# Patient Record
Sex: FEMALE | Race: WHITE | NOT HISPANIC OR LATINO | Employment: OTHER | URBAN - METROPOLITAN AREA
[De-identification: names, ages, dates, MRNs, and addresses within clinical notes are randomized per-mention and may not be internally consistent; named-entity substitution may affect disease eponyms.]

---

## 2017-03-03 ENCOUNTER — ALLSCRIPTS OFFICE VISIT (OUTPATIENT)
Dept: OTHER | Facility: OTHER | Age: 82
End: 2017-03-03

## 2017-03-03 DIAGNOSIS — R06.09 OTHER FORMS OF DYSPNEA: ICD-10-CM

## 2017-03-03 DIAGNOSIS — I25.10 ATHEROSCLEROTIC HEART DISEASE OF NATIVE CORONARY ARTERY WITHOUT ANGINA PECTORIS: ICD-10-CM

## 2017-03-03 DIAGNOSIS — I42.9 CARDIOMYOPATHY (HCC): ICD-10-CM

## 2017-03-03 DIAGNOSIS — Z86.79 PERSONAL HISTORY OF OTHER DISEASES OF THE CIRCULATORY SYSTEM: ICD-10-CM

## 2017-03-03 DIAGNOSIS — I44.7 LEFT BUNDLE-BRANCH BLOCK: ICD-10-CM

## 2017-03-20 ENCOUNTER — HOSPITAL ENCOUNTER (OUTPATIENT)
Dept: NON INVASIVE DIAGNOSTICS | Facility: CLINIC | Age: 82
Discharge: HOME | End: 2017-03-20
Payer: MEDICARE

## 2017-03-20 DIAGNOSIS — I25.10 ATHEROSCLEROTIC HEART DISEASE OF NATIVE CORONARY ARTERY WITHOUT ANGINA PECTORIS: ICD-10-CM

## 2017-03-20 DIAGNOSIS — Z86.79 PERSONAL HISTORY OF OTHER DISEASES OF THE CIRCULATORY SYSTEM: ICD-10-CM

## 2017-03-20 DIAGNOSIS — R06.09 OTHER FORMS OF DYSPNEA: ICD-10-CM

## 2017-03-20 DIAGNOSIS — I44.7 LEFT BUNDLE-BRANCH BLOCK: ICD-10-CM

## 2017-03-20 DIAGNOSIS — I42.9 CARDIOMYOPATHY (HCC): ICD-10-CM

## 2017-03-20 LAB
CHEST PAIN STATEMENT: NORMAL
MAX DIASTOLIC BP: 70 MMHG
MAX HEART RATE: 85 BPM
MAX PREDICTED HEART RATE: 133 BPM
MAX. SYSTOLIC BP: 130 MMHG
PROTOCOL NAME: NORMAL
REASON FOR TERMINATION: NORMAL
TARGET HR FORMULA: NORMAL
TEST INDICATION: NORMAL
TIME IN EXERCISE PHASE: 180 S

## 2017-03-20 PROCEDURE — 93306 TTE W/DOPPLER COMPLETE: CPT

## 2017-03-20 PROCEDURE — 78452 HT MUSCLE IMAGE SPECT MULT: CPT

## 2017-03-20 PROCEDURE — A9502 TC99M TETROFOSMIN: HCPCS

## 2017-03-20 PROCEDURE — 93017 CV STRESS TEST TRACING ONLY: CPT

## 2017-03-20 RX ADMIN — REGADENOSON 0.4 MG: 0.08 INJECTION, SOLUTION INTRAVENOUS at 13:17

## 2017-04-03 ENCOUNTER — ALLSCRIPTS OFFICE VISIT (OUTPATIENT)
Dept: OTHER | Facility: OTHER | Age: 82
End: 2017-04-03

## 2017-04-03 ENCOUNTER — APPOINTMENT (OUTPATIENT)
Dept: LAB | Facility: CLINIC | Age: 82
End: 2017-04-03
Payer: MEDICARE

## 2017-04-03 DIAGNOSIS — R94.39 OTHER NONSPECIFIC ABNORMAL CARDIOVASCULAR SYSTEM FUNCTION STUDY: ICD-10-CM

## 2017-04-03 DIAGNOSIS — I25.10 ATHEROSCLEROTIC HEART DISEASE OF NATIVE CORONARY ARTERY WITHOUT ANGINA PECTORIS: ICD-10-CM

## 2017-04-03 DIAGNOSIS — R06.09 OTHER FORMS OF DYSPNEA: ICD-10-CM

## 2017-04-03 LAB
ANION GAP SERPL CALCULATED.3IONS-SCNC: 8 MMOL/L (ref 4–13)
BUN SERPL-MCNC: 16 MG/DL (ref 5–25)
CALCIUM SERPL-MCNC: 9.2 MG/DL (ref 8.3–10.1)
CHLORIDE SERPL-SCNC: 99 MMOL/L (ref 100–108)
CO2 SERPL-SCNC: 32 MMOL/L (ref 21–32)
CREAT SERPL-MCNC: 0.71 MG/DL (ref 0.6–1.3)
ERYTHROCYTE [DISTWIDTH] IN BLOOD BY AUTOMATED COUNT: 14.1 % (ref 11.6–15.1)
GFR SERPL CREATININE-BSD FRML MDRD: >60 ML/MIN/1.73SQ M
GLUCOSE SERPL-MCNC: 67 MG/DL (ref 65–140)
HCT VFR BLD AUTO: 38.9 % (ref 34.8–46.1)
HGB BLD-MCNC: 12.6 G/DL (ref 11.5–15.4)
INR PPP: 1.05 (ref 0.86–1.16)
MCH RBC QN AUTO: 29.4 PG (ref 26.8–34.3)
MCHC RBC AUTO-ENTMCNC: 32.4 G/DL (ref 31.4–37.4)
MCV RBC AUTO: 91 FL (ref 82–98)
PLATELET # BLD AUTO: 228 THOUSANDS/UL (ref 149–390)
PMV BLD AUTO: 9.5 FL (ref 8.9–12.7)
POTASSIUM SERPL-SCNC: 4.2 MMOL/L (ref 3.5–5.3)
PROTHROMBIN TIME: 13.5 SECONDS (ref 12–14.3)
RBC # BLD AUTO: 4.29 MILLION/UL (ref 3.81–5.12)
SODIUM SERPL-SCNC: 139 MMOL/L (ref 136–145)
WBC # BLD AUTO: 7.01 THOUSAND/UL (ref 4.31–10.16)

## 2017-04-03 PROCEDURE — 85610 PROTHROMBIN TIME: CPT

## 2017-04-03 PROCEDURE — 85027 COMPLETE CBC AUTOMATED: CPT

## 2017-04-03 PROCEDURE — 36415 COLL VENOUS BLD VENIPUNCTURE: CPT

## 2017-04-03 PROCEDURE — 80048 BASIC METABOLIC PNL TOTAL CA: CPT

## 2017-04-07 PROBLEM — I25.10 CAD (CORONARY ARTERY DISEASE): Status: ACTIVE | Noted: 2017-04-07

## 2017-04-07 PROBLEM — I10 HTN (HYPERTENSION): Status: ACTIVE | Noted: 2017-04-07

## 2017-04-10 ENCOUNTER — HOSPITAL ENCOUNTER (OUTPATIENT)
Dept: NON INVASIVE DIAGNOSTICS | Facility: HOSPITAL | Age: 82
Discharge: HOME | End: 2017-04-10
Attending: INTERNAL MEDICINE | Admitting: INTERNAL MEDICINE
Payer: MEDICARE

## 2017-04-10 VITALS
BODY MASS INDEX: 32.89 KG/M2 | OXYGEN SATURATION: 98 % | HEART RATE: 75 BPM | HEIGHT: 60 IN | SYSTOLIC BLOOD PRESSURE: 145 MMHG | TEMPERATURE: 97.5 F | DIASTOLIC BLOOD PRESSURE: 64 MMHG | WEIGHT: 167.55 LBS | RESPIRATION RATE: 18 BRPM

## 2017-04-10 DIAGNOSIS — I25.10 ATHEROSCLEROTIC HEART DISEASE OF NATIVE CORONARY ARTERY WITHOUT ANGINA PECTORIS: ICD-10-CM

## 2017-04-10 DIAGNOSIS — R06.09 OTHER FORMS OF DYSPNEA: ICD-10-CM

## 2017-04-10 DIAGNOSIS — R94.39 OTHER NONSPECIFIC ABNORMAL CARDIOVASCULAR SYSTEM FUNCTION STUDY: ICD-10-CM

## 2017-04-10 LAB
ANION GAP SERPL CALCULATED.3IONS-SCNC: 11 MMOL/L (ref 4–13)
BASOPHILS # BLD AUTO: 0.01 THOUSANDS/ΜL (ref 0–0.1)
BASOPHILS NFR BLD AUTO: 0 % (ref 0–1)
BUN SERPL-MCNC: 12 MG/DL (ref 5–25)
CALCIUM SERPL-MCNC: 9.1 MG/DL (ref 8.3–10.1)
CHLORIDE SERPL-SCNC: 99 MMOL/L (ref 100–108)
CO2 SERPL-SCNC: 27 MMOL/L (ref 21–32)
CREAT SERPL-MCNC: 0.73 MG/DL (ref 0.6–1.3)
EOSINOPHIL # BLD AUTO: 0.1 THOUSAND/ΜL (ref 0–0.61)
EOSINOPHIL NFR BLD AUTO: 2 % (ref 0–6)
ERYTHROCYTE [DISTWIDTH] IN BLOOD BY AUTOMATED COUNT: 14.2 % (ref 11.6–15.1)
GFR SERPL CREATININE-BSD FRML MDRD: >60 ML/MIN/1.73SQ M
GLUCOSE P FAST SERPL-MCNC: 101 MG/DL (ref 65–99)
GLUCOSE SERPL-MCNC: 101 MG/DL (ref 65–140)
HCT VFR BLD AUTO: 39.1 % (ref 34.8–46.1)
HGB BLD-MCNC: 12.8 G/DL (ref 11.5–15.4)
INR PPP: 0.99 (ref 0.86–1.16)
LYMPHOCYTES # BLD AUTO: 0.99 THOUSANDS/ΜL (ref 0.6–4.47)
LYMPHOCYTES NFR BLD AUTO: 19 % (ref 14–44)
MAGNESIUM SERPL-MCNC: 1.8 MG/DL (ref 1.6–2.6)
MCH RBC QN AUTO: 29.4 PG (ref 26.8–34.3)
MCHC RBC AUTO-ENTMCNC: 32.7 G/DL (ref 31.4–37.4)
MCV RBC AUTO: 90 FL (ref 82–98)
MONOCYTES # BLD AUTO: 0.41 THOUSAND/ΜL (ref 0.17–1.22)
MONOCYTES NFR BLD AUTO: 8 % (ref 4–12)
NEUTROPHILS # BLD AUTO: 3.74 THOUSANDS/ΜL (ref 1.85–7.62)
NEUTS SEG NFR BLD AUTO: 71 % (ref 43–75)
PLATELET # BLD AUTO: 227 THOUSANDS/UL (ref 149–390)
PMV BLD AUTO: 9.2 FL (ref 8.9–12.7)
POTASSIUM SERPL-SCNC: 4.8 MMOL/L (ref 3.5–5.3)
PROTHROMBIN TIME: 12.9 SECONDS (ref 12–14.3)
RBC # BLD AUTO: 4.36 MILLION/UL (ref 3.81–5.12)
SODIUM SERPL-SCNC: 137 MMOL/L (ref 136–145)
WBC # BLD AUTO: 5.25 THOUSAND/UL (ref 4.31–10.16)

## 2017-04-10 PROCEDURE — 93458 L HRT ARTERY/VENTRICLE ANGIO: CPT | Performed by: INTERNAL MEDICINE

## 2017-04-10 PROCEDURE — C1769 GUIDE WIRE: HCPCS | Performed by: INTERNAL MEDICINE

## 2017-04-10 PROCEDURE — 99152 MOD SED SAME PHYS/QHP 5/>YRS: CPT | Performed by: INTERNAL MEDICINE

## 2017-04-10 PROCEDURE — C1760 CLOSURE DEV, VASC: HCPCS | Performed by: INTERNAL MEDICINE

## 2017-04-10 PROCEDURE — 99153 MOD SED SAME PHYS/QHP EA: CPT | Performed by: INTERNAL MEDICINE

## 2017-04-10 PROCEDURE — 83735 ASSAY OF MAGNESIUM: CPT | Performed by: NURSE PRACTITIONER

## 2017-04-10 PROCEDURE — 80048 BASIC METABOLIC PNL TOTAL CA: CPT | Performed by: NURSE PRACTITIONER

## 2017-04-10 PROCEDURE — C1894 INTRO/SHEATH, NON-LASER: HCPCS | Performed by: INTERNAL MEDICINE

## 2017-04-10 PROCEDURE — 93567 NJX CAR CTH SPRVLV AORTGRPHY: CPT | Performed by: INTERNAL MEDICINE

## 2017-04-10 PROCEDURE — 85025 COMPLETE CBC W/AUTO DIFF WBC: CPT | Performed by: NURSE PRACTITIONER

## 2017-04-10 PROCEDURE — 85610 PROTHROMBIN TIME: CPT | Performed by: NURSE PRACTITIONER

## 2017-04-10 RX ORDER — METOPROLOL SUCCINATE 50 MG/1
50 TABLET, EXTENDED RELEASE ORAL DAILY
Status: DISCONTINUED | OUTPATIENT
Start: 2017-04-10 | End: 2017-04-11 | Stop reason: HOSPADM

## 2017-04-10 RX ORDER — LEVOTHYROXINE SODIUM 0.03 MG/1
25 TABLET ORAL DAILY
Status: DISCONTINUED | OUTPATIENT
Start: 2017-04-10 | End: 2017-04-11 | Stop reason: HOSPADM

## 2017-04-10 RX ORDER — CLOPIDOGREL BISULFATE 75 MG/1
75 TABLET ORAL DAILY
Status: DISCONTINUED | OUTPATIENT
Start: 2017-04-10 | End: 2017-04-11 | Stop reason: HOSPADM

## 2017-04-10 RX ORDER — NITROGLYCERIN 20 MG/100ML
INJECTION INTRAVENOUS CODE/TRAUMA/SEDATION MEDICATION
Status: COMPLETED | OUTPATIENT
Start: 2017-04-10 | End: 2017-04-10

## 2017-04-10 RX ORDER — ASPIRIN 81 MG/1
81 TABLET, CHEWABLE ORAL DAILY
Status: DISCONTINUED | OUTPATIENT
Start: 2017-04-11 | End: 2017-04-11 | Stop reason: HOSPADM

## 2017-04-10 RX ORDER — PANTOPRAZOLE SODIUM 40 MG/1
40 TABLET, DELAYED RELEASE ORAL DAILY
Status: DISCONTINUED | OUTPATIENT
Start: 2017-04-10 | End: 2017-04-11 | Stop reason: HOSPADM

## 2017-04-10 RX ORDER — ASPIRIN 81 MG/1
324 TABLET, CHEWABLE ORAL ONCE
Status: COMPLETED | OUTPATIENT
Start: 2017-04-10 | End: 2017-04-10

## 2017-04-10 RX ORDER — ACETAMINOPHEN 325 MG/1
650 TABLET ORAL EVERY 4 HOURS PRN
Status: DISCONTINUED | OUTPATIENT
Start: 2017-04-10 | End: 2017-04-11 | Stop reason: HOSPADM

## 2017-04-10 RX ORDER — OXYCODONE HYDROCHLORIDE 5 MG/1
5 TABLET ORAL EVERY 4 HOURS PRN
Status: DISCONTINUED | OUTPATIENT
Start: 2017-04-10 | End: 2017-04-11 | Stop reason: HOSPADM

## 2017-04-10 RX ORDER — ACETAMINOPHEN 325 MG/1
500 TABLET ORAL EVERY 6 HOURS PRN
Status: DISCONTINUED | OUTPATIENT
Start: 2017-04-10 | End: 2017-04-11 | Stop reason: HOSPADM

## 2017-04-10 RX ORDER — PRAVASTATIN SODIUM 20 MG
20 TABLET ORAL DAILY
Status: DISCONTINUED | OUTPATIENT
Start: 2017-04-10 | End: 2017-04-11 | Stop reason: HOSPADM

## 2017-04-10 RX ORDER — MIDAZOLAM HYDROCHLORIDE 1 MG/ML
INJECTION INTRAMUSCULAR; INTRAVENOUS CODE/TRAUMA/SEDATION MEDICATION
Status: COMPLETED | OUTPATIENT
Start: 2017-04-10 | End: 2017-04-10

## 2017-04-10 RX ORDER — CHOLECALCIFEROL (VITAMIN D3) 25 MCG
3000 TABLET,CHEWABLE ORAL DAILY
Status: DISCONTINUED | OUTPATIENT
Start: 2017-04-10 | End: 2017-04-11 | Stop reason: HOSPADM

## 2017-04-10 RX ORDER — FOLIC ACID/MULTIVIT,IRON,MINER .4-18-35
1 TABLET,CHEWABLE ORAL DAILY
Status: DISCONTINUED | OUTPATIENT
Start: 2017-04-10 | End: 2017-04-11 | Stop reason: HOSPADM

## 2017-04-10 RX ORDER — HEPARIN SODIUM 1000 [USP'U]/ML
INJECTION, SOLUTION INTRAVENOUS; SUBCUTANEOUS CODE/TRAUMA/SEDATION MEDICATION
Status: COMPLETED | OUTPATIENT
Start: 2017-04-10 | End: 2017-04-10

## 2017-04-10 RX ORDER — CALCIUM CARBONATE 500(1250)
1 TABLET ORAL
Status: DISCONTINUED | OUTPATIENT
Start: 2017-04-11 | End: 2017-04-11 | Stop reason: HOSPADM

## 2017-04-10 RX ORDER — SODIUM CHLORIDE 9 MG/ML
100 INJECTION, SOLUTION INTRAVENOUS CONTINUOUS
Status: DISCONTINUED | OUTPATIENT
Start: 2017-04-10 | End: 2017-04-11 | Stop reason: HOSPADM

## 2017-04-10 RX ORDER — LISINOPRIL 2.5 MG/1
2.5 TABLET ORAL DAILY
Status: DISCONTINUED | OUTPATIENT
Start: 2017-04-10 | End: 2017-04-11 | Stop reason: HOSPADM

## 2017-04-10 RX ORDER — SODIUM CHLORIDE 9 MG/ML
250 INJECTION, SOLUTION INTRAVENOUS CONTINUOUS
Status: DISPENSED | OUTPATIENT
Start: 2017-04-10 | End: 2017-04-10

## 2017-04-10 RX ORDER — LIDOCAINE HYDROCHLORIDE 10 MG/ML
INJECTION, SOLUTION INFILTRATION; PERINEURAL CODE/TRAUMA/SEDATION MEDICATION
Status: COMPLETED | OUTPATIENT
Start: 2017-04-10 | End: 2017-04-10

## 2017-04-10 RX ORDER — FENTANYL CITRATE 50 UG/ML
INJECTION, SOLUTION INTRAMUSCULAR; INTRAVENOUS CODE/TRAUMA/SEDATION MEDICATION
Status: COMPLETED | OUTPATIENT
Start: 2017-04-10 | End: 2017-04-10

## 2017-04-10 RX ORDER — VERAPAMIL HYDROCHLORIDE 2.5 MG/ML
INJECTION, SOLUTION INTRAVENOUS CODE/TRAUMA/SEDATION MEDICATION
Status: COMPLETED | OUTPATIENT
Start: 2017-04-10 | End: 2017-04-10

## 2017-04-10 RX ADMIN — IOHEXOL 49 ML: 350 INJECTION, SOLUTION INTRAVENOUS at 09:29

## 2017-04-10 RX ADMIN — NITROGLYCERIN 200 MCG: 20 INJECTION INTRAVENOUS at 09:09

## 2017-04-10 RX ADMIN — IOHEXOL 100 ML: 350 INJECTION, SOLUTION INTRAVENOUS at 09:30

## 2017-04-10 RX ADMIN — LIDOCAINE HYDROCHLORIDE 10 ML: 10 INJECTION, SOLUTION INFILTRATION; PERINEURAL at 09:14

## 2017-04-10 RX ADMIN — SODIUM CHLORIDE 100 ML/HR: 0.9 INJECTION, SOLUTION INTRAVENOUS at 08:12

## 2017-04-10 RX ADMIN — ASPIRIN 81 MG 324 MG: 81 TABLET ORAL at 08:11

## 2017-04-10 RX ADMIN — HEPARIN SODIUM 4000 UNITS: 1000 INJECTION INTRAVENOUS; SUBCUTANEOUS at 09:09

## 2017-04-10 RX ADMIN — LIDOCAINE HYDROCHLORIDE 1 ML: 10 INJECTION, SOLUTION INFILTRATION; PERINEURAL at 09:07

## 2017-04-10 RX ADMIN — VERAPAMIL HYDROCHLORIDE 2.5 MG: 2.5 INJECTION, SOLUTION INTRAVENOUS at 09:09

## 2017-04-10 RX ADMIN — IOHEXOL 37 ML: 350 INJECTION, SOLUTION INTRAVENOUS at 09:28

## 2017-04-10 RX ADMIN — MIDAZOLAM HYDROCHLORIDE 2 MG: 1 INJECTION, SOLUTION INTRAMUSCULAR; INTRAVENOUS at 09:00

## 2017-04-10 RX ADMIN — MIDAZOLAM HYDROCHLORIDE 1 MG: 1 INJECTION, SOLUTION INTRAMUSCULAR; INTRAVENOUS at 09:18

## 2017-04-10 RX ADMIN — FENTANYL CITRATE 50 MCG: 50 INJECTION INTRAMUSCULAR; INTRAVENOUS at 09:18

## 2017-04-10 RX ADMIN — FENTANYL CITRATE 50 MCG: 50 INJECTION INTRAMUSCULAR; INTRAVENOUS at 09:01

## 2017-05-15 ENCOUNTER — ALLSCRIPTS OFFICE VISIT (OUTPATIENT)
Dept: OTHER | Facility: OTHER | Age: 82
End: 2017-05-15

## 2017-05-22 ENCOUNTER — GENERIC CONVERSION - ENCOUNTER (OUTPATIENT)
Dept: OTHER | Facility: OTHER | Age: 82
End: 2017-05-22

## 2017-07-24 ENCOUNTER — TRANSCRIBE ORDERS (OUTPATIENT)
Dept: RADIOLOGY | Facility: HOSPITAL | Age: 82
End: 2017-07-24

## 2017-07-24 ENCOUNTER — ALLSCRIPTS OFFICE VISIT (OUTPATIENT)
Dept: OTHER | Facility: OTHER | Age: 82
End: 2017-07-24

## 2017-07-24 ENCOUNTER — HOSPITAL ENCOUNTER (OUTPATIENT)
Dept: RADIOLOGY | Facility: HOSPITAL | Age: 82
Discharge: HOME | End: 2017-07-24
Attending: ANESTHESIOLOGY
Payer: MEDICARE

## 2017-07-24 DIAGNOSIS — M19.012 PRIMARY OSTEOARTHRITIS OF LEFT SHOULDER: ICD-10-CM

## 2017-07-24 DIAGNOSIS — M19.011 PRIMARY OSTEOARTHRITIS OF RIGHT SHOULDER: ICD-10-CM

## 2017-07-24 PROCEDURE — 73030 X-RAY EXAM OF SHOULDER: CPT

## 2017-08-01 ENCOUNTER — ALLSCRIPTS OFFICE VISIT (OUTPATIENT)
Dept: RADIOLOGY | Facility: CLINIC | Age: 82
End: 2017-08-01
Payer: MEDICARE

## 2017-08-01 PROCEDURE — 77002 NEEDLE LOCALIZATION BY XRAY: CPT | Performed by: ANESTHESIOLOGY

## 2017-10-09 NOTE — PRE-PROCEDURE INSTRUCTIONS
Pre-Surgery Instructions:   Medication Instructions    acetaminophen (TYLENOL) 500 mg tablet Instructed patient per Anesthesia Guidelines   aspirin 81 MG tablet Patient was instructed to contact Physician for medication instruction   Calcium Carbonate-Vitamin D (CALTRATE 600+D PO) Instructed patient per Anesthesia Guidelines   clopidogrel (PLAVIX) 75 mg tablet Patient was instructed to contact Physician for medication instruction   Cyanocobalamin (B-12) 3000 MCG CAPS Instructed patient per Anesthesia Guidelines   levothyroxine 25 mcg tablet Instructed patient per Anesthesia Guidelines   lisinopril (ZESTRIL) 2 5 mg tablet Instructed patient per Anesthesia Guidelines   metFORMIN (GLUCOPHAGE) 500 mg tablet Instructed patient per Anesthesia Guidelines   metoprolol succinate (TOPROL-XL) 50 mg 24 hr tablet Instructed patient per Anesthesia Guidelines   Misc Natural Products (ENERGY FOCUS PO) Instructed patient per Anesthesia Guidelines   multivitamin-iron-minerals-folic acid (CENTRUM) chewable tablet Instructed patient per Anesthesia Guidelines   pravastatin (PRAVACHOL) 20 mg tablet Instructed patient per Anesthesia Guidelines  Spoke to patient via telephone  Patient instructed about medication as per anesthesia guidelines  Patient instructed to take Toprol-XL and levothyroxine am of surgery with sip of water  Patient reports already stopping aspirin and plavix  Patient instructed to avoid all Aspirin, NSAIDs, supplements, and vitamins from now and until after surgery  [facility] pre-op instructions reviewed  Pre-op bathing reviewed and patient to  chlorhexidine soap or dial antibacterial soap

## 2017-10-10 ENCOUNTER — ANESTHESIA EVENT (OUTPATIENT)
Dept: PERIOP | Facility: HOSPITAL | Age: 82
End: 2017-10-10
Payer: MEDICARE

## 2017-10-11 ENCOUNTER — ANESTHESIA (OUTPATIENT)
Dept: PERIOP | Facility: HOSPITAL | Age: 82
End: 2017-10-11
Payer: MEDICARE

## 2017-10-11 ENCOUNTER — HOSPITAL ENCOUNTER (OUTPATIENT)
Facility: HOSPITAL | Age: 82
Setting detail: OUTPATIENT SURGERY
Discharge: HOME | End: 2017-10-11
Attending: PODIATRIST | Admitting: PODIATRIST
Payer: MEDICARE

## 2017-10-11 VITALS
TEMPERATURE: 98.1 F | BODY MASS INDEX: 34.47 KG/M2 | OXYGEN SATURATION: 96 % | RESPIRATION RATE: 16 BRPM | HEIGHT: 59 IN | WEIGHT: 171 LBS | DIASTOLIC BLOOD PRESSURE: 74 MMHG | HEART RATE: 70 BPM | SYSTOLIC BLOOD PRESSURE: 174 MMHG

## 2017-10-11 DIAGNOSIS — M24.675 ANKYLOSIS OF LEFT FOOT: ICD-10-CM

## 2017-10-11 DIAGNOSIS — M20.42 OTHER HAMMER TOE(S) (ACQUIRED), LEFT FOOT: ICD-10-CM

## 2017-10-11 PROCEDURE — 82948 REAGENT STRIP/BLOOD GLUCOSE: CPT

## 2017-10-11 PROCEDURE — 88311 DECALCIFY TISSUE: CPT | Performed by: PODIATRIST

## 2017-10-11 PROCEDURE — 88305 TISSUE EXAM BY PATHOLOGIST: CPT | Performed by: PODIATRIST

## 2017-10-11 RX ORDER — FENTANYL CITRATE/PF 50 MCG/ML
25 SYRINGE (ML) INJECTION
Status: DISCONTINUED | OUTPATIENT
Start: 2017-10-11 | End: 2017-10-11 | Stop reason: HOSPADM

## 2017-10-11 RX ORDER — PROPOFOL 10 MG/ML
INJECTION, EMULSION INTRAVENOUS CONTINUOUS PRN
Status: DISCONTINUED | OUTPATIENT
Start: 2017-10-11 | End: 2017-10-11 | Stop reason: SURG

## 2017-10-11 RX ORDER — DEXAMETHASONE SODIUM PHOSPHATE 4 MG/ML
INJECTION, SOLUTION INTRA-ARTICULAR; INTRALESIONAL; INTRAMUSCULAR; INTRAVENOUS; SOFT TISSUE AS NEEDED
Status: DISCONTINUED | OUTPATIENT
Start: 2017-10-11 | End: 2017-10-11 | Stop reason: HOSPADM

## 2017-10-11 RX ORDER — SODIUM CHLORIDE 9 MG/ML
125 INJECTION, SOLUTION INTRAVENOUS CONTINUOUS
Status: DISCONTINUED | OUTPATIENT
Start: 2017-10-11 | End: 2017-10-11 | Stop reason: HOSPADM

## 2017-10-11 RX ORDER — MAGNESIUM HYDROXIDE 1200 MG/15ML
LIQUID ORAL AS NEEDED
Status: DISCONTINUED | OUTPATIENT
Start: 2017-10-11 | End: 2017-10-11 | Stop reason: HOSPADM

## 2017-10-11 RX ORDER — ONDANSETRON 2 MG/ML
4 INJECTION INTRAMUSCULAR; INTRAVENOUS ONCE AS NEEDED
Status: DISCONTINUED | OUTPATIENT
Start: 2017-10-11 | End: 2017-10-11 | Stop reason: HOSPADM

## 2017-10-11 RX ORDER — PROPOFOL 10 MG/ML
INJECTION, EMULSION INTRAVENOUS AS NEEDED
Status: DISCONTINUED | OUTPATIENT
Start: 2017-10-11 | End: 2017-10-11 | Stop reason: SURG

## 2017-10-11 RX ORDER — FENTANYL CITRATE 50 UG/ML
INJECTION, SOLUTION INTRAMUSCULAR; INTRAVENOUS AS NEEDED
Status: DISCONTINUED | OUTPATIENT
Start: 2017-10-11 | End: 2017-10-11 | Stop reason: SURG

## 2017-10-11 RX ORDER — OXYCODONE HYDROCHLORIDE AND ACETAMINOPHEN 5; 325 MG/1; MG/1
1 TABLET ORAL EVERY 4 HOURS PRN
Status: DISCONTINUED | OUTPATIENT
Start: 2017-10-11 | End: 2017-10-11 | Stop reason: HOSPADM

## 2017-10-11 RX ORDER — BUPIVACAINE HYDROCHLORIDE 5 MG/ML
INJECTION, SOLUTION EPIDURAL; INTRACAUDAL AS NEEDED
Status: DISCONTINUED | OUTPATIENT
Start: 2017-10-11 | End: 2017-10-11 | Stop reason: HOSPADM

## 2017-10-11 RX ORDER — MEPERIDINE HYDROCHLORIDE 50 MG/ML
12.5 INJECTION INTRAMUSCULAR; INTRAVENOUS; SUBCUTANEOUS AS NEEDED
Status: DISCONTINUED | OUTPATIENT
Start: 2017-10-11 | End: 2017-10-11 | Stop reason: HOSPADM

## 2017-10-11 RX ADMIN — PROPOFOL 20 MG: 10 INJECTION, EMULSION INTRAVENOUS at 12:39

## 2017-10-11 RX ADMIN — PROPOFOL 30 MCG/KG/MIN: 10 INJECTION, EMULSION INTRAVENOUS at 12:30

## 2017-10-11 RX ADMIN — SODIUM CHLORIDE 125 ML/HR: 0.9 INJECTION, SOLUTION INTRAVENOUS at 11:49

## 2017-10-11 RX ADMIN — PROPOFOL 20 MG: 10 INJECTION, EMULSION INTRAVENOUS at 12:26

## 2017-10-11 RX ADMIN — CEFAZOLIN SODIUM 1000 MG: 1 SOLUTION INTRAVENOUS at 12:32

## 2017-10-11 RX ADMIN — FENTANYL CITRATE 100 MCG: 50 INJECTION, SOLUTION INTRAMUSCULAR; INTRAVENOUS at 12:30

## 2017-10-11 NOTE — DISCHARGE INSTRUCTIONS
Dr Adelfo Sam Instructions    1  Take your prescribed medication as directed  2  Upon arrival at home, lie down and elevate your surgical foot on 2 pillows  3  Remain quiet, off your feet as much as possible, for the first 24-48 hours  This is when your feet first swell and may become painful  After 48 hours you may begin limited walking following these restrictions:   Weightbear as tolerated to surgical foot  4  Drink large quantities of water and citrus fruit juice  Consume no alcohol  Continue a well-balanced diet  5  Report any unusual discomfort or fever to this office  6  A limited amount of discomfort and swelling is to be expected  In some cases the skin may take on a bruised appearance  The surgical solution that was applied to your foot prior to the operation is dark in color and the operation site may appear to be oozing when it actually is not  7  A slight amount of blood is to be expected, and is no cause for alarm  Do not remove the dressings  If there is active bleeding and if the bleeding persists, add additional gauze to the bandage, apply direct pressure, elevate your feet and call the office  8  Do not get the dressings wet  As regular bathing may be inconvenient, sponge baths are recommended  9  Wear your special open shoes anytime you put weight on your foot, even if it is just to walk to the bathroom and back  10  Having performed the operation, we are interested in a prompt recovery  Please cooperate by following the above instructions  11  Please call to confirm your follow up appointment

## 2017-10-11 NOTE — DISCHARGE SUMMARY
Discharge Summary Outpatient Procedure Podiatry- Krystina Lu 80 y o  female MRN: [id number]    Unit/Bed#: OR POOL Encounter: [id number]    Admission Date: 10/11/2017     Admitting Diagnosis: Ankylosis of left foot [M24 675]  Other hammer toe(s) (acquired), left foot [M20 42]    Discharge Diagnosis: same    Procedures Performed: AMPUTATION SECOND TOE: 49870 (CPT®) left foot     Complications: none    Condition at Discharge: stable    Discharge instructions/Information to patient and family:   See after visit summary for information provided to patient and family  Provisions for Follow-Up Care/Important appointments:  See after visit summary for information related to follow-up care and any pertinent home health orders  Discharge Medications:  See after visit summary for reconciled discharge medications provided to patient and family

## 2017-10-11 NOTE — ANESTHESIA PREPROCEDURE EVALUATION
Review of Systems/Medical History  Patient summary reviewed  Chart reviewed  No history of anesthetic complications     Cardiovascular  Hyperlipidemia, Hypertension , No past MI , CAD, , Cardiac stents > 1 year History of percutaneous transluminal coronary angioplasty, Dysrhythmias (Hx LBBB), atrial flutter, DVT  PE,  Pulmonary  Negative pulmonary ROS Shortness of breath, ,        GI/Hepatic    GERD well controlled,        Kidney stones,        Endo/Other  Diabetes well controlled type 2 Oral agent, History of thyroid disease , hypothyroidism, Arthritis     GYN       Hematology  Negative hematology ROS      Musculoskeletal  Obesity ,        Neurology  Negative neurology ROS      Psychology   Anxiety,            Physical Exam    Airway    Mallampati score: II  TM Distance: >3 FB  Neck ROM: full     Dental   No notable dental hx     Cardiovascular  Rhythm: regular, Rate: normal, Cardiovascular exam normal    Pulmonary  Pulmonary exam normal Breath sounds clear to auscultation,     Other Findings        Anesthesia Plan  ASA Score- 3       Anesthesia Type- IV sedation with anesthesia with ASA Monitors  Additional Monitors:   Airway Plan:           Induction- intravenous  Informed Consent- Anesthetic plan and risks discussed with patient

## 2017-10-11 NOTE — OP NOTE
OPERATIVE REPORT  PATIENT NAME: Skye Patrick    :  1929  MRN: [id number]  Pt Location: [facility] OR ROOM 02    SURGERY DATE: 10/11/2017    Surgeon(s) and Role:     * Judson Gutierrez DPM - Primary     * Enid Kilpatrick DPM - Assisting    Preop Diagnosis: Ankylosis of left foot [M24 675]  Other hammer toe(s) (acquired), left foot [M20 42]    Post-Op Diagnosis Codes:     * Ankylosis of left foot [M24 675]     * Other hammer toe(s) (acquired), left foot [M20 42]    Procedure(s) (LRB):  AMPUTATION SECOND TOE (Left)    Specimen(s):  ID Type Source Tests Collected by Time Destination   1 : 2nd toe Tissue Toe, Left TISSUE EXAM Judson Gutierrez DPM 10/11/2017 1245        Estimated Blood Loss:   Minimal    Drains: none     Anesthesia Type:   IV Sedation with Anesthesia; 10cc of 0 5% marcaine plain     Hemostasis: PAT at 250mmHg for 44 minutes     Injectables: 1cc of dexamethasone 4mg    Materials: 3-0 vicryl, 4-0 vicryl, 4-0 prolene     Operative Indications: Ankylosis of left foot [M24 675]  Other hammer toe(s) (acquired), left foot [M20 42]    Operative Findings:  As expected with diagnosis  No signs of infection  Metatarsal head proximally appeared to be white and viable  Complications:   None    Procedure and Technique:  Under mild sedation, the patient was brought into the operating room and placed on the operating room table in the supine position  A pneumatic ankle tourniquet was then placed around the patient's left ankle with ample webril padding  A time out was performed to confirm the correct patient, procedure and site with all parties in agreement  Following IV sedation, local anesthetic was obtained about the patient's left foot and injection was performed consisting of 10ml of 0 5% Bupivacaine plain  The foot was then scrubbed, prepped and draped in the usual aseptic manner   An esmarch bandage was utilized to exsangunate the patients foot and the pneumatic ankle tourniquet was then inflated  The esmarch bandage was removed and the foot was placed on the operating room table  Attention was directed to the 2 digit where a raquet type of incision was made  Utilizing a sterile 15 blade, this incision was carried deep straight to bone  Soft tissue structures were then reflected off the metatarsal phalangeal joint  Utilizing a sterile 15 blade, all capsular structures were severed and the toe was then disarticulated at the metatarsal phalangeal joint and then placed on the back table  No deep sinus tracts or areas of purulence were visualized  The remaining bone on the proximal aspect of the joint was noted to be of hard and viable quality  Then capsular and tendinous structures dorsally and plantarly were re-approximated and closed with 3-0 vicryl  Then deep structures were closed with 3-0 vicryl  The wound was then cleansed with copious amount of sterile saline  Subcutaneous structures were closed with 4-0 vicryl in running technique  Skin closure was then obtained with 4-0 prolene placed in a horizontal mattress type of fashion  Post operative injection was performed consisting of 1cc of dexamethasone  Surgical site was then dressed with adaptic, 4x4 gauze, and 4x4 fluffs, kerlex, and then ACE wrap  Tourniquet was deflated at this time and normal hyperemic response was noted to the digits  Patient tolerated the procedure well and was transported to the PACU with VSS       Patient Disposition:  PACU  and hemodynamically stable    SIGNATURE: Dakota Horner DPM  DATE: October 11, 2017  TIME: 1:39 PM

## 2017-10-12 LAB — GLUCOSE SERPL-MCNC: 85 MG/DL (ref 65–140)

## 2017-11-27 ENCOUNTER — TRANSCRIBE ORDERS (OUTPATIENT)
Dept: ADMINISTRATIVE | Facility: HOSPITAL | Age: 82
End: 2017-11-27

## 2017-11-27 ENCOUNTER — ALLSCRIPTS OFFICE VISIT (OUTPATIENT)
Dept: OTHER | Facility: OTHER | Age: 82
End: 2017-11-27

## 2017-11-27 DIAGNOSIS — M50.120 MID-CERVICAL DISC DISORDER, UNSPECIFIED (CODE): ICD-10-CM

## 2017-11-27 DIAGNOSIS — M50.120 CERVICAL DISC DISORDER WITH RADICULOPATHY OF MID-CERVICAL REGION: Primary | ICD-10-CM

## 2017-11-28 NOTE — PROGRESS NOTES
Assessment    1  Cervical disc disorder with radiculopathy of mid-cervical region (723 4) (M50 120)   2  Primary osteoarthritis of left shoulder (715 11) (M19 012)   3  Primary osteoarthritis of right shoulder (715 11) (M19 011)    Plan  Cervical disc disorder with radiculopathy of mid-cervical region    · * MRI CERVICAL SPINE WO CONTRAST; Status:Need Information - FinancialAuthorization; Requested for:27Nov2017;    Perform:[facility]; [phone]; Last Updated By:Dearie, Francois Spatz; 11/27/2017 9:28:17 AM;Ordered; For:Cervical disc disorder with radiculopathy of mid-cervical region; Ordered By:Erik Griffith; Discussion/Summary    The patient has ongoing pain in the neck going into both shoulders and down both arms  While she does have significant osteoarthritis in both shoulders, I suspect there is a component coming from her neck  I will order an MRI of the cervical spine to evaluate further  She may be a candidate for cervical epidural steroid injection which will act to help reduce inflammation and hopefully alleviate some of her pain symptoms  I advised her we will call with results of the MRI and discuss treatment moving forward  For now, she will continue with the Tylenol for pain relief  The patient has the current Goals: Improved pain control  The patent has the current Barriers: None  Patient is able to Self-Care  Chief Complaint    1  Pain    History of Present Illness  The patient is a pleasant 51-year-old female with a history of bilateral shoulder osteoarthritis who returns for follow-up  She is status post bilateral shoulder glenohumeral injection in August  She reports minimal relief  She continues with constant sharp pain in neck that radiates into both shoulders and upper back  She has been taking Tylenol with minimal relief  Delgado Rand presents with complaints of gradual onset of constant episodes of severe bilateral upper back pain, described as sharp   On a scale of 1 to 10, the patient rates the pain as 8  Symptoms are unchanged  Review of Systems   Constitutional: no fever,-- no recent weight gain-- and-- no recent weight loss  Eyes: no double vision-- and-- no blurry vision  Cardiovascular: no chest pain,-- no palpitations-- and-- no lower extremity edema  Respiratory: shortness of breath, but-- no wheezing  Musculoskeletal: difficulty walking,-- joint stiffness-- and-- pain in extremity , but-- no muscle weakness,-- no joint swelling,-- no limb swelling-- and-- no decreased range of motion  Neurological: dizziness, but-- no difficulty swallowing,-- no memory loss,-- no loss of consciousness-- and-- no seizures  Gastrointestinal: constipation-- and-- diarrhea, but-- no nausea-- and-- no vomiting  Genitourinary: no difficulty initiating urine stream,-- no genital pain-- and-- no frequent urination  Integumentary: no complaints of skin rash  Psychiatric: no depression  Endocrine: no excessive thirst,-- no adrenal disease,-- no hypothyroidism-- and-- no hyperthyroidism  Hematologic/Lymphatic: no tendency for easy bruising-- and-- no tendency for easy bleeding  ROS reviewed  Active Problems  1  Acute pain of right hip (719 45) (M25 551)   2  Adhesive capsulitis (726 0) (M75 00)   3  Anxiety (300 00) (F41 9)   4  Arthritis (716 90) (M19 90)   5  History of Cardiomyopathy, secondary (425 9) (I42 9)   6  Cath Stent 1 Proximal Left Anterior Descending Artery   7  Chronic low back pain (724 2,338 29) (M54 5,G89 29)   8  Chronic pain syndrome (338 4) (G89 4)   9  Coronary artery disease (414 00) (I25 10)   10  Diabetes mellitus (250 00) (E11 9)   11  PEREIRA (dyspnea on exertion) (786 09) (R06 09)   12  Generalized osteoarthritis (715 00) (M15 9)   13  H/O prior ablation treatment (V15 29) (Z98 890)   14  History of arthroplasty of right hip (V43 64) (Z96 641)   15  History of atrial flutter (V12 59) (Z86 79)   16  Hyperlipidemia (272 4) (E78 5)   17   Hypertension (401  9) (I10)   18  Hypothyroidism (244 9) (E03 9)   19  LBBB (left bundle branch block) (426 3) (I44 7)   20  Primary osteoarthritis of left shoulder (715 11) (M19 012)   21  Primary osteoarthritis of right shoulder (715 11) (M19 011)   22  Sacroiliitis (720 2) (M46 1)   23  Strain of muscle of right hip (843 8) (D91 082H)    Past Medical History  1  History of Abnormal nuclear stress test (794 39) (R94 39)   2  History of Acute deep vein thrombosis of lower limb, unspecified laterality   3  History of Cardiomyopathy, secondary (425 9) (I42 9)   4  Diabetes mellitus (250 00) (E11 9)   5  History of atrial flutter (V12 59) (Z86 79)   6  History of renal calculi (V13 01) (Z87 442)   7  History of shortness of breath (V13 89) (Z87 898)   8  History of Myalgia (729 1) (M79 1)   9  History of Pulmonary Embolism (V12 55)    The active problems and past medical history were reviewed and updated today  Surgical History  1  History of Cataract Surgery   2  History of Elective Cardioversion   3  History of Hip Replacement   4  History of Hysterectomy   5  Denied: History of Knee Replacement   6  History of Neuroplasty Decompression Median Nerve At Carpal Tunnel   7  History of Tonsillectomy    The surgical history was reviewed and updated today  Family History  Sister    1  Family history of malignant neoplasm (V16 9) (Z80 9)   2  Family history of Parkinson's disease (V17 2) (Z82 0)  Family History    3  Family history of malignant neoplasm (V16 9) (Z80 9)   4  Denied: Family history of rheumatoid arthritis   5  Denied: Family history of systemic lupus erythematosus   6  Family history of No Significant Family History    The family history was reviewed and updated today  Social History     · Being A Social Drinker   · Marital History - Currently    · Never A Smoker   · No drug use  The social history was reviewed and updated today  The social history was reviewed and is unchanged        Current Meds 1  Aspirin 81 MG Oral Tablet Delayed Release; take one tablet by mouth every day; Therapy: 56OTH2710 to (Shashi Oconnor)  Requested for: 39HGE3772; Last Rx:08Nov2017 Ordered   2  Caltrate 600+D 600-400 MG-UNIT CHEW; Take 1 tablet twice daily; Therapy: 70JKK4078 to Recorded   3  Centrum Silver TABS; TAKE 1 TABLET DAILY; Therapy: (Recorded:45Ykr3585) to Recorded   4  Focused Mind CAPS; TAKE 1 CAPSULE DAILY; Therapy: (Recorded:19Jun2015) to Recorded   5  Levothyroxine Sodium 50 MCG Oral Tablet; TAKE 1 TABLET DAILY; Therapy: 22YZI1546 to Recorded   6  Lisinopril 2 5 MG Oral Tablet; Take 1 tablet daily  Requested for: 06Apr2017; Last Rx:03Apr2017 Ordered   7  MetFORMIN HCl - 500 MG Oral Tablet; TAKE 1 TABLET DAILY; Therapy: (Recorded:49Hnn3761) to Recorded   8  Metoprolol Succinate ER 50 MG Oral Tablet Extended Release 24 Hour; TAKE 1 TABLET DAILY AS DIRECTED  Requested for: 49NBT2226; Last Rx:19Nov2015 Ordered   9  Pravastatin Sodium 40 MG Oral Tablet; Take 1 tablet daily; Therapy: 28ZSL9890 to (Charlotte Philippe)  Requested for: 90Cgr7276; Last Rx:34Cdn7384 Ordered   10  Tylenol Extra Strength 500 MG Oral Tablet; TAKE 2 TABLET Twice daily PRN pain; Therapy: (Recorded:19Jun2015) to Recorded   11  Vitamin B12 TABS; TAKE 1 TABLET DAILY AS DIRECTED; Therapy: (Recorded:19Jun2015) to Recorded    The medication list was reviewed and updated today  Allergies  1  No Known Drug Allergies    Vitals  Vital Signs    Recorded: 93LLA1343 08:55AM   Temperature 98 6 F   Heart Rate 64   Systolic 792   Diastolic 78   Height 5 ft    Weight 174 lb    BMI Calculated 33 98   BSA Calculated 1 76   Pain Scale 8       Physical Exam   Constitutional  General appearance: Well developed, well nourished, alert, in no distress, non-toxic and no overt pain behavior  Eyes  Sclera: anicteric  HEENT  Hearing grossly intact  Neck  Neck: Supple, symmetric, trachea midline, no masses  Pulmonary  Respiratory effort: Even and unlabored  Cardiovascular  Examination of extremities: No edema or pitting edema present  Skin  Skin and subcutaneous tissue: Normal without rashes or lesions, well hydrated  Psychiatric  Mood and affect: Mood and affect appropriate  Cervical Spine examination demonstrates Cervical Spine:  Appearance: Normal   Tenderness: cervical spine tenderness,-- right paraspinal tenderness,-- left paraspinal tenderness,-- right trapezius muscle-- and-- left trapezius muscle  Palpatory findings include bilateral muscle spasms   Cervical Sensory Exam:  intact to light touch and pinprick in the upper extremities  Flexion was restricted-- and-- was painful  Extension was restricted-- and-- was painful  Left lateral flexion was restricted-- and-- was painful  Right lateral flexion was restricted-- and-- was painful  Rotation to the left was restricted-- and-- was painful  Rotation to the right was restricted-- and-- was painful   hand strength was normal bilaterally  wrist strength was normal bilaterally  elbow strength was normal bilaterally  Results/Data  Results Free Text Form [facility]:   Results    I personally reviewed the films/images in the office today  Radiology:  LEFT SHOULDER (7/27/2017)    INDICATION: Left shoulder pain  COMPARISON: Left shoulder radiograph 6/26/2015    VIEWS: 3    IMAGES: 3    FINDINGS:    There is no acute fracture or dislocation  There is severe joint space loss and osteophyte formation involving the glenohumeral joint  Mild degenerative changes of acromioclavicular joint  No lytic or blastic lesions are seen  Soft tissues are unremarkable  SHOULDER (7/27/2017)    INDICATION: Right shoulder pain  COMPARISON: Right humerus radiograph 9/21/2016    VIEWS: 3    IMAGES: 3    FINDINGS:    There is no acute fracture or dislocation  Moderate lateral humeral joint degenerative changes with joint space loss and subchondral sclerosis   Mild acromioclavicular joint degenerative changes  No lytic or blastic lesions are seen  Soft tissues are unremarkable        Signatures   Electronically signed by : Kyler Sousa MD; Nov 27 2017  9:33AM EST                       (Author)

## 2017-12-01 ENCOUNTER — HOSPITAL ENCOUNTER (OUTPATIENT)
Dept: MRI IMAGING | Facility: HOSPITAL | Age: 82
Discharge: HOME | End: 2017-12-01
Attending: ANESTHESIOLOGY
Payer: MEDICARE

## 2017-12-01 DIAGNOSIS — M50.120 MID-CERVICAL DISC DISORDER, UNSPECIFIED (CODE): ICD-10-CM

## 2017-12-01 PROCEDURE — 72141 MRI NECK SPINE W/O DYE: CPT

## 2017-12-06 ENCOUNTER — GENERIC CONVERSION - ENCOUNTER (OUTPATIENT)
Dept: OTHER | Facility: OTHER | Age: 82
End: 2017-12-06

## 2017-12-12 ENCOUNTER — ALLSCRIPTS OFFICE VISIT (OUTPATIENT)
Dept: OTHER | Facility: OTHER | Age: 82
End: 2017-12-12

## 2017-12-13 NOTE — PROGRESS NOTES
Assessment  Assessed    1  Cath Stent 1 Proximal Left Anterior Descending Artery   2  Coronary artery disease (414 00) (I25 10)   3  Hyperlipidemia (272 4) (E78 5)   4  Hypertension (401 9) (I10)   5  LBBB (left bundle branch block) (426 3) (I44 7)   6  Bilateral lower extremity edema (782 3) (R60 0)    Plan  Bilateral lower extremity edema    · Furosemide 20 MG Oral Tablet; Take 1 tablet once daily   Rx By: Betty Ventura; Dispense: 30 Days ; #:30 Tablet; Refill: 11; For: Bilateral lower extremity edema; CHRISTINE = N; Verified Transmission to [facility]; Last Updated By: [employer]; 12/12/2017 4:05:18 PM   · (1) BASIC METABOLIC PROFILE; Status:Active; Requested EQQ:18TMT5000; Perform:[facility]; ITF:43CUN6900;DLFZVAR;ESJCP extremity edema; Ordered By:Myke Vaz;   · Follow-up visit in 6 weeks Evaluation and Treatment  Follow-up  Status: Complete  Done:49Bnv2554   Ordered;Bilateral lower extremity edema; Ordered By: Betty Ventura Performed:  Due: 93LDP0815; Last Updated By: Kareem Richard; 12/12/2017 4:13:56 PM    Discussion/Summary  Cardiology Discussion Summary Free Text Note Form [facility]:   Maryruth Pallas returns to see me at the [facility] office for follow-up of her complaints of leg edema L>R   high risk features for DVT and only distal ankle swelling  was slight HJR on exam but rales, heart exam unchanged  - she has 2-vessel disease with mild nonobstructive 50% lesion in the nondominant RCA, 70% small obtuse marginal and 99% severe proximal LAD lesion  STONE to Proximal LAD 10/30/14  Continue aspirin indefinitely, 81mg  flutter - initially occurred in 2014 but recurred after DCCV  Underwent Ablation 5/15 and has had no recurrence  - remains controlled  - Previously controlled on Pravastatin  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 - no history of stroke, continue ASA  low dose furosemide 20mg daily  have recommended to Maryruth Pallas that she initiate the furosemide daily for at least a week, if her swelling resolves, then she can changed to as needed after that would like her to have some follow-up blood work in about 3 weeks to ensure that her potassium and renal function is stable had complete testing by echo and stress test earlier this year with normal LV function  do not see any mame decompensated heart failure type symptoms but suspect that she probably has some diastolic dysfunction leading to her edema  have also recommended a low-salt diet  Chief Complaint  Chief Complaint Free Text Note Form: pt is here for a follow up  pt c/o leg swelling  Chief Complaint Chronic Condition [facility]: Patient is here today for follow up of chronic conditions described in HPI  History of Present Illness  Cardiology HPI Free Text Note Form [facility]: The Amanda Mackenzie presents with complaints of left greater than right lower extremity edema which is been going on for the past month or so  She has difficulty putting on her left foot  She does have some baseline dyspnea on exertion but this is no worse as of late  She had an echocardiogram earlier this year documented normal LV function bow albeit with diastolic dysfunction  She also had a stress test earlier this year because of her history of coronary artery disease and LAD stenosis with stenting but that was unremarkable  She has a history of atrial flutter with tachycardia induced cardiomyopathy but had this ablated and has not had any recurrent arrhythmias  She is in normal sinus rhythm today  Active Problems  Problems    1  Acute pain of right hip (719 45) (M25 551)   2  Adhesive capsulitis (726 0) (M75 00)   3  Anxiety (300 00) (F41 9)   4  Arthritis (716 90) (M19 90)   5  Cath Stent 1 Proximal Left Anterior Descending Artery   6  Cervical disc disorder with radiculopathy of mid-cervical region (723 4) (M50 120)   7  Chronic low back pain (724 2,338 29) (M54 5,G89 29)   8  Chronic pain syndrome (338 4) (G89 4)   9   Coronary artery disease (414 00) (I25 10)   10  Diabetes mellitus (250 00) (E11 9)   11  PEERIRA (dyspnea on exertion) (786 09) (R06 09)   12  Generalized osteoarthritis (715 00) (M15 9)   13  H/O prior ablation treatment (V15 29) (Z98 890)   14  History of arthroplasty of right hip (V43 64) (Z96 641)   15  History of atrial flutter (V12 59) (Z86 79)   16  Hyperlipidemia (272 4) (E78 5)   17  Hypertension (401 9) (I10)   18  Hypothyroidism (244 9) (E03 9)   19  LBBB (left bundle branch block) (426 3) (I44 7)   20  Primary osteoarthritis of left shoulder (715 11) (M19 012)   21  Primary osteoarthritis of right shoulder (715 11) (M19 011)   22  Sacroiliitis (720 2) (M46 1)   23  Strain of muscle of right hip (843 8) (O38 206E)    Past Medical History  Problems    1  History of Abnormal nuclear stress test (794 39) (R94 39)   2  History of Acute deep vein thrombosis of lower limb, unspecified laterality   3  History of Cardiomyopathy, secondary (425 9) (I42 9)   4  Diabetes mellitus (250 00) (E11 9)   5  History of atrial flutter (V12 59) (Z86 79)   6  History of renal calculi (V13 01) (Z87 442)   7  History of shortness of breath (V13 89) (Z87 898)   8  History of Myalgia (729 1) (M79 1)   9  History of Pulmonary Embolism (V12 55)  Active Problems And Past Medical History Reviewed: The active problems and past medical history were reviewed and updated today  Surgical History  Problems    1  History of Cataract Surgery   2  History of Elective Cardioversion   3  History of Hip Replacement   4  History of Hysterectomy   5  Denied: History of Knee Replacement   6  History of Neuroplasty Decompression Median Nerve At Carpal Tunnel   7  History of Tonsillectomy    Family History  Sister    1  Family history of malignant neoplasm (V16 9) (Z80 9)   2  Family history of Parkinson's disease (V17 2) (Z82 0)  Family History    3  Family history of malignant neoplasm (V16 9) (Z80 9)   4  Denied: Family history of rheumatoid arthritis   5   Denied: Family history of systemic lupus erythematosus   6  Family history of No Significant Family History    Social History  Problems    · Being A Social Drinker   · Marital History - Currently    · Never A Smoker   · No drug use  Social History Reviewed: The social history was reviewed and updated today  The social history was reviewed and is unchanged  Current Meds   1  Aspirin 81 MG Oral Tablet Delayed Release; take one tablet by mouth every day; Therapy: 38TYT1071 to (Elvira Dexter)  Requested for: 21QUR3070; Last Rx:08Nov2017 Ordered   2  Caltrate 600+D 600-400 MG-UNIT CHEW; Take 1 tablet twice daily; Therapy: 00THV0050 to Recorded   3  Centrum Silver TABS; TAKE 1 TABLET DAILY; Therapy: (Recorded:64Gcj9832) to Recorded   4  Focused Mind CAPS; TAKE 1 CAPSULE DAILY; Therapy: (Recorded:19Jun2015) to Recorded   5  Levothyroxine Sodium 50 MCG Oral Tablet; TAKE 1 TABLET DAILY; Therapy: 66AHW3764 to Recorded   6  Lisinopril 2 5 MG Oral Tablet; Take 1 tablet daily  Requested for: 06Apr2017; Last Rx:03Apr2017 Ordered   7  MetFORMIN HCl - 500 MG Oral Tablet; TAKE 1 TABLET DAILY; Therapy: (Recorded:76Ifb2528) to Recorded   8  Metoprolol Succinate ER 50 MG Oral Tablet Extended Release 24 Hour; TAKE 1 TABLET DAILY AS DIRECTED  Requested for: 48SRK2516; Last Rx:19Nov2015 Ordered   9  Pravastatin Sodium 40 MG Oral Tablet; Take 1 tablet daily; Therapy: 35ZPQ2211 to (467 72 717)  Requested for: 40Xvi9856; Last Rx:08Gzj5454 Ordered   10  Tylenol Extra Strength 500 MG Oral Tablet; TAKE 2 TABLET Twice daily PRN pain; Therapy: (Recorded:19Jun2015) to Recorded  Medication List Reviewed: The medication list was reviewed and updated today  Allergies  Medication    1   No Known Drug Allergies    Vitals  Vital Signs    Recorded: 12Dec2017 03:42PM   Heart Rate 72, R Radial   Systolic 099, LUE, Sitting   Diastolic 60, LUE, Sitting   Height 5 ft    Weight 174 lb 8 0 oz   BMI Calculated 34 08   BSA Calculated 1 76   O2 Saturation 95       Physical Exam   Constitutional - General appearance: No acute distress, well appearing and well nourished  Eyes - Conjunctiva and Sclera examination: Conjunctiva pink, sclera anicteric  Neck - Normal, no JVD   Pulmonary - Respiratory effort: No signs of respiratory distress  -- Auscultation of lungs: Clear to auscultation  Cardiovascular - Auscultation of heart: Normal rate and rhythm, normal S1 and S2, no murmurs  -- Pedal pulses: Normal, 2+ bilaterally  -- Trace right and 2+ left ankle edema  Musculoskeletal - Gait and station: Normal gait  Skin - Skin: Normal without rashes  Skin is warm and well perfused  Neurologic - Speech normal  No focal deficits  Psychiatric - Orientation to person, place, and time: Normal       Results/Data  Diagnostic Studies Reviewed Cardio: I personally reviewed the recording/images in the office today  My interpretation follows  Lab Review: 2/17 - CMP normal , CBC normal, LDL 86, HDL 77  Stress Test: Anterior wall ischemia, LVEF 67%- anteroapical wall ischemia, EF 60%  Catheterization: 10/14 - 99% LAD, 70% obtuse marginal, percent RCA with stenting of the 99% LAD with a drug-eluting stent  - LAD stent 10% in-stent restenosis, 40% nonobstructive RCA, otherwise no significant CAD  ECG Report:  Rhythm and rate:  normal sinus rhythm  QRS: left bundle branch block Sinus rhythm with first degree AV block, left axis deviation, nonspecific bundle branch block, cannot rule out anterolateral infarct  - atrial flutter, rapid ventricular response with 2-1 conduction, heart rate 51861/6/15 - normal sinus rhythm, heart rate 82, first degree AV block, left bundle branch block      Future Appointments    Date/Time Provider Specialty Site   01/26/2018 08:00 AM Raul Cortez MD Cardiology [facility]   12/27/2017 09:30 AM Ryann Rosales MD Pain Management [address]       Signatures   Electronically signed by : Fide Dougherty Wilver Lowery MD; Dec 12 2017  5:19PM EST                       (Author)

## 2017-12-27 ENCOUNTER — ALLSCRIPTS OFFICE VISIT (OUTPATIENT)
Dept: RADIOLOGY | Facility: CLINIC | Age: 82
End: 2017-12-27
Payer: MEDICARE

## 2018-01-02 DIAGNOSIS — R60.0 LOCALIZED EDEMA: ICD-10-CM

## 2018-01-05 ENCOUNTER — APPOINTMENT (OUTPATIENT)
Dept: LAB | Facility: CLINIC | Age: 83
End: 2018-01-05
Payer: MEDICARE

## 2018-01-05 DIAGNOSIS — R60.0 LOCALIZED EDEMA: ICD-10-CM

## 2018-01-05 LAB
ANION GAP SERPL CALCULATED.3IONS-SCNC: 7 MMOL/L (ref 4–13)
BUN SERPL-MCNC: 21 MG/DL (ref 5–25)
CALCIUM SERPL-MCNC: 9.4 MG/DL (ref 8.3–10.1)
CHLORIDE SERPL-SCNC: 98 MMOL/L (ref 100–108)
CO2 SERPL-SCNC: 31 MMOL/L (ref 21–32)
CREAT SERPL-MCNC: 0.75 MG/DL (ref 0.6–1.3)
GFR SERPL CREATININE-BSD FRML MDRD: 71 ML/MIN/1.73SQ M
GLUCOSE P FAST SERPL-MCNC: 135 MG/DL (ref 65–99)
POTASSIUM SERPL-SCNC: 4.1 MMOL/L (ref 3.5–5.3)
SODIUM SERPL-SCNC: 136 MMOL/L (ref 136–145)

## 2018-01-05 PROCEDURE — 80048 BASIC METABOLIC PNL TOTAL CA: CPT

## 2018-01-05 PROCEDURE — 36415 COLL VENOUS BLD VENIPUNCTURE: CPT

## 2018-01-11 NOTE — MISCELLANEOUS
Message   Recorded as Task   Date: 05/22/2017 09:30 AM, Created By: Vandana Joyner   Task Name: Miscellaneous   Assigned To: Skylar Walker   Regarding Patient: Linda Ponce, Status: Active   Comment:    Mei Walkera - 22 May 2017 9:30 AM     TASK CREATED  Pt called  She is an existing pt of yours last seen 8/19/16 in [address] [place] [address] for her rt hip  She would like to stay at the [address] [place] [address] and see Dr Zelda Gamboa  She was see by you for her rt hip and is now coming w/different pain -- shoulders  Can we schedule pt w/Dr Zelda Gamboa? Skylar Walker - 22 May 2017 9:54 AM     TASK EDITED  Cont'd: Intake started  Waiting on order  Intake sent to [facility] (for pt to see Dr Zelda Gamboa)  [address] - 22 May 2017 11:30 AM     TASK REPLIED TO: Previously Assigned To [address]  That's fine if Dr Zelda Gamboa is ok with seeing her  MurraySkylar steven - 22 May 2017 12:35 PM     TASK EDITED  Pls see note regarding pt requesting to see you  Rice Lines - 22 May 2017 2:15 PM     TASK REPLIED TO: Previously Assigned To Rice Lines  ok to schedule with me        Active Problems    1  Acute pain of right hip (719 45) (M25 551)   2  Adhesive capsulitis (726 0) (M75 00)   3  Anxiety (300 00) (F41 9)   4  Arthritis (716 90) (M19 90)   5  History of Cardiomyopathy, secondary (425 9) (I42 9)   6  Cath Stent 1 Proximal Left Anterior Descending Artery   7  Chronic low back pain (724 2,338 29) (M54 5,G89 29)   8  Chronic pain syndrome (338 4) (G89 4)   9  Coronary artery disease (414 00) (I25 10)   10  Diabetes mellitus (250 00) (E11 9)   11  PEREIRA (dyspnea on exertion) (786 09) (R06 09)   12  Generalized osteoarthritis (715 00) (M15 9)   13  H/O prior ablation treatment (V15 29) (Z98 890)   14  History of arthroplasty of right hip (V43 64) (Z96 641)   15  History of atrial flutter (V12 59) (Z86 79)   16  Hyperlipidemia (272 4) (E78 5)   17  Hypertension (401 9) (I10)   18   Hypothyroidism (244 9) (E03 9)   19  LBBB (left bundle branch block) (426 3) (I44 7)   20  Primary osteoarthritis of left shoulder (715 11) (M19 012)   21  Sacroiliitis (720 2) (M46 1)   22  Strain of muscle of right hip (843 8) (S76 011A)    Current Meds   1  Aspirin 81 MG Oral Tablet Delayed Release; take one tablet by mouth every day; Therapy: 80REO6682 to 40-45-11-94)  Requested for: 26Oct2016; Last   Rx:24Oct2016 Ordered   2  Caltrate 600+D 600-400 MG-UNIT CHEW; Take 1 tablet twice daily; Therapy: 01XRU1940 to Recorded   3  Centrum Silver TABS; TAKE 1 TABLET DAILY; Therapy: (Recorded:18Pha0524) to Recorded   4  Focused Mind CAPS; TAKE 1 CAPSULE DAILY; Therapy: (Recorded:19Jun2015) to Recorded   5  Levothyroxine Sodium 50 MCG Oral Tablet; TAKE 1 TABLET DAILY; Therapy: 28ULR5593 to Recorded   6  Lisinopril 2 5 MG Oral Tablet; Take 1 tablet daily  Requested for: 06Apr2017; Last   Rx:03Apr2017 Ordered   7  MetFORMIN HCl - 500 MG Oral Tablet; TAKE 1 TABLET DAILY; Therapy: (Recorded:23Sqo8026) to Recorded   8  Metoprolol Succinate ER 50 MG Oral Tablet Extended Release 24 Hour (Toprol XL);   TAKE 1 TABLET DAILY AS DIRECTED  Requested for: 68MHQ7910; Last Rx:19Nov2015   Ordered   9  Pravastatin Sodium 40 MG Oral Tablet; Take 1 tablet daily; Therapy: 11IQL1126 to (467 20 627)  Requested for: 26Yux8979; Last   Rx:92Way4272 Ordered   10  Tylenol Extra Strength 500 MG Oral Tablet; TAKE 2 TABLET Twice daily PRN pain; Therapy: (Recorded:19Jun2015) to Recorded   11  Vitamin B12 TABS; TAKE 1 TABLET DAILY AS DIRECTED; Therapy: (Recorded:19Jun2015) to Recorded    Allergies    1   No Known Drug Allergies    Signatures   Electronically signed by : Ronita Duane, ; May 22 2017  4:04PM EST                       (Author)

## 2018-01-11 NOTE — MISCELLANEOUS
Message   Recorded as Task   Date: 08/25/2016 02:28 PM, Created By: Мария Su   Task Name: Follow Up   Assigned To: [facility], [facility] Clinical,Team   Regarding Patient: Joel Randhawa, Status: Active   Comment:    Charity Gilliland - 25 Aug 2016 2:28 PM     TASK CREATED  Patient reports she received 60% relief from her procedure  She states that she is still getting right sided groin pain but no back pain  Her pain level today is an 8 (groin pain)  S/p (R) SI JOINT INJECTION done 8/19/16  Corrina Hale - 25 Aug 2016 2:33 PM     TASK REPLIED TO: Previously Assigned To [facility]  Active Problems    1  Acute pain of right hip (719 45) (M25 551)   2  Adhesive capsulitis (726 0) (M75 00)   3  Anxiety (300 00) (F41 9)   4  Arthritis (716 90) (M19 90)   5  History of Cardiomyopathy, secondary (425 9) (I42 9)   6  Cath Stent 1 Proximal Left Anterior Descending Artery   7  Chronic low back pain (724 2,338 29) (M54 5,G89 29)   8  Chronic pain syndrome (338 4) (G89 4)   9  Coronary artery disease (414 00) (I25 10)   10  Diabetes mellitus (250 00) (E11 9)   11  Generalized osteoarthritis (715 00) (M15 9)   12  H/O prior ablation treatment (V15 29) (Z98 89)   13  History of arthroplasty of right hip (V43 64) (Z96 641)   14  History of atrial flutter (V12 59) (Z86 79)   15  Hyperlipidemia (272 4) (E78 5)   16  Hypertension (401 9) (I10)   17  Hypothyroidism (244 9) (E03 9)   18  LBBB (left bundle branch block) (426 3) (I44 7)   19  Primary osteoarthritis of left shoulder (715 11) (M19 012)   20  Sacroiliitis (720 2) (M46 1)    Current Meds   1  Aspirin 325 MG Oral Tablet; Take 1 tab every morning; Therapy: 81TNA1123 to (Evaluate:94Zyu6023)  Requested for: 98Arz8635; Last   Rx:27Qno4795 Ordered   2  Caltrate 600+D 600-400 MG-UNIT Oral Tablet Chewable; Take 1 tablet twice daily; Therapy: 90OUE3568 to Recorded   3  Centrum Silver TABS; TAKE 1 TABLET DAILY;    Therapy: (Recorded:48Ksd4469) to Recorded   4  Focused Mind CAPS; TAKE 1 CAPSULE DAILY; Therapy: (Recorded:19Jun2015) to Recorded   5  Levothyroxine Sodium 50 MCG Oral Tablet; TAKE 1 TABLET DAILY; Therapy: 94OQB2719 to Recorded   6  Lisinopril 2 5 MG Oral Tablet; Take 1 tablet daily; Therapy: (06-55181403) to Recorded   7  MetFORMIN HCl - 500 MG Oral Tablet; TAKE 1 TABLET DAILY; Therapy: (Recorded:07Yyf6590) to Recorded   8  Metoprolol Succinate ER 50 MG Oral Tablet Extended Release 24 Hour (Toprol XL);   TAKE 1 TABLET DAILY AS DIRECTED  Requested for: 75TAL8753; Last Rx:19Nov2015   Ordered   9  Pravastatin Sodium 40 MG Oral Tablet; Take 1 tablet daily; Therapy: 14IEW1200 to (Delma Johns)  Requested for: 28Pte2999; Last   Rx:01Tik0525 Ordered   10  Tylenol Extra Strength 500 MG Oral Tablet; TAKE 2 TABLET Twice daily PRN pain; Therapy: (Recorded:19Jun2015) to Recorded   11  Vitamin B12 TABS; TAKE 1 TABLET DAILY AS DIRECTED; Therapy: (Recorded:19Jun2015) to Recorded    Allergies    1   No Known Drug Allergies    Signatures   Electronically signed by : Marilynn Ga RN; Aug 25 2016  2:36PM EST                       (Author)

## 2018-01-12 VITALS
WEIGHT: 174 LBS | DIASTOLIC BLOOD PRESSURE: 68 MMHG | BODY MASS INDEX: 34.16 KG/M2 | HEART RATE: 79 BPM | SYSTOLIC BLOOD PRESSURE: 144 MMHG | HEIGHT: 60 IN

## 2018-01-12 VITALS
DIASTOLIC BLOOD PRESSURE: 78 MMHG | HEIGHT: 60 IN | TEMPERATURE: 98.6 F | SYSTOLIC BLOOD PRESSURE: 130 MMHG | WEIGHT: 174 LBS | BODY MASS INDEX: 34.16 KG/M2 | HEART RATE: 64 BPM

## 2018-01-14 VITALS
HEART RATE: 68 BPM | RESPIRATION RATE: 14 BRPM | HEIGHT: 60 IN | WEIGHT: 172 LBS | DIASTOLIC BLOOD PRESSURE: 62 MMHG | SYSTOLIC BLOOD PRESSURE: 116 MMHG | BODY MASS INDEX: 33.77 KG/M2

## 2018-01-14 VITALS
BODY MASS INDEX: 33.99 KG/M2 | WEIGHT: 173.13 LBS | SYSTOLIC BLOOD PRESSURE: 130 MMHG | HEART RATE: 75 BPM | DIASTOLIC BLOOD PRESSURE: 66 MMHG | HEIGHT: 60 IN

## 2018-01-14 VITALS
HEART RATE: 72 BPM | BODY MASS INDEX: 34.6 KG/M2 | HEIGHT: 60 IN | WEIGHT: 176.25 LBS | DIASTOLIC BLOOD PRESSURE: 64 MMHG | SYSTOLIC BLOOD PRESSURE: 142 MMHG

## 2018-01-15 NOTE — PROGRESS NOTES
Assessment  Assessed    1  History of Cardiomyopathy, secondary (425 9) (I42 9)   2  Cath Stent 1 Proximal Left Anterior Descending Artery   3  Coronary artery disease (414 00) (I25 10)   4  History of atrial flutter (V12 59) (Z86 79)   5  Hyperlipidemia (272 4) (E78 5)   6  Hypertension (401 9) (I10)   7  LBBB (left bundle branch block) (426 3) (I44 7)    Plan  Coronary artery disease    · Follow-up visit in 6 months Evaluation and Treatment  Follow-up  Status: Complete   Done: 93DMG4573   Ordered; For: Coronary artery disease; Ordered By: Vadim Macias Performed:  Due: 37UOX2286; Last Updated By: Anthony Carrasco; 1/27/2016 12:05:42 PM  Hyperlipidemia    · From  Pravastatin Sodium 20 MG Oral Tablet TAKE 1 TABLET DAILY AT  BEDTIME To Pravastatin Sodium 40 MG Oral Tablet Take 1 tablet daily   Rx By: Vadim Macias; Dispense: 90 Days ; #:90 Tablet; Refill: 3; For: Hyperlipidemia; CHRISTINE = N; Record    Discussion/Summary  Cardiology Discussion Summary Free Text Note Form [facility]:   Walter Grullon returns to see me at the [facility] office  CAD - she has 2-vessel disease with mild nonobstructive 50% lesion in the nondominant RCA, 70% small obtuse marginal and 99% severe proximal LAD lesion  STONE to Proximal LAD 10/30/14  Continue aspirin indefinitely  She stopped clopidogrel at my request     Cardiomyopathy - first occurred in the setting of atrial flutter in 2014, and again with recurrence 5/15, but has resolved  Atrial flutter - initially occurred in 2014 but recurred after DCCV  Underwent Ablation 5/15 and has had no recurrence  Hypertension - remains controlled  Hyperlipidemia - she felt hair loss and mylagis due to atorvastatin  She will require higher doses of Pravastatin  Atrial 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 - no history of stroke, continue ASA  Chief Complaint  Chief Complaint Free Text Note Form: 3 month follow-up visit  C/o low back pain  Offers no cardiac symptoms     Chief Complaint Chronic Condition [facility]: Patient is here today for follow up of chronic conditions described in HPI  History of Present Illness  Cardiology HPI Free Text Note Form [facility]: Kalia Zelaya has a h/o atrial flutter and RVR with CMP  Aflutter was ablated and CMP has resolved  She has CAD with LAD stent  She is off clopidogrel  She takes ASA  Her BP is controlled  She denies CP  Her lipids are not well controlled  Atorvastatin was stopped due to myalgias and hair loss  She is tolerating Pravastatin  Review of Systems  Cardiology Female ROS:     Cardiac: rhythm problems, but No complaints of chest pain, no palpitations, no fainting , no signs of swelling and no syncope/fainting  Skin: No complaints of nonhealing sores or skin rash  Genitourinary: No complaints of recurrent urinary tract infections, frequent urination at night, difficult urination, blood in urine, kidney stones, loss of bladder control, kidney problems, denies any birth control or hormone replacement, is not post menopausal, not currently pregnant  , as noted in HPI, no blood in urine and no kidney problems   Psychological: difficulty concentrating, but No complaints of feeling depressed, anxiety, panic attacks, or difficulty concentrating , no depression and no panic attacks  General: trouble sleeping and lack of energy/fatigue, but No complaints of trouble sleeping, lack of energy, fatigue, appetite changes, weight changes, fever, frequent infections, or night sweats  Respiratory: No complaints of shortness of breath, cough with sputum, or wheezing  HEENT: No complaints of serious problems, hearing problems, nose problems, throat problems, or snoring  , no serious eye problems and no hearing problems   Gastrointestinal: abdonimal pain, but No complaints of liver problems, nausea, vomiting, heartburn, constipation, bloody stools, diarrhea, problems swallowing, adbominal pain, or rectal bleeding   and no heartburn   Hematologic: No complaints of bleeding disorders, anemia, blood clots, or excessive brusing  , no bleeding disorders and no anemia   Neurological: No complaints of numbness, tingling, dizziness, weakness, seizures, headaches, syncope or fainting, AM fatigue, daytime sleepiness, no witnessed apnea episodes  , no seizures and no headaches some forgetfulness  Musculoskeletal: arthritis, but No complaints of arthritis, back pain, or painfull swelling  Active Problems  Problems    1  Adhesive capsulitis (726 0) (M75 00)   2  Anxiety (300 00) (F41 9)   3  Arthritis (716 90) (M19 90)   4  History of Cardiomyopathy, secondary (425 9) (I42 9)   5  Cath Stent 1 Proximal Left Anterior Descending Artery   6  Coronary artery disease (414 00) (I25 10)   7  Diabetes mellitus (250 00) (E11 9)   8  Generalized osteoarthritis (715 00) (M15 9)   9  H/O prior ablation treatment (V15 29) (Z98 89)   10  History of atrial flutter (V12 59) (Z86 79)   11  Hyperlipidemia (272 4) (E78 5)   12  Hypertension (401 9) (I10)   13  Hypothyroidism (244 9) (E03 9)   14  LBBB (left bundle branch block) (426 3) (I44 7)   15  Left shoulder pain (719 41) (M25 512)   16  Myalgia (729 1) (M79 1)   17  Primary osteoarthritis of left shoulder (715 11) (M19 012)   18  SOB (shortness of breath) (786 05) (R06 02)    Past Medical History  Problems    1  History of Abnormal nuclear stress test (794 39) (R93 1)   2  History of Acute deep vein thrombosis of lower limb, unspecified laterality   3  History of Cardiomyopathy, secondary (425 9) (I42 9)   4  Diabetes mellitus (250 00) (E11 9)   5  History of atrial flutter (V12 59) (Z86 79)   6  History of renal calculi (V13 01) (Z87 442)   7  History of Pulmonary Embolism (V12 55)  Active Problems And Past Medical History Reviewed: The active problems and past medical history were reviewed and updated today  Surgical History  Problems    1  History of Cataract Surgery   2  History of Elective Cardioversion   3   History of Hip Replacement   4  History of Hysterectomy   5  Denied: History of Knee Replacement   6  History of Neuroplasty Decompression Median Nerve At Carpal Tunnel   7  History of Tonsillectomy  Surgical History Reviewed: The surgical history was reviewed and updated today  Family History  Sister    1  Family history of malignant neoplasm (V16 9) (Z80 9)   2  Family history of Parkinson's disease (V17 2) (Z82 0)  Family History    3  Family history of malignant neoplasm (V16 9) (Z80 9)   4  Denied: Family history of rheumatoid arthritis   5  Denied: Family history of systemic lupus erythematosus   6  Family history of No Significant Family History  Family History Reviewed: The family history was reviewed and updated today  Social History  Problems    · Being A Social Drinker   · Marital History - Currently    · Never A Smoker   · No drug use  Social History Reviewed: The social history was reviewed and updated today  The social history was reviewed and is unchanged  Current Meds   1  Aspirin 81 MG Oral Tablet; TAKE 1 TABLET DAILY; Therapy: 10IHV8821 to (Evaluate:13Nov2016)  Requested for: 18OEH4449; Last   Rx:19Nov2015 Ordered   2  Caltrate 600+D 600-400 MG-UNIT Oral Tablet Chewable; Take 1 tablet twice daily; Therapy: 84MEC2217 to Recorded   3  Centrum Silver TABS; TAKE 1 TABLET DAILY; Therapy: (Recorded:04Cbx4781) to Recorded   4  Levothyroxine Sodium 50 MCG Oral Tablet; TAKE 1 TABLET DAILY; Therapy: 41XGD9878 to Recorded   5  Lisinopril 2 5 MG Oral Tablet; Take 1 tablet daily; Therapy: (77 372 361) to Recorded   6  MetFORMIN HCl - 500 MG Oral Tablet; TAKE 1 TABLET DAILY; Therapy: (Recorded:50Vdx1452) to Recorded   7  Metoprolol Succinate ER 50 MG Oral Tablet Extended Release 24 Hour; TAKE 1 TABLET   DAILY AS DIRECTED  Requested for: 18GBA5664; Last Rx:19Nov2015 Ordered   8  Pravastatin Sodium 20 MG Oral Tablet; TAKE 1 TABLET DAILY AT BEDTIME;    Therapy: 75PQP9450 to (Precious Page)  Requested for: 58ZFG1240; Last   Rx:06Oct2015 Ordered   9  Protonix 40 MG Oral Tablet Delayed Release; take 1 tablet by mouth once daily; Therapy: (Recorded:70Tpn3020) to Recorded   10  Tylenol Extra Strength 500 MG Oral Tablet; TAKE 2 TABLET Twice daily PRN pain; Therapy: (Recorded:19Jun2015) to Recorded   11  Vitamin B12 TABS; TAKE 1 TABLET DAILY AS DIRECTED; Therapy: (Recorded:19Jun2015) to Recorded  Medication List Reviewed: The medication list was reviewed and updated today  Allergies  Medication    1  No Known Drug Allergies    Vitals  Vital Signs [Data Includes: Current Encounter]    Recorded: 00QBL5520 11:49AM   Heart Rate 80, R Radial   Pulse Quality Regular, R Radial   Systolic 422, RUE, Sitting   Diastolic 78, RUE, Sitting   Height 5 ft    Weight 177 lb 9 oz   BMI Calculated 34 68   BSA Calculated 1 77     Physical Exam    Constitutional - General appearance: No acute distress, well appearing and well nourished  Eyes - Conjunctiva and Sclera examination: Conjunctiva pink, sclera anicteric  Neck - Normal, no JVD   Pulmonary - Respiratory effort: No signs of respiratory distress  Auscultation of lungs: Clear to auscultation  Cardiovascular - Auscultation of heart: Normal rate and rhythm, normal S1 and S2, no murmurs  Pedal pulses: Normal, 2+ bilaterally  Examination of extremities for edema and/or varicosities: Normal     Abdomen - Soft  Musculoskeletal - Gait and station: Normal gait  Skin - Skin: Normal without rashes  Skin is warm and well perfused  Neurologic - Speech normal  No focal deficits  Psychiatric - Orientation to person, place, and time: Normal       Results/Data  Diagnostic Studies Reviewed Cardio: I personally reviewed the recording/images in the office today  My interpretation follows  Stress Test: Anterior wall ischemia, LVEF 67%     Catheterization: 10/14 - 99% LAD, 70% obtuse marginal, percent RCA with stenting of the 99% LAD with a drug-eluting stent  ECG Report:   Rhythm and rate:  normal sinus rhythm  QRS: left bundle branch block Sinus rhythm with first degree AV block, left axis deviation, nonspecific bundle branch block, cannot rule out anterolateral infarct    5/12/15 - atrial flutter, rapid ventricular response with 2-1 conduction, heart rate 126  10/6/15 - normal sinus rhythm, heart rate 82, first degree AV block, left bundle branch block      Signatures   Electronically signed by : Yi Lin MD; Feb 1 2016 10:15PM EST                       (Author)

## 2018-01-17 ENCOUNTER — GENERIC CONVERSION - ENCOUNTER (OUTPATIENT)
Dept: OTHER | Facility: OTHER | Age: 83
End: 2018-01-17

## 2018-01-23 VITALS
WEIGHT: 174.5 LBS | BODY MASS INDEX: 34.26 KG/M2 | HEART RATE: 72 BPM | HEIGHT: 60 IN | OXYGEN SATURATION: 95 % | DIASTOLIC BLOOD PRESSURE: 60 MMHG | SYSTOLIC BLOOD PRESSURE: 104 MMHG

## 2018-01-23 NOTE — MISCELLANEOUS
Message   Recorded as Task   Date: 12/04/2017 01:18 PM, Created By: Empertariz Feliciano   Task Name: Follow Up   Assigned To: [facility] vinny clinical,Team   Regarding Patient: Lilliam June, Status: In Progress   CommentLeonor Byron - 04 Dec 2017 1:18 PM     TASK CREATED  discussed MRI C-spine results which shows multilevel stenosis    please schedule GUILLERMO (Dx M48 02/M54 12)   needs aspirin hold (Dr Fidel Busch)   [address] [facility] - 04 Dec 2017 2:07 PM     TASK EDITED  ASA hold form faxed to Ashly Cohen - 06 Dec 2017 2:33 PM     TASK EDITED  Anti Coag approval scanned into patient's chart under procedures  [facility] - 06 Dec 2017 3:25 PM     TASK REASSIGNED: Previously Assigned To Zohaib Siu - 06 Dec 2017 3:39 PM     TASK IN PROGRESS   [address] [facility] - 06 Dec 2017 3:46 PM     TASK EDITED  Scheduled GUILLERMO 12/27 at 930 to arrive at 915  [facility] - 06 Dec 2017 4:01 PM     TASK EDITED  Advised pt to hold aspirin 6 days prior beginning 12/21  Advised to have , NPO 1 hour prior and no abx at time of procedure  Pt verbalized understanding and will cb if any questions  Active Problems    1  Acute pain of right hip (719 45) (M25 551)   2  Adhesive capsulitis (726 0) (M75 00)   3  Anxiety (300 00) (F41 9)   4  Arthritis (716 90) (M19 90)   5  History of Cardiomyopathy, secondary (425 9) (I42 9)   6  Cath Stent 1 Proximal Left Anterior Descending Artery   7  Cervical disc disorder with radiculopathy of mid-cervical region (723 4) (M50 120)   8  Chronic low back pain (724 2,338 29) (M54 5,G89 29)   9  Chronic pain syndrome (338 4) (G89 4)   10  Coronary artery disease (414 00) (I25 10)   11  Diabetes mellitus (250 00) (E11 9)   12  PEREIRA (dyspnea on exertion) (786 09) (R06 09)   13  Generalized osteoarthritis (715 00) (M15 9)   14  H/O prior ablation treatment (V15 29) (Z98 890)   15  History of arthroplasty of right hip (V43 64) (Z96 641)   16   History of atrial flutter (V12 59) (Z86 79)   17  Hyperlipidemia (272 4) (E78 5)   18  Hypertension (401 9) (I10)   19  Hypothyroidism (244 9) (E03 9)   20  LBBB (left bundle branch block) (426 3) (I44 7)   21  Primary osteoarthritis of left shoulder (715 11) (M19 012)   22  Primary osteoarthritis of right shoulder (715 11) (M19 011)   23  Sacroiliitis (720 2) (M46 1)   24  Strain of muscle of right hip (843 8) (S76 011A)    Current Meds   1  Aspirin 81 MG Oral Tablet Delayed Release; take one tablet by mouth every day; Therapy: 75KFD6769 to (Gabo Mask)  Requested for: 83UGR8408; Last   Rx:08Nov2017 Ordered   2  Caltrate 600+D 600-400 MG-UNIT CHEW; Take 1 tablet twice daily; Therapy: 70RUO9621 to Recorded   3  Centrum Silver TABS; TAKE 1 TABLET DAILY; Therapy: (Recorded:55Kap3288) to Recorded   4  Focused Mind CAPS; TAKE 1 CAPSULE DAILY; Therapy: (Recorded:19Jun2015) to Recorded   5  Levothyroxine Sodium 50 MCG Oral Tablet; TAKE 1 TABLET DAILY; Therapy: 77RNH5471 to Recorded   6  Lisinopril 2 5 MG Oral Tablet; Take 1 tablet daily  Requested for: 06Apr2017; Last   Rx:03Apr2017 Ordered   7  MetFORMIN HCl - 500 MG Oral Tablet; TAKE 1 TABLET DAILY; Therapy: (Recorded:11Uhc0043) to Recorded   8  Metoprolol Succinate ER 50 MG Oral Tablet Extended Release 24 Hour (Toprol XL);   TAKE 1 TABLET DAILY AS DIRECTED  Requested for: 72KEL4268; Last Rx:19Nov2015   Ordered   9  Pravastatin Sodium 40 MG Oral Tablet; Take 1 tablet daily; Therapy: 05VSP4073 to (Michelle Wilks)  Requested for: 94Vdi7916; Last   Rx:07Alr9267 Ordered   10  Tylenol Extra Strength 500 MG Oral Tablet; TAKE 2 TABLET Twice daily PRN pain; Therapy: (Recorded:19Jun2015) to Recorded   11  Vitamin B12 TABS; TAKE 1 TABLET DAILY AS DIRECTED; Therapy: (Recorded:19Jun2015) to Recorded    Allergies    1   No Known Drug Allergies    Signatures   Electronically signed by : Rosa Kraft, ; Dec  6 2017  4:22PM EST                       (Author)

## 2018-01-23 NOTE — MISCELLANEOUS
Message   Recorded as Task   Date: 01/05/2018 08:05 AM, Created By: Gian Mendenhall   Task Name: Follow Up   Assigned To: [facility] clerical,Team   Regarding Patient: Dami Zee, Status: In Progress   CommentBlythe Denver - 05 Jan 1990 0:49 AM     TASK CREATED  S/P GUILLERMO/ASPIRIN HOLD ON 12/27/2017 W/ DR Keyonna Neri - NO F/U SCHEDULED   Annetta Valdivia - 05 Jan 4705 6:92 PM     TASK EDITED  Patient reports no relief so far  She was instructed to give more time and we would f/u next week  Montrell Cabrera - 05 Jan 2018 1:49 PM     TASK REPLIED TO: Previously Assigned To Montrell Cabrera md aware    please f/u next week   Annetta Valdivia - 10 Scottie 2931 8:04 PM     TASK EDITED  Spoke with a man - patient is not home  Could not take MSG   Gian Mendenhall - 12 Jan 1858 68:50 AM     TASK EDITED  2nd Attempt     Lm on VM to Cb   Gian Mendenhall - 12 Jan 7099 27:28 AM     TASK IN PROGRESS   Praveen Macrina - 12 Jan 2018 11:54 AM     TASK EDITED  [address] [place] [address]- patient called back stating that she feels the same maybe worse  No relief after the injection  Karrie Nissen Karrie Nissen Current level 8/10   taking Tylenol extra strength   c/b [phone]   Gian Mendenhall - 12 Jan 5174 3:38 PM     TASK EDITED   Montrell Cabrera - 15 Scottie 2018 2:30 PM     TASK REPLIED TO: Previously Assigned To Montrell Cabrera  please have her scheduled for sovs for re-eval   Gian Mendenhall - 17 Jan 4504 7:17 AM     TASK EDITED   Sharon Allen - 17 Jan 2018 11:18 AM     TASK IN PROGRESS   Sharon Allen - 17 Jan 2018 11:24 AM     TASK EDITED  Pt is scheduled w/Therese on 1/24/18 @ 1:00        Active Problems    1  Acute pain of right hip (719 45) (M25 551)   2  Adhesive capsulitis (726 0) (M75 00)   3  Anxiety (300 00) (F41 9)   4  Arthritis (716 90) (M19 90)   5  Bilateral lower extremity edema (782 3) (R60 0)   6  Cath Stent 1 Proximal Left Anterior Descending Artery   7  Cervical disc disorder with radiculopathy of mid-cervical region (723 4) (M50 120)   8   Chronic low back pain (724 2,338 29) (M54 5,G89 29)   9  Chronic pain syndrome (338 4) (G89 4)   10  Coronary artery disease (414 00) (I25 10)   11  Diabetes mellitus (250 00) (E11 9)   12  PEREIRA (dyspnea on exertion) (786 09) (R06 09)   13  Generalized osteoarthritis (715 00) (M15 9)   14  H/O prior ablation treatment (V15 29) (Z98 890)   15  History of arthroplasty of right hip (V43 64) (Z96 641)   16  History of atrial flutter (V12 59) (Z86 79)   17  Hyperlipidemia (272 4) (E78 5)   18  Hypertension (401 9) (I10)   19  Hypothyroidism (244 9) (E03 9)   20  LBBB (left bundle branch block) (426 3) (I44 7)   21  Primary osteoarthritis of left shoulder (715 11) (M19 012)   22  Primary osteoarthritis of right shoulder (715 11) (M19 011)   23  Sacroiliitis (720 2) (M46 1)   24  Strain of muscle of right hip (843 8) (S76 011A)    Current Meds   1  Aspirin 81 MG Oral Tablet Delayed Release; take one tablet by mouth every day; Therapy: 26ONG5934 to (Juan Matthews)  Requested for: 66JSD5469; Last   Rx:08Nov2017 Ordered   2  Caltrate 600+D 600-400 MG-UNIT CHEW; Take 1 tablet twice daily; Therapy: 94RIH5004 to Recorded   3  Centrum Silver TABS; TAKE 1 TABLET DAILY; Therapy: (Recorded:73Vwi0106) to Recorded   4  Focused Mind CAPS; TAKE 1 CAPSULE DAILY; Therapy: (Recorded:19Jun2015) to Recorded   5  Furosemide 20 MG Oral Tablet; Take 1 tablet once daily; Therapy: 76Uho2904 to (Evaluate:84Qjz8487)  Requested for: 19Kzd2705; Last   Rx:64Wbn0553 Ordered   6  Levothyroxine Sodium 50 MCG Oral Tablet; TAKE 1 TABLET DAILY; Therapy: 95QZS2886 to Recorded   7  Lisinopril 2 5 MG Oral Tablet; Take 1 tablet daily  Requested for: 33Ute1532; Last   Rx:15Prs3506 Ordered   8  MetFORMIN HCl - 500 MG Oral Tablet; TAKE 1 TABLET DAILY; Therapy: (Recorded:86Asg2066) to Recorded   9  Metoprolol Succinate ER 50 MG Oral Tablet Extended Release 24 Hour (Toprol XL);   TAKE 1 TABLET DAILY AS DIRECTED  Requested for: 45BUR3523;  Last Rx:19Nov2015 Ordered   10  Pravastatin Sodium 40 MG Oral Tablet; Take 1 tablet daily; Therapy: 90YRQ8768 to (851 68 926)  Requested for: 45Uzs5474; Last    Rx:74Yqi1355 Ordered   11  Tylenol Extra Strength 500 MG Oral Tablet; TAKE 2 TABLET Twice daily PRN pain; Therapy: (Recorded:19Jun2015) to Recorded    Allergies    1   No Known Drug Allergies    Signatures   Electronically signed by : Isidoro Henderson, ; Jan 17 2018 11:24AM EST                       (Author)

## 2018-01-26 ENCOUNTER — OFFICE VISIT (OUTPATIENT)
Dept: CARDIOLOGY CLINIC | Facility: CLINIC | Age: 83
End: 2018-01-26
Payer: MEDICARE

## 2018-01-26 VITALS
BODY MASS INDEX: 34.95 KG/M2 | HEIGHT: 60 IN | WEIGHT: 178 LBS | SYSTOLIC BLOOD PRESSURE: 118 MMHG | DIASTOLIC BLOOD PRESSURE: 58 MMHG | HEART RATE: 85 BPM

## 2018-01-26 DIAGNOSIS — Z95.5 HISTORY OF PLACEMENT OF STENT IN LAD CORONARY ARTERY: ICD-10-CM

## 2018-01-26 DIAGNOSIS — I44.7 LBBB (LEFT BUNDLE BRANCH BLOCK): ICD-10-CM

## 2018-01-26 DIAGNOSIS — I25.10 CORONARY ARTERY DISEASE INVOLVING NATIVE CORONARY ARTERY OF NATIVE HEART WITHOUT ANGINA PECTORIS: ICD-10-CM

## 2018-01-26 DIAGNOSIS — R60.9 EDEMA, UNSPECIFIED TYPE: ICD-10-CM

## 2018-01-26 DIAGNOSIS — Z86.79 HISTORY OF ATRIAL FLUTTER: ICD-10-CM

## 2018-01-26 DIAGNOSIS — I10 HYPERTENSION, UNSPECIFIED TYPE: Primary | ICD-10-CM

## 2018-01-26 DIAGNOSIS — E78.5 HYPERLIPIDEMIA, UNSPECIFIED HYPERLIPIDEMIA TYPE: ICD-10-CM

## 2018-01-26 PROCEDURE — 99214 OFFICE O/P EST MOD 30 MIN: CPT | Performed by: INTERNAL MEDICINE

## 2018-01-26 PROCEDURE — 93000 ELECTROCARDIOGRAM COMPLETE: CPT | Performed by: INTERNAL MEDICINE

## 2018-01-26 RX ORDER — FUROSEMIDE 40 MG/1
40 TABLET ORAL DAILY PRN
Qty: 30 TABLET | Refills: 11 | Status: SHIPPED | OUTPATIENT
Start: 2018-01-26 | End: 2018-05-21

## 2018-01-26 NOTE — PROGRESS NOTES
Anthony Guzmán  7/24/1929  [id number]  [facility] [address] [address]    ASSESSMENT AND PLAN:  1  Cath Stent 1 Proximal Left Anterior Descending Artery  2  Coronary artery disease (414 00) (I25 10)  3  Hyperlipidemia (272 4) (E78 5)  4  Hypertension (401 9) (I10)  5  LBBB (left bundle branch block) (426 3) (I44 7)  6  Bilateral lower extremity edema (782 3) (R60 0)      Plan will be to continue furosemide at 40 mg a day as it has been a more effective dose in the past, this will be prescribed only as needed  Indication is leg swelling and shortness of breath per     continue the remainder of the medications, check lipids and a basic metabolic panel in 6 months and I have requested a 6 month follow-up  HPI:  Anthony Guzmán is a 80 y o  female who presents for routine  follow-up  Previous symptoms were SOB  Furosemide wa started  She took 20 mg daily  She says her response to 20 mg was less  Robust than her previous response to 40 mg at the time of her prior atrial flutter related cardiomyopathy  That being said she is also not sure if she got any improvement in terms of shortness of breath, she still has dyspnea on exertion with walking quickly  Her blood work on 1/5/2018 was unremarkable, normal renal function, normal electrolytes  Her blood pressure is under excellent control  Her lipids were last assessed in early 2017 in a been controlled on pravastatin  She has CAD with a history of LAD stent, but denies any current ischemic symptoms          Past Medical History:   Diagnosis Date    Anxiety     Arthritis     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    Bundle branch block, left     Cardiomyopathy ([facility] [address] )     Chronic pain     chronic b/l shoulder pain    Coronary artery disease     Diabetes mellitus ([facility] [address] )     GERD (gastroesophageal reflux disease)     H/O atrial flutter     History of DVT (deep vein thrombosis)     History of pulmonary embolism     Hyperlipidemia     Hypertension     Hypothyroid     Renal calculi     Shortness of breath      Past Surgical History:   Procedure Laterality Date    ATRIAL ABLATION SURGERY  01/2015    CARPAL TUNNEL RELEASE Right     CATARACT EXTRACTION      CORONARY ANGIOPLASTY WITH STENT PLACEMENT      HYSTERECTOMY      JOINT REPLACEMENT Right     MN AMPUTATION TOE,MT-P JT Left 10/11/2017    Procedure: AMPUTATION SECOND TOE;  Surgeon: Odilia Sánchez DPM;  Location: [facility] OR;  Service: Podiatry    TONSILLECTOMY      TOTAL HIP ARTHROPLASTY      Right     Social History     Social History    Marital status: /Civil Union     Spouse name: N/A    Number of children: N/A    Years of education: N/A     Occupational History    Not on file  Social History Main Topics    Smoking status: Never Smoker    Smokeless tobacco: Never Used    Alcohol use Yes      Comment: occasional    Drug use: No    Sexual activity: Not on file     Other Topics Concern    Not on file     Social History Narrative    No narrative on file     Family History   Problem Relation Age of Onset    Cancer Sister     Parkinsonism Sister        Current Outpatient Prescriptions:     acetaminophen (TYLENOL) 500 mg tablet, Take 500 mg by mouth every 6 (six) hours as needed for mild pain , Disp: , Rfl:     aspirin 81 MG tablet, Take 81 mg by mouth daily  , Disp: , Rfl:     Calcium Carbonate-Vitamin D (CALTRATE 600+D PO), Take by mouth , Disp: , Rfl:     clopidogrel (PLAVIX) 75 mg tablet, Take 75 mg by mouth daily  , Disp: , Rfl:     levothyroxine 25 mcg tablet, Take 25 mcg by mouth daily  , Disp: , Rfl:     lisinopril (ZESTRIL) 2 5 mg tablet, Take 2 5 mg by mouth daily  , Disp: , Rfl:     metFORMIN (GLUCOPHAGE) 500 mg tablet, Take 500 mg by mouth daily with breakfast  , Disp: , Rfl:     Metoprolol Succinate (TOPROL XL PO), Take 50 mg by mouth, Disp: , Rfl:     Misc Natural Products (ENERGY FOCUS PO), Take by mouth, Disp: , Rfl:     multivitamin-iron-minerals-folic acid (CENTRUM) chewable tablet, Chew 1 tablet daily  , Disp: , Rfl:     pravastatin (PRAVACHOL) 20 mg tablet, Take 20 mg by mouth daily  , Disp: , Rfl:   The following portions of the patient's history were reviewed and updated as appropriate: allergies, current medications, past family history, past medical history, past social history, past surgical history and problem list     Review of Systems:  Review of Systems   Constitutional: Negative  HENT: Negative  Eyes: Negative  Respiratory: Positive for shortness of breath  Negative for cough, chest tightness and wheezing  Cardiovascular: Negative  Negative for chest pain and palpitations  Gastrointestinal: Negative  Negative for abdominal pain  Genitourinary: Negative  Musculoskeletal: Negative  Skin: Negative  Neurological: Negative  Hematological: Negative  Psychiatric/Behavioral: Negative  Physical Exam:  Physical Exam   Constitutional: She is oriented to person, place, and time  She appears well-developed and well-nourished  HENT:   Head: Normocephalic and atraumatic  Mouth/Throat: Oropharynx is clear and moist    Eyes: Conjunctivae and EOM are normal  Pupils are equal, round, and reactive to light  Neck: Normal range of motion  Neck supple  No JVD present  No tracheal deviation present  No thyromegaly present  Cardiovascular: Normal rate, regular rhythm, normal heart sounds and intact distal pulses  Exam reveals no gallop  No murmur heard  Pulmonary/Chest: Effort normal and breath sounds normal  No respiratory distress  She has no wheezes  She has no rales  Abdominal: Soft  Bowel sounds are normal  She exhibits no distension  There is no tenderness  Musculoskeletal: Normal range of motion  She exhibits no edema  Neurological: She is alert and oriented to person, place, and time  Skin: Skin is warm and dry  Cardiographics  ECG: normal sinus rhythm  with fusion complexes      Lab Review  Lab Results   Component Value Date    CHOL 210 11/24/2015    CHOL 153 01/20/2015    CHOL 154 10/31/2014    TRIG 107 11/24/2015    TRIG 98 01/20/2015    TRIG 80 10/31/2014    HDL 64 11/24/2015    HDL 61 01/20/2015    HDL 64 10/31/2014       1/5/18    Sodium 136 - 145 mmol/L 136     Potassium 3 5 - 5 3 mmol/L 4 1     Chloride 100 - 108 mmol/L 98   L    CO2 21 - 32 mmol/L 31     Anion Gap 4 - 13 mmol/L 7     BUN 5 - 25 mg/dL 21     Creatinine 0 60 - 1 30 mg/dL 0 75     Comments: Standardized to IDMS reference method   Glucose, Fasting 65 - 99 mg/dL 135   H    Comments:    Specimen collection should occur prior to Sulfasalazine administration due to the potential for falsely depressed results  Specimen collection should occur prior to Sulfapyridine administration due to the potential for falsely elevated results     Calcium 8 3 - 10 1 mg/dL 9 4     eGFR ml/min/1 73sq m 71

## 2018-03-02 ENCOUNTER — OFFICE VISIT (OUTPATIENT)
Dept: INTERNAL MEDICINE CLINIC | Facility: CLINIC | Age: 83
End: 2018-03-02
Payer: MEDICARE

## 2018-03-02 VITALS
DIASTOLIC BLOOD PRESSURE: 84 MMHG | WEIGHT: 171.2 LBS | HEIGHT: 60 IN | SYSTOLIC BLOOD PRESSURE: 140 MMHG | BODY MASS INDEX: 33.61 KG/M2 | HEART RATE: 82 BPM | OXYGEN SATURATION: 94 %

## 2018-03-02 DIAGNOSIS — E03.9 HYPOTHYROIDISM, UNSPECIFIED TYPE: ICD-10-CM

## 2018-03-02 DIAGNOSIS — I10 ESSENTIAL HYPERTENSION: ICD-10-CM

## 2018-03-02 DIAGNOSIS — G89.29 CHRONIC RIGHT-SIDED LOW BACK PAIN WITHOUT SCIATICA: ICD-10-CM

## 2018-03-02 DIAGNOSIS — M75.02 ADHESIVE CAPSULITIS OF BOTH SHOULDERS: ICD-10-CM

## 2018-03-02 DIAGNOSIS — I25.10 CORONARY ARTERY DISEASE INVOLVING NATIVE CORONARY ARTERY OF NATIVE HEART WITHOUT ANGINA PECTORIS: ICD-10-CM

## 2018-03-02 DIAGNOSIS — R33.9 URINARY RETENTION: ICD-10-CM

## 2018-03-02 DIAGNOSIS — R10.84 GENERALIZED ABDOMINAL PAIN: ICD-10-CM

## 2018-03-02 DIAGNOSIS — M25.551 RIGHT HIP PAIN: Primary | ICD-10-CM

## 2018-03-02 DIAGNOSIS — E11.8 TYPE 2 DIABETES MELLITUS WITH COMPLICATION, WITHOUT LONG-TERM CURRENT USE OF INSULIN (HCC): ICD-10-CM

## 2018-03-02 DIAGNOSIS — E78.5 HYPERLIPIDEMIA, UNSPECIFIED HYPERLIPIDEMIA TYPE: ICD-10-CM

## 2018-03-02 DIAGNOSIS — R26.9 GAIT DISTURBANCE: ICD-10-CM

## 2018-03-02 DIAGNOSIS — M54.50 CHRONIC RIGHT-SIDED LOW BACK PAIN WITHOUT SCIATICA: ICD-10-CM

## 2018-03-02 DIAGNOSIS — M75.01 ADHESIVE CAPSULITIS OF BOTH SHOULDERS: ICD-10-CM

## 2018-03-02 PROCEDURE — 99204 OFFICE O/P NEW MOD 45 MIN: CPT | Performed by: INTERNAL MEDICINE

## 2018-03-02 NOTE — PROGRESS NOTES
Assessment/Plan:    Right hip pain and new? urinary retention with lower abdominal tenderness  She was unable to provide a urine sample in the office  Labs ordered and CT scheduled for her  We will get More Kahn to see her at home for PT         Problem List Items Addressed This Visit     CAD (coronary artery disease)    HTN (hypertension)    Relevant Orders    CBC    Comprehensive metabolic panel    Hyperlipidemia    Right hip pain - Primary    Relevant Orders    CT abdomen pelvis w contrast    Diabetes mellitus ([address] )    Relevant Orders    CBC    Comprehensive metabolic panel    HEMOGLOBIN A1C W/ EAG ESTIMATION    Microalbumin / creatinine urine ratio    Hypothyroidism    Relevant Orders    TSH, 3rd generation with T4 reflex    Generalized abdominal pain    Relevant Orders    CT abdomen pelvis w contrast    Urinary retention    Relevant Orders    UA/M w/rflx Culture, Routine    Adhesive capsulitis    Relevant Orders    Ambulatory referral to Physical Therapy    Chronic low back pain    Gait disturbance    Relevant Orders    Ambulatory referral to Physical Therapy            Subjective:      Patient ID: Karen Estes is a 80 y o  female  HPI    Right hip pain x 1 month with certain movements  9/90 non-radiating  She is using a cane  Denies falls, trauma  Taking Tylenol ES   Denies tingling numbenss  A few days of urinary retention, sensation of incomplete emptying  Denies bowel incontinence  Chronic low back pain  Chronic neck and bilateral shoulder pain  She can barely raise her arms    The following portions of the patient's history were reviewed and updated as appropriate: allergies, current medications, past family history, past medical history, past social history, past surgical history and problem list     Review of Systems   Constitutional: Negative for fatigue, fever and unexpected weight change  HENT: Negative for ear pain, hearing loss, sinus pain, sinus pressure and sore throat      Respiratory: Negative for cough, shortness of breath and wheezing  Cardiovascular: Negative for chest pain, palpitations and leg swelling  Gastrointestinal: Negative for abdominal pain, constipation, diarrhea, nausea and vomiting  Genitourinary:        See hpi   Musculoskeletal: Positive for arthralgias and back pain  Negative for myalgias  See hpi   Neurological: Negative for dizziness and headaches  Objective:      /84 (BP Location: Left arm, Patient Position: Sitting, Cuff Size: Standard)   Pulse 82   Ht 5' (1 524 m)   Wt 77 7 kg (171 lb 3 2 oz)   SpO2 94%   BMI 33 44 kg/m²          Physical Exam   Constitutional: She is oriented to person, place, and time  She appears well-developed and well-nourished  HENT:   Head: Normocephalic and atraumatic  Right Ear: External ear normal    Left Ear: External ear normal    Mouth/Throat: Oropharynx is clear and moist    Eyes: Conjunctivae are normal    Neck: Neck supple  Cardiovascular: Normal rate, regular rhythm and normal heart sounds  No murmur heard  Pulmonary/Chest: Effort normal and breath sounds normal  No respiratory distress  She has no wheezes  She has no rales  Abdominal: Soft  Bowel sounds are normal  She exhibits no distension and no mass  There is tenderness in the right lower quadrant, suprapubic area and left lower quadrant  There is no rebound and no guarding  Musculoskeletal:   Very limited ROM of both UE  She can only raise them maybe 30degrees  -SLR  No tenderness over the right hip, full ROM  5/5 in both LE   Neurological: She is alert and oriented to person, place, and time  Skin: Skin is warm and dry  Psychiatric: She has a normal mood and affect   Her behavior is normal  Judgment and thought content normal

## 2018-03-06 ENCOUNTER — TRANSCRIBE ORDERS (OUTPATIENT)
Dept: LAB | Facility: CLINIC | Age: 83
End: 2018-03-06

## 2018-03-06 ENCOUNTER — APPOINTMENT (OUTPATIENT)
Dept: LAB | Facility: CLINIC | Age: 83
End: 2018-03-06
Payer: MEDICARE

## 2018-03-06 DIAGNOSIS — E11.8 TYPE 2 DIABETES MELLITUS WITH COMPLICATION, WITHOUT LONG-TERM CURRENT USE OF INSULIN (HCC): ICD-10-CM

## 2018-03-06 DIAGNOSIS — E03.9 HYPOTHYROIDISM, UNSPECIFIED TYPE: ICD-10-CM

## 2018-03-06 DIAGNOSIS — E11.8 TYPE 2 DIABETES MELLITUS WITH COMPLICATION, WITHOUT LONG-TERM CURRENT USE OF INSULIN (HCC): Primary | ICD-10-CM

## 2018-03-06 LAB
ALBUMIN SERPL BCP-MCNC: 3.9 G/DL (ref 3.5–5)
ALP SERPL-CCNC: 73 U/L (ref 46–116)
ALT SERPL W P-5'-P-CCNC: 14 U/L (ref 12–78)
ANION GAP SERPL CALCULATED.3IONS-SCNC: 5 MMOL/L (ref 4–13)
AST SERPL W P-5'-P-CCNC: 11 U/L (ref 5–45)
BASOPHILS # BLD AUTO: 0.01 THOUSANDS/ΜL (ref 0–0.1)
BASOPHILS NFR BLD AUTO: 0 % (ref 0–1)
BILIRUB SERPL-MCNC: 0.37 MG/DL (ref 0.2–1)
BILIRUB UR QL STRIP: NEGATIVE
BUN SERPL-MCNC: 15 MG/DL (ref 5–25)
CALCIUM SERPL-MCNC: 9.1 MG/DL (ref 8.3–10.1)
CHLORIDE SERPL-SCNC: 99 MMOL/L (ref 100–108)
CLARITY UR: CLEAR
CO2 SERPL-SCNC: 30 MMOL/L (ref 21–32)
COLOR UR: YELLOW
CREAT SERPL-MCNC: 0.68 MG/DL (ref 0.6–1.3)
CREAT UR-MCNC: 32.8 MG/DL
EOSINOPHIL # BLD AUTO: 0.14 THOUSAND/ΜL (ref 0–0.61)
EOSINOPHIL NFR BLD AUTO: 2 % (ref 0–6)
ERYTHROCYTE [DISTWIDTH] IN BLOOD BY AUTOMATED COUNT: 14 % (ref 11.6–15.1)
EST. AVERAGE GLUCOSE BLD GHB EST-MCNC: 103 MG/DL
GFR SERPL CREATININE-BSD FRML MDRD: 78 ML/MIN/1.73SQ M
GLUCOSE P FAST SERPL-MCNC: 94 MG/DL (ref 65–99)
GLUCOSE UR STRIP-MCNC: NEGATIVE MG/DL
HBA1C MFR BLD: 5.2 % (ref 4.2–6.3)
HCT VFR BLD AUTO: 39.2 % (ref 34.8–46.1)
HGB BLD-MCNC: 13.3 G/DL (ref 11.5–15.4)
HGB UR QL STRIP.AUTO: NEGATIVE
KETONES UR STRIP-MCNC: NEGATIVE MG/DL
LEUKOCYTE ESTERASE UR QL STRIP: NEGATIVE
LYMPHOCYTES # BLD AUTO: 1.63 THOUSANDS/ΜL (ref 0.6–4.47)
LYMPHOCYTES NFR BLD AUTO: 26 % (ref 14–44)
MCH RBC QN AUTO: 32 PG (ref 26.8–34.3)
MCHC RBC AUTO-ENTMCNC: 33.9 G/DL (ref 31.4–37.4)
MCV RBC AUTO: 95 FL (ref 82–98)
MICROALBUMIN UR-MCNC: <5 MG/L (ref 0–20)
MICROALBUMIN/CREAT 24H UR: <15 MG/G CREATININE (ref 0–30)
MONOCYTES # BLD AUTO: 0.46 THOUSAND/ΜL (ref 0.17–1.22)
MONOCYTES NFR BLD AUTO: 7 % (ref 4–12)
NEUTROPHILS # BLD AUTO: 4.01 THOUSANDS/ΜL (ref 1.85–7.62)
NEUTS SEG NFR BLD AUTO: 65 % (ref 43–75)
NITRITE UR QL STRIP: NEGATIVE
NRBC BLD AUTO-RTO: 0 /100 WBCS
PH UR STRIP.AUTO: 7.5 [PH] (ref 4.5–8)
PLATELET # BLD AUTO: 242 THOUSANDS/UL (ref 149–390)
PMV BLD AUTO: 10.3 FL (ref 8.9–12.7)
POTASSIUM SERPL-SCNC: 4.3 MMOL/L (ref 3.5–5.3)
PROT SERPL-MCNC: 7.8 G/DL (ref 6.4–8.2)
PROT UR STRIP-MCNC: NEGATIVE MG/DL
RBC # BLD AUTO: 4.15 MILLION/UL (ref 3.81–5.12)
SODIUM SERPL-SCNC: 134 MMOL/L (ref 136–145)
SP GR UR STRIP.AUTO: 1.01 (ref 1–1.03)
TSH SERPL DL<=0.05 MIU/L-ACNC: 2.37 UIU/ML (ref 0.36–3.74)
UROBILINOGEN UR QL STRIP.AUTO: 0.2 E.U./DL
WBC # BLD AUTO: 6.26 THOUSAND/UL (ref 4.31–10.16)

## 2018-03-06 PROCEDURE — 82570 ASSAY OF URINE CREATININE: CPT | Performed by: INTERNAL MEDICINE

## 2018-03-06 PROCEDURE — 36415 COLL VENOUS BLD VENIPUNCTURE: CPT | Performed by: INTERNAL MEDICINE

## 2018-03-06 PROCEDURE — 82043 UR ALBUMIN QUANTITATIVE: CPT | Performed by: INTERNAL MEDICINE

## 2018-03-06 PROCEDURE — 80053 COMPREHEN METABOLIC PANEL: CPT | Performed by: INTERNAL MEDICINE

## 2018-03-06 PROCEDURE — 83036 HEMOGLOBIN GLYCOSYLATED A1C: CPT | Performed by: INTERNAL MEDICINE

## 2018-03-06 PROCEDURE — 85025 COMPLETE CBC W/AUTO DIFF WBC: CPT

## 2018-03-06 PROCEDURE — 84443 ASSAY THYROID STIM HORMONE: CPT | Performed by: INTERNAL MEDICINE

## 2018-03-06 PROCEDURE — 81003 URINALYSIS AUTO W/O SCOPE: CPT

## 2018-03-07 NOTE — PROCEDURES
Procedure      Pre-procedure Diagnosis: Cervical disc disorder with radiculopathy  Post-procedure Diagnosis: Cervical disc disorder with radiculopathy  Procedure Title(s):  1  C7-T1 interlaminar epidural steroid injection      2  Intraoperative fluoroscopy  Attending Surgeon:   Dennie Banda, MD  Anesthesia:   Local     Indications: The patient is a 80year-old female with a diagnosis of cervical disc disorder with radiculopathy  The patient's history and physical exam were reviewed  The risks, benefits and alternatives to the procedure were discussed, and all questions were answered to the patient's satisfaction  The patient agreed to proceed, and written informed consent was obtained  Procedure in Detail: The patient was brought into the procedure room and placed in the prone position on the fluoroscopy table  The area of the cervical spine was prepped with chlorhexidine gluconate solution times one and draped in a sterile manner  The C7-T1 interspace was identified and marked under AP fluoroscopy  The skin and subcutaneous tissues in the area were anesthetized with 1% lidocaine  A 20-gauge Tuohy epidural needle was directed toward the interspace under fluoroscopic guidance until the ligamentum flavum was engaged  The C-arm was oblique to the right to obtain a contra-lateral oblique view  From this point, a loss of resistance technique with air was used to identify entrance of the needle into the epidural space  Once an appropriate loss was obtained, negative aspiration was confirmed, and 1 ml Omnipaque 300 contrast solution was injected  An appropriate epidurogram was noted  Then, after negative aspiration, a solution consisting of 1-mL depo-medrol (80mg/mL) and 3-mL preservative-free saline was easily injected  The needle was removed with a 1% lidocaine flush  The patient's back was cleaned and a bandage was placed over the site of needle insertion      Disposition: The patient tolerated the procedure well, and there were no apparent complications  The patient was taken to the recovery area where written discharge instructions for the procedure were given             Signatures   Electronically signed by : Khanh Baker MD; Dec 27 2017  9:47AM EST                       (Author)

## 2018-03-13 ENCOUNTER — HOSPITAL ENCOUNTER (OUTPATIENT)
Dept: CT IMAGING | Facility: HOSPITAL | Age: 83
Discharge: HOME | End: 2018-03-13
Payer: MEDICARE

## 2018-03-13 PROCEDURE — 74177 CT ABD & PELVIS W/CONTRAST: CPT

## 2018-03-13 RX ADMIN — IOHEXOL 100 ML: 350 INJECTION, SOLUTION INTRAVENOUS at 07:16

## 2018-03-19 ENCOUNTER — OFFICE VISIT (OUTPATIENT)
Dept: INTERNAL MEDICINE CLINIC | Facility: CLINIC | Age: 83
End: 2018-03-19
Payer: MEDICARE

## 2018-03-19 VITALS
HEIGHT: 59 IN | OXYGEN SATURATION: 96 % | SYSTOLIC BLOOD PRESSURE: 144 MMHG | HEART RATE: 81 BPM | DIASTOLIC BLOOD PRESSURE: 90 MMHG | WEIGHT: 175.2 LBS | BODY MASS INDEX: 35.32 KG/M2

## 2018-03-19 DIAGNOSIS — M25.551 RIGHT HIP PAIN: ICD-10-CM

## 2018-03-19 DIAGNOSIS — E78.5 HYPERLIPIDEMIA, UNSPECIFIED HYPERLIPIDEMIA TYPE: Primary | ICD-10-CM

## 2018-03-19 DIAGNOSIS — I10 ESSENTIAL HYPERTENSION: ICD-10-CM

## 2018-03-19 DIAGNOSIS — R33.9 URINARY RETENTION: ICD-10-CM

## 2018-03-19 DIAGNOSIS — E11.8 TYPE 2 DIABETES MELLITUS WITH COMPLICATION, WITHOUT LONG-TERM CURRENT USE OF INSULIN (HCC): ICD-10-CM

## 2018-03-19 DIAGNOSIS — E03.9 HYPOTHYROIDISM, UNSPECIFIED TYPE: ICD-10-CM

## 2018-03-19 DIAGNOSIS — M75.01 ADHESIVE CAPSULITIS OF BOTH SHOULDERS: ICD-10-CM

## 2018-03-19 DIAGNOSIS — M75.02 ADHESIVE CAPSULITIS OF BOTH SHOULDERS: ICD-10-CM

## 2018-03-19 PROCEDURE — 99214 OFFICE O/P EST MOD 30 MIN: CPT | Performed by: INTERNAL MEDICINE

## 2018-03-19 RX ORDER — BLOOD SUGAR DIAGNOSTIC
STRIP MISCELLANEOUS
Refills: 1 | COMMUNITY
Start: 2018-02-05 | End: 2018-03-19 | Stop reason: SDUPTHER

## 2018-03-19 RX ORDER — LANCETS
EACH MISCELLANEOUS
Refills: 1 | COMMUNITY
Start: 2018-02-05 | End: 2018-03-19 | Stop reason: SDUPTHER

## 2018-03-19 NOTE — PROGRESS NOTES
Assessment/Plan:    Diabetes mellitus (HCC)  A1C 5 2  Discontinue metformin    Hypothyroidism  Normal TSH, continue low dose levothyroxine    HTN (hypertension)  Conttrolled    Adhesive capsulitis  Home PT thru [address] [place] [address] rehab to be arranged    Urinary retention  No pathology identified on Ct, symptoms not constant  Monitor    Hyperlipidemia  Lipids in a few months prior to visit with Dr Brendan Shetty    Right hip pain  Chronic, start PT    [facility]     Problem List Items Addressed This Visit     HTN (hypertension)     Conttrolled         Relevant Medications    lisinopril (ZESTRIL) 2 5 mg tablet    metoprolol succinate (TOPROL XL) 25 mg 24 hr tablet    Hyperlipidemia - Primary     Lipids in a few months prior to visit with Dr Brendan Shetty         Right hip pain     Chronic, start PT         Diabetes mellitus ([address] )     A1C 5 2  Discontinue metformin         Relevant Medications    lisinopril (ZESTRIL) 2 5 mg tablet    ACCU-CHEK RUBINA PLUS test strip    ACCU-CHEK SOFTCLIX LANCETS lancets    Hypothyroidism     Normal TSH, continue low dose levothyroxine         Relevant Medications    metoprolol succinate (TOPROL XL) 25 mg 24 hr tablet    levothyroxine 25 mcg tablet    Urinary retention     No pathology identified on Ct, symptoms not constant  Monitor         Adhesive capsulitis     Home PT thru [facility] rehab to be arranged                 Subjective:      Patient ID: Ana Treviño is a 80 y o  female      HPI   She was seen as new patient a few weeks ago c/o R hip pain urinary retention CT unremarkable  R hip pain about the same- h/o right hip arthroplasty and she has had imaging including xray, bone scan in 2016  Urinary retention is not constant, +sensation of incomplete bladder emptying, hesitancy  Chronic bilateral shoulder pain, very limited ROM  PT recommended in the past for her shoulders and hip but she was unable to get to PT  Using a walker  Recent labs normal except for slightly low Na =134  A1C 5 2 on metformin 500mg once daily      The following portions of the patient's history were reviewed and updated as appropriate: allergies, current medications, past family history, past medical history, past social history, past surgical history and problem list     Review of Systems   Constitutional: Negative for fatigue, fever and unexpected weight change  HENT: Negative for hearing loss, sinus pain, sinus pressure and sore throat  Respiratory: Negative for cough, shortness of breath and wheezing  Cardiovascular: Negative for chest pain, palpitations and leg swelling  Gastrointestinal: Negative for abdominal pain, constipation, diarrhea, nausea and vomiting  Genitourinary:        See hpi   Musculoskeletal: Positive for arthralgias  Negative for myalgias  See hpi         Objective:      /90 (BP Location: Left arm, Patient Position: Sitting, Cuff Size: Standard)   Pulse 81   Ht 4' 11" (1 499 m)   Wt 79 5 kg (175 lb 3 2 oz)   SpO2 96%   BMI 35 39 kg/m²          Physical Exam   Constitutional: She is oriented to person, place, and time  She appears well-developed and well-nourished  HENT:   Head: Normocephalic and atraumatic  Right Ear: External ear normal    Left Ear: External ear normal    Mouth/Throat: Oropharynx is clear and moist    Eyes: Conjunctivae are normal    Neck: Neck supple  Cardiovascular: Normal rate, regular rhythm and normal heart sounds  No murmur heard  Pulmonary/Chest: Effort normal and breath sounds normal  No respiratory distress  She has no wheezes  She has no rales  Musculoskeletal:   Limited ROM bilateral shoulders   Neurological: She is alert and oriented to person, place, and time  Skin: Skin is warm and dry  Psychiatric: She has a normal mood and affect   Her behavior is normal  Judgment and thought content normal

## 2018-03-20 RX ORDER — LISINOPRIL 2.5 MG/1
2.5 TABLET ORAL DAILY
Qty: 90 TABLET | Refills: 1 | Status: SHIPPED | OUTPATIENT
Start: 2018-03-20 | End: 2018-09-17 | Stop reason: SDUPTHER

## 2018-03-20 RX ORDER — BLOOD SUGAR DIAGNOSTIC
1 STRIP MISCELLANEOUS DAILY
Qty: 100 EACH | Refills: 1 | Status: SHIPPED | OUTPATIENT
Start: 2018-03-20 | End: 2018-05-07 | Stop reason: SDUPTHER

## 2018-03-20 RX ORDER — LEVOTHYROXINE SODIUM 0.03 MG/1
25 TABLET ORAL DAILY
Qty: 90 TABLET | Refills: 1 | Status: SHIPPED | OUTPATIENT
Start: 2018-03-20 | End: 2018-09-28 | Stop reason: SDUPTHER

## 2018-03-20 RX ORDER — METOPROLOL SUCCINATE 25 MG/1
25 TABLET, EXTENDED RELEASE ORAL DAILY
Qty: 90 TABLET | Refills: 1 | Status: SHIPPED | OUTPATIENT
Start: 2018-03-20 | End: 2018-05-21 | Stop reason: SDUPTHER

## 2018-03-20 RX ORDER — LANCETS
EACH MISCELLANEOUS DAILY
Qty: 100 EACH | Refills: 1 | Status: SHIPPED | OUTPATIENT
Start: 2018-03-20 | End: 2018-05-07 | Stop reason: SDUPTHER

## 2018-03-22 ENCOUNTER — TELEPHONE (OUTPATIENT)
Dept: INTERNAL MEDICINE CLINIC | Facility: CLINIC | Age: 83
End: 2018-03-22

## 2018-03-22 NOTE — TELEPHONE ENCOUNTER
----- Message from Faviola Jeong MD sent at 3/20/2018 11:57 PM EDT -----  Please arrange PT thru [address] for her  Order is in  Not sure if we faxed the order to them at her first visit a few weeks ago

## 2018-04-05 ENCOUNTER — TELEPHONE (OUTPATIENT)
Dept: PAIN MEDICINE | Facility: MEDICAL CENTER | Age: 83
End: 2018-04-05

## 2018-04-05 ENCOUNTER — TELEPHONE (OUTPATIENT)
Dept: INTERNAL MEDICINE CLINIC | Facility: CLINIC | Age: 83
End: 2018-04-05

## 2018-04-05 NOTE — TELEPHONE ENCOUNTER
Pt daughter is calling stating that Dr Sharma July would need DX paperwork on why they would need medical marijuana   Call back number [phone] or [phone]

## 2018-04-05 NOTE — TELEPHONE ENCOUNTER
From what I can see, Dr Cabrera [place] office will be faxing her records to Dr Brandon Lepe?  Not sure if he will be the one prescribing it

## 2018-04-05 NOTE — TELEPHONE ENCOUNTER
Please fax office notes to Dr Amy Monterroso per Dr Rachid Gracia  Fax # [phone],  # [phone]  Mrs Lopez informed that we will fax our office notes to Dr Carol Almaguer, she will make her daughter aware  Mrs Santiago Patient would like to be called once ov are faxed  C/B # [phone]  FYI: We have been requested to fax office notes on both Mr  & Mrs Santiago Patient, a separate task was already sent regarding Mr Denisha Mayorga

## 2018-04-10 NOTE — TELEPHONE ENCOUNTER
Pt's daughter called stating that the records had not been received for pt but was for pt's   Spoke with Trevon Malone and she was going to see what she can do  Anthony Cavazos in med records was unavailable at this time

## 2018-04-10 NOTE — TELEPHONE ENCOUNTER
I did fax information to [facility]  Please forward a copy of your recent mammogram to this office.    Don't forget to schedule a dental exam soon.      Please call with your Spironolactone dose.

## 2018-05-07 DIAGNOSIS — E11.8 TYPE 2 DIABETES MELLITUS WITH COMPLICATION, WITHOUT LONG-TERM CURRENT USE OF INSULIN (HCC): ICD-10-CM

## 2018-05-09 RX ORDER — LANCETS
EACH MISCELLANEOUS DAILY
Qty: 100 EACH | Refills: 2 | Status: SHIPPED | OUTPATIENT
Start: 2018-05-09 | End: 2019-03-04 | Stop reason: SDUPTHER

## 2018-05-09 RX ORDER — BLOOD SUGAR DIAGNOSTIC
1 STRIP MISCELLANEOUS DAILY
Qty: 100 EACH | Refills: 2 | Status: SHIPPED | OUTPATIENT
Start: 2018-05-09 | End: 2019-03-04 | Stop reason: SDUPTHER

## 2018-05-21 ENCOUNTER — OFFICE VISIT (OUTPATIENT)
Dept: INTERNAL MEDICINE CLINIC | Facility: CLINIC | Age: 83
End: 2018-05-21
Payer: MEDICARE

## 2018-05-21 VITALS
WEIGHT: 171.6 LBS | HEART RATE: 93 BPM | SYSTOLIC BLOOD PRESSURE: 132 MMHG | HEIGHT: 59 IN | BODY MASS INDEX: 34.6 KG/M2 | DIASTOLIC BLOOD PRESSURE: 76 MMHG

## 2018-05-21 DIAGNOSIS — I25.10 CORONARY ARTERY DISEASE INVOLVING NATIVE CORONARY ARTERY OF NATIVE HEART WITHOUT ANGINA PECTORIS: ICD-10-CM

## 2018-05-21 DIAGNOSIS — I10 ESSENTIAL HYPERTENSION: ICD-10-CM

## 2018-05-21 DIAGNOSIS — E03.9 HYPOTHYROIDISM, UNSPECIFIED TYPE: ICD-10-CM

## 2018-05-21 DIAGNOSIS — M54.50 CHRONIC RIGHT-SIDED LOW BACK PAIN WITHOUT SCIATICA: ICD-10-CM

## 2018-05-21 DIAGNOSIS — E11.8 TYPE 2 DIABETES MELLITUS WITH COMPLICATION, WITHOUT LONG-TERM CURRENT USE OF INSULIN (HCC): Primary | ICD-10-CM

## 2018-05-21 DIAGNOSIS — M75.02 ADHESIVE CAPSULITIS OF BOTH SHOULDERS: ICD-10-CM

## 2018-05-21 DIAGNOSIS — E11.8 TYPE 2 DIABETES MELLITUS WITH COMPLICATION, WITHOUT LONG-TERM CURRENT USE OF INSULIN (HCC): ICD-10-CM

## 2018-05-21 DIAGNOSIS — G89.29 CHRONIC RIGHT-SIDED LOW BACK PAIN WITHOUT SCIATICA: ICD-10-CM

## 2018-05-21 DIAGNOSIS — E78.2 MIXED HYPERLIPIDEMIA: ICD-10-CM

## 2018-05-21 DIAGNOSIS — M75.01 ADHESIVE CAPSULITIS OF BOTH SHOULDERS: ICD-10-CM

## 2018-05-21 PROBLEM — R10.84 GENERALIZED ABDOMINAL PAIN: Status: RESOLVED | Noted: 2018-03-02 | Resolved: 2018-05-21

## 2018-05-21 PROBLEM — R33.9 URINARY RETENTION: Status: RESOLVED | Noted: 2018-03-02 | Resolved: 2018-05-21

## 2018-05-21 PROCEDURE — 99214 OFFICE O/P EST MOD 30 MIN: CPT | Performed by: INTERNAL MEDICINE

## 2018-05-21 RX ORDER — PRAVASTATIN SODIUM 40 MG
TABLET ORAL
COMMUNITY
End: 2018-09-28 | Stop reason: SDUPTHER

## 2018-05-21 RX ORDER — METOPROLOL SUCCINATE 25 MG/1
25 TABLET, EXTENDED RELEASE ORAL DAILY
Qty: 90 TABLET | Refills: 2 | Status: SHIPPED | OUTPATIENT
Start: 2018-05-21 | End: 2018-09-28 | Stop reason: SDUPTHER

## 2018-05-21 RX ORDER — LISINOPRIL 2.5 MG/1
TABLET ORAL
COMMUNITY
End: 2018-05-21 | Stop reason: SDUPTHER

## 2018-05-21 RX ORDER — METOPROLOL SUCCINATE 50 MG/1
TABLET, EXTENDED RELEASE ORAL
COMMUNITY
End: 2018-05-21 | Stop reason: SDUPTHER

## 2018-05-21 RX ORDER — LANCETS
EACH MISCELLANEOUS
COMMUNITY
Start: 2018-05-07 | End: 2018-10-23 | Stop reason: SDUPTHER

## 2018-05-21 NOTE — PROGRESS NOTES
Assessment/Plan:  Stop metformin  Cont PT and rest of meds     Problem List Items Addressed This Visit     CAD (coronary artery disease)    HTN (hypertension)    Relevant Orders    CBC and differential    Comprehensive metabolic panel    Hyperlipidemia    Relevant Medications    pravastatin (PRAVACHOL) 40 mg tablet    Diabetes mellitus ([facility] [address] ) - Primary    Relevant Orders    HEMOGLOBIN A1C W/ EAG ESTIMATION    Hypothyroidism    Relevant Orders    TSH, 3rd generation with T4 reflex    Adhesive capsulitis    Chronic low back pain            Subjective:      Patient ID: Tho Encarnacion is a 80 y o  female  HPI  Chronic low back pain , right groin, both shoulders  Ongoing PT x 1 month [facility] rehab  2 weeks ago started medical marijuana-not much improvement  2 weeks ago (prior to starting marijuana), she fell, might have blacked out  She found herself on the floor , unsure how long she was down  Was not sure if she passed out  +lightheadedness occ mild  No dizziness getting out of bed  No CP palpitations  +tremors in the hands reaching for something and holding on to something  LE edema, takes Lasix prn a few days a week    The following portions of the patient's history were reviewed and updated as appropriate: allergies, current medications, past family history, past medical history, past social history, past surgical history and problem list     Review of Systems   Constitutional: Negative for fatigue, fever and unexpected weight change  HENT: Negative for ear pain, hearing loss, sinus pain, sinus pressure and sore throat  Respiratory: Negative for cough, shortness of breath and wheezing  Cardiovascular: Negative for chest pain, palpitations and leg swelling  Gastrointestinal: Negative for abdominal pain, constipation, diarrhea, nausea and vomiting  Musculoskeletal: Positive for arthralgias and back pain  Negative for myalgias  Neurological: Positive for tremors  Negative for dizziness and headaches  Objective:      /76 (BP Location: Left arm)   Pulse 93   Ht 4' 11" (1 499 m)   Wt 77 8 kg (171 lb 9 6 oz)   BMI 34 66 kg/m²          Physical Exam   Constitutional: She is oriented to person, place, and time  She appears well-developed and well-nourished  HENT:   Head: Normocephalic and atraumatic  Right Ear: External ear normal    Left Ear: External ear normal    Mouth/Throat: Oropharynx is clear and moist    Eyes: Conjunctivae are normal    Neck: Neck supple  Cardiovascular: Normal rate and regular rhythm  Murmur heard  Pulmonary/Chest: Effort normal and breath sounds normal  No respiratory distress  She has no wheezes  She has no rales  Abdominal: Soft  Bowel sounds are normal  She exhibits no distension and no mass  There is no tenderness  There is no rebound and no guarding  Musculoskeletal:   Limited ROM both shoulders   Neurological: She is alert and oriented to person, place, and time  Postural tremors   Skin: Skin is warm and dry  Psychiatric: She has a normal mood and affect   Her behavior is normal  Judgment and thought content normal

## 2018-06-08 ENCOUNTER — OFFICE VISIT (OUTPATIENT)
Dept: INTERNAL MEDICINE CLINIC | Facility: CLINIC | Age: 83
End: 2018-06-08
Payer: MEDICARE

## 2018-06-08 VITALS
BODY MASS INDEX: 34.68 KG/M2 | HEART RATE: 88 BPM | TEMPERATURE: 98.2 F | WEIGHT: 172 LBS | DIASTOLIC BLOOD PRESSURE: 72 MMHG | HEIGHT: 59 IN | SYSTOLIC BLOOD PRESSURE: 132 MMHG

## 2018-06-08 DIAGNOSIS — G25.0 ESSENTIAL TREMOR: Primary | ICD-10-CM

## 2018-06-08 PROCEDURE — 99213 OFFICE O/P EST LOW 20 MIN: CPT | Performed by: NURSE PRACTITIONER

## 2018-06-08 NOTE — PROGRESS NOTES
Assessment/Plan:     Diagnoses and all orders for this visit:    Essential tremor  -     Ambulatory referral to Neurology; Future        Subjective:      Patient ID: Nestor Radford is a 80 y o  female  Here for tremor  Started a couple months ago  Was seen here on 5/21 and discussed with Dr Genaro Guardado it is getting worse  Bilateral hand tremor with movement or simple tasks- reaching for an object or grabbing a glass of water    Home PT twice a week for bilateral shoulder arthritis  2 weeks ago she started medical marijuana   Thyroid levels done and normal   Patient concerned about tremor because her sister had parkinsons- she would like a work up for [employer]        The following portions of the patient's history were reviewed and updated as appropriate: allergies, current medications, past family history, past medical history, past social history, past surgical history and problem list     Review of Systems   Respiratory: Negative  Cardiovascular: Negative  Musculoskeletal: Positive for arthralgias  Neurological: Positive for tremors and weakness  Negative for numbness  Objective:      /72 (BP Location: Left arm, Patient Position: Sitting)   Pulse 88   Temp 98 2 °F (36 8 °C)   Ht 4' 11" (1 499 m)   Wt 78 kg (172 lb)   BMI 34 74 kg/m²          Physical Exam   Constitutional: She is oriented to person, place, and time  She appears well-developed and well-nourished  Cardiovascular: Normal rate, regular rhythm and normal heart sounds  Pulmonary/Chest: Effort normal and breath sounds normal    Neurological: She is alert and oriented to person, place, and time  She has normal strength  No cranial nerve deficit or sensory deficit  Tremor noted in bilateral hands with activity when grabbing her cane to get up and after having her hold an object  No resting tremor noted  Psychiatric: She has a normal mood and affect  Her behavior is normal    Vitals reviewed

## 2018-07-27 ENCOUNTER — APPOINTMENT (OUTPATIENT)
Dept: LAB | Facility: CLINIC | Age: 83
End: 2018-07-27
Payer: MEDICARE

## 2018-07-27 DIAGNOSIS — I25.10 CORONARY ARTERY DISEASE INVOLVING NATIVE CORONARY ARTERY OF NATIVE HEART WITHOUT ANGINA PECTORIS: ICD-10-CM

## 2018-07-27 LAB
ANION GAP SERPL CALCULATED.3IONS-SCNC: 5 MMOL/L (ref 4–13)
BUN SERPL-MCNC: 20 MG/DL (ref 5–25)
CALCIUM SERPL-MCNC: 9.2 MG/DL (ref 8.3–10.1)
CHLORIDE SERPL-SCNC: 100 MMOL/L (ref 100–108)
CHOLEST SERPL-MCNC: 219 MG/DL (ref 50–200)
CO2 SERPL-SCNC: 29 MMOL/L (ref 21–32)
CREAT SERPL-MCNC: 0.82 MG/DL (ref 0.6–1.3)
GFR SERPL CREATININE-BSD FRML MDRD: 64 ML/MIN/1.73SQ M
GLUCOSE P FAST SERPL-MCNC: 107 MG/DL (ref 65–99)
HDLC SERPL-MCNC: 67 MG/DL (ref 40–60)
LDLC SERPL CALC-MCNC: 119 MG/DL (ref 0–100)
NONHDLC SERPL-MCNC: 152 MG/DL
POTASSIUM SERPL-SCNC: 4.4 MMOL/L (ref 3.5–5.3)
SODIUM SERPL-SCNC: 134 MMOL/L (ref 136–145)
TRIGL SERPL-MCNC: 164 MG/DL

## 2018-07-27 PROCEDURE — 80048 BASIC METABOLIC PNL TOTAL CA: CPT

## 2018-07-27 PROCEDURE — 36415 COLL VENOUS BLD VENIPUNCTURE: CPT

## 2018-07-27 PROCEDURE — 80061 LIPID PANEL: CPT

## 2018-09-07 ENCOUNTER — APPOINTMENT (OUTPATIENT)
Dept: LAB | Facility: CLINIC | Age: 83
End: 2018-09-07
Payer: MEDICARE

## 2018-09-07 DIAGNOSIS — E03.9 HYPOTHYROIDISM, UNSPECIFIED TYPE: ICD-10-CM

## 2018-09-07 DIAGNOSIS — I10 ESSENTIAL HYPERTENSION: ICD-10-CM

## 2018-09-07 DIAGNOSIS — E11.8 TYPE 2 DIABETES MELLITUS WITH COMPLICATION, WITHOUT LONG-TERM CURRENT USE OF INSULIN (HCC): ICD-10-CM

## 2018-09-07 LAB
ALBUMIN SERPL BCP-MCNC: 3.6 G/DL (ref 3.5–5)
ALP SERPL-CCNC: 76 U/L (ref 46–116)
ALT SERPL W P-5'-P-CCNC: 16 U/L (ref 12–78)
ANION GAP SERPL CALCULATED.3IONS-SCNC: 8 MMOL/L (ref 4–13)
AST SERPL W P-5'-P-CCNC: 12 U/L (ref 5–45)
BASOPHILS # BLD AUTO: 0.03 THOUSANDS/ΜL (ref 0–0.1)
BASOPHILS NFR BLD AUTO: 1 % (ref 0–1)
BILIRUB SERPL-MCNC: 0.54 MG/DL (ref 0.2–1)
BUN SERPL-MCNC: 18 MG/DL (ref 5–25)
CALCIUM SERPL-MCNC: 9.3 MG/DL (ref 8.3–10.1)
CHLORIDE SERPL-SCNC: 97 MMOL/L (ref 100–108)
CO2 SERPL-SCNC: 30 MMOL/L (ref 21–32)
CREAT SERPL-MCNC: 0.78 MG/DL (ref 0.6–1.3)
EOSINOPHIL # BLD AUTO: 0.15 THOUSAND/ΜL (ref 0–0.61)
EOSINOPHIL NFR BLD AUTO: 3 % (ref 0–6)
ERYTHROCYTE [DISTWIDTH] IN BLOOD BY AUTOMATED COUNT: 13 % (ref 11.6–15.1)
EST. AVERAGE GLUCOSE BLD GHB EST-MCNC: 117 MG/DL
GFR SERPL CREATININE-BSD FRML MDRD: 68 ML/MIN/1.73SQ M
GLUCOSE P FAST SERPL-MCNC: 93 MG/DL (ref 65–99)
HBA1C MFR BLD: 5.7 % (ref 4.2–6.3)
HCT VFR BLD AUTO: 40.2 % (ref 34.8–46.1)
HGB BLD-MCNC: 13 G/DL (ref 11.5–15.4)
IMM GRANULOCYTES # BLD AUTO: 0.01 THOUSAND/UL (ref 0–0.2)
IMM GRANULOCYTES NFR BLD AUTO: 0 % (ref 0–2)
LYMPHOCYTES # BLD AUTO: 1.59 THOUSANDS/ΜL (ref 0.6–4.47)
LYMPHOCYTES NFR BLD AUTO: 33 % (ref 14–44)
MCH RBC QN AUTO: 31.6 PG (ref 26.8–34.3)
MCHC RBC AUTO-ENTMCNC: 32.3 G/DL (ref 31.4–37.4)
MCV RBC AUTO: 98 FL (ref 82–98)
MONOCYTES # BLD AUTO: 0.39 THOUSAND/ΜL (ref 0.17–1.22)
MONOCYTES NFR BLD AUTO: 8 % (ref 4–12)
NEUTROPHILS # BLD AUTO: 2.68 THOUSANDS/ΜL (ref 1.85–7.62)
NEUTS SEG NFR BLD AUTO: 55 % (ref 43–75)
NRBC BLD AUTO-RTO: 0 /100 WBCS
PLATELET # BLD AUTO: 219 THOUSANDS/UL (ref 149–390)
PMV BLD AUTO: 10 FL (ref 8.9–12.7)
POTASSIUM SERPL-SCNC: 4.3 MMOL/L (ref 3.5–5.3)
PROT SERPL-MCNC: 7.8 G/DL (ref 6.4–8.2)
RBC # BLD AUTO: 4.11 MILLION/UL (ref 3.81–5.12)
SODIUM SERPL-SCNC: 135 MMOL/L (ref 136–145)
TSH SERPL DL<=0.05 MIU/L-ACNC: 1.92 UIU/ML (ref 0.36–3.74)
WBC # BLD AUTO: 4.85 THOUSAND/UL (ref 4.31–10.16)

## 2018-09-07 PROCEDURE — 83036 HEMOGLOBIN GLYCOSYLATED A1C: CPT

## 2018-09-07 PROCEDURE — 80053 COMPREHEN METABOLIC PANEL: CPT

## 2018-09-07 PROCEDURE — 36415 COLL VENOUS BLD VENIPUNCTURE: CPT

## 2018-09-07 PROCEDURE — 84443 ASSAY THYROID STIM HORMONE: CPT

## 2018-09-07 PROCEDURE — 85025 COMPLETE CBC W/AUTO DIFF WBC: CPT

## 2018-09-17 DIAGNOSIS — E11.8 TYPE 2 DIABETES MELLITUS WITH COMPLICATION, WITHOUT LONG-TERM CURRENT USE OF INSULIN (HCC): ICD-10-CM

## 2018-09-17 DIAGNOSIS — I10 ESSENTIAL HYPERTENSION: ICD-10-CM

## 2018-09-17 RX ORDER — LISINOPRIL 2.5 MG/1
2.5 TABLET ORAL DAILY
Qty: 90 TABLET | Refills: 2 | Status: SHIPPED | OUTPATIENT
Start: 2018-09-17 | End: 2018-09-28 | Stop reason: SDUPTHER

## 2018-09-28 ENCOUNTER — OFFICE VISIT (OUTPATIENT)
Dept: INTERNAL MEDICINE CLINIC | Facility: CLINIC | Age: 83
End: 2018-09-28
Payer: MEDICARE

## 2018-09-28 VITALS
OXYGEN SATURATION: 97 % | DIASTOLIC BLOOD PRESSURE: 78 MMHG | HEART RATE: 85 BPM | WEIGHT: 173.8 LBS | HEIGHT: 59 IN | SYSTOLIC BLOOD PRESSURE: 140 MMHG | BODY MASS INDEX: 35.04 KG/M2

## 2018-09-28 DIAGNOSIS — M75.01 ADHESIVE CAPSULITIS OF BOTH SHOULDERS: ICD-10-CM

## 2018-09-28 DIAGNOSIS — E11.8 TYPE 2 DIABETES MELLITUS WITH COMPLICATION, WITHOUT LONG-TERM CURRENT USE OF INSULIN (HCC): ICD-10-CM

## 2018-09-28 DIAGNOSIS — I10 ESSENTIAL HYPERTENSION: ICD-10-CM

## 2018-09-28 DIAGNOSIS — M47.22 OSTEOARTHRITIS OF SPINE WITH RADICULOPATHY, CERVICAL REGION: ICD-10-CM

## 2018-09-28 DIAGNOSIS — Z23 NEED FOR INFLUENZA VACCINATION: Primary | ICD-10-CM

## 2018-09-28 DIAGNOSIS — E03.9 HYPOTHYROIDISM, UNSPECIFIED TYPE: ICD-10-CM

## 2018-09-28 DIAGNOSIS — M19.011 PRIMARY OSTEOARTHRITIS OF BOTH SHOULDERS: ICD-10-CM

## 2018-09-28 DIAGNOSIS — M19.012 PRIMARY OSTEOARTHRITIS OF BOTH SHOULDERS: ICD-10-CM

## 2018-09-28 DIAGNOSIS — R25.1 TREMORS OF NERVOUS SYSTEM: ICD-10-CM

## 2018-09-28 DIAGNOSIS — M75.02 ADHESIVE CAPSULITIS OF BOTH SHOULDERS: ICD-10-CM

## 2018-09-28 DIAGNOSIS — E78.2 MIXED HYPERLIPIDEMIA: ICD-10-CM

## 2018-09-28 DIAGNOSIS — I25.10 CORONARY ARTERY DISEASE INVOLVING NATIVE CORONARY ARTERY OF NATIVE HEART WITHOUT ANGINA PECTORIS: ICD-10-CM

## 2018-09-28 PROBLEM — M47.812 CERVICAL SPINE DEGENERATION: Status: ACTIVE | Noted: 2018-09-28

## 2018-09-28 PROCEDURE — 90662 IIV NO PRSV INCREASED AG IM: CPT

## 2018-09-28 PROCEDURE — 99214 OFFICE O/P EST MOD 30 MIN: CPT | Performed by: INTERNAL MEDICINE

## 2018-09-28 PROCEDURE — G0008 ADMIN INFLUENZA VIRUS VAC: HCPCS

## 2018-09-28 RX ORDER — METOPROLOL SUCCINATE 25 MG/1
25 TABLET, EXTENDED RELEASE ORAL DAILY
Qty: 90 TABLET | Refills: 1 | Status: SHIPPED | OUTPATIENT
Start: 2018-09-28 | End: 2019-04-15 | Stop reason: SDUPTHER

## 2018-09-28 RX ORDER — GABAPENTIN 100 MG/1
CAPSULE ORAL
Qty: 90 CAPSULE | Refills: 0 | Status: SHIPPED | OUTPATIENT
Start: 2018-09-28 | End: 2018-11-01 | Stop reason: HOSPADM

## 2018-09-28 RX ORDER — LEVOTHYROXINE SODIUM 0.03 MG/1
25 TABLET ORAL DAILY
Qty: 90 TABLET | Refills: 1 | Status: SHIPPED | OUTPATIENT
Start: 2018-09-28 | End: 2019-02-09 | Stop reason: SDUPTHER

## 2018-09-28 RX ORDER — PRAVASTATIN SODIUM 40 MG
40 TABLET ORAL DAILY
Qty: 90 TABLET | Refills: 1 | Status: SHIPPED | OUTPATIENT
Start: 2018-09-28 | End: 2019-04-15 | Stop reason: SDUPTHER

## 2018-09-28 RX ORDER — LISINOPRIL 2.5 MG/1
2.5 TABLET ORAL DAILY
Qty: 90 TABLET | Refills: 1 | Status: SHIPPED | OUTPATIENT
Start: 2018-09-28 | End: 2019-06-14 | Stop reason: SDUPTHER

## 2018-09-28 NOTE — TELEPHONE ENCOUNTER
Eugenio from [facility] regarding rx for gabapentin, wanting to clarify directions  He is concerned that increasing from once to twice to three times a day is a big jump  Please advise

## 2018-09-28 NOTE — TELEPHONE ENCOUNTER
It was to be increased every week AS tolerated so start with once a day then BID in a week then TID in another week AS tolerated   I discussed with her that if she can only tolerate one a day, then stay on that dose or if she think one is enough, then stay on one a day

## 2018-09-28 NOTE — ASSESSMENT & PLAN NOTE
Lab Results   Component Value Date    HGBA1C 5 7 09/07/2018     I explained to her that her sugar and the metformin have nothing to do with her neck pain and shoulder pain  Diet controlled DM, no need to resume metformin

## 2018-09-28 NOTE — PROGRESS NOTES
Assessment/Plan:         Problem List Items Addressed This Visit        Endocrine    Diabetes mellitus ([facility] [address] )     Lab Results   Component Value Date    HGBA1C 5 7 09/07/2018     I explained to her that her sugar and the metformin have nothing to do with her neck pain and shoulder pain  Diet controlled DM, no need to resume metformin             Relevant Medications    lisinopril (ZESTRIL) 2 5 mg tablet    Other Relevant Orders    Hemoglobin A1C    Microalbumin / creatinine urine ratio    Hypothyroidism     Normal TSH         Relevant Medications    levothyroxine 25 mcg tablet    metoprolol succinate (TOPROL XL) 25 mg 24 hr tablet       Cardiovascular and Mediastinum    CAD (coronary artery disease)    Relevant Medications    metoprolol succinate (TOPROL XL) 25 mg 24 hr tablet    HTN (hypertension)     Controlled         Relevant Medications    lisinopril (ZESTRIL) 2 5 mg tablet    metoprolol succinate (TOPROL XL) 25 mg 24 hr tablet    Other Relevant Orders    CBC    Comprehensive metabolic panel       Musculoskeletal and Integument    Adhesive capsulitis    Cervical spine degeneration     Start gabapentin  Declines going back to pain mgt         Relevant Medications    gabapentin (NEURONTIN) 100 mg capsule    Primary osteoarthritis of both shoulders    Relevant Medications    gabapentin (NEURONTIN) 100 mg capsule       Other    Hyperlipidemia    Relevant Medications    pravastatin (PRAVACHOL) 40 mg tablet    Other Relevant Orders    Lipid panel    Tremors of nervous system     See neurology         Relevant Orders    Ambulatory referral to Neurology      Other Visit Diagnoses     Need for influenza vaccination    -  Primary    Relevant Orders    influenza vaccine, [id number], high-dose, PF 0 5 mL, for patients 65 yr+ (FLUZONE HIGH-DOSE) (Completed)            Subjective:      Patient ID: Argelia Acosta is a 80 y o  female      HPI  Going down hill since stopping metformin a few months ago   A1C was 5 , now 5  7  Worse pain in her neck and bilateral shoulders in spite of PT  Known degenerative disease in the cervical spine and OA in the shoulders  She has seen pain mgt and had an injection in December without relief  Asking about fibromyalgia  She saw Dr Treasure Hall  for medical marijuana which has not helped much- she is using a cream and on oil  The oil makes her feel somewhat dizzy and foggy  No relief from Tylenol  She has not taken gabapentin in the past  Normal TSH CMP CBC  Tremors in hands when she has it outstretched and when she is holding on to things  She did not make the appt with neurology  Wound on her right foot, sees podiatry regularly  Had a bleeding wound on her right thumb but not sure how he got it  Also asking about B12 since she used to take it orally and get injections      The following portions of the patient's history were reviewed and updated as appropriate: allergies, current medications, past family history, past medical history, past social history, past surgical history and problem list     Review of Systems   Constitutional: Negative for fever and unexpected weight change  HENT: Negative for sinus pain, sinus pressure and sore throat  Respiratory: Negative for cough, shortness of breath and wheezing  Cardiovascular: Negative for chest pain, palpitations and leg swelling  Gastrointestinal: Negative for abdominal pain, constipation, diarrhea, nausea and vomiting  Musculoskeletal: Positive for arthralgias and neck pain  Negative for myalgias  Neurological: Positive for tremors  Objective:      /78   Pulse 85   Ht 4' 11" (1 499 m)   Wt 78 8 kg (173 lb 12 8 oz)   SpO2 97%   BMI 35 10 kg/m²          Physical Exam   Constitutional: She is oriented to person, place, and time  She appears well-developed and well-nourished  HENT:   Head: Normocephalic and atraumatic  Eyes: Conjunctivae are normal    Neck: Neck supple     Cardiovascular: Normal rate, regular rhythm and normal heart sounds  Pulses are no weak pulses  No murmur heard  Pulses:       Dorsalis pedis pulses are 1+ on the right side, and 1+ on the left side  Pulmonary/Chest: Effort normal and breath sounds normal  No respiratory distress  She has no wheezes  She has no rales  Musculoskeletal:   Limited abduction of both shoulders   Feet:   Right Foot:   Skin Integrity: Negative for ulcer, skin breakdown, erythema, warmth, callus or dry skin  Left Foot:   Skin Integrity: Negative for ulcer, skin breakdown, erythema, warmth, callus or dry skin  Neurological: She is alert and oriented to person, place, and time  Skin: Skin is warm and dry  Psychiatric: She has a normal mood and affect  Her behavior is normal  Judgment and thought content normal      foot Patient's shoes and socks removed  Right Foot/Ankle   Right Foot Inspection  Skin Exam: skin normal skin not intact, no dry skin, no warmth, no callus, no erythema, no maceration, no abnormal color, no pre-ulcer, no ulcer and no callus                          Toe Exam: right toe deformity (hammertoe  second toe)  Sensory   Vibration: diminished    Monofilament testing: intact  Vascular  Capillary refills: < 3 seconds  The right DP pulse is 1+  Left Foot/Ankle  Left Foot Inspection  Skin Exam: skin normal and skin intactno dry skin, no warmth, no erythema, no maceration, normal color, no pre-ulcer, no ulcer and no callus                         Toe Exam: left toe deformity (amputation second toe)                   Sensory   Vibration: intact    Monofilament: intact  Vascular  Capillary refills: < 3 seconds  The left DP pulse is 1+  Assign Risk Category:  Deformity present; Loss of protective sensation;  No weak pulses       Risk: 2

## 2018-10-11 NOTE — PROGRESS NOTES
DEPARTMENT OF NEUROLOGICAL SCIENCES  [address] DISORDERS [facility]         NEW PATIENT EVALUATION NOTE    Patient: Mayuri Nath  Medical Record Number: # [id number]  YOB: 1929  Date of visit: 10/12/2018    Referring provider: Alex Ortiz MD      ASSESSMENT     1  Tremors of nervous system  Ambulatory referral to Neurology    Vitamin E    Vitamin B12    Vitamin D Panel    Ceruloplasmin    Vitamin B1, whole blood   2  Multifactorial gait disorder     3  Syncope and collapse  CT head wo contrast   4  Hoarse voice quality       Impression of this 79 yo lady with 6-12 month history of mild bilateral tremor that is not bothersome to her and gait and walking problems more recently causing her to use a walker and cane, and recent voice changes associated with a presumed URI  She was concerned about possible Parkinson's disease  Her tremors appear more action postural/kinetic with a smaller resting component without dystonic features, resembling an essential tremor, possible partially responsive to the metoprolol she is on  At this point I have a low suspicion of parkinsonism, due to lack of clear motor and non-motor symptoms typical of parkinsonism  She has no rigidity or resting tremor and minmal bradykinesia at this time  She had one fall related to a syncopal episode that was not evaluated  PLAN     · Reviewed CMP and TSH from Sept 2018 as normal  Will order ceruloplasmin, and vitamin D, E, B12, B1    · Her tremor is not currently bothersome to her; will hold off on adding any medication  · Discussed with patient the signs and symptoms of Parkinson's disease  She can call us back for any questions  · Will order CT head wo contrast given fall history and memory changes r/o blood products or mass  · Treatment of any infections, including sinus related, as per PCP  Infections can amplify tremors and any underlying neurological conditions     · Encouraged continued home exercise regimen using the techniques learned from her very recent Physical Therapy session, as tolerated  Aware that much of her activity is limited by her arthritis  · Thank you very much for sending me this interesting patient  · The patient has been instructed to call us about any new neurological problems including more falls  · Return to Clinic in 3-4 months  Jemal Moyer MD  Movement Disorders  Department of Neurological [address] [facility]    A total of 60 minutes were spent face-to-face with this patient, of which at least 50% was spent on counseling and coordination of care  We discussed the natural history of the patient's condition, differential diagnosis, level of diagnostic certainty, treatment alternatives and their side effects and possible complications  HISTORY OF PRESENT ILLNESS:     Ms Zia Pride is a 80 y o  right handed female who has been referred to the [address] [facility] for evaluation of tremor  She is unable to recall the exact reason she was sent here today  She is concerned with possible Parkinson's disease  There was nobody with her present  I did call her  Sasha Gavin at home with her permission for some of the history  Her main complaints today:    1  Difficulty walking   She says she started having walking trouble nearly six months ago in Spring 2018  Only fell once, attributed to "blacking out" for several seconds in the bathroom while standing up  She cannot recall if she injured her head, and she did not seek treatment at the hospital  She is vague on when she started using a cane, but guesses it was during Spring 2018  She says she can perform better with a walker than with a cane  She says she has been to Physical Therapy in Summer 2018, but has only been partially compliant with continuing the exercises while at home      - Denies any particular stiffness or slowness on either side particularly, but slow in general    - Endorses trouble around in bed, but to either side  2  "Losing my voice" - She feels her voice changes are very recent onset and related to a head cold  Denies chills, or fever  +Sore throat  Feels stuffy in head and denies trouble swallowing, chewing or eating  3  Tremor - Subjectively more noticeable when she tries to do things  She denies trying any medications or treatments previously for this issue  She is not bothered by them  4  Memory difficulty  - She thinks she has had this "all my life" and stable over the years  She endorses occasional word finding difficulty, but this issue comes and goes  She denies any noticeable decline herself  Her  over the phone tells me she is often forgetful but there have been no major decline recently and no personality change  Date of First Symptom: 2017 approximately per her, but she is unsure  First Symptom:  Shaking in both hands  Side More Affected:  She cannot recall if it started on a particular side    Hyposmia: denies  RBD (REM semi-purposeful body movements):  denies  Gait Disturbance:  yes     Onset: Spring 2018   Falls:  1  Fractures: 0  Freezing of Gait: denies  Dementia:  "I have had trouble memorizing things all my life" and denies feeling any change  Hallucination: she is unsure about this  Sleep:  Can fall asleep easily, 7x hours a night interrupted  +Daytime sleepiness with naps  Handwriting: Yes,  Sloppier "and both bigger and smaller"  Constipation:   No    Family History: Number of First Degree Relatives With Parkinsonism:  Sister  Imaging: No head imaging available  MRI c spine in 2017 showed spondylosis but no compression       REVIEW OF PAST MEDICAL, SOCIAL AND FAMILY HISTORY:  This is the list of problems as per our Medical Records:    Patient Active Problem List    Diagnosis Date Noted    Cervical spine degeneration 09/28/2018    Primary osteoarthritis of both shoulders 09/28/2018    Tremors of nervous system 09/28/2018    Right hip pain 03/02/2018    Gait disturbance 03/02/2018    History of placement of stent in LAD coronary artery 01/26/2018    Hyperlipidemia 01/26/2018    History of atrial flutter 01/26/2018    Edema 01/26/2018    LBBB (left bundle branch block) 01/26/2018    CAD (coronary artery disease) 04/07/2017    HTN (hypertension) 04/07/2017    Chronic low back pain 08/16/2016    Hypothyroidism 01/27/2016    Diabetes mellitus ([facility] [address] ) 06/19/2015    Adhesive capsulitis 06/19/2015       Past Medical History:   Diagnosis Date    Abnormal nuclear stress test     last assessed 04/03/2017    Anxiety     Arthritis     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    Bundle branch block, left     Cardiomyopathy ([facility] [address] )     Chronic pain     chronic b/l shoulder pain    Coronary artery disease     [facility]     GERD (gastroesophageal reflux disease)     H/O atrial flutter     History of DVT (deep vein thrombosis)     History of pulmonary embolism     Hyperlipidemia     Hypertension     Hypothyroid     Renal calculi     Shortness of breath         Past Surgical History:   Procedure Laterality Date    ATRIAL ABLATION SURGERY  01/2015    CARDIOVERSION      CHELA/DCCV 10/22/2014    CARPAL TUNNEL RELEASE Right     CATARACT EXTRACTION      CORONARY ANGIOPLASTY WITH STENT PLACEMENT      HYSTERECTOMY      JOINT REPLACEMENT Right     AL AMPUTATION TOE,MT-P JT Left 10/11/2017    Procedure: AMPUTATION SECOND TOE;  Surgeon: Jay Buchanan DPM;  Location: [facility];  Service: Podiatry    TONSILLECTOMY      TOTAL HIP ARTHROPLASTY      Right        Allergies   Allergen Reactions    Atorvastatin     Other Rash     Patient sensitive to adhesives from tape        Outpatient Encounter Prescriptions as of 10/12/2018   Medication Sig Dispense Refill    ACCU-CHEK RUBINA PLUS test strip 1 each by Other route daily Use as instructed 100 each 2    ACCU-CHEK SOFTCLIX LANCETS lancets by Other route daily Use daily as instructed  100 each 2    ACCU-CHEK SOFTCLIX LANCETS lancets CHECK BLOOD SUGAR EVERY MORNING      acetaminophen (TYLENOL) 500 mg tablet Take 500 mg by mouth every 6 (six) hours as needed for mild pain   aspirin 81 MG tablet Take 81 mg by mouth daily   Calcium Carbonate-Vitamin D (CALTRATE 600+D PO) Take 1 capsule by mouth        gabapentin (NEURONTIN) 100 mg capsule Take once a day and increase to BID after a week and if tolerated then to 3 TID in a week 90 capsule 0    levothyroxine 25 mcg tablet Take 1 tablet (25 mcg total) by mouth daily 90 tablet 1    lisinopril (ZESTRIL) 2 5 mg tablet Take 1 tablet (2 5 mg total) by mouth daily 90 tablet 1    metoprolol succinate (TOPROL XL) 25 mg 24 hr tablet Take 1 tablet (25 mg total) by mouth daily 90 tablet 1    Misc Natural Products (ENERGY FOCUS PO) Take by mouth      multivitamin-iron-minerals-folic acid (CENTRUM) chewable tablet Chew 1 tablet daily   pravastatin (PRAVACHOL) 20 mg tablet       pravastatin (PRAVACHOL) 40 mg tablet Take 1 tablet (40 mg total) by mouth daily (Patient not taking: Reported on 10/12/2018 ) 90 tablet 1     No facility-administered encounter medications on file as of 10/12/2018  Social History   Substance Use Topics    Smoking status: Never Smoker    Smokeless tobacco: Never Used    Alcohol use Yes      Comment: occasional        Family History   Problem Relation Age of Onset   Richard Polio Parkinsonism Sister    Richard Polio Cancer Sister         unknown type        REVIEW OF SYSTEMS:  The patient has entered data on an intake form regarding present illness, past medical and surgical history, medications, allergies, family and social history, and a full review of 14 systems  I have reviewed this form with the patient, and all the relevant information has been included on this note  The full review of systems was negative except as stated in HPI and below        HENT: Positive for postnasal drip, sinus pain, sinus pressure and sore throat  Eyes: Positive for itching  Respiratory: Negative  Cardiovascular: Negative  Gastrointestinal: Negative  Endocrine: Positive for heat intolerance  Genitourinary: Positive for frequency  Musculoskeletal: Positive for arthralgias, back pain, gait problem, joint swelling and myalgias  Skin: Positive for wound  Allergic/Immunologic: Negative  Neurological: Positive for tremors, speech difficulty and weakness  Hematological: Bruises/bleeds easily  Psychiatric/Behavioral: Positive for sleep disturbance  The patient is nervous/anxious  PHYSICAL EXAMINATION:     Vital signs:  BP (!) 183/82 (BP Location: Left arm, Patient Position: Sitting, Cuff Size: Standard)   Pulse 87   Ht 4' 11" (1 499 m)   Wt 79 9 kg (176 lb 1 6 oz)   BMI 35 57 kg/m²     General:  Well-appearing, well nourished, pleasant patient in no acute distress  Cane at side  Mood and Fund of Knowledge are appropriate  Head:  Normocephalic, atraumatic  Oropharynx and conjunctiva are clear  Speech  No hypophonia or bradylalia  No scanning speech  Language: Comprehension intact  Neck:  Supple, strong 5/5 forward flexion and retroflexion  Extremities: Range of motion is normal       Cognitive and Mental Exam:  The patient is alert, oriented to self, location, date and situation  Memory is normal to provide accurate details of health history     Cranial Nerves:  Funduscopic examination reveals no papilledema  Direct and consensual light reflexes were normal  No afferent pupillary defect  Visual fields are full to confrontation  Extraocular movements were full, with normal pursuit and saccades  Pupils were equal, reactive to light symmetrically  Facial sensation to light touch was intact  Face is symmetric with normal strength     Hearing was assessed using the Calibrated Finger Rub Auditory Screening Test (CALFRAST) and was not abnormal (Better than CALFRAST-Strong-70)  Palate is up going bilaterally and symmetrically  Neck muscles are strong  Tongue protrusion is at midline with normal movements  No dysarthria  Motor:    Tremor:  No head or jaw or lip tremor  +Slight resting fast, small amplitude R wrist flex-ext, L thumb flex-extension - amplitude is increased on having both hands held in front of her and in various positions  Does not re-emerge during walking  Dystonia:  Slight left shoulder elevation  Dyskinesia: none  Myoclonus: none  Chorea: none  Tics: none  Handwriting sample:  Mildly Tremulous in all letters, particularly with downstrokes and with horizontal lines  When she circled some answers on the forms, wavy throughout the tracing       MDS-UPDRS III:   Speech: 1  Facial Expression: 1  Tremor (Head):  0  Rest Tremor Severity (RUE/LUE/RLE/LLE/Lip):  1/1/0/0/0  Action Tremor of Hands (R): 2  Action Tremor of Hands (L): 2  Finger tapping (R): 2  Finger tapping (L): 2  Hand clenching (R): 1  Hand clenching (L): 1  PAPITO Hand (R): 1  PAPITO Hand (L): 1  Toe Tapping (R):  1  Toe Tapping (L):  1  Leg Agility (R):  1  Leg Agility (L):  2  Rigidity (Neck): 2  Rigidity (RUE): 0  Rigidity (LUE): 1  Rigidity (RLE): 0  Rigidity (LLE): 0  Arising From Chair: 2  Posture: 2  Gait: 2 (slow, small steps forward)  Freezing of Gait: 0  Postural Stability: 2 (5 steps before about to fall to ground)  Body Bradykinesia: 2  -------------------------------------------------------------------------------------    Muscle Strength Right Left  Muscle Strength Right Left   Deltoid 3/5 3/5  Hip Adductors 5/5 5/5   Biceps 5/5 5/5  Hip Abductors 5/5 5/5   Triceps 5/5 5/5  Knee Extensors 5/5 5/5   Wrist Extensors 5/5 5/5  Knee Flexors 5/5 5/5   Wrist Flexors 5/5 5/5  Ankle Extensors 5/5 5/5    5/5 5/5  Ankle Flexors 5/5 5/5   Finger Abductors 5/5 5/5       Hip Flexors 5/5 5/5   Hip Extensors 5/5 5/5   Unable to raise either arm to shoulder level, attributed to neck pain  At least a 3 for poor voluntary effort due to pain  Strength on lower extremities was normal      Sensory  Decreased to temperature of tuning fork over the feet compared to hands, and also decreased to vibration bilaterally  Coordination:  Finger-to-nose-finger: unable to properly test; she is unable to raise her arms above shoulder due to pain but when performed at close range it was still apparent she was being very slow to both her nose and target  Some intention tremor  Unable to Complete Tandem gait  Gait:  Has to push herself up out of chair with hands ,wide based gait, forward truncal posture without swaying, short steps to walk, multiple steps to turn, bilaterally reduced arm swing equally, unable to complete Tandem gait  Pull test of 2  Reflexes:    Right Left   Biceps 2/4 2/4   Brachioradialis 2/4 2/4   Triceps 2/4 2/4   Knee 2/4 2/4   Ankle 1/4 1/4      Plantar cutaneous reflex:  Right: flexor  Left: flexor      REVIEW OF ANCILLARY TESTS:   MRI CERVICAL SPINE WITHOUT CONTRAST  12/1/17  No cord compression and no significant nerve root compression either

## 2018-10-12 ENCOUNTER — OFFICE VISIT (OUTPATIENT)
Dept: NEUROLOGY | Facility: CLINIC | Age: 83
End: 2018-10-12
Payer: MEDICARE

## 2018-10-12 ENCOUNTER — APPOINTMENT (OUTPATIENT)
Dept: LAB | Facility: CLINIC | Age: 83
End: 2018-10-12
Payer: MEDICARE

## 2018-10-12 VITALS
BODY MASS INDEX: 35.5 KG/M2 | HEART RATE: 87 BPM | DIASTOLIC BLOOD PRESSURE: 82 MMHG | HEIGHT: 59 IN | WEIGHT: 176.1 LBS | SYSTOLIC BLOOD PRESSURE: 183 MMHG

## 2018-10-12 DIAGNOSIS — R26.89 MULTIFACTORIAL GAIT DISORDER: ICD-10-CM

## 2018-10-12 DIAGNOSIS — R25.1 TREMORS OF NERVOUS SYSTEM: ICD-10-CM

## 2018-10-12 DIAGNOSIS — R25.1 TREMORS OF NERVOUS SYSTEM: Primary | ICD-10-CM

## 2018-10-12 DIAGNOSIS — R49.0 HOARSE VOICE QUALITY: ICD-10-CM

## 2018-10-12 DIAGNOSIS — R55 SYNCOPE AND COLLAPSE: ICD-10-CM

## 2018-10-12 LAB — VIT B12 SERPL-MCNC: 1472 PG/ML (ref 100–900)

## 2018-10-12 PROCEDURE — 82607 VITAMIN B-12: CPT

## 2018-10-12 PROCEDURE — 99205 OFFICE O/P NEW HI 60 MIN: CPT | Performed by: PSYCHIATRY & NEUROLOGY

## 2018-10-12 PROCEDURE — 82306 VITAMIN D 25 HYDROXY: CPT

## 2018-10-12 PROCEDURE — 82390 ASSAY OF CERULOPLASMIN: CPT

## 2018-10-12 PROCEDURE — 84446 ASSAY OF VITAMIN E: CPT

## 2018-10-12 PROCEDURE — 36415 COLL VENOUS BLD VENIPUNCTURE: CPT

## 2018-10-12 PROCEDURE — 84425 ASSAY OF VITAMIN B-1: CPT

## 2018-10-12 RX ORDER — PRAVASTATIN SODIUM 20 MG
20 TABLET ORAL DAILY
COMMUNITY
Start: 2018-09-02 | End: 2018-11-01 | Stop reason: SDUPTHER

## 2018-10-12 NOTE — PATIENT INSTRUCTIONS
· Reviewed CMP and TSH from Sept 2018 as normal  Will order ceruloplasmin, and vitamin D, E, B12, B1    · Discussed with patient the signs and symptoms of parkinson's disease  She can call us back for any questions  · Encouraged continued home exercise regimen using the techniques learned from her recent Physical Therapy session, as tolerated  Aware that much of her activity is limited by her arthritis  · Will order CT head wo contrast given fall history and memory changes r/o blood products or mass  · Thank you very much for sending me this interesting patient  · The patient has been instructed to call us about any new neurological problems or medication side effects  · Return to Clinic in 3-4 months

## 2018-10-12 NOTE — PROGRESS NOTES
Patient ID: Pancho Coates is a 80 y o  female  Assessment/Plan:    No problem-specific Assessment & Plan notes found for this encounter  {Assess/PlanSmartLinks:62539}       Subjective:    HPI    {[facility] HPI texts:28278}    {Common ambulatory SmartLinks:52802}         Objective:    Blood pressure (!) 183/82, pulse 87, height 4' 11" (1 499 m), weight 79 9 kg (176 lb 1 6 oz)  Physical Exam    Neurological Exam      ROS:    Review of Systems   HENT: Positive for postnasal drip, sinus pain, sinus pressure and sore throat  Eyes: Positive for itching  Respiratory: Negative  Cardiovascular: Negative  Gastrointestinal: Negative  Endocrine: Positive for heat intolerance  Genitourinary: Positive for frequency  Musculoskeletal: Positive for arthralgias, back pain, gait problem, joint swelling and myalgias  Skin: Positive for wound  Allergic/Immunologic: Negative  Neurological: Positive for tremors, speech difficulty and weakness  Hematological: Bruises/bleeds easily  Psychiatric/Behavioral: Positive for sleep disturbance  The patient is nervous/anxious

## 2018-10-12 NOTE — LETTER
October 12, 2018     Soraya Barney, [address] [facility] [address]  [address] [address]    Patient: Yin Schmidt   YOB: 1929   Date of Visit: 10/12/2018       Dear Dr Leah Parish: Thank you for referring Jessica Jerez to me for evaluation  Below are my notes for this consultation  If you have questions, please do not hesitate to call me  I look forward to following your patient along with you  Sincerely,        Victory Merlin, MD        CC: No Recipients  Victory Merlin, MD  10/12/2018  1:21 PM  Sign at close encounter  [facility] PATIENT EVALUATION NOTE    Patient: Олег Alvarez Record Number: # [id number]  YOB: 1929  Date of visit: 10/12/2018    Referring provider: Cintia Shipman MD      ASSESSMENT     1  Tremors of nervous system  Ambulatory referral to Neurology    Vitamin E    Vitamin B12    Vitamin D Panel    Ceruloplasmin    Vitamin B1, whole blood   2  Multifactorial gait disorder     3  Syncope and collapse  CT head wo contrast   4  Hoarse voice quality       Impression of this 81 yo lady with 6-12 month history of mild bilateral tremor that is not bothersome to her and gait and walking problems more recently causing her to use a walker and cane, and recent voice changes associated with a presumed URI  She was concerned about possible Parkinson's disease  Her tremors appear more action postural/kinetic with a smaller resting component without dystonic features, resembling an essential tremor, possible partially responsive to the metoprolol she is on  At this point I have a low suspicion of parkinsonism, due to lack of clear motor and non-motor symptoms typical of parkinsonism  She has no rigidity or resting tremor and minmal bradykinesia at this time  She had one fall related to a syncopal episode that was not evaluated       PLAN     · Reviewed CMP and TSH from Sept 2018 as normal  Will order ceruloplasmin, and vitamin D, E, B12, B1    · Her tremor is not currently bothersome to her; will hold off on adding any medication  · Discussed with patient the signs and symptoms of Parkinson's disease  She can call us back for any questions  · Will order CT head wo contrast given fall history and memory changes r/o blood products or mass  · Treatment of any infections, including sinus related, as per PCP  Infections can amplify tremors and any underlying neurological conditions  · Encouraged continued home exercise regimen using the techniques learned from her very recent Physical Therapy session, as tolerated  Aware that much of her activity is limited by her arthritis  · Thank you very much for sending me this interesting patient  · The patient has been instructed to call us about any new neurological problems including more falls  · Return to Clinic in 3-4 months  Emily Landa MD  Movement Disorders  Department of Neurological [address] [facility]    A total of 60 minutes were spent face-to-face with this patient, of which at least 50% was spent on counseling and coordination of care  We discussed the natural history of the patient's condition, differential diagnosis, level of diagnostic certainty, treatment alternatives and their side effects and possible complications  HISTORY OF PRESENT ILLNESS:     Ms Maria Elena Pena is a 80 y o  right handed female who has been referred to the [address] [facility] for evaluation of tremor  She is unable to recall the exact reason she was sent here today  She is concerned with possible Parkinson's disease  There was nobody with her present  I did call her  Eileen Zavala at home with her permission for some of the history  Her main complaints today:    1  Difficulty walking   She says she started having walking trouble nearly six months ago in Spring 2018   Only fell once, attributed to "blacking out" for several seconds in the bathroom while standing up  She cannot recall if she injured her head, and she did not seek treatment at the hospital  She is vague on when she started using a cane, but guesses it was during Spring 2018  She says she can perform better with a walker than with a cane  She says she has been to Physical Therapy in Summer 2018, but has only been partially compliant with continuing the exercises while at home  - Denies any particular stiffness or slowness on either side particularly, but slow in general    - Endorses trouble around in bed, but to either side  2  "Losing my voice" - She feels her voice changes are very recent onset and related to a head cold  Denies chills, or fever  +Sore throat  Feels stuffy in head and denies trouble swallowing, chewing or eating  3  Tremor - Subjectively more noticeable when she tries to do things  She denies trying any medications or treatments previously for this issue  She is not bothered by them  4  Memory difficulty  - She thinks she has had this "all my life" and stable over the years  She endorses occasional word finding difficulty, but this issue comes and goes  She denies any noticeable decline herself  Her  over the phone tells me she is often forgetful but there have been no major decline recently and no personality change  Date of First Symptom: 2017 approximately per her, but she is unsure  First Symptom:  Shaking in both hands  Side More Affected:  She cannot recall if it started on a particular side    Hyposmia: denies  RBD (REM semi-purposeful body movements):  denies  Gait Disturbance:  yes     Onset: Spring 2018   Falls:  1  Fractures: 0  Freezing of Gait: denies  Dementia:  "I have had trouble memorizing things all my life" and denies feeling any change  Hallucination: she is unsure about this  Sleep:  Can fall asleep easily, 7x hours a night interrupted  +Daytime sleepiness with naps  Handwriting: Yes,  Sloppier "and both bigger and smaller"  Constipation:   No    Family History: Number of First Degree Relatives With Parkinsonism:  Sister  Imaging: No head imaging available  MRI c spine in 2017 showed spondylosis but no compression       REVIEW OF PAST MEDICAL, SOCIAL AND FAMILY HISTORY:  This is the list of problems as per our Medical Records:    Patient Active Problem List    Diagnosis Date Noted    Cervical spine degeneration 09/28/2018    Primary osteoarthritis of both shoulders 09/28/2018    Tremors of nervous system 09/28/2018    Right hip pain 03/02/2018    Gait disturbance 03/02/2018    History of placement of stent in LAD coronary artery 01/26/2018    Hyperlipidemia 01/26/2018    History of atrial flutter 01/26/2018    Edema 01/26/2018    LBBB (left bundle branch block) 01/26/2018    CAD (coronary artery disease) 04/07/2017    HTN (hypertension) 04/07/2017    Chronic low back pain 08/16/2016    Hypothyroidism 01/27/2016    Diabetes mellitus ([address] ) 06/19/2015    Adhesive capsulitis 06/19/2015       Past Medical History:   Diagnosis Date    Abnormal nuclear stress test     last assessed 04/03/2017    Anxiety     Arthritis     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    Bundle branch block, left     Cardiomyopathy ([address] )     Chronic pain     chronic b/l shoulder pain    Coronary artery disease     Diabetes mellitus ([address] )     GERD (gastroesophageal reflux disease)     H/O atrial flutter     History of DVT (deep vein thrombosis)     History of pulmonary embolism     Hyperlipidemia     Hypertension     Hypothyroid     Renal calculi     Shortness of breath         Past Surgical History:   Procedure Laterality Date    ATRIAL ABLATION SURGERY  01/2015    CARDIOVERSION      CHELA/DCCV 10/22/2014    CARPAL TUNNEL RELEASE Right     CATARACT EXTRACTION      CORONARY ANGIOPLASTY WITH STENT PLACEMENT      HYSTERECTOMY      JOINT REPLACEMENT Right  DE AMPUTATION TOE,MT-P JT Left 10/11/2017    Procedure: AMPUTATION SECOND TOE;  Surgeon: Rochelle Toledo DPM;  Location: [facility];  Service: Podiatry    TONSILLECTOMY      TOTAL HIP ARTHROPLASTY      Right        Allergies   Allergen Reactions    Atorvastatin     Other Rash     Patient sensitive to adhesives from tape        Outpatient Encounter Prescriptions as of 10/12/2018   Medication Sig Dispense Refill    ACCU-CHEK RUBINA PLUS test strip 1 each by Other route daily Use as instructed 100 each 2    ACCU-CHEK SOFTCLIX LANCETS lancets by Other route daily Use daily as instructed  100 each 2    ACCU-CHEK SOFTCLIX LANCETS lancets CHECK BLOOD SUGAR EVERY MORNING      acetaminophen (TYLENOL) 500 mg tablet Take 500 mg by mouth every 6 (six) hours as needed for mild pain   aspirin 81 MG tablet Take 81 mg by mouth daily   Calcium Carbonate-Vitamin D (CALTRATE 600+D PO) Take 1 capsule by mouth        gabapentin (NEURONTIN) 100 mg capsule Take once a day and increase to BID after a week and if tolerated then to 3 TID in a week 90 capsule 0    levothyroxine 25 mcg tablet Take 1 tablet (25 mcg total) by mouth daily 90 tablet 1    lisinopril (ZESTRIL) 2 5 mg tablet Take 1 tablet (2 5 mg total) by mouth daily 90 tablet 1    metoprolol succinate (TOPROL XL) 25 mg 24 hr tablet Take 1 tablet (25 mg total) by mouth daily 90 tablet 1    Misc Natural Products (ENERGY FOCUS PO) Take by mouth      multivitamin-iron-minerals-folic acid (CENTRUM) chewable tablet Chew 1 tablet daily   pravastatin (PRAVACHOL) 20 mg tablet       pravastatin (PRAVACHOL) 40 mg tablet Take 1 tablet (40 mg total) by mouth daily (Patient not taking: Reported on 10/12/2018 ) 90 tablet 1     No facility-administered encounter medications on file as of 10/12/2018          Social History   Substance Use Topics    Smoking status: Never Smoker    Smokeless tobacco: Never Used    Alcohol use Yes      Comment: occasional Family History   Problem Relation Age of Onset   Mirian Sanchez Parkinsonism Sister    Mirian Sanchez Cancer Sister         unknown type        REVIEW OF SYSTEMS:  The patient has entered data on an intake form regarding present illness, past medical and surgical history, medications, allergies, family and social history, and a full review of 14 systems  I have reviewed this form with the patient, and all the relevant information has been included on this note  The full review of systems was negative except as stated in HPI and below  HENT: Positive for postnasal drip, sinus pain, sinus pressure and sore throat  Eyes: Positive for itching  Respiratory: Negative  Cardiovascular: Negative  Gastrointestinal: Negative  Endocrine: Positive for heat intolerance  Genitourinary: Positive for frequency  Musculoskeletal: Positive for arthralgias, back pain, gait problem, joint swelling and myalgias  Skin: Positive for wound  Allergic/Immunologic: Negative  Neurological: Positive for tremors, speech difficulty and weakness  Hematological: Bruises/bleeds easily  Psychiatric/Behavioral: Positive for sleep disturbance  The patient is nervous/anxious  PHYSICAL EXAMINATION:     Vital signs:  BP (!) 183/82 (BP Location: Left arm, Patient Position: Sitting, Cuff Size: Standard)   Pulse 87   Ht 4' 11" (1 499 m)   Wt 79 9 kg (176 lb 1 6 oz)   BMI 35 57 kg/m²      General:  Well-appearing, well nourished, pleasant patient in no acute distress  Cane at side  Mood and Fund of Knowledge are appropriate  Head:  Normocephalic, atraumatic  Oropharynx and conjunctiva are clear  Speech  No hypophonia or bradylalia  No scanning speech  Language: Comprehension intact  Neck:  Supple, strong 5/5 forward flexion and retroflexion  Extremities: Range of motion is normal       Cognitive and Mental Exam:  The patient is alert, oriented to self, location, date and situation   Memory is normal to provide accurate details of health history     Cranial Nerves:  Funduscopic examination reveals no papilledema  Direct and consensual light reflexes were normal  No afferent pupillary defect  Visual fields are full to confrontation  Extraocular movements were full, with normal pursuit and saccades  Pupils were equal, reactive to light symmetrically  Facial sensation to light touch was intact  Face is symmetric with normal strength  Hearing was assessed using the Calibrated Finger Rub Auditory Screening Test (CALFRAST) and was not abnormal (Better than CALFRAST-Strong-70)  Palate is up going bilaterally and symmetrically  Neck muscles are strong  Tongue protrusion is at midline with normal movements  No dysarthria  Motor:    Tremor:  No head or jaw or lip tremor  +Slight resting fast, small amplitude R wrist flex-ext, L thumb flex-extension - amplitude is increased on having both hands held in front of her and in various positions  Does not re-emerge during walking  Dystonia:  Slight left shoulder elevation  Dyskinesia: none  Myoclonus: none  Chorea: none  Tics: none  Handwriting sample:  Mildly Tremulous in all letters, particularly with downstrokes and with horizontal lines  When she circled some answers on the forms, wavy throughout the tracing       MDS-UPDRS III:   Speech: 1  Facial Expression: 1  Tremor (Head):   0  Rest Tremor Severity (RUE/LUE/RLE/LLE/Lip):  1/1/0/0/0  Action Tremor of Hands (R): 2  Action Tremor of Hands (L): 2  Finger tapping (R): 2  Finger tapping (L): 2  Hand clenching (R): 1  Hand clenching (L): 1  PAPITO Hand (R): 1  PAPITO Hand (L): 1  Toe Tapping (R):   1  Toe Tapping (L):   1  Leg Agility (R):   1  Leg Agility (L):   2  Rigidity (Neck): 2  Rigidity (RUE): 0  Rigidity (LUE): 1  Rigidity (RLE): 0  Rigidity (LLE): 0  Arising From Chair: 2  Posture: 2  Gait: 2 (slow, small steps forward)  Freezing of Gait: 0  Postural Stability: 2 (5 steps before about to fall to ground)  Body Bradykinesia: 2  -------------------------------------------------------------------------------------    Muscle Strength Right Left  Muscle Strength Right Left   Deltoid 3/5 3/5  Hip Adductors 5/5 5/5   Biceps 5/5 5/5  Hip Abductors 5/5 5/5   Triceps 5/5 5/5  Knee Extensors 5/5 5/5   Wrist Extensors 5/5 5/5  Knee Flexors 5/5 5/5   Wrist Flexors 5/5 5/5  Ankle Extensors 5/5 5/5    5/5 5/5  Ankle Flexors 5/5 5/5   Finger Abductors 5/5 5/5       Hip Flexors 5/5 5/5   Hip Extensors 5/5 5/5   Unable to raise either arm to shoulder level, attributed to neck pain  At least a 3 for poor voluntary effort due to pain  Strength on lower extremities was normal      Sensory  Decreased to temperature of tuning fork over the feet compared to hands, and also decreased to vibration bilaterally  Coordination:  Finger-to-nose-finger: unable to properly test; she is unable to raise her arms above shoulder due to pain but when performed at close range it was still apparent she was being very slow to both her nose and target  Some intention tremor  Unable to Complete Tandem gait  Gait:  Has to push herself up out of chair with hands ,wide based gait, forward truncal posture without swaying, short steps to walk, multiple steps to turn, bilaterally reduced arm swing equally, unable to complete Tandem gait  Pull test of 2  Reflexes:    Right Left   Biceps 2/4 2/4   Brachioradialis 2/4 2/4   Triceps 2/4 2/4   Knee 2/4 2/4   Ankle 1/4 1/4      Plantar cutaneous reflex:  Right: flexor  Left: flexor      REVIEW OF ANCILLARY TESTS:   MRI CERVICAL SPINE WITHOUT CONTRAST  12/1/17  No cord compression and no significant nerve root compression either

## 2018-10-13 LAB — CERULOPLASMIN SERPL-MCNC: 27.9 MG/DL (ref 19–39)

## 2018-10-15 LAB
A-TOCOPHEROL VIT E SERPL-MCNC: 32.5 MG/L (ref 9–29)
GAMMA TOCOPHEROL SERPL-MCNC: 0.2 MG/L (ref 0.5–4.9)
VIT B1 BLD-SCNC: 140.2 NMOL/L (ref 66.5–200)

## 2018-10-21 LAB
25(OH)D2 SERPL-MCNC: <1 NG/ML
25(OH)D3 SERPL-MCNC: 43 NG/ML
25(OH)D3+25(OH)D2 SERPL-MCNC: 43 NG/ML

## 2018-10-22 ENCOUNTER — TELEPHONE (OUTPATIENT)
Dept: NEUROLOGY | Facility: CLINIC | Age: 83
End: 2018-10-22

## 2018-10-22 NOTE — TELEPHONE ENCOUNTER
----- Message from Fernanda Cobb MD sent at 10/21/2018  2:35 AM EDT -----  Regarding: Lab Results  Hello, please let patient know her results are grossly normal, and reassuring  She should continue with the CT head scan scheduled and call us with any questions   Thanks    Vitamin E(Alpha Tocopherol) 9 0 - 29 0 mg/L 32 5     Vitamin E(Gamma Tocopherol) 0 5 - 4 9 mg/L 0 2       Vitamin B1, Whole Blood 66 5 - 200 0 nmol/L 140 2          ----- Message -----  From: Lab, Background User  Sent: 10/12/2018   3:13 PM  To: Fernanda Cobb MD

## 2018-10-23 ENCOUNTER — OFFICE VISIT (OUTPATIENT)
Dept: CARDIOLOGY CLINIC | Facility: CLINIC | Age: 83
End: 2018-10-23
Payer: MEDICARE

## 2018-10-23 VITALS
BODY MASS INDEX: 35.44 KG/M2 | WEIGHT: 175.8 LBS | SYSTOLIC BLOOD PRESSURE: 132 MMHG | HEART RATE: 88 BPM | HEIGHT: 59 IN | DIASTOLIC BLOOD PRESSURE: 78 MMHG | OXYGEN SATURATION: 96 %

## 2018-10-23 DIAGNOSIS — I44.7 LBBB (LEFT BUNDLE BRANCH BLOCK): Primary | ICD-10-CM

## 2018-10-23 DIAGNOSIS — Z86.79 HISTORY OF ATRIAL FLUTTER: ICD-10-CM

## 2018-10-23 DIAGNOSIS — Z95.5 HISTORY OF PLACEMENT OF STENT IN LAD CORONARY ARTERY: ICD-10-CM

## 2018-10-23 DIAGNOSIS — I10 ESSENTIAL HYPERTENSION: ICD-10-CM

## 2018-10-23 DIAGNOSIS — I25.10 CORONARY ARTERY DISEASE INVOLVING NATIVE CORONARY ARTERY OF NATIVE HEART WITHOUT ANGINA PECTORIS: ICD-10-CM

## 2018-10-23 DIAGNOSIS — E78.2 MIXED HYPERLIPIDEMIA: ICD-10-CM

## 2018-10-23 PROCEDURE — 93000 ELECTROCARDIOGRAM COMPLETE: CPT | Performed by: INTERNAL MEDICINE

## 2018-10-23 PROCEDURE — 99214 OFFICE O/P EST MOD 30 MIN: CPT | Performed by: INTERNAL MEDICINE

## 2018-10-23 NOTE — PROGRESS NOTES
[facility]  Follow up note  Anu Fischer 80 y o  female MRN: [id number]        Problems    1  LBBB (left bundle branch block)  POCT ECG   2  Coronary artery disease involving native coronary artery of native heart without angina pectoris     3  Mixed hyperlipidemia     4  History of atrial flutter     5  History of placement of stent in LAD coronary artery     6  Essential hypertension         Impression:    I HAD THE PLEASURE OF HAVING VANI RETURN TO SEE ME AT THE [facility] OFFICE  Hypertension  Well controlled    Hyperlipidemia  Cholesterol a little elevated for somebody with CAD, but not going to be too aggressive considering age 80  Instructed her to be sure she is taking pravastatin 40 mg a day at home  CAD  History of LAD stent  She has chronic left bundle branch block  History of atrial flutter  Status post ablation, with recovered tachyarrhythmia related cardiomyopathy    History of edema  Previously on furosemide, this appears to have been stopped at some point, she did have a syncopal episode earlier this year, it may have been around that time  At this time she has no edema, no pulmonary edema, and no ongoing need for diuretic therapy  Plan:    Six-month follow-up recommended  No need for any further cardiac testing at this time      HPI:   Anu Fischer is a 80y o  year old female with a history of CAD, left bundle branch block, hypertension, hyperlipidemia, atrial flutter with tachyarrhythmia related cardiomyopathy with recovery post ablation, returns to see me with complaints of gait disturbance, a episode of syncope earlier this year in the spring at which time it appears her furosemide was discontinued although she did not seek emergency care at that time  She is now seeing Neurology  She denies chest pain, lightheadedness, dizziness, lower extremity edema, but does have occasional dyspnea on exertion if she tries to rush    Her last testing was a stress test 3/17 abnormal with reported anterior ischemia, but cardiac catheterization revealed no new occlusive disease  LV function normal         Review of Systems   All other systems reviewed and are negative  Past Medical History:   Diagnosis Date    Abnormal nuclear stress test     last assessed 04/03/2017    Anxiety     Arthritis     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    Bundle branch block, left     Cardiomyopathy ([facility] [address] [facility]     Chronic pain     chronic b/l shoulder pain    Coronary artery disease     Diabetes mellitus ([facility] [address] )     GERD (gastroesophageal reflux disease)     H/O atrial flutter     History of DVT (deep vein thrombosis)     History of pulmonary embolism     Hyperlipidemia     Hypertension     Hypothyroid     Renal calculi     Shortness of breath      History   Alcohol Use    Yes     Comment: occasional     History   Drug Use No     History   Smoking Status    Never Smoker   Smokeless Tobacco    Never Used       Allergies: Allergies   Allergen Reactions    Atorvastatin      Reaction unknown 10/23/2018-    Other Rash     Patient sensitive to adhesives from tape       Medications:     Current Outpatient Prescriptions:     ACCU-CHEK RUBINA PLUS test strip, 1 each by Other route daily Use as instructed, Disp: 100 each, Rfl: 2    ACCU-CHEK SOFTCLIX LANCETS lancets, by Other route daily Use daily as instructed , Disp: 100 each, Rfl: 2    acetaminophen (TYLENOL) 500 mg tablet, Take 500 mg by mouth every 6 (six) hours as needed for mild pain , Disp: , Rfl:     aspirin 81 MG tablet, Take 81 mg by mouth daily  , Disp: , Rfl:     Calcium Carbonate-Vitamin D (CALTRATE 600+D PO), Take 1 capsule by mouth daily  , Disp: , Rfl:     gabapentin (NEURONTIN) 100 mg capsule, Take once a day and increase to BID after a week and if tolerated then to 3 TID in a week, Disp: 90 capsule, Rfl: 0    levothyroxine 25 mcg tablet, Take 1 tablet (25 mcg total) by mouth daily, Disp: 90 tablet, Rfl: 1    lisinopril (ZESTRIL) 2 5 mg tablet, Take 1 tablet (2 5 mg total) by mouth daily, Disp: 90 tablet, Rfl: 1    metoprolol succinate (TOPROL XL) 25 mg 24 hr tablet, Take 1 tablet (25 mg total) by mouth daily, Disp: 90 tablet, Rfl: 1    Misc Natural Products (ENERGY FOCUS PO), Take by mouth daily  , Disp: , Rfl:     multivitamin-iron-minerals-folic acid (CENTRUM) chewable tablet, Chew 1 tablet daily  , Disp: , Rfl:     pravastatin (PRAVACHOL) 20 mg tablet, Take 20 mg by mouth daily  , Disp: , Rfl:     pravastatin (PRAVACHOL) 40 mg tablet, Take 1 tablet (40 mg total) by mouth daily (Patient not taking: Reported on 10/12/2018 ), Disp: 90 tablet, Rfl: 1      Vitals:    10/23/18 0949   BP: 132/78   Pulse: 88   SpO2: 96%     Weight (last 2 days)     Date/Time   Weight    10/23/18 0949  79 7 (175 8)            Physical Exam   Constitutional: No distress  HENT:   Head: Normocephalic and atraumatic  Eyes: Conjunctivae are normal  No scleral icterus  Neck: Normal range of motion  No JVD present  Cardiovascular: Normal rate, regular rhythm, normal heart sounds and intact distal pulses  No murmur heard  Pulmonary/Chest: Effort normal and breath sounds normal  No respiratory distress  She has no wheezes  She has no rales  Musculoskeletal: She exhibits no edema or tenderness  Skin: Skin is warm and dry  She is not diaphoretic           Laboratory Studies:  Lab Results   Component Value Date    HGBA1C 5 7 09/07/2018    HGBA1C 5 2 03/06/2018     (L) 09/07/2018     (L) 07/27/2018     (L) 03/06/2018     (L) 06/23/2015     05/21/2015     (L) 05/16/2015    K 4 3 09/07/2018    K 4 4 07/27/2018    K 4 3 03/06/2018    K 4 3 06/23/2015    K 3 6 05/21/2015    K 4 7 05/16/2015    CL 97 (L) 09/07/2018     07/27/2018    CL 99 (L) 03/06/2018    CL 98 (L) 06/23/2015    CL 93 (L) 05/21/2015    CL 98 (L) 05/16/2015    CO2 30 09/07/2018    CO2 29 07/27/2018    CO2 30 2018    CO2 28 2015    CO2 37 (H) 2015    CO2 28 2015    GLUCOSE 90 2015    GLUCOSE 146 (H) 2015    GLUCOSE 132 2015    CREATININE 0 78 2018    CREATININE 0 82 2018    CREATININE 0 68 2018    CREATININE 0 68 2015    CREATININE 0 91 2015    CREATININE 0 75 2015    BUN 18 2018    BUN 20 2018    BUN 15 2018    BUN 16 2015    BUN 20 2015    BUN 18 2015    MG 1 8 04/10/2017    MG 2 1 2015    MG 1 9 05/15/2015    MG 1 7 2015     Lab Results   Component Value Date    WBC 4 85 2018    WBC 4 85 2015    RBC 4 11 2018    RBC 4 00 2015    HGB 13 0 2018    HGB 11 4 (L) 2015    HCT 40 2 2018    HCT 34 9 2015    MCV 98 2018    MCV 87 2015    MCH 31 6 2018    MCH 28 5 2015    RDW 13 0 2018    RDW 17 0 (H) 2015     2018     2015     NT-proBNP: No results for input(s): NTBNP in the last 72 hours     Coags:    Lipid Profile:   Lab Results   Component Value Date    CHOL 210 2015     Lab Results   Component Value Date    HDL 67 (H) 2018     Lab Results   Component Value Date    LDLCALC 119 (H) 2018     Lab Results   Component Value Date    TRIG 164 (H) 2018       Cardiac testing:   EKG reviewed personally:     Sinus rhythm  1st degree AV block  PACs  Nonspecific intraventricular conduction block  Inferior infarct  Anterolateral infarct    Results for orders placed during the hospital encounter of 17   Echo complete with contrast if indicated    Narrative [facility] [facility]  [phone]    Transthoracic Echocardiogram  2D, M-mode, Doppler, and Color Doppler    Study date:  20-Mar-2017    Patient: Radha Sutherland  MR number: [id number]  Account number: [de-identified]  : 24-Jul-1929  Age: 80 years  Gender: Female  Status: Outpatient  Location: [facility] [place]  Height: 60 in  Weight: 172 7 lb  BP: 130/ 66 mmHg    Indications: Assess left ventricular function  Diagnoses: I25 10 - Atherosclerotic heart disease of native coronary artery without angina pectoris    Sonographer:  TERRANCE Razo  Primary Physician:  Avery Sparrow DO  Referring Physician:  Karon Jenkins MD  Group:  [facility]  Interpreting Physician:  Maria Guadalupe Last MD    SUMMARY    LEFT VENTRICLE:  Size was normal   Systolic function was normal  Ejection fraction was estimated to be 60 %  Wall thickness was at the upper limits of normal   Doppler parameters were consistent with abnormal left ventricular relaxation (grade 1 diastolic dysfunction)  RIGHT VENTRICLE:  The size was normal   Systolic function was normal     LEFT ATRIUM:  The atrium was mildly dilated  ATRIAL SEPTUM:  The septum bows from left to right, consistent with increased left atrial pressure  There was a tiny patent foramen ovale  There was trace residual shunting  Doppler evaluation was performed  There was a trace left-to-right shunt  MITRAL VALVE:  There was trace regurgitation  AORTIC VALVE:  There was mild stenosis  TRICUSPID VALVE:  There was mild regurgitation  Pulmonary artery systolic pressure was mildly increased  The findings suggest mild pulmonary hypertension  HISTORY: PRIOR HISTORY: CAD s/p PTCA, Cardiomyopathy, LBBB, Aflutter, Hypertension, Hyperlipidemia, LBBB    PROCEDURE: The study was performed in the [facility]  This was a routine study  The transthoracic approach was used  The study included complete 2D imaging, M-mode, complete spectral Doppler, and color Doppler  The  heart rate was 75 bpm, at the start of the study  Images were obtained from the parasternal, apical, subcostal, and suprasternal notch acoustic windows  Image quality was adequate      LEFT VENTRICLE: Size was normal  Systolic function was normal  Ejection fraction was estimated to be 60 %  There were no regional wall motion abnormalities  Wall thickness was at the upper limits of normal  DOPPLER: Doppler parameters were  consistent with abnormal left ventricular relaxation (grade 1 diastolic dysfunction)  RIGHT VENTRICLE: The size was normal  Systolic function was normal  Wall thickness was normal     LEFT ATRIUM: The atrium was mildly dilated  ATRIAL SEPTUM: The septum bows from left to right, consistent with increased left atrial pressure  There was a tiny patent foramen ovale  There was trace residual shunting  Doppler evaluation was performed  There was a trace left-to-right  shunt  RIGHT ATRIUM: Size was normal     MITRAL VALVE: Valve structure was normal  There was normal leaflet separation  DOPPLER: The transmitral velocity was within the normal range  There was no evidence for stenosis  There was trace regurgitation  AORTIC VALVE: The valve was trileaflet  Leaflets exhibited mildly increased thickness, mild calcification, mildly reduced cuspal separation, and reduced mobility  DOPPLER: Transaortic velocity was minimally increased  There was mild  stenosis  There was no regurgitation  TRICUSPID VALVE: The valve structure was normal  There was normal leaflet separation  DOPPLER: The transtricuspid velocity was within the normal range  There was no evidence for stenosis  There was mild regurgitation  Pulmonary artery  systolic pressure was mildly increased  Estimated peak PA pressure was 45 mmHg  The findings suggest mild pulmonary hypertension  PULMONIC VALVE: Leaflets exhibited normal thickness, no calcification, and normal cuspal separation  DOPPLER: The transpulmonic velocity was within the normal range  There was no regurgitation  PERICARDIUM: There was no pericardial effusion  The pericardium was normal in appearance  AORTA: The root exhibited normal size      SYSTEMIC VEINS: IVC: The inferior vena cava was normal in size and course  Respirophasic changes were normal     MEASUREMENT TABLES    DOPPLER MEASUREMENTS  Aortic valve   (Reference normals)  Peak gradient   17 mmHg   (--)  Mean gradient   10 mmHg   (--)  Valve area, cont   1 5 cm squared   (--)    SYSTEM MEASUREMENT TABLES    2D  %FS: 32 09 %  AV Diam: 3 1 cm  EDV(Teich): 104 23 ml  EF(Cube): 68 68 %  EF(Teich): 60 21 %  ESV(Cube): 33 28 ml  ESV(Teich): 41 47 ml  IVSd: 0 95 cm  LA Area: 24 34 cm2  LA Diam: 3 31 cm  LVEDV MOD A4C: 81 93 ml  LVEF MOD A4C: 54 85 %  LVESV MOD A4C: 36 99 ml  LVIDd: 4 74 cm  LVIDs: 3 22 cm  LVLd A4C: 7 02 cm  LVLs A4C: 6 35 cm  LVOT Diam: 2 09 cm  LVPWd: 0 83 cm  RA Area: 14 83 cm2  RV Diam: 2 57 cm  SI(Cube): 41 47 ml/m2  SI(Teich): 35 66 ml/m2  SV MOD A4C: 44 94 ml  SV(Cube): 72 98 ml  SV(Teich): 62 75 ml    CW  AV Env  Ti: 288 35 ms  AV MaxPG: 15 88 mmHg  AV SI: 173 33 ml/m2  AV SV: 305 06 ml  AV VTI: 40 51 cm  AV Vmax: 1 99 m/s  AV Vmean: 1 41 m/s  AV meanP 86 mmHg  TR MaxP 62 mmHg  TR Vmax: 2 66 m/s    MM  TAPSE: 2 27 cm    PW  JN (VTI): 1 47 cm2  JN Vmax: 1 45 cm2  E': 0 03 m/s  E/E': 22 12  LVOT Env  Ti: 290 82 ms  LVOT VTI: 17 32 cm  LVOT Vmax: 0 84 m/s  LVOT Vmean: 0 59 m/s  LVOT maxP 82 mmHg  LVOT meanP 6 mmHg  LVSI Dopp: 33 77 ml/m2  LVSV Dopp: 59 44 ml  MV A Steven: 1 1 m/s  MV Dec Walsh: 2 18 m/s2  MV DecT: 347 55 ms  MV E Steven: 0 76 m/s  MV E/A Ratio: 0 69    [employer] Accredited Echocardiography Laboratory    Prepared and electronically signed by    Álvaro Booth MD  Signed 20-Mar-2017 13:32:45       No results found for this or any previous visit    Results for orders placed during the hospital encounter of 04/10/17   Cardiac catheterization    Narrative [place] [address], [address] [facility]  [phone]    [facility]    Invasive Cardiovascular Lab Complete Report    Patient: Fanny Yip  MR number: [id number]  Account number: [id number]  Study date: 04/10/2017  Gender: Female  : 1929  Height: 59 8 in  Weight: 173 6 lb  BSA: 1 76 m squared    Allergies: NO KNOWN ALLERGIES    Diagnostic Cardiologist:  Aria Celaya MD  Primary Physician:  DO MARIANGEL Rodriguez    CARDIAC STRUCTURES:  Global left ventricular function was normal  EF calculated by contrast ventriculography was 65 %  INDICATIONS:  --  Coronary artery disease: abnormal stress test and suspect in-stent stenosis  --  Cardiac: dyspnea  PROCEDURES PERFORMED    --  Left heart catheterization with ventriculography  --  Left coronary angiography  --  Right coronary angiography  --  Arch aortography with cardiac catheterization  --  Outpatient  --  Coronary Catheterization (w/ LHC)  --  Aortography w/ Cardiac Cath (Supravalvular)  --  Mod Sedation Same Physician Initial 15min  --  Mod Sedation Same Physician Add 15min  --  Mod Sedation Same Physician Add 15min  PROCEDURE: The risks and alternatives of the procedures and conscious sedation were explained to the patient and informed consent was obtained  The patient was brought to the cath lab and placed on the table  The planned puncture sites  were prepped and draped in the usual sterile fashion  Oxygen 2 L/min  --  Right radial artery access  After performing an Juan's test to verify adequate ulnar artery supply to the hand, the radial site was prepped  The puncture site was infiltrated with local anesthetic  The vessel was accessed using the  modified Seldinger technique, a wire was advanced into the vessel, and a sheath was advanced over the wire into the vessel  --  Right femoral artery access  The puncture site was infiltrated with local anesthetic  The vessel was accessed using the modified Seldinger technique, a wire was advanced into the vessel, and a sheath was advanced over the wire into the  vessel  --  Left heart catheterization with ventriculography   A catheter was advanced over a guidewire into the ascending aorta  After recording ascending aortic pressure, the catheter was advanced across the aortic valve and left ventricular  pressure was recorded  Ventriculography was performed  The catheter was pulled back across the aortic valve and into the ascending aorta and pullback pressures were obtained  --  Left coronary artery angiography  A catheter was advanced over a guidewire into the aorta and positioned in the left coronary artery ostium under fluoroscopic guidance  Angiography was performed  --  Right coronary artery angiography  A catheter was advanced over a guidewire into the aorta and positioned in the right coronary artery ostium under fluoroscopic guidance  Angiography was performed  --  Arch aortography with cardiac catheterization  A catheter was positioned into the ascending aorta over a guide wire under fluoroscopic guidance and contrast was injected  Angiography was performed  --  Outpatient  --  Coronary Catheterization (w/ LHC)  --  Aortography w/ Cardiac Cath (Supravalvular)  --  Mod Sedation Same Physician Initial 15min  --  Mod Sedation Same Physician Add 15min  --  Mod Sedation Same Physician Add 15min  PROCEDURE COMPLETION: The patient tolerated the procedure well and was discharged from the cath lab  TIMING: Test started at 08:50  Test concluded at 09:32  HEMOSTASIS: The sheath was removed over a wire and the Angioseal delivery sheath  was inserted into the femoral artery  Hemostasis was obtained using a closure device ( Angioseal) deployed through the delivery sheath  The sheath was removed  The site was compressed with a Hemoband device  Hemostasis was obtained  MEDICATIONS GIVEN: Versed (2mg/2ml), 2 mg, IV, at 09:01  Fentanyl (1OOmcg/2 ml), 50 mcg, IV, at 09:01  1% Lidocaine, 1 ml, subcutaneously, at 09:05  Nitroglycerin (200mcg/ml), 200 mcg, at 09:09  Heparin 1000 units/ml, 4,000 units, IV, at  09:09   Verapamil (5mg/2ml), 2 5 mg, IV, at 09:09  1% Lidocaine, 10 ml, subcutaneously, at 09:14  Versed (2mg/2ml), 1 mg, IV, at 09:16  Fentanyl (1OOmcg/2 ml), 50 mcg, IV, at 09:16  CONTRAST GIVEN: 86 ml Omnipaque (350mg I /ml)  100 ml  Omnipaque (350mg I /ml)  RADIATION EXPOSURE: Fluoroscopy time: 5 78 min  HEMODYNAMICS: Hemodynamic assessment demonstrated normal LVEDP  VENTRICLES:   --  There were no left ventricular global or regional wall motion abnormalities  Global left ventricular function was normal  EF calculated by contrast ventriculography was 65 %  VALVES:  AORTIC VALVE:   --  There was no aortic stenosis  MITRAL VALVE:   --  The mitral valve exhibited no regurgitation  CORONARY VESSELS:   --  The coronary circulation is left dominant  --  Left main: The vessel was large sized  Angiography showed minor luminal irregularities  --  LAD: The vessel was medium sized  Angiography showed mild atherosclerosis  --  Proximal LAD: There was a 10 % stenosis at the site of a prior stent  --  Circumflex: The vessel was large sized (dominant)  Angiography showed mild atherosclerosis  There were three major obtuse marginals  --  RCA: The vessel was small (non-dominant)  Angiography showed mild atherosclerosis  --  Ostial RCA: There was a 40 % stenosis  Prepared and signed by    Ann Littlejohn MD  Signed 04/10/2017 09:45:08    Study diagram    Angiographic findings  Native coronary lesions:  ·Proximal LAD: Lesion 1: 10 % stenosis, site of prior stent  ·Ostial RCA: Lesion 1: 40 % stenosis      Hemodynamic tables    Pressures:  Baseline  Pressures:  - HR: 79  Pressures:  - Rhythm:  Pressures:  -- Aortic Pressure (S/D/M): 134/52/69  Pressures:  -- Left Ventricle (s/edp): 136/14/--    Outputs:  Baseline  Outputs:  -- CALCULATIONS: [age]  Outputs:  -- CALCULATIONS: Body Surface Area: 1 76  Outputs:  -- CALCULATIONS: Height in cm: 152 00  Outputs:  -- CALCULATIONS: Sex: Female  Outputs:  -- CALCULATIONS: Weight in kg: 78 90       No results found for this or any previous visit  Rosendo Duggan MD    Portions of the record may have been created with voice recognition software   Occasional wrong word or "sound a like" substitutions may have occurred due to the inherent limitations of voice recognition software   Read the chart carefully and recognize, using context, where substitutions have occurred

## 2018-10-23 NOTE — PATIENT INSTRUCTIONS
PLEASE CHECK AT HOME, MAKE SURE WHETHER OR NOT YOU ARE TAKING A MEDICINE CALLED     Lexi Blend Lexi Blend FUROSEMIDE    PLEASE TAKE 2 OF THE 20 MG TABLETS OF PRAVASTATIN A DAY UNTIL YOU USE THEM UP, AND THEN SWITCH TO THE 40 MG TABLETS

## 2018-10-26 ENCOUNTER — HOSPITAL ENCOUNTER (OUTPATIENT)
Dept: CT IMAGING | Facility: HOSPITAL | Age: 83
Discharge: HOME | End: 2018-10-26
Payer: MEDICARE

## 2018-10-26 DIAGNOSIS — R55 SYNCOPE AND COLLAPSE: ICD-10-CM

## 2018-10-26 PROCEDURE — 70450 CT HEAD/BRAIN W/O DYE: CPT

## 2018-10-28 ENCOUNTER — APPOINTMENT (EMERGENCY)
Dept: CT IMAGING | Facility: HOSPITAL | Age: 83
End: 2018-10-28
Payer: MEDICARE

## 2018-10-28 ENCOUNTER — HOSPITAL ENCOUNTER (OUTPATIENT)
Facility: HOSPITAL | Age: 83
Setting detail: OBSERVATION
Discharge: HOME | End: 2018-10-30
Attending: EMERGENCY MEDICINE | Admitting: HOSPITALIST
Payer: MEDICARE

## 2018-10-28 ENCOUNTER — APPOINTMENT (EMERGENCY)
Dept: RADIOLOGY | Facility: HOSPITAL | Age: 83
End: 2018-10-28
Payer: MEDICARE

## 2018-10-28 DIAGNOSIS — R61 DIAPHORESIS: ICD-10-CM

## 2018-10-28 DIAGNOSIS — R55 NEAR SYNCOPE: Primary | ICD-10-CM

## 2018-10-28 LAB
ALBUMIN SERPL BCP-MCNC: 3.5 G/DL (ref 3.5–5)
ALP SERPL-CCNC: 79 U/L (ref 46–116)
ALT SERPL W P-5'-P-CCNC: 24 U/L (ref 12–78)
ANION GAP SERPL CALCULATED.3IONS-SCNC: 10 MMOL/L (ref 4–13)
APTT PPP: 29 SECONDS (ref 24–36)
AST SERPL W P-5'-P-CCNC: 16 U/L (ref 5–45)
ATRIAL RATE: 163 BPM
ATRIAL RATE: 500 BPM
BASOPHILS # BLD AUTO: 0.02 THOUSANDS/ΜL (ref 0–0.1)
BASOPHILS NFR BLD AUTO: 0 % (ref 0–1)
BILIRUB DIRECT SERPL-MCNC: 0.07 MG/DL (ref 0–0.2)
BILIRUB SERPL-MCNC: 0.3 MG/DL (ref 0.2–1)
BUN SERPL-MCNC: 19 MG/DL (ref 5–25)
CALCIUM SERPL-MCNC: 9.8 MG/DL (ref 8.3–10.1)
CHLORIDE SERPL-SCNC: 99 MMOL/L (ref 100–108)
CO2 SERPL-SCNC: 30 MMOL/L (ref 21–32)
CREAT SERPL-MCNC: 0.8 MG/DL (ref 0.6–1.3)
EOSINOPHIL # BLD AUTO: 0.15 THOUSAND/ΜL (ref 0–0.61)
EOSINOPHIL NFR BLD AUTO: 3 % (ref 0–6)
ERYTHROCYTE [DISTWIDTH] IN BLOOD BY AUTOMATED COUNT: 13.4 % (ref 11.6–15.1)
GFR SERPL CREATININE-BSD FRML MDRD: 66 ML/MIN/1.73SQ M
GLUCOSE SERPL-MCNC: 107 MG/DL (ref 65–140)
GLUCOSE SERPL-MCNC: 124 MG/DL (ref 65–140)
GLUCOSE SERPL-MCNC: 139 MG/DL (ref 65–140)
HCT VFR BLD AUTO: 37.5 % (ref 34.8–46.1)
HGB BLD-MCNC: 12.7 G/DL (ref 11.5–15.4)
IMM GRANULOCYTES # BLD AUTO: 0.02 THOUSAND/UL (ref 0–0.2)
IMM GRANULOCYTES NFR BLD AUTO: 0 % (ref 0–2)
INR PPP: 1 (ref 0.86–1.17)
LYMPHOCYTES # BLD AUTO: 1.81 THOUSANDS/ΜL (ref 0.6–4.47)
LYMPHOCYTES NFR BLD AUTO: 30 % (ref 14–44)
MCH RBC QN AUTO: 31.9 PG (ref 26.8–34.3)
MCHC RBC AUTO-ENTMCNC: 33.9 G/DL (ref 31.4–37.4)
MCV RBC AUTO: 94 FL (ref 82–98)
MONOCYTES # BLD AUTO: 0.49 THOUSAND/ΜL (ref 0.17–1.22)
MONOCYTES NFR BLD AUTO: 8 % (ref 4–12)
NEUTROPHILS # BLD AUTO: 3.6 THOUSANDS/ΜL (ref 1.85–7.62)
NEUTS SEG NFR BLD AUTO: 59 % (ref 43–75)
NRBC BLD AUTO-RTO: 0 /100 WBCS
NT-PROBNP SERPL-MCNC: 332 PG/ML
P AXIS: 63 DEGREES
PLATELET # BLD AUTO: 218 THOUSANDS/UL (ref 149–390)
PMV BLD AUTO: 9.3 FL (ref 8.9–12.7)
POTASSIUM SERPL-SCNC: 3.7 MMOL/L (ref 3.5–5.3)
PROT SERPL-MCNC: 7.8 G/DL (ref 6.4–8.2)
PROTHROMBIN TIME: 12.9 SECONDS (ref 11.8–14.2)
QRS AXIS: -61 DEGREES
QRS AXIS: -63 DEGREES
QRSD INTERVAL: 140 MS
QRSD INTERVAL: 144 MS
QT INTERVAL: 416 MS
QT INTERVAL: 420 MS
QTC INTERVAL: 468 MS
QTC INTERVAL: 469 MS
RBC # BLD AUTO: 3.98 MILLION/UL (ref 3.81–5.12)
SODIUM SERPL-SCNC: 139 MMOL/L (ref 136–145)
T WAVE AXIS: 88 DEGREES
T WAVE AXIS: 88 DEGREES
TROPONIN I SERPL-MCNC: <0.02 NG/ML
TSH SERPL DL<=0.05 MIU/L-ACNC: 2.37 UIU/ML (ref 0.36–3.74)
VENTRICULAR RATE: 75 BPM
VENTRICULAR RATE: 76 BPM
WBC # BLD AUTO: 6.09 THOUSAND/UL (ref 4.31–10.16)

## 2018-10-28 PROCEDURE — 85730 THROMBOPLASTIN TIME PARTIAL: CPT | Performed by: EMERGENCY MEDICINE

## 2018-10-28 PROCEDURE — 80048 BASIC METABOLIC PNL TOTAL CA: CPT | Performed by: EMERGENCY MEDICINE

## 2018-10-28 PROCEDURE — 99220 PR INITIAL OBSERVATION CARE/DAY 70 MINUTES: CPT | Performed by: HOSPITALIST

## 2018-10-28 PROCEDURE — 93010 ELECTROCARDIOGRAM REPORT: CPT | Performed by: INTERNAL MEDICINE

## 2018-10-28 PROCEDURE — 71046 X-RAY EXAM CHEST 2 VIEWS: CPT

## 2018-10-28 PROCEDURE — 84443 ASSAY THYROID STIM HORMONE: CPT | Performed by: EMERGENCY MEDICINE

## 2018-10-28 PROCEDURE — 36415 COLL VENOUS BLD VENIPUNCTURE: CPT | Performed by: EMERGENCY MEDICINE

## 2018-10-28 PROCEDURE — 84484 ASSAY OF TROPONIN QUANT: CPT | Performed by: EMERGENCY MEDICINE

## 2018-10-28 PROCEDURE — 83880 ASSAY OF NATRIURETIC PEPTIDE: CPT | Performed by: EMERGENCY MEDICINE

## 2018-10-28 PROCEDURE — 99285 EMERGENCY DEPT VISIT HI MDM: CPT

## 2018-10-28 PROCEDURE — 70450 CT HEAD/BRAIN W/O DYE: CPT

## 2018-10-28 PROCEDURE — 85610 PROTHROMBIN TIME: CPT | Performed by: EMERGENCY MEDICINE

## 2018-10-28 PROCEDURE — 80076 HEPATIC FUNCTION PANEL: CPT | Performed by: EMERGENCY MEDICINE

## 2018-10-28 PROCEDURE — 93005 ELECTROCARDIOGRAM TRACING: CPT

## 2018-10-28 PROCEDURE — 82948 REAGENT STRIP/BLOOD GLUCOSE: CPT

## 2018-10-28 PROCEDURE — 85025 COMPLETE CBC W/AUTO DIFF WBC: CPT | Performed by: EMERGENCY MEDICINE

## 2018-10-28 RX ORDER — METOPROLOL SUCCINATE 25 MG/1
25 TABLET, EXTENDED RELEASE ORAL DAILY
Status: DISCONTINUED | OUTPATIENT
Start: 2018-10-29 | End: 2018-10-30 | Stop reason: HOSPADM

## 2018-10-28 RX ORDER — PRAVASTATIN SODIUM 40 MG
40 TABLET ORAL EVERY EVENING
Status: DISCONTINUED | OUTPATIENT
Start: 2018-10-28 | End: 2018-10-30 | Stop reason: HOSPADM

## 2018-10-28 RX ORDER — LEVOTHYROXINE SODIUM 0.03 MG/1
25 TABLET ORAL
Status: DISCONTINUED | OUTPATIENT
Start: 2018-10-29 | End: 2018-10-30 | Stop reason: HOSPADM

## 2018-10-28 RX ORDER — ASPIRIN 81 MG/1
81 TABLET, CHEWABLE ORAL DAILY
Status: DISCONTINUED | OUTPATIENT
Start: 2018-10-29 | End: 2018-10-30 | Stop reason: HOSPADM

## 2018-10-28 RX ORDER — LISINOPRIL 2.5 MG/1
2.5 TABLET ORAL DAILY
Status: DISCONTINUED | OUTPATIENT
Start: 2018-10-29 | End: 2018-10-30 | Stop reason: HOSPADM

## 2018-10-28 RX ORDER — ONDANSETRON 2 MG/ML
INJECTION INTRAMUSCULAR; INTRAVENOUS
Status: COMPLETED
Start: 2018-10-28 | End: 2018-10-28

## 2018-10-28 RX ORDER — SODIUM CHLORIDE 9 MG/ML
125 INJECTION, SOLUTION INTRAVENOUS CONTINUOUS
Status: DISCONTINUED | OUTPATIENT
Start: 2018-10-28 | End: 2018-10-29

## 2018-10-28 RX ORDER — ONDANSETRON 2 MG/ML
4 INJECTION INTRAMUSCULAR; INTRAVENOUS EVERY 6 HOURS PRN
Status: DISCONTINUED | OUTPATIENT
Start: 2018-10-28 | End: 2018-10-30 | Stop reason: HOSPADM

## 2018-10-28 RX ORDER — GABAPENTIN 100 MG/1
100 CAPSULE ORAL 2 TIMES DAILY
Status: DISCONTINUED | OUTPATIENT
Start: 2018-10-28 | End: 2018-10-30 | Stop reason: HOSPADM

## 2018-10-28 RX ADMIN — SODIUM CHLORIDE 125 ML/HR: 0.9 INJECTION, SOLUTION INTRAVENOUS at 18:13

## 2018-10-28 RX ADMIN — GABAPENTIN 100 MG: 100 CAPSULE ORAL at 18:12

## 2018-10-28 RX ADMIN — PRAVASTATIN SODIUM 40 MG: 40 TABLET ORAL at 18:13

## 2018-10-28 RX ADMIN — ONDANSETRON 4 MG: 2 INJECTION INTRAMUSCULAR; INTRAVENOUS at 14:27

## 2018-10-28 NOTE — ASSESSMENT & PLAN NOTE
Lab Results   Component Value Date    HGBA1C 5 7 09/07/2018       No results for input(s): POCGLU in the last 72 hours      Blood Sugar Average: Last 72 hrs:  diet controlled DMII   Pt would like BG checks

## 2018-10-28 NOTE — ED PROVIDER NOTES
History  Chief Complaint   Patient presents with    Dizziness     pt reports immediately PTA feeling sudden onset of dizziness, nausea and diaphoresis     80year-old female presents with sudden onset nausea, dizziness and diaphoresis  Patient states she was in her complete normal state of health which they changed 45 minutes ago where she suddenly felt weak, dizzy, lightheaded  Her  was concerned  so he called 911  Upon paramedic arrival she was found to be severely diaphoretic, paramedics note to the point where she actually had collection of sweat in her supraclavicular areas  , was not confused and was able to answer all questions although very slowed  She had a nonfocal neurological examination for them although was unable to walk due to generalized weakness  Patient describes a sensation during all these events as feeling extremely dizzy and lightheaded, felt like she was close to passing out but did not fully lose consciousness  Denies any associated pain  No chest pain no abdominal pain no headache no neck pain  Denies any associated shortness of breath  Denies any sensation of palpitations  Currently patient states she still feels slightly weak but the diaphoresis has resolved and she feels very close to baseline  History provided by:  Patient  Dizziness   Quality:  Lightheadedness  Severity:  Severe  Onset quality:  Sudden  Duration:  1 hour  Timing:  Constant  Progression:  Partially resolved  Chronicity:  New  Relieved by:  None tried  Worsened by:  Nothing  Ineffective treatments:  None tried  Associated symptoms: no chest pain, no diarrhea, no headaches, no nausea, no palpitations, no shortness of breath and no vomiting    Associated symptoms comment:  Diaphoresis      Prior to Admission Medications   Prescriptions Last Dose Informant Patient Reported? Taking?    ACCU-CHEK RUBINA PLUS test strip  Self No No   Si each by Other route daily Use as instructed   ACCU-PolyInnovationsK SOFTCLIX LANCETS lancets  Self No No   Sig: by Other route daily Use daily as instructed  Calcium Carbonate-Vitamin D (CALTRATE 600+D PO)  Self Yes No   Sig: Take 1 capsule by mouth daily     Misc Natural Products (ENERGY FOCUS PO)  Self Yes No   Sig: Take by mouth daily     acetaminophen (TYLENOL) 500 mg tablet  Self Yes No   Sig: Take 500 mg by mouth every 6 (six) hours as needed for mild pain  aspirin 81 MG tablet  Self Yes No   Sig: Take 81 mg by mouth daily  gabapentin (NEURONTIN) 100 mg capsule  Self No No   Sig: Take once a day and increase to BID after a week and if tolerated then to 3 TID in a week   levothyroxine 25 mcg tablet  Self No No   Sig: Take 1 tablet (25 mcg total) by mouth daily   lisinopril (ZESTRIL) 2 5 mg tablet  Self No No   Sig: Take 1 tablet (2 5 mg total) by mouth daily   metoprolol succinate (TOPROL XL) 25 mg 24 hr tablet  Self No No   Sig: Take 1 tablet (25 mg total) by mouth daily   multivitamin-iron-minerals-folic acid (CENTRUM) chewable tablet  Self Yes No   Sig: Chew 1 tablet daily     pravastatin (PRAVACHOL) 20 mg tablet  Self Yes No   Sig: Take 20 mg by mouth daily     pravastatin (PRAVACHOL) 40 mg tablet  Self No No   Sig: Take 1 tablet (40 mg total) by mouth daily   Patient not taking: Reported on 10/12/2018       Facility-Administered Medications: None       Past Medical History:   Diagnosis Date    Abnormal nuclear stress test     last assessed 04/03/2017    Anxiety     Arthritis     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    Bundle branch block, left     Cardiomyopathy ([facility] [address] [facility]     Chronic pain     chronic b/l shoulder pain    Coronary artery disease     Diabetes mellitus ([facility] [address] [facility]     GERD (gastroesophageal reflux disease)     H/O atrial flutter     History of DVT (deep vein thrombosis)     History of pulmonary embolism     Hyperlipidemia     Hypertension     Hypothyroid     Renal calculi     Shortness of breath        Past Surgical History: Procedure Laterality Date    ATRIAL ABLATION SURGERY  01/2015    CARDIOVERSION      CHELA/DCCV 10/22/2014    CARPAL TUNNEL RELEASE Right     CATARACT EXTRACTION      CORONARY ANGIOPLASTY WITH STENT PLACEMENT      HYSTERECTOMY      JOINT REPLACEMENT Right     DC AMPUTATION TOE,MT-P JT Left 10/11/2017    Procedure: AMPUTATION SECOND TOE;  Surgeon: Jojo Miller DPM;  Location: [facility];  Service: Podiatry    TONSILLECTOMY      TOTAL HIP ARTHROPLASTY      Right       Family History   Problem Relation Age of Onset   Corrine Burt Parkinsonism Sister     Cancer Sister         unknown type    Heart attack Neg Hx     Stroke Neg Hx     Anuerysm Neg Hx     Clotting disorder Neg Hx     Arrhythmia Neg Hx     Heart failure Neg Hx     Coronary artery disease Neg Hx      I have reviewed and agree with the history as documented  Social History   Substance Use Topics    Smoking status: Never Smoker    Smokeless tobacco: Never Used    Alcohol use Yes      Comment: occasional        Review of Systems   Constitutional: Negative for activity change, chills, diaphoresis and fever  HENT: Negative for congestion, sinus pressure and sore throat  Eyes: Negative for pain and visual disturbance  Respiratory: Negative for cough, chest tightness, shortness of breath, wheezing and stridor  Cardiovascular: Negative for chest pain and palpitations  Gastrointestinal: Negative for abdominal distention, abdominal pain, constipation, diarrhea, nausea and vomiting  Genitourinary: Negative for dysuria and frequency  Musculoskeletal: Negative for neck pain and neck stiffness  Skin: Negative for rash  Neurological: Positive for dizziness  Negative for speech difficulty, light-headedness, numbness and headaches  Physical Exam  Physical Exam   Constitutional: She is oriented to person, place, and time  She appears well-developed  No distress  HENT:   Head: Normocephalic and atraumatic     Eyes: Pupils are equal, round, and reactive to light  Neck: Normal range of motion  Neck supple  No tracheal deviation present  Cardiovascular: Normal rate, regular rhythm, normal heart sounds and intact distal pulses  No murmur heard  Pulmonary/Chest: Effort normal and breath sounds normal  No stridor  No respiratory distress  Abdominal: Soft  She exhibits no distension  There is no tenderness  There is no rebound and no guarding  Musculoskeletal: Normal range of motion  Neurological: She is alert and oriented to person, place, and time  Skin: Skin is warm and dry  She is not diaphoretic  No erythema  No pallor  Psychiatric: She has a normal mood and affect  Vitals reviewed        Vital Signs  ED Triage Vitals   Temperature Pulse Respirations Blood Pressure SpO2   10/28/18 1341 10/28/18 1329 10/28/18 1329 10/28/18 1329 10/28/18 1329   97 7 °F (36 5 °C) 77 18 (!) 180/85 95 %      Temp Source Heart Rate Source Patient Position - Orthostatic VS BP Location FiO2 (%)   10/28/18 1341 10/28/18 1329 10/28/18 1329 10/28/18 1329 --   Oral Monitor Sitting Right arm       Pain Score       10/28/18 1329       No Pain           Vitals:    10/28/18 1329 10/28/18 1430 10/28/18 1500   BP: (!) 180/85 (!) 186/85 149/66   Pulse: 77 74 74   Patient Position - Orthostatic VS: Sitting Lying Lying       Visual Acuity      ED Medications  Medications   ondansetron (ZOFRAN) 4 mg/2 mL injection **ADS Override Pull** (4 mg  Given 10/28/18 1427)       Diagnostic Studies  Results Reviewed     Procedure Component Value Units Date/Time    Basic metabolic panel [[id number]]  (Abnormal) Collected:  10/28/18 1343    Lab Status:  Final result Specimen:  Blood from Arm, Right Updated:  10/28/18 1422     Sodium 139 mmol/L      Potassium 3 7 mmol/L      Chloride 99 (L) mmol/L      CO2 30 mmol/L      ANION GAP 10 mmol/L      BUN 19 mg/dL      Creatinine 0 80 mg/dL      Glucose 124 mg/dL      Calcium 9 8 mg/dL      eGFR 66 ml/min/1 73sq m     Narrative: National Kidney Disease Education Program recommendations are as follows:  GFR calculation is accurate only with a steady state creatinine  Chronic Kidney disease less than 60 ml/min/1 73 sq  meters  Kidney failure less than 15 ml/min/1 73 sq  meters  Hepatic function panel [[id number]]  (Normal) Collected:  10/28/18 1343    Lab Status:  Final result Specimen:  Blood from Arm, Right Updated:  10/28/18 1422     Total Bilirubin 0 30 mg/dL      Bilirubin, Direct 0 07 mg/dL      Alkaline Phosphatase 79 U/L      AST 16 U/L      ALT 24 U/L      Total Protein 7 8 g/dL      Albumin 3 5 g/dL     TSH [[id number]]  (Normal) Collected:  10/28/18 1343    Lab Status:  Final result Specimen:  Blood from Arm, Right Updated:  10/28/18 1421     TSH 3RD GENERATON 2 373 uIU/mL     Narrative:         Patients undergoing fluorescein dye angiography may retain small amounts of fluorescein in the body for 48-72 hours post procedure  Samples containing fluorescein can produce falsely depressed TSH values  If the patient had this procedure,a specimen should be resubmitted post fluorescein clearance            The recommended reference ranges for TSH during pregnancy are as follows:  First trimester 0 1 to 2 5 uIU/mL  Second trimester  0 2 to 3 0 uIU/mL  Third trimester 0 3 to 3 0 uIU/m      B-type natriuretic peptide [[id number]]  (Normal) Collected:  10/28/18 1343    Lab Status:  Final result Specimen:  Blood from Arm, Right Updated:  10/28/18 1421     NT-proBNP 332 pg/mL     Troponin I [[id number]]  (Normal) Collected:  10/28/18 1343    Lab Status:  Final result Specimen:  Blood from Arm, Right Updated:  10/28/18 1412     Troponin I <0 02 ng/mL     Protime-INR [[id number]]  (Normal) Collected:  10/28/18 1343    Lab Status:  Final result Specimen:  Blood from Arm, Right Updated:  10/28/18 1403     Protime 12 9 seconds      INR 1 00    APTT [[id number]]  (Normal) Collected:  10/28/18 1343    Lab Status:  Final result Specimen:  Blood from Arm, Right Updated:  10/28/18 1403     PTT 29 seconds     CBC and differential [[id number]] Collected:  10/28/18 1343    Lab Status:  Final result Specimen:  Blood from Arm, Right Updated:  10/28/18 1353     WBC 6 09 Thousand/uL      RBC 3 98 Million/uL      Hemoglobin 12 7 g/dL      Hematocrit 37 5 %      MCV 94 fL      MCH 31 9 pg      MCHC 33 9 g/dL      RDW 13 4 %      MPV 9 3 fL      Platelets 626 Thousands/uL      nRBC 0 /100 WBCs      Neutrophils Relative 59 %      Immat GRANS % 0 %      Lymphocytes Relative 30 %      Monocytes Relative 8 %      Eosinophils Relative 3 %      Basophils Relative 0 %      Neutrophils Absolute 3 60 Thousands/µL      Immature Grans Absolute 0 02 Thousand/uL      Lymphocytes Absolute 1 81 Thousands/µL      Monocytes Absolute 0 49 Thousand/µL      Eosinophils Absolute 0 15 Thousand/µL      Basophils Absolute 0 02 Thousands/µL                  CT head without contrast   Final Result by Reyna Gruber MD (10/28 5240)      No acute intracranial abnormality  Microangiopathic changes  Stable compared to prior  Workstation performed: [id number]         XR chest 2 views    (Results Pending)              Procedures  ECG 12 Lead Documentation  Date/Time: 10/28/2018 1:30 PM  Performed by: Stephanie Pozo by: Reshma Kulkarni     ECG reviewed by me, the ED Provider: yes    Patient location:  ED  Previous ECG:     Previous ECG:  Compared to current    Comparison ECG info:  7 7 16  Interpretation:     Interpretation: non-specific    Rate:     ECG rate:  75    ECG rate assessment: normal    Rhythm:     Rhythm: sinus rhythm and A-V block      Rhythm comment:  Long 1st degree AV block  Ectopy:     Ectopy: none    QRS:     QRS axis:  Left    QRS intervals:   Wide  Conduction:     Conduction: abnormal      Abnormal conduction: complete LBBB    ST segments:     ST segments:  Non-specific  T waves:     T waves: non-specific               Phone Contacts  ED Phone Contact    ED Course                               MDM  Number of Diagnoses or Management Options  Diaphoresis: new and requires workup  Near syncope: new and requires workup  Diagnosis management comments: 80-year-old female presents severe dizziness lightheadedness, likely near syncopal episode but due to the severe diaphoresis, will admit the hospital for serial troponins of further workup evaluation       Amount and/or Complexity of Data Reviewed  Clinical lab tests: ordered and reviewed  Tests in the radiology section of CPT®: ordered and reviewed  Decide to obtain previous medical records or to obtain history from someone other than the patient: yes  Obtain history from someone other than the patient: yes  Review and summarize past medical records: yes  Discuss the patient with other providers: yes  Independent visualization of images, tracings, or specimens: yes      CritCare Time    Disposition  Final diagnoses:   Near syncope   Diaphoresis     Time reflects when diagnosis was documented in both MDM as applicable and the Disposition within this note     Time User Action Codes Description Comment    10/28/2018  3:07 PM Dianne Kerr Add [R55] Near syncope     10/28/2018  3:07 PM Olivia, [address] [facility] [place]       ED Disposition     ED Disposition Condition Comment    Admit  Case was discussed with Dr Hair Lang and the patient's admission status was agreed to be Admission Status:  Observation status to the service of Dr Hair Lang   Follow-up Information    None         Patient's Medications   Discharge Prescriptions    No medications on file     No discharge procedures on file      ED Provider  Electronically Signed by           Silverio Ball DO  10/28/18 0911

## 2018-10-28 NOTE — H&P
H&P- Anu Fischer 7/24/1929, 80 y o  female MRN: [id number]    Unit/Bed#: ED 26 Encounter: [id number]    Primary Care Provider: Michelle Haddad MD   Date and time admitted to hospital: 10/28/2018  1:23 PM    * Near syncope   Assessment & Plan    Occurred today while seated; dizziness, nausea and diaphoresis  Observation overnight  Telemetry   Check TSH   Check orthostatics   IVF after orthostats  F/u AM labs  Mobilize prior to dc      HTN (hypertension)   Assessment & Plan    Resume home meds  Lisinopril, toprol      Diabetes mellitus ([facility] [address] )   Assessment & Plan    Lab Results   Component Value Date    HGBA1C 5 7 09/07/2018       No results for input(s): POCGLU in the last 72 hours  Blood Sugar Average: Last 72 hrs:  diet controlled DMII   Pt would like BG checks      LBBB (left bundle branch block)   Assessment & Plan    Noted on prior EKGs          VTE Prophylaxis: Enoxaparin (Lovenox)    Code Status: DNR/DNI   POLST: POLST form is not discussed and not completed at this time  Anticipated Length of Stay:  Patient will be admitted on an Observation basis with an anticipated length of stay of  < 2 midnights  Justification for Hospital Stay: near syncope    Total Time for Visit, including Counseling / Coordination of Care: 45 minutes  Greater than 50% of this total time spent on direct patient counseling and coordination of care  Chief Complaint:   Near syncope, dizziness     History of Present Illness:    Anu Fischer is a 80 y o  female history of hypertension, left bundle branch block who presents with an episode of near syncope today  History provided by patient, patient's  and patient's daughter at bedside  Patient reports she was in her usual state of health in the preceding days  Today while sitting on the couch looking for some TV to watch, patient became acutely dizzy  Her  noted she was severely diaphoretic and pale  Patient noted nausea and lightheadedness    She denies chest pain, palpitations or chest pressure  No focal deficits noted during event and pt was conscious throughout episode, per  at bedside  Her symptoms lasted until EMS arrived  Patient had checked her blood sugar earlier that morning and it was in the 90s prior to her eating breakfast   She took all of her medications as directed the day of  She denies any preceding fevers or chills  She still feels a little lightheaded while in the emergency department but overall improved  Review of Systems:    Review of Systems   Constitutional: Positive for diaphoresis  Negative for chills, fatigue and fever  Respiratory: Negative for shortness of breath  Cardiovascular: Negative for chest pain, palpitations and leg swelling  Gastrointestinal: Positive for nausea  Negative for abdominal distention, diarrhea and vomiting  Musculoskeletal: Negative  Neurological: Positive for dizziness and weakness  Negative for facial asymmetry  All other systems reviewed and are negative        Past Medical and Surgical History:     Past Medical History:   Diagnosis Date    Abnormal nuclear stress test     last assessed 04/03/2017    Anxiety     Arthritis     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    Bundle branch block, left     Cardiomyopathy ([facility] [address] [facility]     Chronic pain     chronic b/l shoulder pain    Coronary artery disease     Diabetes mellitus ([facility] [address] )     GERD (gastroesophageal reflux disease)     H/O atrial flutter     History of DVT (deep vein thrombosis)     History of pulmonary embolism     Hyperlipidemia     Hypertension     Hypothyroid     Renal calculi     Shortness of breath        Past Surgical History:   Procedure Laterality Date    ATRIAL ABLATION SURGERY  01/2015    CARDIOVERSION      CHELA/DCCV 10/22/2014    CARPAL TUNNEL RELEASE Right     CATARACT EXTRACTION      CORONARY ANGIOPLASTY WITH STENT PLACEMENT      HYSTERECTOMY      JOINT REPLACEMENT Right     IA AMPUTATION TOE,MT-P JT Left 10/11/2017    Procedure: AMPUTATION SECOND TOE;  Surgeon: Dafne Wiseman DPM;  Location: [facility];  Service: Podiatry    TONSILLECTOMY      TOTAL HIP ARTHROPLASTY      Right       Meds/Allergies:    Prior to Admission medications    Medication Sig Start Date End Date Taking? Authorizing Provider   ACCU-CHEK RUBINA PLUS test strip 1 each by Other route daily Use as instructed 5/9/18   Lendia Cheadle, MD   ACCU-CHEK SOFTCLIX LANCETS lancets by Other route daily Use daily as instructed  5/9/18   Lendia Cheadle, MD   acetaminophen (TYLENOL) 500 mg tablet Take 500 mg by mouth every 6 (six) hours as needed for mild pain  Historical Provider, MD   aspirin 81 MG tablet Take 81 mg by mouth daily  Historical Provider, MD   Calcium Carbonate-Vitamin D (CALTRATE 600+D PO) Take 1 capsule by mouth daily      Historical Provider, MD   gabapentin (NEURONTIN) 100 mg capsule Take once a day and increase to BID after a week and if tolerated then to 3 TID in a week 9/28/18   Lendia Cheadle, MD   levothyroxine 25 mcg tablet Take 1 tablet (25 mcg total) by mouth daily 9/28/18   Lendia Cheadle, MD   lisinopril (ZESTRIL) 2 5 mg tablet Take 1 tablet (2 5 mg total) by mouth daily 9/28/18   Lendia Cheadle, MD   metoprolol succinate (TOPROL XL) 25 mg 24 hr tablet Take 1 tablet (25 mg total) by mouth daily 9/28/18   Lendia Cheadle, MD   Misc Natural Products (ENERGY FOCUS PO) Take by mouth daily      Historical Provider, MD   multivitamin-iron-minerals-folic acid (CENTRUM) chewable tablet Chew 1 tablet daily  Historical Provider, MD   pravastatin (PRAVACHOL) 20 mg tablet Take 20 mg by mouth daily   9/2/18   Historical Provider, MD   pravastatin (PRAVACHOL) 40 mg tablet Take 1 tablet (40 mg total) by mouth daily  Patient not taking: Reported on 10/12/2018  9/28/18   Lendia Cheadle, MD     I have reviewed home medications using allscripts  Allergies:    Allergies   Allergen Reactions    Atorvastatin      Reaction unknown 10/23/2018-  Other Rash     Patient sensitive to adhesives from tape       Social History:     Marital Status: /Civil Union   Occupation:   Patient Pre-hospital Living Situation: home with    Patient Pre-hospital Level of Mobility: independent   Patient Pre-hospital Diet Restrictions:  No restrictions   Substance Use History:   History   Alcohol Use    Yes     Comment: occasional     History   Smoking Status    Never Smoker   Smokeless Tobacco    Never Used     History   Drug Use No       Family History:    Family History   Problem Relation Age of Onset    Parkinsonism Sister    Natasha Golden Cancer Sister         unknown type    Heart attack Neg Hx     Stroke Neg Hx     Anuerysm Neg Hx     Clotting disorder Neg Hx     Arrhythmia Neg Hx     Heart failure Neg Hx     Coronary artery disease Neg Hx        Physical Exam:     Vitals:   Blood Pressure: 169/78 (10/28/18 1548)  Pulse: 61 (10/28/18 1548)  Temperature: 97 7 °F (36 5 °C) (10/28/18 1341)  Temp Source: Oral (10/28/18 1341)  Respirations: 18 (10/28/18 1548)  Weight - Scale: 81 5 kg (179 lb 10 8 oz) (10/28/18 1329)  SpO2: 99 % (10/28/18 1548)    Physical Exam   Constitutional: She is oriented to person, place, and time  No distress  HENT:   Head: Normocephalic and atraumatic  Moist mucus membranes    Eyes: Conjunctivae are normal  No scleral icterus  Neck: No JVD present  Cardiovascular: Normal rate  An irregular rhythm present  Murmur heard  Pulmonary/Chest: Effort normal and breath sounds normal  No respiratory distress  She has no wheezes  She has no rales  She exhibits no tenderness  Abdominal: Soft  Bowel sounds are normal  She exhibits no distension  There is no tenderness  There is no rebound and no guarding  Musculoskeletal: Normal range of motion  She exhibits no edema or tenderness  Neurological: She is alert and oriented to person, place, and time  Skin: Skin is warm and dry  No rash noted  She is not diaphoretic  No erythema  Psychiatric: She has a normal mood and affect  Her behavior is normal    Nursing note and vitals reviewed  Additional Data:     Lab Results: I have personally reviewed pertinent reports  Results from last 7 days  Lab Units 10/28/18  1343   WBC Thousand/uL 6 09   HEMOGLOBIN g/dL 12 7   HEMATOCRIT % 37 5   PLATELETS Thousands/uL 218   NEUTROS PCT % 59   LYMPHS PCT % 30   MONOS PCT % 8   EOS PCT % 3       Results from last 7 days  Lab Units 10/28/18  1343   SODIUM mmol/L 139   POTASSIUM mmol/L 3 7   CHLORIDE mmol/L 99*   CO2 mmol/L 30   BUN mg/dL 19   CREATININE mg/dL 0 80   CALCIUM mg/dL 9 8   ALK PHOS U/L 79   ALT U/L 24   AST U/L 16       Results from last 7 days  Lab Units 10/28/18  1343   INR  1 00       Imaging: I have personally reviewed pertinent reports  Ct Head Without Contrast    Result Date: 10/28/2018  Narrative: CT BRAIN - WITHOUT CONTRAST INDICATION:   Lightheadedness diaphoresis  COMPARISON:  10/26/2018 TECHNIQUE:  CT examination of the brain was performed  In addition to axial images, coronal 2D reformatted images were created and submitted for interpretation  Radiation dose length product (DLP) for this visit:  [phone] mGy-cm   This examination, like all CT scans performed in the [facility], was performed utilizing techniques to minimize radiation dose exposure, including the use of iterative reconstruction and automated exposure control  IMAGE QUALITY:  Diagnostic  FINDINGS: PARENCHYMA: Decreased attenuation is noted in periventricular and subcortical white matter demonstrating an appearance that is statistically most likely to represent mild microangiopathic change; this appearance is similar when compared to most recent prior examination  No CT signs of acute infarction  No intracranial mass, mass effect or midline shift  No acute parenchymal hemorrhage   VENTRICLES AND EXTRA-AXIAL SPACES:  Ventricles and extra-axial CSF spaces are prominent commensurate with the degree of volume loss  No hydrocephalus  No acute extra-axial hemorrhage  VISUALIZED ORBITS AND PARANASAL SINUSES:  Unremarkable  CALVARIUM AND EXTRACRANIAL SOFT TISSUES:  Normal      Impression: No acute intracranial abnormality  Microangiopathic changes  Stable compared to prior  Workstation performed: [id number]     Ct Head Wo Contrast    Result Date: 10/26/2018  Narrative: CT BRAIN - WITHOUT CONTRAST INDICATION:   R55: Syncope and collapse  COMPARISON:  Head CT 9/18/2006 TECHNIQUE:  CT examination of the brain was performed  In addition to axial images, coronal 2D reformatted images were created and submitted for interpretation  Radiation dose length product (DLP) for this visit:  [phone] mGy-cm   This examination, like all CT scans performed in the [facility], was performed utilizing techniques to minimize radiation dose exposure, including the use of iterative reconstruction and automated exposure control  IMAGE QUALITY:  Diagnostic  FINDINGS: PARENCHYMA: There is age appropriate cerebral atrophy  No masslike lesion, mass effect, or midline shift  Periventricular and subcortical white matter patchy and confluent hypodensities most compatible with chronic microangiopathic disease  No acute intracranial hemorrhage  Bilateral carotid siphons and vertebral-basilar atherosclerotic disease  VENTRICLES AND EXTRA-AXIAL SPACES:  Normal for the patient's age  VISUALIZED ORBITS AND PARANASAL SINUSES:  Unremarkable  CALVARIUM AND EXTRACRANIAL SOFT TISSUES:  Normal      Impression: 1  No acute CT abnormality  2   Age appropriate cerebral atrophy and chronic microangiopathic changes of the brain Workstation performed: [id number]       EKG, Pathology, and Other Studies Reviewed on Admission:   · EKG: rate 75 rhythm undetermined  LBBB    Allscripts / Epic Records Reviewed: Yes     ** Please Note: This note has been constructed using a voice recognition system   **

## 2018-10-28 NOTE — ASSESSMENT & PLAN NOTE
Occurred today while seated; dizziness, nausea and diaphoresis  Observation overnight  Telemetry   Check TSH   Check orthostatics   IVF after orthostats  F/u AM labs  Mobilize prior to dc

## 2018-10-29 ENCOUNTER — TELEPHONE (OUTPATIENT)
Dept: NEUROLOGY | Facility: CLINIC | Age: 83
End: 2018-10-29

## 2018-10-29 ENCOUNTER — APPOINTMENT (OUTPATIENT)
Dept: NON INVASIVE DIAGNOSTICS | Facility: HOSPITAL | Age: 83
End: 2018-10-29
Payer: MEDICARE

## 2018-10-29 LAB
ALBUMIN SERPL BCP-MCNC: 2.8 G/DL (ref 3.5–5)
ALP SERPL-CCNC: 63 U/L (ref 46–116)
ALT SERPL W P-5'-P-CCNC: 18 U/L (ref 12–78)
ANION GAP SERPL CALCULATED.3IONS-SCNC: 6 MMOL/L (ref 4–13)
AST SERPL W P-5'-P-CCNC: 13 U/L (ref 5–45)
BASOPHILS # BLD AUTO: 0.02 THOUSANDS/ΜL (ref 0–0.1)
BASOPHILS NFR BLD AUTO: 0 % (ref 0–1)
BILIRUB SERPL-MCNC: 0.4 MG/DL (ref 0.2–1)
BUN SERPL-MCNC: 16 MG/DL (ref 5–25)
CALCIUM SERPL-MCNC: 8.7 MG/DL (ref 8.3–10.1)
CHLORIDE SERPL-SCNC: 103 MMOL/L (ref 100–108)
CO2 SERPL-SCNC: 30 MMOL/L (ref 21–32)
CREAT SERPL-MCNC: 0.8 MG/DL (ref 0.6–1.3)
EOSINOPHIL # BLD AUTO: 0.12 THOUSAND/ΜL (ref 0–0.61)
EOSINOPHIL NFR BLD AUTO: 2 % (ref 0–6)
ERYTHROCYTE [DISTWIDTH] IN BLOOD BY AUTOMATED COUNT: 13.5 % (ref 11.6–15.1)
GFR SERPL CREATININE-BSD FRML MDRD: 66 ML/MIN/1.73SQ M
GLUCOSE P FAST SERPL-MCNC: 89 MG/DL (ref 65–99)
GLUCOSE SERPL-MCNC: 117 MG/DL (ref 65–140)
GLUCOSE SERPL-MCNC: 118 MG/DL (ref 65–140)
GLUCOSE SERPL-MCNC: 89 MG/DL (ref 65–140)
GLUCOSE SERPL-MCNC: 92 MG/DL (ref 65–140)
GLUCOSE SERPL-MCNC: 98 MG/DL (ref 65–140)
HCT VFR BLD AUTO: 34.9 % (ref 34.8–46.1)
HGB BLD-MCNC: 11.4 G/DL (ref 11.5–15.4)
IMM GRANULOCYTES # BLD AUTO: 0.01 THOUSAND/UL (ref 0–0.2)
IMM GRANULOCYTES NFR BLD AUTO: 0 % (ref 0–2)
LYMPHOCYTES # BLD AUTO: 1.25 THOUSANDS/ΜL (ref 0.6–4.47)
LYMPHOCYTES NFR BLD AUTO: 25 % (ref 14–44)
MAGNESIUM SERPL-MCNC: 1.7 MG/DL (ref 1.6–2.6)
MCH RBC QN AUTO: 31.5 PG (ref 26.8–34.3)
MCHC RBC AUTO-ENTMCNC: 32.7 G/DL (ref 31.4–37.4)
MCV RBC AUTO: 96 FL (ref 82–98)
MONOCYTES # BLD AUTO: 0.47 THOUSAND/ΜL (ref 0.17–1.22)
MONOCYTES NFR BLD AUTO: 9 % (ref 4–12)
NEUTROPHILS # BLD AUTO: 3.18 THOUSANDS/ΜL (ref 1.85–7.62)
NEUTS SEG NFR BLD AUTO: 64 % (ref 43–75)
NRBC BLD AUTO-RTO: 0 /100 WBCS
PLATELET # BLD AUTO: 188 THOUSANDS/UL (ref 149–390)
PMV BLD AUTO: 9.2 FL (ref 8.9–12.7)
POTASSIUM SERPL-SCNC: 4.2 MMOL/L (ref 3.5–5.3)
PROT SERPL-MCNC: 6.4 G/DL (ref 6.4–8.2)
RBC # BLD AUTO: 3.62 MILLION/UL (ref 3.81–5.12)
SODIUM SERPL-SCNC: 139 MMOL/L (ref 136–145)
TSH SERPL DL<=0.05 MIU/L-ACNC: 0.87 UIU/ML (ref 0.36–3.74)
WBC # BLD AUTO: 5.05 THOUSAND/UL (ref 4.31–10.16)

## 2018-10-29 PROCEDURE — 93306 TTE W/DOPPLER COMPLETE: CPT

## 2018-10-29 PROCEDURE — 99225 PR SBSQ OBSERVATION CARE/DAY 25 MINUTES: CPT | Performed by: NURSE PRACTITIONER

## 2018-10-29 PROCEDURE — 82948 REAGENT STRIP/BLOOD GLUCOSE: CPT

## 2018-10-29 PROCEDURE — 85025 COMPLETE CBC W/AUTO DIFF WBC: CPT | Performed by: HOSPITALIST

## 2018-10-29 PROCEDURE — 83735 ASSAY OF MAGNESIUM: CPT | Performed by: HOSPITALIST

## 2018-10-29 PROCEDURE — 93306 TTE W/DOPPLER COMPLETE: CPT | Performed by: INTERNAL MEDICINE

## 2018-10-29 PROCEDURE — 84443 ASSAY THYROID STIM HORMONE: CPT | Performed by: HOSPITALIST

## 2018-10-29 PROCEDURE — 80053 COMPREHEN METABOLIC PANEL: CPT | Performed by: HOSPITALIST

## 2018-10-29 RX ADMIN — PRAVASTATIN SODIUM 40 MG: 40 TABLET ORAL at 17:17

## 2018-10-29 RX ADMIN — ASPIRIN 81 MG 81 MG: 81 TABLET ORAL at 10:00

## 2018-10-29 RX ADMIN — LISINOPRIL 2.5 MG: 2.5 TABLET ORAL at 10:00

## 2018-10-29 RX ADMIN — METOPROLOL SUCCINATE 25 MG: 25 TABLET, EXTENDED RELEASE ORAL at 10:00

## 2018-10-29 RX ADMIN — SODIUM CHLORIDE 125 ML/HR: 0.9 INJECTION, SOLUTION INTRAVENOUS at 02:29

## 2018-10-29 RX ADMIN — GABAPENTIN 100 MG: 100 CAPSULE ORAL at 17:17

## 2018-10-29 RX ADMIN — ENOXAPARIN SODIUM 40 MG: 40 INJECTION SUBCUTANEOUS at 10:00

## 2018-10-29 RX ADMIN — GABAPENTIN 100 MG: 100 CAPSULE ORAL at 10:00

## 2018-10-29 RX ADMIN — LEVOTHYROXINE SODIUM 25 MCG: 25 TABLET ORAL at 05:40

## 2018-10-29 NOTE — TELEPHONE ENCOUNTER
Patient's , Marielos Murrell, made aware of below  Verbalized understanding  Marielos Murrell reports patient is currently admitted to the hospital but he will make her aware

## 2018-10-29 NOTE — TELEPHONE ENCOUNTER
----- Message from Tedyd Morris MD sent at 10/27/2018 11:26 AM EDT -----  Regarding: CT Head results   Hi please inform patient her CT head was normal for her age   She should call with any questions, thanks    ----- Message -----  From: Interface, Radiology Results In  Sent: 10/26/2018   3:39 PM  To: Teddy Morris MD

## 2018-10-29 NOTE — ASSESSMENT & PLAN NOTE
· Occurred today while seated; dizziness, nausea and diaphoresis  · CTH without acute changes  · Check echo, murmur noted on exam  · Telemetry appears stable   · TSH 0 868  · orthostats negative  · Stop IVF  · Encourage OOB with assistance to see how she tolerates, d/w nursing

## 2018-10-29 NOTE — PROGRESS NOTES
Progress Note - Ashli Chopra 7/24/1929, 80 y o  female MRN: [id number]    Unit/Bed#: -01 Encounter: [id number]    Primary Care Provider: Maritza La MD   Date and time admitted to hospital: 10/28/2018  1:23 PM        * Near syncope   Assessment & Plan    · Occurred today while seated; dizziness, nausea and diaphoresis  · CTH without acute changes  · Check echo, murmur noted on exam  · Telemetry appears stable   · TSH 0 868  · orthostats negative  · Stop IVF  · Encourage OOB with assistance to see how she tolerates, d/w nursing     Hypothyroidism   Assessment & Plan    · TSH 0 868  · Continue current levothyroxine dose     Diabetes mellitus [facility])   Assessment & Plan    Lab Results   Component Value Date    HGBA1C 5 7 09/07/2018       Recent Labs      10/28/18   1807  10/28/18   2102  10/29/18   0728  10/29/18   1112   POCGLU  107  139  98  117       Blood Sugar Average: Last 72 hrs:  (P) 115 25     diet controlled DMII   accu checks acceptable, continue ac/hs accu checks with SSI     LBBB (left bundle branch block)   Assessment & Plan    Noted on prior EKGs      Hyperlipidemia   Assessment & Plan    · Continue statin     HTN (hypertension)   Assessment & Plan    · BP acceptable  · Continue current meds          VTE Pharmacologic Prophylaxis:   Pharmacologic: Enoxaparin (Lovenox)  Mechanical VTE Prophylaxis in Place: Yes    Patient Centered Rounds: I have performed bedside rounds with nursing staff today  Discussions with Specialists or Other Care Team Provider:     Education and Discussions with Family / Patient: patient    Time Spent for Care: 30 minutes  More than 50% of total time spent on counseling and coordination of care as described above      Current Length of Stay: 0 day(s)    Current Patient Status: Observation   Certification Statement: may require another midnight check echo and PT consult to evaluate gait and amb status inregards to safety    Discharge Plan: await echo and PT eval Code Status: Level 3 - DNAR and DNI      Subjective:   Reports feeling slightly better but still with weakness and lightheadedness  No CP, palp, SOB  Noted murmur on exam and patient does not recall being told she has one  jeremías diet  Objective:     Vitals:   Temp (24hrs), Av °F (36 7 °C), Min:97 7 °F (36 5 °C), Max:98 3 °F (36 8 °C)    Temp:  [97 7 °F (36 5 °C)-98 3 °F (36 8 °C)] 98 1 °F (36 7 °C)  HR:  [60-83] 63  Resp:  [18] 18  BP: (138-174)/(56-78) 145/67  SpO2:  [92 %-100 %] 92 %  Body mass index is 35 22 kg/m²  Input and Output Summary (last 24 hours): Intake/Output Summary (Last 24 hours) at 10/29/18 1521  Last data filed at 10/29/18 1441   Gross per 24 hour   Intake          1953 33 ml   Output             1500 ml   Net           453 33 ml       Physical Exam:     Physical Exam   Constitutional: She is oriented to person, place, and time  She appears well-developed and well-nourished  No distress  HENT:   Head: Normocephalic and atraumatic  Mouth/Throat: Oropharynx is clear and moist    Eyes: Pupils are equal, round, and reactive to light  Neck: Normal range of motion  Neck supple  Cardiovascular: Normal rate, regular rhythm and intact distal pulses  Murmur heard  Pulmonary/Chest: Effort normal and breath sounds normal  No respiratory distress  Abdominal: Soft  Bowel sounds are normal  She exhibits no distension  There is no tenderness  obese   Musculoskeletal: Normal range of motion  She exhibits no edema  Neurological: She is alert and oriented to person, place, and time  No cranial nerve deficit  Skin: Skin is warm and dry  Psychiatric: She has a normal mood and affect  Her behavior is normal    Vitals reviewed        Additional Data:     Labs:      Results from last 7 days  Lab Units 10/29/18  0549   WBC Thousand/uL 5 05   HEMOGLOBIN g/dL 11 4*   HEMATOCRIT % 34 9   PLATELETS Thousands/uL 188   NEUTROS PCT % 64   LYMPHS PCT % 25   MONOS PCT % 9   EOS PCT % 2 Results from last 7 days  Lab Units 10/29/18  0549   SODIUM mmol/L 139   POTASSIUM mmol/L 4 2   CHLORIDE mmol/L 103   CO2 mmol/L 30   BUN mg/dL 16   CREATININE mg/dL 0 80   CALCIUM mg/dL 8 7   ALK PHOS U/L 63   ALT U/L 18   AST U/L 13       Results from last 7 days  Lab Units 10/28/18  1343   INR  1 00       * I Have Reviewed All Lab Data Listed Above  * Additional Pertinent Lab Tests Reviewed: Lucía 66 Admission Reviewed    Imaging:    Imaging Reports Reviewed Today Include: [facility]  Imaging Personally Reviewed by Myself Includes:  none    Recent Cultures (last 7 days):           Last 24 Hours Medication List:     Current Facility-Administered Medications:  aspirin 81 mg Oral Daily Vinay Puente MD   enoxaparin 40 mg Subcutaneous Daily Vinay Puente MD   gabapentin 100 mg Oral BID Vinay Puente MD   insulin lispro 1-6 Units Subcutaneous HS Juni Loera PA-C   levothyroxine 25 mcg Oral Early Morning Vinay Puente MD   lisinopril 2 5 mg Oral Daily Vinay Puente MD   metoprolol succinate 25 mg Oral Daily Vinay Puente MD   ondansetron 4 mg Intravenous Q6H PRN Vinay Puente MD   pravastatin 40 mg Oral QPM Vinay Puente MD        Today, Patient Was Seen By: FERMIN Hope    ** Please Note: Dictation voice to text software may have been used in the creation of this document   **

## 2018-10-29 NOTE — UTILIZATION REVIEW
Initial Clinical Review    Admission: Date/Time/Statement: 10/28/2018  1507 OBSERVATION     Orders Placed This Encounter   Procedures    Place in Observation (expected length of stay for this patient is less than two midnights)     Standing Status:   Standing     Number of Occurrences:   1     Order Specific Question:   Admitting Physician     Answer:   Tequila Norton     Order Specific Question:   Level of Care     Answer:   Med Surg [16]         ED: Date/Time/Mode of Arrival:   ED Arrival Information     Expected Arrival Acuity Means of Arrival Escorted By Service Admission Type    - 10/28/2018 13:23 Emergent Ambulance [facility] General Medicine Emergency    Arrival Complaint    -          Chief Complaint:   Chief Complaint   Patient presents with    Dizziness     pt reports immediately PTA feeling sudden onset of dizziness, nausea and diaphoresis       History of Illness: 80 y o  female history of hypertension, left bundle branch block who presents with an episode of near syncope today  History provided by patient, patient's  and patient's daughter at bedside  Patient reports she was in her usual state of health in the preceding days  Today while sitting on the couch looking for some TV to watch, patient became acutely dizzy  Her  noted she was severely diaphoretic and pale  Patient noted nausea and lightheadedness  She denies chest pain, palpitations or chest pressure  No focal deficits noted during event and pt was conscious throughout episode, per  at bedside  Her symptoms lasted until EMS arrived  Patient had checked her blood sugar earlier that morning and it was in the 90s prior to her eating breakfast   She took all of her medications as directed the day of      She still feels a little lightheaded while in the emergency department        ED Vital Signs:   ED Triage Vitals   Temperature Pulse Respirations Blood Pressure SpO2   10/28/18 1341 10/28/18 1329 10/28/18 1329 10/28/18 1329 10/28/18 1329   97 7 °F (36 5 °C) 77 18 (!) 180/85 95 %      Temp Source Heart Rate Source Patient Position - Orthostatic VS BP Location FiO2 (%)   10/28/18 1341 10/28/18 1329 10/28/18 1329 10/28/18 1329 --   Oral Monitor Sitting Right arm       Pain Score       10/28/18 1329       No Pain        Wt Readings from Last 1 Encounters:   10/28/18 79 1 kg (174 lb 6 1 oz)       Vital Signs (abnormal): /85 - 174/77  10/28/18 1743  --  72  --  151/67  --  --  Standing for 3 minutes - Orthostatic VS   10/28/18 1740  --  82  --  146/56  --  --  Standing - Orthostatic VS   10/28/18 1739  --  60  --  161/67  --  --  Sitting - Orthostatic VS   10/28/18 1738  97 7 °F (36 5 °C)  83  18   174/77  100 %  Nasal cannula  Lying - Orthostatic VS       Abnormal Labs/Diagnostic Test Results:   Cl 99    Ct head - No acute intracranial abnormality   Microangiopathic changes   Stable compared to prior    10/29/2018  Albumin 2 8   hgb 11 4    ED Treatment:   Medication Administration from 10/28/2018 1323 to 10/28/2018 1730       Date/Time Order Dose Route Action Comments     10/28/2018 1427 ondansetron (ZOFRAN) 4 mg/2 mL injection **ADS Override Pull** 4 mg  Given rac          Past Medical/Surgical History:   Past Medical History:   Diagnosis Date    Abnormal nuclear stress test     Anxiety     Arthritis     Bundle branch block, left     Cardiomyopathy ([facility] [address] [facility]     Chronic pain     Coronary artery disease     Diabetes mellitus ([facility] [address] )     GERD (gastroesophageal reflux disease)     H/O atrial flutter     History of DVT (deep vein thrombosis)     History of pulmonary embolism     Hyperlipidemia     Hypertension     Hypothyroid     Renal calculi     Shortness of breath        Admitting Diagnosis: Diaphoresis [R61]  Dizziness [R42]  Near syncope [R55]    Age/Sex: 80 y o  female    Assessment/Plan:   Near syncope   Assessment & Plan     Occurred today while seated; dizziness, nausea and diaphoresis  Observation overnight  Telemetry   Check TSH   Check orthostatics               IVF after orthostats  F/u AM labs  Mobilize prior to dc       HTN (hypertension)   Assessment & Plan     Resume home meds  Lisinopril, toprol       Diabetes mellitus ([facility] [address] )   Assessment & Plan             Lab Results   Component Value Date     HGBA1C 5 7 09/07/2018         No results for input(s): POCGLU in the last 72 hours      Blood Sugar Average: Last 72 hrs:  diet controlled DMII   Pt would like BG checks       LBBB (left bundle branch block)   Assessment & Plan     Noted on prior EKGs             Admission Orders:  10/28/2018  1507 OBSERVATION   Scheduled Meds:   Current Facility-Administered Medications:  aspirin 81 mg Oral Daily   enoxaparin 40 mg Subcutaneous Daily   gabapentin 100 mg Oral BID   insulin lispro 1-6 Units Subcutaneous HS   levothyroxine 25 mcg Oral Early Morning   lisinopril 2 5 mg Oral Daily   metoprolol succinate 25 mg Oral Daily   ondansetron 4 mg Intravenous Q6H PRN   pravastatin 40 mg Oral QPM     Continuous Infusions:    PRN Meds: ondansetron - not used       Fingerstick glucose qid  Telemetry  Orthostatic BP

## 2018-10-29 NOTE — ASSESSMENT & PLAN NOTE
Lab Results   Component Value Date    HGBA1C 5 7 09/07/2018       Recent Labs      10/28/18   1807  10/28/18   2102  10/29/18   0728  10/29/18   1112   POCGLU  107  139  98  117       Blood Sugar Average: Last 72 hrs:  (P) 115 25     diet controlled DMII   accu checks acceptable, continue ac/hs accu checks with SSI

## 2018-10-30 ENCOUNTER — TELEPHONE (OUTPATIENT)
Dept: CARDIOLOGY CLINIC | Facility: CLINIC | Age: 83
End: 2018-10-30

## 2018-10-30 ENCOUNTER — TRANSITIONAL CARE MANAGEMENT (OUTPATIENT)
Dept: INTERNAL MEDICINE CLINIC | Facility: CLINIC | Age: 83
End: 2018-10-30

## 2018-10-30 VITALS
TEMPERATURE: 98.4 F | HEIGHT: 59 IN | HEART RATE: 78 BPM | BODY MASS INDEX: 35.16 KG/M2 | WEIGHT: 174.38 LBS | RESPIRATION RATE: 18 BRPM | SYSTOLIC BLOOD PRESSURE: 140 MMHG | OXYGEN SATURATION: 92 % | DIASTOLIC BLOOD PRESSURE: 80 MMHG

## 2018-10-30 PROBLEM — R55 NEAR SYNCOPE: Status: RESOLVED | Noted: 2018-10-28 | Resolved: 2018-10-30

## 2018-10-30 LAB
GLUCOSE SERPL-MCNC: 104 MG/DL (ref 65–140)
GLUCOSE SERPL-MCNC: 97 MG/DL (ref 65–140)

## 2018-10-30 PROCEDURE — 97163 PT EVAL HIGH COMPLEX 45 MIN: CPT

## 2018-10-30 PROCEDURE — 82948 REAGENT STRIP/BLOOD GLUCOSE: CPT

## 2018-10-30 PROCEDURE — 99217 PR OBSERVATION CARE DISCHARGE MANAGEMENT: CPT | Performed by: PHYSICIAN ASSISTANT

## 2018-10-30 PROCEDURE — G8978 MOBILITY CURRENT STATUS: HCPCS

## 2018-10-30 PROCEDURE — 97116 GAIT TRAINING THERAPY: CPT

## 2018-10-30 PROCEDURE — G8979 MOBILITY GOAL STATUS: HCPCS

## 2018-10-30 RX ORDER — ACETAMINOPHEN 325 MG/1
650 TABLET ORAL EVERY 6 HOURS PRN
Status: DISCONTINUED | OUTPATIENT
Start: 2018-10-30 | End: 2018-10-30 | Stop reason: HOSPADM

## 2018-10-30 RX ADMIN — ASPIRIN 81 MG 81 MG: 81 TABLET ORAL at 08:52

## 2018-10-30 RX ADMIN — ENOXAPARIN SODIUM 40 MG: 40 INJECTION SUBCUTANEOUS at 08:51

## 2018-10-30 RX ADMIN — METOPROLOL SUCCINATE 25 MG: 25 TABLET, EXTENDED RELEASE ORAL at 08:52

## 2018-10-30 RX ADMIN — ACETAMINOPHEN 650 MG: 325 TABLET, FILM COATED ORAL at 02:07

## 2018-10-30 RX ADMIN — GABAPENTIN 100 MG: 100 CAPSULE ORAL at 08:52

## 2018-10-30 RX ADMIN — ACETAMINOPHEN 650 MG: 325 TABLET, FILM COATED ORAL at 08:51

## 2018-10-30 RX ADMIN — LEVOTHYROXINE SODIUM 25 MCG: 25 TABLET ORAL at 05:24

## 2018-10-30 RX ADMIN — LISINOPRIL 2.5 MG: 2.5 TABLET ORAL at 08:52

## 2018-10-30 NOTE — ASSESSMENT & PLAN NOTE
· Occurred at home while seated; dizziness, nausea and diaphoretic  · CTH and CXR with no acute change  · Echo: increased LV wall thickness, severe concentric hypertrophy  EF 55%  Septal wall dyssynergy, possible conduction abnormality or paced rhythm  Mild AV stenosis  Moderate TV regurgitation, pressure 55 mmHg showing moderate pulmonary HTN     · Telemetry stable, no events overnight   · TSH 0 868  · Orthostats negative  · Per PT able to be d/c with home PT  · Encourage adequate hydration  · Will follow up with PCP and cardiologist

## 2018-10-30 NOTE — PHYSICIAN ADVISOR
Current patient class: Observation  The patient is currently on Hospital Day: 2 at [address] [facility]        The patient was admitted to the hospital  on N/A at N/A for the following diagnosis:  Diaphoresis [R61]  Dizziness [R42]  Near syncope [R55]     After review of the relevant documentation, labs, vital signs and test results, the patient is most appropriate for OBSERVATION STATUS  Rationale is as follows: The patient is a 80 yrs   Female who presented to the ED at 10/28/2018  1:23 PM with a chief complaint of Dizziness (pt reports immediately PTA feeling sudden onset of dizziness, nausea and diaphoresis)     The patient presented with near syncope and dizziness  The plan of care includes telemetry monitoring, orthostatic vital signs, IVF, telemetry monitoring  This patient is appropriate for OBSERVATION status       The patients vitals on arrival were ED Triage Vitals   Temperature Pulse Respirations Blood Pressure SpO2   10/28/18 1341 10/28/18 1329 10/28/18 1329 10/28/18 1329 10/28/18 1329   97 7 °F (36 5 °C) 77 18 (!) 180/85 95 %      Temp Source Heart Rate Source Patient Position - Orthostatic VS BP Location FiO2 (%)   10/28/18 1341 10/28/18 1329 10/28/18 1329 10/28/18 1329 --   Oral Monitor Sitting Right arm       Pain Score       10/28/18 1329       No Pain           Past Medical History:   Diagnosis Date    Abnormal nuclear stress test     last assessed 04/03/2017    Anxiety     Arthritis     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    Bundle branch block, left     Cardiomyopathy ([address] )     Chronic pain     chronic b/l shoulder pain    Coronary artery disease     Diabetes mellitus ([address] )     GERD (gastroesophageal reflux disease)     H/O atrial flutter     History of DVT (deep vein thrombosis)     History of pulmonary embolism     Hyperlipidemia     Hypertension     Hypothyroid     Renal calculi     Shortness of breath      Past Surgical History: Procedure Laterality Date    ATRIAL ABLATION SURGERY  01/2015    CARDIOVERSION      CHELA/DCCV 10/22/2014    CARPAL TUNNEL RELEASE Right     CATARACT EXTRACTION      CORONARY ANGIOPLASTY WITH STENT PLACEMENT      HYSTERECTOMY      JOINT REPLACEMENT Right     IL AMPUTATION TOE,MT-P JT Left 10/11/2017    Procedure: AMPUTATION SECOND TOE;  Surgeon: James Lal DPM;  Location: [facility];  Service: Podiatry    TONSILLECTOMY      TOTAL HIP ARTHROPLASTY      Right           Consults have been placed to:   None    Vitals:    10/28/18 1743 10/28/18 2249 10/29/18 0727 10/29/18 1500   BP: 151/67 138/63 145/67 137/77   BP Location: Left arm Right arm Left arm Left arm   Pulse: 72 77 63 67   Resp:  18 18 16   Temp:  98 3 °F (36 8 °C) 98 1 °F (36 7 °C) 98 3 °F (36 8 °C)   TempSrc:  Oral Oral Oral   SpO2:  94% 92% 93%   Weight:       Height:           Most recent labs:    Recent Labs      10/28/18   1343  10/29/18   0549   WBC  6 09  5 05   HGB  12 7  11 4*   HCT  37 5  34 9   PLT  218  188   K  3 7  4 2   NA  139  139   CALCIUM  9 8  8 7   BUN  19  16   CREATININE  0 80  0 80   INR  1 00   --    TROPONINI  <0 02   --    AST  16  13   ALT  24  18   ALKPHOS  79  63       Scheduled Meds:  Current Facility-Administered Medications:  aspirin 81 mg Oral Daily Teri Shetty MD   enoxaparin 40 mg Subcutaneous Daily Teri Shetty MD   gabapentin 100 mg Oral BID Teri Shetty MD   insulin lispro 1-6 Units Subcutaneous HS Juni Capone PA-C   levothyroxine 25 mcg Oral Early Morning Teri Shetty MD   lisinopril 2 5 mg Oral Daily Teri Shetty MD   metoprolol succinate 25 mg Oral Daily Teri Shetty MD   ondansetron 4 mg Intravenous Q6H PRN Teri Shetty MD   pravastatin 40 mg Oral QPM Teri Shetty MD     Continuous Infusions:   PRN Meds: ondansetron    Surgical procedures (if appropriate):

## 2018-10-30 NOTE — UTILIZATION REVIEW
Continued Stay Review  OBSERVATION PER PHYSICIAN ADVISOR    Date:10/29/2018  Reports feeling slightly better but still with weakness and lightheadedness  Vital Signs: Temp (24hrs), Av °F (36 7 °C), Min:97 7 °F (36 5 °C), Max:98 3 °F (36 8 °C)     Temp:  [97 7 °F (36 5 °C)-98 3 °F (36 8 °C)] 98 1 °F (36 7 °C)  HR:  [60-83] 63  Resp:  [18] 18  BP: (138-174)/(56-78) 145/67  SpO2:  [92 %-100 %] 92 %  Body mass index is 35 22 kg/m²  Medications:   Scheduled Meds:   Current Facility-Administered Medications:  acetaminophen 650 mg Oral Q6H PRN   aspirin 81 mg Oral Daily   enoxaparin 40 mg Subcutaneous Daily   gabapentin 100 mg Oral BID   insulin lispro 1-6 Units Subcutaneous HS   levothyroxine 25 mcg Oral Early Morning   lisinopril 2 5 mg Oral Daily   metoprolol succinate 25 mg Oral Daily   ondansetron 4 mg Intravenous Q6H PRN   pravastatin 40 mg Oral QPM     Continuous Infusions:    PRN Meds:   acetaminophen    ondansetron    Abnormal Labs/Diagnostic Results: Albumin 2 8   hgb  11 4    Echo- LEFT VENTRICLE:  Systolic function was normal  Ejection fraction was estimated to be 55 %  This study was inadequate for the evaluation of regional wall motion  Wall thickness was markedly increased  There was severe concentric hypertrophy      VENTRICULAR SEPTUM:  There was dyssynergic motion  These changes are consistent with a conduction abnormality or paced rhythm      RIGHT VENTRICLE:  The ventricle was dilated  Systolic function was normal      AORTIC VALVE:  The valve was probably trileaflet  Leaflets exhibited moderately to markedly increased thickness, moderate calcification, moderately reduced cuspal separation, and sclerosis  There was mild stenosis by gradients  Vmax 2 6 m/s, mean gradient 16 mm Hg, maximum gradient 27 mm Hg      TRICUSPID VALVE:  There was moderate regurgitation  Estimated peak PA pressure was 55 mmHg    The findings suggest moderate pulmonary hypertension    Age/Sex: 80 y o  female Assessment/Plan:Female who presented to the ED at 10/28/2018  1:23 PM with a chief complaint of Dizziness (pt reports immediately PTA feeling sudden onset of dizziness, nausea and diaphoresis)      The patient presented with near syncope and dizziness  The plan of care includes telemetry monitoring, orthostatic vital signs, IVF, telemetry monitoring  This patient is appropriate for OBSERVATION status    Discharge Plan: to be determined

## 2018-10-30 NOTE — PLAN OF CARE
Problem: PHYSICAL THERAPY ADULT  Goal: Performs mobility at highest level of function for planned discharge setting  See evaluation for individualized goals  Treatment/Interventions: Functional transfer training, LE strengthening/ROM, Elevations, Therapeutic exercise, Endurance training, Patient/family training, Equipment eval/education, Bed mobility, Compensatory technique education, Spoke to nursing, Spoke to case management  Equipment Recommended: Stacia Marrero (RW for mobility)       See flowsheet documentation for full assessment, interventions and recommendations  Prognosis: Good  Problem List: Decreased strength, Decreased endurance, Impaired balance, Decreased mobility, Decreased skin integrity, Pain  Assessment: Pt is a 81 y/o female admitted to T 2* near syncope and reports of dizziness (which has resolved)  Pt lives with  and has local support from family as needed,use of personal DME PTA,no reports of recent falls,ranch style home and 2 RADHA  Pt reports being completely I PTA  Pt currently is not at functional mobility baseline,needs Ax1 for mobility,use of RW for mobility,multiple lines,reports pain LLE and L foot during WB,ongoing medical care and unsteady gait pattern  Pt demonstrates minimal deficits during functional mobility and gait including dec endurance,dec balance,dec BLE strength,inc L foot/ankle pain,unsteady gait pattern and needs minAx1 for transfers and stair training,S for BM and S for gait with use of RW  Pt would cont to benefit from skilled inpt PT services to maximize functional independence,        Recommendation: Home with family support, Home PT (cont use of personal DME)          See flowsheet documentation for full assessment

## 2018-10-30 NOTE — TELEPHONE ENCOUNTER
Patient was admitted to hospital on 10/28 with near syncope  D/C today  Just seen in office on 10/23  Echo done yesterday and Flavio castro would like you to review results and advise on follow up  Please advise

## 2018-10-30 NOTE — SOCIAL WORK
Patient will d c later today  Patient is active with [employer] through [address] and will resume at discharge  CM did not send referral due to patient being in under Obs  D/c instruction should be printed for patient to give to her [facility]  agency  No other needs and  will transport  CM will follow patient

## 2018-10-30 NOTE — PLAN OF CARE
Problem: DISCHARGE PLANNING - CARE MANAGEMENT  Goal: Discharge to post-acute care or home with appropriate resources  INTERVENTIONS:  - Conduct assessment to determine patient/family and health care team treatment goals, and need for post-acute services based on payer coverage, community resources, and patient preferences, and barriers to discharge  - Address psychosocial, clinical, and financial barriers to discharge as identified in assessment in conjunction with the patient/family and health care team  - Arrange appropriate level of post-acute services according to patients   needs and preference and payer coverage in collaboration with the physician and health care team  - Communicate with and update the patient/family, physician, and health care team regarding progress on the discharge plan  - Arrange appropriate transportation to post-acute venues  Outcome: Progressing  Patient will d c later today  Patient is active with [employer] through  [address] and will resume at discharge  CM did not send referral due to patient being in under Obs  D/c instruction should be printed for patient to give to her [employer] agency  No other needs and  will transport  CM will follow patient

## 2018-10-30 NOTE — DISCHARGE SUMMARY
Discharge- Karen Sale 7/24/1929, 80 y o  female MRN: [id number]  Unit/Bed#: -01 Encounter: [id number]  Primary Care Provider: Chandrika Cohen MD   Date and time admitted to hospital: 10/28/2018  1:23 PM    * Near syncope   Assessment & Plan    · Occurred at home while seated; dizziness, nausea and diaphoretic  · CTH and CXR with no acute change  · Echo: increased LV wall thickness, severe concentric hypertrophy  EF 55%  Septal wall dyssynergy, possible conduction abnormality or paced rhythm  Mild AV stenosis  Moderate TV regurgitation, pressure 55 mmHg showing moderate pulmonary HTN     · Telemetry stable, no events overnight   · TSH 0 868  · Orthostats negative  · Per PT able to be d/c with home PT  · Encourage adequate hydration  · Will follow up with PCP and cardiologist     HTN (hypertension)   Assessment & Plan    · BP acceptable  · Continue current meds      Diabetes mellitus [facility])   Assessment & Plan    Lab Results   Component Value Date    HGBA1C 5 7 09/07/2018       Recent Labs      10/29/18   1112  10/29/18   1613  10/29/18   2059  10/30/18   0742   POCGLU  117  92  118  97       Blood Sugar Average: Last 72 hrs:  (P) 291 [id number]     diet controlled DMII   accu checks acceptable     Hypothyroidism   Assessment & Plan    · TSH 0 868  · Continue current levothyroxine dose     LBBB (left bundle branch block)   Assessment & Plan    · Noted on prior EKGs   · Will follow up with cardiologist after discharge       Hyperlipidemia   Assessment & Plan    · Continue statin         Discharging Physician / Practitioner: Meliton Homans, PA-C  PCP: Chandrika Cohen MD  Admission Date:   Admission Orders     Ordered        10/28/18 1507  Place in Observation (expected length of stay for this patient is less than two midnights)  Once             Discharge Date: 10/30/18    Resolved Problems  Date Reviewed: 10/29/2018    None        Consultations During Hospital Stay:  · none    Procedures Performed: · none    Significant Findings / Test Results:   · CBC and BMP normal   · TSH 0 868  · Orthostatic vitals wnl  · Telemetry stable  · CT head- no acute change  · CXR- no acute change  · EKG- Normal sinus rhythm with 1st degree A-V block, Left axis deviation, LBBB  · Echocardiogram- increased LV wall thickness, severe concentric hypertrophy  EF 55%  Septal wall dyssynergy, possible conduction abnormality or paced rhythm  Mild AV stenosis  Moderate TV regurgitation, pressure 55 mmHg showing moderate pulmonary HTN    Incidental Findings:   · Worsening left ventricular hypertrophy     Test Results Pending at Discharge (will require follow up):   · none     Outpatient Tests Requested:  · none    Complications:  none    Reason for Admission: Near syncope    Hospital Course:     Janie Seip is a 80 y o  female patient who originally presented to the hospital on 10/28/2018 due to near syncope while at home  The patient reports she was sitting at home in her recliner watching TV when she felt dizzy  She became nauseous and diaphoretic but denies LOC  She was brought into the ER where an EKG revealed Normal sinus rhythm with 1st degree A-V block, Left axis deviation, LBBB  A CBC and BMP were WNL  CT head and CXR showed no acute change  She had no focal neurological deficit  She was admitted under [facility] service and was placed under telemetry monitoring  During her stay she had an echocardiogram which showed increased LV wall thickness, severe concentric hypertrophy  EF 55%  Septal wall dyssynergy, possible conduction abnormality or paced rhythm  Mild AV stenosis  Moderate TV regurgitation, pressure 55 mmHg showing moderate pulmonary HTN  Her hospital stay was uncomplicated, she denied any near-syncope or dizziness and did not have any chest pain or shortness of breath  Her telemetry was normal throughout her stay  She remained medically stable and was discharged to home with home PT   She was instructed to follow up with her PCP and cardiologist upon discharge home  Please see above list of diagnoses and related plan for additional information  Condition at Discharge: fair     Discharge Day Visit / Exam:     Subjective: The patient denies any complaints and reports she is feeling better today  She does not report any episodes of near-syncope or dizziness  She denies CP, SOB, headache, fevers, abdominal pain, N/V/D, edema  Vitals: Blood Pressure: 140/80 (10/30/18 0748)  Pulse: 78 (10/30/18 0748)  Temperature: 98 4 °F (36 9 °C) (10/30/18 0748)  Temp Source: Oral (10/30/18 0748)  Respirations: 18 (10/30/18 0748)  Height: 4' 11" (149 9 cm) (10/28/18 1738)  Weight - Scale: 79 1 kg (174 lb 6 1 oz) (10/28/18 1738)  SpO2: 92 % (10/30/18 0748)  Exam:   Physical Exam   Constitutional: She is oriented to person, place, and time  HENT:   Head: Normocephalic and atraumatic  Mouth/Throat: Oropharynx is clear and moist and mucous membranes are normal    Eyes: No scleral icterus  Cardiovascular: Normal rate and regular rhythm  Murmur heard  Pulmonary/Chest: Breath sounds normal  She has no wheezes  She has no rales  She exhibits no tenderness  Abdominal: Soft  Bowel sounds are normal  She exhibits no distension  There is no tenderness  Musculoskeletal: Normal range of motion  She exhibits no edema  Neurological: She is alert and oriented to person, place, and time  Skin: Skin is warm and dry  No rash noted  Psychiatric: She has a normal mood and affect  Vitals reviewed  Discussion with Family: none    Discharge instructions/Information to patient and family:   See after visit summary for information provided to patient and family  Provisions for Follow-Up Care:  See after visit summary for information related to follow-up care and any pertinent home health orders        Disposition:     Other: Home with home PT    For Discharges to [facility]:   · Not Applicable to this Patient - Not Applicable to this Patient    Planned Readmission: no     Discharge Statement:  I spent 45 minutes discharging the patient  This time was spent on the day of discharge  I had direct contact with the patient on the day of discharge  Greater than 50% of the total time was spent examining patient, answering all patient questions, arranging and discussing plan of care with patient as well as directly providing post-discharge instructions  Additional time then spent on discharge activities  Discharge Medications:  See after visit summary for reconciled discharge medications provided to patient and family        ** Please Note: This note has been constructed using a voice recognition system **

## 2018-10-30 NOTE — PHYSICAL THERAPY NOTE
Physical Therapy Evaluation:    2 forms of pt ID verified:name,birthdate and pt ID amado    Patient's Name: Karl Park    Admitting Diagnosis  Diaphoresis [R61]  Dizziness [R42]  Near syncope [R55]    Problem List  Patient Active Problem List   Diagnosis    CAD (coronary artery disease)    HTN (hypertension)    History of placement of stent in LAD coronary artery    Hyperlipidemia    History of atrial flutter    Edema    LBBB (left bundle branch block)    Right hip pain    [facility] mellitus ([facility] [address] [facility]    Hypothyroidism    Adhesive capsulitis    Chronic low back pain    Gait disturbance    Cervical spine degeneration    Primary osteoarthritis of both shoulders    Tremors of nervous system    Near syncope       Past Medical History  Past Medical History:   Diagnosis Date    Abnormal nuclear stress test     last assessed 04/03/2017    Anxiety     Arthritis     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    Bundle branch block, left     Cardiomyopathy ([facility] [address] [facility]     Chronic pain     chronic b/l shoulder pain    Coronary artery disease     Diabetes mellitus ([facility] [address] [facility]     GERD (gastroesophageal reflux disease)     H/O atrial flutter     History of DVT (deep vein thrombosis)     History of pulmonary embolism     Hyperlipidemia     Hypertension     Hypothyroid     Renal calculi     Shortness of breath        Past Surgical History  Past Surgical History:   Procedure Laterality Date    ATRIAL ABLATION SURGERY  01/2015    CARDIOVERSION      CHELA/DCCV 10/22/2014    CARPAL TUNNEL RELEASE Right     CATARACT EXTRACTION      CORONARY ANGIOPLASTY WITH STENT PLACEMENT      HYSTERECTOMY      JOINT REPLACEMENT Right     GA AMPUTATION TOE,MT-P JT Left 10/11/2017    Procedure: AMPUTATION SECOND TOE;  Surgeon: Rita Rogers DPM;  Location: [facility];  Service: Podiatry    TONSILLECTOMY      TOTAL HIP ARTHROPLASTY      Right      10/30/18 1100   Note Type   Note type Eval/Treat Pain Assessment   Pain Assessment 0-10   Pain Score 6   Pain Type Acute pain   Pain Location Foot; Ankle   Pain Orientation Left  (during WB)   Hospital Pain Intervention(s) Repositioned; Ambulation/increased activity; Elevated; Emotional support; Environmental changes;Cold applied; Rest   Home Living   Type of [address] One level;Stairs to enter without rails  (2 RADHA,ranch style home)   Home Equipment Cane;Walker  (owns [facility] and ,use of personal DME PTA)   Additional Comments pt reports being completely I PTA,lives with  and has family support as needed,use of personal DME PTA,reports no recent falls   Prior Function   Level of Cole Independent with ADLs and functional mobility  (per pt PTA)   Lives With Spouse  (local family A as needed)   Receives Help From Family   ADL Assistance Independent   IADLs Independent   Falls in the last 6 months 0   Restrictions/Precautions   Other Precautions Pain;Multiple lines; Fall Risk;Hard of hearing   General   Additional Pertinent History near syncope,reports of dizziness (resolved at this time)   Family/Caregiver Present No   Cognition   Overall Cognitive Status WFL   Arousal/Participation Cooperative   Orientation Level Oriented X4   Following Commands Follows one step commands without difficulty   RLE Assessment   RLE Assessment (4/5 grossly throughout)   LLE Assessment   LLE Assessment (4/5 grossly throughout)   Coordination   Movements are Fluid and Coordinated 0   Coordination and Movement Description dec WB LLE 2* inc pain,step to gait pattern,dec BLE step length,ataxic and unsteady gait pattern   Sensation WFL   Light Touch   RLE Light Touch Grossly intact   LLE Light Touch Grossly intact   Bed Mobility   Supine to Sit 5  Supervision   Additional items Assist x 1;Bedrails; Increased time required;Verbal cues   Transfers   Sit to Stand 4  Minimal assistance   Additional items Assist x 1;Bedrails; Increased time required;Verbal cues   Stand to Sit 4  Minimal assistance   Additional items Assist x 1; Armrests; Increased time required;Verbal cues  (for safety,education)   Ambulation/Elevation   Gait pattern Improper Weight shift; Antalgic;Narrow REBEKA; Decreased L stance; Inconsistent sandra; Foward flexed; Short stride; Ataxia; Step to   Gait Assistance 5  Supervision   Additional items Assist x 1;Verbal cues   Assistive Device Rolling walker   Distance 120 feet with use of RW on tile and hardwood edward;pt reports inc pain LLE during WB (acute in nature)   Stair Management Assistance 4  Minimal assist   Additional items Assist x 1;Verbal cues; Tactile cues   Stair Management Technique One rail R;Step to pattern; Foreward;Nonreciprocal  (HHA)   Number of Stairs 2   Balance   Static Sitting Good   Dynamic Sitting Poor +   Static Standing Poor +   Dynamic Standing Poor +   Ambulatory Poor +   Endurance Deficit   Endurance Deficit Yes   Endurance Deficit Description pain,weakness,SOB following mobility   Activity Tolerance   Activity Tolerance Patient limited by fatigue;Patient limited by pain  (fair->good)   Medical Staff Made Aware CM   Nurse Made Aware yes   Assessment   Prognosis Good   Problem List Decreased strength;Decreased endurance; Impaired balance;Decreased mobility; Decreased skin integrity;Pain   Assessment Pt is a 79 y/o female admitted to T 2* near syncope and reports of dizziness (which has resolved)  Pt lives with  and has local support from family as needed,use of personal DME PTA,no reports of recent falls,ranch style home and 2 RADHA  Pt reports being completely I PTA  Pt currently is not at functional mobility baseline,needs Ax1 for mobility,use of RW for mobility,multiple lines,reports pain LLE and L foot during WB,ongoing medical care and unsteady gait pattern   Pt demonstrates minimal deficits during functional mobility and gait including dec endurance,dec balance,dec BLE strength,inc L foot/ankle pain,unsteady gait pattern and needs minAx1 for transfers and stair training,S for BM and S for gait with use of RW  Pt would cont to benefit from skilled inpt PT services to maximize functional independence,   Goals   Patient Goals to dec L foot pain   STG Expiration Date 11/09/18   Short Term Goal #1 in 7-10 days:pt will be able to ambulate >200 feet with use of RW on various surfaces without LOB S->completely I level of A to A pt to return to PLOF,activity tolerance:45mins/45mins,inc balance 1/2 grade to dec fall risk,inc BLE strength 1/2 grade to A to inc balance,strength,mobility and endurance,I with BLE ther ex HEP in various positions to A pt to dec pain,inc balance,strength,mobility and endurance,BM and transfers completely I to and from various surfaces to A pt to return to PLOF,up and down 2 steps without use of rail S level of A to navigate RADHA upon D/C   Treatment Day 0   Plan   Treatment/Interventions Functional transfer training;LE strengthening/ROM; Elevations; Therapeutic exercise; Endurance training;Patient/family training;Equipment eval/education; Bed mobility; Compensatory technique education;Spoke to nursing;Spoke to case management   PT Frequency Other (Comment)  (3-5x/week)   Recommendation   Recommendation Home with family support;Home PT  (cont use of personal DME)   Equipment Recommended Walker  (RW for mobility)   Barthel Index   Feeding 10   Bathing 5   Grooming Score 5   Dressing Score 10   Bladder Score 10   Bowels Score 10   Toilet Use Score 5   Transfers (Bed/Chair) Score 10   Mobility (Level Surface) Score 0   Stairs Score 5   Barthel Index Score 70           @Brenda Verma, PT, DPT@

## 2018-11-01 ENCOUNTER — OFFICE VISIT (OUTPATIENT)
Dept: INTERNAL MEDICINE CLINIC | Facility: CLINIC | Age: 83
End: 2018-11-01
Payer: MEDICARE

## 2018-11-01 VITALS
BODY MASS INDEX: 35.2 KG/M2 | WEIGHT: 174.6 LBS | OXYGEN SATURATION: 93 % | DIASTOLIC BLOOD PRESSURE: 78 MMHG | SYSTOLIC BLOOD PRESSURE: 162 MMHG | HEIGHT: 59 IN | HEART RATE: 96 BPM

## 2018-11-01 DIAGNOSIS — I25.10 CORONARY ARTERY DISEASE INVOLVING NATIVE CORONARY ARTERY OF NATIVE HEART WITHOUT ANGINA PECTORIS: ICD-10-CM

## 2018-11-01 DIAGNOSIS — R55 PRE-SYNCOPE: Primary | ICD-10-CM

## 2018-11-01 DIAGNOSIS — E03.9 HYPOTHYROIDISM, UNSPECIFIED TYPE: ICD-10-CM

## 2018-11-01 DIAGNOSIS — M47.22 OSTEOARTHRITIS OF SPINE WITH RADICULOPATHY, CERVICAL REGION: ICD-10-CM

## 2018-11-01 DIAGNOSIS — I10 ESSENTIAL HYPERTENSION: ICD-10-CM

## 2018-11-01 DIAGNOSIS — E78.2 MIXED HYPERLIPIDEMIA: ICD-10-CM

## 2018-11-01 DIAGNOSIS — I44.7 LBBB (LEFT BUNDLE BRANCH BLOCK): ICD-10-CM

## 2018-11-01 DIAGNOSIS — E11.8 TYPE 2 DIABETES MELLITUS WITH COMPLICATION, WITHOUT LONG-TERM CURRENT USE OF INSULIN (HCC): ICD-10-CM

## 2018-11-01 PROCEDURE — 99496 TRANSJ CARE MGMT HIGH F2F 7D: CPT | Performed by: INTERNAL MEDICINE

## 2018-11-01 NOTE — TELEPHONE ENCOUNTER
Please let her know:    1  Heart function still normal  2  Aortic valve is maybe a little more blocked but not severe and unlikely related to syncope      I don;t think she needs to be seen soon, but if she has similar symptoms then I would definitely want to see her

## 2018-11-01 NOTE — ASSESSMENT & PLAN NOTE
Has seen pain mgt, had PT  No relief from gabapentin-will stop in light of recent presyncope  More relief from topical treatment

## 2018-11-01 NOTE — PATIENT INSTRUCTIONS
Stop gabapentin-take daily for 1 week then stop  Call if you don't hear from the heart doctor or physical therapy  Blood work in February before your visit

## 2018-11-01 NOTE — PROGRESS NOTES
Assessment/Plan:     Stop gabapentin-take daily for the next week then stop     Problem List Items Addressed This Visit        Endocrine    Diabetes mellitus ([facility] [address] )     Lab Results   Component Value Date    HGBA1C 5 7 09/07/2018       Off all meds         Hypothyroidism     Normal TSH            Cardiovascular and Mediastinum    CAD (coronary artery disease)    LBBB (left bundle branch block)    HTN (hypertension)     Controlled            Musculoskeletal and Integument    Cervical spine degeneration     Has seen pain mgt, had PT  No relief from gabapentin-will stop in light of recent presyncope  More relief from topical treatment            Other    Hyperlipidemia      Other Visit Diagnoses     Pre-syncope    -  Primary           Subjective:     Patient ID: Dennis Mora is a 80 y o  female  HPI  Admitted for a near syncopal episode  She did not feel right when she sitting at home, her  thought she might have been having a stroke or a heart attack  She was not confused, speech was not slurred, no CP  EMS called and she was taken to the hospital  She recalls breaking into a cold sweat when EMS came  Echo  showed EF 55% Markedly increased wall thickness, severe concentric hypertrophy  Mild AS moderate TR  Blood work normal  Syncope in the summer-?frmo low sugar  She denies taking the furosemide then or recently  She was taking it prn for LE edema  She had "tonsillitis" diagnosed at urgent care a few weeks ago  I started her on gabapentin at her last visit on 9/28 for her chronic neck pain  She is tolerating it and takes it BID but no relief of the neck pain  She has  medical marijuana cream that she uses prn which helps more    Review of Systems   Constitutional: Negative for fatigue, fever and unexpected weight change  HENT: Positive for sneezing and voice change  Negative for ear pain, hearing loss, sinus pain, sinus pressure and sore throat  Respiratory: Positive for shortness of breath  Negative for cough and wheezing  Cardiovascular: Negative for palpitations (with exertion) and leg swelling  Gastrointestinal: Negative for abdominal pain, constipation, diarrhea, nausea and vomiting  Genitourinary: Negative for dysuria  Musculoskeletal: Positive for neck pain  Neurological: Negative for dizziness, light-headedness and headaches  Objective:     Physical Exam   Constitutional: She is oriented to person, place, and time  She appears well-developed and well-nourished  HENT:   Head: Normocephalic and atraumatic  Mouth/Throat: Oropharynx is clear and moist    Eyes: Conjunctivae are normal    Neck: Neck supple  Cardiovascular: Normal rate, regular rhythm and normal heart sounds  No murmur heard  Pulmonary/Chest: Effort normal and breath sounds normal  No respiratory distress  She has no wheezes  She has no rales  Abdominal: Soft  Bowel sounds are normal  She exhibits no distension and no mass  There is no tenderness  There is no rebound and no guarding  Musculoskeletal: Normal range of motion  Neurological: She is alert and oriented to person, place, and time  Skin: Skin is warm and dry  Psychiatric: She has a normal mood and affect  Her behavior is normal  Judgment and thought content normal          Vitals:    11/01/18 1102   BP: 162/78   Pulse: 96   SpO2: 93%   Weight: 79 2 kg (174 lb 9 6 oz)   Height: 4' 11" (1 499 m)       Transitional Care Management Review:  Pancho Coates is a 80 y o  female here for TCM follow up       During the TCM phone call patient stated:    Date and time hospital follow up call was made:  10/30/2018  3:36 PM  Hospital care reviewed:  Records reviewed  Patient was hopsitalized at:  [address] [place] [address]  Date of admission:  10/28/18  Date of discharge:  10/30/18  Diagnosis:  Near Syncope  Disposition:  Home  Were the patients medicaitons reviewed and updated:  No  Current symptoms:  Fatigue  Should patient be enrolled in anticoag monitoring?:  No  Scheduled for follow up?:  Yes  Patients specialists:  Other (comment)  Other specialists Name:  Therapist  Do you need help managing your perscriptions or medications:  Yes  I have advised the patient to call PCP with any new or worsening symptoms (please type in name along with any credentials):  Brown Bloch,   Are you recieving outpatient services:  No  Are you recieving home care services:  No  Are you using any community resources:  No  Have you fallen in the last 12 months:  Yes  How many times:  1  Interperter language line required?:  No  Counseling:  Patient             Lendia Cheadle, MD

## 2018-12-02 DIAGNOSIS — I25.10 CAD (CORONARY ARTERY DISEASE): Primary | ICD-10-CM

## 2018-12-03 RX ORDER — ASPIRIN 81 MG
TABLET, DELAYED RELEASE (ENTERIC COATED) ORAL
Qty: 90 TABLET | Refills: 3 | Status: SHIPPED | OUTPATIENT
Start: 2018-12-03 | End: 2020-01-28

## 2018-12-09 ENCOUNTER — HOSPITAL ENCOUNTER (INPATIENT)
Facility: HOSPITAL | Age: 83
LOS: 4 days | Discharge: NURSING, REHAB OR LONG-TERM CARE | DRG: 243 | End: 2018-12-13
Attending: FAMILY MEDICINE | Admitting: STUDENT IN AN ORGANIZED HEALTH CARE EDUCATION/TRAINING PROGRAM
Payer: MEDICARE

## 2018-12-09 ENCOUNTER — APPOINTMENT (EMERGENCY)
Dept: CT IMAGING | Facility: HOSPITAL | Age: 83
End: 2018-12-09
Payer: MEDICARE

## 2018-12-09 ENCOUNTER — HOSPITAL ENCOUNTER (EMERGENCY)
Facility: HOSPITAL | Age: 83
End: 2018-12-09
Attending: EMERGENCY MEDICINE | Admitting: EMERGENCY MEDICINE
Payer: MEDICARE

## 2018-12-09 ENCOUNTER — APPOINTMENT (INPATIENT)
Dept: NON INVASIVE DIAGNOSTICS | Facility: HOSPITAL | Age: 83
DRG: 243 | End: 2018-12-09
Payer: MEDICARE

## 2018-12-09 VITALS
SYSTOLIC BLOOD PRESSURE: 157 MMHG | OXYGEN SATURATION: 95 % | WEIGHT: 179.68 LBS | BODY MASS INDEX: 36.29 KG/M2 | RESPIRATION RATE: 18 BRPM | TEMPERATURE: 98.8 F | DIASTOLIC BLOOD PRESSURE: 70 MMHG | HEART RATE: 58 BPM

## 2018-12-09 DIAGNOSIS — R26.2 AMBULATORY DYSFUNCTION: ICD-10-CM

## 2018-12-09 DIAGNOSIS — I44.1 SECOND DEGREE HEART BLOCK: ICD-10-CM

## 2018-12-09 DIAGNOSIS — R55 SYNCOPE: Primary | ICD-10-CM

## 2018-12-09 DIAGNOSIS — I44.1 2ND DEGREE AV BLOCK: ICD-10-CM

## 2018-12-09 DIAGNOSIS — I44.7 LBBB (LEFT BUNDLE BRANCH BLOCK): Primary | ICD-10-CM

## 2018-12-09 LAB
ALBUMIN SERPL BCP-MCNC: 3.4 G/DL (ref 3.5–5)
ALP SERPL-CCNC: 76 U/L (ref 46–116)
ALT SERPL W P-5'-P-CCNC: 24 U/L (ref 12–78)
ANION GAP SERPL CALCULATED.3IONS-SCNC: 5 MMOL/L (ref 4–13)
AST SERPL W P-5'-P-CCNC: 17 U/L (ref 5–45)
BASOPHILS # BLD AUTO: 0.02 THOUSANDS/ΜL (ref 0–0.1)
BASOPHILS NFR BLD AUTO: 0 % (ref 0–1)
BILIRUB SERPL-MCNC: 0.3 MG/DL (ref 0.2–1)
BUN SERPL-MCNC: 25 MG/DL (ref 5–25)
CALCIUM SERPL-MCNC: 9.4 MG/DL (ref 8.3–10.1)
CHLORIDE SERPL-SCNC: 102 MMOL/L (ref 100–108)
CO2 SERPL-SCNC: 31 MMOL/L (ref 21–32)
CREAT SERPL-MCNC: 0.78 MG/DL (ref 0.6–1.3)
EOSINOPHIL # BLD AUTO: 0.06 THOUSAND/ΜL (ref 0–0.61)
EOSINOPHIL NFR BLD AUTO: 1 % (ref 0–6)
ERYTHROCYTE [DISTWIDTH] IN BLOOD BY AUTOMATED COUNT: 13.4 % (ref 11.6–15.1)
GFR SERPL CREATININE-BSD FRML MDRD: 68 ML/MIN/1.73SQ M
GLUCOSE SERPL-MCNC: 105 MG/DL (ref 65–140)
GLUCOSE SERPL-MCNC: 97 MG/DL (ref 65–140)
HCT VFR BLD AUTO: 35.9 % (ref 34.8–46.1)
HGB BLD-MCNC: 12 G/DL (ref 11.5–15.4)
IMM GRANULOCYTES # BLD AUTO: 0.01 THOUSAND/UL (ref 0–0.2)
IMM GRANULOCYTES NFR BLD AUTO: 0 % (ref 0–2)
LYMPHOCYTES # BLD AUTO: 0.97 THOUSANDS/ΜL (ref 0.6–4.47)
LYMPHOCYTES NFR BLD AUTO: 13 % (ref 14–44)
MCH RBC QN AUTO: 32.2 PG (ref 26.8–34.3)
MCHC RBC AUTO-ENTMCNC: 33.4 G/DL (ref 31.4–37.4)
MCV RBC AUTO: 96 FL (ref 82–98)
MONOCYTES # BLD AUTO: 0.61 THOUSAND/ΜL (ref 0.17–1.22)
MONOCYTES NFR BLD AUTO: 8 % (ref 4–12)
NEUTROPHILS # BLD AUTO: 5.81 THOUSANDS/ΜL (ref 1.85–7.62)
NEUTS SEG NFR BLD AUTO: 78 % (ref 43–75)
NRBC BLD AUTO-RTO: 0 /100 WBCS
PLATELET # BLD AUTO: 214 THOUSANDS/UL (ref 149–390)
PMV BLD AUTO: 10.3 FL (ref 8.9–12.7)
POTASSIUM SERPL-SCNC: 4.3 MMOL/L (ref 3.5–5.3)
PROT SERPL-MCNC: 7.3 G/DL (ref 6.4–8.2)
RBC # BLD AUTO: 3.73 MILLION/UL (ref 3.81–5.12)
SODIUM SERPL-SCNC: 138 MMOL/L (ref 136–145)
TROPONIN I SERPL-MCNC: <0.02 NG/ML
WBC # BLD AUTO: 7.48 THOUSAND/UL (ref 4.31–10.16)

## 2018-12-09 PROCEDURE — 82948 REAGENT STRIP/BLOOD GLUCOSE: CPT

## 2018-12-09 PROCEDURE — 33210 INSERT ELECTRD/PM CATH SNGL: CPT | Performed by: INTERNAL MEDICINE

## 2018-12-09 PROCEDURE — 93005 ELECTROCARDIOGRAM TRACING: CPT

## 2018-12-09 PROCEDURE — 94760 N-INVAS EAR/PLS OXIMETRY 1: CPT

## 2018-12-09 PROCEDURE — 5A1223Z PERFORMANCE OF CARDIAC PACING, CONTINUOUS: ICD-10-PCS | Performed by: INTERNAL MEDICINE

## 2018-12-09 PROCEDURE — 80053 COMPREHEN METABOLIC PANEL: CPT | Performed by: EMERGENCY MEDICINE

## 2018-12-09 PROCEDURE — 99222 1ST HOSP IP/OBS MODERATE 55: CPT | Performed by: STUDENT IN AN ORGANIZED HEALTH CARE EDUCATION/TRAINING PROGRAM

## 2018-12-09 PROCEDURE — 99285 EMERGENCY DEPT VISIT HI MDM: CPT

## 2018-12-09 PROCEDURE — 36415 COLL VENOUS BLD VENIPUNCTURE: CPT | Performed by: EMERGENCY MEDICINE

## 2018-12-09 PROCEDURE — 33210 INSERT ELECTRD/PM CATH SNGL: CPT | Performed by: STUDENT IN AN ORGANIZED HEALTH CARE EDUCATION/TRAINING PROGRAM

## 2018-12-09 PROCEDURE — 70450 CT HEAD/BRAIN W/O DYE: CPT

## 2018-12-09 PROCEDURE — 85025 COMPLETE CBC W/AUTO DIFF WBC: CPT | Performed by: EMERGENCY MEDICINE

## 2018-12-09 PROCEDURE — C1894 INTRO/SHEATH, NON-LASER: HCPCS | Performed by: STUDENT IN AN ORGANIZED HEALTH CARE EDUCATION/TRAINING PROGRAM

## 2018-12-09 PROCEDURE — 84484 ASSAY OF TROPONIN QUANT: CPT | Performed by: EMERGENCY MEDICINE

## 2018-12-09 RX ORDER — PRAVASTATIN SODIUM 40 MG
40 TABLET ORAL DAILY
Status: DISCONTINUED | OUTPATIENT
Start: 2018-12-10 | End: 2018-12-13 | Stop reason: HOSPADM

## 2018-12-09 RX ORDER — ONDANSETRON 2 MG/ML
4 INJECTION INTRAMUSCULAR; INTRAVENOUS EVERY 8 HOURS PRN
Status: DISCONTINUED | OUTPATIENT
Start: 2018-12-09 | End: 2018-12-13 | Stop reason: HOSPADM

## 2018-12-09 RX ORDER — SODIUM CHLORIDE 9 MG/ML
50 INJECTION, SOLUTION INTRAVENOUS CONTINUOUS
Status: DISCONTINUED | OUTPATIENT
Start: 2018-12-09 | End: 2018-12-10

## 2018-12-09 RX ORDER — LIDOCAINE HYDROCHLORIDE 10 MG/ML
INJECTION, SOLUTION INFILTRATION; PERINEURAL CODE/TRAUMA/SEDATION MEDICATION
Status: COMPLETED | OUTPATIENT
Start: 2018-12-09 | End: 2018-12-09

## 2018-12-09 RX ORDER — CALCIUM CARBONATE 500(1250)
1 TABLET ORAL
Status: DISCONTINUED | OUTPATIENT
Start: 2018-12-10 | End: 2018-12-13 | Stop reason: HOSPADM

## 2018-12-09 RX ORDER — HEPARIN SODIUM 5000 [USP'U]/ML
5000 INJECTION, SOLUTION INTRAVENOUS; SUBCUTANEOUS EVERY 8 HOURS SCHEDULED
Status: DISCONTINUED | OUTPATIENT
Start: 2018-12-09 | End: 2018-12-13 | Stop reason: HOSPADM

## 2018-12-09 RX ORDER — HYDRALAZINE HYDROCHLORIDE 20 MG/ML
5 INJECTION INTRAMUSCULAR; INTRAVENOUS EVERY 6 HOURS PRN
Status: DISCONTINUED | OUTPATIENT
Start: 2018-12-09 | End: 2018-12-10

## 2018-12-09 RX ORDER — ASPIRIN 81 MG/1
81 TABLET ORAL DAILY
Status: DISCONTINUED | OUTPATIENT
Start: 2018-12-10 | End: 2018-12-13 | Stop reason: HOSPADM

## 2018-12-09 RX ORDER — ACETAMINOPHEN 325 MG/1
500 TABLET ORAL EVERY 6 HOURS PRN
Status: DISCONTINUED | OUTPATIENT
Start: 2018-12-09 | End: 2018-12-09

## 2018-12-09 RX ORDER — LEVOTHYROXINE SODIUM 0.03 MG/1
25 TABLET ORAL
Status: DISCONTINUED | OUTPATIENT
Start: 2018-12-10 | End: 2018-12-13 | Stop reason: HOSPADM

## 2018-12-09 RX ORDER — SODIUM CHLORIDE 9 MG/ML
INJECTION, SOLUTION INTRAVENOUS
Status: COMPLETED | OUTPATIENT
Start: 2018-12-09 | End: 2018-12-09

## 2018-12-09 RX ORDER — ACETAMINOPHEN 325 MG/1
650 TABLET ORAL EVERY 6 HOURS PRN
Status: DISCONTINUED | OUTPATIENT
Start: 2018-12-09 | End: 2018-12-12

## 2018-12-09 RX ADMIN — HEPARIN SODIUM 5000 UNITS: 5000 INJECTION INTRAVENOUS; SUBCUTANEOUS at 21:46

## 2018-12-09 RX ADMIN — SODIUM CHLORIDE 50 ML/HR: 0.9 INJECTION, SOLUTION INTRAVENOUS at 21:13

## 2018-12-09 RX ADMIN — HYDRALAZINE HYDROCHLORIDE 5 MG: 20 INJECTION INTRAMUSCULAR; INTRAVENOUS at 21:45

## 2018-12-09 RX ADMIN — LIDOCAINE HYDROCHLORIDE 10 ML: 10 INJECTION, SOLUTION INFILTRATION; PERINEURAL at 20:17

## 2018-12-09 RX ADMIN — SODIUM CHLORIDE 50 ML/HR: 0.9 INJECTION, SOLUTION INTRAVENOUS at 20:15

## 2018-12-09 RX ADMIN — SODIUM CHLORIDE 250 ML: 0.9 INJECTION, SOLUTION INTRAVENOUS at 11:56

## 2018-12-09 NOTE — EMTALA/ACUTE CARE TRANSFER
[address] [address] [address]  Dept: [phone]      EMTALA TRANSFER CONSENT    NAME Nando Whiting                                         1929                              MRN [id number]    I have been informed of my rights regarding examination, treatment, and transfer   by Dr Sumit Rios MD    Benefits: Specialized equipment and/or services available at the receiving facility (Include comment)________________________    Risks: Potential for delay in receiving treatment      Transfer Request   I acknowledge that my medical condition has been evaluated and explained to me by the emergency department physician or other qualified medical person and/or my attending physician who has recommended and offered to me further medical examination and treatment  I understand the Hospital's obligation with respect to the treatment and stabilization of my emergency medical condition  I nevertheless request to be transferred  I release the Hospital, the doctor, and any other persons caring for me from all responsibility or liability for any injury or ill effects that may result from my transfer and agree to accept all responsibility for the consequences of my choice to transfer, rather than receive stabilizing treatment at the Hospital  I understand that because the transfer is my request, my insurance may not provide reimbursement for the services  The Hospital will assist and direct me and my family in how to make arrangements for transfer, but the hospital is not liable for any fees charged by the transport service  In spite of this understanding, I refuse to consent to further medical examination and treatment which has been offered to me, and request transfer to  [facility] Name, [address] [facility]    I authorize the performance of emergency medical procedures and treatments upon me in both transit and upon arrival at the receiving facility  Additionally, I authorize the release of any and all medical records to the receiving facility and request they be transported with me, if possible  I authorize the performance of emergency medical procedures and treatments upon me in both transit and upon arrival at the receiving facility  Additionally, I authorize the release of any and all medical records to the receiving facility and request they be transported with me, if possible  I understand that the safest mode of transportation during a medical emergency is an ambulance and that the Hospital advocates the use of this mode of transport  Risks of traveling to the receiving facility by car, including absence of medical control, life sustaining equipment, such as oxygen, and medical personnel has been explained to me and I fully understand them  (FLETCHER CORRECT BOX BELOW)  [ x ]  I consent to the stated transfer and to be transported by ambulance/helicopter  [  ]  I consent to the stated transfer, but refuse transportation by ambulance and accept full responsibility for my transportation by car  I understand the risks of non-ambulance transfers and I exonerate the Hospital and its staff from any deterioration in my condition that results from this refusal     X___________________________________________    DATE  18  TIME________  Signature of patient or legally responsible individual signing on patient behalf           RELATIONSHIP TO PATIENT_________________________          Provider Certification    NAME Leonidas Rai                                         1929                              MRN [id number]    A medical screening exam was performed on the above named patient  Based on the examination:    Condition Necessitating Transfer The primary encounter diagnosis was Syncope  A diagnosis of 2nd degree AV block was also pertinent to this visit      Patient Condition: The patient has been stabilized such that within reasonable medical probability, no material deterioration of the patient condition or the condition of the unborn child(dionicio) is likely to result from the transfer    Reason for Transfer: Level of Care needed not available at this facility    Transfer Requirements: [facility] [address]    · Space available and qualified personnel available for treatment as acknowledged by pacs  · Agreed to accept transfer and to provide appropriate medical treatment as acknowledged by       dr Tobias Tapia  · Appropriate medical records of the examination and treatment of the patient are provided at the time of transfer   [address], [address] _______  · Transfer will be performed by qualified personnel from [facility]  and appropriate transfer equipment as required, including the use of necessary and appropriate life support measures  Provider Certification: I have examined the patient and explained the following risks and benefits of being transferred/refusing transfer to the patient/family:  General risk, such as traffic hazards, adverse weather conditions, rough terrain or turbulence, possible failure of equipment (including vehicle or aircraft), or consequences of actions of persons outside the control of the transport personnel      Based on these reasonable risks and benefits to the patient and/or the unborn child(dionicio), and based upon the information available at the time of the patients examination, I certify that the medical benefits reasonably to be expected from the provision of appropriate medical treatments at another medical facility outweigh the increasing risks, if any, to the individuals medical condition, and in the case of labor to the unborn child, from effecting the transfer      X____________________________________________ DATE 12/09/18        TIME_______      ORIGINAL - SEND TO MEDICAL RECORDS   COPY - SEND WITH PATIENT DURING TRANSFER

## 2018-12-09 NOTE — ED NOTES
Aware I am awaiting transport arrangements for transfer to [facility]  Call bell within reach, bed low position    Remains NPO     Marlene Hodges RN  12/09/18 5708

## 2018-12-09 NOTE — ED NOTES
Aware she is awaiting ct scan, call bell within reach, bed low position, side rail up     Marlene Hodges RN  12/09/18 5906

## 2018-12-09 NOTE — ED NOTES
Dr Tony Yost at bedside, per doctor hold on [facility] [address], RN  12/09/18 7646

## 2018-12-09 NOTE — ED NOTES
Callbell within reach  Bed in low position  Siderails up  HOB elevated  Temperature was taken because PT stated she was sweaty but she was not febrile       Cecilio Primus  12/09/18 1702

## 2018-12-09 NOTE — ED NOTES
Dylan Valencia from [address] called, to call when she has transport for patient     Cherelle Smith RN  12/09/18 4352

## 2018-12-09 NOTE — ED PROVIDER NOTES
History  Chief Complaint   Patient presents with    Fall     WAS WALKING FROM THE BATHROOM TO THE KITCHEN AND "I JUST WENT DOWN   I DON'T KNOW WHAT HAPPENED, I HAVE A FUNNY FEELING IN MY HEAD"  SHE DOESN'T REMEMBER IF SHE WAS DIZZY OR IF SHE TRIPPED  SHE IS NOT SURE IF SHE HIT HER HEAD BUT STATES "I DON'T THINK SO"     80 yr female- with hx of  paf -  With lbbb- hx fo pulmonary artery htnsion / mild as- with recent neg admit for dizziness-  In normal state of health with no recent illness/ new med symptoms-  Dosage change- usually walk with walker- but not this am - was walking back from bathroom and next thing remembers was on ground -- deneis any prodromal symptoms- or tripping/ losing balance- was not on ground for long- no specific injury - unsure if any head injury - daughter states by the time she got  To see her- which was not long- before ems arrived- pt was acting normally for her with no comps- or signs of any injury - pt currently is assymptomatic with no comps at this time        History provided by:  Patient and relative   used: No        Prior to Admission Medications   Prescriptions Last Dose Informant Patient Reported? Taking? ACCU-CHEK RUBINA PLUS test strip  Self No Yes   Si each by Other route daily Use as instructed   ACCU-CHEK SOFTCLIX LANCETS lancets  Self No Yes   Sig: by Other route daily Use daily as instructed  ASPIR-LOW 81 MG EC tablet   No Yes   Sig: TAKE ONE TABLET BY MOUTH EVERY DAY   Calcium Carbonate-Vitamin D (CALTRATE 600+D PO)  Self Yes Yes   Sig: Take 1 capsule by mouth 2 (two) times a day     Misc Natural Products (ENERGY FOCUS PO)  Self Yes Yes   Sig: Take 1 tablet by mouth 2 (two) times a day     acetaminophen (TYLENOL) 500 mg tablet  Self Yes Yes   Sig: Take 500 mg by mouth every 6 (six) hours as needed for mild pain     levothyroxine 25 mcg tablet  Self No Yes   Sig: Take 1 tablet (25 mcg total) by mouth daily   lisinopril (ZESTRIL) 2 5 mg tablet Self No Yes   Sig: Take 1 tablet (2 5 mg total) by mouth daily   metoprolol succinate (TOPROL XL) 25 mg 24 hr tablet  Self No Yes   Sig: Take 1 tablet (25 mg total) by mouth daily   multivitamin-iron-minerals-folic acid (CENTRUM) chewable tablet  Self Yes Yes   Sig: Chew 1 tablet daily  pravastatin (PRAVACHOL) 40 mg tablet  Self No Yes   Sig: Take 1 tablet (40 mg total) by mouth daily      Facility-Administered Medications: None       Past Medical History:   Diagnosis Date    Abnormal nuclear stress test     last assessed 04/03/2017    Anxiety     Arthritis     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    Bundle branch block, left     Cardiomyopathy ([facility] [address] [facility]     Chronic pain     chronic b/l shoulder pain    Coronary artery disease     Diabetes mellitus ([facility] [address] )     GERD (gastroesophageal reflux disease)     H/O atrial flutter     History of DVT (deep vein thrombosis)     History of pulmonary embolism     Hyperlipidemia     Hypertension     Hypothyroid     Renal calculi     Shortness of breath        Past Surgical History:   Procedure Laterality Date    ATRIAL ABLATION SURGERY  01/2015    CARDIOVERSION      CHELA/DCCV 10/22/2014    CARPAL TUNNEL RELEASE Right     CATARACT EXTRACTION      CORONARY ANGIOPLASTY WITH STENT PLACEMENT      HYSTERECTOMY      JOINT REPLACEMENT Right     NE AMPUTATION TOE,MT-P JT Left 10/11/2017    Procedure: AMPUTATION SECOND TOE;  Surgeon: Angélica Ortiz DPM;  Location: [facility];  Service: Podiatry    TONSILLECTOMY      TOTAL HIP ARTHROPLASTY      Right       Family History   Problem Relation Age of Onset    Parkinsonism Sister     Cancer Sister         unknown type    Heart attack Neg Hx     Stroke Neg Hx     Anuerysm Neg Hx     Clotting disorder Neg Hx     Arrhythmia Neg Hx     Heart failure Neg Hx     Coronary artery disease Neg Hx      I have reviewed and agree with the history as documented      Social History   Substance Use Topics    Smoking status: Never Smoker    Smokeless tobacco: Never Used    Alcohol use Yes      Comment: occasional        Review of Systems   Constitutional: Negative  HENT: Negative  Eyes: Negative  Respiratory: Negative  Cardiovascular: Negative  Gastrointestinal: Negative  Endocrine: Negative  Genitourinary: Negative  Musculoskeletal: Negative  Skin: Negative  Allergic/Immunologic: Negative  Neurological: Positive for syncope  Negative for dizziness, tremors, seizures, facial asymmetry, speech difficulty, weakness, light-headedness, numbness and headaches  Hematological: Negative  Psychiatric/Behavioral: Negative  Physical Exam  Physical Exam   Constitutional: She is oriented to person, place, and time  She appears well-developed and well-nourished  No distress  avss-- intermitent sean -- pulse ox 95 % on ra- interpretation is normal- no intervention- well appearing- in nad    HENT:   Head: Normocephalic and atraumatic  Eyes: Pupils are equal, round, and reactive to light  Conjunctivae and EOM are normal  Right eye exhibits no discharge  Left eye exhibits no discharge  No scleral icterus  Mm pink   Neck: Normal range of motion  Neck supple  No JVD present  No tracheal deviation present  No thyromegaly present  No pmt c/t/l/s spine   Cardiovascular: Regular rhythm and intact distal pulses  Exam reveals no gallop and no friction rub  Murmur heard  Pulmonary/Chest: Effort normal and breath sounds normal  No stridor  No respiratory distress  She has no wheezes  She has no rales  She exhibits no tenderness  Abdominal: Soft  Bowel sounds are normal  She exhibits no distension and no mass  There is no tenderness  There is no rebound and no guarding  No hernia  Soft nt- nsd- no peritoneal signs- no cva tenderness- no pulsatile abd mass/bruit   Musculoskeletal: Normal range of motion  She exhibits edema  She exhibits no tenderness or deformity     Equal bilateral radial/dp pulses- trace ble pretibial edema- nt- no assym/ erythema   Lymphadenopathy:     She has no cervical adenopathy  Neurological: She is alert and oriented to person, place, and time  No cranial nerve deficit or sensory deficit  She exhibits normal muscle tone  Coordination normal    Skin: Skin is warm  Capillary refill takes less than 2 seconds  No rash noted  She is not diaphoretic  No erythema  There is pallor  Psychiatric: She has a normal mood and affect  Her behavior is normal    Nursing note and vitals reviewed        Vital Signs  ED Triage Vitals   Temperature Pulse Respirations Blood Pressure SpO2   12/09/18 1236 12/09/18 1123 12/09/18 1123 12/09/18 1123 12/09/18 1123   98 9 °F (37 2 °C) 74 18 165/72 96 %      Temp Source Heart Rate Source Patient Position - Orthostatic VS BP Location FiO2 (%)   12/09/18 1123 12/09/18 1123 -- 12/09/18 1123 --   Oral Monitor  Right arm       Pain Score       12/09/18 1123       7           Vitals:    12/09/18 1123 12/09/18 1200 12/09/18 1235 12/09/18 1245   BP: 165/72 146/68 169/70 169/77   Pulse: 74 58 67 68       Visual Acuity  Visual Acuity      Most Recent Value   L Pupil Size (mm)  3   R Pupil Size (mm)  3          ED Medications  Medications   sodium chloride 0 9 % bolus 250 mL (0 mL Intravenous Stopped 12/9/18 1222)       Diagnostic Studies  Results Reviewed     Procedure Component Value Units Date/Time    Comprehensive metabolic panel [[id number]]  (Abnormal) Collected:  12/09/18 1155    Lab Status:  Final result Specimen:  Blood from Arm, Right Updated:  12/09/18 1231     Sodium 138 mmol/L      Potassium 4 3 mmol/L      Chloride 102 mmol/L      CO2 31 mmol/L      ANION GAP 5 mmol/L      BUN 25 mg/dL      Creatinine 0 78 mg/dL      Glucose 97 mg/dL      Calcium 9 4 mg/dL      AST 17 U/L      ALT 24 U/L      Alkaline Phosphatase 76 U/L      Total Protein 7 3 g/dL      Albumin 3 4 (L) g/dL      Total Bilirubin 0 30 mg/dL      eGFR 68 ml/min/1 73sq m Narrative:         National Kidney Disease Education Program recommendations are as follows:  GFR calculation is accurate only with a steady state creatinine  Chronic Kidney disease less than 60 ml/min/1 73 sq  meters  Kidney failure less than 15 ml/min/1 73 sq  meters  Troponin I [[id number]]  (Normal) Collected:  12/09/18 1155    Lab Status:  Final result Specimen:  Blood from Arm, Right Updated:  12/09/18 1225     Troponin I <0 02 ng/mL     CBC and differential [[id number]]  (Abnormal) Collected:  12/09/18 1155    Lab Status:  Final result Specimen:  Blood from Arm, Right Updated:  12/09/18 1201     WBC 7 48 Thousand/uL      RBC 3 73 (L) Million/uL      Hemoglobin 12 0 g/dL      Hematocrit 35 9 %      MCV 96 fL      MCH 32 2 pg      MCHC 33 4 g/dL      RDW 13 4 %      MPV 10 3 fL      Platelets 128 Thousands/uL      nRBC 0 /100 WBCs      Neutrophils Relative 78 (H) %      Immat GRANS % 0 %      Lymphocytes Relative 13 (L) %      Monocytes Relative 8 %      Eosinophils Relative 1 %      Basophils Relative 0 %      Neutrophils Absolute 5 81 Thousands/µL      Immature Grans Absolute 0 01 Thousand/uL      Lymphocytes Absolute 0 97 Thousands/µL      Monocytes Absolute 0 61 Thousand/µL      Eosinophils Absolute 0 06 Thousand/µL      Basophils Absolute 0 02 Thousands/µL                  CT head without contrast   Final Result by Fab Guajardo MD (12/09 1229)      No acute intracranial abnormality  Microangiopathic changes                    Workstation performed: [id number]                    Procedures  Procedures       Phone Contacts  ED Phone Contact    ED Course  ED Course as of Dec 09 1337   Monika Mayra Dec 09, 2018   1149 Leyda harkins note- 10/18 labs/ cardiac echo reviewed by leyda harkins     46 Er md note- pt re-evalauted by leyda harkins multiple times-- pulse from 30's  to 60's-- sbp always greater than 140- pt is assymptomatic     1321 - leyda harkins note- case d/w- dr Jorgito Abernathy cardiology- ecg tiger texted to her -- would prefer pt  be transferred over  to [facility] for eps eval and rachel pacer- [facility]  contacted                                 MDM  The patient presented with a condition in which there was a high probability of imminent or life-threatening deterioration, and critical care services (excluding separately billable procedures) totalled 30-74 minutes          Disposition  Final diagnoses:   Syncope   2nd degree AV block     Time reflects when diagnosis was documented in both MDM as applicable and the Disposition within this note     Time User Action Codes Description Comment    12/9/2018  1:06 PM Minor Levels Add [R55] Syncope     12/9/2018  1:06 PM Minor Levels Add [I44 1] 2nd degree AV block       ED Disposition     ED Disposition Condition Comment    Transfer to Another [place] [id number] should be transferred out to [facility] - dr Duane Witt MD Documentation      Most Recent Value   Patient Condition  The patient has been stabilized such that within reasonable medical probability, no material deterioration of the patient condition or the condition of the unborn child(dionicio) is likely to result from the transfer   Reason for Transfer  Level of Care needed not available at this facility   Benefits of Transfer  Specialized equipment and/or services available at the receiving facility (Include comment)________________________   Risks of Transfer  Potential for delay in receiving treatment   Accepting Physician  dr Hawa Hi NameDylan     (Name & Tel number)  [facility]   Transported by (Company and Unit #)  [employer]   Sending MD arango   Provider Certification  General risk, such as traffic hazards, adverse weather conditions, rough terrain or turbulence, possible failure of equipment (including vehicle or aircraft), or consequences of actions of persons outside the control of the transport personnel      RN Documentation      Most Recent Value Accepting Facility Name, [address] [place] [address]     (Name & Tel number)  [facility]   Transported by [employer] and Unit #)  [facility]      Follow-up Information    None         Patient's Medications   Discharge Prescriptions    No medications on file     No discharge procedures on file      ED Provider  Electronically Signed by           Caren Philippe MD  12/09/18 7614

## 2018-12-09 NOTE — ED NOTES
Returned from ct scan  Call bell within reach, side rail x 1 up, bed low position  Aware she is awaiting ct results       Damián Stout RN  12/09/18 9559

## 2018-12-09 NOTE — ED NOTES
Ate 100% of her meal tray, call bell within reach, bed low position, side rail up     Misael James, [employer]  12/09/18 5534

## 2018-12-09 NOTE — ED PROCEDURE NOTE
PROCEDURE  ECG 12 Lead Documentation  Date/Time: 12/9/2018 1:22 PM  Performed by: Patric Horton  Authorized by: Alex MUHAMMAD     Indications / Diagnosis:  Syncope  ECG reviewed by me, the ED Provider: yes    Patient location:  ED and bedside  Previous ECG:     Previous ECG:  Compared to current    Comparison ECG info:  2nd degree av block is new- lbbb is old- old ecg- 10/28/18    Similarity:  Changes noted    Comparison to cardiac monitor: Yes    Interpretation:     Interpretation: abnormal    Rate:     ECG rate:  48    ECG rate assessment: bradycardic    Rhythm:     Rhythm: A-V block      Rhythm comment:  2nd degree mobitz type 2   Ectopy:     Ectopy: none    QRS:     QRS axis:  Left    QRS intervals:   Wide  Conduction:     Conduction: abnormal      Abnormal conduction: complete LBBB    ST segments:     ST segments:  Normal  T waves:     T waves: inverted      Inverted:  AVL  Q waves:     Q waves:  II, III, aVF, V1, V2, V3, V4, V5 and V6  Comments:      No allisona a/b/c- mod gonzales criteria         Lennie Huitron MD  12/09/18 5443

## 2018-12-09 NOTE — ED NOTES
PT ambulated well to commode at bedside  PT left on cardiac monitor during use of commode  PT provided call bell for when shes finished        Tash Oliver  12/09/18 3442

## 2018-12-09 NOTE — ED NOTES
Patient aware meal tray ordered, remains awaiting transport, call bell within reach, bed low position     Tuan Henson RN  12/09/18 0767

## 2018-12-09 NOTE — ED NOTES
Transfer consent and medical necessity noted signed  Being excepted by Dr James Fu at [facility]       Jose Thomas RN  12/09/18 7944

## 2018-12-09 NOTE — ED NOTES
Patient reports falling today  Does not recall what happened  Event unwitnessed  Reports head feeling funny    Takes aspirin     Shell Mccormack RN  12/09/18 9121

## 2018-12-09 NOTE — ED NOTES
Per Shelli Mercado from [facility], patient going to [place] [address] at 1830 via [address]       Kristi Gonsalves RN  12/09/18 3430

## 2018-12-09 NOTE — ED NOTES
Per Dr Maxwell [place] patient may eat, regular diet can be ordered     Marlene Hodges RN  12/09/18 1959

## 2018-12-09 NOTE — ED NOTES
Re-assumed care of patient post break coverage by EMIR Sher    Per Boyd Fountain is working on excepting doctor for transfer     Sandra Diaz RN  12/09/18 [phone]

## 2018-12-09 NOTE — ED PROCEDURE NOTE
PROCEDURE  CriticalCare Time  Performed by: Lucas Blum  Authorized by: Lucas Blum     Critical care provider statement:     Critical care time (minutes):  35    Critical care start time:  12/9/2018 11:00 AM    Critical care end time:  12/9/2018 11:35 AM    Critical care time was exclusive of:  Separately billable procedures and treating other patients and teaching time    Critical care was necessary to treat or prevent imminent or life-threatening deterioration of the following conditions: heart block      Critical care was time spent personally by me on the following activities:  Examination of patient, discussions with consultants, development of treatment plan with patient or surrogate, obtaining history from patient or surrogate, ordering and review of radiographic studies, ordering and review of laboratory studies, re-evaluation of patient's condition and review of old charts    I assumed direction of critical care for this patient from another provider in my specialty: roland Higuera MD  12/09/18 0943

## 2018-12-10 ENCOUNTER — ANESTHESIA EVENT (INPATIENT)
Dept: NON INVASIVE DIAGNOSTICS | Facility: HOSPITAL | Age: 83
DRG: 243 | End: 2018-12-10
Payer: MEDICARE

## 2018-12-10 ENCOUNTER — APPOINTMENT (INPATIENT)
Dept: RADIOLOGY | Facility: HOSPITAL | Age: 83
DRG: 243 | End: 2018-12-10
Payer: MEDICARE

## 2018-12-10 ENCOUNTER — APPOINTMENT (OUTPATIENT)
Dept: NON INVASIVE DIAGNOSTICS | Facility: HOSPITAL | Age: 83
DRG: 243 | End: 2018-12-10
Attending: INTERNAL MEDICINE
Payer: MEDICARE

## 2018-12-10 PROBLEM — Z95.5 HISTORY OF PLACEMENT OF STENT IN LAD CORONARY ARTERY: Chronic | Status: ACTIVE | Noted: 2018-01-26

## 2018-12-10 PROBLEM — I10 HTN (HYPERTENSION): Chronic | Status: ACTIVE | Noted: 2017-04-07

## 2018-12-10 PROBLEM — Z86.79 HISTORY OF ATRIAL FLUTTER: Chronic | Status: ACTIVE | Noted: 2018-01-26

## 2018-12-10 PROBLEM — E78.5 HYPERLIPIDEMIA: Chronic | Status: ACTIVE | Noted: 2018-01-26

## 2018-12-10 LAB
ANION GAP SERPL CALCULATED.3IONS-SCNC: 6 MMOL/L (ref 4–13)
ATRIAL RATE: 54 BPM
ATRIAL RATE: 74 BPM
ATRIAL RATE: 75 BPM
ATRIAL RATE: 77 BPM
ATRIAL RATE: 80 BPM
BUN SERPL-MCNC: 17 MG/DL (ref 5–25)
CALCIUM SERPL-MCNC: 9.2 MG/DL (ref 8.3–10.1)
CHLORIDE SERPL-SCNC: 102 MMOL/L (ref 100–108)
CO2 SERPL-SCNC: 29 MMOL/L (ref 21–32)
CREAT SERPL-MCNC: 0.6 MG/DL (ref 0.6–1.3)
ERYTHROCYTE [DISTWIDTH] IN BLOOD BY AUTOMATED COUNT: 13.5 % (ref 11.6–15.1)
GFR SERPL CREATININE-BSD FRML MDRD: 81 ML/MIN/1.73SQ M
GLUCOSE SERPL-MCNC: 101 MG/DL (ref 65–140)
GLUCOSE SERPL-MCNC: 103 MG/DL (ref 65–140)
GLUCOSE SERPL-MCNC: 105 MG/DL (ref 65–140)
GLUCOSE SERPL-MCNC: 148 MG/DL (ref 65–140)
GLUCOSE SERPL-MCNC: 95 MG/DL (ref 65–140)
HCT VFR BLD AUTO: 34.8 % (ref 34.8–46.1)
HGB BLD-MCNC: 11.3 G/DL (ref 11.5–15.4)
MAGNESIUM SERPL-MCNC: 1.8 MG/DL (ref 1.6–2.6)
MAGNESIUM SERPL-MCNC: 2.3 MG/DL (ref 1.6–2.6)
MCH RBC QN AUTO: 31.2 PG (ref 26.8–34.3)
MCHC RBC AUTO-ENTMCNC: 32.5 G/DL (ref 31.4–37.4)
MCV RBC AUTO: 96 FL (ref 82–98)
P AXIS: 43 DEGREES
P AXIS: 47 DEGREES
P AXIS: 63 DEGREES
PLATELET # BLD AUTO: 191 THOUSANDS/UL (ref 149–390)
PMV BLD AUTO: 10.1 FL (ref 8.9–12.7)
POTASSIUM SERPL-SCNC: 3.8 MMOL/L (ref 3.5–5.3)
POTASSIUM SERPL-SCNC: 4.5 MMOL/L (ref 3.5–5.3)
PR INTERVAL: 268 MS
QRS AXIS: -36 DEGREES
QRS AXIS: -36 DEGREES
QRS AXIS: -40 DEGREES
QRS AXIS: -62 DEGREES
QRS AXIS: -77 DEGREES
QRSD INTERVAL: 132 MS
QRSD INTERVAL: 134 MS
QRSD INTERVAL: 136 MS
QRSD INTERVAL: 138 MS
QRSD INTERVAL: 167 MS
QT INTERVAL: 442 MS
QT INTERVAL: 448 MS
QT INTERVAL: 448 MS
QT INTERVAL: 508 MS
QT INTERVAL: 548 MS
QTC INTERVAL: 416 MS
QTC INTERVAL: 448 MS
QTC INTERVAL: 459 MS
QTC INTERVAL: 482 MS
QTC INTERVAL: 489 MS
RBC # BLD AUTO: 3.62 MILLION/UL (ref 3.81–5.12)
SODIUM SERPL-SCNC: 137 MMOL/L (ref 136–145)
T WAVE AXIS: 76 DEGREES
T WAVE AXIS: 87 DEGREES
T WAVE AXIS: 88 DEGREES
T WAVE AXIS: 89 DEGREES
T WAVE AXIS: 93 DEGREES
TSH SERPL DL<=0.05 MIU/L-ACNC: 1.19 UIU/ML (ref 0.36–3.74)
VENTRICULAR RATE: 48 BPM
VENTRICULAR RATE: 52 BPM
VENTRICULAR RATE: 54 BPM
VENTRICULAR RATE: 60 BPM
VENTRICULAR RATE: 65 BPM
WBC # BLD AUTO: 5.11 THOUSAND/UL (ref 4.31–10.16)

## 2018-12-10 PROCEDURE — 84443 ASSAY THYROID STIM HORMONE: CPT | Performed by: STUDENT IN AN ORGANIZED HEALTH CARE EDUCATION/TRAINING PROGRAM

## 2018-12-10 PROCEDURE — C1898 LEAD, PMKR, OTHER THAN TRANS: HCPCS

## 2018-12-10 PROCEDURE — 83735 ASSAY OF MAGNESIUM: CPT | Performed by: PHYSICIAN ASSISTANT

## 2018-12-10 PROCEDURE — 99232 SBSQ HOSP IP/OBS MODERATE 35: CPT | Performed by: EMERGENCY MEDICINE

## 2018-12-10 PROCEDURE — 0JH606Z INSERTION OF PACEMAKER, DUAL CHAMBER INTO CHEST SUBCUTANEOUS TISSUE AND FASCIA, OPEN APPROACH: ICD-10-PCS | Performed by: INTERNAL MEDICINE

## 2018-12-10 PROCEDURE — 82948 REAGENT STRIP/BLOOD GLUCOSE: CPT

## 2018-12-10 PROCEDURE — 71045 X-RAY EXAM CHEST 1 VIEW: CPT

## 2018-12-10 PROCEDURE — C1892 INTRO/SHEATH,FIXED,PEEL-AWAY: HCPCS | Performed by: INTERNAL MEDICINE

## 2018-12-10 PROCEDURE — 02H63JZ INSERTION OF PACEMAKER LEAD INTO RIGHT ATRIUM, PERCUTANEOUS APPROACH: ICD-10-PCS | Performed by: INTERNAL MEDICINE

## 2018-12-10 PROCEDURE — C1769 GUIDE WIRE: HCPCS | Performed by: INTERNAL MEDICINE

## 2018-12-10 PROCEDURE — 33208 INSRT HEART PM ATRIAL & VENT: CPT | Performed by: INTERNAL MEDICINE

## 2018-12-10 PROCEDURE — 83735 ASSAY OF MAGNESIUM: CPT | Performed by: NURSE PRACTITIONER

## 2018-12-10 PROCEDURE — 93010 ELECTROCARDIOGRAM REPORT: CPT | Performed by: INTERNAL MEDICINE

## 2018-12-10 PROCEDURE — 93005 ELECTROCARDIOGRAM TRACING: CPT

## 2018-12-10 PROCEDURE — 02HK3JZ INSERTION OF PACEMAKER LEAD INTO RIGHT VENTRICLE, PERCUTANEOUS APPROACH: ICD-10-PCS | Performed by: INTERNAL MEDICINE

## 2018-12-10 PROCEDURE — 84132 ASSAY OF SERUM POTASSIUM: CPT | Performed by: NURSE PRACTITIONER

## 2018-12-10 PROCEDURE — C1785 PMKR, DUAL, RATE-RESP: HCPCS

## 2018-12-10 PROCEDURE — 80048 BASIC METABOLIC PNL TOTAL CA: CPT | Performed by: PHYSICIAN ASSISTANT

## 2018-12-10 PROCEDURE — 85027 COMPLETE CBC AUTOMATED: CPT | Performed by: PHYSICIAN ASSISTANT

## 2018-12-10 PROCEDURE — 99223 1ST HOSP IP/OBS HIGH 75: CPT | Performed by: INTERNAL MEDICINE

## 2018-12-10 RX ORDER — FENTANYL CITRATE 50 UG/ML
INJECTION, SOLUTION INTRAMUSCULAR; INTRAVENOUS AS NEEDED
Status: DISCONTINUED | OUTPATIENT
Start: 2018-12-10 | End: 2018-12-10 | Stop reason: SURG

## 2018-12-10 RX ORDER — GENTAMICIN SULFATE 40 MG/ML
INJECTION, SOLUTION INTRAMUSCULAR; INTRAVENOUS CODE/TRAUMA/SEDATION MEDICATION
Status: COMPLETED | OUTPATIENT
Start: 2018-12-10 | End: 2018-12-10

## 2018-12-10 RX ORDER — CEFAZOLIN SODIUM 1 G/50ML
1000 SOLUTION INTRAVENOUS ONCE
Status: COMPLETED | OUTPATIENT
Start: 2018-12-10 | End: 2018-12-10

## 2018-12-10 RX ORDER — DOCUSATE SODIUM 100 MG/1
100 CAPSULE, LIQUID FILLED ORAL 2 TIMES DAILY
Status: DISCONTINUED | OUTPATIENT
Start: 2018-12-10 | End: 2018-12-13 | Stop reason: HOSPADM

## 2018-12-10 RX ORDER — METOPROLOL TARTRATE 5 MG/5ML
5 INJECTION INTRAVENOUS ONCE
Status: COMPLETED | OUTPATIENT
Start: 2018-12-10 | End: 2018-12-10

## 2018-12-10 RX ORDER — POTASSIUM CHLORIDE 20 MEQ/1
20 TABLET, EXTENDED RELEASE ORAL ONCE
Status: COMPLETED | OUTPATIENT
Start: 2018-12-10 | End: 2018-12-10

## 2018-12-10 RX ORDER — PROPOFOL 10 MG/ML
INJECTION, EMULSION INTRAVENOUS AS NEEDED
Status: DISCONTINUED | OUTPATIENT
Start: 2018-12-10 | End: 2018-12-10 | Stop reason: SURG

## 2018-12-10 RX ORDER — HYDRALAZINE HYDROCHLORIDE 20 MG/ML
5 INJECTION INTRAMUSCULAR; INTRAVENOUS ONCE
Status: COMPLETED | OUTPATIENT
Start: 2018-12-10 | End: 2018-12-10

## 2018-12-10 RX ORDER — LIDOCAINE HYDROCHLORIDE 10 MG/ML
INJECTION, SOLUTION INFILTRATION; PERINEURAL CODE/TRAUMA/SEDATION MEDICATION
Status: COMPLETED | OUTPATIENT
Start: 2018-12-10 | End: 2018-12-10

## 2018-12-10 RX ORDER — MAGNESIUM SULFATE HEPTAHYDRATE 40 MG/ML
2 INJECTION, SOLUTION INTRAVENOUS ONCE
Status: COMPLETED | OUTPATIENT
Start: 2018-12-10 | End: 2018-12-10

## 2018-12-10 RX ORDER — PROPOFOL 10 MG/ML
INJECTION, EMULSION INTRAVENOUS CONTINUOUS PRN
Status: DISCONTINUED | OUTPATIENT
Start: 2018-12-10 | End: 2018-12-10 | Stop reason: SURG

## 2018-12-10 RX ORDER — METOPROLOL SUCCINATE 25 MG/1
25 TABLET, EXTENDED RELEASE ORAL DAILY
Status: DISCONTINUED | OUTPATIENT
Start: 2018-12-11 | End: 2018-12-13 | Stop reason: HOSPADM

## 2018-12-10 RX ORDER — HYDRALAZINE HYDROCHLORIDE 20 MG/ML
10 INJECTION INTRAMUSCULAR; INTRAVENOUS EVERY 6 HOURS PRN
Status: DISCONTINUED | OUTPATIENT
Start: 2018-12-10 | End: 2018-12-13 | Stop reason: HOSPADM

## 2018-12-10 RX ORDER — LISINOPRIL 2.5 MG/1
2.5 TABLET ORAL DAILY
Status: DISCONTINUED | OUTPATIENT
Start: 2018-12-11 | End: 2018-12-13 | Stop reason: HOSPADM

## 2018-12-10 RX ADMIN — HYDRALAZINE HYDROCHLORIDE 5 MG: 20 INJECTION INTRAMUSCULAR; INTRAVENOUS at 06:06

## 2018-12-10 RX ADMIN — HYDRALAZINE HYDROCHLORIDE 5 MG: 20 INJECTION INTRAMUSCULAR; INTRAVENOUS at 16:15

## 2018-12-10 RX ADMIN — PROPOFOL 50 MG: 10 INJECTION, EMULSION INTRAVENOUS at 12:42

## 2018-12-10 RX ADMIN — ASPIRIN 81 MG: 81 TABLET, COATED ORAL at 08:43

## 2018-12-10 RX ADMIN — DOCUSATE SODIUM 100 MG: 100 CAPSULE, LIQUID FILLED ORAL at 17:11

## 2018-12-10 RX ADMIN — LIDOCAINE HYDROCHLORIDE 20 ML: 10 INJECTION, SOLUTION INFILTRATION; PERINEURAL at 13:15

## 2018-12-10 RX ADMIN — HEPARIN SODIUM 5000 UNITS: 5000 INJECTION INTRAVENOUS; SUBCUTANEOUS at 21:43

## 2018-12-10 RX ADMIN — METOPROLOL TARTRATE 5 MG: 5 INJECTION, SOLUTION INTRAVENOUS at 17:32

## 2018-12-10 RX ADMIN — HEPARIN SODIUM 5000 UNITS: 5000 INJECTION INTRAVENOUS; SUBCUTANEOUS at 05:47

## 2018-12-10 RX ADMIN — ACETAMINOPHEN 650 MG: 325 TABLET, FILM COATED ORAL at 05:47

## 2018-12-10 RX ADMIN — IOHEXOL 10 ML: 350 INJECTION, SOLUTION INTRAVENOUS at 13:14

## 2018-12-10 RX ADMIN — HYDRALAZINE HYDROCHLORIDE 5 MG: 20 INJECTION INTRAMUSCULAR; INTRAVENOUS at 15:30

## 2018-12-10 RX ADMIN — HEPARIN SODIUM 5000 UNITS: 5000 INJECTION INTRAVENOUS; SUBCUTANEOUS at 15:31

## 2018-12-10 RX ADMIN — CALCIUM 1 TABLET: 500 TABLET ORAL at 08:44

## 2018-12-10 RX ADMIN — FENTANYL CITRATE 25 MCG: 50 INJECTION, SOLUTION INTRAMUSCULAR; INTRAVENOUS at 12:47

## 2018-12-10 RX ADMIN — LEVOTHYROXINE SODIUM 25 MCG: 25 TABLET ORAL at 05:47

## 2018-12-10 RX ADMIN — POTASSIUM CHLORIDE 20 MEQ: 1500 TABLET, EXTENDED RELEASE ORAL at 08:44

## 2018-12-10 RX ADMIN — HYDRALAZINE HYDROCHLORIDE 10 MG: 20 INJECTION INTRAMUSCULAR; INTRAVENOUS at 21:43

## 2018-12-10 RX ADMIN — SODIUM CHLORIDE: 0.9 INJECTION, SOLUTION INTRAVENOUS at 08:00

## 2018-12-10 RX ADMIN — PRAVASTATIN SODIUM 40 MG: 40 TABLET ORAL at 08:44

## 2018-12-10 RX ADMIN — CEFAZOLIN SODIUM 1000 MG: 1 SOLUTION INTRAVENOUS at 13:00

## 2018-12-10 RX ADMIN — FENTANYL CITRATE 25 MCG: 50 INJECTION, SOLUTION INTRAMUSCULAR; INTRAVENOUS at 13:10

## 2018-12-10 RX ADMIN — PROPOFOL 50 MG: 10 INJECTION, EMULSION INTRAVENOUS at 13:00

## 2018-12-10 RX ADMIN — Medication 1 TABLET: at 08:44

## 2018-12-10 RX ADMIN — MAGNESIUM SULFATE IN WATER 2 G: 40 INJECTION, SOLUTION INTRAVENOUS at 08:43

## 2018-12-10 RX ADMIN — ACETAMINOPHEN 650 MG: 325 TABLET, FILM COATED ORAL at 21:55

## 2018-12-10 RX ADMIN — GENTAMICIN SULFATE 80 MG: 40 INJECTION, SOLUTION INTRAMUSCULAR; INTRAVENOUS at 13:44

## 2018-12-10 RX ADMIN — PROPOFOL 50 MCG/KG/MIN: 10 INJECTION, EMULSION INTRAVENOUS at 12:42

## 2018-12-10 NOTE — ANESTHESIA POSTPROCEDURE EVALUATION
Post-Op Assessment Note      CV Status:  Stable    Mental Status:  Alert and awake    Hydration Status:  Euvolemic    PONV Controlled:  Controlled    Airway Patency:  Patent    Post Op Vitals Reviewed: Yes          Staff: Anesthesiologist, CRNA           BP   143/70   Temp      Pulse  74   Resp     SpO2   100

## 2018-12-10 NOTE — PLAN OF CARE
CARDIOVASCULAR - ADULT     Maintains optimal cardiac output and hemodynamic stability Progressing     Absence of cardiac dysrhythmias or at baseline rhythm Progressing        DISCHARGE PLANNING     Discharge to home or other facility with appropriate resources Progressing        INFECTION - ADULT     Absence or prevention of progression during hospitalization Progressing     Absence of fever/infection during neutropenic period Progressing        PAIN - ADULT     Verbalizes/displays adequate comfort level or baseline comfort level Progressing        Potential for Falls     Patient will remain free of falls Progressing        SAFETY ADULT     Maintain or return to baseline ADL function Progressing     Maintain or return mobility status to optimal level Progressing

## 2018-12-10 NOTE — PROGRESS NOTES
Patient was transferred from [facility] block Mobitz type 2  Patient was evaluated by Cardiology upon arrival to the hospital and felt that patient need temporary pacing    Patient will be transferred to the critical care unit

## 2018-12-10 NOTE — H&P
History and Physical - Critical Care    Dulce Padgett 80 y o  female MRN: [id number]  [facility]   Unit/Bed#: [place] 661-27 Encounter: [id number]      Reason for Admission / Principal Problem: Syncope    HPI: Dulce Padgett is a 80 y o  female who presents to the [address] [place] [address] Emergency Department with a sudden onset of syncope  Patient reports waking up and feeling fine this morning  She remembers walking back to her living room from her kitchen and the next thing she remembers is being woken up by her   He had found her on the floor  It is unclear how long she was unconscious  She had no loss of bowel or bladder  After this episode she woke up without confusion  In the emergency department, she was found to have second-degree type 2 heart block and left bundle branch block and was subsequently transferred to [facility] for cardiac evaluation  Given her slow HR, cardiology recommended TVP placement and the patient presents to the ICU post procedurally  At the time of my examination, she is resting comfotably  She offers no complaints  History obtained from chart review and the patient      PMH:   Past Medical History:   Diagnosis Date    Abnormal nuclear stress test     last assessed 04/03/2017    Anxiety     Arthritis     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    Bundle branch block, left     Cardiomyopathy ([facility] )     Chronic pain     chronic b/l shoulder pain    Coronary artery disease     Diabetes mellitus ([facility] [address] [facility] )     GERD (gastroesophageal reflux disease)     H/O atrial flutter     History of DVT (deep vein thrombosis)     History of pulmonary embolism     Hyperlipidemia     Hypertension     Hypothyroid     Renal calculi     Shortness of breath        PSH:   Past Surgical History:   Procedure Laterality Date    ATRIAL ABLATION SURGERY  01/2015    CARDIOVERSION      CHELA/DCCV 10/22/2014    CARPAL TUNNEL RELEASE Right     CATARACT EXTRACTION      CORONARY ANGIOPLASTY WITH STENT PLACEMENT      HYSTERECTOMY      JOINT REPLACEMENT Right     MS AMPUTATION TOE,MT-P JT Left 10/11/2017    Procedure: AMPUTATION SECOND TOE;  Surgeon: Leah Roy DPM;  Location: [facility];  Service: Podiatry    TONSILLECTOMY      TOTAL HIP ARTHROPLASTY      Right       Family History:   Family History   Problem Relation Age of Onset    Parkinsonism Sister     Cancer Sister         unknown type    Heart attack Neg Hx     Stroke Neg Hx     Anuerysm Neg Hx     Clotting disorder Neg Hx     Arrhythmia Neg Hx     Heart failure Neg Hx     Coronary artery disease Neg Hx        Social History:   History   Smoking Status    Never Smoker   Smokeless Tobacco    Never Used      History   Alcohol Use    Yes     Comment: occasional      Marital Status: /Civil Union    ROS: 14 point ROS is negative and/or as stated in the HPI  Allergies: Allergies   Allergen Reactions    Atorvastatin      Reaction unknown 10/23/2018-    Other Rash     Patient sensitive to adhesives from tape       Home Medications:   Prior to Admission medications    Medication Sig Start Date End Date Taking? Authorizing Provider   ACCU-CHEK RUBINA PLUS test strip 1 each by Other route daily Use as instructed 5/9/18   Amandeep Andrade MD   ACCU-CHEK SOFTCLIX LANCETS lancets by Other route daily Use daily as instructed  5/9/18   Amandeep Andrade MD   acetaminophen (TYLENOL) 500 mg tablet Take 500 mg by mouth every 6 (six) hours as needed for mild pain      Historical Provider, MD   ASPIR-LOW 81 MG EC tablet TAKE ONE TABLET BY MOUTH EVERY DAY 12/3/18   Hollis Ramirez DO   Calcium Carbonate-Vitamin D (CALTRATE 600+D PO) Take 1 capsule by mouth 2 (two) times a day      Historical Provider, MD   levothyroxine 25 mcg tablet Take 1 tablet (25 mcg total) by mouth daily 9/28/18   Amandeep Andrade MD   lisinopril (ZESTRIL) 2 5 mg tablet Take 1 tablet (2 5 mg total) by mouth daily 18   Chandrika Cohen MD   metoprolol succinate (TOPROL XL) 25 mg 24 hr tablet Take 1 tablet (25 mg total) by mouth daily 18   Chandrika Cohen MD   Misc Natural Products (ENERGY FOCUS PO) Take 1 tablet by mouth 2 (two) times a day      Historical Provider, MD   multivitamin-iron-minerals-folic acid (CENTRUM) chewable tablet Chew 1 tablet daily  Historical Provider, MD   pravastatin (PRAVACHOL) 40 mg tablet Take 1 tablet (40 mg total) by mouth daily 18   Chandrika Cohen MD   aspirin 81 MG tablet Take 81 mg by mouth daily  18  Historical Provider, MD       Vitals:   Vitals:    18 1937   BP: 132/68   BP Location: Right arm   Pulse: 65   Resp: 18   Temp: 98 6 °F (37 °C)   TempSrc: Oral   SpO2: 92%   Height: 4' 11" (1 499 m)     Respiratory:  SpO2: SpO2: 92 %, SpO2 Activity: SpO2 Activity: At Rest, SpO2 Device: O2 Device: None (Room air)    Temperature: Temp (24hrs), Av 7 °F (37 1 °C), Min:98 6 °F (37 °C), Max:98 9 °F (37 2 °C)  Current: Temperature: 98 6 °F (37 °C)    Weights: IBW: 43 2 kg  Body mass index is 36 29 kg/m²  Physical Exam:    General Appearance:  Elderly female in NAD  HENT: Atraumtic  Neck: Supple  No CVC  Eyes: No icterus  Cardiac: Slowed rate and regular rhythm, no murmur, no rub  Pulmonary: Clear to auscultation bilaterally, no secretions  Gastrointestinal: Soft, no distention  : Segura present: no              Musculoskeletal: No edema bilaterally  Neuro:  Alert and oriented x 3  Psych: Mood and affect seem appropriate  Skin: Warm      Labs:     Results from last 7 days  Lab Units 18  1155   WBC Thousand/uL 7 48   HEMOGLOBIN g/dL 12 0   HEMATOCRIT % 35 9   PLATELETS Thousands/uL 214   NEUTROS PCT % 78*   MONOS PCT % 8       Results from last 7 days  Lab Units 18  1155   POTASSIUM mmol/L 4 3   CHLORIDE mmol/L 102   CO2 mmol/L 31   BUN mg/dL 25   CREATININE mg/dL 0 78   CALCIUM mg/dL 9 4   ALK PHOS U/L 76   ALT U/L 24   AST U/L 17 Results from last 7 days  Lab Units 12/09/18  1155   TROPONIN I ng/mL <0 02           Imaging:   None    Micro:  Blood Culture: No results found for: BLOODCX  Urine Culture: No results found for: URINECX  Sputum Culture: No components found for: SPUTUMCX  Wound Culure: No results found for: WOUNDCULT    Impression:  Principal Problem:    Syncope  Active Problems:    Heart block AV second degree    LBBB (left bundle branch block)    History of placement of stent in LAD coronary artery    History of atrial flutter    Diabetes mellitus (HCC)    Hypothyroidism    HTN (hypertension)    Hyperlipidemia      Plan:    Neuro:   · Pain controlled with: tylenol PRN  · Regulate sleep/wake cycle  · Delirium precautions  · CAM-ICU daily  · Trend neuro exam  CV:   · Cardiac infusions: None  · MAP goal > 65  · Rhythm: V-Paced  · Follow rhythm on telemetry  · Hold BB and ACEi  · EP consult in AM  · VVI 50 RFV temp pacer wire  Lung:   · SpO2 goal >92%  · Pulmonary toileting with IS  GI:   · Stress ulcer prophylaxis: No prophylaxis needed  · Bowel regimen: PRN  · Zofran PRN for nausea  FEN:   · Fluid/Diuretic plan: NS @ 50/ hour  · Nutrition/diet plan: NPO at midnight  · Replete electrolytes with goals: K >4 0, Mag >2 0, and Phos >3 0  :   · Indwelling Segura: no  · Trend UOP and BUN/creat  · Strict I and O  ID:   · Trend temps and WBC count  · Maintain normothermia  Heme:   · Trend hgb and plts  · Transfuse as needed for goal hgb >7 0  Endo:   · Glycemic control plan: SQ insulin coverage  MSK/Skin:   · Frequent turning and pressure off-loading  · Local wound care as needed    Disposition: ICU admission  Given critical illness, patient length of stay will require greater than two midnights  VTE Pharmacologic Prophylaxis: Heparin SQ  VTE Mechanical Prophylaxis: sequential compression device    Invasive lines and devices:   Invasive Devices     Peripheral Intravenous Line            Peripheral IV 12/09/18 Left Hand less than 1 day          Line            Venous Sheath 6 Fr  Right Femoral less than 1 day                Code Status: Prior    Counseling / Coordination of Care  Total Critical Care time spent 0 minutes excluding procedures, teaching and family updates        SIGNATURE: Rekha Nicholas PA-C  DATE: December 9, 2018  TIME: 9:00 PM

## 2018-12-10 NOTE — PROGRESS NOTES
Briana Gutierrez with CCM called and notified of pt with more frequent PVCs  HR in 80s at the time and sbp 150s  New orders for K and Mg level  Labs drawn and sent  Will monitor

## 2018-12-10 NOTE — PROGRESS NOTES
Pt transported to cath lab via bed and on heart monitor  Drop off in stable condition  Care assumed by cath lab on call staff

## 2018-12-10 NOTE — PROGRESS NOTES
Transfer Note - ICU/Stepdown Transfer to [facility]/MS glenn Mora 80 y o  female MRN: [id number]  [address] [facility]   Unit/Bed#: [place] 747-71 Encounter: [id number]    Code Status: Level 3 - DNAR and DNI    Reason for ICU/Stepdown admission: 2nd degree AV block requiring temp transvenous pacemaker     Active problems: Principal Problem:    Syncope  Active Problems:    Heart block AV second degree    LBBB (left bundle branch block)    History of placement of stent in LAD coronary artery    History of atrial flutter    Diabetes mellitus ([facility] [address] )    Hypothyroidism    HTN (hypertension)    Hyperlipidemia  Resolved Problems:    * No resolved hospital problems  *      Consultants:   · EP    History of Present Illness/Summary of clinical course: From H&P 12/9 2056 by Bakari English PA-C: "80 y o  female who presents to the [facility] Emergency Department with a sudden onset of syncope  Patient reports waking up and feeling fine this morning  She remembers walking back to her living room from her kitchen and the next thing she remembers is being woken up by her   He had found her on the floor  It is unclear how long she was unconscious  She had no loss of bowel or bladder  After this episode she woke up without confusion  In the emergency department, she was found to have second-degree type 2 heart block and left bundle branch block and was subsequently transferred to [facility] for cardiac evaluation  Given her slow HR, cardiology recommended TVP placement and the patient presents to the ICU post procedurally "     Please refer to today's progress note for further clinical details  Recent or scheduled procedures: 12/10 s/p dual chamber PPM     Outstanding/pending diagnostics: N/A       Mobilization Plan: PT/OT consult  Ambulate as tolerated      Nutrition Plan: Cardiac diet    Discharge Plan: discharge to home when medically stable       [  ] Family aware of transfer out of critical care: no family present, patient aware      Spoke with Dr Kimmy Negrete regarding transfer @ [phone]  Patient accepted to their service      FERMIN Abel

## 2018-12-10 NOTE — PROGRESS NOTES
Progress Note - Critical Care   Devyn Garay 80 y o  female MRN: [id number]  Unit/Bed#: [facility] 477-84 Encounter: [id number]    Impression:  Principal Problem:    Syncope  Active Problems:    Heart block AV second degree    LBBB (left bundle branch block)    History of placement of stent in LAD coronary artery    History of atrial flutter    Diabetes mellitus (HCC)    Hypothyroidism    HTN (hypertension)    Hyperlipidemia      Plan:    Neuro:   · Pain controlled with: tylenol PRN  · Regulate sleep/wake cycle  · Delirium precautions  · CAM-ICU daily  · Trend neuro exam  CV:   · Cardiac infusions: None  · MAP goal > 65  · Rhythm: V-Paced- intermittently  · Follow rhythm on telemetry  · VVI 50  · EP consult pending  · Hold BB and ACE-I for marlon  · Hydralazine PRN for SBP >160  Lung:   · SpO2 goal >92%  · Pulmonary toileting with IS  GI:   · Stress ulcer prophylaxis: No prophylaxis needed  · Bowel regimen: PRN  · Zofran PRN for nausea  FEN:   · Fluid/Diuretic plan: NS @ 50  · Nutrition/diet plan: NPO  · Replete electrolytes with goals: K >4 0, Mag >2 0, and Phos >3 0  :   · Indwelling Segura present: no   · Trend UOP and BUN/creat  · Strict I and O  ID:   · Trend temps and WBC count  · Maintain normothermia  Heme:   · Trend hgb and plts  · Transfuse as needed for goal hgb >7 0  Endo:   · Glycemic control plan: SQ insulin  · Synthroid  · TSH in AM  MSK/Skin:  · Mobility goal: Bedrest with TVP  · PT consult: yes  · OT consult: yes  · Frequent turning and pressure off-loading  · Local wound care as needed  VTE Prophylaxis:  · Pharmacologic Prophylaxis: Heparin SQ  · Mechanical Prophylaxis: sequential compression device    Disposition: Continue ICU care    Treatment Team/Consultants: Cardiology    Date of admission: 12/9/2018    Reason for Admission: Admitted with syncope    HPI/24hr events: TVP placed via femoral vein  Physical Exam:    General Appearance:  Elderly female in bed in NAD  HENT: Atraumtic      Neck: Supple  No CVC  Eyes: No icterus  Cardiac: Regular rate and rhythm, no murmur, no rub  Pulmonary: Clear to auscultation bilaterally, no secretions  Gastrointestinal: Soft, no distention  : Segura present: no              Musculoskeletal: No edema bilaterally  Neuro:  Alert  Grossly non-focal     Psych: Mood and affect seem appropriate  Skin: Warm  Vitals:   Vitals:    12/10/18 0300 12/10/18 0400 12/10/18 0500 12/10/18 0600   BP: 139/56 159/73 152/67 166/60   Pulse: 56 78 76 72   Resp: (!) 26 (!) 30 (!) 26 (!) 26   Temp:    98 2 °F (36 8 °C)   TempSrc:    Oral   SpO2: 96% 95% 94% 96%   Weight:       Height:         Temperature: Temp (24hrs), Av 5 °F (36 9 °C), Min:98 °F (36 7 °C), Max:98 9 °F (37 2 °C)  Current: Temperature: 98 2 °F (36 8 °C)    Weights: IBW: 43 2 kg  Body mass index is 35 09 kg/m²  Respiratory:  SpO2: SpO2: 96 %, SpO2 Activity: SpO2 Activity: At Rest, SpO2 Device: O2 Device: None (Room air)    Intake and Outputs:    Intake/Output Summary (Last 24 hours) at 12/10/18 0623  Last data filed at 12/10/18 0600   Gross per 24 hour   Intake            786 1 ml   Output             1100 ml   Net           -313 9 ml     I/O last 24 hours: In: 786 1 [P O :300; I V :486 1]  Out: 1100 [Urine:1100]    Nutrition:        Diet Orders            Start     Ordered    12/10/18 0001  Diet NPO; Sips with meds  Diet effective midnight     Comments: For permanent pacemaker in AM   Question Answer Comment   Diet Type NPO    NPO Except: Sips with meds    RD to adjust diet per protocol?  No        18        Labs:     Results from last 7 days  Lab Units 18  1155   WBC Thousand/uL 7 48   HEMOGLOBIN g/dL 12 0   HEMATOCRIT % 35 9   PLATELETS Thousands/uL 214   NEUTROS PCT % 78*   MONOS PCT % 8       Results from last 7 days  Lab Units 18  1155   SODIUM mmol/L 138   POTASSIUM mmol/L 4 3   CHLORIDE mmol/L 102   CO2 mmol/L 31   BUN mg/dL 25   CREATININE mg/dL 0 78   CALCIUM mg/dL 9 4   ALK PHOS U/L 76   ALT U/L 24   AST U/L 17     Imaging:   No new imaging    Micro:   Blood Culture: No results found for: BLOODCX  Urine Culture: No results found for: URINECX  Sputum Culture: No components found for: SPUTUMCX  Wound Culure: No results found for: WOUNDCULT        Allergies: Allergies   Allergen Reactions    Atorvastatin      Reaction unknown 10/23/2018-    Other Rash     Patient sensitive to adhesives from tape       Medications:   Scheduled Meds:    Current Facility-Administered Medications:  acetaminophen 650 mg Oral Q6H PRN Kimberly Leslie PA-C    aspirin 81 mg Oral Daily Gloria Romero PA-C    calcium carbonate 1 tablet Oral Daily With Breakfast Gloria Romero PA-C    heparin (porcine) 5,000 Units Subcutaneous [facility] Gloria Romero PA-C    hydrALAZINE 5 mg Intravenous Q6H PRN Samm Vick MD    insulin lispro 1-5 Units Subcutaneous TID AC Gloria Romero PA-C    insulin lispro 1-5 Units Subcutaneous HS Gloria Romero PA-C    levothyroxine 25 mcg Oral Early Morning Kimberly Leslie PA-C    multivitamin-minerals 1 tablet Oral Daily Gloria Romero PA-C    ondansetron 4 mg Intravenous Q8H PRN Kimberly Leslie PA-C    pravastatin 40 mg Oral Daily Gloria Romero PA-C    sodium chloride 50 mL/hr Intravenous Continuous Kimberly Leslie PA-C Last Rate: 50 mL/hr (12/09/18 2113)     Continuous Infusions:    sodium chloride 50 mL/hr Last Rate: 50 mL/hr (12/09/18 2113)     PRN Meds:    acetaminophen 650 mg Q6H PRN   hydrALAZINE 5 mg Q6H PRN   ondansetron 4 mg Q8H PRN       Invasive lines and devices: Invasive Devices     Peripheral Intravenous Line            Peripheral IV 12/09/18 Left Hand less than 1 day          Line            Venous Sheath 6 Fr  Right Femoral less than 1 day                Code Status: Level 3 - DNAR and DNI    Counseling / Coordination of Care  Total Critical Care time spent 0 minutes excluding procedures, teaching and family updates        SIGNATURE: Kimberly Leslie PA-C  DATE: December 10, 2018  TIME: 6:23 AM

## 2018-12-10 NOTE — PROGRESS NOTES
Progress Note - Cardiology   Janith Class 80 y o  female MRN: [id number]  Unit/Bed#: [facility] 518-01 Encounter: [id number]    Assessment/Plan:    1  Syncope likely secondary to high-grade AV block  2  High-grade AV block-appears to Mobitz 2 along with left bundle-branch block  3  Coronary artery disease S/P PCI to LAD  4  Hypertension  5  Hyperlipidemia  6  History of atrial flutter S/P ablation    -patient now postprocedure day 1 from temporary venous pacemaker with intermittent episodes of pacing on telemetry  -continue aspirin and statin therapy  -as previously stated most recent transthoracic echocardiogram from 10/29/2018 read as left ventricular systolic function normal estimated LVEF 55% with severe concentric hypertrophy and ventricular septum dyssynergistic motion likely secondary to left bundle-branch block and mild aortic stenosis with moderate tricuspid regurg and elevated PA pressure suggestive of moderate pulmonary hypertension  -will continue to monitor patient at this time and follow up electrophysiology recommendations for permanent pacemaker placement  -patient currently NPO for potential procedure      Subjective/Objective   Subjective:   Patient seen in exam   Per nursing, there were no acute events overnight  Per patient she notes having her chronic arthritic pain in her shoulders and knees however denies any chest pain, shortness of breath, lightheadedness or dizziness, palpitations, abdominal pain      Objective:   Vitals: BP (!) 146/42   Pulse 76   Temp (!) 97 3 °F (36 3 °C) (Oral)   Resp (!) 25   Ht 4' 11" (1 499 m)   Wt 78 4 kg (172 lb 13 5 oz)   SpO2 94%   BMI 34 91 kg/m²   Vitals:    12/09/18 2151 12/10/18 0600   Weight: 78 8 kg (173 lb 11 6 oz) 78 4 kg (172 lb 13 5 oz)     Orthostatic Blood Pressures      Most Recent Value   Blood Pressure   146/42 filed at 12/10/2018 0800   Patient Position - Orthostatic VS  Lying filed at 12/10/2018 0700        Physical Exam   Constitutional: No distress  HENT:   Head: Normocephalic and atraumatic  Eyes: Conjunctivae are normal  No scleral icterus  Neck: No tracheal deviation present  Cardiovascular:   Irregular rate and rhythm   Pulmonary/Chest: Effort normal and breath sounds normal  No respiratory distress  She has no wheezes  Abdominal: Soft  Bowel sounds are normal  There is no tenderness  Musculoskeletal: She exhibits no edema  Neurological:   Awake, alert, able to answer questions appropriately   Skin: Skin is warm and dry  She is not diaphoretic  Temporary venous pacemaker in place   Psychiatric: She has a normal mood and affect  Intake/Output Summary (Last 24 hours) at 12/10/18 0847  Last data filed at 12/10/18 0800   Gross per 24 hour   Intake            886 1 ml   Output             1450 ml   Net           -563 9 ml       Invasive Devices     Peripheral Intravenous Line            Peripheral IV 12/09/18 Left Hand less than 1 day          Line            Venous Sheath 6 Fr  Right Femoral less than 1 day                Lab Results: I have personally reviewed pertinent lab results  Imaging: I have personally reviewed pertinent reports  EKG:  Appears to be in Mobitz 2 on telemetry requiring intermittent episodes of pacing

## 2018-12-10 NOTE — CONSULTS
Consultation - Cardiology   Angel Martin 80 y o  female MRN: [id number]  Unit/Bed#: [facility] 792-09 Encounter: [id number]      Assessment:  Active Problems:    * No active hospital problems  *      Plan:  1  Syncope due to High grade AV block- Mobitz II along with LBBB- due to age related degeneration of conduction system  - EKG with sinus rhythm, Mobitz Type II AV block and LBBB  - Discussed with Dr Bridget Lim and the patient will be taken for a temporary pacemaker with plan for permanent pacemaker by EP   - Check TSH, hold Metoprolol, troponin one set negative    2  Coronary artery disease with PCI to the LAD  - aspirin 81 mg daily, pravastatin 40 mg daily  - echo from 02/80 reviewed-systolic function at 50% with marked concentric LVH  Dyskinetic septum due to left bundle branch block  Mildly dilated RV with normal systolic function  Mild aortic stenosis  Moderate tricuspid regurgitation with an estimated PA systolic pressure of 55 suggestive of moderate pulmonary hypertension   - cardiac catheterization-April 2017-minimal luminal irregularities in the left main, LAD, mild atherosclerosis of the circumflex, ostial RCA 40%  3  Hypertension  - on lisinopril 2 5 mg daily  4  Dyslipidemia  - 7/18- LDL-119, HDL-67  5  Atrial flutter status post CTI ablation  6  Type 2 diabetes  7  Hypothyroidism  - check TSH, continue levothyroxine 25 mcg daily  History of Present Illness   Physician Requesting Consult: Amandeep Strong MD  Reason for Consult / Principal Problem:  Syncope  HPI: Angel Martin is a 80y o  year old female with past medical history significant for coronary artery disease with PCI to the LAD, hypertension, dyslipidemia, atrial flutter status post CTI ablation with recovered tachyarrhythmia related cardiomyopathy, type 2 diabetes, hypothyroidism  Patient presented to the ED at [facility] with sudden onset syncope    Patient reports waking up and feeling fine this morning, she then made breakfast and went to use the restroom  She denies any symptoms of dizziness or lightheadedness while and after using the restroom  She remembers walking back to the living room and the next thing she remembers is being waking up by her   She was found on the floor  She denies any prodromal symptoms of dizziness or lightheadedness  No neurological symptoms  She did not trip or lose balance  Unsure how much time she was down  Post episode when she woke up, no confusion  No bowel or bladder accident  Patient was subsequently transferred to [facility] for further evaluation  On my evaluation here, patient resting in bed  No symptoms of chest pain or pressure  No shortness of breath  No dizziness or lightheadedness in bed  Patient had an episode of sudden onset dizziness diaphoresis and near-syncope while seated in a chair end of October- admitted to [facility] and had a workup done with echo, normal tele per discharge note and discharged  EKG on arrival at [facility] with Mobitz type 2 av block  Left bundle branch block  Telemetry-sinus with Mobitz type 2 av block, marked bradycardia up to 33  Inpatient consult to Cardiology     Date/Time 12/9/2018 8:14 PM     Performed by  Giacomo Fleming     Authorized by Guerry Cranker              Review of Systems:  Review of Systems   Constitutional: Negative for activity change, appetite change, chills, diaphoresis, fatigue and fever  HENT: Negative for congestion  Respiratory: Negative for cough, chest tightness, shortness of breath and wheezing  Cardiovascular: Negative for chest pain, palpitations and leg swelling  Gastrointestinal: Negative for abdominal pain, diarrhea, nausea and vomiting  Genitourinary: Negative for difficulty urinating and dysuria  Musculoskeletal: Negative for back pain  Skin: Negative for color change and rash  Neurological: Positive for syncope   Negative for dizziness, facial asymmetry, weakness, light-headedness, numbness and headaches  Psychiatric/Behavioral: Negative for confusion  14 systems reviewed and negative with the exception of the above and the following    Historical Information   Past Medical History:   Diagnosis Date    Abnormal nuclear stress test     last assessed 04/03/2017    Anxiety     Arthritis     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 [address] [facility]     Chronic pain     chronic b/l shoulder pain    Coronary artery disease     Diabetes mellitus ([facility] [address] )     GERD (gastroesophageal reflux disease)     H/O atrial flutter     History of DVT (deep vein thrombosis)     History of pulmonary embolism     Hyperlipidemia     Hypertension     Hypothyroid     Renal calculi     Shortness of breath      Past Surgical History:   Procedure Laterality Date    ATRIAL ABLATION SURGERY  01/2015    CARDIOVERSION      CHELA/DCCV 10/22/2014    CARPAL TUNNEL RELEASE Right     CATARACT EXTRACTION      CORONARY ANGIOPLASTY WITH STENT PLACEMENT      HYSTERECTOMY      JOINT REPLACEMENT Right     NC AMPUTATION TOE,MT-P JT Left 10/11/2017    Procedure: AMPUTATION SECOND TOE;  Surgeon: Trena Evangelista DPM;  Location: [facility];  Service: Podiatry    TONSILLECTOMY      TOTAL HIP ARTHROPLASTY      Right     History   Alcohol Use    Yes     Comment: occasional     History   Drug Use    Types: Marijuana     Comment: MEDICAL MARIJUANA-HAS NOT USED FOR 3 WEEKS     History   Smoking Status    Never Smoker   Smokeless Tobacco    Never Used     Family History: non-contributory    Meds/Allergies   all current active meds have been reviewed  Allergies   Allergen Reactions    Atorvastatin      Reaction unknown 10/23/2018-    Other Rash     Patient sensitive to adhesives from tape       Objective   Vitals: There were no vitals taken for this visit  , There is no height or weight on file to calculate BMI ,     No intake or output data in the 24 hours ending 12/09/18 1951    Invasive Devices     Peripheral Intravenous Line            Peripheral IV 12/09/18 Left Hand less than 1 day                    Physical Exam:  Physical Exam   Constitutional: She is oriented to person, place, and time  She appears well-developed and well-nourished  No distress  HENT:   Head: Normocephalic and atraumatic  Eyes: Conjunctivae are normal  No scleral icterus  Neck: Neck supple  No JVD present  Cardiovascular: Regular rhythm and intact distal pulses  Exam reveals no gallop and no friction rub  Murmur heard  Bradycardic  Grade 2/6 ESM in the aortic area   Pulmonary/Chest: Effort normal and breath sounds normal  She has no wheezes  She has no rales  Abdominal: Soft  Bowel sounds are normal  There is no tenderness  Musculoskeletal: Normal range of motion  She exhibits no edema  Neurological: She is alert and oriented to person, place, and time  Skin: Skin is warm and dry  Psychiatric: She has a normal mood and affect  Nursing note and vitals reviewed            Lab Results:     Lab Results   Component Value Date    CKTOTAL 52 07/07/2016    TROPONINI <0 02 12/09/2018    TROPONINI <0 02 10/28/2018       Lab Results   Component Value Date    GLUCOSE 90 06/23/2015    CALCIUM 9 4 12/09/2018     (L) 06/23/2015    K 4 3 12/09/2018    CO2 31 12/09/2018     12/09/2018    BUN 25 12/09/2018    CREATININE 0 78 12/09/2018       Lab Results   Component Value Date    WBC 7 48 12/09/2018    HGB 12 0 12/09/2018    HCT 35 9 12/09/2018    MCV 96 12/09/2018     12/09/2018       Lab Results   Component Value Date    CHOL 210 11/24/2015    CHOL 153 01/20/2015    CHOL 154 10/31/2014     Lab Results   Component Value Date    HDL 67 (H) 07/27/2018    HDL 64 11/24/2015    HDL 61 01/20/2015     Lab Results   Component Value Date    LDLCALC 119 (H) 07/27/2018    LDLCALC 125 (H) 11/24/2015    LDLCALC 72 01/20/2015 Lab Results   Component Value Date    TRIG 164 (H) 2018    TRIG 107 2015    TRIG 98 2015       Lab Results   Component Value Date    ALT 24 2018    AST 17 2018             Cardiac testing:   Results for orders placed during the hospital encounter of 10/28/18   Echo complete with contrast if indicated    Narrative [address] [facility]  [phone]    Transthoracic Echocardiogram  2D, M-mode, Doppler, and Color Doppler    Study date:  29-Oct-2018    Patient: Amari Colbert  MR number: [id number]  Account number: [de-identified]  : [id number]  Age: 80 years  Gender: Female  Status: Outpatient  Location: Bedside  Height: 59 in  Weight: 173 6 lb  BP: 169/ 78 mmHg    Indications: Murmur    Diagnoses: R01 1 - Cardiac murmur, unspecified    Sonographer:  Livier Pop RDCS  Primary Physician:  Zohreh Méndez MD  Referring Physician:  Neville CRNP  Group:  [facility]  Interpreting Physician:  Dayton Stovall MD    SUMMARY    LEFT VENTRICLE:  Systolic function was normal  Ejection fraction was estimated to be 55 %  This study was inadequate for the evaluation of regional wall motion  Wall thickness was markedly increased  There was severe concentric hypertrophy  VENTRICULAR SEPTUM:  There was dyssynergic motion  These changes are consistent with a conduction abnormality or paced rhythm  RIGHT VENTRICLE:  The ventricle was dilated  Systolic function was normal     AORTIC VALVE:  The valve was probably trileaflet  Leaflets exhibited moderately to markedly increased thickness, moderate calcification, moderately reduced cuspal separation, and sclerosis  There was mild stenosis by gradients  Vmax 2 6 m/s, mean gradient 16 mm Hg, maximum gradient 27 mm Hg  TRICUSPID VALVE:  There was moderate regurgitation  Estimated peak PA pressure was 55 mmHg    The findings suggest moderate pulmonary hypertension  HISTORY: PRIOR HISTORY: Syncope, hypertension, diabetes, left bundle block branch    PROCEDURE: The procedure was performed at the bedside  This was a routine study  The transthoracic approach was used  The study included complete 2D imaging, M-mode, complete spectral Doppler, and color Doppler  Images were obtained from  the parasternal, apical, subcostal, and suprasternal notch acoustic windows  Echocardiographic views were limited due to restricted patient mobility, decreased penetration, and lung interference  This was a technically difficult study  LEFT VENTRICLE: Size was normal  Systolic function was normal  Ejection fraction was estimated to be 55 %  This study was inadequate for the evaluation of regional wall motion  Wall thickness was markedly increased  There was severe  concentric hypertrophy  DOPPLER: There was an increased relative contribution of atrial contraction to ventricular filling  VENTRICULAR SEPTUM: There was dyssynergic motion  These changes are consistent with a conduction abnormality or paced rhythm  RIGHT VENTRICLE: The ventricle was dilated  Systolic function was normal     LEFT ATRIUM: The atrium was dilated  RIGHT ATRIUM: The atrium was dilated  MITRAL VALVE: There was moderate to marked annular calcification  DOPPLER: There was no evidence for stenosis  There was no significant regurgitation  AORTIC VALVE: The valve was probably trileaflet  Leaflets exhibited moderately to markedly increased thickness, moderate calcification, moderately reduced cuspal separation, and sclerosis  DOPPLER: There was mild stenosis by gradients  Vmax 2 6 m/s, mean gradient 16 mm Hg, maximum gradient 27 mm Hg  There was no significant regurgitation  TRICUSPID VALVE: The valve structure was normal  There was normal leaflet separation  DOPPLER: There was no evidence for stenosis  There was moderate regurgitation  Estimated peak PA pressure was 55 mmHg   The findings suggest moderate  pulmonary hypertension  PULMONIC VALVE: Leaflets exhibited normal thickness, no calcification, and normal cuspal separation  DOPPLER: The transpulmonic velocity was within the normal range  There was mild regurgitation  PERICARDIUM: There was no pericardial effusion  The pericardium was normal in appearance  AORTA: The root exhibited top normal size at 3 8 cm and fibrocalcific change  SYSTEM MEASUREMENT TABLES    2D  %FS: 28 05 %  Ao Diam: 3 66 cm  EDV(Teich): 61 24 ml  EF Biplane: 54 43 %  EF(Teich): 55 04 %  ESV(Teich): 27 53 ml  IVSd: 1 37 cm  LA Area: 26 21 cm2  LA Diam: 3 67 cm  LVEDV MOD A2C: 94 62 ml  LVEDV MOD A4C: 62 24 ml  LVEDV MOD BP: 77 26 ml  LVEF MOD A2C: 59 %  LVEF MOD A4C: 49 46 %  LVESV MOD A2C: 38 8 ml  LVESV MOD A4C: 31 46 ml  LVESV MOD BP: 35 21 ml  LVIDd: 3 78 cm  LVIDs: 2 72 cm  LVLd A2C: 7 05 cm  LVLd A4C: 7 17 cm  LVLs A2C: 6 04 cm  LVLs A4C: 5 92 cm  LVOT Diam: 2 2 cm  LVPWd: 1 38 cm  RA Area: 23 62 cm2  RVIDd: 3 78 cm  SV MOD A2C: 55 82 ml  SV MOD A4C: 30 78 ml  SV(Teich): 33 71 ml    CW  AV Env  Ti: 279 12 ms  AV MaxP 98 mmHg  AV VTI: 46 73 cm  AV Vmax: 2 23 m/s  AV Vmean: 1 67 m/s  AV meanP 18 mmHg  TR MaxP 48 mmHg  TR Vmax: 3 59 m/s    MM  TAPSE: 2 39 cm    PW  JN (VTI): 1 4 cm2  JN Vmax: 1 35 cm2  E': 0 06 m/s  E/E': 15 79  LVOT Env  Ti: 279 41 ms  LVOT VTI: 17 24 cm  LVOT Vmax: 0 79 m/s  LVOT Vmean: 0 62 m/s  LVOT maxP 51 mmHg  LVOT meanP 61 mmHg  LVSI Dopp: 37 59 ml/m2  LVSV Dopp: 65 41 ml  MV A Steven: 1 14 m/s  MV Dec Owen: 7 35 m/s2  MV DecT: 130 63 ms  MV E Steven: 0 96 m/s  MV E/A Ratio: 0 84  MV PHT: 37 88 ms  MVA By PHT: 5 81 cm2    [employer] Accredited [facility]    Prepared and electronically signed by    Ab Newell MD  Signed 29-Oct-2018 17:35:21       No results found for this or any previous visit  No procedure found  No results found for this or any previous visit        Imaging: I have personally reviewed pertinent reports  Ct Head Without Contrast    Result Date: 12/9/2018  Narrative: CT BRAIN - WITHOUT CONTRAST INDICATION:   Intracranial hemorrhage  COMPARISON:  10/28/2018 TECHNIQUE:  CT examination of the brain was performed  In addition to axial images, coronal 2D reformatted images were created and submitted for interpretation  Radiation dose length product (DLP) for this visit:  1055 mGy-cm   This examination, like all CT scans performed in the [facility], was performed utilizing techniques to minimize radiation dose exposure, including the use of iterative reconstruction and automated exposure control  IMAGE QUALITY:  Diagnostic  FINDINGS: PARENCHYMA: Decreased attenuation is noted in periventricular and subcortical white matter demonstrating an appearance that is statistically most likely to represent mild microangiopathic change; this appearance is similar when compared to most recent prior examination  No CT signs of acute infarction  No intracranial mass, mass effect or midline shift  No acute parenchymal hemorrhage  VENTRICLES AND EXTRA-AXIAL SPACES:  Ventricles and extra-axial CSF spaces are prominent commensurate with the degree of volume loss  No hydrocephalus  No acute extra-axial hemorrhage  VISUALIZED ORBITS AND PARANASAL SINUSES:  Unremarkable  CALVARIUM AND EXTRACRANIAL SOFT TISSUES:  Normal      Impression: No acute intracranial abnormality  Microangiopathic changes  Workstation performed: [id number]         EKG:  Sinus with Mobitz type 2  Left bundle branch block

## 2018-12-10 NOTE — CONSULTS
Consultation - Electrophysiology-Cardiology (EP)   Anthony Guzmán 80 y o  female MRN: [id number]  Unit/Bed#: [facility] 323-66 Encounter: [id number]      Inpatient consult to Electrophysiology  Consult performed by: Elsi Wei ordered by: Jeana Wheeler          Assessment/Plan   1  Mobitz type II HB    * patient with bradycardia induced syncope 2/2 mobitz type II HB and is now s/p temp perm    * no identifiable reversible causes as she is on no AVNB now and only on toprol XL 25mg PO QDay as an outpatient 2/2 CAD and still requires intermittent V pacing due to bradycardia    Also in setting of underlying conduction disease with LBBB   * TSH is normal at 1 19   * EF 55% 2018    * no indication for ICD, will proceed with class I indication for DC PPM today by Dr Velma Hand, procedure was explained to patient by myself with risks benefits included  She is OK with proceeding    * she is right handed    * no renal disease    * no contrast dye allergy   * no prior surgeries to left breast or axilla, left SC Vein should be patent    * pre op Vanc on call to EP lab only     2  CAD s/p PCI to LAD   3  Metabolic syndrome   4  LBBB   5  Typical flutter s/p CTI RFA        History of Present Illness   Physician Requesting Consult: Neisha Panda MD  Reason for Consult / Principal Problem: bradycardia     HPI: Anthony Guzmán is a 80y o  year old female with a history of LBBB, HTN, obesity, T2DM, typical flutter s/p CTI RFA, HLD, CAD s/p PCI to LAB who is HOD#1 after being transferred to [facility] from [facility] for syncope 2/2 bradycardia  EP is being consulted for PPM evaluation  One day ago while she was walking from her bathroom to her kitchen all she remembers is feeling a fleeting symptom of dizziness with the next thing patient remembering is waking up on the floor  Her  heard her fall and came downstairs to find her  Patient denies any loss of bowel or bladder function, no tongue or lip biting  After the event EMS was called who took her to [facility] where she was found to have mobitz type II HB prompting transfer to our facility  Once here due to continued bradycardia a temp pacer and EP consult were placed  This AM she remains in intermittent Mobitz type II HB with intermittent V pacing  She is currently asymptomatic  She denies any prior surgeries to her left breast or axilla  She is right handed  Review of Systems  ROS as noted above, otherwise 12 point review of systems was performed and is negative         Historical Information   Past Medical History:   Diagnosis Date    Abnormal nuclear stress test     last assessed 04/03/2017    Anxiety     Arthritis     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    Bundle branch block, left     Cardiomyopathy ([facility] [address] [facility]     Chronic pain     chronic b/l shoulder pain    Coronary artery disease     Diabetes mellitus ([facility] [address] )     GERD (gastroesophageal reflux disease)     H/O atrial flutter     History of DVT (deep vein thrombosis)     History of pulmonary embolism     Hyperlipidemia     Hypertension     Hypothyroid     Renal calculi     Shortness of breath      Past Surgical History:   Procedure Laterality Date    ATRIAL ABLATION SURGERY  01/2015    CARDIOVERSION      CHELA/DCCV 10/22/2014    CARPAL TUNNEL RELEASE Right     CATARACT EXTRACTION      CORONARY ANGIOPLASTY WITH STENT PLACEMENT      HYSTERECTOMY      JOINT REPLACEMENT Right     WA AMPUTATION TOE,MT-P JT Left 10/11/2017    Procedure: AMPUTATION SECOND TOE;  Surgeon: Judson hTompson DPM;  Location: [facility];  Service: Podiatry    TONSILLECTOMY      TOTAL HIP ARTHROPLASTY      Right     History   Alcohol Use    Yes     Comment: occasional     History   Drug Use    Types: Marijuana     Comment: MEDICAL MARIJUANA-HAS NOT USED FOR 3 WEEKS     History   Smoking Status    Never Smoker   Smokeless Tobacco    Never Used     Family History: non-contributory    Meds/Allergies Hospital Medications: Current Facility-Administered Medications   Medication Dose Route Frequency    acetaminophen (TYLENOL) tablet 650 mg  650 mg Oral Q6H PRN    aspirin (ECOTRIN LOW STRENGTH) EC tablet 81 mg  81 mg Oral Daily    calcium carbonate (OYSTER SHELL,OSCAL) 500 mg tablet 1 tablet  1 tablet Oral Daily With Breakfast    heparin (porcine) subcutaneous injection 5,000 Units  5,000 Units Subcutaneous Q8H [address]    hydrALAZINE (APRESOLINE) injection 5 mg  5 mg Intravenous Q6H PRN    insulin lispro (HumaLOG) 100 units/mL subcutaneous injection 1-5 Units  1-5 Units Subcutaneous TID AC    insulin lispro (HumaLOG) 100 units/mL subcutaneous injection 1-5 Units  1-5 Units Subcutaneous HS    levothyroxine tablet 25 mcg  25 mcg Oral Early Morning    multivitamin-minerals (CENTRUM) tablet 1 tablet  1 tablet Oral Daily    ondansetron (ZOFRAN) injection 4 mg  4 mg Intravenous Q8H PRN    pravastatin (PRAVACHOL) tablet 40 mg  40 mg Oral Daily    sodium chloride 0 9 % infusion  50 mL/hr Intravenous Continuous     Home Medications:   Prescriptions Prior to Admission   Medication    ACCU-CHEK RUBINA PLUS test strip    ACCU-CHEK SOFTCLIX LANCETS lancets    acetaminophen (TYLENOL) 500 mg tablet    ASPIR-LOW 81 MG EC tablet    Calcium Carbonate-Vitamin D (CALTRATE 600+D PO)    levothyroxine 25 mcg tablet    lisinopril (ZESTRIL) 2 5 mg tablet    metoprolol succinate (TOPROL XL) 25 mg 24 hr tablet    Misc Natural Products (ENERGY FOCUS PO)    multivitamin-iron-minerals-folic acid (CENTRUM) chewable tablet    pravastatin (PRAVACHOL) 40 mg tablet       Allergies   Allergen Reactions    Atorvastatin      Reaction unknown 10/23/2018-    Other Rash     Patient sensitive to adhesives from tape       Objective   Vitals: Blood pressure 127/51, pulse 76, temperature 98 2 °F (36 8 °C), temperature source Oral, resp  rate (!) 25, height 4' 11" (1 499 m), weight 78 4 kg (172 lb 13 5 oz), SpO2 95 %    Orthostatic Blood Pressures      Most Recent Value   Blood Pressure  127/51 filed at 12/10/2018 1000   Patient Position - Orthostatic VS  Lying filed at 12/10/2018 1000            Intake/Output Summary (Last 24 hours) at 12/10/18 1111  Last data filed at 12/10/18 0957   Gross per 24 hour   Intake            983 6 ml   Output             1525 ml   Net           -541 4 ml       Invasive Devices     Peripheral Intravenous Line            Peripheral IV 12/09/18 Left Hand less than 1 day          Line            Venous Sheath 6 Fr  Right Femoral less than 1 day                Physical Exam   Constitutional: She is oriented to person, place, and time  She appears well-developed and well-nourished  HENT:   Head: Normocephalic and atraumatic  Eyes: Pupils are equal, round, and reactive to light  EOM are normal    Neck: Normal range of motion  Neck supple  Cardiovascular: Regular rhythm  Bradycardia present  Pulmonary/Chest: Effort normal and breath sounds normal    Abdominal: Soft  Bowel sounds are normal    Musculoskeletal: Normal range of motion  Neurological: She is alert and oriented to person, place, and time  Skin: Skin is warm and dry  Psychiatric: She has a normal mood and affect  Lab Results: I have personally reviewed pertinent lab results  Results from last 7 days  Lab Units 12/10/18  0544 12/09/18  1155   WBC Thousand/uL 5 11 7 48   HEMOGLOBIN g/dL 11 3* 12 0   HEMATOCRIT % 34 8 35 9   PLATELETS Thousands/uL 191 214       Results from last 7 days  Lab Units 12/10/18  0544 12/09/18  1155   POTASSIUM mmol/L 3 8 4 3   CHLORIDE mmol/L 102 102   CO2 mmol/L 29 31   BUN mg/dL 17 25   CREATININE mg/dL 0 60 0 78   CALCIUM mg/dL 9 2 9 4           Results from last 7 days  Lab Units 12/10/18  0544   MAGNESIUM mg/dL 1 8       Imaging: I have personally reviewed pertinent reports      ECHO:   Results for orders placed during the hospital encounter of 10/28/18   Echo complete with contrast if indicated    Narrative [address] [place] [address]  [phone]    Transthoracic Echocardiogram  2D, M-mode, Doppler, and Color Doppler    Study date:  29-Oct-2018    Patient: Myra Her  MR number: [id number]  Account number: [de-identified]  : 1929  Age: 80 years  Gender: Female  Status: Outpatient  Location: Bedside  Height: 59 in  Weight: 173 6 lb  BP: 169/ 78 mmHg    Indications: Murmur    Diagnoses: R01 1 - Cardiac murmur, unspecified    Sonographer:  Jakub Parmar  Primary Physician:  Wilfred Shepard MD  Referring Physician:  Heather Rice,, FERMIN  Group:  [id number]  Interpreting Physician:  Hamzah Green MD    SUMMARY    LEFT VENTRICLE:  Systolic function was normal  Ejection fraction was estimated to be 55 %  This study was inadequate for the evaluation of regional wall motion  Wall thickness was markedly increased  There was severe concentric hypertrophy  VENTRICULAR SEPTUM:  There was dyssynergic motion  These changes are consistent with a conduction abnormality or paced rhythm  RIGHT VENTRICLE:  The ventricle was dilated  Systolic function was normal     AORTIC VALVE:  The valve was probably trileaflet  Leaflets exhibited moderately to markedly increased thickness, moderate calcification, moderately reduced cuspal separation, and sclerosis  There was mild stenosis by gradients  Vmax 2 6 m/s, mean gradient 16 mm Hg, maximum gradient 27 mm Hg  TRICUSPID VALVE:  There was moderate regurgitation  Estimated peak PA pressure was 55 mmHg  The findings suggest moderate pulmonary hypertension  HISTORY: PRIOR HISTORY: Syncope, hypertension, diabetes, left bundle block branch    PROCEDURE: The procedure was performed at the bedside  This was a routine study  The transthoracic approach was used  The study included complete 2D imaging, M-mode, complete spectral Doppler, and color Doppler   Images were obtained from  the parasternal, apical, subcostal, and suprasternal notch acoustic windows  Echocardiographic views were limited due to restricted patient mobility, decreased penetration, and lung interference  This was a technically difficult study  LEFT VENTRICLE: Size was normal  Systolic function was normal  Ejection fraction was estimated to be 55 %  This study was inadequate for the evaluation of regional wall motion  Wall thickness was markedly increased  There was severe  concentric hypertrophy  DOPPLER: There was an increased relative contribution of atrial contraction to ventricular filling  VENTRICULAR SEPTUM: There was dyssynergic motion  These changes are consistent with a conduction abnormality or paced rhythm  RIGHT VENTRICLE: The ventricle was dilated  Systolic function was normal     LEFT ATRIUM: The atrium was dilated  RIGHT ATRIUM: The atrium was dilated  MITRAL VALVE: There was moderate to marked annular calcification  DOPPLER: There was no evidence for stenosis  There was no significant regurgitation  AORTIC VALVE: The valve was probably trileaflet  Leaflets exhibited moderately to markedly increased thickness, moderate calcification, moderately reduced cuspal separation, and sclerosis  DOPPLER: There was mild stenosis by gradients  Vmax 2 6 m/s, mean gradient 16 mm Hg, maximum gradient 27 mm Hg  There was no significant regurgitation  TRICUSPID VALVE: The valve structure was normal  There was normal leaflet separation  DOPPLER: There was no evidence for stenosis  There was moderate regurgitation  Estimated peak PA pressure was 55 mmHg  The findings suggest moderate  pulmonary hypertension  PULMONIC VALVE: Leaflets exhibited normal thickness, no calcification, and normal cuspal separation  DOPPLER: The transpulmonic velocity was within the normal range  There was mild regurgitation  PERICARDIUM: There was no pericardial effusion  The pericardium was normal in appearance      AORTA: The root exhibited top normal size at 3 8 cm and fibrocalcific change  SYSTEM MEASUREMENT TABLES    2D  %FS: 28 05 %  Ao Diam: 3 66 cm  EDV(Teich): 61 24 ml  EF Biplane: 54 43 %  EF(Teich): 55 04 %  ESV(Teich): 27 53 ml  IVSd: 1 37 cm  LA Area: 26 21 cm2  LA Diam: 3 67 cm  LVEDV MOD A2C: 94 62 ml  LVEDV MOD A4C: 62 24 ml  LVEDV MOD BP: 77 26 ml  LVEF MOD A2C: 59 %  LVEF MOD A4C: 49 46 %  LVESV MOD A2C: 38 8 ml  LVESV MOD A4C: 31 46 ml  LVESV MOD BP: 35 21 ml  LVIDd: 3 78 cm  LVIDs: 2 72 cm  LVLd A2C: 7 05 cm  LVLd A4C: 7 17 cm  LVLs A2C: 6 04 cm  LVLs A4C: 5 92 cm  LVOT Diam: 2 2 cm  LVPWd: 1 38 cm  RA Area: 23 62 cm2  RVIDd: 3 78 cm  SV MOD A2C: 55 82 ml  SV MOD A4C: 30 78 ml  SV(Teich): 33 71 ml    CW  AV Env  Ti: 279 12 ms  AV MaxP 98 mmHg  AV VTI: 46 73 cm  AV Vmax: 2 23 m/s  AV Vmean: 1 67 m/s  AV meanP 18 mmHg  TR MaxP 48 mmHg  TR Vmax: 3 59 m/s    MM  TAPSE: 2 39 cm    PW  JN (VTI): 1 4 cm2  JN Vmax: 1 35 cm2  E': 0 06 m/s  E/E': 15 79  LVOT Env  Ti: 279 41 ms  LVOT VTI: 17 24 cm  LVOT Vmax: 0 79 m/s  LVOT Vmean: 0 62 m/s  LVOT maxP 51 mmHg  LVOT meanP 61 mmHg  LVSI Dopp: 37 59 ml/m2  LVSV Dopp: 65 41 ml  MV A Steven: 1 14 m/s  MV Dec [place]: 7 35 m/s2  MV DecT: 130 63 ms  MV E Steven: 0 96 m/s  MV E/A Ratio: 0 84  MV PHT: 37 88 ms  MVA By PHT: 5 81 cm2    [facility] Accredited Echocardiography Laboratory    Prepared and electronically signed by    Leonard Nicholas MD  Signed 29-Oct-2018 17:35:21         CATH/STRESS TEST (2017):   CORONARY VESSELS:   --  The coronary circulation is left dominant  --  Left main: The vessel was large sized  Angiography showed minor luminal irregularities  --  LAD: The vessel was medium sized  Angiography showed mild atherosclerosis  --  Proximal LAD: There was a 10 % stenosis at the site of a prior stent  --  Circumflex: The vessel was large sized (dominant)  Angiography showed mild atherosclerosis  There were three major obtuse marginals    --  RCA: The vessel was small (non-dominant)  Angiography showed mild atherosclerosis    --  Ostial RCA: There was a 40 % stenosis        EKG:

## 2018-12-10 NOTE — RESPIRATORY THERAPY NOTE
RT Protocol Note  Hobart Kocher 80 y o  female MRN: [id number]  Unit/Bed#: [facility] 242-23 Encounter: [id number]    Assessment    Principal Problem:    Syncope  Active Problems:    HTN (hypertension)    History of placement of stent in LAD coronary artery    Hyperlipidemia    History of atrial flutter    LBBB (left bundle branch block)    Diabetes mellitus ([facility] [address] )    Hypothyroidism    Heart block AV second degree      Home Pulmonary Medications:    Home Devices/Therapy:  (None)    Past Medical History:   Diagnosis Date    Abnormal nuclear stress test     last assessed 04/03/2017    Anxiety     Arthritis     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    Bundle branch block, left     Cardiomyopathy ([facility] [address] )     Chronic pain     chronic b/l shoulder pain    Coronary artery disease     Diabetes mellitus ([facility] )     GERD (gastroesophageal reflux disease)     H/O atrial flutter     History of DVT (deep vein thrombosis)     History of pulmonary embolism     Hyperlipidemia     Hypertension     Hypothyroid     Renal calculi     Shortness of breath      Social History     Social History    Marital status: /Civil Union     Spouse name: N/A    Number of children: N/A    Years of education: N/A     Social History Main Topics    Smoking status: Never Smoker    Smokeless tobacco: Never Used    Alcohol use Yes      Comment: occasional    Drug use: Yes     Types: Marijuana      Comment: MEDICAL MARIJUANA-HAS NOT USED FOR 3 WEEKS    Sexual activity: Not on file     Other Topics Concern    Not on file     Social History Narrative    No narrative on file       Subjective         Objective    Physical Exam:   Assessment Type: Assess only  General Appearance: Alert, Awake  Respiratory Pattern: Normal  Chest Assessment: Chest expansion symmetrical  Bilateral Breath Sounds: Clear  O2 Device: RA    Vitals:  Blood pressure (!) 104/45, pulse (!) 54, temperature 98 6 °F (37 °C), temperature source Oral, resp  rate (!) 30, height 4' 11" (1 499 m), weight 78 8 kg (173 lb 11 6 oz), SpO2 97 %  Imaging and other studies: I have personally reviewed pertinent reports  O2 Device: RA     Plan    Respiratory Plan: Discontinue Protocol        Resp Comments: (P) Pt admit with syncope  She has no pulmonary history and currently takes no respiratory meds at home  Pt c/o no SOB or distress  Breath sounds are clear  No indication for bronchodilators at this time  Will DC protocol

## 2018-12-10 NOTE — ANESTHESIA PREPROCEDURE EVALUATION
Review of Systems/Medical History  Patient summary reviewed  Chart reviewed  No history of anesthetic complications     Cardiovascular  Hyperlipidemia, Hypertension , No past MI , CAD , Cardiac stents > 1 year History of percutaneous transluminal coronary angioplasty, Dysrhythmias (Hx LBBB) , atrial flutter, DVT  Comment: Ef 55, RV normal systolic function, mild AS, RVSP 55, PE,  Pulmonary  Negative pulmonary ROS        GI/Hepatic    GERD well controlled,        Kidney stones,        Endo/Other  Diabetes well controlled type 2 Oral agent, History of thyroid disease , hypothyroidism,   Obesity    GYN       Hematology  Negative hematology ROS      Musculoskeletal    Arthritis     Neurology  Negative neurology ROS      Psychology   Anxiety,              Physical Exam    Airway    Mallampati score: II  TM Distance: >3 FB  Neck ROM: full     Dental   No notable dental hx     Cardiovascular  Rhythm: regular, Rate: normal, Cardiovascular exam normal    Pulmonary  Pulmonary exam normal Breath sounds clear to auscultation,     Other Findings        Anesthesia Plan  ASA Score- 3     Anesthesia Type- IV sedation with anesthesia with ASA Monitors  Additional Monitors:   Airway Plan:         Plan Factors-    Induction- intravenous  Postoperative Plan-     Informed Consent- Anesthetic plan and risks discussed with patient

## 2018-12-10 NOTE — PROGRESS NOTES
Rounded with Dr Kat Freeman and pt updated on plan of care  Pt agreeable to plan  Pt transported to cath lab for ppm placement  Bedside report given to April

## 2018-12-10 NOTE — SOCIAL WORK
Patient identified as HRR per criteria  Call made to DC appointment hotline with information as required for CM support follow up  Referral made to Op cm

## 2018-12-11 ENCOUNTER — APPOINTMENT (INPATIENT)
Dept: NON INVASIVE DIAGNOSTICS | Facility: HOSPITAL | Age: 83
DRG: 243 | End: 2018-12-11
Payer: MEDICARE

## 2018-12-11 ENCOUNTER — APPOINTMENT (INPATIENT)
Dept: RADIOLOGY | Facility: HOSPITAL | Age: 83
DRG: 243 | End: 2018-12-11
Payer: MEDICARE

## 2018-12-11 DIAGNOSIS — Z71.89 COMPLEX CARE COORDINATION: Primary | ICD-10-CM

## 2018-12-11 PROBLEM — E87.1 HYPONATREMIA: Status: ACTIVE | Noted: 2018-12-11

## 2018-12-11 LAB
ANION GAP SERPL CALCULATED.3IONS-SCNC: 5 MMOL/L (ref 4–13)
ANION GAP SERPL CALCULATED.3IONS-SCNC: 9 MMOL/L (ref 4–13)
BUN SERPL-MCNC: 12 MG/DL (ref 5–25)
BUN SERPL-MCNC: 23 MG/DL (ref 5–25)
CALCIUM SERPL-MCNC: 8.2 MG/DL (ref 8.3–10.1)
CALCIUM SERPL-MCNC: 9 MG/DL (ref 8.3–10.1)
CHLORIDE SERPL-SCNC: 99 MMOL/L (ref 100–108)
CHLORIDE SERPL-SCNC: 99 MMOL/L (ref 100–108)
CO2 SERPL-SCNC: 26 MMOL/L (ref 21–32)
CO2 SERPL-SCNC: 26 MMOL/L (ref 21–32)
CREAT SERPL-MCNC: 0.58 MG/DL (ref 0.6–1.3)
CREAT SERPL-MCNC: 1.03 MG/DL (ref 0.6–1.3)
ERYTHROCYTE [DISTWIDTH] IN BLOOD BY AUTOMATED COUNT: 13.5 % (ref 11.6–15.1)
GFR SERPL CREATININE-BSD FRML MDRD: 48 ML/MIN/1.73SQ M
GFR SERPL CREATININE-BSD FRML MDRD: 82 ML/MIN/1.73SQ M
GLUCOSE SERPL-MCNC: 105 MG/DL (ref 65–140)
GLUCOSE SERPL-MCNC: 105 MG/DL (ref 65–140)
GLUCOSE SERPL-MCNC: 110 MG/DL (ref 65–140)
GLUCOSE SERPL-MCNC: 112 MG/DL (ref 65–140)
GLUCOSE SERPL-MCNC: 122 MG/DL (ref 65–140)
GLUCOSE SERPL-MCNC: 134 MG/DL (ref 65–140)
HCT VFR BLD AUTO: 36.7 % (ref 34.8–46.1)
HGB BLD-MCNC: 12.1 G/DL (ref 11.5–15.4)
MAGNESIUM SERPL-MCNC: 2 MG/DL (ref 1.6–2.6)
MCH RBC QN AUTO: 31.6 PG (ref 26.8–34.3)
MCHC RBC AUTO-ENTMCNC: 33 G/DL (ref 31.4–37.4)
MCV RBC AUTO: 96 FL (ref 82–98)
OSMOLALITY UR/SERPL-RTO: 282 MMOL/KG (ref 282–298)
PLATELET # BLD AUTO: 180 THOUSANDS/UL (ref 149–390)
PMV BLD AUTO: 10.8 FL (ref 8.9–12.7)
POTASSIUM SERPL-SCNC: 3.9 MMOL/L (ref 3.5–5.3)
POTASSIUM SERPL-SCNC: 4 MMOL/L (ref 3.5–5.3)
RBC # BLD AUTO: 3.83 MILLION/UL (ref 3.81–5.12)
SODIUM SERPL-SCNC: 130 MMOL/L (ref 136–145)
SODIUM SERPL-SCNC: 134 MMOL/L (ref 136–145)
WBC # BLD AUTO: 6.7 THOUSAND/UL (ref 4.31–10.16)

## 2018-12-11 PROCEDURE — 93308 TTE F-UP OR LMTD: CPT

## 2018-12-11 PROCEDURE — 99232 SBSQ HOSP IP/OBS MODERATE 35: CPT | Performed by: INTERNAL MEDICINE

## 2018-12-11 PROCEDURE — 93308 TTE F-UP OR LMTD: CPT | Performed by: INTERNAL MEDICINE

## 2018-12-11 PROCEDURE — 80048 BASIC METABOLIC PNL TOTAL CA: CPT | Performed by: INTERNAL MEDICINE

## 2018-12-11 PROCEDURE — 93321 DOPPLER ECHO F-UP/LMTD STD: CPT | Performed by: INTERNAL MEDICINE

## 2018-12-11 PROCEDURE — 83930 ASSAY OF BLOOD OSMOLALITY: CPT | Performed by: INTERNAL MEDICINE

## 2018-12-11 PROCEDURE — 85027 COMPLETE CBC AUTOMATED: CPT | Performed by: PHYSICIAN ASSISTANT

## 2018-12-11 PROCEDURE — 80048 BASIC METABOLIC PNL TOTAL CA: CPT | Performed by: PHYSICIAN ASSISTANT

## 2018-12-11 PROCEDURE — 83735 ASSAY OF MAGNESIUM: CPT | Performed by: PHYSICIAN ASSISTANT

## 2018-12-11 PROCEDURE — 71046 X-RAY EXAM CHEST 2 VIEWS: CPT

## 2018-12-11 PROCEDURE — 82948 REAGENT STRIP/BLOOD GLUCOSE: CPT

## 2018-12-11 PROCEDURE — 93325 DOPPLER ECHO COLOR FLOW MAPG: CPT | Performed by: INTERNAL MEDICINE

## 2018-12-11 RX ORDER — SODIUM CHLORIDE 9 MG/ML
50 INJECTION, SOLUTION INTRAVENOUS CONTINUOUS
Status: DISCONTINUED | OUTPATIENT
Start: 2018-12-11 | End: 2018-12-12

## 2018-12-11 RX ADMIN — PRAVASTATIN SODIUM 40 MG: 40 TABLET ORAL at 09:29

## 2018-12-11 RX ADMIN — CALCIUM 1 TABLET: 500 TABLET ORAL at 09:29

## 2018-12-11 RX ADMIN — HEPARIN SODIUM 5000 UNITS: 5000 INJECTION INTRAVENOUS; SUBCUTANEOUS at 14:00

## 2018-12-11 RX ADMIN — Medication 1 TABLET: at 09:29

## 2018-12-11 RX ADMIN — LEVOTHYROXINE SODIUM 25 MCG: 25 TABLET ORAL at 06:42

## 2018-12-11 RX ADMIN — ASPIRIN 81 MG: 81 TABLET, COATED ORAL at 09:29

## 2018-12-11 RX ADMIN — LISINOPRIL 2.5 MG: 2.5 TABLET ORAL at 09:29

## 2018-12-11 RX ADMIN — ACETAMINOPHEN 650 MG: 325 TABLET, FILM COATED ORAL at 16:58

## 2018-12-11 RX ADMIN — DOCUSATE SODIUM 100 MG: 100 CAPSULE, LIQUID FILLED ORAL at 16:59

## 2018-12-11 RX ADMIN — ACETAMINOPHEN 650 MG: 325 TABLET, FILM COATED ORAL at 09:29

## 2018-12-11 RX ADMIN — METOPROLOL SUCCINATE 25 MG: 25 TABLET, EXTENDED RELEASE ORAL at 09:29

## 2018-12-11 RX ADMIN — HEPARIN SODIUM 5000 UNITS: 5000 INJECTION INTRAVENOUS; SUBCUTANEOUS at 06:42

## 2018-12-11 RX ADMIN — SODIUM CHLORIDE 50 ML/HR: 0.9 INJECTION, SOLUTION INTRAVENOUS at 17:06

## 2018-12-11 RX ADMIN — HEPARIN SODIUM 5000 UNITS: 5000 INJECTION INTRAVENOUS; SUBCUTANEOUS at 22:13

## 2018-12-11 RX ADMIN — DOCUSATE SODIUM 100 MG: 100 CAPSULE, LIQUID FILLED ORAL at 09:29

## 2018-12-11 NOTE — ASSESSMENT & PLAN NOTE
Lab Results   Component Value Date    HGBA1C 5 7 09/07/2018       Recent Labs      12/10/18   1617  12/10/18   2147  12/11/18   0641  12/11/18   1116   POCGLU  105  148*  122  105       Blood Sugar Average: Last 72 hrs:  (P) 112 [id number]     Patient not on any medications at home  Continue sliding scale for now  Blood glucose well controlled

## 2018-12-11 NOTE — RESTORATIVE TECHNICIAN NOTE
Restorative Specialist Mobility Note       Activity: Ambulate in room, Chair, Dangle, Stand at bedside (Educated/encouraged pt to ambulate with assistance 3-4 x's/day  Chair alarm on   Pt callbell, phone/tray within reach )     Assistive Device: Other (Comment), Sling (HHA x2 back to bed, pt is NWB to L UE and wearing L arm sling)    Luz SWARTZ, Restorative Technician, [employer]

## 2018-12-11 NOTE — PROGRESS NOTES
Pt just came back to floor from CXR  Pt back in bed comfortable  No complaints of pain  VSS  Call bell within reach  Bed locked and low  Will continue to monitor

## 2018-12-11 NOTE — RESTORATIVE TECHNICIAN NOTE
Restorative Specialist Mobility Note       Activity: Ambulate in room, Chair, Dangle, Stand at bedside (Educated/encouraged pt to ambulate with assistance 3-4 x's/day   Pt callbell, phone/tray within reach )     Assistive Device: Other (Comment), Sling (HHA x2 OOB to the chair, Pt is NWB to L UE and wearing L arm sling)    Jason Jewell BS, Restorative Technician, [employer]

## 2018-12-11 NOTE — PLAN OF CARE
CARDIOVASCULAR - ADULT     Maintains optimal cardiac output and hemodynamic stability Progressing     Absence of cardiac dysrhythmias or at baseline rhythm Progressing        DISCHARGE PLANNING     Discharge to home or other facility with appropriate resources Progressing        INFECTION - ADULT     Absence or prevention of progression during hospitalization Progressing     Absence of fever/infection during neutropenic period Progressing        Nutrition/Hydration-ADULT     Nutrient/Hydration intake appropriate for improving, restoring or maintaining nutritional needs Progressing        PAIN - ADULT     Verbalizes/displays adequate comfort level or baseline comfort level Progressing        Potential for Falls     Patient will remain free of falls Progressing        SAFETY ADULT     Maintain or return to baseline ADL function Progressing     Maintain or return mobility status to optimal level Progressing

## 2018-12-11 NOTE — PROGRESS NOTES
Pt tx to P7 from P5  Pt resting comfortably in bed  Telemetry on  LCW dressing at pacer site C/D/I  R Groin puncture site dressing C/D/I  Bed pan used  SCD's on  Warmed up dinner and set her up  Bed locked and low  Bed alarm on  Call bell and phone within reach  Will continue to monitor

## 2018-12-11 NOTE — RESTORATIVE TECHNICIAN NOTE
Restorative Specialist Mobility Note       Activity: Ambulate in room, Chair, Dangle, Stand at bedside (Educated/encouraged pt to ambulate with assistance 3-4 x's/day  Chair alarm on   Pt callbell, phone/tray within reach )     Assistive Device: Other (Comment), Sling (HHA x2 OOB to the chair, pt is NWB to L UE)       Alexy Roajs BS, Restorative Technician, [employer]

## 2018-12-11 NOTE — PROGRESS NOTES
Progress Note - Pancho Coates 7/24/1929, 80 y o  female MRN: [id number]    Unit/Bed#: [facility] 704-01 Encounter: [id number]    Primary Care Provider: Gaby Brown MD   Date and time admitted to hospital: 12/9/2018  7:07 PM        * Syncope   Assessment & Plan    Possibly from heart block  Status post pacemaker placement  Continue to monitor on tele  Appreciate EP input  Patient's blood pressure did drop 1 time today and she was feeling dizzy and lightheaded  But it improved later on without any significant interventions  Orthostatic blood pressure was negative  Will start the patient on gentle IV hydration  Heart block AV second degree   Assessment & Plan    Along with left bundle branch block  Status post pacemaker placement  Appreciate EP input  Hyponatremia   Assessment & Plan    Exact reason not clear  Possibly due to dehydration as patient mentioned that she has not been eating drinking well for last 1-2 days  Will check urine and serum osmolality and urine sodium  Start gentle IV fluids at 50 cc  Recheck BMP in the afternoon and tomorrow morning  Hypothyroidism   Assessment & Plan    TSH normal      Diabetes mellitus [facility])   Assessment & Plan    Lab Results   Component Value Date    HGBA1C 5 7 09/07/2018       Recent Labs      12/10/18   1617  12/10/18   2147  12/11/18   0641  12/11/18   1116   POCGLU  105  148*  122  105       Blood Sugar Average: Last 72 hrs:  (P) 112 [id number]     Patient not on any medications at home  Continue sliding scale for now  Blood glucose well controlled  History of atrial flutter   Assessment & Plan    Status post ablation in 2015  HTN (hypertension)   Assessment & Plan    Currently well controlled  Continue lisinopril and metoprolol           VTE Pharmacologic Prophylaxis:   Pharmacologic: Heparin  Mechanical VTE Prophylaxis in Place: Yes    Patient Centered Rounds: I have performed bedside rounds with nursing staff today     Discussions with Specialists or Other Care Team Provider:     Education and Discussions with Family / Patient: patient  She said she will call her family  Time Spent for Care: 45 minutes  More than 50% of total time spent on counseling and coordination of care as described above  Current Length of Stay: 2 day(s)    Current Patient Status: Inpatient   Certification Statement: The patient will continue to require additional inpatient hospital stay due to above    Discharge Plan: pending improvement - needs PT TO eval    Code Status: Level 3 - DNAR and DNI      Subjective:   Pt seen and examined by me this morning  Pt complained of  generalized weakness  Says that she has not got out of bed for last 2-3 days now  Objective:     Vitals:   Temp (24hrs), Av 1 °F (37 3 °C), Min:98 4 °F (36 9 °C), Max:99 9 °F (37 7 °C)    Temp:  [98 4 °F (36 9 °C)-99 9 °F (37 7 °C)] 98 4 °F (36 9 °C)  HR:  [76-87] 87  Resp:  [16-20] 18  BP: ()/(48-94) 159/82  SpO2:  [94 %-96 %] 95 %  Body mass index is 33 75 kg/m²  Input and Output Summary (last 24 hours): Intake/Output Summary (Last 24 hours) at 18 1648  Last data filed at 18 1400   Gross per 24 hour   Intake              660 ml   Output             1400 ml   Net             -740 ml       Physical Exam:     Physical Exam    Constitutional: Pt appears well-developed and well-nourished  Not in any acute distress  HENT:   Head: Normocephalic and atraumatic  Eyes: EOM are normal    Neck: Neck supple  Cardiovascular: Normal rate, regular rhythm, normal heart sounds  Exam reveals no gallop and no friction rub  No murmur heard  Left chest wall PPM site - no bleeding, swelling  Pulmonary/Chest: Effort normal and breath sounds normal  No respiratory distress  Pt has no wheezes or rales  Abdominal: Soft  Non-distended, Non-tender  Bowel sounds are normal    Musculoskeletal: Normal range of motion     Neurological: alert and oriented to person, place, and time  Nomal strength and sensations  r  Psychiatric: normal mood and affect  Additional Data:     Labs:      Results from last 7 days  Lab Units 12/11/18  0548  12/09/18  1155   WBC Thousand/uL 6 70  < > 7 48   HEMOGLOBIN g/dL 12 1  < > 12 0   HEMATOCRIT % 36 7  < > 35 9   PLATELETS Thousands/uL 180  < > 214   NEUTROS PCT %  --   --  78*   LYMPHS PCT %  --   --  13*   MONOS PCT %  --   --  8   EOS PCT %  --   --  1   < > = values in this interval not displayed  Results from last 7 days  Lab Units 12/11/18  0548  12/09/18  1155   SODIUM mmol/L 130*  < > 138   POTASSIUM mmol/L 4 0  < > 4 3   CHLORIDE mmol/L 99*  < > 102   CO2 mmol/L 26  < > 31   BUN mg/dL 12  < > 25   CREATININE mg/dL 0 58*  < > 0 78   ANION GAP mmol/L 5  < > 5   CALCIUM mg/dL 9 0  < > 9 4   ALBUMIN g/dL  --   --  3 4*   TOTAL BILIRUBIN mg/dL  --   --  0 30   ALK PHOS U/L  --   --  76   ALT U/L  --   --  24   AST U/L  --   --  17   GLUCOSE RANDOM mg/dL 112  < > 97   < > = values in this interval not displayed  Results from last 7 days  Lab Units 12/11/18  1116 12/11/18  0641 12/10/18  2147 12/10/18  1617 12/10/18  1054 12/10/18  0802 12/09/18  2112   POC GLUCOSE mg/dl 105 122 148* 105 101 103 105                   * I Have Reviewed All Lab Data Listed Above  * Additional Pertinent Lab Tests Reviewed:  Lucía 66 Admission Reviewed    Imaging:    Imaging Reports Reviewed Today Include:   Imaging Personally Reviewed by Myself Includes:      Recent Cultures (last 7 days):           Last 24 Hours Medication List:     Current Facility-Administered Medications:  acetaminophen 650 mg Oral Q6H PRN Kimberly Leslie PA-C   aspirin 81 mg Oral Daily Gloria Romero PA-C   calcium carbonate 1 tablet Oral Daily With Breakfast Gloria Romero PA-C   docusate sodium 100 mg Oral BID FERMIN Santo   heparin (porcine) 5,000 Units Subcutaneous Q8H [facility] Gloria Romero PA-C   hydrALAZINE 10 mg Intravenous Q6H PRN FERMIN Santo   insulin lispro 1-5 Units Subcutaneous TID AC Gloria Romero PA-C   insulin lispro 1-5 Units Subcutaneous HS Gloria Romero PA-C   levothyroxine 25 mcg Oral Early Morning Gloria Romero PA-C   lisinopril 2 5 mg Oral Daily FERMIN Santo   metoprolol succinate 25 mg Oral Daily FERMIN Santo   multivitamin-minerals 1 tablet Oral Daily Gloria Romero PA-C   ondansetron 4 mg Intravenous Q8H PRN Gloria Romero PA-C   pravastatin 40 mg Oral Daily Gloria Romero PA-C   sodium chloride 50 mL/hr Intravenous Continuous Everton Sharp MD        Today, Patient Was Seen By: Everton Sharp MD    ** Please Note: Dictation voice to text software may have been used in the creation of this document   **

## 2018-12-11 NOTE — ASSESSMENT & PLAN NOTE
Exact reason not clear  Possibly due to dehydration as patient mentioned that she has not been eating drinking well for last 1-2 days  Will check urine and serum osmolality and urine sodium  Start gentle IV fluids at 50 cc  Recheck BMP in the afternoon and tomorrow morning

## 2018-12-11 NOTE — OCCUPATIONAL THERAPY NOTE
Occupational Therapy         Patient Name: Sandi Zambrano  GCUHL'Z Date: 12/11/2018      OT orders received  Chart reviewed   Attempted to see pt twice  At first attempt, informed by PT of pt's refusal 10 minutes prior indicating she would prefer therapy in the afternoon having just ambulated w/ restorative  [facility]   OT will continue to follow to attempt initial eval     Barbara Ackerman MS, OTR/L

## 2018-12-11 NOTE — PHYSICAL THERAPY NOTE
Physical Therapy Cancellation Note:    Attempt x2 for pt to participate in therapy PT eval  Pt declined during first attempt and second attempt pt off floor at [address] per Cristino Melendrez)  Cancel PT services for today and will cont to follow as able

## 2018-12-11 NOTE — ASSESSMENT & PLAN NOTE
Possibly from heart block  Status post pacemaker placement  Continue to monitor on tele  Appreciate EP input  Patient's blood pressure did drop 1 time today and she was feeling dizzy and lightheaded  But it improved later on without any significant interventions  Orthostatic blood pressure was negative  Will start the patient on gentle IV hydration

## 2018-12-11 NOTE — PROGRESS NOTES
Discussed with Dr Richa Guaman pt's low temps 99 7-99 9, and that while out in chair she was dizzy and BP was 98/48, rechecking now laying and sitting in bed for her orthos as her standing did not go well today, heavy assist of 2, knees very weak, laying was 115/50 sitting 131/55 MILD

## 2018-12-11 NOTE — DISCHARGE INSTRUCTIONS
Please refer to post pacemaker implantation discharge instructions and restrictions and your pacemaker booklet/temporary card  Keep incision dry for one week  Do not use lotions/powders/creams on incision  Leave outer bandage in place for 1 week - it is water proof, and as long as it is fully adhered to your skin you may shower with it  If it appears as though the bandage is coming off and/or there is any communication to the area of device incision, please then keep the whole area dry for the remaining week  After 1 week, please remove by pulling all edges away from the center of the bandage  No overhead reaching/pushing/pulling/lifting greater than 5-10lbs with left arm for one month  Please call the office if you notice redness, swelling, bleeding, or drainage from incision or if you develop fevers  AFTER PACEMAKER CARE:    If you have any questions, please call [phone] to speak with a nurse (8:30am-4pm, or [phone] after hours)  For appointments, please call [phone]  WHAT YOU SHOULD KNOW:   A pacemaker is a small, battery-powered device that is placed under your skin in your upper chest area with wires placed through a vein that lead directly into the heart  It helps regulate your heart rate and prevent your heart from beating too slowly                  AFTER YOU LEAVE:     Medicines:     · Pain medicine: You may need medicine to take away or decrease pain  ¨ Learn how to take your medicine  Ask what medicine and how much you should take  Be sure you know how, when, and how often to take it  Usually Over the counter pain medicine is sufficient to control pain (Acetominophen or Ibuprofen) Ask your doctor if you may take these  If this does not control your pain, narcotic pain killers may be prescribed, please call if you need prescription  ¨ Do not wait until the pain is severe before you take your medicine  Tell caregivers if your pain does not decrease      ¨ Pain medicine can make you dizzy or sleepy  Prevent falls by calling someone when you get out of bed or if you need help  Take your medicine as directed  Call your healthcare provider if you think your medicine is not helping or if you have side effects  Tell him if you are allergic to any medicine  Follow up with your cardiologist after your procedure: You will need a follow-up visit approximately 2 weeks after you leave the hospital  Your cardiologist will check your wound and make sure that your pacemaker is working correctly  Follow the instructions to check your pacemaker: Your cardiologist or primary healthcare provider will check your pacemaker and the battery regularly  He will use a computer to check your pacemaker over the telephone or wireless device which will be given to you  Pacemaker batteries usually last 5 to 10 years  The pacemaker unit will be replaced when the battery gets low  This is a simpler procedure than the original one to implant your pacemaker  Wound care:  Keep your incision dry for one week  Sponge/tub baths are preferred, try to avoid a shower for 7 days  Do not use lotions/powders/creams on incision  Remove outer bandage 48 hours after implantation  Leave underlying steri-strips in place, they will either fall off on their own or will be removed at 2 week follow up appointment  Please call the office if you notice redness, swelling, bleeding, or drainage from incision or if you develop fevers  Activity:   · Arm movement and lifting:  Be careful using the arm on the side of your pacemaker  Do not move your arm for the first 24 hours after your procedure  Do not  lift your arm above your shoulder or lift more than 10 pounds for one month after your procedure  Avoid pushing, pulling, or repetitive arm movements for one month  This helps the leads stay in place and helps your wound heal  Ask your caregiver when you can drive after your procedure   You may move your arm side to side without lifting above your shoulder, and do not need to wear a sling at home  · Typically driving is allowed after 1 week post pacemaker if you are stable, however in some cases it may be longer and you should discuss with your doctor before proceeding  · Sports:  Ask your caregiver when it is okay to play tennis, golf, basketball, or any sport that requires you to lift your arms  Do not play full contact sports, such as football, that could damage your pacemaker  Ask your cardiologist or primary healthcare provider how much and what kinds of physical activity are safe for you  Living with a pacemaker:   · Tell all caregivers you have a pacemaker: This includes surgeons, radiologists, and medical technicians  You may want to wear a medical alert ID bracelet or necklace that states that you have a pacemaker  · Carry your pacemaker ID card: Make sure you receive a pacemaker ID card  Carry it with you at all times  It lists important information about your pacemaker  Show it to airport security if you travel  · Avoid electrical interference:  Avoid welding equipment and other equipment with large magnets or electric fields  These things could interfere with how your pacemaker works  Use your cell phone on the ear opposite from your pacemaker  Do not carry your cell phone in your shirt pocket over your chest      · Some Pacemakers are MRI safe  Ask you doctor if it is safe to proceed with MRI and let the radiologist and staff know you have a pacemaker  · Do not touch the skin around your pacemaker: This can cause damage to the lead wires or move the pacemaker unit from where it should be  Contact your cardiologist or primary healthcare provider if:   · The area around your pacemaker has increasing amount of pain after surgery  The pain should improve over first few days after implantation  · The skin around your stitches has increasing redness, swelling, or has drainage  This may mean that you have an infection  · You have a fever  · You have chills, a cough, and feel weak or achy  These are also signs of infection  · Your feet or ankles are more swollen than your baseline  · Your Heart rate is less than 50 beats per minute     Seek care immediately if:   · Your bandage becomes soaked with blood  · Your pacemaker is swelling rapidly    · Your stitches open up  · You feel your heart suddenly beating very slowly or quickly  · You become too weak or dizzy to stand, or you pass out  · Your arm or leg feels warm, tender, and painful  It may look swollen and red  · You have chest pain that does not go away with rest or medicine  · You feel lightheaded, short of breath, and have chest pain  · You cough up blood  © 2014 [address] is for End User's use only and may not be sold, redistributed or otherwise used for commercial purposes  All illustrations and images included in CareNotes® are the copyrighted property of [employer]  or Uli Bautista  The above information is an  only  It is not intended as medical advice for individual conditions or treatments  Talk to your doctor, nurse or pharmacist before following any medical regimen to see if it is safe and effective for you

## 2018-12-11 NOTE — PROGRESS NOTES
Progress Note - Electrophysiology-Cardiology (EP)   Karl Park 80 y o  female MRN: [id number]  Unit/Bed#: [facility] 704-01 Encounter: [id number]      Assessment:  1  Symptomatic Mobitz type 2 and left bundle-branch block, status post Medtronic dual-chamber pacemaker implantation 12/10/2018  2  Preserved LV systolic function per echo 10/2018   A ) mild aortic stenosis, moderate tricuspid regurgitation  3  CAD status post prior STONE to proximal LAD, known 70% small OM and 50% nonobstructive RCA lesion  4  History of typical atrial flutter status post ablation in 2015  5  Hypertension  6  Hyperlipidemia  7  Hypothyroidism    Plan:  1  Incision clean, dry, intact without swelling, hematoma, or signs of infection  Per reports device interrogation shows appropriate function including lead sensing, thresholds, impedances  PA and lateral chest x-ray is currently pending  2  Post implantation discharge instructions and restrictions were reviewed with the patient in detail, all follow-up has been arranged  All questions were answered  3  She states just before I came in the room she began to feel unwell, slightly dizzy  She states she has had these symptoms in the past, and they are largely unchanged  Blood pressures were then taken and she was found to be hypotensive and felt weak  Her most recent blood pressure was improved  There were no arrhythmias noted on telemetry, but given that she just had a pacemaker implanted will get a limited echo to rule out an effusion  4  She was previously on metoprolol as an outpatient, and this has been restarted  She has infrequent PVCs on telemetry, we can continue to monitor  5  Pending the results of her echocardiogram, she would then be stable for discharge from an EP standpoint with outpatient follow-up        Subjective/Objective   Chief Complaint: "I just got dizzy"    Subjective:  Patient states she has been feeling well, however she recently was moved to the chair after being in bed for an extended period  After eating lunch, she suddenly felt dizzy and unwell  She states she has had this in the past, and is largely unchanged  She otherwise denies significant pain, shortness of breath, or edema      Objective:     Vitals: /76 (BP Location: Right arm)   Pulse 83   Temp 99 7 °F (37 6 °C) (Oral)   Resp 16   Ht 4' 11" (1 499 m)   Wt 75 8 kg (167 lb 1 7 oz)   SpO2 94%   BMI 33 75 kg/m²   Vitals:    12/10/18 1859 12/11/18 0600   Weight: 79 5 kg (175 lb 4 3 oz) 75 8 kg (167 lb 1 7 oz)     Orthostatic Blood Pressures      Most Recent Value   Blood Pressure  142/76 filed at 12/11/2018 5106   Patient Position - Orthostatic VS  Lying filed at 12/11/2018 9429            Intake/Output Summary (Last 24 hours) at 12/11/18 1308  Last data filed at 12/11/18 1100   Gross per 24 hour   Intake              180 ml   Output             1750 ml   Net            -1570 ml       Invasive Devices     Peripheral Intravenous Line            Peripheral IV 12/10/18 Left Hand less than 1 day                            Scheduled Meds:  Current Facility-Administered Medications:  acetaminophen 650 mg Oral Q6H PRN Mary Ferguson PA-C   aspirin 81 mg Oral Daily Gloria Romero PA-C   calcium carbonate 1 tablet Oral Daily With Breakfast Gloria Romero PA-C   docusate sodium 100 mg Oral BID Dayna Spironello V, CRNP   heparin (porcine) 5,000 Units Subcutaneous Q8H [address] Gloria Romero PA-C   hydrALAZINE 10 mg Intravenous Q6H PRN Dayna Spironello V, CRNP   insulin lispro 1-5 Units Subcutaneous TID AC Gloria Romero PA-C   insulin lispro 1-5 Units Subcutaneous HS Gloria Romero PA-C   levothyroxine 25 mcg Oral Early Morning Gloria Romero PA-C   lisinopril 2 5 mg Oral Daily Dayna Spironello V, CRMARANDA   metoprolol succinate 25 mg Oral Daily Dayna Spironello V, CRNP   multivitamin-minerals 1 tablet Oral Daily Gloria Romero PA-C   ondansetron 4 mg Intravenous Q8H PRN FABRICIO UrrutiaC pravastatin 40 mg Oral Daily Gloria Romero PA-C     Continuous Infusions:   PRN Meds:   acetaminophen    hydrALAZINE    ondansetron    Review of Systems: Cardiovascular ROS: positive for - dizziness  negative for - chest pain, palpitations or shortness of breath    Physical Exam:   GEN: NAD, alert and oriented, well appearing  SKIN: dry without significant lesions or rashes  HEENT: NCAT, PERRL, EOMs intact  NECK: No JVD appreciated  CARDIOVASCULAR: RRR, normal S1, S2 without rubs, or gallops appreciated; +FORREST  LUNGS: Clear to auscultation bilaterally without wheezes, rhonchi, or rales  ABDOMEN: Soft, nontender, nondistended  EXTREMITIES/VASCULAR: perfused without clubbing, cyanosis, or edema b/l  PSYCH: Normal mood and affect  NEURO: CN ll-Xll grossly intact                Lab Results: I have personally reviewed pertinent lab results  Results from last 7 days  Lab Units 18  0548 12/10/18  0544 18  1155   WBC Thousand/uL 6 70 5 11 7 48   HEMOGLOBIN g/dL 12 1 11 3* 12 0   HEMATOCRIT % 36 7 34 8 35 9   PLATELETS Thousands/uL 180 191 214       Results from last 7 days  Lab Units 18  0548 12/10/18  1527 12/10/18  0544 18  1155   POTASSIUM mmol/L 4 0 4 5 3 8 4 3   CHLORIDE mmol/L 99*  --  102 102   CO2 mmol/L 26  --  29 31   BUN mg/dL 12  --  17 25   CREATININE mg/dL 0 58*  --  0 60 0 78   CALCIUM mg/dL 9 0  --  9 2 9 4           Results from last 7 days  Lab Units 18  0548 12/10/18  1527 12/10/18  0544   MAGNESIUM mg/dL 2 0 2 3 1 8         Imaging: I have personally reviewed pertinent reports      Results for orders placed during the hospital encounter of 10/28/18   Echo complete with contrast if indicated    Narrative [address] [place] [address], [address] [place] [address]  [phone]    Transthoracic Echocardiogram  2D, M-mode, Doppler, and Color Doppler    Study date:  29-Oct-2018    Patient: Kishan Swartz  MR number: [id number]  Account number: [de-identified]  : 24-Jul-1929  Age: 80 years  Gender: Female  Status: Outpatient  Location: Bedside  Height: 59 in  Weight: 173 6 lb  BP: 169/ 78 mmHg    Indications: Murmur    Diagnoses: R01 1 - Cardiac murmur, unspecified    Sonographer:  Aggie Alvarez RDCS  Primary Physician:  Gail Lyon MD  Referring Physician:  Altagracia Higuera,, FERMIN  Group:  [facility]  Interpreting Physician:  Lluvia Massey MD    SUMMARY    LEFT VENTRICLE:  Systolic function was normal  Ejection fraction was estimated to be 55 %  This study was inadequate for the evaluation of regional wall motion  Wall thickness was markedly increased  There was severe concentric hypertrophy  VENTRICULAR SEPTUM:  There was dyssynergic motion  These changes are consistent with a conduction abnormality or paced rhythm  RIGHT VENTRICLE:  The ventricle was dilated  Systolic function was normal     AORTIC VALVE:  The valve was probably trileaflet  Leaflets exhibited moderately to markedly increased thickness, moderate calcification, moderately reduced cuspal separation, and sclerosis  There was mild stenosis by gradients  Vmax 2 6 m/s, mean gradient 16 mm Hg, maximum gradient 27 mm Hg  TRICUSPID VALVE:  There was moderate regurgitation  Estimated peak PA pressure was 55 mmHg  The findings suggest moderate pulmonary hypertension  HISTORY: PRIOR HISTORY: Syncope, hypertension, diabetes, left bundle block branch    PROCEDURE: The procedure was performed at the bedside  This was a routine study  The transthoracic approach was used  The study included complete 2D imaging, M-mode, complete spectral Doppler, and color Doppler  Images were obtained from  the parasternal, apical, subcostal, and suprasternal notch acoustic windows  Echocardiographic views were limited due to restricted patient mobility, decreased penetration, and lung interference  This was a technically difficult study      LEFT VENTRICLE: Size was normal  Systolic function was normal  Ejection fraction was estimated to be 55 %  This study was inadequate for the evaluation of regional wall motion  Wall thickness was markedly increased  There was severe  concentric hypertrophy  DOPPLER: There was an increased relative contribution of atrial contraction to ventricular filling  VENTRICULAR SEPTUM: There was dyssynergic motion  These changes are consistent with a conduction abnormality or paced rhythm  RIGHT VENTRICLE: The ventricle was dilated  Systolic function was normal     LEFT ATRIUM: The atrium was dilated  RIGHT ATRIUM: The atrium was dilated  MITRAL VALVE: There was moderate to marked annular calcification  DOPPLER: There was no evidence for stenosis  There was no significant regurgitation  AORTIC VALVE: The valve was probably trileaflet  Leaflets exhibited moderately to markedly increased thickness, moderate calcification, moderately reduced cuspal separation, and sclerosis  DOPPLER: There was mild stenosis by gradients  Vmax 2 6 m/s, mean gradient 16 mm Hg, maximum gradient 27 mm Hg  There was no significant regurgitation  TRICUSPID VALVE: The valve structure was normal  There was normal leaflet separation  DOPPLER: There was no evidence for stenosis  There was moderate regurgitation  Estimated peak PA pressure was 55 mmHg  The findings suggest moderate  pulmonary hypertension  PULMONIC VALVE: Leaflets exhibited normal thickness, no calcification, and normal cuspal separation  DOPPLER: The transpulmonic velocity was within the normal range  There was mild regurgitation  PERICARDIUM: There was no pericardial effusion  The pericardium was normal in appearance  AORTA: The root exhibited top normal size at 3 8 cm and fibrocalcific change      SYSTEM MEASUREMENT TABLES    2D  %FS: 28 05 %  Ao Diam: 3 66 cm  EDV(Teich): 61 24 ml  EF Biplane: 54 43 %  EF(Teich): 55 04 %  ESV(Teich): 27 53 ml  IVSd: 1 37 cm  LA Area: 26 21 cm2  LA Diam: 3 67 cm  LVEDV MOD A2C: 94 62 ml  LVEDV MOD A4C: 62 24 ml  LVEDV MOD BP: 77 26 ml  LVEF MOD A2C: 59 %  LVEF MOD A4C: 49 46 %  LVESV MOD A2C: 38 8 ml  LVESV MOD A4C: 31 46 ml  LVESV MOD BP: 35 21 ml  LVIDd: 3 78 cm  LVIDs: 2 72 cm  LVLd A2C: 7 05 cm  LVLd A4C: 7 17 cm  LVLs A2C: 6 04 cm  LVLs A4C: 5 92 cm  LVOT Diam: 2 2 cm  LVPWd: 1 38 cm  RA Area: 23 62 cm2  RVIDd: 3 78 cm  SV MOD A2C: 55 82 ml  SV MOD A4C: 30 78 ml  SV(Teich): 33 71 ml    CW  AV Env  Ti: 279 12 ms  AV MaxP 98 mmHg  AV VTI: 46 73 cm  AV Vmax: 2 23 m/s  AV Vmean: 1 67 m/s  AV meanP 18 mmHg  TR MaxP 48 mmHg  TR Vmax: 3 59 m/s    MM  TAPSE: 2 39 cm    PW  JN (VTI): 1 4 cm2  JN Vmax: 1 35 cm2  E': 0 06 m/s  E/E': 15 79  LVOT Env  Ti: 279 41 ms  LVOT VTI: 17 24 cm  LVOT Vmax: 0 79 m/s  LVOT Vmean: 0 62 m/s  LVOT maxP 51 mmHg  LVOT meanP 61 mmHg  LVSI Dopp: 37 59 ml/m2  LVSV Dopp: 65 41 ml  MV A Steven: 1 14 m/s  MV Dec Ponce: 7 35 m/s2  MV DecT: 130 63 ms  MV E Steven: 0 96 m/s  MV E/A Ratio: 0 84  MV PHT: 37 88 ms  MVA By PHT: 5 81 cm2    [facility] Accredited Echocardiography Laboratory    Prepared and electronically signed by    Marcial Colbert MD  Signed 29-Oct-2018 17:35:21         EKG/telemetry:  Normal sinus rhythm with baseline left bundle-branch block, occasional PVCs, occasional pacing    VTE Pharmacologic Prophylaxis: Reason for no pharmacologic prophylaxis Postop hematoma risk  VTE Mechanical Prophylaxis: sequential compression device

## 2018-12-12 PROBLEM — R26.2 AMBULATORY DYSFUNCTION: Status: ACTIVE | Noted: 2018-12-12

## 2018-12-12 LAB
ANION GAP SERPL CALCULATED.3IONS-SCNC: 8 MMOL/L (ref 4–13)
ATRIAL RATE: 68 BPM
BUN SERPL-MCNC: 17 MG/DL (ref 5–25)
CALCIUM SERPL-MCNC: 8.2 MG/DL (ref 8.3–10.1)
CHLORIDE SERPL-SCNC: 101 MMOL/L (ref 100–108)
CO2 SERPL-SCNC: 27 MMOL/L (ref 21–32)
CREAT SERPL-MCNC: 0.62 MG/DL (ref 0.6–1.3)
GFR SERPL CREATININE-BSD FRML MDRD: 80 ML/MIN/1.73SQ M
GLUCOSE SERPL-MCNC: 104 MG/DL (ref 65–140)
GLUCOSE SERPL-MCNC: 105 MG/DL (ref 65–140)
GLUCOSE SERPL-MCNC: 122 MG/DL (ref 65–140)
GLUCOSE SERPL-MCNC: 144 MG/DL (ref 65–140)
GLUCOSE SERPL-MCNC: 223 MG/DL (ref 65–140)
OSMOLALITY UR: 304 MMOL/KG
P AXIS: 50 DEGREES
POTASSIUM SERPL-SCNC: 4 MMOL/L (ref 3.5–5.3)
PR INTERVAL: 204 MS
QRS AXIS: 17 DEGREES
QRSD INTERVAL: 113 MS
QT INTERVAL: 417 MS
QTC INTERVAL: 444 MS
SODIUM 24H UR-SCNC: 35 MOL/L
SODIUM SERPL-SCNC: 136 MMOL/L (ref 136–145)
T WAVE AXIS: 188 DEGREES
VENTRICULAR RATE: 68 BPM

## 2018-12-12 PROCEDURE — 83935 ASSAY OF URINE OSMOLALITY: CPT | Performed by: INTERNAL MEDICINE

## 2018-12-12 PROCEDURE — 93010 ELECTROCARDIOGRAM REPORT: CPT | Performed by: INTERNAL MEDICINE

## 2018-12-12 PROCEDURE — 97110 THERAPEUTIC EXERCISES: CPT

## 2018-12-12 PROCEDURE — 99232 SBSQ HOSP IP/OBS MODERATE 35: CPT | Performed by: INTERNAL MEDICINE

## 2018-12-12 PROCEDURE — G8988 SELF CARE GOAL STATUS: HCPCS

## 2018-12-12 PROCEDURE — 80048 BASIC METABOLIC PNL TOTAL CA: CPT | Performed by: INTERNAL MEDICINE

## 2018-12-12 PROCEDURE — G8987 SELF CARE CURRENT STATUS: HCPCS

## 2018-12-12 PROCEDURE — G8978 MOBILITY CURRENT STATUS: HCPCS

## 2018-12-12 PROCEDURE — 82948 REAGENT STRIP/BLOOD GLUCOSE: CPT

## 2018-12-12 PROCEDURE — 84300 ASSAY OF URINE SODIUM: CPT | Performed by: INTERNAL MEDICINE

## 2018-12-12 PROCEDURE — 97530 THERAPEUTIC ACTIVITIES: CPT

## 2018-12-12 PROCEDURE — 97167 OT EVAL HIGH COMPLEX 60 MIN: CPT

## 2018-12-12 PROCEDURE — G8979 MOBILITY GOAL STATUS: HCPCS

## 2018-12-12 PROCEDURE — 97163 PT EVAL HIGH COMPLEX 45 MIN: CPT

## 2018-12-12 RX ORDER — ACETAMINOPHEN 325 MG/1
1000 TABLET ORAL EVERY 8 HOURS
Status: DISCONTINUED | OUTPATIENT
Start: 2018-12-12 | End: 2018-12-13 | Stop reason: HOSPADM

## 2018-12-12 RX ADMIN — DOCUSATE SODIUM 100 MG: 100 CAPSULE, LIQUID FILLED ORAL at 17:18

## 2018-12-12 RX ADMIN — LEVOTHYROXINE SODIUM 25 MCG: 25 TABLET ORAL at 05:27

## 2018-12-12 RX ADMIN — ACETAMINOPHEN 975 MG: 325 TABLET, FILM COATED ORAL at 17:18

## 2018-12-12 RX ADMIN — ACETAMINOPHEN 650 MG: 325 TABLET, FILM COATED ORAL at 08:43

## 2018-12-12 RX ADMIN — ASPIRIN 81 MG: 81 TABLET, COATED ORAL at 08:43

## 2018-12-12 RX ADMIN — HEPARIN SODIUM 5000 UNITS: 5000 INJECTION INTRAVENOUS; SUBCUTANEOUS at 13:31

## 2018-12-12 RX ADMIN — HEPARIN SODIUM 5000 UNITS: 5000 INJECTION INTRAVENOUS; SUBCUTANEOUS at 05:27

## 2018-12-12 RX ADMIN — INSULIN LISPRO 2 UNITS: 100 INJECTION, SOLUTION INTRAVENOUS; SUBCUTANEOUS at 13:32

## 2018-12-12 RX ADMIN — CALCIUM 1 TABLET: 500 TABLET ORAL at 08:43

## 2018-12-12 RX ADMIN — HYDRALAZINE HYDROCHLORIDE 10 MG: 20 INJECTION INTRAMUSCULAR; INTRAVENOUS at 08:43

## 2018-12-12 RX ADMIN — Medication 1 TABLET: at 08:43

## 2018-12-12 RX ADMIN — HEPARIN SODIUM 5000 UNITS: 5000 INJECTION INTRAVENOUS; SUBCUTANEOUS at 21:46

## 2018-12-12 RX ADMIN — PRAVASTATIN SODIUM 40 MG: 40 TABLET ORAL at 08:43

## 2018-12-12 RX ADMIN — LISINOPRIL 2.5 MG: 2.5 TABLET ORAL at 08:43

## 2018-12-12 RX ADMIN — DOCUSATE SODIUM 100 MG: 100 CAPSULE, LIQUID FILLED ORAL at 08:43

## 2018-12-12 RX ADMIN — METOPROLOL SUCCINATE 25 MG: 25 TABLET, EXTENDED RELEASE ORAL at 08:43

## 2018-12-12 NOTE — ASSESSMENT & PLAN NOTE
Lab Results   Component Value Date    HGBA1C 5 7 09/07/2018       Recent Labs      12/11/18   1701  12/11/18   2106  12/12/18   0624  12/12/18   1043   POCGLU  110  134  105  223*       Blood Sugar Average: Last 72 hrs:  (P) 123 [id number]     Patient not on any medications at home  Continue sliding scale for now  Blood glucose well controlled

## 2018-12-12 NOTE — PROGRESS NOTES
Progress Note - Angel Martin 7/24/1929, 80 y o  female MRN: [id number]    [facility] Encounter: [id number]    Primary Care Provider: Erich Baca MD   Date and time admitted to hospital: 12/9/2018  7:07 PM        * Syncope   Assessment & Plan    Possibly from heart block  Status post pacemaker placement  Continue to monitor on tele  Appreciate EP input  Patient's blood pressure stable  She denies any dizziness or lightheadedness  Stop IV fluids  Heart block AV second degree   Assessment & Plan    Along with left bundle branch block  Status post pacemaker placement  Appreciate EP input  Hyponatremia   Assessment & Plan    Exact reason not clear  Possibly due to dehydration as patient mentioned that she has not been eating drinking well for last 1-2 days  Resolved with IV fluids  136 today  Stop IV fluids  Ambulatory dysfunction   Assessment & Plan    With generalized weakness and pain all over possibly from osteoarthritis  Will start the patient on standing Tylenol 975 Q 8   PT OT evaluation pending  Patient might need rehab  Patient agreeable  Hypothyroidism   Assessment & Plan    TSH normal   Continue Synthroid  Diabetes mellitus [facility])   Assessment & Plan    Lab Results   Component Value Date    HGBA1C 5 7 09/07/2018       Recent Labs      12/11/18   1701  12/11/18   2106  12/12/18   0624  12/12/18   1043   POCGLU  110  134  105  223*       Blood Sugar Average: Last 72 hrs:  (P) 123 [id number]     Patient not on any medications at home  Continue sliding scale for now  Blood glucose well controlled  History of atrial flutter   Assessment & Plan    Status post ablation in 2015  HTN (hypertension)   Assessment & Plan    Currently well controlled  Continue lisinopril and metoprolol           VTE Pharmacologic Prophylaxis:   Pharmacologic: Heparin  Mechanical VTE Prophylaxis in Place: Yes   Patient Centered Rounds: I have performed bedside rounds with nursing staff today    Discussions with Specialists or Other Care Team Provider:    Education and Discussions with Family / Patient: patient  She said she will call her family    Time Spent for Care: 30 minutes  More than 50% of total time spent on counseling and coordination of care as described above    Current Length of Stay: 3 day(s)   Current Patient Status: Inpatient   Certification Statement: The patient will continue to require additional inpatient hospital stay due to above   Discharge Plan: pending improvement - needs PT TO eval   Code Status: Level 3 - DNAR and DNI    Subjective:   Pt seen and examined by me this morning  Pt complained of  generalized weakness and difficulty walking  She feels that it is because she has been in bed for last few days  Objective:     Vitals:   Temp (24hrs), Av 5 °F (36 9 °C), Min:97 8 °F (36 6 °C), Max:99 9 °F (37 7 °C)    Temp:  [97 8 °F (36 6 °C)-99 9 °F (37 7 °C)] 98 2 °F (36 8 °C)  HR:  [74-89] 88  Resp:  [16-20] 18  BP: ()/(48-84) 112/53  SpO2:  [94 %-98 %] 98 %  Body mass index is 33 89 kg/m²  Input and Output Summary (last 24 hours): Intake/Output Summary (Last 24 hours) at 18 1156  Last data filed at 18 5679   Gross per 24 hour   Intake             1020 ml   Output             1751 ml   Net             -731 ml       Physical Exam:     Physical Exam    Constitutional: Pt appears well-developed and well-nourished  Not in any acute distress  Cardiovascular: Normal rate, regular rhythm, normal heart sounds  Exam reveals no gallop and no friction rub  No murmur heard  Left chest wall PPM site - no bleeding, swelling  Pulmonary/Chest: Effort normal and breath sounds normal  No respiratory distress  Pt has no wheezes or rales  Abdominal: Soft  Non-distended, Non-tender  Bowel sounds are normal    Musculoskeletal: Normal range of motion  Neurological: alert and oriented to person, place, and time   Nomal strength and sensations  r  Psychiatric: normal mood and affect  Additional Data:     Labs:      Results from last 7 days  Lab Units 12/11/18  0548  12/09/18  1155   WBC Thousand/uL 6 70  < > 7 48   HEMOGLOBIN g/dL 12 1  < > 12 0   HEMATOCRIT % 36 7  < > 35 9   PLATELETS Thousands/uL 180  < > 214   NEUTROS PCT %  --   --  78*   LYMPHS PCT %  --   --  13*   MONOS PCT %  --   --  8   EOS PCT %  --   --  1   < > = values in this interval not displayed  Results from last 7 days  Lab Units 12/12/18  0541  12/09/18  1155   SODIUM mmol/L 136  < > 138   POTASSIUM mmol/L 4 0  < > 4 3   CHLORIDE mmol/L 101  < > 102   CO2 mmol/L 27  < > 31   BUN mg/dL 17  < > 25   CREATININE mg/dL 0 62  < > 0 78   ANION GAP mmol/L 8  < > 5   CALCIUM mg/dL 8 2*  < > 9 4   ALBUMIN g/dL  --   --  3 4*   TOTAL BILIRUBIN mg/dL  --   --  0 30   ALK PHOS U/L  --   --  76   ALT U/L  --   --  24   AST U/L  --   --  17   GLUCOSE RANDOM mg/dL 104  < > 97   < > = values in this interval not displayed  Results from last 7 days  Lab Units 12/12/18  1043 12/12/18  0624 12/11/18  2106 12/11/18  1701 12/11/18  1116 12/11/18  0641 12/10/18  2147 12/10/18  1617 12/10/18  1054 12/10/18  0802 12/09/18  2112   POC GLUCOSE mg/dl 223* 105 134 110 105 122 148* 105 101 103 105                   * I Have Reviewed All Lab Data Listed Above  * Additional Pertinent Lab Tests Reviewed:  Lucía 66 Admission Reviewed    Imaging:    Imaging Reports Reviewed Today Include:   Imaging Personally Reviewed by Myself Includes:      Recent Cultures (last 7 days):           Last 24 Hours Medication List:     Current Facility-Administered Medications:  acetaminophen 975 mg Oral Q8H Julio Cesar Hairston MD   aspirin 81 mg Oral Daily Gloria Romero PA-C   calcium carbonate 1 tablet Oral Daily With Breakfast Gloria Romero PA-C   docusate sodium 100 mg Oral BID Dayna Spironello V, CRNP   heparin (porcine) 5,000 Units Subcutaneous [facility] Dorina Day PA-C hydrALAZINE 10 mg Intravenous Q6H PRN Dayna Holdeno ILANA, FERMIN   insulin lispro 1-5 Units Subcutaneous TID AC Gloria Romero PA-C   insulin lispro 1-5 Units Subcutaneous HS Gloria Romero PA-C   levothyroxine 25 mcg Oral Early Morning Gloria Romero PA-C   lisinopril 2 5 mg Oral Daily FERMIN Santo   metoprolol succinate 25 mg Oral Daily FERMIN Santo   multivitamin-minerals 1 tablet Oral Daily Gloria Romero PA-C   ondansetron 4 mg Intravenous Q8H PRN Gloria Romero PA-C   pravastatin 40 mg Oral Daily Lucinda Townsend PA-C        Today, Patient Was Seen By: Lennie Montalvo MD    ** Please Note: Dictation voice to text software may have been used in the creation of this document   **

## 2018-12-12 NOTE — PROGRESS NOTES
Patient s/p PPM, echo reviewed  No abnormalities  No further input needed from EP at this time, we will sign off  Please call with questions

## 2018-12-12 NOTE — PLAN OF CARE
Problem: OCCUPATIONAL THERAPY ADULT  Goal: Performs self-care activities at highest level of function for planned discharge setting  See evaluation for individualized goals  Treatment Interventions: ADL retraining, Functional transfer training, UE strengthening/ROM, Endurance training, Patient/family training, Equipment evaluation/education, Compensatory technique education, Continued evaluation, Energy conservation, Activityengagement          See flowsheet documentation for full assessment, interventions and recommendations  Limitation: Decreased ADL status, Decreased UE ROM, Decreased UE strength, Decreased endurance, Decreased self-care trans, Decreased high-level ADLs  Prognosis: Fair  Assessment: Pt is an 80year old female seen for OT eval s/p admission [facility] [place] [facility] a [facility] with sudden onset of syncope with a subsequent fall  Comorbidities include h/o anxiety, Arthritis, cardiomyopathy, chronic pain, CAD, DM, DVT, PE, HLD, HTN, hypothyroid and renal calculi  Pt lives with her  in a [facility] with 2STE  Pt was I with ADLs, and functional mobility with cane/rw and required assistance with IADLs PTA  Pt is currently demonstrating the following occupational deficits: eating with set-up, grooming with set-up, UB bathing with Vini, LB bathing with maxA, LB dressing with maxA, toileting with modA, and functional transfers with maxA  Impairments that are currently contributing to pt's decline in independence with these occupations include: strength, endurance, activity tolerance, functional mobility, unsupportive home environment and anxiety  Overall, pt scored 45/100 on the Barthel Index  Recommend STR upon d/c  Pt is to continue to benefit from skilled occupational therapy while in the hospital to maximize functioning and independence in daily activities  See below for OT goals       OT Discharge Recommendation: Short Term Rehab  OT - OK to Discharge:  (to rehab when medically stable)      Comments: Lalo Perry, STACI, OTR/L

## 2018-12-12 NOTE — RESTORATIVE TECHNICIAN NOTE
Restorative Specialist Mobility Note       Activity: Ambulate in room, Bathroom privileges, Chair, Dangle, Stand at bedside (Educated/encouraged pt to ambulate with assistance 3-4 x's/day  Chair alarm on   Pt callbell, phone/tray within reach )     Assistive Device: Front wheel walker          [employer] BS, Restorative Technician, [employer]

## 2018-12-12 NOTE — PLAN OF CARE
Problem: PHYSICAL THERAPY ADULT  Goal: Performs mobility at highest level of function for planned discharge setting  See evaluation for individualized goals  Treatment/Interventions: Functional transfer training, LE strengthening/ROM, Elevations, Therapeutic exercise, Endurance training, Patient/family training, Equipment eval/education, Bed mobility, Gait training, Spoke to nursing, Spoke to case management  Equipment Recommended: Chioma Sheridan       See flowsheet documentation for full assessment, interventions and recommendations  Prognosis: Good  Problem List: Decreased strength, Decreased range of motion, Decreased endurance, Impaired balance, Decreased mobility, Decreased coordination, Decreased safety awareness, Impaired sensation, Obesity, Pain  Assessment:  80 y o  female admitted  to [facility] on 12/9/2018 as a transfer from [place] following being found down on floor by spouse w/ unknown down time/ unwitnessed fall  W/u revealing L BBB and pt was xferred to [facility]  Pt underwent pacemaker placement on 12/10/18 and pt was consulted to assist w/ mobility and d/c planning  Dx also consisting of syncope; ambulatory dysfunction; hyponatremia;  PMhx is fairly extensive (see above) and significant CAD; HTN; HLD; stent placement LAD; DM w/ neuropathy; chronic LBP; C/D degeneration; OA of B/L shoulder  And hips/ knees for  and  personal factors currently affecting pt's physical performance include: advanced age; limited social support; spouse is 81 y/o; obesity; limited ability to perform ADL's IADL's anxiety; loss on functional indep and poor adjustment to hospitalization and disability  Prior to admission, pt was living w/ her spouse in a ranch home w/ 2STE and was using a SPCvs RW only on occasion   Pt reports driving and  performing all ADL's independently  Upon evaluation, pt currently is requiring modA A for bed skills; modA w/ SBA of another  for functional transfers and modA x1  for ambulation w/ RW 6' x1 w/ chair follow- extremely slow gait w/ cues for upright- limited by fatigue; SOB and fear of falling  Pt presents functioning below baseline and currently w/ overall mobility deficits 2* to: decreased LE strength/AROM; limited flexibility;  generalized weakness/ deconditioning; decreased endurance; decreased activity tolerance; decreased coordination; impaired balance; gait deviations; decreased safety awareness; SOB/PEREIRA; fatigue; impaired safety and judgement; limited insight into current deficits; bed/ chair alarms; multiple lines; Pt currently at risk for falls  (Please find additional objective findings from PT assessment regarding body systems outlined above ) Pt will continue to benefit from skilled PT interventions to address stated impairments; to maximize functional potential; for ongoing pt/ family training; and DME needs  PT is currently recommending   d/c to inpatient rehab setting when medically cleared for safety and to maximize functional potential prior to d/c home  Pt is reclucant to dc to rehab however knows she will need it prior to home- time spent explaining goals of rehab w/ pt  Upon return to chair pt reqports that she feels like she needs to go to the bathroom- PT initiated additional tx session following (see below)   Barriers to Discharge: Inaccessible home environment, Decreased caregiver support  Barriers to Discharge Comments: 2 RADHA home- sposue is 79 y/o   Recommendation: Short-term skilled PT     PT - OK to Discharge:  (to  rehab when medically cleared )    See flowsheet documentation for full assessment

## 2018-12-12 NOTE — ASSESSMENT & PLAN NOTE
With generalized weakness and pain all over possibly from osteoarthritis  Will start the patient on standing Tylenol 975 Q 8   PT OT evaluation pending  Patient might need rehab  Patient agreeable

## 2018-12-12 NOTE — PHYSICAL THERAPY NOTE
Physical Therapy Evaluation [id number]    Patient Name: Hobart Kocher    UZMTQ'P Date: 12/12/2018     Problem List  Patient Active Problem List   Diagnosis    CAD (coronary artery disease)    HTN (hypertension)    History of placement of stent in LAD coronary artery    Hyperlipidemia    History of atrial flutter    Edema    LBBB (left bundle branch block)    Right hip pain    [facility] [address] [facility]    Hypothyroidism    Adhesive capsulitis    Chronic low back pain    Gait disturbance    Cervical spine degeneration    Primary osteoarthritis of both shoulders    Tremors of nervous system    Heart block AV second degree    Syncope    Hyponatremia    Ambulatory dysfunction        Past Medical History  Past Medical History:   Diagnosis Date    Abnormal nuclear stress test     last assessed 04/03/2017    Anxiety     Arthritis     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    Bundle branch block, left     Cardiomyopathy ([facility] [address] [facility]     Chronic pain     chronic b/l shoulder pain    Coronary artery disease     Diabetes mellitus ([facility] [address] [facility]     GERD (gastroesophageal reflux disease)     H/O atrial flutter     History of DVT (deep vein thrombosis)     History of pulmonary embolism     Hyperlipidemia     Hypertension     Hypothyroid     Renal calculi     Shortness of breath         Past Surgical History  Past Surgical History:   Procedure Laterality Date    ATRIAL ABLATION SURGERY  01/2015    CARDIOVERSION      CHELA/DCCV 10/22/2014    CARPAL TUNNEL RELEASE Right     CATARACT EXTRACTION      CORONARY ANGIOPLASTY WITH STENT PLACEMENT      HYSTERECTOMY      JOINT REPLACEMENT Right     [place] AMPUTATION TOE,MT-P JT Left 10/11/2017    Procedure: AMPUTATION SECOND TOE;  Surgeon: Jasmin Rai DPM;  [facility];  Service: Podiatry    TONSILLECTOMY      TOTAL HIP ARTHROPLASTY      Right         12/12/18 1019   Note Type Note type Eval/Treat   Pain Assessment   Pain Assessment 0-10   Pain Score 8   Pain Type Chronic pain   Pain Location Shoulder   Pain Orientation Bilateral;Posterior   Pain Descriptors Sore   Pain Frequency Constant/continuous   Hospital Pain Intervention(s) Ambulation/increased activity; Emotional support;Repositioned   Response to Interventions tolerated    Home Living   Type of [address] One level  (2STE rail)   Home Equipment Walker;Cane  (was not usign conssitantly PTA- mostly I in home )   Prior Function   Level of Nancy Independent with ADLs and functional mobility   Lives With Spouse  (pt reports her spouse is 79 y/o)   Receives Help From Family   ADL Assistance Independent   IADLs Needs assistance   Falls in the last 6 months 1 to 4  (syncopal events per pt - not mechanica falls )   Vocational Retired   Comments pt reports being I w/o use of AD- occasional use of SPC; I w/ ADL's driving    Restrictions/Precautions   Weight Bearing Precautions Per Order No   Other Precautions Chair Alarm; Bed Alarm;Multiple lines; Fall Risk;Pain  (s/p pacer precautions- sling d/c'd )   General   Additional Pertinent History pt is s/p pacer 12/10/18   Family/Caregiver Present No   Cognition   Overall Cognitive Status WFL   Arousal/Participation Alert  (tearful and upset that she "needs so much help" )   Attention Within functional limits   Orientation Level Oriented X4   Memory Within functional limits   Following Commands Follows one step commands without difficulty   Comments pt tearful at times 2* loss of independence"- needs reassurance and is easily redirected to participate in PT   LUE Assessment   LUE Assessment (limited s/p pacer- )   RLE Assessment   RLE Assessment WFL  (tested at 3+/5 functional )   LLE Assessment   LLE Assessment WFL  (functinoal 3+/5 )   Coordination   Movements are Fluid and Coordinated 0   Light Touch   RLE Light Touch Grossly intact  (pt reports chronic "numbnes" in plantar surface of feet )   LLE Light Touch Grossly intact   Bed Mobility   Supine to Sit 3  Moderate assistance   Additional items Assist x 1   Transfers   Sit to Stand 3  Moderate assistance   Additional items Assist x 1;Assist x 2  (SBA of another- pad utilized )   Stand to Sit 3  Moderate assistance   Additional items Assist x 1; Increased time required;Verbal cues   Stand pivot 3  Moderate assistance   Additional items Assist x 1; Increased time required;Verbal cues  (w/ RW )   Ambulation/Elevation   Gait pattern Foward flexed; Shuffling;Decreased L stance;Decreased R stance;Decreased foot clearance; Wide REBEKA; Excessively slow   Gait Assistance 3  Moderate assist   Additional items Assist x 1;Verbal cues; Tactile cues  (CHAIR FOLLOW (A OF ANOTHER FOR SAFETY))   Assistive Device Rolling walker   Distance 6   Balance   Static Sitting Fair +   Dynamic Sitting Fair   Static Standing Poor +   Dynamic Standing Poor +   Endurance Deficit   Endurance Deficit Yes   Endurance Deficit Description significant decline from baseline    Activity Tolerance   Activity Tolerance Patient limited by fatigue   Medical Staff Made Aware yes- Rja myers for mobiltiy    Nurse Made Aware yes- Vaughn joseph pt for session   Assessment   Prognosis Good   Problem List Decreased strength;Decreased range of motion;Decreased endurance; Impaired balance;Decreased mobility; Decreased coordination;Decreased safety awareness; Impaired sensation;Obesity;Pain   Assessment 80 y o  female admitted  [facility] on [facility]/9/2018 as a transfer from [address] [place] [address] following being found down on floor by spouse w/ unknown down time/ unwitnessed fall  W/u revealing L BBB and pt was xferred to [facility]  Pt underwent pacemaker placement on 12/10/18 and pt was consulted to assist w/ mobility and d/c planning   Dx also consisting of syncope; ambulatory dysfunction; hyponatremia;  PMhx is fairly extensive (see above) and significant CAD; HTN; HLD; stent placement LAD; DM w/ neuropathy; chronic LBP; C/D degeneration; OA of B/L shoulder  And hips/ knees for  and  personal factors currently affecting pt's physical performance include: advanced age; limited social support; spouse is 79 y/o; obesity; limited ability to perform ADL's IADL's anxiety; loss on functional indep and poor adjustment to hospitalization and disability  Prior to admission, pt was living w/ her spouse in a ranch home w/ 2STE and was using a SPCvs RW only on occasion  Pt reports driving and  performing all ADL's independently  Upon evaluation, pt currently is requiring modA A for bed skills; modA w/ SBA of another  for functional transfers and modA x1  for ambulation w/ RW 6' x1 w/ chair follow- extremely slow gait w/ cues for upright- limited by fatigue; SOB and fear of falling  Pt presents functioning below baseline and currently w/ overall mobility deficits 2* to: decreased LE strength/AROM; limited flexibility;  generalized weakness/ deconditioning; decreased endurance; decreased activity tolerance; decreased coordination; impaired balance; gait deviations; decreased safety awareness; SOB/PEREIRA; fatigue; impaired safety and judgement; limited insight into current deficits; bed/ chair alarms; multiple lines; Pt currently at risk for falls  (Please find additional objective findings from PT assessment regarding body systems outlined above ) Pt will continue to benefit from skilled PT interventions to address stated impairments; to maximize functional potential; for ongoing pt/ family training; and DME needs  PT is currently recommending   d/c to inpatient rehab setting when medically cleared for safety and to maximize functional potential prior to d/c home  Pt is reclucant to dc to rehab however knows she will need it prior to home- time spent explaining goals of rehab w/ pt   Upon return to chair pt reqports that she feels like she needs to go to the bathroom- PT initiated additional tx session following (see below)    Barriers to Discharge Inaccessible home environment;Decreased caregiver support   Barriers to Discharge Comments 2 RADHA home- sposue is 79 y/o    Goals   Patient Goals be able to go home    STG Expiration Date 12/22/18   Short Term Goal #1 In 10 days pt will complete: 1) Bed mobility skills with MI for improved fuctional mobiltiy2) Functional transfers with MI for safe d/c I 3) Ambulation with ' x2 for safe home and community mobility' without LOB and stable vitals  4) Stair training up/ down 2 step/s with S and rails for safe home d/c   5) Improve balance grades to Good 6) Improve LE strength grades by 1   7) LE HEP independently  8) PT for ongoing pt and family education; DME needs and D/C planning to promote highest level of function in least restrictive environment  Treatment Day 0   Plan   Treatment/Interventions Functional transfer training;LE strengthening/ROM; Elevations; Therapeutic exercise; Endurance training;Patient/family training;Equipment eval/education; Bed mobility;Gait training;Spoke to nursing;Spoke to case management   PT Frequency (3-5x/wk )   Recommendation   Recommendation Short-term skilled PT   Equipment Recommended Walker   PT - OK to Discharge (to  rehab when medically cleared )   Modified Tonica Scale   Modified Maryann Scale 4   Barthel Index   Feeding 10   Bathing 0   Grooming Score 5   Dressing Score 5   Bladder Score 10   Bowels Score 10   Toilet Use Score 5   Transfers (Bed/Chair) Score 5   Mobility (Level Surface) Score 0   Stairs Score 0   Barthel Index Score 50         PHYSICAL THERAPY TREATMENT [id number]   Upon return to chair and repositioning following above eval; pt reports urge to use the bathroom and requests assist from PT/ restorative Yevgeniy Divers) to use commode  3in 1 was obtained as pt reporting significant fatigue- pt performs sit<> stand from recliner w/ modA and ambulates approx 4' sidesteps w/ turn to commode w/ RW Vini for controlled descent   Pthas BM and required modA for balance w/ RW during perineal hygiene and required total assist to clean self  Pt ambualted back to chair w/ modA and RW following approx 4'  Pt was repositioned w/ all needs in reach; became tearful re: loss of  independence and need for rehab (pt expressing wanting to be able to go home)   PT showed pt basic seated therex including AP; LAQ and seated marching 10 reps each w/ rest for LE strengthening to assist w/ improving functional strength for increased independence w/ xfers and gait    Sabina Rivera, PT

## 2018-12-12 NOTE — PLAN OF CARE
Problem: Potential for Falls  Goal: Patient will remain free of falls  INTERVENTIONS:  - Assess patient frequently for physical needs  -  Identify cognitive and physical deficits and behaviors that affect risk of falls  -  [place] fall precautions as indicated by assessment   - Educate patient/family on patient safety including physical limitations  - Instruct patient to call for assistance with activity based on assessment  - Modify environment to reduce risk of injury  - Consider OT/PT consult to assist with strengthening/mobility   Outcome: Progressing      Problem: CARDIOVASCULAR - ADULT  Goal: Maintains optimal cardiac output and hemodynamic stability  INTERVENTIONS:  - Monitor I/O, vital signs and rhythm  - Monitor for S/S and trends of decreased cardiac output i e  bleeding, hypotension  - Administer and titrate ordered vasoactive medications to optimize hemodynamic stability  - Assess quality of pulses, skin color and temperature  - Assess for signs of decreased coronary artery perfusion - ex   Angina  - Instruct patient to report change in severity of symptoms   Outcome: Progressing    Goal: Absence of cardiac dysrhythmias or at baseline rhythm  INTERVENTIONS:  - Continuous cardiac monitoring, monitor vital signs, obtain 12 lead EKG if indicated  - Administer antiarrhythmic and heart rate control medications as ordered  - Monitor electrolytes and administer replacement therapy as ordered   Outcome: Progressing      Problem: SKIN/TISSUE INTEGRITY - ADULT  Goal: Skin integrity remains intact  INTERVENTIONS  - Identify patients at risk for skin breakdown  - Assess and monitor skin integrity  - Assess and monitor nutrition and hydration status  - Monitor labs (i e  albumin)  - Assess for incontinence   - Turn and reposition patient  - Assist with mobility/ambulation  - Relieve pressure over bony prominences  - Avoid friction and shearing  - Provide appropriate hygiene as needed including keeping skin clean and dry  - Evaluate need for skin moisturizer/barrier cream  - Collaborate with interdisciplinary team (i e  Nutrition, Rehabilitation, etc )   - Patient/family teaching   Outcome: Progressing

## 2018-12-12 NOTE — PROGRESS NOTES
Rounded with Dr Salbador George with [facility], pt to be seen by PT/OT as we are awaiting their eval for d/c planning

## 2018-12-12 NOTE — RESTORATIVE TECHNICIAN NOTE
Restorative Specialist Mobility Note       Activity: Ambulate in room, Chair, Stand at bedside, Commode (Educated/encouraged pt to ambulate with assistance 3-4 x's/day  Chair alarm on  Pt callbell, phone/tray within reach )     Assistive Device: Front wheel walker (Assisted PT Bubba Mohan with getting pt to the commode/ambulation   See PT notes )    Luz Martinez BS, Restorative Technician, [employer]

## 2018-12-12 NOTE — ASSESSMENT & PLAN NOTE
Possibly from heart block  Status post pacemaker placement  Continue to monitor on tele  Appreciate EP input  Patient's blood pressure stable  She denies any dizziness or lightheadedness  Stop IV fluids

## 2018-12-12 NOTE — PLAN OF CARE
CARDIOVASCULAR - ADULT     Maintains optimal cardiac output and hemodynamic stability Progressing     Absence of cardiac dysrhythmias or at baseline rhythm Progressing        DISCHARGE PLANNING     Discharge to home or other facility with appropriate resources Progressing        INFECTION - ADULT     Absence or prevention of progression during hospitalization Progressing     Absence of fever/infection during neutropenic period Progressing        METABOLIC, FLUID AND ELECTROLYTES - ADULT     Electrolytes maintained within normal limits Progressing        Nutrition/Hydration-ADULT     Nutrient/Hydration intake appropriate for improving, restoring or maintaining nutritional needs Progressing        PAIN - ADULT     Verbalizes/displays adequate comfort level or baseline comfort level Progressing        Potential for Falls     Patient will remain free of falls Progressing        SAFETY ADULT     Maintain or return to baseline ADL function Progressing     Maintain or return mobility status to optimal level Progressing        SKIN/TISSUE INTEGRITY - ADULT     Skin integrity remains intact Progressing

## 2018-12-12 NOTE — SOCIAL WORK
CM met with pt at bedside and explained CM role  Pt lives with her  in a 1 story home with 2 steps to enter  PTA pt was independent with ADL's and utilized a RW or a cane for ambulation  Pt reports a hx of VNA a few years ago, and no hx of STR  Pt is able to drive and uses [facility] on [address]  Pt sees PCP Dr Author Elliott  Pt reports no hx of drug/alcohol rehabilitation or mental health issues  Pt's primary contact is her  Neema Mccracken [phone]  CM discussed PT/OT recommendation for STR and provided pt with a SNF list  CM provided information on STR  Pt reports she will review SNF list and provide choices  CM to follow  CM reviewed d/c planning process including the following: identifying help at home, patient preference for d/c planning needs, Discharge Lounge, [facility] Meds to Bed program, availability of treatment team to discuss questions or concerns patient and/or family may have regarding understanding medications and recognizing signs and symptoms once discharged  CM also encouraged patient to follow up with all recommended appointments after discharge  Patient advised of importance for patient and family to participate in managing patients medical well being

## 2018-12-12 NOTE — ASSESSMENT & PLAN NOTE
Exact reason not clear  Possibly due to dehydration as patient mentioned that she has not been eating drinking well for last 1-2 days  Resolved with IV fluids  136 today  Stop IV fluids

## 2018-12-12 NOTE — OCCUPATIONAL THERAPY NOTE
[address]  Evaluation     Patient Name: Sandi DRAKEX Date: 12/12/2018  Problem List  Patient Active Problem List   Diagnosis    CAD (coronary artery disease)    HTN (hypertension)    History of placement of stent in LAD coronary artery    Hyperlipidemia    History of atrial flutter    Edema    LBBB (left bundle branch block)    Right hip pain    [facility] mellitus ([facility] [address] [facility]    Hypothyroidism    Adhesive capsulitis    Chronic low back pain    Gait disturbance    Cervical spine degeneration    Primary osteoarthritis of both shoulders    Tremors of nervous system    Heart block AV second degree    Syncope    Hyponatremia    Ambulatory dysfunction     Past Medical History  Past Medical History:   Diagnosis Date    Abnormal nuclear stress test     last assessed 04/03/2017    Anxiety     Arthritis     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 [address] [facility]     Chronic pain     chronic b/l shoulder pain    Coronary artery disease     Diabetes mellitus ([facility] [address] [facility]     GERD (gastroesophageal reflux disease)     H/O atrial flutter     History of DVT (deep vein thrombosis)     History of pulmonary embolism     Hyperlipidemia     Hypertension     Hypothyroid     Renal calculi     Shortness of breath      Past Surgical History  Past Surgical History:   Procedure Laterality Date    ATRIAL ABLATION SURGERY  01/2015    CARDIOVERSION      CHELA/DCCV 10/22/2014    CARPAL TUNNEL RELEASE Right     CATARACT EXTRACTION      CORONARY ANGIOPLASTY WITH STENT PLACEMENT      HYSTERECTOMY      JOINT REPLACEMENT Right     [place] AMPUTATION TOE,MT-P JT Left 10/11/2017    Procedure: AMPUTATION SECOND TOE;  Surgeon: Jason Cade DPM;  Location[facility];  Service: Podiatry    TONSILLECTOMY      TOTAL HIP ARTHROPLASTY      Right        Goals:  Further assess functional transfers  Pt will complete UB ADLs with supervision    Pt will complete LB ADLs with supervision using DME and AD as appropriate  Pt will participate in ongoing cognitive assessment while in the hospital for safe discharge/planning      STACI Onofre, OTR/L

## 2018-12-13 ENCOUNTER — TRANSITIONAL CARE MANAGEMENT (OUTPATIENT)
Dept: INTERNAL MEDICINE CLINIC | Facility: CLINIC | Age: 83
End: 2018-12-13

## 2018-12-13 VITALS
DIASTOLIC BLOOD PRESSURE: 68 MMHG | HEART RATE: 85 BPM | HEIGHT: 59 IN | SYSTOLIC BLOOD PRESSURE: 161 MMHG | RESPIRATION RATE: 16 BRPM | WEIGHT: 167.77 LBS | TEMPERATURE: 98.3 F | OXYGEN SATURATION: 97 % | BODY MASS INDEX: 33.82 KG/M2

## 2018-12-13 LAB
ANION GAP SERPL CALCULATED.3IONS-SCNC: 6 MMOL/L (ref 4–13)
BUN SERPL-MCNC: 19 MG/DL (ref 5–25)
CALCIUM SERPL-MCNC: 9.6 MG/DL (ref 8.3–10.1)
CHLORIDE SERPL-SCNC: 101 MMOL/L (ref 100–108)
CO2 SERPL-SCNC: 27 MMOL/L (ref 21–32)
CREAT SERPL-MCNC: 0.62 MG/DL (ref 0.6–1.3)
GFR SERPL CREATININE-BSD FRML MDRD: 80 ML/MIN/1.73SQ M
GLUCOSE SERPL-MCNC: 100 MG/DL (ref 65–140)
GLUCOSE SERPL-MCNC: 201 MG/DL (ref 65–140)
GLUCOSE SERPL-MCNC: 212 MG/DL (ref 65–140)
POTASSIUM SERPL-SCNC: 4.2 MMOL/L (ref 3.5–5.3)
SODIUM SERPL-SCNC: 134 MMOL/L (ref 136–145)

## 2018-12-13 PROCEDURE — 80048 BASIC METABOLIC PNL TOTAL CA: CPT | Performed by: INTERNAL MEDICINE

## 2018-12-13 PROCEDURE — 99239 HOSP IP/OBS DSCHRG MGMT >30: CPT | Performed by: INTERNAL MEDICINE

## 2018-12-13 PROCEDURE — TCMXX

## 2018-12-13 PROCEDURE — 82948 REAGENT STRIP/BLOOD GLUCOSE: CPT

## 2018-12-13 RX ORDER — ACETAMINOPHEN 500 MG
500 TABLET ORAL EVERY 6 HOURS PRN
Qty: 30 TABLET | Refills: 0 | Status: SHIPPED | OUTPATIENT
Start: 2018-12-13 | End: 2021-02-02

## 2018-12-13 RX ADMIN — Medication 1 TABLET: at 09:50

## 2018-12-13 RX ADMIN — INSULIN LISPRO 1 UNITS: 100 INJECTION, SOLUTION INTRAVENOUS; SUBCUTANEOUS at 11:13

## 2018-12-13 RX ADMIN — ACETAMINOPHEN 975 MG: 325 TABLET, FILM COATED ORAL at 09:49

## 2018-12-13 RX ADMIN — ASPIRIN 81 MG: 81 TABLET, COATED ORAL at 09:50

## 2018-12-13 RX ADMIN — ACETAMINOPHEN 975 MG: 325 TABLET, FILM COATED ORAL at 00:13

## 2018-12-13 RX ADMIN — PRAVASTATIN SODIUM 40 MG: 40 TABLET ORAL at 09:53

## 2018-12-13 RX ADMIN — LEVOTHYROXINE SODIUM 25 MCG: 25 TABLET ORAL at 05:29

## 2018-12-13 RX ADMIN — CALCIUM 1 TABLET: 500 TABLET ORAL at 09:50

## 2018-12-13 RX ADMIN — HEPARIN SODIUM 5000 UNITS: 5000 INJECTION INTRAVENOUS; SUBCUTANEOUS at 05:29

## 2018-12-13 RX ADMIN — LISINOPRIL 2.5 MG: 2.5 TABLET ORAL at 09:49

## 2018-12-13 RX ADMIN — METOPROLOL SUCCINATE 25 MG: 25 TABLET, EXTENDED RELEASE ORAL at 09:50

## 2018-12-13 RX ADMIN — DOCUSATE SODIUM 100 MG: 100 CAPSULE, LIQUID FILLED ORAL at 09:50

## 2018-12-13 NOTE — ASSESSMENT & PLAN NOTE
Possibly from heart block  Status post pacemaker placement  Continue to monitor on tele  Appreciate EP input  Cleared for discharge from EP standpoint  Patient's blood pressure stable  She denies any dizziness or lightheadedness  Outpatient follow-up for incision check and with electrophysiology

## 2018-12-13 NOTE — UTILIZATION REVIEW
Continued Stay Review    Date: 12/13/2018  Age/Sex: 80 y o  female     Assessment/Plan:     Discharge Plan:   12/13/18 1137  Discharge patient Once     Discharge Disposition: Non [facility] SNF/TCU/SNU    Expected Discharge Date: 12/13/18            Vital Signs: /68 (BP Location: Right arm)   Pulse 85   Temp 98 3 °F (36 8 °C) (Oral)   Resp 16   Ht 4' 11" (1 499 m)   Wt 76 1 kg (167 lb 12 3 oz)   SpO2 97%   BMI 33 89 kg/m²     Medications:   Scheduled Meds:   Current Facility-Administered Medications:  acetaminophen 975 mg Oral Q8H Leobardo Alcantara MD   aspirin 81 mg Oral Daily Rizwana Lambert PA-C   calcium carbonate 1 tablet Oral Daily With Breakfast Gloria Romero PA-C   docusate sodium 100 mg Oral BID Dayna Spironello FERMIN PRECIADO   heparin (porcine) 5,000 Units Subcutaneous [facility] Gloria Romero PA-C   hydrALAZINE 10 mg Intravenous Q6H PRN EFRMIN Santo   insulin lispro 1-5 Units Subcutaneous TID AC Gloria Romero PA-C   insulin lispro 1-5 Units Subcutaneous HS Gloria Romero PA-C   levothyroxine 25 mcg Oral Early Morning Gloria Romero PA-C   lisinopril 2 5 mg Oral Daily Dayna Holdeno FERMIN PRECIADO   metoprolol succinate 25 mg Oral Daily FERMIN Santo   multivitamin-minerals 1 tablet Oral Daily Gloria Romero PA-C   ondansetron 4 mg Intravenous Q8H PRN Gloria Romero PA-C   pravastatin 40 mg Oral Daily Gloria Romero PA-C     Abnormal Labs/Diagnostic Results:

## 2018-12-13 NOTE — ASSESSMENT & PLAN NOTE
Exact reason not clear  Possibly due to dehydration as patient mentioned that she has not been eating drinking well for last 1-2 days  Resolved with IV fluids  134 today  Encourage p o  Hydration  Repeat BMP in 1 week

## 2018-12-13 NOTE — ASSESSMENT & PLAN NOTE
Lab Results   Component Value Date    HGBA1C 5 7 09/07/2018       Recent Labs      12/12/18   1653  12/12/18   2109  12/13/18   0948  12/13/18   1102   POCGLU  144*  122  212*  201*       Blood Sugar Average: Last 72 hrs:  (P) 208 [id number]     Patient not on any medications at home  Diet controlled  Cont to monitor  Will hold off on starting any medications for now  Will need close monitoring of sugars and follow up with PCP  If needed can consider starting metformin

## 2018-12-13 NOTE — DISCHARGE SUMMARY
Discharge- Angel Martin 7/24/1929, 80 y o  female MRN: [id number]    [facility]counter: [id number]    Primary Care Provider: Erich Baca MD   Date and time admitted to hospital: 12/9/2018  7:07 PM        * Syncope   Assessment & Plan    Possibly from heart block  Status post pacemaker placement  Continue to monitor on tele  Appreciate EP input  Cleared for discharge from EP standpoint  Patient's blood pressure stable  She denies any dizziness or lightheadedness  Outpatient follow-up for incision check and with electrophysiology  Heart block AV second degree   Assessment & Plan    Along with left bundle branch block  Status post pacemaker placement  Appreciate EP input  Hyponatremia   Assessment & Plan    Exact reason not clear  Possibly due to dehydration as patient mentioned that she has not been eating drinking well for last 1-2 days  Resolved with IV fluids  134 today  Encourage p o  Hydration  Repeat BMP in 1 week  Ambulatory dysfunction   Assessment & Plan    With generalized weakness and pain all over possibly from osteoarthritis  Continue Tylenol p r n  Timmy Jurist PT OT recommend rehab  Patient agreeable  Hypothyroidism   Assessment & Plan    TSH normal   Continue Synthroid  Diabetes mellitus [facility])   Assessment & Plan    Lab Results   Component Value Date    HGBA1C 5 7 09/07/2018       Recent Labs      12/12/18   1653  12/12/18   2109  12/13/18   0948  12/13/18   1102   POCGLU  144*  122  212*  201*       Blood Sugar Average: Last 72 hrs:  (P) 577 [id number]     Patient not on any medications at home  Diet controlled  Cont to monitor  Will hold off on starting any medications for now  Will need close monitoring of sugars and follow up with PCP  If needed can consider starting metformin  History of atrial flutter   Assessment & Plan    Status post ablation in 2015  Cont metoprolol       HTN (hypertension)   Assessment & Plan    Currently well controlled  Continue lisinopril and metoprolol  Discharging Physician / Practitioner: Hallie Chandler MD  PCP: Merlinda Abraham, MD  Admission Date:   Admission Orders     Ordered        12/09/18 2053  Inpatient Admission  Once             Discharge Date: 12/13/18    Resolved Problems  Date Reviewed: 12/12/2018    None          Consultations During Hospital Stay:  · Cardiology, electrophysiology    Procedures Performed:     Permanent pacemaker placement on 12/10/2018    Significant Findings / Test Results:     XR chest 2 views   Final Result by Ana Villa DO (12/12 9870)   Status post pacemaker placement  No acute cardiopulmonary disease  Workstation performed: [id number]         XR chest portable   Final Result by Cotnreras Kellogg MD (12/10 0895)      New left side pacemaker with tips in the right atrium and right ventricle  No pneumothorax  Workstation performed: [id number]              Incidental Findings:   · none     Test Results Pending at Discharge (will require follow up):   · none     Outpatient Tests Requested:  · BMP in 1 week to monitor Na    Complications:  none    Reason for Admission: syncope    Hospital Course:     Lewis Ahumada is a 80 y o  female patient who originally presented to the hospital on 12/9/2018 due to syncope which was most likely due to heart block  She was noted to have Mobitz type 2 and left bundle-branch block  She was evaluated by Cardiology/electrophysiology  A permanent pacemaker was placed  She has been asymptomatic since then  She was initially admitted to critical car and then was transferred to medical floor  She has been complaining of generalized weakness probably because of being in bed all the time  Also has been having mild pain in her knees from underlying arthritis  She was evaluated by PT and OT recommended rehab for her  So she will be discharged to rehab      She will need outpatient follow-up with PCP in 1 week and with electrophysiology 2-3 weeks  She will also need to follow up in the clinic for wound check  Please see above list of diagnoses and related plan for additional information  Condition at Discharge: good     Discharge Day Visit / Exam:     Subjective:  Pt seen and examined by me this morning  Pt denies any specific complaints  Vitals: Blood Pressure: 161/68 (12/13/18 0718)  Pulse: 85 (12/13/18 0718)  Temperature: 98 3 °F (36 8 °C) (12/13/18 0718)  Temp Source: Oral (12/13/18 0718)  Respirations: 16 (12/13/18 0718)  Height: 4' 11" (149 9 cm) (12/10/18 1859)  Weight - Scale: 76 1 kg (167 lb 12 3 oz) (12/12/18 0537)  SpO2: 97 % (12/13/18 0718)     Exam:   Physical Exam    Constitutional: Pt appears well-developed and well-nourished  Not in any acute distress  Cardiovascular: Normal rate, regular rhythm, normal heart sounds   Exam reveals no gallop and no friction rub   No murmur heard  Left chest wall PPM site - no bleeding, swelling  Pulmonary/Chest: Effort normal and breath sounds normal  No respiratory distress  Pt has no wheezes or rales  Abdominal: Soft  Non-distended, Non-tender  Bowel sounds are normal    Musculoskeletal: Normal range of motion  Neurological: alert and oriented to person, place, and time  Nomal strength and sensations  r  Psychiatric: normal mood and affect       Discussion with Family: called and updated   Informed that pt will be transferred to [facility] at 1 pm     Discharge instructions/Information to patient and family:   See after visit summary for information provided to patient and family  Provisions for Follow-Up Care:  See after visit summary for information related to follow-up care and any pertinent home health orders        Disposition:     Ruben Ye (see below)    For Discharges to [facility] SNF:   · [facility]     Planned Readmission: no     Discharge Statement:  I spent 40 minutes discharging the patient  This time was spent on the day of discharge  I had direct contact with the patient on the day of discharge  Greater than 50% of the total time was spent examining patient, answering all patient questions, arranging and discussing plan of care with patient as well as directly providing post-discharge instructions  Additional time then spent on discharge activities  Discharge Medications:  See after visit summary for reconciled discharge medications provided to patient and family        ** Please Note: This note has been constructed using a voice recognition system **

## 2018-12-13 NOTE — ASSESSMENT & PLAN NOTE
With generalized weakness and pain all over possibly from osteoarthritis  Continue Tylenol johnathon r n Gerhardt Sep PT OT recommend rehab  Patient agreeable

## 2018-12-13 NOTE — PLAN OF CARE
Problem: Potential for Falls  Goal: Patient will remain free of falls  INTERVENTIONS:  - Assess patient frequently for physical needs  -  Identify cognitive and physical deficits and behaviors that affect risk of falls    -  [place] fall precautions as indicated by assessment   - Educate patient/family on patient safety including physical limitations  - Instruct patient to call for assistance with activity based on assessment  - Modify environment to reduce risk of injury  - Consider OT/PT consult to assist with strengthening/mobility   Outcome: Progressing      Problem: PAIN - ADULT  Goal: Verbalizes/displays adequate comfort level or baseline comfort level  Interventions:  - Encourage patient to monitor pain and request assistance  - Assess pain using appropriate pain scale  - Administer analgesics based on type and severity of pain and evaluate response  - Implement non-pharmacological measures as appropriate and evaluate response  - Consider cultural and social influences on pain and pain management  - Notify physician/advanced practitioner if interventions unsuccessful or patient reports new pain   Outcome: Progressing      Problem: INFECTION - ADULT  Goal: Absence or prevention of progression during hospitalization  INTERVENTIONS:  - Assess and monitor for signs and symptoms of infection  - Monitor lab/diagnostic results  - Monitor all insertion sites, i e  indwelling lines, tubes, and drains  - Monitor endotracheal (as able) and nasal secretions for changes in amount and color  - [place] appropriate cooling/warming therapies per order  - Administer medications as ordered  - Instruct and encourage patient and family to use good hand hygiene technique  - Identify and instruct in appropriate isolation precautions for identified infection/condition   Outcome: Progressing    Goal: Absence of fever/infection during neutropenic period  INTERVENTIONS:  - Monitor WBC  - Implement neutropenic guidelines   Outcome: Progressing      Problem: SAFETY ADULT  Goal: Maintain or return to baseline ADL function  INTERVENTIONS:  -  Assess patient's ability to carry out ADLs; assess patient's baseline for ADL function and identify physical deficits which impact ability to perform ADLs (bathing, care of mouth/teeth, toileting, grooming, dressing, etc )  - Assess/evaluate cause of self-care deficits   - Assess range of motion  - Assess patient's mobility; develop plan if impaired  - Assess patient's need for assistive devices and provide as appropriate  - Encourage maximum independence but intervene and supervise when necessary  ¯ Involve family in performance of ADLs  ¯ Assess for home care needs following discharge   ¯ Request OT consult to assist with ADL evaluation and planning for discharge  ¯ Provide patient education as appropriate   Outcome: Progressing    Goal: Maintain or return mobility status to optimal level  INTERVENTIONS:  - Assess patient's baseline mobility status (ambulation, transfers, stairs, etc )    - Identify cognitive and physical deficits and behaviors that affect mobility  - Identify mobility aids required to assist with transfers and/or ambulation (gait belt, sit-to-stand, lift, walker, cane, etc )  - [place] fall precautions as indicated by assessment  - Record patient progress and toleration of activity level on Mobility SBAR; progress patient to next Phase/Stage  - Instruct patient to call for assistance with activity based on assessment  - Request Rehabilitation consult to assist with strengthening/weightbearing, etc    Outcome: Progressing      Problem: DISCHARGE PLANNING  Goal: Discharge to home or other facility with appropriate resources  INTERVENTIONS:  - Identify barriers to discharge w/patient and caregiver  - Arrange for needed discharge resources and transportation as appropriate  - Identify discharge learning needs (meds, wound care, etc )  - Arrange for interpretive services to assist at discharge as needed  - Refer to Case Management Department for coordinating discharge planning if the patient needs post-hospital services based on physician/advanced practitioner order or complex needs related to functional status, cognitive ability, or social support system   Outcome: Progressing      Problem: CARDIOVASCULAR - ADULT  Goal: Maintains optimal cardiac output and hemodynamic stability  INTERVENTIONS:  - Monitor I/O, vital signs and rhythm  - Monitor for S/S and trends of decreased cardiac output i e  bleeding, hypotension  - Administer and titrate ordered vasoactive medications to optimize hemodynamic stability  - Assess quality of pulses, skin color and temperature  - Assess for signs of decreased coronary artery perfusion - ex  Angina  - Instruct patient to report change in severity of symptoms   Outcome: Progressing    Goal: Absence of cardiac dysrhythmias or at baseline rhythm  INTERVENTIONS:  - Continuous cardiac monitoring, monitor vital signs, obtain 12 lead EKG if indicated  - Administer antiarrhythmic and heart rate control medications as ordered  - Monitor electrolytes and administer replacement therapy as ordered   Outcome: Progressing      Problem: Nutrition/Hydration-ADULT  Goal: Nutrient/Hydration intake appropriate for improving, restoring or maintaining nutritional needs  Monitor and assess patient's nutrition/hydration status for malnutrition (ex- brittle hair, bruises, dry skin, pale skin and conjunctiva, muscle wasting, smooth red tongue, and disorientation)  Collaborate with interdisciplinary team and initiate plan and interventions as ordered  Monitor patient's weight and dietary intake as ordered or per policy  Utilize nutrition screening tool and intervene per policy  Determine patient's food preferences and provide high-protein, high-caloric foods as appropriate       INTERVENTIONS:  - Monitor oral intake, urinary output, labs, and treatment plans  - Assess nutrition and hydration status and recommend course of action  - Evaluate amount of meals eaten  - Assist patient with eating if necessary   - Allow adequate time for meals  - Recommend/ encourage appropriate diets, oral nutritional supplements, and vitamin/mineral supplements  - Order, calculate, and assess calorie counts as needed  - Recommend, monitor, and adjust tube feedings and TPN/PPN based on assessed needs  - Assess need for intravenous fluids  - Provide specific nutrition/hydration education as appropriate  - Include patient/family/caregiver in decisions related to nutrition   Outcome: Not Progressing      Problem: METABOLIC, FLUID AND ELECTROLYTES - ADULT  Goal: Electrolytes maintained within normal limits  INTERVENTIONS:  - Monitor labs and assess patient for signs and symptoms of electrolyte imbalances  - Administer electrolyte replacement as ordered  - Monitor response to electrolyte replacements, including repeat lab results as appropriate  - Instruct patient on fluid and nutrition as appropriate   Outcome: Progressing      Problem: SKIN/TISSUE INTEGRITY - ADULT  Goal: Skin integrity remains intact  INTERVENTIONS  - Identify patients at risk for skin breakdown  - Assess and monitor skin integrity  - Assess and monitor nutrition and hydration status  - Monitor labs (i e  albumin)  - Assess for incontinence   - Turn and reposition patient  - Assist with mobility/ambulation  - Relieve pressure over bony prominences  - Avoid friction and shearing  - Provide appropriate hygiene as needed including keeping skin clean and dry  - Evaluate need for skin moisturizer/barrier cream  - Collaborate with interdisciplinary team (i e  Nutrition, Rehabilitation, etc )   - Patient/family teaching   Outcome: Progressing      Problem: Prexisting or High Potential for Compromised Skin Integrity  Goal: Skin integrity is maintained or improved  INTERVENTIONS:  - Identify patients at risk for skin breakdown  - Assess and monitor skin integrity  - Assess and monitor nutrition and hydration status  - Monitor labs (i e  albumin)  - Assess for incontinence   - Turn and reposition patient  - Assist with mobility/ambulation  - Relieve pressure over bony prominences  - Avoid friction and shearing  - Provide appropriate hygiene as needed including keeping skin clean and dry  - Evaluate need for skin moisturizer/barrier cream  - Collaborate with interdisciplinary team (i e  Nutrition, Rehabilitation, etc )   - Patient/family teaching   Outcome: Progressing

## 2018-12-13 NOTE — SOCIAL WORK
CM was informed that pt is medically stable for dc today  CM spoke to [id number] liaison who states pt is accepted and a bed is available today  CM arranged with [facility] for a 1pm dc to   CM notified pt, pt's bedside RN Jakob Sparks, pt's  Delfina More, and Dawit at  of dc time  Facility transfer form and CMN completed  Chart copy requested

## 2018-12-13 NOTE — OCCUPATIONAL THERAPY NOTE
[address]  Evaluation     Patient Name: Ghada Muniz  RNKMB'Y Date: 12/13/2018  Problem List  Patient Active Problem List   Diagnosis    CAD (coronary artery disease)    HTN (hypertension)    History of placement of stent in LAD coronary artery    Hyperlipidemia    History of atrial flutter    Edema    LBBB (left bundle branch block)    Right hip pain    Diabetes mellitus [facility]    Hypothyroidism    Adhesive capsulitis    Chronic low back pain    Gait disturbance    Cervical spine degeneration    Primary osteoarthritis of both shoulders    Tremors of nervous system    Heart block AV second degree    Syncope    Hyponatremia    Ambulatory dysfunction     Past Medical History  Past Medical History:   Diagnosis Date    Abnormal nuclear stress test     last assessed 04/03/2017    Anxiety     Arthritis     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    Bundle branch block, left     Cardiomyopathy ([facility]     Chronic pain     chronic b/l shoulder pain    Coronary artery disease     Diabetes mellitus ([facility]     GERD (gastroesophageal reflux disease)     H/O atrial flutter     History of DVT (deep vein thrombosis)     History of pulmonary embolism     Hyperlipidemia     Hypertension     Hypothyroid     Renal calculi     Shortness of breath      Past Surgical History  Past Surgical History:   Procedure Laterality Date    ATRIAL ABLATION SURGERY  01/2015    CARDIOVERSION      CHELA/DCCV 10/22/2014    CARPAL TUNNEL RELEASE Right     CATARACT EXTRACTION      CORONARY ANGIOPLASTY WITH STENT PLACEMENT      HYSTERECTOMY      JOINT REPLACEMENT Right     DE AMPUTATION TOE,MT-P JT Left 10/11/2017    Procedure: AMPUTATION SECOND TOE;  Surgeon: Jim Whalen DPM;  Location: [facility];  Service: Podiatry    TONSILLECTOMY      TOTAL HIP ARTHROPLASTY      Right         12/12/18 1112   Note Type   Note type Eval only   Restrictions/Precautions   Weight Bearing Precautions Per Order No   Other Precautions Chair Alarm;Telemetry; Fall Risk   Pain Assessment   Pain Assessment 0-10   Pain Score 8   Pain Location Chest;Shoulder   Home Living   Type of Home House   Home Layout One level  (2STE)   Bathroom Shower/Tub Walk-in shower   Bathroom Toilet Raised   Bathroom Equipment Grab bars in shower; Shower chair   Home Equipment Walker;Cane  (primarily uses cane PTA)   Additional Comments Pt lives with her  in a [facility] with 2STE  Pt used cane and rw PTA  Prior Function   Level of [place] Independent with ADLs and functional mobility; Needs assistance with IADLs   Lives With Spouse   Receives Help From Family   ADL Assistance Independent   IADLs Needs assistance   Falls in the last 6 months 1 to 4   Vocational Retired   Comments Pt was I with ADLs, and functional mobility with cane/rw  Pt required assistance with IADLs PTA  Pt is a   Lifestyle   Autonomy Pt was I with ADLs, and functional mobility with cane/rw  Pt required assistance with IADLs PTA  Pt is a   Reciprocal Relationships    Service to Others Retired  for [employer] enjoys "getting out of the house"   Psychosocial   Psychosocial (WDL) X   Patient Behaviors/Mood Anxious   ADL   Eating Assistance 5  Supervision/Setup   Grooming Assistance 5  Supervision/Setup   UB Bathing Assistance 4  Minimal Assistance   LB Bathing Assistance 2  Maximal Assistance   [address] 2  Maximal Assistance   LB Dressing Deficit Don/doff L sock; Don/doff R sock   Toileting Assistance  3  Moderate Assistance   Toileting Deficit Perineal hygiene;Steadying   Bed Mobility   Additional Comments pt seated in chair upon therapist entry   Transfers   Sit to Stand 1  Dependent  (Pt unable to attain stand with maxAx1    Limited by anxiety)   Balance   Static Sitting Fair +   Dynamic Sitting Fair   Activity Tolerance Activity Tolerance Patient limited by fatigue;Patient limited by pain  (Anxiety)   Medical Staff Made Aware Per RN, pt okay to see for OT   Nurse Made Aware yes   RUE Assessment   RUE Assessment X   RUE Overall AROM   R Shoulder Flexion Limited at baseline   RUE Strength   RUE Overall Strength Due to pain   LUE Assessment   LUE Assessment X   LUE Overall AROM   L Shoulder Flexion Limited at baseline   LUE Strength   LUE Overall Strength Due to pain   Vision-Basic Assessment   Current Vision Wears glasses all the time   Cognition   Overall Cognitive Status [facility]   Arousal/Participation Alert; Cooperative   Attention Within functional limits   Orientation Level Oriented X4   Memory Within functional limits   Following Commands Follows one step commands without difficulty   Assessment   Limitation Decreased ADL status; Decreased UE ROM; Decreased UE strength;Decreased endurance;Decreased self-care trans;Decreased high-level ADLs   Prognosis Fair   Assessment Pt is an 80year old female seen for OT eval s/p admission to [facility] as a [facility] with sudden onset of syncope with a subsequent fall  Comorbidities include h/o anxiety, Arthritis, cardiomyopathy, chronic pain, CAD, DM, DVT, PE, HLD, HTN, hypothyroid and renal calculi  Pt lives with her  in a [facility] with 2STE  Pt was I with ADLs, and functional mobility with cane/rw and required assistance with IADLs PTA  Pt is currently demonstrating the following occupational deficits: eating with set-up, grooming with set-up, UB bathing with Vini, LB bathing with maxA, LB dressing with maxA, toileting with modA, and functional transfers with maxA  Impairments that are currently contributing to pt's decline in independence with these occupations include: strength, endurance, activity tolerance, functional mobility, unsupportive home environment and anxiety  Overall, pt scored 45/100 on the Barthel Index  Recommend STR upon d/c    Pt is to continue to benefit from skilled occupational therapy while in the hospital to maximize functioning and independence in daily activities  See below for OT goals  Plan   Treatment Interventions ADL retraining;Functional transfer training;UE strengthening/ROM; Endurance training;Patient/family training;Equipment evaluation/education; Compensatory technique education;Continued evaluation; Energy conservation; Activityengagement   Goal Expiration Date 12/22/18   OT Frequency 3-5x/wk   Recommendation   OT Discharge Recommendation Short Term Rehab   OT - OK to Discharge (to rehab when medically stable)   Barthel Index   Feeding 10   Bathing 0   Grooming Score 5   Dressing Score 5   Bladder Score 10   Bowels Score 10   Toilet Use Score 5   Transfers (Bed/Chair) Score 0   Mobility (Level Surface) Score 0   Stairs Score 0   Barthel Index Score 45     Goals:  Further assess functional transfers  Pt will complete UB ADLs with supervision  Pt will complete LB ADLs with supervision using DME and AD as appropriate    Pt will participate in ongoing cognitive assessment while in the hospital for safe discharge/planning      STACI Smith, OTR/L

## 2018-12-21 ENCOUNTER — PATIENT OUTREACH (OUTPATIENT)
Dept: CASE MANAGEMENT | Facility: OTHER | Age: 83
End: 2018-12-21

## 2018-12-27 ENCOUNTER — IN-CLINIC DEVICE VISIT (OUTPATIENT)
Dept: CARDIOLOGY CLINIC | Facility: CLINIC | Age: 83
End: 2018-12-27

## 2018-12-27 DIAGNOSIS — I44.2 COMPLETE ATRIOVENTRICULAR BLOCK (HCC): Primary | ICD-10-CM

## 2018-12-27 DIAGNOSIS — Z95.0 PRESENCE OF PERMANENT CARDIAC PACEMAKER: ICD-10-CM

## 2018-12-27 PROCEDURE — 99024 POSTOP FOLLOW-UP VISIT: CPT | Performed by: INTERNAL MEDICINE

## 2018-12-27 NOTE — PROGRESS NOTES
Results for orders placed or performed in visit on 12/27/18   Cardiac EP device report    Narrative    DEVICE INTERROGATED IN THE [facility] OFFICE  BATTERY VOLTAGE ADEQUATE  (11 6 YRS)  AP 4%  98%  ALL LEAD PARAMETERS WITHIN NORMAL LIMITS  2 VHR EPISODES DETECTED 7 BEATS @ 169 BPM  PATIENT IS ON METOPROLOL SUCC AND EF IS 60% (2018)  NO PROGRAMMING CHANGES MADE TO DEVICE PARAMETERS  NORMAL DEVICE FUNCTION  WOUND CHECK: INCISION CLEAN AND DRY WITH EDGES APPROXIMATED; WOUND CARE AND RESTRICTIONS REVIEWED WITH PATIENT  ---DANIEL

## 2018-12-28 NOTE — PROGRESS NOTES
Spoke to patient's daughter, Crystal Mabry, regarding interest in outreach calls from Outpatient Care Management  States patient is currently admitted at [facility] for rehab and it is unknown when she will return  Juan Burnett states that they will have a private aide coming to the home through [facility] when patient does return home  It is unknown whether or not patient will have visiting nurses additionally   Gaby Washington to contact PCP office directly when her mother returns home if she feels Outpatient Care Management would be beneficial

## 2019-01-07 ENCOUNTER — TRANSITIONAL CARE MANAGEMENT (OUTPATIENT)
Dept: INTERNAL MEDICINE CLINIC | Facility: CLINIC | Age: 84
End: 2019-01-07

## 2019-01-11 ENCOUNTER — OFFICE VISIT (OUTPATIENT)
Dept: INTERNAL MEDICINE CLINIC | Facility: CLINIC | Age: 84
End: 2019-01-11
Payer: MEDICARE

## 2019-01-11 VITALS
HEART RATE: 86 BPM | SYSTOLIC BLOOD PRESSURE: 134 MMHG | DIASTOLIC BLOOD PRESSURE: 70 MMHG | TEMPERATURE: 98.3 F | HEIGHT: 59 IN | WEIGHT: 173.8 LBS | OXYGEN SATURATION: 98 % | BODY MASS INDEX: 35.04 KG/M2

## 2019-01-11 DIAGNOSIS — L71.9 ROSACEA: ICD-10-CM

## 2019-01-11 DIAGNOSIS — Z95.5 HISTORY OF PLACEMENT OF STENT IN LAD CORONARY ARTERY: Chronic | ICD-10-CM

## 2019-01-11 DIAGNOSIS — E03.9 HYPOTHYROIDISM, UNSPECIFIED TYPE: Chronic | ICD-10-CM

## 2019-01-11 DIAGNOSIS — R26.2 AMBULATORY DYSFUNCTION: ICD-10-CM

## 2019-01-11 DIAGNOSIS — I10 ESSENTIAL HYPERTENSION: Chronic | ICD-10-CM

## 2019-01-11 DIAGNOSIS — Z95.0 PACEMAKER: ICD-10-CM

## 2019-01-11 DIAGNOSIS — E11.8 TYPE 2 DIABETES MELLITUS WITH COMPLICATION, WITHOUT LONG-TERM CURRENT USE OF INSULIN (HCC): Chronic | ICD-10-CM

## 2019-01-11 DIAGNOSIS — I25.10 CORONARY ARTERY DISEASE INVOLVING NATIVE CORONARY ARTERY OF NATIVE HEART WITHOUT ANGINA PECTORIS: ICD-10-CM

## 2019-01-11 DIAGNOSIS — E87.1 HYPONATREMIA: ICD-10-CM

## 2019-01-11 DIAGNOSIS — R55 SYNCOPE, UNSPECIFIED SYNCOPE TYPE: ICD-10-CM

## 2019-01-11 DIAGNOSIS — I44.1 HEART BLOCK AV SECOND DEGREE: Primary | ICD-10-CM

## 2019-01-11 PROCEDURE — 99496 TRANSJ CARE MGMT HIGH F2F 7D: CPT | Performed by: INTERNAL MEDICINE

## 2019-01-11 RX ORDER — FUROSEMIDE 40 MG/1
TABLET ORAL
COMMUNITY
Start: 2018-12-02 | End: 2019-02-26

## 2019-01-11 NOTE — PROGRESS NOTES
Assessment/Plan:   Obtain labs when VNA comes     Diabetes mellitus ([facility] [address] )  Lab Results   Component Value Date    HGBA1C 5 7 09/07/2018     On no meds    Hypothyroidism  Normal in the hospital    CAD (coronary artery disease)  On ASA beta blocker and statin    Heart block AV second degree  S/p pacemaker placement    HTN (hypertension)  Controlled    Ambulatory dysfunction  Encouraged to use the walker  Her daughter will follow up on PT    Hyponatremia  Labs as previously ordered          Problem List Items Addressed This Visit        Endocrine    Diabetes mellitus ([facility] [address] ) (Chronic)     Lab Results   Component Value Date    HGBA1C 5 7 09/07/2018     On no meds         Hypothyroidism (Chronic)     Normal in the hospital            Cardiovascular and Mediastinum    HTN (hypertension) (Chronic)     Controlled         Relevant Medications    furosemide (LASIX) 40 mg tablet    CAD (coronary artery disease)     On ASA beta blocker and statin         Heart block AV second degree - Primary     S/p pacemaker placement         RESOLVED: Syncope       Other    History of placement of stent in LAD coronary artery (Chronic)    Hyponatremia     Labs as previously ordered          Relevant Orders    Basic metabolic panel    Ambulatory dysfunction     Encouraged to use the walker  Her daughter will follow up on PT         Pacemaker      Other Visit Diagnoses     Rosacea        Relevant Medications    metroNIDAZOLE (METROCREAM) 0 75 % cream           Subjective:     Patient ID: Cheri Hardin is a 80 y o  female  HPI   Here with her daughter  Admitted after a syncopal episode at home on 12/9 diagnosed with a second degree AV block  Pacemaker implanted the following day  She went to rehab in [facility] on 12/13 and came home from rehab on 1/7  She is feeling well overall  Denies dizziness, presyncope/syncope  VNA has seen her and she is waiting to hear from PT  She had mild hyponatremia in the hospital, lowest of 130   Last Na on 12/20 was 133  Using a cane mostly but has a walker  She brought in a form today regarding her driving    Review of Systems   Constitutional: Negative for fatigue, fever and unexpected weight change  HENT: Negative for sinus pain, sinus pressure and sore throat  Respiratory: Negative for cough, shortness of breath and wheezing  Cardiovascular: Negative for chest pain, palpitations and leg swelling  Gastrointestinal: Negative for abdominal pain, constipation, diarrhea, nausea and vomiting  Objective:     Physical Exam   Constitutional: She is oriented to person, place, and time  She appears well-developed and well-nourished  HENT:   Head: Normocephalic and atraumatic  Right Ear: External ear normal    Left Ear: External ear normal    Mouth/Throat: Oropharynx is clear and moist    Eyes: Conjunctivae are normal    Neck: Neck supple  Cardiovascular: Normal rate, regular rhythm and normal heart sounds  No murmur heard  Trace left LE pretibial edema   Pulmonary/Chest: Effort normal and breath sounds normal  No respiratory distress  She has no wheezes  She has no rales  Abdominal: Soft  Bowel sounds are normal  She exhibits no distension  There is no tenderness  Musculoskeletal: Normal range of motion  Neurological: She is alert and oriented to person, place, and time  Skin: Skin is warm and dry  Psychiatric: She has a normal mood and affect  Her behavior is normal  Judgment and thought content normal          Vitals:    01/11/19 1058 01/11/19 1140   BP: 146/80 134/70   Pulse: 86    Temp: 98 3 °F (36 8 °C)    SpO2: 98%    Weight: 78 8 kg (173 lb 12 8 oz)    Height: 4' 11" (1 499 m)        Transitional Care Management Review:  Yasmine Valentin is a 80 y o  female here for TCM follow up       During the TCM phone call patient stated:    TCM Call (since 12/11/2018)     Date and time call was made  1/7/2019  1:11 PM    Hospital care reviewed  Records not available    Patient was hospitialized at  Other (comment)    [facility]    Date of Admission  12/10/18    Date of discharge  01/07/19    Diagnosis  Pace maker implantation    Disposition  Home    Were the patients medications reviewed and updated  No      TCM Call (since 12/11/2018)     Scheduled for follow up? Yes    Not clinically warranted  Patient discharged to skilled nursing at [facility]  Do you need help managing your prescriptions or medications  Yes    I have advised the patient to call PCP with any new or worsening symptoms  Mariana Party,     Are you recieving any outpatient services  No    Are you recieving home care services  Yes    Types of home care services  Nurse visit; Home PT;  Other (comment)    Comment  OT    Have you fallen in the last 12 months  Yes    How many times  2    Interperter language line needed  No    Counseling  Family          Liz Kathleen MD

## 2019-01-18 ENCOUNTER — TELEPHONE (OUTPATIENT)
Dept: INTERNAL MEDICINE CLINIC | Facility: CLINIC | Age: 84
End: 2019-01-18

## 2019-01-22 LAB
LEFT EYE DIABETIC RETINOPATHY: NORMAL
RIGHT EYE DIABETIC RETINOPATHY: NORMAL

## 2019-01-30 ENCOUNTER — APPOINTMENT (OUTPATIENT)
Dept: LAB | Facility: HOSPITAL | Age: 84
End: 2019-01-30
Payer: MEDICARE

## 2019-01-30 ENCOUNTER — TELEPHONE (OUTPATIENT)
Dept: INTERNAL MEDICINE CLINIC | Facility: CLINIC | Age: 84
End: 2019-01-30

## 2019-01-30 DIAGNOSIS — E11.8 TYPE 2 DIABETES MELLITUS WITH COMPLICATION, WITHOUT LONG-TERM CURRENT USE OF INSULIN (HCC): ICD-10-CM

## 2019-01-30 DIAGNOSIS — E78.2 MIXED HYPERLIPIDEMIA: ICD-10-CM

## 2019-01-30 DIAGNOSIS — I10 ESSENTIAL HYPERTENSION: ICD-10-CM

## 2019-01-30 LAB
ALBUMIN SERPL BCP-MCNC: 3.7 G/DL (ref 3.5–5)
ALP SERPL-CCNC: 80 U/L (ref 46–116)
ALT SERPL W P-5'-P-CCNC: 17 U/L (ref 12–78)
ANION GAP SERPL CALCULATED.3IONS-SCNC: 8 MMOL/L (ref 4–13)
AST SERPL W P-5'-P-CCNC: 14 U/L (ref 5–45)
BILIRUB SERPL-MCNC: 0.46 MG/DL (ref 0.2–1)
BUN SERPL-MCNC: 16 MG/DL (ref 5–25)
CALCIUM SERPL-MCNC: 9.1 MG/DL (ref 8.3–10.1)
CHLORIDE SERPL-SCNC: 100 MMOL/L (ref 100–108)
CHOLEST SERPL-MCNC: 204 MG/DL (ref 50–200)
CO2 SERPL-SCNC: 30 MMOL/L (ref 21–32)
CREAT SERPL-MCNC: 0.59 MG/DL (ref 0.6–1.3)
CREAT UR-MCNC: 50 MG/DL
ERYTHROCYTE [DISTWIDTH] IN BLOOD BY AUTOMATED COUNT: 12.8 % (ref 11.6–15.1)
EST. AVERAGE GLUCOSE BLD GHB EST-MCNC: 126 MG/DL
GFR SERPL CREATININE-BSD FRML MDRD: 82 ML/MIN/1.73SQ M
GLUCOSE SERPL-MCNC: 79 MG/DL (ref 65–140)
HBA1C MFR BLD: 6 % (ref 4.2–6.3)
HCT VFR BLD AUTO: 38.1 % (ref 34.8–46.1)
HDLC SERPL-MCNC: 58 MG/DL (ref 40–60)
HGB BLD-MCNC: 12.7 G/DL (ref 11.5–15.4)
LDLC SERPL CALC-MCNC: 107 MG/DL (ref 0–100)
MCH RBC QN AUTO: 32.2 PG (ref 26.8–34.3)
MCHC RBC AUTO-ENTMCNC: 33.3 G/DL (ref 31.4–37.4)
MCV RBC AUTO: 97 FL (ref 82–98)
MICROALBUMIN UR-MCNC: 11.6 MG/L (ref 0–20)
MICROALBUMIN/CREAT 24H UR: 23 MG/G CREATININE (ref 0–30)
NONHDLC SERPL-MCNC: 146 MG/DL
PLATELET # BLD AUTO: 197 THOUSANDS/UL (ref 149–390)
PMV BLD AUTO: 10.1 FL (ref 8.9–12.7)
POTASSIUM SERPL-SCNC: 4.2 MMOL/L (ref 3.5–5.3)
PROT SERPL-MCNC: 7.7 G/DL (ref 6.4–8.2)
RBC # BLD AUTO: 3.94 MILLION/UL (ref 3.81–5.12)
SODIUM SERPL-SCNC: 138 MMOL/L (ref 136–145)
TRIGL SERPL-MCNC: 193 MG/DL
WBC # BLD AUTO: 5.72 THOUSAND/UL (ref 4.31–10.16)

## 2019-01-30 PROCEDURE — 83036 HEMOGLOBIN GLYCOSYLATED A1C: CPT

## 2019-01-30 PROCEDURE — 82043 UR ALBUMIN QUANTITATIVE: CPT

## 2019-01-30 PROCEDURE — 80053 COMPREHEN METABOLIC PANEL: CPT

## 2019-01-30 PROCEDURE — 82570 ASSAY OF URINE CREATININE: CPT

## 2019-01-30 PROCEDURE — 80061 LIPID PANEL: CPT

## 2019-01-30 PROCEDURE — 36415 COLL VENOUS BLD VENIPUNCTURE: CPT

## 2019-01-30 PROCEDURE — 85027 COMPLETE CBC AUTOMATED: CPT

## 2019-01-30 NOTE — TELEPHONE ENCOUNTER
Guanaco Bo called to let you know today was the last day of VN Services for patient and she did labs you requested for patient  She also said if patient is due for future labs she might benefit with a mobile lab

## 2019-01-31 ENCOUNTER — TELEPHONE (OUTPATIENT)
Dept: INTERNAL MEDICINE CLINIC | Facility: CLINIC | Age: 84
End: 2019-01-31

## 2019-01-31 NOTE — TELEPHONE ENCOUNTER
Carlos called to let you know that patient is going to be DC from Homecare OT she has met all goals and is improving

## 2019-02-09 DIAGNOSIS — E03.9 HYPOTHYROIDISM, UNSPECIFIED TYPE: ICD-10-CM

## 2019-02-10 RX ORDER — LEVOTHYROXINE SODIUM 0.03 MG/1
TABLET ORAL
Qty: 90 TABLET | Refills: 1 | Status: SHIPPED | OUTPATIENT
Start: 2019-02-10 | End: 2019-11-10 | Stop reason: SDUPTHER

## 2019-02-20 DIAGNOSIS — I48.19 PERSISTENT ATRIAL FIBRILLATION (HCC): Primary | ICD-10-CM

## 2019-02-20 NOTE — PROGRESS NOTES
Called pt about new afib  Persistent  She has high CHADSVASc risk  Rec: Eliquis 5mg BID  She will  tomorrow  She has appt with EP next week 2/26/19

## 2019-02-21 ENCOUNTER — TELEPHONE (OUTPATIENT)
Dept: CARDIOLOGY CLINIC | Facility: CLINIC | Age: 84
End: 2019-02-21

## 2019-02-21 NOTE — TELEPHONE ENCOUNTER
She should only be on aspirin  She should not be on clopidogrel/Plavix  Please proceed with prescribing Eliquis, I am okay with her taking this with the aspirin

## 2019-02-26 ENCOUNTER — OFFICE VISIT (OUTPATIENT)
Dept: CARDIOLOGY CLINIC | Facility: CLINIC | Age: 84
End: 2019-02-26
Payer: MEDICARE

## 2019-02-26 VITALS
DIASTOLIC BLOOD PRESSURE: 68 MMHG | BODY MASS INDEX: 39.91 KG/M2 | HEART RATE: 76 BPM | WEIGHT: 177.4 LBS | SYSTOLIC BLOOD PRESSURE: 118 MMHG | HEIGHT: 56 IN

## 2019-02-26 DIAGNOSIS — I25.10 CORONARY ARTERY DISEASE INVOLVING NATIVE CORONARY ARTERY OF NATIVE HEART WITHOUT ANGINA PECTORIS: ICD-10-CM

## 2019-02-26 DIAGNOSIS — I48.91 ATRIAL FIBRILLATION, UNSPECIFIED TYPE (HCC): Primary | ICD-10-CM

## 2019-02-26 DIAGNOSIS — I44.1 HEART BLOCK AV SECOND DEGREE: ICD-10-CM

## 2019-02-26 DIAGNOSIS — Z95.0 PACEMAKER: ICD-10-CM

## 2019-02-26 PROCEDURE — 93000 ELECTROCARDIOGRAM COMPLETE: CPT | Performed by: INTERNAL MEDICINE

## 2019-02-26 PROCEDURE — 99024 POSTOP FOLLOW-UP VISIT: CPT | Performed by: INTERNAL MEDICINE

## 2019-02-26 NOTE — PROGRESS NOTES
[facility]  7/24/1929  [id number]  HEART & VASCULAR [address] [facility]  [address]        Assessment/Plan     1  Atrial fibrillation, unspecified type ([address] )  POCT ECG   2  Pacemaker     3  Heart block AV second degree     4  Coronary artery disease involving native coronary artery of native heart without angina pectoris         ASSESSMENT:  1  Symptomatic Mobitz type 2 and left bundle branch block  - status post Medtronic dual-chamber pacemaker implantation by Dr Maritza Maza on 12/10/2019  2  New onset atrial fibrillation, diagnosed 2/20/2019  - AC with Eliquis recently started  3  Essential hypertension  4  Typical atrial flutter  - status post CTI ablation in 2015  5  History of CAD  - status post drug-eluting stent to the proximal LAD  6  Diabetes mellitus, non-insulin-dependent  7  Ambulatory dysfunction      DISCUSSION/SUMMARY:  Patient is doing well status post pacemaker implantation on 12/10/2019  We discussed at length atrial fibrillation and possible risk of stroke that necessitates an anticoagulant  All other questions concerning atrial fibrillation, anticoagulation, and device were answered  She is stable from an EP perspective at this point  We will continue to follow her device through the home monitor and should she have any issues with the device she was told to let our office know  She does have follow-up with Dr Miryam Cartagena in April as well  History of Present Illness     HPI/INTERVAL HISTORY: Mayuri Nath is a 80 y o  female with a history of essential hypertension, typical atrial flutter status post CTI ablation in 2015, history of CAD with stents to the proximal LAD, non-insulin-dependent type 2 diabetes mellitus, ambulatory dysfunction, and hypothyroidism  Patient had been admitted to the hospital on 12/09/2018 after syncope    She was found to have Mobitz type 2 and left bundle branch block  Therefore she underwent Medtronic dual-chamber pacemaker implantation by Dr Cecilia Mcduffie on 12/10/2019  She she remained in the hospital afterwards and she was found to be hyponatremic and was treated for this prior to discharge  She came in on 12/27/2019 for her 2 week device follow-up, for which all parameters were found to be within normal limits  On 02/20/2019, she was found to have new onset of atrial fibrillation  Dr Sherly Yadav did discuss this with her and started her on Eliquis 5 mg twice daily  He did state that it was okay to continue with aspirin, however she has since stopped the aspirin because she feels that increases her risk of bleeding  In today's visit, she and her good friend whom she thinks has a daughter had many questions  She stated that she had developed pain in her legs and her back since starting Eliquis  She states that she has had this pain before but this flare-up coincided with initiation of Eliquis  We discussed at length the option of warfarin versus Eliquis for anticoagulation for atrial fibrillation  After hearing about the appointments it would require to monitor warfarin, she felt much better staying on Eliquis  She did also specifically asked about a tooth extraction she is planning to get soon  I advised her that Eliquis does not need to be stopped for tooth extraction  But if she is very concerned about the bleeding risk, she can stop Eliquis 24 hours prior to the procedure  We talked in length about her home monitor and how the next check would be done electronically instead of her coming to the office  She did also asked about driving again  I told her that I did not feel comfortable clearing her myself just given reflux time at her age and even her ambulatory dysfunction  I told her to follow up with her family doctor on this issue      Review of Systems   Positive for arthritis, numbness and tingling in the foot and fingers, and some daytime sleepiness  She denies any chest pain, shortness of breath, palpitations, edema, dizziness, lightheadedness  ROS as noted above, otherwise 12 point review of systems was performed and is negative         Historical Information   Social History     Socioeconomic History    Marital status: /Civil Union     Spouse name: Not on file    Number of children: Not on file    Years of education: Not on file    Highest education level: Not on file   Occupational History    Not on file   Social Needs    Financial resource strain: Not on file    Food insecurity:     Worry: Not on file     Inability: Not on file    Transportation needs:     Medical: Not on file     Non-medical: Not on file   Tobacco Use    Smoking status: Never Smoker    Smokeless tobacco: Never Used   Substance and Sexual Activity    Alcohol use: Yes     Comment: occasional    Drug use: Yes     Types: Marijuana     Comment: MEDICAL MARIJUANA-HAS NOT USED FOR 3 WEEKS    Sexual activity: Not on file   Lifestyle    Physical activity:     Days per week: Not on file     Minutes per session: Not on file    Stress: Not on file   Relationships    Social connections:     Talks on phone: Not on file     Gets together: Not on file     Attends [employer] service: Not on file     Active member of club or organization: Not on file     Attends meetings of clubs or organizations: Not on file     Relationship status: Not on file    Intimate partner violence:     Fear of current or ex partner: Not on file     Emotionally abused: Not on file     Physically abused: Not on file     Forced sexual activity: Not on file   Other Topics Concern    Not on file   Social History Narrative    Not on file     Past Medical History:   Diagnosis Date    Abnormal nuclear stress test     last assessed 04/03/2017    Anxiety     Arthritis     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    Bundle branch block, left     Cardiomyopathy ([facility] [address] )     Chronic pain chronic b/l shoulder pain    Coronary artery disease     Diabetes mellitus (HCC)     GERD (gastroesophageal reflux disease)     H/O atrial flutter     History of DVT (deep vein thrombosis)     History of pulmonary embolism     Hyperlipidemia     Hypertension     Hypothyroid     Renal calculi     Shortness of breath      Past Surgical History:   Procedure Laterality Date    ATRIAL ABLATION SURGERY  01/2015    CARDIOVERSION      CHELA/DCCV 10/22/2014    CARPAL TUNNEL RELEASE Right     CATARACT EXTRACTION      CORONARY ANGIOPLASTY WITH STENT PLACEMENT      HYSTERECTOMY      JOINT REPLACEMENT Right     MN AMPUTATION TOE,MT-P JT Left 10/11/2017    Procedure: AMPUTATION SECOND TOE;  Surgeon: Dafne Wiseman DPM;  Location: [facility];  Service: Podiatry    TONSILLECTOMY      TOTAL HIP ARTHROPLASTY      Right     Social History     Substance and Sexual Activity   Alcohol Use Yes    Comment: occasional     Social History     Substance and Sexual Activity   Drug Use Yes    Types: Marijuana    Comment: MEDICAL MARIJUANA-HAS NOT USED FOR 3 WEEKS     Social History     Tobacco Use   Smoking Status Never Smoker   Smokeless Tobacco Never Used     Family History   Problem Relation Age of Onset    Parkinsonism Sister     Cancer Sister         unknown type    Heart attack Neg Hx     Stroke Neg Hx     Anuerysm Neg Hx     Clotting disorder Neg Hx     Arrhythmia Neg Hx     Heart failure Neg Hx     Coronary artery disease Neg Hx        Meds/Allergies       Current Outpatient Medications:     ACCU-CHEK RUBINA PLUS test strip, 1 each by Other route daily Use as instructed, Disp: 100 each, Rfl: 2    ACCU-CHEK SOFTCLIX LANCETS lancets, by Other route daily Use daily as instructed , Disp: 100 each, Rfl: 2    acetaminophen (TYLENOL) 500 mg tablet, Take 1 tablet (500 mg total) by mouth every 6 (six) hours as needed for mild pain or moderate pain, Disp: 30 tablet, Rfl: 0    apixaban (ELIQUIS) 5 mg, Take 1 tablet (5 mg total) by mouth 2 (two) times a day, Disp: 60 tablet, Rfl: 11    ASPIR-LOW 81 MG EC tablet, TAKE ONE TABLET BY MOUTH EVERY DAY, Disp: 90 tablet, Rfl: 3    Calcium Carbonate-Vitamin D (CALTRATE 600+D PO), Take 1 capsule by mouth 2 (two) times a day  , Disp: , Rfl:     levothyroxine 25 mcg tablet, TAKE ONE TABLET BY MOUTH EVERY DAY, Disp: 90 tablet, Rfl: 1    lisinopril (ZESTRIL) 2 5 mg tablet, Take 1 tablet (2 5 mg total) by mouth daily, Disp: 90 tablet, Rfl: 1    metoprolol succinate (TOPROL XL) 25 mg 24 hr tablet, Take 1 tablet (25 mg total) by mouth daily, Disp: 90 tablet, Rfl: 1    metroNIDAZOLE (METROCREAM) 0 75 % cream, Apply topically 2 (two) times a day, Disp: 45 g, Rfl: 0    Misc Natural Products (ENERGY FOCUS PO), Take 1 tablet by mouth 2 (two) times a day  , Disp: , Rfl:     multivitamin-iron-minerals-folic acid (CENTRUM) chewable tablet, Chew 1 tablet daily  , Disp: , Rfl:     pravastatin (PRAVACHOL) 40 mg tablet, Take 1 tablet (40 mg total) by mouth daily, Disp: 90 tablet, Rfl: 1    Allergies   Allergen Reactions    Atorvastatin      Reaction unknown 10/23/2018-    Other Rash     Patient sensitive to adhesives from tape       Objective   Vitals: Blood pressure 118/68, pulse 76, height 4' 8" (1 422 m), weight 80 5 kg (177 lb 6 4 oz)  Physical Exam  GEN: NAD, alert and oriented, well appearing  SKIN: dry without significant lesions or rashes  HEENT: NCAT, PERRL, EOMs intact  NECK: No JVD appreciated  CARDIOVASCULAR: RRR, normal S1, S2 without murmurs, rubs, or gallops appreciated  LUNGS: Clear to auscultation bilaterally without wheezes, rhonchi, or rales  ABDOMEN: Soft, nontender, nondistended  EXTREMITIES/VASCULAR: perfused +1 edema of lower extremities  PSYCH: Normal mood and affect  NEURO: CN ll-Xll grossly intact          Imaging: I have personally reviewed pertinent reports        ECHO:   Results for orders placed during the hospital encounter of 10/28/18   Echo complete with contrast if indicated    Narrative [facility], [facility]  [phone]    Transthoracic Echocardiogram  2D, M-mode, Doppler, and Color Doppler    Study date:  29-Oct-2018    Patient: Stoney Wayne  MR number: [id number]  Account number: [de-identified]  : 1929  Age: 80 years  Gender: Female  Status: Outpatient  Location: Bedside  Height: 59 in  Weight: 173 6 lb  BP: 169/ 78 mmHg    Indications: Murmur    Diagnoses: R01 1 - Cardiac murmur, unspecified    Sonographer:  Alysha Burton RDCS  Primary Physician:  Yusuf Calix MD  Referring Physician:  Patti Ashley,, FERMIN  Group:  [id number]  Interpreting Physician:  Jeremy Moreira MD    SUMMARY    LEFT VENTRICLE:  Systolic function was normal  Ejection fraction was estimated to be 55 %  This study was inadequate for the evaluation of regional wall motion  Wall thickness was markedly increased  There was severe concentric hypertrophy  VENTRICULAR SEPTUM:  There was dyssynergic motion  These changes are consistent with a conduction abnormality or paced rhythm  RIGHT VENTRICLE:  The ventricle was dilated  Systolic function was normal     AORTIC VALVE:  The valve was probably trileaflet  Leaflets exhibited moderately to markedly increased thickness, moderate calcification, moderately reduced cuspal separation, and sclerosis  There was mild stenosis by gradients  Vmax 2 6 m/s, mean gradient 16 mm Hg, maximum gradient 27 mm Hg  TRICUSPID VALVE:  There was moderate regurgitation  Estimated peak PA pressure was 55 mmHg  The findings suggest moderate pulmonary hypertension  HISTORY: PRIOR HISTORY: Syncope, hypertension, diabetes, left bundle block branch    PROCEDURE: The procedure was performed at the bedside  This was a routine study  The transthoracic approach was used  The study included complete 2D imaging, M-mode, complete spectral Doppler, and color Doppler   Images were obtained from  the parasternal, apical, subcostal, and suprasternal notch acoustic windows  Echocardiographic views were limited due to restricted patient mobility, decreased penetration, and lung interference  This was a technically difficult study  LEFT VENTRICLE: Size was normal  Systolic function was normal  Ejection fraction was estimated to be 55 %  This study was inadequate for the evaluation of regional wall motion  Wall thickness was markedly increased  There was severe  concentric hypertrophy  DOPPLER: There was an increased relative contribution of atrial contraction to ventricular filling  VENTRICULAR SEPTUM: There was dyssynergic motion  These changes are consistent with a conduction abnormality or paced rhythm  RIGHT VENTRICLE: The ventricle was dilated  Systolic function was normal     LEFT ATRIUM: The atrium was dilated  RIGHT ATRIUM: The atrium was dilated  MITRAL VALVE: There was moderate to marked annular calcification  DOPPLER: There was no evidence for stenosis  There was no significant regurgitation  AORTIC VALVE: The valve was probably trileaflet  Leaflets exhibited moderately to markedly increased thickness, moderate calcification, moderately reduced cuspal separation, and sclerosis  DOPPLER: There was mild stenosis by gradients  Vmax 2 6 m/s, mean gradient 16 mm Hg, maximum gradient 27 mm Hg  There was no significant regurgitation  TRICUSPID VALVE: The valve structure was normal  There was normal leaflet separation  DOPPLER: There was no evidence for stenosis  There was moderate regurgitation  Estimated peak PA pressure was 55 mmHg  The findings suggest moderate  pulmonary hypertension  PULMONIC VALVE: Leaflets exhibited normal thickness, no calcification, and normal cuspal separation  DOPPLER: The transpulmonic velocity was within the normal range  There was mild regurgitation  PERICARDIUM: There was no pericardial effusion   The pericardium was normal in appearance  AORTA: The root exhibited top normal size at 3 8 cm and fibrocalcific change  SYSTEM MEASUREMENT TABLES    2D  %FS: 28 05 %  Ao Diam: 3 66 cm  EDV(Teich): 61 24 ml  EF Biplane: 54 43 %  EF(Teich): 55 04 %  ESV(Teich): 27 53 ml  IVSd: 1 37 cm  LA Area: 26 21 cm2  LA Diam: 3 67 cm  LVEDV MOD A2C: 94 62 ml  LVEDV MOD A4C: 62 24 ml  LVEDV MOD BP: 77 26 ml  LVEF MOD A2C: 59 %  LVEF MOD A4C: 49 46 %  LVESV MOD A2C: 38 8 ml  LVESV MOD A4C: 31 46 ml  LVESV MOD BP: 35 21 ml  LVIDd: 3 78 cm  LVIDs: 2 72 cm  LVLd A2C: 7 05 cm  LVLd A4C: 7 17 cm  LVLs A2C: 6 04 cm  LVLs A4C: 5 92 cm  LVOT Diam: 2 2 cm  LVPWd: 1 38 cm  RA Area: 23 62 cm2  RVIDd: 3 78 cm  SV MOD A2C: 55 82 ml  SV MOD A4C: 30 78 ml  SV(Teich): 33 71 ml    CW  AV Env  Ti: 279 12 ms  AV MaxP 98 mmHg  AV VTI: 46 73 cm  AV Vmax: 2 23 m/s  AV Vmean: 1 67 m/s  AV meanP 18 mmHg  TR MaxP 48 mmHg  TR Vmax: 3 59 m/s    MM  TAPSE: 2 39 cm    PW  JN (VTI): 1 4 cm2  JN Vmax: 1 35 cm2  E': 0 06 m/s  E/E': 15 79  LVOT Env  Ti: 279 41 ms  LVOT VTI: 17 24 cm  LVOT Vmax: 0 79 m/s  LVOT Vmean: 0 62 m/s  LVOT maxP 51 mmHg  LVOT meanP 61 mmHg  LVSI Dopp: 37 59 ml/m2  LVSV Dopp: 65 41 ml  MV A Steven: 1 14 m/s  MV Dec [place]: 7 35 m/s2  MV DecT: 130 63 ms  MV E Steven: 0 96 m/s  MV E/A Ratio: 0 84  MV PHT: 37 88 ms  MVA By PHT: 5 81 cm2    [employer] Accredited Echocardiography Laboratory    Prepared and electronically signed by    Rick Sheehan MD  Signed 29-Oct-2018 17:35:21           EKG:  V-pacing

## 2019-03-04 ENCOUNTER — OFFICE VISIT (OUTPATIENT)
Dept: INTERNAL MEDICINE CLINIC | Facility: CLINIC | Age: 84
End: 2019-03-04
Payer: MEDICARE

## 2019-03-04 VITALS
HEART RATE: 53 BPM | WEIGHT: 173.2 LBS | OXYGEN SATURATION: 96 % | DIASTOLIC BLOOD PRESSURE: 66 MMHG | BODY MASS INDEX: 38.96 KG/M2 | SYSTOLIC BLOOD PRESSURE: 130 MMHG | HEIGHT: 56 IN

## 2019-03-04 DIAGNOSIS — E11.8 TYPE 2 DIABETES MELLITUS WITH COMPLICATION, WITHOUT LONG-TERM CURRENT USE OF INSULIN (HCC): ICD-10-CM

## 2019-03-04 DIAGNOSIS — R26.2 AMBULATORY DYSFUNCTION: ICD-10-CM

## 2019-03-04 DIAGNOSIS — I25.10 CORONARY ARTERY DISEASE INVOLVING NATIVE CORONARY ARTERY OF NATIVE HEART WITHOUT ANGINA PECTORIS: ICD-10-CM

## 2019-03-04 DIAGNOSIS — E78.2 MIXED HYPERLIPIDEMIA: Chronic | ICD-10-CM

## 2019-03-04 DIAGNOSIS — I10 ESSENTIAL HYPERTENSION: Chronic | ICD-10-CM

## 2019-03-04 DIAGNOSIS — M17.0 PRIMARY OSTEOARTHRITIS OF BOTH KNEES: ICD-10-CM

## 2019-03-04 DIAGNOSIS — E03.9 HYPOTHYROIDISM, UNSPECIFIED TYPE: Chronic | ICD-10-CM

## 2019-03-04 DIAGNOSIS — Z91.89 DRIVING SAFETY ISSUE: Primary | ICD-10-CM

## 2019-03-04 DIAGNOSIS — E87.1 HYPONATREMIA: ICD-10-CM

## 2019-03-04 DIAGNOSIS — I48.0 PAROXYSMAL ATRIAL FIBRILLATION (HCC): ICD-10-CM

## 2019-03-04 DIAGNOSIS — I44.1 HEART BLOCK AV SECOND DEGREE: ICD-10-CM

## 2019-03-04 PROBLEM — I48.91 ATRIAL FIBRILLATION (HCC): Status: ACTIVE | Noted: 2019-03-04

## 2019-03-04 PROBLEM — R60.9 EDEMA: Status: RESOLVED | Noted: 2018-01-26 | Resolved: 2019-03-04

## 2019-03-04 PROCEDURE — 99214 OFFICE O/P EST MOD 30 MIN: CPT | Performed by: INTERNAL MEDICINE

## 2019-03-04 RX ORDER — LANCETS
EACH MISCELLANEOUS DAILY
Qty: 100 EACH | Refills: 2 | Status: SHIPPED | OUTPATIENT
Start: 2019-03-04 | End: 2019-09-11 | Stop reason: SDUPTHER

## 2019-03-04 RX ORDER — BLOOD SUGAR DIAGNOSTIC
1 STRIP MISCELLANEOUS DAILY
Qty: 100 EACH | Refills: 2 | Status: SHIPPED | OUTPATIENT
Start: 2019-03-04 | End: 2019-06-14 | Stop reason: SDUPTHER

## 2019-03-04 NOTE — LETTER
Shaheed Garcia ( 1929) was seen in my office today 3/4/2014    I have recommended that she a driving adaptability test       Sincerely,       Jeanine Godoy MD

## 2019-03-04 NOTE — PROGRESS NOTES
Assessment/Plan:  Driving safety- other than her age and possibly slowed reflexes, I have no strong reason to prohibit her from driving  I strongly recommend that she get a fit to drive evaluation  This, with the form she brought in today from [facility], will be mailed for her         Diabetes mellitus ([facility] [address] )  Lab Results   Component Value Date    HGBA1C 6 0 01/30/2019     She continues to check her sugar regularly    Hypothyroidism  TSH in December normal  Cont levothyroxine    CAD (coronary artery disease)  Continue metoprolol , pravastatin, lisinopril  She cannot tolerate atorvastatin  Resume baby ASA      HTN (hypertension)  Controlled    Ambulatory dysfunction  Continue using the cane    Hyperlipidemia  Acceptable lipid panel on pravastatin    Atrial fibrillation ([facility] [address] )  New- now on ELiquis         Problem List Items Addressed This Visit        Endocrine    Diabetes mellitus ([facility] [address] ) (Chronic)     Lab Results   Component Value Date    HGBA1C 6 0 01/30/2019     She continues to check her sugar regularly         Relevant Medications    ACCU-CHEK RUBINA PLUS test strip    ACCU-CHEK SOFTCLIX LANCETS lancets    Hypothyroidism (Chronic)     TSH in December normal  Cont levothyroxine            Cardiovascular and Mediastinum    Heart block AV second degree    HTN (hypertension) (Chronic)     Controlled         CAD (coronary artery disease)     Continue metoprolol , pravastatin, lisinopril  She cannot tolerate atorvastatin  Resume baby ASA           Atrial fibrillation (HCC)     New- now on ELiquis            Other    Hyperlipidemia (Chronic)     Acceptable lipid panel on pravastatin         Ambulatory dysfunction     Continue using the cane         RESOLVED: Hyponatremia      Other Visit Diagnoses     Driving safety issue    -  Primary    Relevant Orders    Ambulatory referral to ot  adaptability evaluation    Primary osteoarthritis of both knees        Relevant Orders    Ambulatory referral to Orthopedic Surgery Subjective:      Patient ID: Dennis Mora is a 80 y o  female  HPI   Syncope in December and diagnosed with a second degree AV block, pacemaker placed  Afib noted recently and started on Eliquis  She stopped the baby ASA on her own because she thought she was on too many blood thinners  She reports feeling more shaky since starting Eliquis, no recent falls  Uses a cane  FBS in the 90s on average   She checks it daily  Recent labs A1C 6  Trig 193  Here with a driving form for me to complete  No LOC since syncope in December-prior to having pacemaker implanted  She was driving prior to that event    The following portions of the patient's history were reviewed and updated as appropriate: allergies, past family history, past medical history, past social history, past surgical history and problem list     Review of Systems   Constitutional: Negative for fatigue, fever and unexpected weight change  HENT: Negative for ear pain, hearing loss, sinus pressure, sinus pain and sore throat  Respiratory: Negative for cough, shortness of breath and wheezing  Cardiovascular: Negative for chest pain, palpitations and leg swelling  Gastrointestinal: Negative for abdominal pain, constipation, diarrhea, nausea and vomiting  Musculoskeletal: Positive for arthralgias (especially the knees; taking Tylenol prn with little relief)  Negative for myalgias  Neurological: Negative for dizziness and headaches  Objective:      /66   Pulse (!) 53   Ht 4' 7 5" (1 41 m)   Wt 78 6 kg (173 lb 3 2 oz)   SpO2 96%   BMI 39 53 kg/m²          Physical Exam   Constitutional: She is oriented to person, place, and time  She appears well-developed and well-nourished  HENT:   Head: Normocephalic and atraumatic  Right Ear: External ear normal    Left Ear: External ear normal    Mouth/Throat: Oropharynx is clear and moist    Cerumen AD   Eyes: Conjunctivae are normal    Neck: Neck supple  Cardiovascular: Normal rate, regular rhythm and normal heart sounds  No murmur heard  Pulmonary/Chest: Effort normal and breath sounds normal  No respiratory distress  She has no wheezes  She has no rales  Abdominal: Soft  Bowel sounds are normal  She exhibits no distension and no mass  There is no tenderness  There is no rebound and no guarding  Neurological: She is alert and oriented to person, place, and time  Skin: Skin is warm and dry  Psychiatric: She has a normal mood and affect   Her behavior is normal  Judgment and thought content normal

## 2019-03-04 NOTE — ASSESSMENT & PLAN NOTE
Lab Results   Component Value Date    HGBA1C 6 0 01/30/2019     She continues to check her sugar regularly

## 2019-03-09 ENCOUNTER — HOSPITAL ENCOUNTER (OUTPATIENT)
Facility: HOSPITAL | Age: 84
Setting detail: OBSERVATION
Discharge: HOME | End: 2019-03-10
Attending: EMERGENCY MEDICINE | Admitting: HOSPITALIST
Payer: MEDICARE

## 2019-03-09 ENCOUNTER — APPOINTMENT (EMERGENCY)
Dept: RADIOLOGY | Facility: HOSPITAL | Age: 84
End: 2019-03-09
Payer: MEDICARE

## 2019-03-09 DIAGNOSIS — R07.9 CHEST PAIN: Primary | ICD-10-CM

## 2019-03-09 DIAGNOSIS — I25.10 CORONARY ARTERY DISEASE INVOLVING NATIVE CORONARY ARTERY OF NATIVE HEART WITHOUT ANGINA PECTORIS: Chronic | ICD-10-CM

## 2019-03-09 LAB
ANION GAP SERPL CALCULATED.3IONS-SCNC: 6 MMOL/L (ref 4–13)
ATRIAL RATE: 72 BPM
BASOPHILS # BLD AUTO: 0.02 THOUSANDS/ΜL (ref 0–0.1)
BASOPHILS NFR BLD AUTO: 0 % (ref 0–1)
BILIRUB UR QL STRIP: NEGATIVE
BUN SERPL-MCNC: 20 MG/DL (ref 5–25)
CALCIUM SERPL-MCNC: 9 MG/DL (ref 8.3–10.1)
CHLORIDE SERPL-SCNC: 101 MMOL/L (ref 100–108)
CLARITY UR: CLEAR
CO2 SERPL-SCNC: 31 MMOL/L (ref 21–32)
COLOR UR: YELLOW
COLOR, POC: NORMAL
CREAT SERPL-MCNC: 0.66 MG/DL (ref 0.6–1.3)
EOSINOPHIL # BLD AUTO: 0.13 THOUSAND/ΜL (ref 0–0.61)
EOSINOPHIL NFR BLD AUTO: 2 % (ref 0–6)
ERYTHROCYTE [DISTWIDTH] IN BLOOD BY AUTOMATED COUNT: 13 % (ref 11.6–15.1)
GFR SERPL CREATININE-BSD FRML MDRD: 79 ML/MIN/1.73SQ M
GLUCOSE SERPL-MCNC: 117 MG/DL (ref 65–140)
GLUCOSE UR STRIP-MCNC: NEGATIVE MG/DL
HCT VFR BLD AUTO: 38.4 % (ref 34.8–46.1)
HGB BLD-MCNC: 12.7 G/DL (ref 11.5–15.4)
HGB UR QL STRIP.AUTO: NEGATIVE
IMM GRANULOCYTES # BLD AUTO: 0.02 THOUSAND/UL (ref 0–0.2)
IMM GRANULOCYTES NFR BLD AUTO: 0 % (ref 0–2)
KETONES UR STRIP-MCNC: NEGATIVE MG/DL
LEUKOCYTE ESTERASE UR QL STRIP: NEGATIVE
LYMPHOCYTES # BLD AUTO: 1.39 THOUSANDS/ΜL (ref 0.6–4.47)
LYMPHOCYTES NFR BLD AUTO: 23 % (ref 14–44)
MCH RBC QN AUTO: 31.4 PG (ref 26.8–34.3)
MCHC RBC AUTO-ENTMCNC: 33.1 G/DL (ref 31.4–37.4)
MCV RBC AUTO: 95 FL (ref 82–98)
MONOCYTES # BLD AUTO: 0.45 THOUSAND/ΜL (ref 0.17–1.22)
MONOCYTES NFR BLD AUTO: 7 % (ref 4–12)
NEUTROPHILS # BLD AUTO: 4.15 THOUSANDS/ΜL (ref 1.85–7.62)
NEUTS SEG NFR BLD AUTO: 68 % (ref 43–75)
NITRITE UR QL STRIP: NEGATIVE
NRBC BLD AUTO-RTO: 0 /100 WBCS
P AXIS: 63 DEGREES
PH UR STRIP.AUTO: 8.5 [PH] (ref 4.5–8)
PLATELET # BLD AUTO: 216 THOUSANDS/UL (ref 149–390)
PMV BLD AUTO: 10.3 FL (ref 8.9–12.7)
POTASSIUM SERPL-SCNC: 4 MMOL/L (ref 3.5–5.3)
PR INTERVAL: 208 MS
PROT UR STRIP-MCNC: NEGATIVE MG/DL
QRS AXIS: -14 DEGREES
QRSD INTERVAL: 122 MS
QT INTERVAL: 370 MS
QTC INTERVAL: 405 MS
RBC # BLD AUTO: 4.05 MILLION/UL (ref 3.81–5.12)
SODIUM SERPL-SCNC: 138 MMOL/L (ref 136–145)
SP GR UR STRIP.AUTO: 1.02 (ref 1–1.03)
T WAVE AXIS: 121 DEGREES
TROPONIN I SERPL-MCNC: <0.02 NG/ML
UROBILINOGEN UR QL STRIP.AUTO: 0.2 E.U./DL
VENTRICULAR RATE: 72 BPM
WBC # BLD AUTO: 6.16 THOUSAND/UL (ref 4.31–10.16)

## 2019-03-09 PROCEDURE — 36415 COLL VENOUS BLD VENIPUNCTURE: CPT | Performed by: EMERGENCY MEDICINE

## 2019-03-09 PROCEDURE — 80048 BASIC METABOLIC PNL TOTAL CA: CPT | Performed by: EMERGENCY MEDICINE

## 2019-03-09 PROCEDURE — 93005 ELECTROCARDIOGRAM TRACING: CPT

## 2019-03-09 PROCEDURE — 85025 COMPLETE CBC W/AUTO DIFF WBC: CPT | Performed by: EMERGENCY MEDICINE

## 2019-03-09 PROCEDURE — 99285 EMERGENCY DEPT VISIT HI MDM: CPT

## 2019-03-09 PROCEDURE — 93010 ELECTROCARDIOGRAM REPORT: CPT | Performed by: INTERNAL MEDICINE

## 2019-03-09 PROCEDURE — 84484 ASSAY OF TROPONIN QUANT: CPT | Performed by: PHYSICIAN ASSISTANT

## 2019-03-09 PROCEDURE — 84484 ASSAY OF TROPONIN QUANT: CPT | Performed by: EMERGENCY MEDICINE

## 2019-03-09 PROCEDURE — 81003 URINALYSIS AUTO W/O SCOPE: CPT

## 2019-03-09 PROCEDURE — 70450 CT HEAD/BRAIN W/O DYE: CPT

## 2019-03-09 PROCEDURE — 71046 X-RAY EXAM CHEST 2 VIEWS: CPT

## 2019-03-09 PROCEDURE — 99220 PR INITIAL OBSERVATION CARE/DAY 70 MINUTES: CPT | Performed by: INTERNAL MEDICINE

## 2019-03-09 RX ORDER — LEVOTHYROXINE SODIUM 0.03 MG/1
25 TABLET ORAL
Status: DISCONTINUED | OUTPATIENT
Start: 2019-03-10 | End: 2019-03-10 | Stop reason: HOSPADM

## 2019-03-09 RX ORDER — PRAVASTATIN SODIUM 40 MG
40 TABLET ORAL EVERY EVENING
Status: DISCONTINUED | OUTPATIENT
Start: 2019-03-09 | End: 2019-03-10 | Stop reason: HOSPADM

## 2019-03-09 RX ORDER — ASPIRIN 81 MG/1
81 TABLET ORAL DAILY
Status: DISCONTINUED | OUTPATIENT
Start: 2019-03-10 | End: 2019-03-10 | Stop reason: HOSPADM

## 2019-03-09 RX ORDER — METOPROLOL SUCCINATE 25 MG/1
25 TABLET, EXTENDED RELEASE ORAL DAILY
Status: DISCONTINUED | OUTPATIENT
Start: 2019-03-10 | End: 2019-03-10 | Stop reason: HOSPADM

## 2019-03-09 RX ORDER — LISINOPRIL 2.5 MG/1
2.5 TABLET ORAL DAILY
Status: DISCONTINUED | OUTPATIENT
Start: 2019-03-10 | End: 2019-03-10 | Stop reason: HOSPADM

## 2019-03-09 RX ORDER — ONDANSETRON 2 MG/ML
4 INJECTION INTRAMUSCULAR; INTRAVENOUS EVERY 6 HOURS PRN
Status: DISCONTINUED | OUTPATIENT
Start: 2019-03-09 | End: 2019-03-10 | Stop reason: HOSPADM

## 2019-03-09 RX ORDER — ASPIRIN 325 MG
325 TABLET ORAL ONCE
Status: COMPLETED | OUTPATIENT
Start: 2019-03-09 | End: 2019-03-09

## 2019-03-09 RX ORDER — ACETAMINOPHEN 325 MG/1
650 TABLET ORAL EVERY 6 HOURS PRN
Status: DISCONTINUED | OUTPATIENT
Start: 2019-03-09 | End: 2019-03-10 | Stop reason: HOSPADM

## 2019-03-09 RX ADMIN — PRAVASTATIN SODIUM 40 MG: 40 TABLET ORAL at 17:01

## 2019-03-09 RX ADMIN — APIXABAN 5 MG: 5 TABLET, FILM COATED ORAL at 17:00

## 2019-03-09 RX ADMIN — ASPIRIN 325 MG ORAL TABLET 325 MG: 325 PILL ORAL at 14:01

## 2019-03-09 RX ADMIN — NITROGLYCERIN 1 INCH: 20 OINTMENT TOPICAL at 14:03

## 2019-03-09 NOTE — ED ATTENDING ATTESTATION
Liberty Torrez MD, saw and evaluated the patient  I have discussed the patient with the resident/non-physician practitioner and agree with the resident's/non-physician practitioner's findings, Plan of Care, and MDM as documented in the resident's/non-physician practitioner's note, except where noted  All available labs and Radiology studies were reviewed  I was present for key portions of any procedure(s) performed by the resident/non-physician practitioner and I was immediately available to provide assistance  At this point I agree with the current assessment done in the Emergency Department  I have conducted an independent evaluation of this patient a history and physical is as follows:    Patient reports she was in her normal state of health this morning when she woke and had breakfast as normal   Later in the morning, at approximately 11:30 a m , the patient started to feel as though her head were, funny  On further questioning the patient states that the head symptoms may have been lightheadedness although she did not pass out or feel as though she was going to pass out  At approximately the same time the patient did notice that she had a heaviness across her chest which radiated to both shoulders  The patient rated this was moderate in severity and has been waxing and waning since onset  The patient rates it as minimal in severity right now  The patient denies shortness of breath, vomiting, or diaphoresis  The patient reports was a brief period where she felt slightly nauseated this morning when she had the chest heaviness  On review of systems the patient admits to brief, lasting only minutes, tingling in her left thumb index long and ring fingers  She stated this happened several times each time only lasting a few minutes  That symptom has resolved  The patient denies having any weakness this morning  The patient denies any head symptoms now    Physical exam demonstrates a pleasant alert nontoxic female in no acute distress  HEENT exam was normal   The neck was supple nontender  Lungs are clear with equal breath sounds  The heart had a regular rate rhythm  The abdomen is soft and nontender  There is no rebound or guarding  The chest was nontender to palpation  All extremities are nontender with a full range of motion  Both lower extremities are symmetric  The patient had normal strength and sensation all extremities  Cranial nerves were intact  Patient was alert and oriented x3    Speech was normal   Skin was normal     Critical Care Time  Procedures

## 2019-03-09 NOTE — ASSESSMENT & PLAN NOTE
· Appears to be in a rate control A  Fib   Pacer spikes noted on EKG   · Continue Toprol -XL   · Continue Eliquis for anticoagulation

## 2019-03-09 NOTE — H&P
[address] [facility]  H&P- Ashli Chopra 7/24/1929, 80 y o  female MRN: [id number]    Unit/Bed#: [facility] 214-02 Encounter: [id number]    Primary Care Provider: Maritza La MD   Date and time admitted to hospital: 3/9/2019 11:20 AM        * Chest pain  Assessment & Plan  · POA, resolved  Reported as a heaviness in her chest without any exacerbating or alleviating factors  No other anginal symptoms  EKG and troponin normal  CHEPE score is 4  History of angioplasty  Last stress test in 2017 was abnormal and resulted in a cardiac catheterization with small amounts of stenosis but no interventions were performed   · Admit patient to med/surg under observation status with telemetry monitoring   · Trend troponin   · Update stress test   · Consider cardiology evaluation if further abnormal findings    Coronary artery disease involving native coronary artery of native heart without angina pectoris  Assessment & Plan  · Patient did have chest pain earlier today without anginal symptoms  Troponin normal   · Continue current medication regimen   · ASA, Pravastatin, Toprol, Lisinopril     Atrial fibrillation (HCC)  Assessment & Plan  · Appears to be in a rate control A  Fib  Pacer spikes noted on EKG   · Continue Toprol -XL   · Continue Eliquis for anticoagulation     Type 2 diabetes mellitus without complication, without long-term current use of insulin (HCC)  Assessment & Plan  Lab Results   Component Value Date    HGBA1C 6 0 01/30/2019       No results for input(s): POCGLU in the last 72 hours      Blood Sugar Average: Last 72 hrs:  · Last A1c showing good control   · Monitor with BMP   · No SSI needed    Essential hypertension  Assessment & Plan  · BP controlled on admission   · Continue Lisinopril, Toprol-XL     Hyperlipidemia  Assessment & Plan  · Continue with statin       VTE Prophylaxis: Apixaban (Eliquis)  / sequential compression device   Code Status: DNR/DNI  POLST: POLST form is not discussed and not completed at this time  Discussion with family: No family at bedside     Anticipated Length of Stay:  Patient will be admitted on an Observation basis with an anticipated length of stay of  Less than 2 midnights  Justification for Hospital Stay: Chest pain, rule out ACS    Total Time for Visit, including Counseling / Coordination of Care: 1 hour  Greater than 50% of this total time spent on direct patient counseling and coordination of care  Chief Complaint:   Chest Pain     History of Present Illness:    Xiao Maldonado is a 80 y o  female with a history of CAD, HTN, HLD, and well-controlled T2DM who presents with chest pain earlier today  She reports that she had central chest heaviness that was non-radiated  States that there were no exacerbating or alleviating factors  Denies arm pain  Denies neck or jaw pain  Denies shortness of breath or coughing  Denied nausea or vomiting  Denied dizziness  Denies diaphoresis  She reports that additionally she felt funny in her head  She denied headaches, change in vision, blurred vision, or feeling that she was drunk  She states that she had been getting numbness in her left arm and left leg, but these symptoms have been going on for months and were unassociated with her feeling in her head  Denies difficulty speaking or swallowing  Otherwise patient does report some arthritis pain in her bilateral shoulder  Denies falls  Review of Systems:    Review of Systems   Constitutional: Negative for appetite change, chills, diaphoresis, fatigue and fever  HENT: Negative for congestion, rhinorrhea and sore throat  Eyes: Negative for visual disturbance  Respiratory: Negative for cough, chest tightness, shortness of breath and wheezing  Cardiovascular: Positive for chest pain  Negative for palpitations and leg swelling  Gastrointestinal: Negative for abdominal pain, constipation, diarrhea, nausea and vomiting  Genitourinary: Negative for dysuria  Musculoskeletal: Negative for arthralgias and myalgias  Neurological: Positive for numbness (in left arm and leg, prior to head pain for the last few months, never at same time )  Negative for dizziness, syncope, speech difficulty, weakness, light-headedness and headaches (Felt funny in her head, but denied headahces specifically )  All other systems reviewed and are negative  Past Medical and Surgical History:     Past Medical History:   Diagnosis Date    Abnormal nuclear stress test     last assessed 04/03/2017    Anxiety     Arthritis     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    Bundle branch block, left     Cardiomyopathy ([facility] [address] )     Chronic pain     chronic b/l shoulder pain    Coronary artery disease     Diabetes mellitus ([facility] [address] )     GERD (gastroesophageal reflux disease)     H/O atrial flutter     History of DVT (deep vein thrombosis)     History of pulmonary embolism     Hyperlipidemia     Hypertension     Hypothyroid     Renal calculi     Shortness of breath        Past Surgical History:   Procedure Laterality Date    ATRIAL ABLATION SURGERY  01/2015    CARDIOVERSION      CHELA/DCCV 10/22/2014    CARPAL TUNNEL RELEASE Right     CATARACT EXTRACTION      CORONARY ANGIOPLASTY WITH STENT PLACEMENT      HYSTERECTOMY      JOINT REPLACEMENT Right     IA AMPUTATION TOE,MT-P JT Left 10/11/2017    Procedure: AMPUTATION SECOND TOE;  Surgeon: Walker Downing DPM;  Location: [facility];  Service: Podiatry    TONSILLECTOMY      TOTAL HIP ARTHROPLASTY      Right       Meds/Allergies:    Prior to Admission medications    Medication Sig Start Date End Date Taking? Authorizing Provider   ACCU-CHEK RUBINA PLUS test strip 1 each by Other route daily Use as instructed 3/4/19  Yes Grady Sullivan MD   ACCU-CHEK SOFTCLIX LANCETS lancets by Other route daily Use daily as instructed   3/4/19  Yes Grady Sullivan MD   acetaminophen (TYLENOL) 500 mg tablet Take 1 tablet (500 mg total) by mouth every 6 (six) hours as needed for mild pain or moderate pain 12/13/18  Yes Arelis Frank MD   apixaban (ELIQUIS) 5 mg Take 1 tablet (5 mg total) by mouth 2 (two) times a day 2/20/19  Yes Rhina Fermin MD   ASPIR-LOW 81 MG EC tablet TAKE ONE TABLET BY MOUTH EVERY DAY 12/3/18  Yes Fito Pozo DO   Calcium Carbonate-Vitamin D (CALTRATE 600+D PO) Take 1 capsule by mouth 2 (two) times a day     Yes Historical Provider, MD   levothyroxine 25 mcg tablet TAKE ONE TABLET BY MOUTH EVERY DAY 2/10/19  Yes Roverto Nava MD   lisinopril (ZESTRIL) 2 5 mg tablet Take 1 tablet (2 5 mg total) by mouth daily 9/28/18  Yes Roverto Nava MD   metoprolol succinate (TOPROL XL) 25 mg 24 hr tablet Take 1 tablet (25 mg total) by mouth daily 9/28/18  Yes Roverto Nava MD   multivitamin-iron-minerals-folic acid (CENTRUM) chewable tablet Chew 1 tablet daily  Yes Historical Provider, MD   pravastatin (PRAVACHOL) 40 mg tablet Take 1 tablet (40 mg total) by mouth daily 9/28/18  Yes Roverto Nava MD   metroNIDAZOLE (METROCREAM) 0 75 % cream Apply topically 2 (two) times a day 1/11/19   Roverto Nava MD   Misc Natural Products (ENERGY FOCUS PO) Take 1 tablet by mouth 2 (two) times a day      Historical Provider, MD     I have reviewed home medications with patient personally  Allergies:    Allergies   Allergen Reactions    Atorvastatin      Reaction unknown 10/23/2018-    Other Rash     Patient sensitive to adhesives from tape       Social History:     Marital Status: /Civil Union   Occupation: Noncontributory   Patient Pre-hospital Living Situation: Home  Patient Pre-hospital Level of Mobility: Full  Patient Pre-hospital Diet Restrictions: None  Substance Use History:   Social History     Substance and Sexual Activity   Alcohol Use Yes    Comment: occasional     Social History     Tobacco Use   Smoking Status Never Smoker   Smokeless Tobacco Never Used     Social History     Substance and Sexual Activity   Drug Use Yes    Types: Marijuana Comment: MEDICAL MARIJUANA-HAS NOT USED FOR 3 WEEKS       Family History:    Family History   Problem Relation Age of Onset   Camille Moreland Parkinsonism Sister    Camille Moreland Cancer Sister         unknown type    Heart attack Neg Hx     Stroke Neg Hx     Anuerysm Neg Hx     Clotting disorder Neg Hx     Arrhythmia Neg Hx     Heart failure Neg Hx     Coronary artery disease Neg Hx        Physical Exam:     Vitals:   Blood Pressure: 128/69 (03/09/19 1516)  Pulse: 82 (03/09/19 1516)  Temperature: 98 4 °F (36 9 °C) (03/09/19 1138)  Temp Source: Oral (03/09/19 1138)  Respirations: 18 (03/09/19 1516)  SpO2: 96 % (03/09/19 1516)    Physical Exam   Constitutional: She is oriented to person, place, and time  Vital signs are normal  She appears well-developed and well-nourished  Non-toxic appearance  No distress  HENT:   Head: Normocephalic and atraumatic  Mouth/Throat: Mucous membranes are not dry  Eyes: Pupils are equal, round, and reactive to light  Conjunctivae and EOM are normal  No scleral icterus  Right pupil is round and reactive  Left pupil is round and reactive  Pupils are equal    Neck: Neck supple  Cardiovascular: Normal rate, S1 normal, S2 normal, normal heart sounds and intact distal pulses  An irregularly irregular rhythm present  Exam reveals no S3 and no S4  No murmur heard  Pulmonary/Chest: Effort normal and breath sounds normal  No accessory muscle usage or stridor  No respiratory distress  She has no decreased breath sounds  She has no wheezes  She has no rhonchi  She has no rales  She exhibits no tenderness  Abdominal: Soft  Bowel sounds are normal  She exhibits no distension and no mass  There is no tenderness  There is no rigidity, no rebound and no guarding  Neurological: She is alert and oriented to person, place, and time  She has normal strength  She is not disoriented  She displays no tremor  No cranial nerve deficit or sensory deficit  She displays no seizure activity  GCS eye subscore is 4   GCS verbal subscore is 5  GCS motor subscore is 6  Skin: Skin is warm and dry  Additional Data:     Lab Results: I have personally reviewed pertinent reports  Results from last 7 days   Lab Units 03/09/19  1243   WBC Thousand/uL 6 16   HEMOGLOBIN g/dL 12 7   HEMATOCRIT % 38 4   PLATELETS Thousands/uL 216   NEUTROS PCT % 68   LYMPHS PCT % 23   MONOS PCT % 7   EOS PCT % 2     Results from last 7 days   Lab Units 03/09/19  1243   SODIUM mmol/L 138   POTASSIUM mmol/L 4 0   CHLORIDE mmol/L 101   CO2 mmol/L 31   BUN mg/dL 20   CREATININE mg/dL 0 66   ANION GAP mmol/L 6   CALCIUM mg/dL 9 0   GLUCOSE RANDOM mg/dL 117                       Imaging: I have personally reviewed pertinent reports  CT head without contrast   Final Result by Tyrone Deng MD (03/09 1351)      No CT evidence of acute intracranial process or significant interval change  Chronic microangiopathy  Chronic bilateral mastoid effusions  Workstation performed: [id number]         XR chest 2 views   Final Result by Yasmani Espitia MD (03/09 8026)         1  Slight increase in cardiomegaly  2   Clear lungs  Workstation performed: [id number]             EKG, Pathology, and Other Studies Reviewed on Admission:   · CXR: Cardiomegaly  No acute pulmonary disease  · CT Head: NO acute intracranial abnormality  Chronic microangiopathy  Bilateral mastoid effusions  Allscripts / Epic Records Reviewed: Yes     ** Please Note: This note has been constructed using a voice recognition system   **

## 2019-03-09 NOTE — ASSESSMENT & PLAN NOTE
· POA, resolved  Reported as a heaviness in her chest without any exacerbating or alleviating factors  No other anginal symptoms  EKG and troponin normal  CHEPE score is 4  History of angioplasty   Last stress test in 2017 was abnormal and resulted in a cardiac catheterization with small amounts of stenosis but no interventions were performed   · Admit patient to med/surg under observation status with telemetry monitoring   · Trend troponin   · Update stress test   · Consider cardiology evaluation if further abnormal findings

## 2019-03-09 NOTE — ASSESSMENT & PLAN NOTE
· Patient did have chest pain earlier today without anginal symptoms   Troponin normal   · Continue current medication regimen   · ASA, Pravastatin, Toprol, Lisinopril

## 2019-03-09 NOTE — ED PROVIDER NOTES
History  Chief Complaint   Patient presents with    Weakness - Generalized     pt states starting this morning she was having intermittent "weakness and cloudiness" as well as left finger tingling/numbess of sudden onset  symptoms coming and going, not exacerbated by anything  per EMS- pt with fluctuating HR and BPs intermittently A-paced while en route  Pt states "I can't describe it but I just feel funny " Pt recently started on eliquis d/t new afib  pt denies fall/injury  Patient with past medical history of left bundle-branch block, cardiomyopathy, CAD, diabetes, hyperlipidemia, hypertension, hypothyroid, atrial fibrillation with pacemaker placement on Eliquis presents to the ED stating that earlier today after she was eating breakfast she sat down and started to get a heavy pressure feeling under her bilateral breasts which radiates to both shoulders and included left finger numbness as well as a foggy/funny feeling in her head and a bout of nausea  Patient denies recent illness, fever, night sweats, chills, recent falls, shortness of breath, radiation of the pain to her back, jaw or abdomen, vomiting, diarrhea or constipation, dysuria, hematuria  History provided by:  Patient   used: No        Prior to Admission Medications   Prescriptions Last Dose Informant Patient Reported? Taking? ACCU-CHEK RUBINA PLUS test strip   No Yes   Si each by Other route daily Use as instructed   ACCU-CHEK SOFTCLIX LANCETS lancets   No Yes   Sig: by Other route daily Use daily as instructed     ASPIR-LOW 81 MG EC tablet  Self No Yes   Sig: TAKE ONE TABLET BY MOUTH EVERY DAY   Calcium Carbonate-Vitamin D (CALTRATE 600+D PO)  Self Yes Yes   Sig: Take 1 capsule by mouth 2 (two) times a day     Misc Natural Products (ENERGY FOCUS PO)  Self Yes No   Sig: Take 1 tablet by mouth 2 (two) times a day     acetaminophen (TYLENOL) 500 mg tablet  Self No Yes   Sig: Take 1 tablet (500 mg total) by mouth every 6 (six) hours as needed for mild pain or moderate pain   apixaban (ELIQUIS) 5 mg  Self No Yes   Sig: Take 1 tablet (5 mg total) by mouth 2 (two) times a day   levothyroxine 25 mcg tablet  Self No Yes   Sig: TAKE ONE TABLET BY MOUTH EVERY DAY   lisinopril (ZESTRIL) 2 5 mg tablet  Self No Yes   Sig: Take 1 tablet (2 5 mg total) by mouth daily   metoprolol succinate (TOPROL XL) 25 mg 24 hr tablet  Self No Yes   Sig: Take 1 tablet (25 mg total) by mouth daily   metroNIDAZOLE (METROCREAM) 0 75 % cream  Self No No   Sig: Apply topically 2 (two) times a day   multivitamin-iron-minerals-folic acid (CENTRUM) chewable tablet  Self Yes Yes   Sig: Chew 1 tablet daily     pravastatin (PRAVACHOL) 40 mg tablet  Self No Yes   Sig: Take 1 tablet (40 mg total) by mouth daily      Facility-Administered Medications: None       Past Medical History:   Diagnosis Date    Abnormal nuclear stress test     last assessed 04/03/2017    Anxiety     Arthritis     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    Bundle branch block, left     Cardiomyopathy ([facility] [address] )     Chronic pain     chronic b/l shoulder pain    Coronary artery disease     Diabetes mellitus ([facility] [address] [facility]     GERD (gastroesophageal reflux disease)     H/O atrial flutter     History of DVT (deep vein thrombosis)     History of pulmonary embolism     Hyperlipidemia     Hypertension     Hypothyroid     Renal calculi     Shortness of breath        Past Surgical History:   Procedure Laterality Date    ATRIAL ABLATION SURGERY  01/2015    CARDIOVERSION      CHELA/DCCV 10/22/2014    CARPAL TUNNEL RELEASE Right     CATARACT EXTRACTION      CORONARY ANGIOPLASTY WITH STENT PLACEMENT      HYSTERECTOMY      JOINT REPLACEMENT Right     AR AMPUTATION TOE,MT-P JT Left 10/11/2017    Procedure: AMPUTATION SECOND TOE;  Surgeon: Heydi Ibarra DPM;  Location: [facility];  Service: Podiatry    TONSILLECTOMY      TOTAL HIP ARTHROPLASTY      Right       Family History Problem Relation Age of Onset   Guerda Dubose Parkinsonism Sister    Guerda Dubose Cancer Sister         unknown type    Heart attack Neg Hx     Stroke Neg Hx     Anuerysm Neg Hx     Clotting disorder Neg Hx     Arrhythmia Neg Hx     Heart failure Neg Hx     Coronary artery disease Neg Hx      I have reviewed and agree with the history as documented  Social History     Tobacco Use    Smoking status: Never Smoker    Smokeless tobacco: Never Used   Substance Use Topics    Alcohol use: Yes     Comment: occasional    Drug use: Yes     Types: Marijuana     Comment: MEDICAL MARIJUANA-HAS NOT USED FOR 3 WEEKS        Review of Systems   Constitutional: Negative for activity change, appetite change, chills, diaphoresis, fatigue and fever  HENT: Negative for congestion, nosebleeds, postnasal drip, rhinorrhea, sinus pressure, sinus pain, sneezing and sore throat  Eyes: Negative for redness and visual disturbance  Respiratory: Positive for chest tightness  Negative for apnea, cough, shortness of breath, wheezing and stridor  Cardiovascular: Positive for chest pain  Negative for palpitations and leg swelling  Gastrointestinal: Negative for abdominal distention, abdominal pain, blood in stool, constipation, diarrhea, nausea and vomiting  Genitourinary: Negative for difficulty urinating, dysuria, flank pain, frequency, hematuria and urgency  Musculoskeletal: Negative for arthralgias, back pain, gait problem, joint swelling, myalgias, neck pain and neck stiffness  Skin: Negative for color change, pallor, rash and wound  Neurological: Positive for weakness and numbness  Negative for dizziness, syncope, facial asymmetry, light-headedness and headaches  Psychiatric/Behavioral: Negative for confusion and decreased concentration  The patient is not nervous/anxious          Physical Exam  ED Triage Vitals [03/09/19 1138]   Temperature Pulse Respirations Blood Pressure SpO2   98 4 °F (36 9 °C) 80 18 152/79 96 %      Temp Source Heart Rate Source Patient Position - Orthostatic VS BP Location FiO2 (%)   Oral -- -- -- --      Pain Score       No Pain           Orthostatic Vital Signs  Vitals:    03/09/19 1138 03/09/19 1401   BP: 152/79 140/70   Pulse: 80 78       Physical Exam   Constitutional: She is oriented to person, place, and time  She appears well-developed and well-nourished  No distress  HENT:   Head: Normocephalic and atraumatic  Right Ear: External ear normal    Left Ear: External ear normal    Nose: Nose normal    Mouth/Throat: Oropharynx is clear and moist  No oropharyngeal exudate  Eyes: Pupils are equal, round, and reactive to light  Conjunctivae and EOM are normal  Right eye exhibits no discharge  Left eye exhibits no discharge  No scleral icterus  Neck: Normal range of motion  Neck supple  No JVD present  No tracheal deviation present  Cardiovascular: Normal rate, regular rhythm, normal heart sounds and intact distal pulses  Exam reveals no gallop and no friction rub  No murmur heard  Pulmonary/Chest: Effort normal and breath sounds normal  No stridor  No respiratory distress  She has no wheezes  She has no rales  Abdominal: Soft  Bowel sounds are normal  She exhibits no distension and no mass  There is no tenderness  There is no guarding  Musculoskeletal: Normal range of motion  She exhibits no edema, tenderness or deformity  Neurological: She is alert and oriented to person, place, and time  She has normal strength  She is not disoriented  No cranial nerve deficit or sensory deficit  She exhibits normal muscle tone  GCS eye subscore is 4  GCS verbal subscore is 5  GCS motor subscore is 6  No sensory deficits noted  Strength 5/5 UEs/LEs  No cerebellar signs noted on exam   Skin: Skin is warm and dry  Capillary refill takes less than 2 seconds  No rash noted  She is not diaphoretic  No erythema  No pallor  Psychiatric: She has a normal mood and affect   Her behavior is normal  Judgment and thought content normal    Nursing note and vitals reviewed  ED Medications  Medications   nitroglycerin (NITRO-BID) 2 % TD ointment 1 inch (1 inch Topical Given 3/9/19 1403)   aspirin tablet 325 mg (325 mg Oral Given 3/9/19 1401)       Diagnostic Studies  Results Reviewed     Procedure Component Value Units Date/Time    Troponin I [[id number]]  (Normal) Collected:  03/09/19 1243    Lab Status:  Final result Specimen:  Blood from Arm, Left Updated:  03/09/19 1326     Troponin I <0 02 ng/mL     Basic metabolic panel [[id number]] Collected:  03/09/19 1243    Lab Status:  Final result Specimen:  Blood from Arm, Left Updated:  03/09/19 1322     Sodium 138 mmol/L      Potassium 4 0 mmol/L      Chloride 101 mmol/L      CO2 31 mmol/L      ANION GAP 6 mmol/L      BUN 20 mg/dL      Creatinine 0 66 mg/dL      Glucose 117 mg/dL      Calcium 9 0 mg/dL      eGFR 79 ml/min/1 73sq m     Narrative:       National Kidney Disease Education Program recommendations are as follows:  GFR calculation is accurate only with a steady state creatinine  Chronic Kidney disease less than 60 ml/min/1 73 sq  meters  Kidney failure less than 15 ml/min/1 73 sq  meters      POCT urinalysis dipstick [[id number]]  (Normal) Resulted:  03/09/19 1308    Lab Status:  Final result Updated:  03/09/19 1308     Color, UA see results    ED Urine Macroscopic [[id number]]  (Abnormal) Collected:  03/09/19 1311    Lab Status:  Final result Specimen:  Urine Updated:  03/09/19 1307     Color, UA Yellow     Clarity, UA Clear     pH, UA 8 5     Leukocytes, UA Negative     Nitrite, UA Negative     Protein, UA Negative mg/dl      Glucose, UA Negative mg/dl      Ketones, UA Negative mg/dl      Urobilinogen, UA 0 2 E U /dl      Bilirubin, UA Negative     Blood, UA Negative     Specific Gravity, UA 1 020    Narrative:       CLINITEK RESULT    CBC and differential [[id number]] Collected:  03/09/19 1243    Lab Status:  Final result Specimen:  Blood from Arm, Left Updated:  03/09/19 1253     WBC 6 16 Thousand/uL      RBC 4 05 Million/uL      Hemoglobin 12 7 g/dL      Hematocrit 38 4 %      MCV 95 fL      MCH 31 4 pg      MCHC 33 1 g/dL      RDW 13 0 %      MPV 10 3 fL      Platelets 988 Thousands/uL      nRBC 0 /100 WBCs      Neutrophils Relative 68 %      Immat GRANS % 0 %      Lymphocytes Relative 23 %      Monocytes Relative 7 %      Eosinophils Relative 2 %      Basophils Relative 0 %      Neutrophils Absolute 4 15 Thousands/µL      Immature Grans Absolute 0 02 Thousand/uL      Lymphocytes Absolute 1 39 Thousands/µL      Monocytes Absolute 0 45 Thousand/µL      Eosinophils Absolute 0 13 Thousand/µL      Basophils Absolute 0 02 Thousands/µL                  CT head without contrast   Final Result by Jhoan Camacho MD (03/09 1351)      No CT evidence of acute intracranial process or significant interval change  Chronic microangiopathy  Chronic bilateral mastoid effusions  Workstation performed: [id number]         XR chest 2 views    (Results Pending)         Procedures  ECG 12 Lead Documentation  Date/Time: 3/9/2019 12:24 PM  Performed by: Michael Clement DO  Authorized by: Michael Clement DO     Indications / Diagnosis:  Chest pressure  ECG reviewed by me, the ED Provider: yes    Patient location:  ED  Previous ECG:     Previous ECG:  Compared to current    Comparison ECG info:  LBBB now present, ventricular pacing spikes now noted    Similarity:  Changes noted    Comparison to cardiac monitor: Yes    Interpretation:     Interpretation: abnormal    Rate:     ECG rate:  72    ECG rate assessment: normal    Rhythm:     Rhythm: sinus rhythm    Ectopy:     Ectopy: none    QRS:     QRS axis:  Left    QRS intervals:   Wide  Conduction:     Conduction: abnormal      Abnormal conduction: complete LBBB    ST segments:     ST segments:  Normal  T waves:     T waves: normal            Phone Consults  ED Phone Contact    ED Course         HEART Risk Score      Most Recent Value   History  1 Filed at: 03/09/2019 1318   ECG  0 Filed at: 03/09/2019 1318   Age  2 Filed at: 03/09/2019 1318   Risk Factors  2 Filed at: 03/09/2019 1318   Troponin  0 Filed at: 03/09/2019 1318   Heart Score Risk Calculator   History  1 Filed at: 03/09/2019 1318   ECG  0 Filed at: 03/09/2019 1318   Age  2 Filed at: 03/09/2019 1318   Risk Factors  2 Filed at: 03/09/2019 1318   Troponin  0 Filed at: 03/09/2019 1318   HEART Score  5 Filed at: 03/09/2019 1318   HEART Score  5 Filed at: 03/09/2019 1318        Identification of Seniors at Risk      Most Recent Value   (ISAR) Identification of Seniors at Risk   Before the illness or injury that brought you to the Emergency, did you need someone to help you on a regular basis? 1 Filed at: 03/09/2019 1140   In the last 24 hours, have you needed more help than usual?  1 Filed at: 03/09/2019 1140   Have you been hospitalized for one or more nights during the past 6 months? 0 Filed at: 03/09/2019 1140   In general, do you see well? 1 Filed at: 03/09/2019 1140   In general, do you have serious problems with your memory? 0 Filed at: 03/09/2019 1140   Do you take more than three different medications every day?   1 Filed at: 03/09/2019 1140   ISAR Score  4 Filed at: 03/09/2019 1140                          MDM  Number of Diagnoses or Management Options  Chest pain: new and requires workup     Amount and/or Complexity of Data Reviewed  Clinical lab tests: ordered and reviewed  Tests in the radiology section of CPT®: ordered and reviewed  Tests in the medicine section of CPT®: ordered and reviewed    Risk of Complications, Morbidity, and/or Mortality  Presenting problems: minimal  Diagnostic procedures: minimal  Management options: minimal    Patient Progress  Patient progress: stable      Disposition  Final diagnoses:   Chest pain     Time reflects when diagnosis was documented in both MDM as applicable and the Disposition within this note     Time User Action Codes Description Comment    3/9/2019  2:55 PM Melani Rodriguez Add [R07 9] Chest pain       ED Disposition     ED Disposition Condition Date/Time Comment    Admit Stable Sat Mar 9, 2019  2:55 PM Case was discussed with CHUY and the patient's admission status was agreed to be Admission Status: observation status to the service of Dr Sun Storm   Follow-up Information    None         Patient's Medications   Discharge Prescriptions    No medications on file     No discharge procedures on file  ED Provider  Attending physically available and evaluated Anthony Guzmán I managed the patient along with the ED Attending      Electronically Signed by         Kimmie Daniels DO  03/09/19 0728

## 2019-03-09 NOTE — ASSESSMENT & PLAN NOTE
Lab Results   Component Value Date    HGBA1C 6 0 01/30/2019       No results for input(s): POCGLU in the last 72 hours      Blood Sugar Average: Last 72 hrs:  · Last A1c showing good control   · Monitor with BMP   · No SSI needed

## 2019-03-10 VITALS
BODY MASS INDEX: 39.14 KG/M2 | HEIGHT: 56 IN | DIASTOLIC BLOOD PRESSURE: 96 MMHG | OXYGEN SATURATION: 95 % | RESPIRATION RATE: 20 BRPM | SYSTOLIC BLOOD PRESSURE: 139 MMHG | HEART RATE: 79 BPM | WEIGHT: 174 LBS | TEMPERATURE: 97.6 F

## 2019-03-10 LAB
ANION GAP SERPL CALCULATED.3IONS-SCNC: 9 MMOL/L (ref 4–13)
BUN SERPL-MCNC: 16 MG/DL (ref 5–25)
CALCIUM SERPL-MCNC: 8.6 MG/DL (ref 8.3–10.1)
CHLORIDE SERPL-SCNC: 101 MMOL/L (ref 100–108)
CO2 SERPL-SCNC: 26 MMOL/L (ref 21–32)
CREAT SERPL-MCNC: 0.71 MG/DL (ref 0.6–1.3)
GFR SERPL CREATININE-BSD FRML MDRD: 76 ML/MIN/1.73SQ M
GLUCOSE SERPL-MCNC: 87 MG/DL (ref 65–140)
POTASSIUM SERPL-SCNC: 4.1 MMOL/L (ref 3.5–5.3)
SODIUM SERPL-SCNC: 136 MMOL/L (ref 136–145)

## 2019-03-10 PROCEDURE — 99217 PR OBSERVATION CARE DISCHARGE MANAGEMENT: CPT | Performed by: PHYSICIAN ASSISTANT

## 2019-03-10 PROCEDURE — 80048 BASIC METABOLIC PNL TOTAL CA: CPT | Performed by: PHYSICIAN ASSISTANT

## 2019-03-10 RX ADMIN — METOPROLOL SUCCINATE 25 MG: 25 TABLET, EXTENDED RELEASE ORAL at 08:57

## 2019-03-10 RX ADMIN — LEVOTHYROXINE SODIUM 25 MCG: 25 TABLET ORAL at 05:49

## 2019-03-10 RX ADMIN — LISINOPRIL 2.5 MG: 2.5 TABLET ORAL at 08:57

## 2019-03-10 RX ADMIN — APIXABAN 5 MG: 5 TABLET, FILM COATED ORAL at 08:57

## 2019-03-10 RX ADMIN — ACETAMINOPHEN 650 MG: 325 TABLET ORAL at 01:00

## 2019-03-10 RX ADMIN — ASPIRIN 81 MG: 81 TABLET, COATED ORAL at 08:57

## 2019-03-10 NOTE — NURSING NOTE
Discharge instructions gone over, pt knows to make her appt for her op stress test and has no issues or concerns at this time

## 2019-03-10 NOTE — SOCIAL WORK
CM spoke with Eddi Lennon PA-C who states pt can transport back to [facility] today  [place] states that daughter will be able to transport around noon  CM contacted [facility] [phone] and was provided phone number for [facility] @ [phone]  CM left [facility] requesting return call to determine if pt can return today  CM called [facility] and was transferred to [facility] who states that pt is able to return to facility  CM contacted Eddi Lennon PA-C regarding discharge plan

## 2019-03-10 NOTE — DISCHARGE SUMMARY
[address] Internal Medicine  Discharge- Radha Leavens 7/24/1929, 80 y o  female MRN: [id number]    Unit/Bed#: [facility] 214-02 Encounter: [id number]    Primary Care Provider: Gail Lyon MD   Date and time admitted to hospital: 3/9/2019 11:20 AM        * Chest pain  Assessment & Plan  · POA, resolved  No further pain overnight  Troponin normal x 3  Discussed with patient and she would rather have outpatient stress test performed   · Stable for discharge   · Stress team will contact patient for testing later this week  · Cardiology follow up     Coronary artery disease involving native coronary artery of native heart without angina pectoris  Assessment & Plan  · No further chest pain  Troponin normal   · Continue current medication regimen   · ASA, Pravastatin, Toprol, Lisinopril   · Update stress test - will perform as outpatient     Atrial fibrillation ([facility] )  Assessment & Plan  · Appears to be in a rate control A  Fib  Pacer spikes noted on EKG   · Continue Toprol -XL   · Continue Eliquis for anticoagulation     Type 2 diabetes mellitus without complication, without long-term current use of insulin (HCC)  Assessment & Plan  Lab Results   Component Value Date    HGBA1C 6 0 01/30/2019       No results for input(s): POCGLU in the last 72 hours      Blood Sugar Average: Last 72 hrs:  · Last A1c showing good control   · Continue dietary control     Essential hypertension  Assessment & Plan  · BP controlled    · Continue Lisinopril, Toprol-XL     Hyperlipidemia  Assessment & Plan  · Continue with statin       Discharging Physician / Practitioner: Ping Milligan PA-C  PCP: Gail Lyon MD  Admission Date:   Admission Orders (From admission, onward)    Ordered        03/09/19 1454  Place in Observation  Once     Order ID Start Status   [id number] 03/09/19 1454 Completed              Discharge Date: 03/10/19    Resolved Problems  Date Reviewed: 3/10/2019    None          Consultations During Hospital Stay:  · None    Procedures Performed:   · CXR PA and Lateral   · CT Head without contrast     Significant Findings / Test Results:   · CXR PA and Lateral - Slight increase in cardiomegaly  Clear lungs   · CT Head without contrast - No CT evidence of acute intracranial process or significant interval change  Chronic microangiopathy  Chronic bilateral mastoid effusions     Incidental Findings:   · None     Test Results Pending at Discharge (will require follow up): · None     Outpatient Tests Requested:  · NM Stress Test     Complications:  None    Reason for Admission: Chest Pain     Hospital Course:     Danisha Patel is a 80 y o  female patient who originally presented to the hospital on 3/9/2019 due to chest pain and nonspecific neurological complaints  In the emergency the patient had a CXR, CT head, and routine blood work which was negative  She was admitted for further telemetry monitoring and to trend her troponin  Her troponin was negative x 3 as well as her telemetry showed no acute events  Given that her pain had resolved and she had a negative work up she was deemed stable for discharge  A nuclear stress test had been planned inpatient, however given unavailability of the test on day of discharge it was decided that it would be acceptable for the patient to follow up with an outpatient stress test and to have close cardiology follow up, which she has  Arrangement were made directly with the stress test team to get the patient in later on this week  Please see above list of diagnoses and related plan for additional information  Condition at Discharge: stable     Discharge Day Visit / Exam:     Subjective:  Patient states that she had some pain in her legs last night, but this resolved spontaneously  She denies further chest pain  Denies SOB  Overall feeling better and wants to go home  Amenable to outpatient stress test and knows that she must follow up       Vitals: Blood Pressure: 139/96 (03/10/19 0715)  Pulse: 79 (03/10/19 0715)  Temperature: 97 6 °F (36 4 °C) (03/10/19 0715)  Temp Source: Oral (03/10/19 0715)  Respirations: 20 (03/10/19 0715)  Height: 4' 8" (142 2 cm) (03/09/19 1628)  Weight - Scale: 78 9 kg (174 lb) (03/09/19 1628)  SpO2: 95 % (03/10/19 0715)  Exam:   Physical Exam   Constitutional: She is oriented to person, place, and time  Vital signs are normal  She appears well-developed and well-nourished  Non-toxic appearance  No distress  HENT:   Head: Normocephalic and atraumatic  Eyes: Pupils are equal, round, and reactive to light  Conjunctivae and EOM are normal    Neck: Neck supple  Cardiovascular: Normal rate, S1 normal, S2 normal, normal heart sounds and intact distal pulses  An irregularly irregular rhythm present  Exam reveals no S3 and no S4  No murmur heard  Pulmonary/Chest: Effort normal and breath sounds normal  No accessory muscle usage or stridor  No respiratory distress  She has no decreased breath sounds  She has no wheezes  She has no rhonchi  She has no rales  She exhibits no tenderness  Abdominal: Soft  Bowel sounds are normal  She exhibits no distension and no mass  There is no tenderness  There is no rigidity, no rebound and no guarding  Neurological: She is alert and oriented to person, place, and time  She has normal strength  She is not disoriented  No cranial nerve deficit or sensory deficit  GCS eye subscore is 4  GCS verbal subscore is 5  GCS motor subscore is 6  Skin: Skin is warm and dry  Discussion with Family: None requested     Discharge instructions/Information to patient and family:   See after visit summary for information provided to patient and family  Provisions for Follow-Up Care:  See after visit summary for information related to follow-up care and any pertinent home health orders        Disposition:     Home    For Discharges to [facility]:   · Not Applicable to this Patient - Not Applicable to this Patient    Planned Readmission: None     Discharge Statement:  I spent 45 minutes discharging the patient  This time was spent on the day of discharge  I had direct contact with the patient on the day of discharge  Greater than 50% of the total time was spent examining patient, answering all patient questions, arranging and discussing plan of care with patient as well as directly providing post-discharge instructions  Additional time then spent on discharge activities  Discharge Medications:  See after visit summary for reconciled discharge medications provided to patient and family        ** Please Note: This note has been constructed using a voice recognition system **

## 2019-03-10 NOTE — ASSESSMENT & PLAN NOTE
Lab Results   Component Value Date    HGBA1C 6 0 01/30/2019       No results for input(s): POCGLU in the last 72 hours      Blood Sugar Average: Last 72 hrs:  · Last A1c showing good control   · Continue dietary control

## 2019-03-10 NOTE — UTILIZATION REVIEW
Initial Clinical Review    Admission: Date/Time/Statement: 03/09/19 @ 1454 -- OBS    Orders Placed This Encounter   Procedures    Place in Observation     Standing Status:   Standing     Number of Occurrences:   1     Order Specific Question:   Admitting Physician     Answer:   Yasmine Underwood [[id number]]     Order Specific Question:   Level of Care     Answer:   Med Surg [[id number]]     ED: Date/Time/Mode of Arrival:   ED Arrival Information     Expected Arrival Acuity Means of Arrival Escorted By Service Admission Type    - 3/9/2019 11:19 Emergent Ambulance [facility] Hospitalist Emergency    Arrival Complaint    weekness        Chief Complaint:   Chief Complaint   Patient presents with    Weakness - Generalized     pt states starting this morning she was having intermittent "weakness and cloudiness" as well as left finger tingling/numbess of sudden onset  symptoms coming and going, not exacerbated by anything  per EMS- pt with fluctuating HR and BPs intermittently A-paced while en route  Pt states "I can't describe it but I just feel funny " Pt recently started on eliquis d/t new afib  pt denies fall/injury  Assessment/Plan: 65 y/o female presents with chest pain that started earlier today  Reports central chest heaviness, non-radiating  Chest pain  Assessment & Plan  · POA, resolved  Reported as a heaviness in her chest without any exacerbating or alleviating factors  No other anginal symptoms  EKG and troponin normal  CHEPE score is 4  History of angioplasty  Last stress test in 2017 was abnormal and resulted in a cardiac catheterization with small amounts of stenosis but no interventions were performed   ? Admit patient to med/surg under observation status with telemetry monitoring   ? Trend troponin   ?  Update stress test   ? Consider cardiology evaluation if further abnormal findings    Anticipated Length of Stay:  Patient will be admitted on an Observation basis with an anticipated length of stay of  Less than 2 midnights  Justification for Hospital Stay: Chest pain, rule out ACS      ED Vital Signs:   ED Triage Vitals   Temperature Pulse Respirations Blood Pressure SpO2   03/09/19 1138 03/09/19 1138 03/09/19 1138 03/09/19 1138 03/09/19 1138   98 4 °F (36 9 °C) 80 18 152/79 96 %      Temp Source Heart Rate Source Patient Position - Orthostatic VS BP Location FiO2 (%)   03/09/19 1138 03/09/19 1516 03/09/19 1516 03/09/19 1516 --   Oral Monitor Sitting Right arm       Pain Score       03/09/19 1138       No Pain        Wt Readings from Last 1 Encounters:   03/09/19 78 9 kg (174 lb)     Vital Signs (abnormal): wnl  Pertinent Labs/Diagnostic Test Results: CBC, BMP -- wnl, Trop neg  CXR--  1   Slight increase in cardiomegaly  CT head -- No CT evidence of acute intracranial process or significant interval change  Chronic microangiopathy  Chronic bilateral mastoid effusions  ED Treatment:   Medication Administration from 03/09/2019 1119 to 03/09/2019 1619       Date/Time Order Dose Route Action Action by Comments     03/09/2019 1403 nitroglycerin (NITRO-BID) 2 % TD ointment 1 inch 1 inch Topical Given Nancyann Collet, RN      03/09/2019 1401 aspirin tablet 325 mg 325 mg Oral Given Nancyann Collet, RN         Past Medical/Surgical History:    Active Ambulatory Problems     Diagnosis Date Noted    Coronary artery disease involving native coronary artery of native heart without angina pectoris 04/07/2017    Essential hypertension 04/07/2017    History of placement of stent in LAD coronary artery 01/26/2018    Hyperlipidemia 01/26/2018    History of atrial flutter 01/26/2018    LBBB (left bundle branch block) 01/26/2018    Right hip pain 03/02/2018    Type 2 diabetes mellitus without complication, without long-term current use of insulin ([facility] [address] ) 06/19/2015    Hypothyroidism 01/27/2016    Adhesive capsulitis 06/19/2015    Chronic low back pain 08/16/2016    Gait disturbance 03/02/2018  Cervical spine degeneration 09/28/2018    Primary osteoarthritis of both shoulders 09/28/2018    Tremors of nervous system 09/28/2018    Heart block AV second degree 12/09/2018    Ambulatory dysfunction 12/12/2018    Pacemaker 01/11/2019    Atrial fibrillation ([facility] ) 03/04/2019     Past Medical History:   Diagnosis Date    Abnormal nuclear stress test     Anxiety     Arthritis     Bundle branch block, left     Cardiomyopathy ([facility] )     Chronic pain     Coronary artery disease     Diabetes mellitus ([facility] )     GERD (gastroesophageal reflux disease)     H/O atrial flutter     History of DVT (deep vein thrombosis)     History of pulmonary embolism     Hyperlipidemia     Hypertension     Hypothyroid     Renal calculi     Shortness of breath      Admitting Diagnosis: Chest pain [R07 9]  Weakness [R53 1]  Age/Sex: 80 y o  female  Admission Orders:  Scheduled Meds:   Current Facility-Administered Medications:  acetaminophen 650 mg Oral Q6H PRN Juni Capone PA-C   apixaban 5 mg Oral BID Juni Bolton PA-C   aspirin 81 mg Oral Daily Jnui Bolton PA-C   levothyroxine 25 mcg Oral Early Morning Juni Capone PA-C   lisinopril 2 5 mg Oral Daily Juni Capone PA-C   metoprolol succinate 25 mg Oral Daily Juni Capone PA-C   ondansetron 4 mg Intravenous Q6H PRN Juni Bolton PA-C   pravastatin 40 mg Oral QPM Juni Capone PA-C     Telem  Serial trops  ekg  Nuclear stress test  Cardiac diet

## 2019-03-10 NOTE — ASSESSMENT & PLAN NOTE
· No further chest pain   Troponin normal   · Continue current medication regimen   · ASA, Pravastatin, Toprol, Lisinopril   · Update stress test - will perform as outpatient

## 2019-03-10 NOTE — ASSESSMENT & PLAN NOTE
· POA, resolved  No further pain overnight  Troponin normal x 3   Discussed with patient and she would rather have outpatient stress test performed   · Stable for discharge   · Stress team will contact patient for testing later this week  · Cardiology follow up

## 2019-03-11 ENCOUNTER — TRANSITIONAL CARE MANAGEMENT (OUTPATIENT)
Dept: INTERNAL MEDICINE CLINIC | Facility: CLINIC | Age: 84
End: 2019-03-11

## 2019-03-12 ENCOUNTER — OFFICE VISIT (OUTPATIENT)
Dept: INTERNAL MEDICINE CLINIC | Facility: CLINIC | Age: 84
End: 2019-03-12
Payer: MEDICARE

## 2019-03-12 VITALS
SYSTOLIC BLOOD PRESSURE: 116 MMHG | HEIGHT: 56 IN | HEART RATE: 92 BPM | BODY MASS INDEX: 39.82 KG/M2 | DIASTOLIC BLOOD PRESSURE: 62 MMHG | WEIGHT: 177 LBS | OXYGEN SATURATION: 94 %

## 2019-03-12 DIAGNOSIS — I48.0 PAROXYSMAL ATRIAL FIBRILLATION (HCC): ICD-10-CM

## 2019-03-12 DIAGNOSIS — I25.10 CORONARY ARTERY DISEASE INVOLVING NATIVE CORONARY ARTERY OF NATIVE HEART WITHOUT ANGINA PECTORIS: Chronic | ICD-10-CM

## 2019-03-12 DIAGNOSIS — E11.9 TYPE 2 DIABETES MELLITUS WITHOUT COMPLICATION, WITHOUT LONG-TERM CURRENT USE OF INSULIN (HCC): Chronic | ICD-10-CM

## 2019-03-12 DIAGNOSIS — G56.02 CARPAL TUNNEL SYNDROME ON LEFT: Primary | ICD-10-CM

## 2019-03-12 DIAGNOSIS — E03.9 HYPOTHYROIDISM, UNSPECIFIED TYPE: Chronic | ICD-10-CM

## 2019-03-12 PROCEDURE — 99495 TRANSJ CARE MGMT MOD F2F 14D: CPT | Performed by: INTERNAL MEDICINE

## 2019-03-12 NOTE — PROGRESS NOTES
Assessment/Plan:   Carpal tunnel, left-discussed using OTC wrist splints  No NSAIDs bec she is on Eliquis  Continue rest of meds  Stress test as schedule       Problem List Items Addressed This Visit        Endocrine    Hypothyroidism (Chronic)    Type 2 diabetes mellitus without complication, without long-term current use of insulin (HCC) (Chronic)       Cardiovascular and Mediastinum    Coronary artery disease involving native coronary artery of native heart without angina pectoris (Chronic)    Atrial fibrillation ([facility] [address] )      Other Visit Diagnoses     Carpal tunnel syndrome on left    -  Primary           Subjective:     Patient ID: Danisha Patel is a 80 y o  female  HPI  Long history of carpal tunnel on the left with tingling and numbness on the first 4 fingers  This occurred frequently but would go away  This time, it was not going away so she went to the hospital  She denies having chest pain then  CXR showed clear lungs  No actue findings on head CT  Normal cardiac enzymes  She is scheduled for a nuclear stress test in 2 days  Continues to deny chest pain  She had the right hand carpal tunnel surgery many years ago and was told she needed the left too but it was not bothering her much so did not have it done    Review of Systems   Constitutional: Negative for chills and fever  HENT: Negative for congestion, rhinorrhea, sinus pressure and sore throat  Respiratory: Negative for cough and wheezing  Cardiovascular: Negative for chest pain, palpitations and leg swelling  Gastrointestinal: Negative for abdominal pain  Musculoskeletal: Positive for arthralgias  Neurological: Positive for dizziness  Negative for headaches  Objective:     Physical Exam   Constitutional: She is oriented to person, place, and time  No distress  Eyes: Conjunctivae are normal    Cardiovascular: Normal rate  An irregularly irregular rhythm present  Murmur heard     Systolic murmur is present with a grade of 2/6  Trace pretibial bilateral LE edema   Pulmonary/Chest: Effort normal and breath sounds normal  No stridor  No respiratory distress  She has no wheezes  She has no rales  Neurological: She is alert and oriented to person, place, and time  Skin: Skin is warm and dry  She is not diaphoretic  Psychiatric: She has a normal mood and affect  Her behavior is normal  Judgment and thought content normal          Vitals:    03/12/19 1100 03/12/19 1128   BP: 148/82 116/62   Pulse: 92    SpO2: 94%    Weight: 80 3 kg (177 lb)    Height: 4' 8" (1 422 m)        Transitional Care Management Review:  Lewis Ahumada is a 80 y o  female here for TCM follow up  During the TCM phone call patient stated:    TCM Call (since 2/9/2019)     Date and time call was made  3/11/2019  8:33 AM    Hospital care reviewed  Records reviewed    Patient was hospitialized at  [facility]    Date of Admission  03/09/19    Date of discharge  03/10/19    Diagnosis  Chest pain    Disposition  Home    Were the patients medications reviewed and updated  No    Current Symptoms  None      TCM Call (since 2/9/2019)     Post hospital issues  None    Should patient be enrolled in anticoag monitoring? No    Scheduled for follow up?   Yes    Patients specialists  Cardiologist    Cardiologist name  Eli Caceres    I have advised the patient to call PCP with any new or worsening symptoms  Haley Ayoub     Are you recieving any outpatient services  No    Are you recieving home care services  No    Are you using any community resources  No    Have you fallen in the last 12 months  Yes    How many times  1    Interperter language line needed  No    Counseling  Patient          Merlinda Abraham, MD

## 2019-03-14 ENCOUNTER — HOSPITAL ENCOUNTER (OUTPATIENT)
Dept: NON INVASIVE DIAGNOSTICS | Facility: CLINIC | Age: 84
Discharge: HOME | End: 2019-03-14
Payer: MEDICARE

## 2019-03-14 ENCOUNTER — TELEPHONE (OUTPATIENT)
Dept: INTERNAL MEDICINE CLINIC | Facility: CLINIC | Age: 84
End: 2019-03-14

## 2019-03-14 DIAGNOSIS — R07.9 CHEST PAIN: ICD-10-CM

## 2019-03-14 DIAGNOSIS — I25.10 CORONARY ARTERY DISEASE INVOLVING NATIVE CORONARY ARTERY OF NATIVE HEART WITHOUT ANGINA PECTORIS: Chronic | ICD-10-CM

## 2019-03-14 LAB
CHEST PAIN STATEMENT: NORMAL
MAX DIASTOLIC BP: 60 MMHG
MAX HEART RATE: 90 BPM
MAX PREDICTED HEART RATE: 131 BPM
MAX. SYSTOLIC BP: 126 MMHG
PROTOCOL NAME: NORMAL
REASON FOR TERMINATION: NORMAL
TARGET HR FORMULA: NORMAL
TEST INDICATION: NORMAL
TIME IN EXERCISE PHASE: NORMAL

## 2019-03-14 PROCEDURE — A9502 TC99M TETROFOSMIN: HCPCS

## 2019-03-14 PROCEDURE — 93018 CV STRESS TEST I&R ONLY: CPT | Performed by: INTERNAL MEDICINE

## 2019-03-14 PROCEDURE — 78452 HT MUSCLE IMAGE SPECT MULT: CPT | Performed by: INTERNAL MEDICINE

## 2019-03-14 PROCEDURE — 78452 HT MUSCLE IMAGE SPECT MULT: CPT

## 2019-03-14 PROCEDURE — 93017 CV STRESS TEST TRACING ONLY: CPT

## 2019-03-14 PROCEDURE — 93016 CV STRESS TEST SUPVJ ONLY: CPT | Performed by: INTERNAL MEDICINE

## 2019-03-14 RX ADMIN — REGADENOSON 0.4 MG: 0.08 INJECTION, SOLUTION INTRAVENOUS at 13:53

## 2019-03-14 NOTE — TELEPHONE ENCOUNTER
Patient said she was supposed to call and let you know when she had her stress test done  She had it done earlier today

## 2019-03-15 ENCOUNTER — TELEPHONE (OUTPATIENT)
Dept: CARDIOLOGY CLINIC | Facility: CLINIC | Age: 84
End: 2019-03-15

## 2019-03-15 DIAGNOSIS — I25.10 CORONARY ARTERY DISEASE INVOLVING NATIVE CORONARY ARTERY OF NATIVE HEART WITHOUT ANGINA PECTORIS: Primary | Chronic | ICD-10-CM

## 2019-03-15 RX ORDER — ISOSORBIDE MONONITRATE 30 MG/1
30 TABLET, EXTENDED RELEASE ORAL DAILY
Qty: 30 TABLET | Refills: 11 | Status: SHIPPED | OUTPATIENT
Start: 2019-03-15 | End: 2019-04-15 | Stop reason: SDUPTHER

## 2019-03-15 NOTE — PROGRESS NOTES
Had CP, was in ED and admitted overnight  W/U neg  Stress test yesterday suggested small anteroapical ischemia  Has prior LAD stent  Not having unstable symptoms, so am starting Imdur 30mg daily  Discussed with her in detail  She also thinks the Eliquis is causing side effects  Nothing specific  Stated we will discuss at appt in April

## 2019-04-02 ENCOUNTER — HOSPITAL ENCOUNTER (OUTPATIENT)
Dept: RADIOLOGY | Facility: HOSPITAL | Age: 84
Discharge: HOME | End: 2019-04-02
Attending: ORTHOPAEDIC SURGERY
Payer: MEDICARE

## 2019-04-02 ENCOUNTER — OFFICE VISIT (OUTPATIENT)
Dept: OBGYN CLINIC | Facility: HOSPITAL | Age: 84
End: 2019-04-02
Payer: MEDICARE

## 2019-04-02 VITALS
DIASTOLIC BLOOD PRESSURE: 75 MMHG | BODY MASS INDEX: 39.82 KG/M2 | WEIGHT: 177 LBS | HEART RATE: 96 BPM | HEIGHT: 56 IN | SYSTOLIC BLOOD PRESSURE: 167 MMHG

## 2019-04-02 DIAGNOSIS — M25.562 PAIN IN BOTH KNEES, UNSPECIFIED CHRONICITY: Primary | ICD-10-CM

## 2019-04-02 DIAGNOSIS — M17.0 PRIMARY OSTEOARTHRITIS OF BOTH KNEES: ICD-10-CM

## 2019-04-02 DIAGNOSIS — M25.561 PAIN IN BOTH KNEES, UNSPECIFIED CHRONICITY: ICD-10-CM

## 2019-04-02 DIAGNOSIS — M70.61 TROCHANTERIC BURSITIS OF RIGHT HIP: ICD-10-CM

## 2019-04-02 DIAGNOSIS — M25.561 PAIN IN BOTH KNEES, UNSPECIFIED CHRONICITY: Primary | ICD-10-CM

## 2019-04-02 DIAGNOSIS — M25.562 PAIN IN BOTH KNEES, UNSPECIFIED CHRONICITY: ICD-10-CM

## 2019-04-02 PROCEDURE — 73562 X-RAY EXAM OF KNEE 3: CPT

## 2019-04-02 PROCEDURE — 20610 DRAIN/INJ JOINT/BURSA W/O US: CPT | Performed by: ORTHOPAEDIC SURGERY

## 2019-04-02 PROCEDURE — 99214 OFFICE O/P EST MOD 30 MIN: CPT | Performed by: ORTHOPAEDIC SURGERY

## 2019-04-02 RX ORDER — LIDOCAINE HYDROCHLORIDE 20 MG/ML
2 INJECTION, SOLUTION INFILTRATION; PERINEURAL
Status: COMPLETED | OUTPATIENT
Start: 2019-04-02 | End: 2019-04-02

## 2019-04-02 RX ORDER — BUPIVACAINE HYDROCHLORIDE 2.5 MG/ML
2 INJECTION, SOLUTION INFILTRATION; PERINEURAL
Status: COMPLETED | OUTPATIENT
Start: 2019-04-02 | End: 2019-04-02

## 2019-04-02 RX ORDER — METHYLPREDNISOLONE ACETATE 40 MG/ML
2 INJECTION, SUSPENSION INTRA-ARTICULAR; INTRALESIONAL; INTRAMUSCULAR; SOFT TISSUE
Status: COMPLETED | OUTPATIENT
Start: 2019-04-02 | End: 2019-04-02

## 2019-04-02 RX ORDER — BETAMETHASONE SODIUM PHOSPHATE AND BETAMETHASONE ACETATE 3; 3 MG/ML; MG/ML
12 INJECTION, SUSPENSION INTRA-ARTICULAR; INTRALESIONAL; INTRAMUSCULAR; SOFT TISSUE
Status: COMPLETED | OUTPATIENT
Start: 2019-04-02 | End: 2019-04-02

## 2019-04-02 RX ORDER — LIDOCAINE HYDROCHLORIDE 20 MG/ML
2 INJECTION, SOLUTION EPIDURAL; INFILTRATION; INTRACAUDAL; PERINEURAL
Status: COMPLETED | OUTPATIENT
Start: 2019-04-02 | End: 2019-04-02

## 2019-04-02 RX ADMIN — BETAMETHASONE SODIUM PHOSPHATE AND BETAMETHASONE ACETATE 12 MG: 3; 3 INJECTION, SUSPENSION INTRA-ARTICULAR; INTRALESIONAL; INTRAMUSCULAR; SOFT TISSUE at 10:38

## 2019-04-02 RX ADMIN — LIDOCAINE HYDROCHLORIDE 2 ML: 20 INJECTION, SOLUTION EPIDURAL; INFILTRATION; INTRACAUDAL; PERINEURAL at 10:38

## 2019-04-02 RX ADMIN — METHYLPREDNISOLONE ACETATE 2 ML: 40 INJECTION, SUSPENSION INTRA-ARTICULAR; INTRALESIONAL; INTRAMUSCULAR; SOFT TISSUE at 10:39

## 2019-04-02 RX ADMIN — BUPIVACAINE HYDROCHLORIDE 2 ML: 2.5 INJECTION, SOLUTION INFILTRATION; PERINEURAL at 10:38

## 2019-04-02 RX ADMIN — LIDOCAINE HYDROCHLORIDE 2 ML: 20 INJECTION, SOLUTION INFILTRATION; PERINEURAL at 10:39

## 2019-04-02 RX ADMIN — BUPIVACAINE HYDROCHLORIDE 2 ML: 2.5 INJECTION, SOLUTION INFILTRATION; PERINEURAL at 10:39

## 2019-04-15 ENCOUNTER — OFFICE VISIT (OUTPATIENT)
Dept: CARDIOLOGY CLINIC | Facility: CLINIC | Age: 84
End: 2019-04-15
Payer: MEDICARE

## 2019-04-15 ENCOUNTER — IN-CLINIC DEVICE VISIT (OUTPATIENT)
Dept: CARDIOLOGY CLINIC | Facility: CLINIC | Age: 84
End: 2019-04-15
Payer: MEDICARE

## 2019-04-15 VITALS
HEART RATE: 86 BPM | BODY MASS INDEX: 39.01 KG/M2 | DIASTOLIC BLOOD PRESSURE: 64 MMHG | SYSTOLIC BLOOD PRESSURE: 116 MMHG | WEIGHT: 173.4 LBS | HEIGHT: 56 IN

## 2019-04-15 DIAGNOSIS — I25.10 CORONARY ARTERY DISEASE INVOLVING NATIVE CORONARY ARTERY OF NATIVE HEART WITHOUT ANGINA PECTORIS: Chronic | ICD-10-CM

## 2019-04-15 DIAGNOSIS — E78.2 MIXED HYPERLIPIDEMIA: Chronic | ICD-10-CM

## 2019-04-15 DIAGNOSIS — I44.7 LBBB (LEFT BUNDLE BRANCH BLOCK): ICD-10-CM

## 2019-04-15 DIAGNOSIS — I44.1 HEART BLOCK AV SECOND DEGREE: Primary | ICD-10-CM

## 2019-04-15 DIAGNOSIS — I10 ESSENTIAL HYPERTENSION: Chronic | ICD-10-CM

## 2019-04-15 DIAGNOSIS — Z95.5 HISTORY OF PLACEMENT OF STENT IN LAD CORONARY ARTERY: Chronic | ICD-10-CM

## 2019-04-15 DIAGNOSIS — I44.2 COMPLETE ATRIOVENTRICULAR BLOCK (HCC): Primary | ICD-10-CM

## 2019-04-15 DIAGNOSIS — Z86.79 HISTORY OF ATRIAL FLUTTER: Chronic | ICD-10-CM

## 2019-04-15 DIAGNOSIS — I48.19 PERSISTENT ATRIAL FIBRILLATION (HCC): ICD-10-CM

## 2019-04-15 DIAGNOSIS — Z95.0 PRESENCE OF PERMANENT CARDIAC PACEMAKER: ICD-10-CM

## 2019-04-15 DIAGNOSIS — Z95.0 PACEMAKER: ICD-10-CM

## 2019-04-15 PROCEDURE — 93280 PM DEVICE PROGR EVAL DUAL: CPT | Performed by: INTERNAL MEDICINE

## 2019-04-15 PROCEDURE — 99215 OFFICE O/P EST HI 40 MIN: CPT | Performed by: INTERNAL MEDICINE

## 2019-04-15 RX ORDER — METOPROLOL SUCCINATE 25 MG/1
25 TABLET, EXTENDED RELEASE ORAL
Qty: 90 TABLET | Refills: 3 | Status: SHIPPED | OUTPATIENT
Start: 2019-04-15 | End: 2020-06-17 | Stop reason: SDUPTHER

## 2019-04-15 RX ORDER — PRAVASTATIN SODIUM 80 MG/1
80 TABLET ORAL
Qty: 90 TABLET | Refills: 3 | Status: SHIPPED | OUTPATIENT
Start: 2019-04-15 | End: 2019-08-21 | Stop reason: SDUPTHER

## 2019-04-15 RX ORDER — ISOSORBIDE MONONITRATE 30 MG/1
30 TABLET, EXTENDED RELEASE ORAL DAILY
Qty: 90 TABLET | Refills: 3 | Status: SHIPPED | OUTPATIENT
Start: 2019-04-15 | End: 2020-05-15 | Stop reason: SDUPTHER

## 2019-04-18 ENCOUNTER — TELEPHONE (OUTPATIENT)
Dept: CARDIOLOGY CLINIC | Facility: CLINIC | Age: 84
End: 2019-04-18

## 2019-05-15 ENCOUNTER — TELEPHONE (OUTPATIENT)
Dept: OBGYN CLINIC | Facility: HOSPITAL | Age: 84
End: 2019-05-15

## 2019-06-14 ENCOUNTER — OFFICE VISIT (OUTPATIENT)
Dept: INTERNAL MEDICINE CLINIC | Facility: CLINIC | Age: 84
End: 2019-06-14
Payer: MEDICARE

## 2019-06-14 VITALS
HEIGHT: 56 IN | BODY MASS INDEX: 40.31 KG/M2 | OXYGEN SATURATION: 96 % | HEART RATE: 88 BPM | WEIGHT: 179.2 LBS | SYSTOLIC BLOOD PRESSURE: 144 MMHG | DIASTOLIC BLOOD PRESSURE: 80 MMHG

## 2019-06-14 DIAGNOSIS — E11.9 TYPE 2 DIABETES MELLITUS WITHOUT COMPLICATION, WITHOUT LONG-TERM CURRENT USE OF INSULIN (HCC): Primary | Chronic | ICD-10-CM

## 2019-06-14 DIAGNOSIS — I48.0 PAROXYSMAL ATRIAL FIBRILLATION (HCC): ICD-10-CM

## 2019-06-14 DIAGNOSIS — Z00.00 MEDICARE ANNUAL WELLNESS VISIT, SUBSEQUENT: ICD-10-CM

## 2019-06-14 DIAGNOSIS — M17.0 PRIMARY OSTEOARTHRITIS OF BOTH KNEES: ICD-10-CM

## 2019-06-14 DIAGNOSIS — E78.2 MIXED HYPERLIPIDEMIA: Chronic | ICD-10-CM

## 2019-06-14 DIAGNOSIS — Z12.31 ENCOUNTER FOR SCREENING MAMMOGRAM FOR BREAST CANCER: ICD-10-CM

## 2019-06-14 DIAGNOSIS — R26.2 AMBULATORY DYSFUNCTION: ICD-10-CM

## 2019-06-14 DIAGNOSIS — I10 ESSENTIAL HYPERTENSION: Chronic | ICD-10-CM

## 2019-06-14 DIAGNOSIS — E11.8 TYPE 2 DIABETES MELLITUS WITH COMPLICATION, WITHOUT LONG-TERM CURRENT USE OF INSULIN (HCC): ICD-10-CM

## 2019-06-14 DIAGNOSIS — E03.9 HYPOTHYROIDISM, UNSPECIFIED TYPE: Chronic | ICD-10-CM

## 2019-06-14 DIAGNOSIS — I25.10 CORONARY ARTERY DISEASE INVOLVING NATIVE CORONARY ARTERY OF NATIVE HEART WITHOUT ANGINA PECTORIS: Chronic | ICD-10-CM

## 2019-06-14 PROCEDURE — 99214 OFFICE O/P EST MOD 30 MIN: CPT | Performed by: INTERNAL MEDICINE

## 2019-06-14 PROCEDURE — G0439 PPPS, SUBSEQ VISIT: HCPCS | Performed by: INTERNAL MEDICINE

## 2019-06-14 RX ORDER — LISINOPRIL 2.5 MG/1
2.5 TABLET ORAL DAILY
Qty: 90 TABLET | Refills: 1 | Status: SHIPPED | OUTPATIENT
Start: 2019-06-14 | End: 2020-01-14

## 2019-06-14 RX ORDER — BLOOD SUGAR DIAGNOSTIC
1 STRIP MISCELLANEOUS DAILY
Qty: 100 EACH | Refills: 2 | Status: SHIPPED | OUTPATIENT
Start: 2019-06-14 | End: 2020-03-12 | Stop reason: SDUPTHER

## 2019-06-15 DIAGNOSIS — E78.2 MIXED HYPERLIPIDEMIA: ICD-10-CM

## 2019-06-17 RX ORDER — PRAVASTATIN SODIUM 40 MG
TABLET ORAL
Qty: 90 TABLET | Refills: 1 | Status: SHIPPED | OUTPATIENT
Start: 2019-06-17 | End: 2019-08-15 | Stop reason: DRUGHIGH

## 2019-07-02 ENCOUNTER — OFFICE VISIT (OUTPATIENT)
Dept: OBGYN CLINIC | Facility: HOSPITAL | Age: 84
End: 2019-07-02
Payer: MEDICARE

## 2019-07-02 VITALS
BODY MASS INDEX: 40.26 KG/M2 | DIASTOLIC BLOOD PRESSURE: 76 MMHG | WEIGHT: 179 LBS | HEIGHT: 56 IN | HEART RATE: 92 BPM | SYSTOLIC BLOOD PRESSURE: 146 MMHG

## 2019-07-02 DIAGNOSIS — M70.61 TROCHANTERIC BURSITIS OF RIGHT HIP: ICD-10-CM

## 2019-07-02 DIAGNOSIS — M17.0 PRIMARY OSTEOARTHRITIS OF BOTH KNEES: Primary | ICD-10-CM

## 2019-07-02 PROCEDURE — 99212 OFFICE O/P EST SF 10 MIN: CPT | Performed by: ORTHOPAEDIC SURGERY

## 2019-07-02 NOTE — PROGRESS NOTES
Assessment:  1  Primary osteoarthritis of both knees     2  Trochanteric bursitis of right hip       Patient Active Problem List   Diagnosis    Coronary artery disease involving native coronary artery of native heart without angina pectoris    Essential hypertension    History of placement of stent in LAD coronary artery    Hyperlipidemia    History of atrial flutter    LBBB (left bundle branch block)    Right hip pain    Type 2 diabetes mellitus without complication, without long-term current use of insulin (HCC)    Hypothyroidism    Adhesive capsulitis    Chronic low back pain    Gait disturbance    Cervical spine degeneration    Primary osteoarthritis of both shoulders    Tremors of nervous system    Heart block AV second degree    Ambulatory dysfunction    Pacemaker    Atrial fibrillation (HCC)    Chest pain    Pain in both knees    Primary osteoarthritis of both knees    Trochanteric bursitis of right hip           Plan      Left knee instability secondary to advanced osteoarthritis  Recommend hinged knee brace to try for added support to the left knee  She is still having continued relief from las cortisone injections  Follow up in 3 months  Subjective:     Patient ID:    Chief Complaint:Brenda Lopez 80 y o  female      HPI  80year old female who is here for recheck of her left knee  She was seen in April of 2019 at which time both knees were injected  Noe Amador has had great relief in her bilateral knee pain but over the pas four weeks or so without injury she has been having left knee instability and giving way when walking and standing at times          The following portions of the patient's history were reviewed and updated as appropriate: allergies, current medications, past family history, past social history, past surgical history and problem list     All organ systems normal    Social History     Socioeconomic History    Marital status: /Civil Union     Spouse name: Not on file    Number of children: Not on file    Years of education: Not on file    Highest education level: Not on file   Occupational History    Not on file   Social Needs    Financial resource strain: Not on file    Food insecurity:     Worry: Not on file     Inability: Not on file    Transportation needs:     Medical: Not on file     Non-medical: Not on file   Tobacco Use    Smoking status: Never Smoker    Smokeless tobacco: Never Used   Substance and Sexual Activity    Alcohol use: Yes     Comment: occasional    Drug use: Yes     Types: Marijuana     Comment: MEDICAL MARIJUANA-HAS NOT USED FOR 3 WEEKS    Sexual activity: Not on file   Lifestyle    Physical activity:     Days per week: Not on file     Minutes per session: Not on file    Stress: Not on file   Relationships    Social connections:     Talks on phone: Not on file     Gets together: Not on file     Attends Religion service: Not on file     Active member of club or organization: Not on file     Attends meetings of clubs or organizations: Not on file     Relationship status: Not on file    Intimate partner violence:     Fear of current or ex partner: Not on file     Emotionally abused: Not on file     Physically abused: Not on file     Forced sexual activity: Not on file   Other Topics Concern    Not on file   Social History Narrative    Not on file     Past Medical History:   Diagnosis Date    Abnormal nuclear stress test     last assessed 04/03/2017    Anxiety     Arthritis     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    Bundle branch block, left     Cardiomyopathy ([facility] [address] )     Chronic pain     chronic b/l shoulder pain    Coronary artery disease     Diabetes mellitus ([facility] [address] )     GERD (gastroesophageal reflux disease)     H/O atrial flutter     History of DVT (deep vein thrombosis)     History of pulmonary embolism     Hyperlipidemia     Hypertension     Hypothyroid     Renal calculi  Shortness of breath      Past Surgical History:   Procedure Laterality Date    ATRIAL ABLATION SURGERY  01/2015    CARDIOVERSION      CHELA/DCCV 10/22/2014    CARPAL TUNNEL RELEASE Right     CATARACT EXTRACTION      CORONARY ANGIOPLASTY WITH STENT PLACEMENT      HYSTERECTOMY      JOINT REPLACEMENT Right     CA AMPUTATION TOE,MT-P JT Left 10/11/2017    Procedure: AMPUTATION SECOND TOE;  Surgeon: Vilma Pryor DPM;  Location: [facility];  Service: Podiatry    TONSILLECTOMY      TOTAL HIP ARTHROPLASTY      Right     Allergies   Allergen Reactions    Atorvastatin      Reaction unknown 10/23/2018-    Other Rash     Patient sensitive to adhesives from tape     Current Outpatient Medications on File Prior to Visit   Medication Sig Dispense Refill    ACCU-CHEK RUBINA PLUS test strip 1 each by Other route daily Use as instructed 100 each 2    ACCU-CHEK SOFTCLIX LANCETS lancets by Other route daily Use daily as instructed   100 each 2    acetaminophen (TYLENOL) 500 mg tablet Take 1 tablet (500 mg total) by mouth every 6 (six) hours as needed for mild pain or moderate pain 30 tablet 0    amoxicillin (AMOXIL) 875 mg tablet amoxicillin 875 mg tablet      apixaban (ELIQUIS) 5 mg Take 1 tablet (5 mg total) by mouth 2 (two) times a day 180 tablet 3    ASPIR-LOW 81 MG EC tablet TAKE ONE TABLET BY MOUTH EVERY DAY 90 tablet 3    azelastine (ASTELIN) 0 1 % nasal spray Reported on 1/5/2017      Calcium Carbonate-Vitamin D (CALTRATE 600+D PO) Take 1 capsule by mouth 2 (two) times a day        cephalexin (KEFLEX) 500 mg capsule cephalexin 500 mg capsule      clopidogrel (PLAVIX) 75 mg tablet clopidogrel 75 mg tablet      furosemide (LASIX) 20 mg tablet furosemide 20 mg tablet      gabapentin (NEURONTIN) 300 mg capsule gabapentin 300 mg capsule      isosorbide mononitrate (IMDUR) 30 mg 24 hr tablet Take 1 tablet (30 mg total) by mouth daily 90 tablet 3    levothyroxine 25 mcg tablet TAKE ONE TABLET BY MOUTH EVERY DAY 90 tablet 1    lisinopril (ZESTRIL) 2 5 mg tablet Take 1 tablet (2 5 mg total) by mouth daily 90 tablet 1    metFORMIN (GLUCOPHAGE) 500 mg tablet metformin 500 mg tablet      methylPREDNISolone 4 MG tablet therapy pack methylprednisolone 4 mg tablets in a dose pack      metoprolol succinate (TOPROL XL) 25 mg 24 hr tablet Take 1 tablet (25 mg total) by mouth daily after dinner 90 tablet 3    metoprolol succinate (TOPROL-XL) 50 mg 24 hr tablet metoprolol succinate ER 50 mg tablet,extended release 24 hr      metroNIDAZOLE (METROCREAM) 0 75 % cream Apply topically 2 (two) times a day 45 g 0    Misc Natural Products (ENERGY FOCUS PO) Take 1 tablet by mouth 2 (two) times a day        multivitamin-iron-minerals-folic acid (CENTRUM) chewable tablet Chew 1 tablet daily   MYRBETRIQ 50 MG TB24       oxyCODONE-acetaminophen (PERCOCET) 5-325 mg per tablet oxycodone-acetaminophen 5 mg-325 mg tablet      pravastatin (PRAVACHOL) 20 mg tablet pravastatin 20 mg tablet      pravastatin (PRAVACHOL) 40 mg tablet pravastatin 40 mg tablet      pravastatin (PRAVACHOL) 40 mg tablet TAKE ONE TABLET BY MOUTH EVERY DAY 90 tablet 1    pravastatin (PRAVACHOL) 80 mg tablet Take 1 tablet (80 mg total) by mouth daily at bedtime 90 tablet 3    triamcinolone (KENALOG) 0 1 % oral topical paste Apply to tongue right side bid x 7-10 days 5 g 0     No current facility-administered medications on file prior to visit  Objective:        Left Knee Exam     Tenderness   The patient is experiencing tenderness in the pes anserinus      Tests   Varus: negative Valgus: negative    Other   Pulse: present  Swelling: none  Effusion: effusion present                no new imaging today    Portions of the record may have been created with voice recognition software   Occasional wrong word or "sound a like" substitutions may have occurred due to the inherent limitations of voice recognition software   Read the chart carefully and recognize, using context, where substitutions have occurred

## 2019-07-19 ENCOUNTER — TELEPHONE (OUTPATIENT)
Dept: CARDIOLOGY CLINIC | Facility: CLINIC | Age: 84
End: 2019-07-19

## 2019-07-19 NOTE — TELEPHONE ENCOUNTER
P/c having dental extractionson 8/6  They would like to know how long to hold Eliquis?         Please advise

## 2019-07-31 ENCOUNTER — REMOTE DEVICE CLINIC VISIT (OUTPATIENT)
Dept: CARDIOLOGY CLINIC | Facility: CLINIC | Age: 84
End: 2019-07-31
Payer: MEDICARE

## 2019-07-31 DIAGNOSIS — Z95.0 PRESENCE OF PERMANENT CARDIAC PACEMAKER: Primary | ICD-10-CM

## 2019-07-31 PROCEDURE — 93294 REM INTERROG EVL PM/LDLS PM: CPT | Performed by: INTERNAL MEDICINE

## 2019-07-31 PROCEDURE — 93296 REM INTERROG EVL PM/IDS: CPT | Performed by: INTERNAL MEDICINE

## 2019-07-31 NOTE — PROGRESS NOTES
Results for orders placed or performed in visit on 07/31/19   Cardiac EP device report    Narrative    MDT DUAL PM  CARELINK TRANSMISSION: BATTERY VOLTAGE ADEQUATE  (11 7 YRS) AP 17%  99%  ALL AVAILABLE LEAD PARAMETERS WITHIN NORMAL LIMITS  1 VHR EPISODE DETECTED 7 BEATS @ 330ms  PATIENT IS ON METOPROLOL SUCC; EF 76% (2019, STRESS)  NORMAL DEVICE FUNCTION  ---SANCHEZ

## 2019-08-07 DIAGNOSIS — I47.2 VENTRICULAR TACHYCARDIA (HCC): Primary | ICD-10-CM

## 2019-08-07 DIAGNOSIS — R00.0 TACHYCARDIA: ICD-10-CM

## 2019-08-07 RX ORDER — AMIODARONE HYDROCHLORIDE 200 MG/1
TABLET ORAL
Qty: 60 TABLET | Refills: 1 | Status: SHIPPED | OUTPATIENT
Start: 2019-08-07 | End: 2019-09-23 | Stop reason: SDUPTHER

## 2019-08-07 NOTE — PROGRESS NOTES
I received a call from our device clinic today regarding a recent transmission from this patient's pacemaker  She had an episode of ventricular tachycardia lasting 22 seconds around 7 o'clock this morning  The patient states that she was largely unaware of this arrhythmia, she denies dizziness, palpitations, lightheadedness, or syncope  Upon review of her history, she has known CAD with prior LAD stent  She underwent a nuclear stress test earlier this year which showed a small amount of apical ischemia in the LAD territory and she was started on Imdur  She also has had issues with low normal blood pressure and associated lightheadedness  She is currently maintained on low-dose Toprol-XL, however given her history of hypotension I would be unable to up titrate this medication  I reviewed these findings with Dr João Saldana, and it was recommended that she be started on amiodarone antiarrhythmic therapy to suppress further episodes of VT  Given her age and other comorbidities, I would likely treat this conservatively with medications as opposed to upgrading her device to a defibrillator  I explained these findings to the patient, and reviewed our recommendations  She is in agreement to proceed with amiodarone, however I feel that she should be seen by her primary cardiologist (Dr Marcia Ramirez) in the near future to further discuss these findings as well as treatment options  She agrees with this plan  Unfortunately, our call was disconnected toward the end of our conversation  When I tried to call her back her line was busy  I will send in a prescription for amiodarone, 200 mg 3 times daily for 1 week, then 200 mg daily  I advised her that she can take this with food  I briefly explained the side effects of amiodarone, and close monitoring that is required    I will also order a CMP and TSH, which historically have been normal     I will make Dr Marcia Ramirez aware of the above, I will have our office reach out to her to schedule a follow-up appointment

## 2019-08-08 ENCOUNTER — TELEPHONE (OUTPATIENT)
Dept: CARDIOLOGY CLINIC | Facility: CLINIC | Age: 84
End: 2019-08-08

## 2019-08-08 NOTE — TELEPHONE ENCOUNTER
Called [facility] pharmacy and spoke with Jose J Orozco, the pharmacist, after speaking with Lisa Martinez regarding the new amiodarone script  The patient does not need to stop metoprolol or Eliquis with starting amiodarone  Jose J Orozco will fill the script call patient when it is ready

## 2019-08-08 NOTE — TELEPHONE ENCOUNTER
Yaron Zhao from [address] [place] [address] called to clarify the new Amiodarone script  Dr Alexander Wei is on vacation  Lola Stevens sent in the script  It does not indicate stop Metoprolol or Eliquis, but Yaron Zhao is questioning that  Nii Newsome will check with Lola Stevens and call the Pharmacy and patient

## 2019-08-13 ENCOUNTER — APPOINTMENT (OUTPATIENT)
Dept: LAB | Facility: CLINIC | Age: 84
End: 2019-08-13
Payer: MEDICARE

## 2019-08-13 LAB
ALBUMIN SERPL BCP-MCNC: 3.8 G/DL (ref 3.5–5)
ALP SERPL-CCNC: 78 U/L (ref 46–116)
ALT SERPL W P-5'-P-CCNC: 15 U/L (ref 12–78)
ANION GAP SERPL CALCULATED.3IONS-SCNC: 4 MMOL/L (ref 4–13)
AST SERPL W P-5'-P-CCNC: 13 U/L (ref 5–45)
BASOPHILS # BLD AUTO: 0.02 THOUSANDS/ΜL (ref 0–0.1)
BASOPHILS NFR BLD AUTO: 0 % (ref 0–1)
BILIRUB SERPL-MCNC: 0.47 MG/DL (ref 0.2–1)
BUN SERPL-MCNC: 19 MG/DL (ref 5–25)
CALCIUM SERPL-MCNC: 9.1 MG/DL (ref 8.3–10.1)
CHLORIDE SERPL-SCNC: 98 MMOL/L (ref 100–108)
CHOLEST SERPL-MCNC: 191 MG/DL (ref 50–200)
CO2 SERPL-SCNC: 31 MMOL/L (ref 21–32)
CREAT SERPL-MCNC: 0.84 MG/DL (ref 0.6–1.3)
CREAT UR-MCNC: 41.5 MG/DL
EOSINOPHIL # BLD AUTO: 0.13 THOUSAND/ΜL (ref 0–0.61)
EOSINOPHIL NFR BLD AUTO: 2 % (ref 0–6)
ERYTHROCYTE [DISTWIDTH] IN BLOOD BY AUTOMATED COUNT: 12.5 % (ref 11.6–15.1)
EST. AVERAGE GLUCOSE BLD GHB EST-MCNC: 114 MG/DL
GFR SERPL CREATININE-BSD FRML MDRD: 61 ML/MIN/1.73SQ M
GLUCOSE P FAST SERPL-MCNC: 97 MG/DL (ref 65–99)
HBA1C MFR BLD: 5.6 % (ref 4.2–6.3)
HCT VFR BLD AUTO: 39.5 % (ref 34.8–46.1)
HDLC SERPL-MCNC: 57 MG/DL (ref 40–60)
HGB BLD-MCNC: 13 G/DL (ref 11.5–15.4)
IMM GRANULOCYTES # BLD AUTO: 0.02 THOUSAND/UL (ref 0–0.2)
IMM GRANULOCYTES NFR BLD AUTO: 0 % (ref 0–2)
LDLC SERPL CALC-MCNC: 102 MG/DL (ref 0–100)
LYMPHOCYTES # BLD AUTO: 1.61 THOUSANDS/ΜL (ref 0.6–4.47)
LYMPHOCYTES NFR BLD AUTO: 28 % (ref 14–44)
MCH RBC QN AUTO: 31.9 PG (ref 26.8–34.3)
MCHC RBC AUTO-ENTMCNC: 32.9 G/DL (ref 31.4–37.4)
MCV RBC AUTO: 97 FL (ref 82–98)
MICROALBUMIN UR-MCNC: <5 MG/L (ref 0–20)
MICROALBUMIN/CREAT 24H UR: <12 MG/G CREATININE (ref 0–30)
MONOCYTES # BLD AUTO: 0.46 THOUSAND/ΜL (ref 0.17–1.22)
MONOCYTES NFR BLD AUTO: 8 % (ref 4–12)
NEUTROPHILS # BLD AUTO: 3.58 THOUSANDS/ΜL (ref 1.85–7.62)
NEUTS SEG NFR BLD AUTO: 62 % (ref 43–75)
NONHDLC SERPL-MCNC: 134 MG/DL
NRBC BLD AUTO-RTO: 0 /100 WBCS
PLATELET # BLD AUTO: 224 THOUSANDS/UL (ref 149–390)
PMV BLD AUTO: 10 FL (ref 8.9–12.7)
POTASSIUM SERPL-SCNC: 4.2 MMOL/L (ref 3.5–5.3)
PROT SERPL-MCNC: 7.9 G/DL (ref 6.4–8.2)
RBC # BLD AUTO: 4.07 MILLION/UL (ref 3.81–5.12)
SODIUM SERPL-SCNC: 133 MMOL/L (ref 136–145)
T4 FREE SERPL-MCNC: 1.37 NG/DL (ref 0.76–1.46)
TRIGL SERPL-MCNC: 162 MG/DL
TSH SERPL DL<=0.05 MIU/L-ACNC: 2.89 UIU/ML (ref 0.36–3.74)
WBC # BLD AUTO: 5.82 THOUSAND/UL (ref 4.31–10.16)

## 2019-08-13 PROCEDURE — 80061 LIPID PANEL: CPT | Performed by: INTERNAL MEDICINE

## 2019-08-13 PROCEDURE — 36415 COLL VENOUS BLD VENIPUNCTURE: CPT | Performed by: INTERNAL MEDICINE

## 2019-08-13 PROCEDURE — 85025 COMPLETE CBC W/AUTO DIFF WBC: CPT | Performed by: INTERNAL MEDICINE

## 2019-08-13 PROCEDURE — 84439 ASSAY OF FREE THYROXINE: CPT | Performed by: PHYSICIAN ASSISTANT

## 2019-08-13 PROCEDURE — 84443 ASSAY THYROID STIM HORMONE: CPT | Performed by: INTERNAL MEDICINE

## 2019-08-13 PROCEDURE — 80053 COMPREHEN METABOLIC PANEL: CPT | Performed by: INTERNAL MEDICINE

## 2019-08-13 PROCEDURE — 82043 UR ALBUMIN QUANTITATIVE: CPT | Performed by: INTERNAL MEDICINE

## 2019-08-13 PROCEDURE — 82570 ASSAY OF URINE CREATININE: CPT | Performed by: INTERNAL MEDICINE

## 2019-08-13 PROCEDURE — 83036 HEMOGLOBIN GLYCOSYLATED A1C: CPT | Performed by: INTERNAL MEDICINE

## 2019-08-15 ENCOUNTER — TELEPHONE (OUTPATIENT)
Dept: CARDIOLOGY CLINIC | Facility: CLINIC | Age: 84
End: 2019-08-15

## 2019-08-15 ENCOUNTER — OFFICE VISIT (OUTPATIENT)
Dept: INTERNAL MEDICINE CLINIC | Facility: CLINIC | Age: 84
End: 2019-08-15
Payer: MEDICARE

## 2019-08-15 VITALS
HEIGHT: 56 IN | BODY MASS INDEX: 39.55 KG/M2 | SYSTOLIC BLOOD PRESSURE: 132 MMHG | HEART RATE: 82 BPM | WEIGHT: 175.8 LBS | DIASTOLIC BLOOD PRESSURE: 66 MMHG

## 2019-08-15 DIAGNOSIS — E03.9 HYPOTHYROIDISM, UNSPECIFIED TYPE: Chronic | ICD-10-CM

## 2019-08-15 DIAGNOSIS — R39.15 URINARY URGENCY: ICD-10-CM

## 2019-08-15 DIAGNOSIS — E11.9 TYPE 2 DIABETES MELLITUS WITHOUT COMPLICATION, WITHOUT LONG-TERM CURRENT USE OF INSULIN (HCC): Chronic | ICD-10-CM

## 2019-08-15 DIAGNOSIS — I47.2 VENTRICULAR TACHYCARDIA (HCC): Primary | ICD-10-CM

## 2019-08-15 DIAGNOSIS — I25.10 CORONARY ARTERY DISEASE INVOLVING NATIVE CORONARY ARTERY OF NATIVE HEART WITHOUT ANGINA PECTORIS: Chronic | ICD-10-CM

## 2019-08-15 DIAGNOSIS — Z95.0 PACEMAKER: ICD-10-CM

## 2019-08-15 DIAGNOSIS — I48.0 PAROXYSMAL ATRIAL FIBRILLATION (HCC): ICD-10-CM

## 2019-08-15 DIAGNOSIS — I42.9 CARDIOMYOPATHY, UNSPECIFIED TYPE (HCC): ICD-10-CM

## 2019-08-15 DIAGNOSIS — I10 ESSENTIAL HYPERTENSION: Chronic | ICD-10-CM

## 2019-08-15 DIAGNOSIS — E78.2 MIXED HYPERLIPIDEMIA: Chronic | ICD-10-CM

## 2019-08-15 PROBLEM — I47.20 VENTRICULAR TACHYCARDIA: Status: ACTIVE | Noted: 2019-08-15

## 2019-08-15 PROCEDURE — 99214 OFFICE O/P EST MOD 30 MIN: CPT | Performed by: INTERNAL MEDICINE

## 2019-08-15 RX ORDER — MIRABEGRON 50 MG/1
50 TABLET, FILM COATED, EXTENDED RELEASE ORAL DAILY
Start: 2019-08-15 | End: 2019-11-15 | Stop reason: ALTCHOICE

## 2019-08-15 NOTE — TELEPHONE ENCOUNTER
Patient's family member/caretaker called office w/ questions on how pt should be taking her amiodarone and if it will interfere with her other medications  Spoke to Pt this AM  Pt states she completed the first week of amiodarone by taking it 3 times daily, today  Pt is in understanding that she is now to take 200 mg daily moving forward  Pt also informed that this will not interfere with any of her other medications

## 2019-08-15 NOTE — PROGRESS NOTES
Assessment/Plan: We went over all her meds today and I asked her to match our list with what she has at home  She should check this before she sees cardiology next week       Type 2 diabetes mellitus without complication, without long-term current use of insulin (HCC)  Lab Results   Component Value Date    HGBA1C 5 6 08/13/2019   Controlled without metformin    Hypothyroidism  Continue low dose levothyroxine  Recent TSH normal    Coronary artery disease involving native coronary artery of native heart without angina pectoris  On ASA statin  Now also on Imdur   Continue metoprolol    Essential hypertension  Controlled on low dose lisinopril metoprolol    Atrial fibrillation (HCC)  Maintained on Eliquis    Ventricular tachycardia (HCC)  Taking amiodarone 200mg a day    Hyperlipidemia  Tolerating high dose pravastatin    Urinary urgency  On Myrbetriq through Dr [address] [facility]         Problem List Items Addressed This Visit        Endocrine    Type 2 diabetes mellitus without complication, without long-term current use of insulin ([address] ) (Chronic)     Lab Results   Component Value Date    HGBA1C 5 6 08/13/2019   Controlled without metformin         Hypothyroidism (Chronic)     Continue low dose levothyroxine  Recent TSH normal            Cardiovascular and Mediastinum    Coronary artery disease involving native coronary artery of native heart without angina pectoris (Chronic)     On ASA statin  Now also on Imdur   Continue metoprolol         Essential hypertension (Chronic)     Controlled on low dose lisinopril metoprolol         Atrial fibrillation (HCC)     Maintained on Eliquis         Ventricular tachycardia (HCC) - Primary     Taking amiodarone 200mg a day         Cardiomyopathy ([address] )       Other    Pacemaker    Hyperlipidemia (Chronic)     Tolerating high dose pravastatin         Urinary urgency     On My[facility]betriq [facility] [address] [facility]         Relevant Medications    MYRBETRIQ 50 MG TB24            Subjective:      Patient ID: Vicky Girard is a 80 y o  female  HPI  Here to go over medications  She was noted to have a run of VT on a recent pacemaker interrogation and last week was started on amiodarone  She is now on 200mg daily  She denies having any chest pain, palpitations  Recent TSH, creatinine, liver function tests are normal   Lipids a little better on pravastatin 80mg  She knows most of the meds she is taking  The following portions of the patient's history were reviewed and updated as appropriate: current medications, past family history, past medical history, past social history, past surgical history and problem list     Review of Systems   Constitutional: Negative for fatigue, fever and unexpected weight change  HENT: Positive for rhinorrhea  Negative for congestion, ear pain, hearing loss, postnasal drip, sinus pressure, sinus pain and sore throat  Respiratory: Positive for cough  Negative for shortness of breath and wheezing  Cardiovascular: Negative for chest pain, palpitations and leg swelling  Gastrointestinal: Negative for abdominal pain, constipation, diarrhea, nausea and vomiting  Genitourinary: Positive for urgency  Negative for dysuria  Musculoskeletal: Positive for arthralgias (generalized, florinda left shoulder) and neck pain  Neurological: Negative for dizziness and headaches  Objective:      /66   Pulse 82   Ht 4' 8" (1 422 m)   Wt 79 7 kg (175 lb 12 8 oz)   BMI 39 41 kg/m²          Physical Exam   Constitutional: She is oriented to person, place, and time  She appears well-developed and well-nourished  HENT:   Head: Normocephalic and atraumatic  Right Ear: External ear normal    Left Ear: External ear normal    Mouth/Throat: Oropharynx is clear and moist    Eyes: Conjunctivae are normal    Neck: Neck supple  Cardiovascular: Normal rate, regular rhythm and normal heart sounds  No murmur heard    Pulmonary/Chest: Effort normal and breath sounds normal  No respiratory distress  She has no wheezes  She has no rales  Abdominal: Soft  Bowel sounds are normal  She exhibits no distension and no mass  There is no tenderness  There is no rebound and no guarding  Musculoskeletal: Normal range of motion  Neurological: She is alert and oriented to person, place, and time  Skin: Skin is warm and dry  Psychiatric: She has a normal mood and affect   Her behavior is normal  Judgment and thought content normal

## 2019-08-15 NOTE — PATIENT INSTRUCTIONS
Continue amiodarone and levothyroxine  Make sure you are not taking Plavix (clopidogrel)  Check the medication list you were given today with what you are taking at home

## 2019-08-19 ENCOUNTER — OFFICE VISIT (OUTPATIENT)
Dept: CARDIOLOGY CLINIC | Facility: CLINIC | Age: 84
End: 2019-08-19
Payer: MEDICARE

## 2019-08-19 VITALS
OXYGEN SATURATION: 95 % | BODY MASS INDEX: 40.42 KG/M2 | HEART RATE: 80 BPM | SYSTOLIC BLOOD PRESSURE: 120 MMHG | DIASTOLIC BLOOD PRESSURE: 60 MMHG | WEIGHT: 179.7 LBS | HEIGHT: 56 IN

## 2019-08-19 DIAGNOSIS — I44.1 HEART BLOCK AV SECOND DEGREE: ICD-10-CM

## 2019-08-19 DIAGNOSIS — E78.2 MIXED HYPERLIPIDEMIA: Chronic | ICD-10-CM

## 2019-08-19 DIAGNOSIS — Z95.5 HISTORY OF PLACEMENT OF STENT IN LAD CORONARY ARTERY: Chronic | ICD-10-CM

## 2019-08-19 DIAGNOSIS — I48.0 PAROXYSMAL ATRIAL FIBRILLATION (HCC): ICD-10-CM

## 2019-08-19 DIAGNOSIS — I47.2 NON-SUSTAINED VENTRICULAR TACHYCARDIA (HCC): Primary | ICD-10-CM

## 2019-08-19 PROCEDURE — 99214 OFFICE O/P EST MOD 30 MIN: CPT | Performed by: NURSE PRACTITIONER

## 2019-08-19 PROCEDURE — 93000 ELECTROCARDIOGRAM COMPLETE: CPT | Performed by: NURSE PRACTITIONER

## 2019-08-19 PROCEDURE — 1123F ACP DISCUSS/DSCN MKR DOCD: CPT | Performed by: NURSE PRACTITIONER

## 2019-08-19 NOTE — PATIENT INSTRUCTIONS
2gm sodium low fat low cholesterol diet, eating fresh is best, fresh fruits, fresh vegetables lean protein

## 2019-08-19 NOTE — PROGRESS NOTES
Cardiology Follow Up    Beth ESCOBAR [facility]  7/24/1929  [id number]  [facility] [place]  [facility]  [address] [facility] [address] [phone]    Follow up office visit   Interval History:     On 8/07/19 device interrogation showed AP 18%,  99 4%  1 VT duration 22 sec at 7 am    4 VT episodes  Sasha Mini call and she stated she felt funny around the time of the VT episodes  Electrophysiology was notified  She was started on Amiodarone antiarrhythmic therapy to suppress episodes of VT  Due to age and comorbidities conservative medical management  She was started on amiodarone 200 mg t i d  For 1 week and then 200 mg daily  She was advised to take with food  CMP and TSH were ordered  Ms Riky Rolon presents our office for a follow-up visit  She is accompanied by her daughter  Her chief complaint is her arthritis pain  She admits to a poor memory  She denies chest pain palpitations lightheadedness or dizziness    Lab studies done on 8/13/19: sodium 133 potassium 4 2 BUN 19 creatinine 0 84 GFR 61 cholesterol 191 triglycerides 160 to HDL 57 , TSH 2 890 free T4 1 37  CAD         Stent to LAD  Nuclear stress test showed small amount of apical ischemia to LAD and started on Imdur  12/11/18 TTE LVEF 60%  Second degree AVB   12/10/18 sp Medtronic PPM  LBBB  Mixed HLD  Patient Active Problem List   Diagnosis    Coronary artery disease involving native coronary artery of native heart without angina pectoris    Essential hypertension    History of placement of stent in LAD coronary artery    Hyperlipidemia    History of atrial flutter    LBBB (left bundle branch block)    Right hip pain    Type 2 diabetes mellitus without complication, without long-term current use of insulin (HCC)    Hypothyroidism    Adhesive capsulitis    Chronic low back pain    Gait disturbance    Cervical spine degeneration    Primary osteoarthritis of both shoulders    Tremors of nervous system    Heart block AV second degree    Ambulatory dysfunction    Pacemaker    Atrial fibrillation (HCC)    Chest pain    Pain in both knees    Primary osteoarthritis of both knees    Trochanteric bursitis of right hip    Ventricular tachycardia (HCC)    Urinary urgency    Cardiomyopathy [facility])     Past Medical History:   Diagnosis Date    Abnormal nuclear stress test     last assessed 04/03/2017    Anxiety     Arthritis     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    Bundle branch block, left     Cardiomyopathy [facility]     Chronic pain     chronic b/l shoulder pain    Coronary artery disease     Diabetes mellitus ([facility] )     GERD (gastroesophageal reflux disease)     H/O atrial flutter     History of DVT (deep vein thrombosis)     History of pulmonary embolism     Hyperlipidemia     Hypertension     Hypothyroid     Renal calculi     Shortness of breath      Social History     Socioeconomic History    Marital status: /Civil Union     Spouse name: Not on file    Number of children: Not on file    Years of education: Not on file    Highest education level: Not on file   Occupational History    Not on file   Social Needs    Financial resource strain: Not on file    Food insecurity:     Worry: Not on file     Inability: Not on file    Transportation needs:     Medical: Not on file     Non-medical: Not on file   Tobacco Use    Smoking status: Never Smoker    Smokeless tobacco: Never Used   Substance and Sexual Activity    Alcohol use: Yes     Comment: occasional    Drug use: Yes     Types: Marijuana     Comment: MEDICAL MARIJUANA-HAS NOT USED FOR 3 WEEKS    Sexual activity: Not on file   Lifestyle    Physical activity:     Days per week: Not on file     Minutes per session: Not on file    Stress: Not on file   Relationships    Social connections:     Talks on phone: Not on file     Gets together: Not on file     Attends Rastafari service: Not on file     Active member of club or organization: Not on file     Attends meetings of clubs or organizations: Not on file     Relationship status: Not on file    Intimate partner violence:     Fear of current or ex partner: Not on file     Emotionally abused: Not on file     Physically abused: Not on file     Forced sexual activity: Not on file   Other Topics Concern    Not on file   Social History Narrative    Not on file      Family History   Problem Relation Age of Onset    Parkinsonism Sister     Cancer Sister         unknown type    Heart attack Neg Hx     Stroke Neg Hx     Anuerysm Neg Hx     Clotting disorder Neg Hx     Arrhythmia Neg Hx     Heart failure Neg Hx     Coronary artery disease Neg Hx      Past Surgical History:   Procedure Laterality Date    ATRIAL ABLATION SURGERY  01/2015    CARDIOVERSION      CHELA/DCCV 10/22/2014    CARPAL TUNNEL RELEASE Right     CATARACT EXTRACTION      CORONARY ANGIOPLASTY WITH STENT PLACEMENT      HYSTERECTOMY      JOINT REPLACEMENT Right     SD AMPUTATION TOE,MT-P JT Left 10/11/2017    Procedure: AMPUTATION SECOND TOE;  Surgeon: Dioni Brand DPM;  Location: [facility];  Service: Podiatry    TONSILLECTOMY      TOTAL HIP ARTHROPLASTY      Right       Current Outpatient Medications:     ACCU-CHEK RUBINA PLUS test strip, 1 each by Other route daily Use as instructed, Disp: 100 each, Rfl: 2    ACCU-CHEK SOFTCLIX LANCETS lancets, by Other route daily Use daily as instructed , Disp: 100 each, Rfl: 2    acetaminophen (TYLENOL) 500 mg tablet, Take 1 tablet (500 mg total) by mouth every 6 (six) hours as needed for mild pain or moderate pain, Disp: 30 tablet, Rfl: 0    amiodarone 200 mg tablet, Take 200 mg three times daily for one week, then take 200 mg daily thereafter (Patient taking differently: 200 mg daily Take 200 mg three times daily for one week, then take 200 mg daily thereafter), Disp: 60 tablet, Rfl: 1    apixaban (ELIQUIS) 5 mg, Take 1 tablet (5 mg total) by mouth 2 (two) times a day, Disp: 180 tablet, Rfl: 3    ASPIR-LOW 81 MG EC tablet, TAKE ONE TABLET BY MOUTH EVERY DAY, Disp: 90 tablet, Rfl: 3    Calcium Carbonate-Vitamin D (CALTRATE 600+D PO), Take 1 capsule by mouth 2 (two) times a day  , Disp: , Rfl:     isosorbide mononitrate (IMDUR) 30 mg 24 hr tablet, Take 1 tablet (30 mg total) by mouth daily, Disp: 90 tablet, Rfl: 3    levothyroxine 25 mcg tablet, TAKE ONE TABLET BY MOUTH EVERY DAY, Disp: 90 tablet, Rfl: 1    lisinopril (ZESTRIL) 2 5 mg tablet, Take 1 tablet (2 5 mg total) by mouth daily, Disp: 90 tablet, Rfl: 1    metoprolol succinate (TOPROL XL) 25 mg 24 hr tablet, Take 1 tablet (25 mg total) by mouth daily after dinner, Disp: 90 tablet, Rfl: 3    metroNIDAZOLE (METROCREAM) 0 75 % cream, Apply topically 2 (two) times a day, Disp: 45 g, Rfl: 0    Misc Natural Products (ENERGY FOCUS PO), Take 1 tablet by mouth 2 (two) times a day  , Disp: , Rfl:     multivitamin-iron-minerals-folic acid (CENTRUM) chewable tablet, Chew 1 tablet daily  , Disp: , Rfl:     MYRBETRIQ 50 MG TB24, Take 1 tablet (50 mg total) by mouth daily, Disp: , Rfl:     pravastatin (PRAVACHOL) 80 mg tablet, Take 1 tablet (80 mg total) by mouth daily at bedtime (Patient taking differently: Take 40 mg by mouth daily at bedtime ), Disp: 90 tablet, Rfl: 3  Allergies   Allergen Reactions    Atorvastatin      Reaction unknown 10/23/2018-    Other Rash     Patient sensitive to adhesives from tape       Labs:  Orders Only on 08/07/2019   Component Date Value    Free T4 08/13/2019 1 37      Imaging: No results found  Review of Systems:  Review of Systems   Musculoskeletal: Positive for arthralgias and gait problem  Neurological:        Poor memory        Physical Exam:  Physical Exam   Constitutional: She is oriented to person, place, and time  She appears well-developed     Elderly female    Eyes: Pupils are equal, round, and reactive to light  Neck: Normal range of motion  No JVD present  Cardiovascular: Normal rate, regular rhythm and normal heart sounds  Pulmonary/Chest: Effort normal and breath sounds normal    Abdominal: Soft  Bowel sounds are normal    Musculoskeletal: Normal range of motion  She exhibits no edema  Neurological: She is alert and oriented to person, place, and time  Skin: Skin is warm and dry  Capillary refill takes less than 2 seconds  Psychiatric: She has a normal mood and affect  Vitals reviewed  Discussion/Summary:  1  NSVT- placed on amiodarone 200mg TID for a week, now on Amiodarone 200mg daily,  Metoprolol succinate 25 mg daily  2  Paroxysmal atrial fibrillation-  Continue metoprolol succinate 25 mg daily,  Eliquis 5 mg b i d  For stroke prevention  3  Second Degree AVB sp Medtronic PPM   4  Mixed  Hyperlipidemia- 8/13/19  Cholesterol 191, triglycerides 162, HDL 57, -  Continue pravastatin  40 mg daily   5   Hx of Stent to LAD -  Continue metoprolol succinate 25 mg daily, aspirin 81 mg daily, Zestril 2 5 mg daily, metoprolol succinate 25 mg daily, pravastatin  40 mg daily

## 2019-08-20 ENCOUNTER — TELEPHONE (OUTPATIENT)
Dept: CARDIOLOGY CLINIC | Facility: CLINIC | Age: 84
End: 2019-08-20

## 2019-08-20 NOTE — TELEPHONE ENCOUNTER
Good afternoon you saw this patient yesterday  Which dose would you like patient to be on 40mgs or the 80mg of pravachol?

## 2019-08-20 NOTE — TELEPHONE ENCOUNTER
Patient would like clarification on how she should be taking her pravastatin (PRAVACHOL) 80 mg tablet    It is noted she is taking it differently than prescribed at 40 mg and that is how the pills are coming in 40 mg tablets but, she is concerned the instructions are for 80mg  Please clarify and advise if she should change from the 40mg to taking 2 of the 40 to total the 80mg       Thank you

## 2019-08-21 DIAGNOSIS — E78.2 MIXED HYPERLIPIDEMIA: Chronic | ICD-10-CM

## 2019-08-21 RX ORDER — PRAVASTATIN SODIUM 80 MG/1
80 TABLET ORAL
Qty: 30 TABLET | Refills: 11 | Status: SHIPPED | OUTPATIENT
Start: 2019-08-21 | End: 2020-08-17 | Stop reason: SDUPTHER

## 2019-08-27 ENCOUNTER — TRANSCRIBE ORDERS (OUTPATIENT)
Dept: ADMINISTRATIVE | Facility: HOSPITAL | Age: 84
End: 2019-08-27

## 2019-08-27 DIAGNOSIS — N31.9 NEUROGENIC DYSFUNCTION OF THE URINARY BLADDER: Primary | ICD-10-CM

## 2019-08-30 ENCOUNTER — HOSPITAL ENCOUNTER (OUTPATIENT)
Dept: ULTRASOUND IMAGING | Facility: HOSPITAL | Age: 84
Discharge: HOME | End: 2019-08-30
Payer: MEDICARE

## 2019-08-30 DIAGNOSIS — N31.9 NEUROGENIC DYSFUNCTION OF THE URINARY BLADDER: ICD-10-CM

## 2019-08-30 PROCEDURE — 51798 US URINE CAPACITY MEASURE: CPT

## 2019-09-11 DIAGNOSIS — E11.8 TYPE 2 DIABETES MELLITUS WITH COMPLICATION, WITHOUT LONG-TERM CURRENT USE OF INSULIN (HCC): ICD-10-CM

## 2019-09-13 RX ORDER — LANCETS
EACH MISCELLANEOUS DAILY
Qty: 100 EACH | Refills: 2 | Status: SHIPPED | OUTPATIENT
Start: 2019-09-13 | End: 2020-06-15

## 2019-09-23 DIAGNOSIS — I47.2 VENTRICULAR TACHYCARDIA (HCC): ICD-10-CM

## 2019-09-23 RX ORDER — AMIODARONE HYDROCHLORIDE 200 MG/1
200 TABLET ORAL DAILY
Qty: 30 TABLET | Refills: 1 | Status: SHIPPED | OUTPATIENT
Start: 2019-09-23 | End: 2020-03-12

## 2019-10-02 ENCOUNTER — OFFICE VISIT (OUTPATIENT)
Dept: CARDIOLOGY CLINIC | Facility: CLINIC | Age: 84
End: 2019-10-02
Payer: MEDICARE

## 2019-10-02 VITALS
BODY MASS INDEX: 40.72 KG/M2 | DIASTOLIC BLOOD PRESSURE: 66 MMHG | HEIGHT: 56 IN | WEIGHT: 181 LBS | HEART RATE: 72 BPM | SYSTOLIC BLOOD PRESSURE: 112 MMHG

## 2019-10-02 DIAGNOSIS — I25.10 CORONARY ARTERY DISEASE INVOLVING NATIVE CORONARY ARTERY OF NATIVE HEART WITHOUT ANGINA PECTORIS: Chronic | ICD-10-CM

## 2019-10-02 DIAGNOSIS — I10 ESSENTIAL HYPERTENSION: Chronic | ICD-10-CM

## 2019-10-02 DIAGNOSIS — I44.1 HEART BLOCK AV SECOND DEGREE: ICD-10-CM

## 2019-10-02 DIAGNOSIS — E78.2 MIXED HYPERLIPIDEMIA: Chronic | ICD-10-CM

## 2019-10-02 DIAGNOSIS — I42.9 CARDIOMYOPATHY, UNSPECIFIED TYPE (HCC): ICD-10-CM

## 2019-10-02 DIAGNOSIS — Z95.5 HISTORY OF PLACEMENT OF STENT IN LAD CORONARY ARTERY: Chronic | ICD-10-CM

## 2019-10-02 DIAGNOSIS — I47.2 VENTRICULAR TACHYCARDIA (HCC): ICD-10-CM

## 2019-10-02 DIAGNOSIS — I44.7 LBBB (LEFT BUNDLE BRANCH BLOCK): ICD-10-CM

## 2019-10-02 DIAGNOSIS — I48.0 PAROXYSMAL ATRIAL FIBRILLATION (HCC): Primary | ICD-10-CM

## 2019-10-02 DIAGNOSIS — Z86.79 HISTORY OF ATRIAL FLUTTER: Chronic | ICD-10-CM

## 2019-10-02 PROCEDURE — 99215 OFFICE O/P EST HI 40 MIN: CPT | Performed by: INTERNAL MEDICINE

## 2019-10-02 RX ORDER — WARFARIN SODIUM 2.5 MG/1
2.5 TABLET ORAL
Qty: 135 TABLET | Refills: 3 | Status: SHIPPED | OUTPATIENT
Start: 2019-10-02 | End: 2020-08-14 | Stop reason: SDUPTHER

## 2019-10-02 NOTE — PROGRESS NOTES
[facility]  Follow up note  Rosie Bustillos 80 y o  female MRN: [id number]        Problems    1  Paroxysmal atrial fibrillation (HCC)     2  Cardiomyopathy, unspecified type ([address] )     3  Coronary artery disease involving native coronary artery of native heart without angina pectoris     4  Heart block AV second degree     5  Mixed hyperlipidemia     6  LBBB (left bundle branch block)     7  Ventricular tachycardia ([address] )     8  Essential hypertension     9  History of placement of stent in LAD coronary artery     10  History of atrial flutter         Impression:    Rosana Oneil follows up with me at the [place] office  Hypertension  · Low normal, but no further lightheadedness since prior visit    Hyperlipidemia  · Intolerance to high-intensity statin therapy due to myalgias  · Pravastatin was increased to 80 mg, tolerated,     CAD  · Remote history of LAD stent  · Baseline left bundle branch block    History of atrial flutter  · Status post ablation, with recovered tachyarrhythmia related cardiomyopathy    PAF  · Not symptomatic, rare episodes per device check  · Continues on Eliquis, but issues with cost    Ventricular tachycardia  · Frequent nonsustained noted on pacemaker checks, amiodarone was started for rhythm suppression considering age of 80    2nd degree heart block  · S/p PPM 12/18 due to syncope  · Normal device check 8/19 with arrhythmias as above        Plan:    Six-month follow-up  Continue Imdur  Would like to switch to Coumadin, ordered INRs, discussed with the [facility], will need home draws considering age and debility      HPI:   Rosie Bustillos is a 80y o  year old female with a history of coronary artery disease, lad stent, PAF, history of atrial flutter status post ablation, second-degree heart block resulting in syncope with placement of a permanent pacemaker December 2018, ambulatory dysfunction, hypertension, hyperlipidemia presents for a follow-up visit  Device checks have found rare episodes of AFib and occasional episodes of nonsustained VT  Amiodarone was started about a month ago  She tolerates that well, new device take due in October    No lightheadedness, no syncope    She denies any chest pain, she was in the ER for chest pain/epigastric pain 3/19, supposedly small apical ischemia and started on Imdur  She continues to have ambulatory dysfunction, she is with a walker today, but usually with a cane    Rare episodes of AFib per the device check, takes Eliquis, issues with cost currently, and would like to consider alternative    Lipids are still mildly uncontrolled, but not tolerant of Crestor or Lipitor, and pravastatin was increased to 80 mg  Review of Systems   Constitutional: Negative for appetite change, diaphoresis, fatigue and fever  Respiratory: Negative for chest tightness, shortness of breath and wheezing  Cardiovascular: Negative for chest pain, palpitations and leg swelling  Gastrointestinal: Negative for abdominal pain and blood in stool  Musculoskeletal: Positive for arthralgias and gait problem  Negative for joint swelling  Skin: Negative for rash  Neurological: Negative for dizziness, syncope and light-headedness           Past Medical History:   Diagnosis Date    Abnormal nuclear stress test     last assessed 04/03/2017    Anxiety     Arthritis     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    Bundle branch block, left     Cardiomy[facility] [address] )     Chronic pain     chronic b/l shoulder pain    Coronary artery disease     Diabetes mellitus ([facility] )     GERD (gastroesophageal reflux disease)     H/O atrial flutter     History of DVT (deep vein thrombosis)     History of pulmonary embolism     Hyperlipidemia     Hypertension     Hypothyroid     Renal calculi     Shortness of breath      Social History     Substance and Sexual Activity   Alcohol Use Yes    Comment: occasional     Social History     Substance and Sexual Activity   Drug Use Yes    Types: Marijuana    Comment: MEDICAL MARIJUANA-HAS NOT USED FOR 3 WEEKS     Social History     Tobacco Use   Smoking Status Never Smoker   Smokeless Tobacco Never Used       Allergies:   Allergies   Allergen Reactions    Atorvastatin      Reaction unknown 10/23/2018-    Other Rash     Patient sensitive to adhesives from tape       Medications:     Current Outpatient Medications:     ACCU-CHEK RUBINA PLUS test strip, 1 each by Other route daily Use as instructed, Disp: 100 each, Rfl: 2    ACCU-CHEK SOFTCLIX LANCETS lancets, by Other route daily Use daily as instructed , Disp: 100 each, Rfl: 2    acetaminophen (TYLENOL) 500 mg tablet, Take 1 tablet (500 mg total) by mouth every 6 (six) hours as needed for mild pain or moderate pain, Disp: 30 tablet, Rfl: 0    amiodarone 200 mg tablet, Take 1 tablet (200 mg total) by mouth daily TAKE ONE TABLET BY MOUTH DAILY, Disp: 30 tablet, Rfl: 1    apixaban (ELIQUIS) 5 mg, Take 1 tablet (5 mg total) by mouth 2 (two) times a day, Disp: 180 tablet, Rfl: 3    ASPIR-LOW 81 MG EC tablet, TAKE ONE TABLET BY MOUTH EVERY DAY, Disp: 90 tablet, Rfl: 3    Calcium Carbonate-Vitamin D (CALTRATE 600+D PO), Take 1 capsule by mouth 2 (two) times a day  , Disp: , Rfl:     isosorbide mononitrate (IMDUR) 30 mg 24 hr tablet, Take 1 tablet (30 mg total) by mouth daily, Disp: 90 tablet, Rfl: 3    levothyroxine 25 mcg tablet, TAKE ONE TABLET BY MOUTH EVERY DAY, Disp: 90 tablet, Rfl: 1    lisinopril (ZESTRIL) 2 5 mg tablet, Take 1 tablet (2 5 mg total) by mouth daily, Disp: 90 tablet, Rfl: 1    metoprolol succinate (TOPROL XL) 25 mg 24 hr tablet, Take 1 tablet (25 mg total) by mouth daily after dinner, Disp: 90 tablet, Rfl: 3    metroNIDAZOLE (METROCREAM) 0 75 % cream, Apply topically 2 (two) times a day, Disp: 45 g, Rfl: 0    Misc Natural Products (ENERGY FOCUS PO), Take 1 tablet by mouth 2 (two) times a day  , Disp: , Rfl:    multivitamin-iron-minerals-folic acid (CENTRUM) chewable tablet, Chew 1 tablet daily  , Disp: , Rfl:     MYRBETRIQ 50 MG TB24, Take 1 tablet (50 mg total) by mouth daily, Disp: , Rfl:     pravastatin (PRAVACHOL) 80 mg tablet, Take 1 tablet (80 mg total) by mouth daily at bedtime, Disp: 30 tablet, Rfl: 11      Vitals:    10/02/19 1024   BP: 112/66   Pulse: 72     Weight (last 2 days)     Date/Time   Weight    10/02/19 1024   82 1 (181)            Physical Exam   Constitutional: No distress  HENT:   Head: Normocephalic and atraumatic  Eyes: Conjunctivae are normal  No scleral icterus  Neck: Normal range of motion  No JVD present  Cardiovascular: Normal rate, regular rhythm, normal heart sounds and intact distal pulses  No murmur heard  Pulmonary/Chest: Effort normal and breath sounds normal  No respiratory distress  She has no wheezes  She has no rales  Musculoskeletal: She exhibits no edema or tenderness  Skin: Skin is warm and dry  She is not diaphoretic           Laboratory Studies:  Lab Results   Component Value Date    HGBA1C 5 6 08/13/2019    HGBA1C 6 0 01/30/2019    HGBA1C 5 7 09/07/2018     (L) 06/23/2015     05/21/2015     (L) 05/16/2015    K 4 2 08/13/2019    K 4 1 03/10/2019    K 4 0 03/09/2019    K 4 3 06/23/2015    K 3 6 05/21/2015    K 4 7 05/16/2015    CL 98 (L) 08/13/2019     03/10/2019     03/09/2019    CL 98 (L) 06/23/2015    CL 93 (L) 05/21/2015    CL 98 (L) 05/16/2015    CO2 31 08/13/2019    CO2 26 03/10/2019    CO2 31 03/09/2019    CO2 28 06/23/2015    CO2 37 (H) 05/21/2015    CO2 28 05/16/2015    GLUCOSE 90 06/23/2015    GLUCOSE 146 (H) 05/21/2015    GLUCOSE 132 05/16/2015    CREATININE 0 84 08/13/2019    CREATININE 0 71 03/10/2019    CREATININE 0 66 03/09/2019    CREATININE 0 68 06/23/2015    CREATININE 0 91 05/21/2015    CREATININE 0 75 05/16/2015    BUN 19 08/13/2019    BUN 16 03/10/2019    BUN 20 03/09/2019    BUN 16 06/23/2015    BUN 20 05/21/2015 BUN 18 05/16/2015    MG 2 0 12/11/2018    MG 2 3 12/10/2018    MG 1 8 12/10/2018    MG 2 1 05/16/2015    MG 1 9 05/15/2015    MG 1 7 05/14/2015     Lab Results   Component Value Date    WBC 5 82 08/13/2019    WBC 4 85 06/23/2015    RBC 4 07 08/13/2019    RBC 4 00 06/23/2015    HGB 13 0 08/13/2019    HGB 11 4 (L) 06/23/2015    HCT 39 5 08/13/2019    HCT 34 9 06/23/2015    MCV 97 08/13/2019    MCV 87 06/23/2015    MCH 31 9 08/13/2019    MCH 28 5 06/23/2015    RDW 12 5 08/13/2019    RDW 17 0 (H) 06/23/2015     08/13/2019     06/23/2015     NT-proBNP: No results for input(s): NTBNP in the last 72 hours  Coags:    Lipid Profile:   Lab Results   Component Value Date    CHOL 210 11/24/2015     Lab Results   Component Value Date    HDL 57 08/13/2019     Lab Results   Component Value Date    LDLCALC 102 (H) 08/13/2019     Lab Results   Component Value Date    TRIG 162 (H) 08/13/2019       Cardiac testing:   EKG reviewed personally:     Stress Myoview 3/19-EF normal, small amount of apical ischemia  Catheterization 2017-LAD stent placed  Device check 4/15/2019-normal device function, 6 seconds of AFib      Tyrone Rey MD    Portions of the record may have been created with voice recognition software   Occasional wrong word or "sound a like" substitutions may have occurred due to the inherent limitations of voice recognition software   Read the chart carefully and recognize, using context, where substitutions have occurred

## 2019-10-15 ENCOUNTER — OFFICE VISIT (OUTPATIENT)
Dept: OBGYN CLINIC | Facility: HOSPITAL | Age: 84
End: 2019-10-15
Payer: MEDICARE

## 2019-10-15 VITALS
HEART RATE: 71 BPM | WEIGHT: 181 LBS | HEIGHT: 56 IN | DIASTOLIC BLOOD PRESSURE: 72 MMHG | BODY MASS INDEX: 40.72 KG/M2 | SYSTOLIC BLOOD PRESSURE: 115 MMHG

## 2019-10-15 DIAGNOSIS — G89.29 CHRONIC PAIN OF LEFT KNEE: Primary | ICD-10-CM

## 2019-10-15 DIAGNOSIS — M17.11 PRIMARY OSTEOARTHRITIS OF RIGHT KNEE: ICD-10-CM

## 2019-10-15 DIAGNOSIS — G89.29 CHRONIC PAIN OF RIGHT KNEE: ICD-10-CM

## 2019-10-15 DIAGNOSIS — M70.61 TROCHANTERIC BURSITIS OF RIGHT HIP: ICD-10-CM

## 2019-10-15 DIAGNOSIS — M17.12 PRIMARY OSTEOARTHRITIS OF LEFT KNEE: ICD-10-CM

## 2019-10-15 DIAGNOSIS — M25.562 CHRONIC PAIN OF LEFT KNEE: Primary | ICD-10-CM

## 2019-10-15 DIAGNOSIS — M25.462 EFFUSION OF LEFT KNEE: ICD-10-CM

## 2019-10-15 DIAGNOSIS — M25.561 CHRONIC PAIN OF RIGHT KNEE: ICD-10-CM

## 2019-10-15 PROCEDURE — 20610 DRAIN/INJ JOINT/BURSA W/O US: CPT | Performed by: ORTHOPAEDIC SURGERY

## 2019-10-15 PROCEDURE — 99213 OFFICE O/P EST LOW 20 MIN: CPT | Performed by: ORTHOPAEDIC SURGERY

## 2019-10-15 RX ORDER — BETAMETHASONE SODIUM PHOSPHATE AND BETAMETHASONE ACETATE 3; 3 MG/ML; MG/ML
12 INJECTION, SUSPENSION INTRA-ARTICULAR; INTRALESIONAL; INTRAMUSCULAR; SOFT TISSUE
Status: COMPLETED | OUTPATIENT
Start: 2019-10-15 | End: 2019-10-15

## 2019-10-15 RX ORDER — LIDOCAINE HYDROCHLORIDE 10 MG/ML
2 INJECTION, SOLUTION INFILTRATION; PERINEURAL
Status: COMPLETED | OUTPATIENT
Start: 2019-10-15 | End: 2019-10-15

## 2019-10-15 RX ORDER — BUPIVACAINE HYDROCHLORIDE 2.5 MG/ML
2 INJECTION, SOLUTION INFILTRATION; PERINEURAL
Status: COMPLETED | OUTPATIENT
Start: 2019-10-15 | End: 2019-10-15

## 2019-10-15 RX ADMIN — LIDOCAINE HYDROCHLORIDE 2 ML: 10 INJECTION, SOLUTION INFILTRATION; PERINEURAL at 10:50

## 2019-10-15 RX ADMIN — BUPIVACAINE HYDROCHLORIDE 2 ML: 2.5 INJECTION, SOLUTION INFILTRATION; PERINEURAL at 10:49

## 2019-10-15 RX ADMIN — BUPIVACAINE HYDROCHLORIDE 2 ML: 2.5 INJECTION, SOLUTION INFILTRATION; PERINEURAL at 10:50

## 2019-10-15 RX ADMIN — LIDOCAINE HYDROCHLORIDE 2 ML: 10 INJECTION, SOLUTION INFILTRATION; PERINEURAL at 10:49

## 2019-10-15 RX ADMIN — BETAMETHASONE SODIUM PHOSPHATE AND BETAMETHASONE ACETATE 12 MG: 3; 3 INJECTION, SUSPENSION INTRA-ARTICULAR; INTRALESIONAL; INTRAMUSCULAR; SOFT TISSUE at 10:50

## 2019-10-15 RX ADMIN — BETAMETHASONE SODIUM PHOSPHATE AND BETAMETHASONE ACETATE 12 MG: 3; 3 INJECTION, SUSPENSION INTRA-ARTICULAR; INTRALESIONAL; INTRAMUSCULAR; SOFT TISSUE at 10:49

## 2019-10-15 NOTE — PROGRESS NOTES
Assessment:   Diagnosis ICD-10-CM Associated Orders   1  Chronic pain of left knee M25 562 Large joint arthrocentesis: L knee    G89 29    2  Primary osteoarthritis of left knee M17 12 Large joint arthrocentesis: L knee   3  Chronic pain of right knee M25 561 Large joint arthrocentesis: R knee    G89 29    4  Primary osteoarthritis of right knee M17 11 Large joint arthrocentesis: R knee   5  Trochanteric bursitis of right hip M70 61 Large joint arthrocentesis: R greater trochanteric bursa   6  Effusion of left knee M25 462 Large joint arthrocentesis: L knee       Plan:  Diagnosis, treatment options and associated risks were discussed with the patient including no treatment, nonsurgical treatment and potential for surgical intervention  The patient was given the opportunity to ask questions regarding each  Patient wishes to avoid surgical intervention  Patient was offered, accepted, performed injections x3 to her left knee, right knee, and right trochanteric bursal area  Her left knee was aspirated prior to the injection of cortisone  She tolerated all 3 injections well  Ice and post injection protocol advised  Weightbearing activities as tolerated  To do next visit:  Return in about 3 months (around 1/15/2020) for re-check  The above stated was discussed in layman's terms and the patient expressed understanding  All questions were answered to the patient's satisfaction  Scribe Attestation    I,:   Neena Guardado am acting as a scribe while in the presence of the attending physician :        I,:   Vena Blizzard, MD personally performed the services described in this documentation    as scribed in my presence :              Subjective:   Emerald Arzola is a 80 y o  female who presents repeat evaluation of her bilateral knees and pain laterally at her right hip  She responded favorably to previous cortisone injections at all 3 areas    She returns today with use of a single-point cane for ambulatory assistance  She cannot lay on her right side due to pain laterally at her right hip at night  She has increased knee pain with weight-bearing activities  She does find that her knees primarily her left will buckle underneath her at times        Review of systems negative unless otherwise specified in HPI    Past Medical History:   Diagnosis Date    Abnormal nuclear stress test     last assessed 04/03/2017    Anxiety     Arthritis     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    Bundle branch block, left     Cardiomyopathy ([facility] [address] [facility]     Chronic pain     chronic b/l shoulder pain    Coronary artery disease     Diabetes mellitus ([facility] [address] [facility]     GERD (gastroesophageal reflux disease)     H/O atrial flutter     History of DVT (deep vein thrombosis)     History of pulmonary embolism     Hyperlipidemia     Hypertension     Hypothyroid     Renal calculi     Shortness of breath        Past Surgical History:   Procedure Laterality Date    ATRIAL ABLATION SURGERY  01/2015    CARDIOVERSION      CHELA/DCCV 10/22/2014    CARPAL TUNNEL RELEASE Right     CATARACT EXTRACTION      CORONARY ANGIOPLASTY WITH STENT PLACEMENT      HYSTERECTOMY      JOINT REPLACEMENT Right     FL AMPUTATION TOE,MT-P JT Left 10/11/2017    Procedure: AMPUTATION SECOND TOE;  Surgeon: Abelardo Thomas DPM;  [facility];  Service: Podiatry    TONSILLECTOMY      TOTAL HIP ARTHROPLASTY      Right       Family History   Problem Relation Age of Onset    Parkinsonism Sister     Cancer Sister         unknown type    Heart attack Neg Hx     Stroke Neg Hx     Anuerysm Neg Hx     Clotting disorder Neg Hx     Arrhythmia Neg Hx     Heart failure Neg Hx     Coronary artery disease Neg Hx        Social History     Occupational History    Not on file   Tobacco Use    Smoking status: Never Smoker    Smokeless tobacco: Never Used   Substance and Sexual Activity    Alcohol use: Yes     Comment: occasional    Drug use: Yes     Types: Marijuana     Comment: MEDICAL MARIJUANA-HAS NOT USED FOR 3 WEEKS    Sexual activity: Not on file         Current Outpatient Medications:     ACCU-CHEK RUBINA PLUS test strip, 1 each by Other route daily Use as instructed, Disp: 100 each, Rfl: 2    ACCU-CHEK SOFTCLIX LANCETS lancets, by Other route daily Use daily as instructed , Disp: 100 each, Rfl: 2    acetaminophen (TYLENOL) 500 mg tablet, Take 1 tablet (500 mg total) by mouth every 6 (six) hours as needed for mild pain or moderate pain, Disp: 30 tablet, Rfl: 0    amiodarone 200 mg tablet, Take 1 tablet (200 mg total) by mouth daily TAKE ONE TABLET BY MOUTH DAILY, Disp: 30 tablet, Rfl: 1    ASPIR-LOW 81 MG EC tablet, TAKE ONE TABLET BY MOUTH EVERY DAY, Disp: 90 tablet, Rfl: 3    Calcium Carbonate-Vitamin D (CALTRATE 600+D PO), Take 1 capsule by mouth 2 (two) times a day  , Disp: , Rfl:     isosorbide mononitrate (IMDUR) 30 mg 24 hr tablet, Take 1 tablet (30 mg total) by mouth daily, Disp: 90 tablet, Rfl: 3    levothyroxine 25 mcg tablet, TAKE ONE TABLET BY MOUTH EVERY DAY, Disp: 90 tablet, Rfl: 1    lisinopril (ZESTRIL) 2 5 mg tablet, Take 1 tablet (2 5 mg total) by mouth daily, Disp: 90 tablet, Rfl: 1    metoprolol succinate (TOPROL XL) 25 mg 24 hr tablet, Take 1 tablet (25 mg total) by mouth daily after dinner, Disp: 90 tablet, Rfl: 3    metroNIDAZOLE (METROCREAM) 0 75 % cream, Apply topically 2 (two) times a day, Disp: 45 g, Rfl: 0    Misc Natural Products (ENERGY FOCUS PO), Take 1 tablet by mouth 2 (two) times a day  , Disp: , Rfl:     multivitamin-iron-minerals-folic acid (CENTRUM) chewable tablet, Chew 1 tablet daily  , Disp: , Rfl:     MYRBETRIQ 50 MG TB24, Take 1 tablet (50 mg total) by mouth daily, Disp: , Rfl:     pravastatin (PRAVACHOL) 80 mg tablet, Take 1 tablet (80 mg total) by mouth daily at bedtime, Disp: 30 tablet, Rfl: 11    warfarin (COUMADIN) 2 5 mg tablet, Take 1 tablet (2 5 mg total) by mouth daily, Disp: 135 tablet, Rfl: 3    Allergies   Allergen Reactions    Atorvastatin      Reaction unknown 10/23/2018-    Other Rash     Patient sensitive to adhesives from tape            Vitals:    10/15/19 1018   BP: 115/72   Pulse: 71       Objective:                    Right Knee Exam     Tenderness   The patient is experiencing tenderness in the medial joint line and lateral joint line  Range of Motion   Extension: 5   Flexion: 110       Left Knee Exam     Tenderness   The patient is experiencing tenderness in the medial joint line and lateral joint line  Range of Motion   Extension: 10   Flexion: 110     Comments: Both knees are in mild varus alignment with bony enlargement medially  There is mild laxity but no gross instability at either knee  Intact extensor mechanism  Mild quadriceps weakness at both eyes  Right Hip Exam     Tenderness   The patient is experiencing tenderness in the greater trochanter  Other   Erythema: absent  Sensation: normal    Comments:    Fairly good passive range of motion all planes without recreating her lateral hip pain  No recreate a bowl groin pain with passive internal external rotation    Fairly good strength            Diagnostics, reviewed and taken today if performed as documented:    None performed        Procedures, if performed today:    Large joint arthrocentesis: L knee  Date/Time: 10/15/2019 10:49 AM  Consent given by: patient  Site marked: site marked  Supporting Documentation  Indications: pain   Procedure Details  Location: knee - L knee  Preparation: Patient was prepped and draped in the usual sterile fashion  Needle size: 22 G  Ultrasound guidance: no  Approach: lateral  Medications administered: 12 mg betamethasone acetate-betamethasone sodium phosphate 6 (3-3) mg/mL; 2 mL bupivacaine 0 25 %; 2 mL lidocaine 1 %    Aspirate amount: 35 mL  Aspirate: blood-tinged and clear    Patient tolerance: patient tolerated the procedure well with no immediate complications  Dressing:  Sterile dressing applied    Large joint arthrocentesis: R knee  Date/Time: 10/15/2019 10:49 AM  Consent given by: patient  Site marked: site marked  Supporting Documentation  Indications: pain   Procedure Details  Location: knee - R knee  Preparation: Patient was prepped and draped in the usual sterile fashion  Needle size: 22 G  Ultrasound guidance: no  Approach: medial  Medications administered: 12 mg betamethasone acetate-betamethasone sodium phosphate 6 (3-3) mg/mL; 2 mL bupivacaine 0 25 %; 2 mL lidocaine 1 %    Patient tolerance: patient tolerated the procedure well with no immediate complications  Dressing:  Sterile dressing applied    Large joint arthrocentesis: R greater trochanteric bursa  Date/Time: 10/15/2019 10:50 AM  Consent given by: patient  Site marked: site marked  Supporting Documentation  Indications: pain   Procedure Details  Location: hip - R greater trochanteric bursa  Preparation: Patient was prepped and draped in the usual sterile fashion  Needle size: 22 G  Ultrasound guidance: no  Approach: lateral  Medications administered: 12 mg betamethasone acetate-betamethasone sodium phosphate 6 (3-3) mg/mL; 2 mL bupivacaine 0 25 %; 2 mL lidocaine 1 %    Patient tolerance: patient tolerated the procedure well with no immediate complications  Dressing:  Sterile dressing applied            Portions of the record may have been created with voice recognition software  Occasional wrong word or "sound a like" substitutions may have occurred due to the inherent limitations of voice recognition software  Read the chart carefully and recognize, using context, where substitutions have occurred

## 2019-10-18 DIAGNOSIS — Z12.31 ENCOUNTER FOR SCREENING MAMMOGRAM FOR BREAST CANCER: ICD-10-CM

## 2019-10-30 ENCOUNTER — REMOTE DEVICE CLINIC VISIT (OUTPATIENT)
Dept: CARDIOLOGY CLINIC | Facility: CLINIC | Age: 84
End: 2019-10-30
Payer: MEDICARE

## 2019-10-30 DIAGNOSIS — Z95.0 CARDIAC PACEMAKER IN SITU: Primary | ICD-10-CM

## 2019-10-30 PROCEDURE — 93294 REM INTERROG EVL PM/LDLS PM: CPT | Performed by: INTERNAL MEDICINE

## 2019-10-30 PROCEDURE — 93296 REM INTERROG EVL PM/IDS: CPT | Performed by: INTERNAL MEDICINE

## 2019-10-30 NOTE — PROGRESS NOTES
Results for orders placed or performed in visit on 10/30/19   Cardiac EP device report    Narrative    MDT DUAL PM  CARELINK TRANSMISSION: BATTERY VOLTAGE ADEQUATE (11 5 YRS)  AP-37%, >99% (DEPENDENT/CHB)  ALL AVAILABLE LEAD PARAMETERS WITHIN NORMAL LIMITS  NO SIGNIFICANT HIGH RATE EPISODES  NORMAL DEVICE FUNCTION   GV

## 2019-11-10 DIAGNOSIS — E03.9 HYPOTHYROIDISM, UNSPECIFIED TYPE: ICD-10-CM

## 2019-11-11 RX ORDER — LEVOTHYROXINE SODIUM 0.03 MG/1
TABLET ORAL
Qty: 90 TABLET | Refills: 1 | Status: SHIPPED | OUTPATIENT
Start: 2019-11-11 | End: 2020-05-13

## 2019-11-15 ENCOUNTER — OFFICE VISIT (OUTPATIENT)
Dept: INTERNAL MEDICINE CLINIC | Facility: CLINIC | Age: 84
End: 2019-11-15
Payer: MEDICARE

## 2019-11-15 VITALS
OXYGEN SATURATION: 94 % | SYSTOLIC BLOOD PRESSURE: 132 MMHG | HEART RATE: 90 BPM | BODY MASS INDEX: 39.63 KG/M2 | WEIGHT: 176.2 LBS | DIASTOLIC BLOOD PRESSURE: 74 MMHG | HEIGHT: 56 IN

## 2019-11-15 DIAGNOSIS — E11.9 TYPE 2 DIABETES MELLITUS WITHOUT COMPLICATION, WITHOUT LONG-TERM CURRENT USE OF INSULIN (HCC): Chronic | ICD-10-CM

## 2019-11-15 DIAGNOSIS — I25.10 CORONARY ARTERY DISEASE INVOLVING NATIVE CORONARY ARTERY OF NATIVE HEART WITHOUT ANGINA PECTORIS: Chronic | ICD-10-CM

## 2019-11-15 DIAGNOSIS — R35.0 URINARY FREQUENCY: ICD-10-CM

## 2019-11-15 DIAGNOSIS — M17.0 PRIMARY OSTEOARTHRITIS OF BOTH KNEES: ICD-10-CM

## 2019-11-15 DIAGNOSIS — E11.8 TYPE 2 DIABETES MELLITUS WITH COMPLICATION, WITHOUT LONG-TERM CURRENT USE OF INSULIN (HCC): Primary | ICD-10-CM

## 2019-11-15 DIAGNOSIS — E03.9 HYPOTHYROIDISM, UNSPECIFIED TYPE: Chronic | ICD-10-CM

## 2019-11-15 DIAGNOSIS — I10 ESSENTIAL HYPERTENSION: Chronic | ICD-10-CM

## 2019-11-15 DIAGNOSIS — I48.0 PAROXYSMAL ATRIAL FIBRILLATION (HCC): ICD-10-CM

## 2019-11-15 LAB
SL AMB  POCT GLUCOSE, UA: NEGATIVE
SL AMB LEUKOCYTE ESTERASE,UA: NEGATIVE
SL AMB POCT BILIRUBIN,UA: NEGATIVE
SL AMB POCT BLOOD,UA: NEGATIVE
SL AMB POCT CLARITY,UA: NORMAL
SL AMB POCT COLOR,UA: YELLOW
SL AMB POCT KETONES,UA: NEGATIVE
SL AMB POCT NITRITE,UA: NEGATIVE
SL AMB POCT PH,UA: 6
SL AMB POCT SPECIFIC GRAVITY,UA: 1.02
SL AMB POCT URINE PROTEIN: NEGATIVE
SL AMB POCT UROBILINOGEN: NEGATIVE

## 2019-11-15 PROCEDURE — 81003 URINALYSIS AUTO W/O SCOPE: CPT | Performed by: INTERNAL MEDICINE

## 2019-11-15 PROCEDURE — 99214 OFFICE O/P EST MOD 30 MIN: CPT | Performed by: INTERNAL MEDICINE

## 2019-11-15 NOTE — PROGRESS NOTES
Assessment/Plan:  Continue current medications  Continue with Tylenol OTC   She cannot take NSAIDs  Discussed prescription pain meds but she declined  UA was clean, no UTI  Coumadin will be started and she will get home draws-obtain labs before next visit     Problem List Items Addressed This Visit        Endocrine    Type 2 diabetes mellitus without complication, without long-term current use of insulin (HCC) (Chronic)    Relevant Orders    Comprehensive metabolic panel    CBC    Hemoglobin A1C    Hypothyroidism (Chronic)    Relevant Orders    TSH, 3rd generation with Free T4 reflex       Cardiovascular and Mediastinum    Coronary artery disease involving native coronary artery of native heart without angina pectoris (Chronic)    Essential hypertension (Chronic)    Relevant Orders    Comprehensive metabolic panel    CBC    Atrial fibrillation (HCC)       Other    Primary osteoarthritis of both knees      Other Visit Diagnoses     Type 2 diabetes mellitus with complication, without long-term current use of insulin (HCC)    -  Primary    Urinary frequency        Relevant Orders    POCT urine dip auto non-scope (Completed)            Subjective:      Patient ID: Fátima Espinoza is a 80 y o  female  HPI  Here for a follow up  C/o severe generalized arthritis and taking Tylenol Extra Strength 2 tabs PO 3-4 times a day, not much relief from it  Considering Tylenol Arthritis  She will be switching to coumadin (from Eliquis bec of cost)  Urinary frequency for few week without dysuria  She sees Dr Pooja Gracia has prescribed Myrbetriq which did not help  Ongoing dental work    The following portions of the patient's history were reviewed and updated as appropriate: allergies, current medications, past family history, past medical history, past social history and problem list     Review of Systems   Constitutional: Positive for fatigue  Negative for chills, fever and unexpected weight change     HENT: Negative for congestion, rhinorrhea and sore throat  Respiratory: Negative for cough, shortness of breath and wheezing  Cardiovascular: Negative for chest pain, palpitations and leg swelling  Gastrointestinal: Negative for abdominal pain, constipation, diarrhea, nausea and vomiting  Genitourinary: Positive for frequency  Negative for difficulty urinating and dysuria  Musculoskeletal: Positive for arthralgias  Neurological: Negative for dizziness and headaches  Objective:      /74   Pulse 90   Ht 4' 8" (1 422 m)   Wt 79 9 kg (176 lb 3 2 oz)   SpO2 94%   BMI 39 50 kg/m²          Physical Exam   Constitutional: She is oriented to person, place, and time  She appears well-developed and well-nourished  HENT:   Head: Normocephalic and atraumatic  Eyes: Conjunctivae are normal    Neck: Neck supple  Cardiovascular: Normal rate  An irregularly irregular rhythm present  Pulses are no weak pulses  Murmur heard  Systolic murmur is present with a grade of 2/6  Pulses:       Dorsalis pedis pulses are 2+ on the right side, and 2+ on the left side  Pulmonary/Chest: Effort normal and breath sounds normal  No respiratory distress  She has no wheezes  She has no rales  Feet:   Right Foot:   Skin Integrity: Negative for ulcer, skin breakdown, erythema, warmth, callus or dry skin  Left Foot:   Skin Integrity: Negative for ulcer, skin breakdown, erythema, warmth, callus or dry skin  Neurological: She is alert and oriented to person, place, and time  Skin: Skin is warm and dry  Psychiatric: She has a normal mood and affect  Her behavior is normal  Judgment and thought content normal      Patient's shoes and socks removed  Right Foot/Ankle   Right Foot Inspection  Skin Exam: skin normal and skin intact no dry skin, no warmth, no callus, no erythema, no maceration, no abnormal color, no pre-ulcer, no ulcer and no callus                          Toe Exam: right toe deformity (hammertoe second)  Sensory       Monofilament testing: absent  Vascular    The right DP pulse is 2+  Left Foot/Ankle  Left Foot Inspection  Skin Exam: skin normal and skin intactno dry skin, no warmth, no erythema, no maceration, normal color, no pre-ulcer, no ulcer and no callus                         Toe Exam: left toe deformity (amputated second toe)                   Sensory       Monofilament: absent  Vascular    The left DP pulse is 2+  Assign Risk Category:  Deformity present; Loss of protective sensation;  No weak pulses       Risk: 2

## 2019-11-21 ENCOUNTER — TELEPHONE (OUTPATIENT)
Dept: CARDIOLOGY CLINIC | Facility: CLINIC | Age: 84
End: 2019-11-21

## 2019-11-21 NOTE — TELEPHONE ENCOUNTER
FYI:  Pt having a tooth extraction on 11/26/19  She was told in August it was ok to hold Eliquis for two days, therefore she is going to hold again

## 2019-11-25 ENCOUNTER — ANTICOAG VISIT (OUTPATIENT)
Dept: CARDIOLOGY CLINIC | Facility: CLINIC | Age: 84
End: 2019-11-25

## 2019-11-25 DIAGNOSIS — I48.0 PAROXYSMAL ATRIAL FIBRILLATION (HCC): ICD-10-CM

## 2019-11-25 DIAGNOSIS — I48.0 PAROXYSMAL ATRIAL FIBRILLATION (HCC): Primary | ICD-10-CM

## 2019-11-25 RX ORDER — WARFARIN SODIUM 2.5 MG/1
TABLET ORAL
Qty: 60 TABLET | Refills: 11 | Status: SHIPPED | OUTPATIENT
Start: 2019-11-25 | End: 2020-12-21 | Stop reason: SDUPTHER

## 2019-11-29 ENCOUNTER — TELEPHONE (OUTPATIENT)
Dept: CARDIOLOGY CLINIC | Facility: CLINIC | Age: 84
End: 2019-11-29

## 2019-11-29 NOTE — TELEPHONE ENCOUNTER
P/C form pt-started amiodarone in August, 2019, and is now on 200 mg daily  Pt feels pain in her right side, during the night hours, started about 3 weeks ago  She also has had some problems focusing on things she reads as well as shortness of breath with more exertion  She does have macular degeneration and is going to have an office visit for this at [facility] in February, 2020  Pt denies any heart rate probs/issues with any palpitations  Pt is not scheduled for future follow up or recall with you  Thank you

## 2019-12-06 ENCOUNTER — ANTICOAG VISIT (OUTPATIENT)
Dept: CARDIOLOGY CLINIC | Facility: CLINIC | Age: 84
End: 2019-12-06

## 2019-12-06 DIAGNOSIS — I48.0 PAROXYSMAL ATRIAL FIBRILLATION (HCC): ICD-10-CM

## 2019-12-06 LAB — INR PPP: 1.1 (ref 0.84–1.19)

## 2019-12-06 NOTE — TELEPHONE ENCOUNTER
Pt called the device clinic on August 7,2019 and Chiara Kline sent in a script for the amiodarone at that time  I called the patient again to get an update  She still has the pains in her side, and is concerned it could be from the amiodarone  If so, is there an alternative  Thank you

## 2019-12-06 NOTE — TELEPHONE ENCOUNTER
Hi, I do not know the patient and I can't answer question about the pain without seeing her in the office  If she would like to come to office, then we can discuss her symptoms  I have not seen her before

## 2019-12-10 ENCOUNTER — ANTICOAG VISIT (OUTPATIENT)
Dept: CARDIOLOGY CLINIC | Facility: CLINIC | Age: 84
End: 2019-12-10

## 2019-12-10 DIAGNOSIS — I48.0 PAROXYSMAL ATRIAL FIBRILLATION (HCC): ICD-10-CM

## 2019-12-10 LAB — INR PPP: 1.8 (ref 0.84–1.19)

## 2019-12-11 NOTE — TELEPHONE ENCOUNTER
Pt will come in on Monday, December 16th, 2019 at 9:30 with Dr Ulices Vernon  She will try to get transportation and will call if this is not a good time

## 2019-12-16 ENCOUNTER — OFFICE VISIT (OUTPATIENT)
Dept: CARDIOLOGY CLINIC | Facility: CLINIC | Age: 84
End: 2019-12-16
Payer: MEDICARE

## 2019-12-16 VITALS
SYSTOLIC BLOOD PRESSURE: 100 MMHG | HEIGHT: 58 IN | DIASTOLIC BLOOD PRESSURE: 60 MMHG | WEIGHT: 180.9 LBS | HEART RATE: 71 BPM | BODY MASS INDEX: 37.97 KG/M2

## 2019-12-16 DIAGNOSIS — R06.00 DYSPNEA ON EXERTION: ICD-10-CM

## 2019-12-16 DIAGNOSIS — I48.0 PAROXYSMAL ATRIAL FIBRILLATION (HCC): Primary | ICD-10-CM

## 2019-12-16 DIAGNOSIS — R01.1 MURMUR: ICD-10-CM

## 2019-12-16 PROCEDURE — 93000 ELECTROCARDIOGRAM COMPLETE: CPT | Performed by: INTERNAL MEDICINE

## 2019-12-16 PROCEDURE — 99215 OFFICE O/P EST HI 40 MIN: CPT | Performed by: INTERNAL MEDICINE

## 2019-12-16 NOTE — PROGRESS NOTES
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, paroxysmal atrial fibrillation, VT (non-sustained pacemaker checks), 2nd degree heart block and syncope s/p PPM on 12/18 presents to discuss management of her NSVT and amiodarone use  She was started on amiodarone after having non-sustained VT episodes (4 total, longest 22 seconds, HR of 175-207 bpm)  Per the chart review, she did feel "funny" during the episodes  However upon asking the same question again in today's visit , she does not remember if she felt anything  She reports having poor memory and cannot pinpoint her symptoms  Further device interrogation has shown no episodes of VT after starting the amiodarone  Today , she reports having symptoms that she attributes to amiodarone  She has been having difficulty reading, numbness in the arms, and right hip pain (lasting for few hours until she wakes up)  She denies having hip pain and tingling in the arms before the amiodarone though she does not remember well  Reading difficulty has worsened since starting medications  She does have easy bruising  She denies any hematuira, hematochezia, recent syncope  She had syncope prior to the pacemaker but not since then  She occasionaly has swelling in lower extremities  She also notes dyspnea on exertion whih could have been present but patient is unable to confirm duration      Past Medical History:  Past Medical History:   Diagnosis Date    Abnormal nuclear stress test     last assessed 04/03/2017    Anxiety     Arthritis     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    Bundle branch block, left  Cardiomyopathy ([facility] [address] )     Chronic pain     chronic b/l shoulder pain    Coronary artery disease     Diabetes mellitus (HCC)     GERD (gastroesophageal reflux disease)     H/O atrial flutter     History of DVT (deep vein thrombosis)     History of pulmonary embolism     Hyperlipidemia     Hypertension     Hypothyroid     Renal calculi     Shortness of breath        Medications:      Current Outpatient Medications:     ACCU-CHEK RUBINA PLUS test strip, 1 each by Other route daily Use as instructed, Disp: 100 each, Rfl: 2    ACCU-CHEK SOFTCLIX LANCETS lancets, by Other route daily Use daily as instructed , Disp: 100 each, Rfl: 2    acetaminophen (TYLENOL) 500 mg tablet, Take 1 tablet (500 mg total) by mouth every 6 (six) hours as needed for mild pain or moderate pain, Disp: 30 tablet, Rfl: 0    amiodarone 200 mg tablet, Take 1 tablet (200 mg total) by mouth daily TAKE ONE TABLET BY MOUTH DAILY, Disp: 30 tablet, Rfl: 1    ASPIR-LOW 81 MG EC tablet, TAKE ONE TABLET BY MOUTH EVERY DAY, Disp: 90 tablet, Rfl: 3    Calcium Carbonate-Vitamin D (CALTRATE 600+D PO), Take 1 capsule by mouth 2 (two) times a day  , Disp: , Rfl:     isosorbide mononitrate (IMDUR) 30 mg 24 hr tablet, Take 1 tablet (30 mg total) by mouth daily, Disp: 90 tablet, Rfl: 3    levothyroxine 25 mcg tablet, TAKE ONE TABLET BY MOUTH EVERY DAY, Disp: 90 tablet, Rfl: 1    lisinopril (ZESTRIL) 2 5 mg tablet, Take 1 tablet (2 5 mg total) by mouth daily, Disp: 90 tablet, Rfl: 1    metoprolol succinate (TOPROL XL) 25 mg 24 hr tablet, Take 1 tablet (25 mg total) by mouth daily after dinner, Disp: 90 tablet, Rfl: 3    metroNIDAZOLE (METROCREAM) 0 75 % cream, Apply topically 2 (two) times a day, Disp: 45 g, Rfl: 0    Misc Natural Products (ENERGY FOCUS PO), Take 1 tablet by mouth 2 (two) times a day  , Disp: , Rfl:     multivitamin-iron-minerals-folic acid (CENTRUM) chewable tablet, Chew 1 tablet daily  , Disp: , Rfl:     pravastatin (PRAVACHOL) 80 mg tablet, Take 1 tablet (80 mg total) by mouth daily at bedtime, Disp: 30 tablet, Rfl: 11    warfarin (COUMADIN) 2 5 mg tablet, Take 1 tablet (2 5 mg total) by mouth daily, Disp: 135 tablet, Rfl: 3    warfarin (COUMADIN) 2 5 mg tablet, TAKE 1 TO 2 TABS BY MOUTH DAILY OR AS DIRECTED BY PHYSICIAN, Disp: 60 tablet, Rfl: 6     Family History   Problem Relation Age of Onset    Parkinsonism Sister     Cancer Sister         unknown type    Heart attack Neg Hx     Stroke Neg Hx     Anuerysm Neg Hx     Clotting disorder Neg Hx     Arrhythmia Neg Hx     Heart failure Neg Hx     Coronary artery disease Neg Hx      Social History     Socioeconomic History    Marital status: /Civil Union     Spouse name: Not on file    Number of children: Not on file    Years of education: Not on file    Highest education level: Not on file   Occupational History    Not on file   Social Needs    Financial resource strain: Not on file    Food insecurity:     Worry: Not on file     Inability: Not on file    Transportation needs:     Medical: Not on file     Non-medical: Not on file   Tobacco Use    Smoking status: Never Smoker    Smokeless tobacco: Never Used   Substance and Sexual Activity    Alcohol use: Yes     Comment: occasional    Drug use: Yes     Types: Marijuana     Comment: MEDICAL MARIJUANA-HAS NOT USED FOR 3 WEEKS    Sexual activity: Not on file   Lifestyle    Physical activity:     Days per week: Not on file     Minutes per session: Not on file    Stress: Not on file   Relationships    Social connections:     Talks on phone: Not on file     Gets together: Not on file     Attends Christianity service: Not on file     Active member of club or organization: Not on file     Attends meetings of clubs or organizations: Not on file     Relationship status: Not on file    Intimate partner violence:     Fear of current or ex partner: Not on file     Emotionally abused: Not on file     Physically abused: Not on file     Forced sexual activity: Not on file   Other Topics Concern    Not on file   Social History Narrative    Not on file     Social History     Tobacco Use   Smoking Status Never Smoker   Smokeless Tobacco Never Used     Social History     Substance and Sexual Activity   Alcohol Use Yes    Comment: occasional       Review of Systems   Constitution: Positive for malaise/fatigue  Negative for chills and fever  HENT: Negative  Eyes: Positive for blurred vision  Negative for double vision  Cardiovascular: Positive for dyspnea on exertion  Negative for chest pain, leg swelling, near-syncope, orthopnea, palpitations, paroxysmal nocturnal dyspnea and syncope  Respiratory: Negative for cough and sputum production  Endocrine: Negative  Skin: Negative  Negative for rash  Musculoskeletal: Positive for arthritis and joint pain (Right hip)  Gastrointestinal: Negative for abdominal pain, nausea and vomiting  Genitourinary: Negative  Neurological: Positive for numbness (Hands)  Negative for dizziness and light-headedness  Psychiatric/Behavioral: Negative  The patient is not nervous/anxious  Objective:     Vitals: Blood pressure 100/60, pulse 71, height 4' 10" (1 473 m), weight 82 1 kg (180 lb 14 4 oz)  , Body mass index is 37 81 kg/m²  ,        Physical Exam:    GEN: Catia Magana appears well, alert and oriented x 3, pleasant and cooperative   HEENT: pupils equal, round, and reactive to light; extraocular muscles intact  NECK: supple, no carotid bruits   HEART: regular rhythm, normal S1 and S2, no murmurs, clicks, gallops or rubs   LUNGS: clear to auscultation bilaterally; no wheezes, rales, or rhonchi   ABDOMEN: normal bowel sounds, soft, no tenderness, no distention  EXTREMITIES: peripheral pulses normal; no clubbing, cyanosis, or edema  NEURO: no focal findings   SKIN: normal without suspicious lesions on exposed skin      Labs & Results:  Below is the patient's most recent value for Albumin, ALT, AST, BUN, Calcium, Chloride, Cholesterol, CO2, Creatinine, GFR, Glucose, HDL, Hematocrit, Hemoglobin, Hemoglobin A1C, LDL, Magnesium, Phosphorus, Platelets, Potassium, PSA, Sodium, Triglycerides, and WBC  Lab Results   Component Value Date    ALT 15 2019    AST 13 2019    BUN 19 2019    CALCIUM 9 1 2019    CL 98 (L) 2019    CHOL 210 2015    CO2 31 2019    CREATININE 0 84 2019    HDL 57 2019    HCT 39 5 2019    HGB 13 0 2019    HGBA1C 5 6 2019    MG 2 0 2018     2019    K 4 2 2019     (L) 2015    TRIG 162 (H) 2019    WBC 5 82 2019     Note: for a comprehensive list of the patient's lab results, access the Results Review activity  Cardiac testing:     I personally reviewed the ECG performed in the clinic on 19  It reveals atrial sensed ventricular paced rhythm at 71 beats per minute  Patient is dependent  Echocardiograms:  Results for orders placed during the hospital encounter of 10/28/18   Echo complete with contrast if indicated    Narrative [place] [address] [address] [place] [address]  [phone]    Transthoracic Echocardiogram  2D, M-mode, Doppler, and Color Doppler    Study date:  29-Oct-2018    Patient: Iliana Martinez  MR number: [id number]  Account number: [de-identified]  : 1929  Age: 80 years  Gender: Female  Status: Outpatient  Location: Bedside  Height: 59 in  Weight: 173 6 lb  BP: 169/ 78 mmHg    Indications: Murmur    Diagnoses: R01 1 - Cardiac murmur, unspecified    Sonographer:  Sarina Mari  Primary Physician:  Ector Hernandez MD  Referring Physician:  [address] [place] [address],[address]  Huggins CRNP  Group:  [facility]  Interpreting Physician:  Pamela York MD    SUMMARY    LEFT VENTRICLE:  Systolic function was normal  Ejection fraction was estimated to be 55 %    This study was inadequate for the evaluation of regional wall motion  Wall thickness was markedly increased  There was severe concentric hypertrophy  VENTRICULAR SEPTUM:  There was dyssynergic motion  These changes are consistent with a conduction abnormality or paced rhythm  RIGHT VENTRICLE:  The ventricle was dilated  Systolic function was normal     AORTIC VALVE:  The valve was probably trileaflet  Leaflets exhibited moderately to markedly increased thickness, moderate calcification, moderately reduced cuspal separation, and sclerosis  There was mild stenosis by gradients  Vmax 2 6 m/s, mean gradient 16 mm Hg, maximum gradient 27 mm Hg  TRICUSPID VALVE:  There was moderate regurgitation  Estimated peak PA pressure was 55 mmHg  The findings suggest moderate pulmonary hypertension  HISTORY: PRIOR HISTORY: Syncope, hypertension, diabetes, left bundle block branch    PROCEDURE: The procedure was performed at the bedside  This was a routine study  The transthoracic approach was used  The study included complete 2D imaging, M-mode, complete spectral Doppler, and color Doppler  Images were obtained from  the parasternal, apical, subcostal, and suprasternal notch acoustic windows  Echocardiographic views were limited due to restricted patient mobility, decreased penetration, and lung interference  This was a technically difficult study  LEFT VENTRICLE: Size was normal  Systolic function was normal  Ejection fraction was estimated to be 55 %  This study was inadequate for the evaluation of regional wall motion  Wall thickness was markedly increased  There was severe  concentric hypertrophy  DOPPLER: There was an increased relative contribution of atrial contraction to ventricular filling  VENTRICULAR SEPTUM: There was dyssynergic motion  These changes are consistent with a conduction abnormality or paced rhythm  RIGHT VENTRICLE: The ventricle was dilated   Systolic function was normal     LEFT ATRIUM: The atrium was dilated  RIGHT ATRIUM: The atrium was dilated  MITRAL VALVE: There was moderate to marked annular calcification  DOPPLER: There was no evidence for stenosis  There was no significant regurgitation  AORTIC VALVE: The valve was probably trileaflet  Leaflets exhibited moderately to markedly increased thickness, moderate calcification, moderately reduced cuspal separation, and sclerosis  DOPPLER: There was mild stenosis by gradients  Vmax 2 6 m/s, mean gradient 16 mm Hg, maximum gradient 27 mm Hg  There was no significant regurgitation  TRICUSPID VALVE: The valve structure was normal  There was normal leaflet separation  DOPPLER: There was no evidence for stenosis  There was moderate regurgitation  Estimated peak PA pressure was 55 mmHg  The findings suggest moderate  pulmonary hypertension  PULMONIC VALVE: Leaflets exhibited normal thickness, no calcification, and normal cuspal separation  DOPPLER: The transpulmonic velocity was within the normal range  There was mild regurgitation  PERICARDIUM: There was no pericardial effusion  The pericardium was normal in appearance  AORTA: The root exhibited top normal size at 3 8 cm and fibrocalcific change  SYSTEM MEASUREMENT TABLES    2D  %FS: 28 05 %  Ao Diam: 3 66 cm  EDV(Teich): 61 24 ml  EF Biplane: 54 43 %  EF(Teich): 55 04 %  ESV(Teich): 27 53 ml  IVSd: 1 37 cm  LA Area: 26 21 cm2  LA Diam: 3 67 cm  LVEDV MOD A2C: 94 62 ml  LVEDV MOD A4C: 62 24 ml  LVEDV MOD BP: 77 26 ml  LVEF MOD A2C: 59 %  LVEF MOD A4C: 49 46 %  LVESV MOD A2C: 38 8 ml  LVESV MOD A4C: 31 46 ml  LVESV MOD BP: 35 21 ml  LVIDd: 3 78 cm  LVIDs: 2 72 cm  LVLd A2C: 7 05 cm  LVLd A4C: 7 17 cm  LVLs A2C: 6 04 cm  LVLs A4C: 5 92 cm  LVOT Diam: 2 2 cm  LVPWd: 1 38 cm  RA Area: 23 62 cm2  RVIDd: 3 78 cm  SV MOD A2C: 55 82 ml  SV MOD A4C: 30 78 ml  SV(Teich): 33 71 ml    CW  AV Env  Ti: 279 12 ms  AV MaxP 98 mmHg  AV VTI: 46 73 cm  AV Vmax: 2 23 m/s  AV Vmean: 1 67 m/s  AV meanP 18 mmHg  TR MaxP 48 mmHg  TR Vmax: 3 59 m/s    MM  TAPSE: 2 39 cm    PW  JN (VTI): 1 4 cm2  JN Vmax: 1 35 cm2  E': 0 06 m/s  E/E': 15 79  LVOT Env  Ti: 279 41 ms  LVOT VTI: 17 24 cm  LVOT Vmax: 0 79 m/s  LVOT Vmean: 0 62 m/s  LVOT maxP 51 mmHg  LVOT meanP 61 mmHg  LVSI Dopp: 37 59 ml/m2  LVSV Dopp: 65 41 ml  MV A Steven: 1 14 m/s  MV Dec [place]: 7 35 m/s2  MV DecT: 130 63 ms  MV E Tseven: 0 96 m/s  MV E/A Ratio: 0 84  MV PHT: 37 88 ms  MVA By PHT: 5 81 cm2    [employer] Accredited Echocardiography Laboratory    Prepared and electronically signed by    Kya Tellez MD  Signed 29-Oct-2018 17:35:21       No results found for this or any previous visit  Catheterizations:   No results found for this or any previous visit  Stress Tests:  No results found for this or any previous visit  Holter monitor -   No results found for this or any previous visit  No results found for this or any previous visit  ASSESSMENT:  1  Paroxysmal atrial fibrillation (HCC)  POCT ECG   -burden is less than 0 01%  -continue anticoagulation with warfarin; could not afford Eliquis so switched to warfarin   -continue to monitor burden on remote transmission  2  Nonsustained VT episodes  -patient is unaware if she felt any symptoms due to her poor memory  -she reports some symptoms that she believes started after amiodarone including dyspnea on exertion, numbness in her hands and worsening of her blurry vision  -currently on metoprolol process amiodarone    3  Hypertension  -blood pressure normotensive  -currently on lisinopril and metoprolol    4  Hyperlipidemia  -currently on statin     SUMMARY:    Ms Graylin Kehr is a 80 y o    Woman with history of HTN, HLD, CAD (LAD stent), LBBB, Hx of atrial flutter s/p ablation, 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, paroxysmal atrial fibrillation, VT (non-sustained pacemaker checks), 2nd degree heart block and syncope s/p PPM on  presents to discuss management of her NSVT and amiodarone use  She had brief episode of nonsustained VT on device interrogation  It is unclear if she is symptomatic from it are not as she has very poor memory  She was started on amiodarone but after reading the side effects she started noticing some dyspnea on exertion as well as numbness in the hands and worsening of her blurry vision  She also reports having a pain in the hip that is present now  After asking further she does not remember if the hip pain is new or old  We discussed that possibility these symptoms not being due to amiodarone use  However she prefers to come off amiodarone  We decided to stop amiodarone  If she continues to have symptoms after discontinuing amiodarone then it is unlikely to be from the amiodarone  In that case we will resume amiodarone to suppress her nonsustained VT  In the meantime we will also obtain an echocardiogram to evaluate her EF  She will continue of her other medications  She will follow up with us in 8 weeks  She is welcome to call our office with any questions  Thank you for allowing me to participate in the care of this patient  Please do not hesitate to contact me for any questions  PLAN:  1  Discontinue amiodarone  2  Obtain echocardiogram  3   Follow-up in 8 weeks

## 2019-12-16 NOTE — PATIENT INSTRUCTIONS
Stop amiodarone  If after a month, you still have symptoms then it is likely not from amiodarone and we should resume it  Please give us a call after a month       Obtain echocardiogram

## 2019-12-17 LAB — INR PPP: 3.2 (ref 0.84–1.19)

## 2019-12-18 ENCOUNTER — ANTICOAG VISIT (OUTPATIENT)
Dept: CARDIOLOGY CLINIC | Facility: CLINIC | Age: 84
End: 2019-12-18

## 2019-12-18 DIAGNOSIS — I48.0 PAROXYSMAL ATRIAL FIBRILLATION (HCC): ICD-10-CM

## 2019-12-26 ENCOUNTER — ANTICOAG VISIT (OUTPATIENT)
Dept: CARDIOLOGY CLINIC | Facility: CLINIC | Age: 84
End: 2019-12-26

## 2019-12-26 DIAGNOSIS — I48.0 PAROXYSMAL ATRIAL FIBRILLATION (HCC): ICD-10-CM

## 2019-12-26 LAB — INR PPP: 1.8 (ref 0.84–1.19)

## 2020-01-02 ENCOUNTER — ANTICOAG VISIT (OUTPATIENT)
Dept: CARDIOLOGY CLINIC | Facility: CLINIC | Age: 85
End: 2020-01-02

## 2020-01-02 DIAGNOSIS — I48.0 PAROXYSMAL ATRIAL FIBRILLATION (HCC): ICD-10-CM

## 2020-01-02 LAB — INR PPP: 2.1 (ref 0.84–1.19)

## 2020-01-09 ENCOUNTER — ANTICOAG VISIT (OUTPATIENT)
Dept: CARDIOLOGY CLINIC | Facility: CLINIC | Age: 85
End: 2020-01-09

## 2020-01-09 DIAGNOSIS — I48.0 PAROXYSMAL ATRIAL FIBRILLATION (HCC): ICD-10-CM

## 2020-01-09 LAB — INR PPP: 1.8 (ref 0.84–1.19)

## 2020-01-14 DIAGNOSIS — I47.2 VENTRICULAR TACHYCARDIA (HCC): ICD-10-CM

## 2020-01-14 DIAGNOSIS — I10 ESSENTIAL HYPERTENSION: Chronic | ICD-10-CM

## 2020-01-14 DIAGNOSIS — E11.8 TYPE 2 DIABETES MELLITUS WITH COMPLICATION, WITHOUT LONG-TERM CURRENT USE OF INSULIN (HCC): ICD-10-CM

## 2020-01-14 RX ORDER — LISINOPRIL 2.5 MG/1
TABLET ORAL
Qty: 90 TABLET | Refills: 1 | Status: SHIPPED | OUTPATIENT
Start: 2020-01-14 | End: 2020-07-21

## 2020-01-14 RX ORDER — AMIODARONE HYDROCHLORIDE 200 MG/1
TABLET ORAL
Qty: 30 TABLET | Refills: 0 | OUTPATIENT
Start: 2020-01-14

## 2020-01-16 ENCOUNTER — ANTICOAG VISIT (OUTPATIENT)
Dept: CARDIOLOGY CLINIC | Facility: CLINIC | Age: 85
End: 2020-01-16

## 2020-01-16 DIAGNOSIS — I48.0 PAROXYSMAL ATRIAL FIBRILLATION (HCC): ICD-10-CM

## 2020-01-16 LAB — INR PPP: 2 (ref 0.84–1.19)

## 2020-01-21 ENCOUNTER — HOSPITAL ENCOUNTER (OUTPATIENT)
Dept: RADIOLOGY | Facility: HOSPITAL | Age: 85
Discharge: HOME | End: 2020-01-21
Attending: ORTHOPAEDIC SURGERY
Payer: MEDICARE

## 2020-01-21 ENCOUNTER — OFFICE VISIT (OUTPATIENT)
Dept: OBGYN CLINIC | Facility: HOSPITAL | Age: 85
End: 2020-01-21
Payer: MEDICARE

## 2020-01-21 VITALS
HEART RATE: 92 BPM | BODY MASS INDEX: 38.2 KG/M2 | DIASTOLIC BLOOD PRESSURE: 92 MMHG | SYSTOLIC BLOOD PRESSURE: 161 MMHG | WEIGHT: 182 LBS | HEIGHT: 58 IN

## 2020-01-21 DIAGNOSIS — M54.50 CHRONIC BILATERAL LOW BACK PAIN WITHOUT SCIATICA: ICD-10-CM

## 2020-01-21 DIAGNOSIS — G89.29 CHRONIC BILATERAL LOW BACK PAIN WITHOUT SCIATICA: ICD-10-CM

## 2020-01-21 DIAGNOSIS — M17.11 PRIMARY OSTEOARTHRITIS OF RIGHT KNEE: ICD-10-CM

## 2020-01-21 DIAGNOSIS — M17.12 PRIMARY OSTEOARTHRITIS OF LEFT KNEE: Primary | ICD-10-CM

## 2020-01-21 DIAGNOSIS — M70.61 TROCHANTERIC BURSITIS OF RIGHT HIP: ICD-10-CM

## 2020-01-21 PROCEDURE — 20610 DRAIN/INJ JOINT/BURSA W/O US: CPT | Performed by: ORTHOPAEDIC SURGERY

## 2020-01-21 PROCEDURE — 73502 X-RAY EXAM HIP UNI 2-3 VIEWS: CPT

## 2020-01-21 PROCEDURE — 99214 OFFICE O/P EST MOD 30 MIN: CPT | Performed by: ORTHOPAEDIC SURGERY

## 2020-01-21 RX ORDER — LIDOCAINE HYDROCHLORIDE 10 MG/ML
2 INJECTION, SOLUTION INFILTRATION; PERINEURAL
Status: COMPLETED | OUTPATIENT
Start: 2020-01-21 | End: 2020-01-21

## 2020-01-21 RX ORDER — BETAMETHASONE SODIUM PHOSPHATE AND BETAMETHASONE ACETATE 3; 3 MG/ML; MG/ML
12 INJECTION, SUSPENSION INTRA-ARTICULAR; INTRALESIONAL; INTRAMUSCULAR; SOFT TISSUE
Status: COMPLETED | OUTPATIENT
Start: 2020-01-21 | End: 2020-01-21

## 2020-01-21 RX ORDER — BUPIVACAINE HYDROCHLORIDE 2.5 MG/ML
2 INJECTION, SOLUTION INFILTRATION; PERINEURAL
Status: COMPLETED | OUTPATIENT
Start: 2020-01-21 | End: 2020-01-21

## 2020-01-21 RX ADMIN — BETAMETHASONE SODIUM PHOSPHATE AND BETAMETHASONE ACETATE 12 MG: 3; 3 INJECTION, SUSPENSION INTRA-ARTICULAR; INTRALESIONAL; INTRAMUSCULAR; SOFT TISSUE at 10:35

## 2020-01-21 RX ADMIN — LIDOCAINE HYDROCHLORIDE 2 ML: 10 INJECTION, SOLUTION INFILTRATION; PERINEURAL at 10:35

## 2020-01-21 RX ADMIN — BUPIVACAINE HYDROCHLORIDE 2 ML: 2.5 INJECTION, SOLUTION INFILTRATION; PERINEURAL at 10:35

## 2020-01-21 NOTE — PROGRESS NOTES
Assessment:  1  Primary osteoarthritis of left knee     2  Primary osteoarthritis of right knee     3  Trochanteric bursitis of right hip  XR hip/pelv 2-3 vws right if performed   4  Chronic bilateral low back pain without sciatica  Ambulatory referral to Pain Management       Plan:  The patient was provided with bilateral knee and right trochanteric bursa steroid injections  She should follow up in 3 months  To do next visit:  Return in about 3 months (around 4/21/2020)  The above stated was discussed in layman's terms and the patient expressed understanding  All questions were answered to the patient's satisfaction  Scribe Attestation    I,:   Alejandra Houston am acting as a scribe while in the presence of the attending physician :        I,:   Mat Carr MD personally performed the services described in this documentation    as scribed in my presence :              Subjective:   Nicolas Villar is a 80 y o  female who presents for follow up of bilateral hips and knees  She is s/p right trochanteric bursa and bilateral knee steroid injections with moderate benefit, 10/15/19  Today she complains of right lateral hip pain and bilateral knee pain and instability  Most activity and walking aggravates  Rest alleviates  She does use a walker for ambulation  She is s/p right ROSEMARY by Dr Mcguire          Review of systems negative unless otherwise specified in HPI    Past Medical History:   Diagnosis Date    Abnormal nuclear stress test     last assessed 04/03/2017    Anxiety     Arthritis     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    Bundle branch block, left     Cardiomyopathy ([facility] [address]     Chronic pain     chronic b/l shoulder pain    Coronary artery disease     Diabetes mellitus ([facility] [address] [facility]     GERD (gastroesophageal reflux disease)     H/O atrial flutter     History of DVT (deep vein thrombosis)     History of pulmonary embolism     Hyperlipidemia     Hypertension     Hypothyroid     Renal calculi     Shortness of breath        Past Surgical History:   Procedure Laterality Date    ATRIAL ABLATION SURGERY  01/2015    CARDIOVERSION      CHELA/DCCV 10/22/2014    CARPAL TUNNEL RELEASE Right     CATARACT EXTRACTION      CORONARY ANGIOPLASTY WITH STENT PLACEMENT      HYSTERECTOMY      JOINT REPLACEMENT Right     W[place] AMPUTATION TOE,MT-P JT Left 10/11/2017    Procedure: AMPUTATION SECOND TOE;  Surgeon: Pablo Monsivais DPM;  Location: [facility] OR;  Service: Podiatry    TONSILLECTOMY      TOTAL HIP ARTHROPLASTY      Right       Family History   Problem Relation Age of Onset   [facility] Parkinsonism Sister     Cancer Sister         unknown type    Heart attack Neg Hx     Stroke Neg Hx     Anuerysm Neg Hx     Clotting disorder Neg Hx     Arrhythmia Neg Hx     Heart failure Neg Hx     Coronary artery disease Neg Hx        Social History     Occupational History    Not on file   Tobacco Use    Smoking status: Never Smoker    Smokeless tobacco: Never Used   Substance and Sexual Activity    Alcohol use: Yes     Comment: occasional    Drug use: Yes     Types: Marijuana     Comment: MEDICAL MARIJUANA-HAS NOT USED FOR 3 WEEKS    Sexual activity: Not on file         Current Outpatient Medications:     ACCU-CHEK RUBINA PLUS test strip, 1 each by Other route daily Use as instructed, Disp: 100 each, Rfl: 2    ACCU-CHEK SOFTCLIX LANCETS lancets, by Other route daily Use daily as instructed , Disp: 100 each, Rfl: 2    acetaminophen (TYLENOL) 500 mg tablet, Take 1 tablet (500 mg total) by mouth every 6 (six) hours as needed for mild pain or moderate pain, Disp: 30 tablet, Rfl: 0    amiodarone 200 mg tablet, Take 1 tablet (200 mg total) by mouth daily TAKE ONE TABLET BY MOUTH DAILY, Disp: 30 tablet, Rfl: 1    ASPIR-LOW 81 MG EC tablet, TAKE ONE TABLET BY MOUTH EVERY DAY, Disp: 90 tablet, Rfl: 3    Calcium Carbonate-Vitamin D (CALTRATE 600+D PO), Take 1 capsule by mouth 2 (two) times a day  , Disp: , Rfl:     isosorbide mononitrate (IMDUR) 30 mg 24 hr tablet, Take 1 tablet (30 mg total) by mouth daily, Disp: 90 tablet, Rfl: 3    levothyroxine 25 mcg tablet, TAKE ONE TABLET BY MOUTH EVERY DAY, Disp: 90 tablet, Rfl: 1    lisinopril (ZESTRIL) 2 5 mg tablet, TAKE ONE TABLET BY MOUTH EVERY DAY, Disp: 90 tablet, Rfl: 1    metoprolol succinate (TOPROL XL) 25 mg 24 hr tablet, Take 1 tablet (25 mg total) by mouth daily after dinner, Disp: 90 tablet, Rfl: 3    metroNIDAZOLE (METROCREAM) 0 75 % cream, Apply topically 2 (two) times a day, Disp: 45 g, Rfl: 0    Misc Natural Products (ENERGY FOCUS PO), Take 1 tablet by mouth 2 (two) times a day  , Disp: , Rfl:     multivitamin-iron-minerals-folic acid (CENTRUM) chewable tablet, Chew 1 tablet daily  , Disp: , Rfl:     pravastatin (PRAVACHOL) 80 mg tablet, Take 1 tablet (80 mg total) by mouth daily at bedtime, Disp: 30 tablet, Rfl: 11    warfarin (COUMADIN) 2 5 mg tablet, Take 1 tablet (2 5 mg total) by mouth daily, Disp: 135 tablet, Rfl: 3    warfarin (COUMADIN) 2 5 mg tablet, TAKE 1 TO 2 TABS BY MOUTH DAILY OR AS DIRECTED BY PHYSICIAN, Disp: 60 tablet, Rfl: 11    Allergies   Allergen Reactions    Atorvastatin      Reaction unknown 10/23/2018-    Other Rash     Patient sensitive to adhesives from tape            Vitals:    01/21/20 0956   BP: 161/92   Pulse: 92       Objective:  Physical exam  · General: Awake, Alert, Oriented  · Eyes: Pupils equal, round and reactive to light  · Heart: regular rate and rhythm  · Lungs: No audible wheezing  · Abdomen: soft                    Ortho Exam   Bilateral knees:  Mild varus alignment with bony enlargement  TTP medial joint line  No erythema or ecchymosis  No effusion or swelling  Normal strength  Good ROM with crepitus   Calf compartments soft and supple  Sensation intact  Toes are warm sensate and mobile    Right hip:  TTP trochanteric bursa        Diagnostics, reviewed and taken today if performed as documented: The attending physician has personally reviewed the pertinent films in PACS and interpretation is as follows:  Right hip x-ray:   S/p right total hip arthroplasty        Procedures, if performed today:    Large joint arthrocentesis: R knee  Date/Time: 1/21/2020 10:35 AM  Consent given by: patient  Site marked: site marked  Supporting Documentation  Indications: pain   Procedure Details  Location: knee - R knee  Preparation: Patient was prepped and draped in the usual sterile fashion  Needle size: 22 G  Ultrasound guidance: no  Approach: anterolateral  Medications administered: 12 mg betamethasone acetate-betamethasone sodium phosphate 6 (3-3) mg/mL; 2 mL bupivacaine 0 25 %; 2 mL lidocaine 1 %    Patient tolerance: patient tolerated the procedure well with no immediate complications  Dressing:  Sterile dressing applied    Large joint arthrocentesis: L knee  Date/Time: 1/21/2020 10:35 AM  Consent given by: patient  Site marked: site marked  Supporting Documentation  Indications: pain   Procedure Details  Location: knee - L knee  Preparation: Patient was prepped and draped in the usual sterile fashion  Needle size: 22 G  Ultrasound guidance: no  Approach: anterolateral  Medications administered: 12 mg betamethasone acetate-betamethasone sodium phosphate 6 (3-3) mg/mL; 2 mL bupivacaine 0 25 %; 2 mL lidocaine 1 %    Patient tolerance: patient tolerated the procedure well with no immediate complications  Dressing:  Sterile dressing applied    Large joint arthrocentesis: R greater trochanteric bursa  Date/Time: 1/21/2020 10:35 AM  Consent given by: patient  Site marked: site marked  Supporting Documentation  Indications: pain   Procedure Details  Location: hip - R greater trochanteric bursa  Needle size: 22 G  Ultrasound guidance: no  Approach: lateral  Medications administered: 12 mg betamethasone acetate-betamethasone sodium phosphate 6 (3-3) mg/mL; 2 mL bupivacaine 0 25 %; 2 mL lidocaine 1 %    Patient tolerance: patient tolerated the procedure well with no immediate complications  Dressing:  Sterile dressing applied          Portions of the record may have been created with voice recognition software  Occasional wrong word or "sound a like" substitutions may have occurred due to the inherent limitations of voice recognition software  Read the chart carefully and recognize, using context, where substitutions have occurred

## 2020-01-28 DIAGNOSIS — I25.10 CAD (CORONARY ARTERY DISEASE): ICD-10-CM

## 2020-01-28 RX ORDER — ASPIRIN 81 MG/1
TABLET, COATED ORAL
Qty: 90 TABLET | Refills: 0 | Status: SHIPPED | OUTPATIENT
Start: 2020-01-28 | End: 2020-05-04

## 2020-01-30 ENCOUNTER — ANTICOAG VISIT (OUTPATIENT)
Dept: CARDIOLOGY CLINIC | Facility: CLINIC | Age: 85
End: 2020-01-30

## 2020-01-30 ENCOUNTER — REMOTE DEVICE CLINIC VISIT (OUTPATIENT)
Dept: CARDIOLOGY CLINIC | Facility: CLINIC | Age: 85
End: 2020-01-30
Payer: MEDICARE

## 2020-01-30 DIAGNOSIS — I48.0 PAROXYSMAL ATRIAL FIBRILLATION (HCC): ICD-10-CM

## 2020-01-30 DIAGNOSIS — Z95.0 CARDIAC PACEMAKER IN SITU: Primary | ICD-10-CM

## 2020-01-30 LAB — INR PPP: 2.3 (ref 0.84–1.19)

## 2020-01-30 PROCEDURE — 93296 REM INTERROG EVL PM/IDS: CPT | Performed by: INTERNAL MEDICINE

## 2020-01-30 PROCEDURE — 93294 REM INTERROG EVL PM/LDLS PM: CPT | Performed by: INTERNAL MEDICINE

## 2020-01-30 NOTE — PROGRESS NOTES
MDT DUAL PM - ACTIVE SYSTEM IS MRI CONDITIONAL  CARELINK TRANSMISSION: BATTERY VOLTAGE ADEQUATE (11 1 YRS)  AP 50 5%  99 9% (>40%/CHB)  ALL AVAILABLE LEAD PARAMETERS WITHIN NORMAL LIMITS  NO SIGNIFICANT HIGH RATE EPISODES   NORMAL DEVICE FUNCTION    EB

## 2020-02-04 ENCOUNTER — HOSPITAL ENCOUNTER (OUTPATIENT)
Dept: NON INVASIVE DIAGNOSTICS | Facility: CLINIC | Age: 85
Discharge: HOME | End: 2020-02-04
Payer: MEDICARE

## 2020-02-04 DIAGNOSIS — R01.1 MURMUR: ICD-10-CM

## 2020-02-04 DIAGNOSIS — R06.00 DYSPNEA ON EXERTION: ICD-10-CM

## 2020-02-04 PROCEDURE — C8929 TTE W OR WO FOL WCON,DOPPLER: HCPCS

## 2020-02-04 PROCEDURE — 93306 TTE W/DOPPLER COMPLETE: CPT | Performed by: INTERNAL MEDICINE

## 2020-02-04 RX ADMIN — PERFLUTREN 1 ML/MIN: 6.52 INJECTION, SUSPENSION INTRAVENOUS at 11:56

## 2020-02-20 ENCOUNTER — OFFICE VISIT (OUTPATIENT)
Dept: CARDIOLOGY CLINIC | Facility: CLINIC | Age: 85
End: 2020-02-20
Payer: MEDICARE

## 2020-02-20 VITALS
SYSTOLIC BLOOD PRESSURE: 110 MMHG | HEART RATE: 84 BPM | BODY MASS INDEX: 37.97 KG/M2 | HEIGHT: 58 IN | WEIGHT: 180.9 LBS | DIASTOLIC BLOOD PRESSURE: 60 MMHG

## 2020-02-20 DIAGNOSIS — I44.7 LBBB (LEFT BUNDLE BRANCH BLOCK): ICD-10-CM

## 2020-02-20 DIAGNOSIS — E78.2 MIXED HYPERLIPIDEMIA: Chronic | ICD-10-CM

## 2020-02-20 DIAGNOSIS — I25.10 CORONARY ARTERY DISEASE INVOLVING NATIVE CORONARY ARTERY OF NATIVE HEART WITHOUT ANGINA PECTORIS: Chronic | ICD-10-CM

## 2020-02-20 DIAGNOSIS — Z86.79 HISTORY OF ATRIAL FLUTTER: Chronic | ICD-10-CM

## 2020-02-20 DIAGNOSIS — Z95.5 HISTORY OF PLACEMENT OF STENT IN LAD CORONARY ARTERY: Chronic | ICD-10-CM

## 2020-02-20 DIAGNOSIS — I48.0 PAROXYSMAL ATRIAL FIBRILLATION (HCC): Primary | ICD-10-CM

## 2020-02-20 DIAGNOSIS — I44.1 HEART BLOCK AV SECOND DEGREE: ICD-10-CM

## 2020-02-20 DIAGNOSIS — I47.2 VENTRICULAR TACHYCARDIA (HCC): ICD-10-CM

## 2020-02-20 DIAGNOSIS — I35.0 NONRHEUMATIC AORTIC VALVE STENOSIS: ICD-10-CM

## 2020-02-20 DIAGNOSIS — I10 ESSENTIAL HYPERTENSION: Chronic | ICD-10-CM

## 2020-02-20 PROCEDURE — 1036F TOBACCO NON-USER: CPT | Performed by: INTERNAL MEDICINE

## 2020-02-20 PROCEDURE — 3078F DIAST BP <80 MM HG: CPT | Performed by: INTERNAL MEDICINE

## 2020-02-20 PROCEDURE — 2022F DILAT RTA XM EVC RTNOPTHY: CPT | Performed by: INTERNAL MEDICINE

## 2020-02-20 PROCEDURE — 99215 OFFICE O/P EST HI 40 MIN: CPT | Performed by: INTERNAL MEDICINE

## 2020-02-20 PROCEDURE — 3074F SYST BP LT 130 MM HG: CPT | Performed by: INTERNAL MEDICINE

## 2020-02-20 PROCEDURE — 4040F PNEUMOC VAC/ADMIN/RCVD: CPT | Performed by: INTERNAL MEDICINE

## 2020-02-20 PROCEDURE — 1160F RVW MEDS BY RX/DR IN RCRD: CPT | Performed by: INTERNAL MEDICINE

## 2020-02-20 NOTE — PATIENT INSTRUCTIONS
Adolfo De La Vega came in to talk to me today regarding her recent new echocardiogram results   As of 2018 her aortic valve was mild-to-moderate in terms of how narrow  As of this most recent echo last month, the valve was now moderate to severe  It causes shortness of breath, lightheadedness, can eventually cause chest pain and congestive heart failure  I suspect she probably has 2 years until the valve reaches a critical stage, but in the meantime would be reasonable to consider fixing it, that technique is call TAVR, a balloon expandable valve done through a heart catheterization access in the groin, she would not undergo open heart surgery  I have recommended referral to the heart surgeons to discuss this procedure

## 2020-02-20 NOTE — PROGRESS NOTES
[address]  Cardiology  Follow up note  Laurita Pope 80 y o  female MRN: [id number]        Problems    1  Paroxysmal atrial fibrillation (HCC)  CANCELED: POCT ECG   2  Coronary artery disease involving native coronary artery of native heart without angina pectoris     3  Mixed hyperlipidemia     4  LBBB (left bundle branch block)     5  Heart block AV second degree     6  Ventricular tachycardia ([address] )     7  Essential hypertension     8  History of placement of stent in LAD coronary artery     9  History of atrial flutter     10  Nonrheumatic aortic valve stenosis  Ambulatory referral to Cardiovascular Surgery       Impression:    Tania Jasvir follows up with me at the [facility] office after recent echo showing mod to sev AS    Hypertension  · Normal/controlled    Hyperlipidemia  · Can only tolerate Pravastatin despite indications for high intensity statin  · Lipids reasonably controlled      CAD  · Emote LAD stent  · Baseline LBBB  · PEREIRA, but no anginal CP    History of atrial flutter  · S/p remote ablation with resolution of associated tachy mediated CMP    PAF  · Eliquis cost precludes use  Now on Warfarin  · No symptoms and no evidence of recurrence on recent device checks  · On Amiodarone/Meteprolol    Ventricular tachycardia  · Amiodarone Rx for NSVT suppression due to age  · No episodes on recent device check 1/29/20    2nd degree heart block  · S/p PPM 12/18 due to syncope  · 99% V Pacing based on most recent device check    Aortic Stenosis  · Mod to sev, symptomatic  · 80years old but reasonably functio and independent at home and cares for her elder   · Discussed extensively today regarding AV rplacement by SAVR, TAVR  Plan: Will refer to CT surgery for further discussion of likely TAVR if anything  Continue Amiodarone  Amio screening labs  Continue warfarin  No med changes for now  Close f/u  LVEF is normal despite RV pacing 99%        HPI:   Laurita Pope is a 80 y o  year old female with a history of coronary artery disease, lad stent, PAF, history of atrial flutter status post ablation, second-degree heart block resulting in syncope with placement of a permanent pacemaker December 2018, ambulatory dysfunction, hypertension, hyperlipidemia presents for a follow-up visit  CAD - on ASA, no cp    No syncope or falling  Afib and NSVT remains suppressed and without recurrence on recent device check personally reviewed from 1/29/20  ECHO rescreening by EP revealed mod to sev AS, and she has worsening PEREIRA  I presonally reviewed her echo  LVEF is gregory  Gradients are low, but she has small LV cavity and low LVOT velocities  Lipids are reasonable on pravastatin considering age 80      Review of Systems   Respiratory: Positive for shortness of breath  Musculoskeletal: Positive for arthralgias and gait problem  All other systems reviewed and are negative  Past Medical History:   Diagnosis Date    Abnormal nuclear stress test     last assessed 04/03/2017    Anxiety     Arthritis     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    Bundle branch block, left     Cardiomyopathy ([facility] [address] [facility]     Chronic pain     chronic b/l shoulder pain    Coronary artery disease     Diabetes mellitus ([facility] [address] )     GERD (gastroesophageal reflux disease)     H/O atrial flutter     History of DVT (deep vein thrombosis)     History of pulmonary embolism     Hyperlipidemia     Hypertension     Hypothyroid     Renal calculi     Shortness of breath      Social History     Substance and Sexual Activity   Alcohol Use Yes    Comment: occasional     Social History     Substance and Sexual Activity   Drug Use Yes    Types: Marijuana    Comment: MEDICAL MARIJUANA-HAS NOT USED FOR 3 WEEKS     Social History     Tobacco Use   Smoking Status Never Smoker   Smokeless Tobacco Never Used       Allergies:   Allergies   Allergen Reactions    Atorvastatin      Reaction unknown 10/23/2018-    Other Rash     Patient sensitive to adhesives from tape       Medications:     Current Outpatient Medications:     ACCU-CHEK RUBINA PLUS test strip, 1 each by Other route daily Use as instructed, Disp: 100 each, Rfl: 2    ACCU-CHEK SOFTCLIX LANCETS lancets, by Other route daily Use daily as instructed , Disp: 100 each, Rfl: 2    acetaminophen (TYLENOL) 500 mg tablet, Take 1 tablet (500 mg total) by mouth every 6 (six) hours as needed for mild pain or moderate pain, Disp: 30 tablet, Rfl: 0    amiodarone 200 mg tablet, Take 1 tablet (200 mg total) by mouth daily TAKE ONE TABLET BY MOUTH DAILY, Disp: 30 tablet, Rfl: 1    ASPIRIN LOW DOSE 81 MG EC tablet, TAKE ONE TABLET BY MOUTH EVERY DAY, Disp: 90 tablet, Rfl: 0    Calcium Carbonate-Vitamin D (CALTRATE 600+D PO), Take 1 capsule by mouth 2 (two) times a day  , Disp: , Rfl:     isosorbide mononitrate (IMDUR) 30 mg 24 hr tablet, Take 1 tablet (30 mg total) by mouth daily, Disp: 90 tablet, Rfl: 3    levothyroxine 25 mcg tablet, TAKE ONE TABLET BY MOUTH EVERY DAY, Disp: 90 tablet, Rfl: 1    lisinopril (ZESTRIL) 2 5 mg tablet, TAKE ONE TABLET BY MOUTH EVERY DAY, Disp: 90 tablet, Rfl: 1    metoprolol succinate (TOPROL XL) 25 mg 24 hr tablet, Take 1 tablet (25 mg total) by mouth daily after dinner, Disp: 90 tablet, Rfl: 3    metroNIDAZOLE (METROCREAM) 0 75 % cream, Apply topically 2 (two) times a day, Disp: 45 g, Rfl: 0    Misc Natural Products (ENERGY FOCUS PO), Take 1 tablet by mouth 2 (two) times a day  , Disp: , Rfl:     multivitamin-iron-minerals-folic acid (CENTRUM) chewable tablet, Chew 1 tablet daily  , Disp: , Rfl:     pravastatin (PRAVACHOL) 80 mg tablet, Take 1 tablet (80 mg total) by mouth daily at bedtime, Disp: 30 tablet, Rfl: 11    warfarin (COUMADIN) 2 5 mg tablet, Take 1 tablet (2 5 mg total) by mouth daily, Disp: 135 tablet, Rfl: 3    warfarin (COUMADIN) 2 5 mg tablet, TAKE 1 TO 2 TABS BY MOUTH DAILY OR AS DIRECTED BY PHYSICIAN, Disp: 60 tablet, Rfl: 11      Vitals:    02/20/20 1252   BP: 110/60   Pulse: 84     Weight (last 2 days)     Date/Time   Weight    02/20/20 1252   82 1 (180 9)            Physical Exam   Constitutional: She is oriented to person, place, and time  She appears well-developed and well-nourished  No distress  HENT:   Head: Normocephalic and atraumatic  Mouth/Throat: Oropharynx is clear and moist    Eyes: Pupils are equal, round, and reactive to light  Conjunctivae and EOM are normal  No scleral icterus  Neck: Normal range of motion  Neck supple  No tracheal deviation present  No thyromegaly present  Cardiovascular: Normal rate, regular rhythm and intact distal pulses  Exam reveals no gallop and no friction rub  Murmur heard  Pulmonary/Chest: Effort normal and breath sounds normal  No respiratory distress  She has no wheezes  She has no rales  Abdominal: Soft  Bowel sounds are normal  She exhibits no distension  There is no tenderness  Musculoskeletal: Normal range of motion  She exhibits no edema, tenderness or deformity  Neurological: She is alert and oriented to person, place, and time  No cranial nerve deficit  Skin: Skin is warm and dry  No rash noted  She is not diaphoretic  No erythema  Psychiatric: She has a normal mood and affect   Judgment normal          Laboratory Studies:  Lab Results   Component Value Date    HGBA1C 5 6 08/13/2019    HGBA1C 6 0 01/30/2019    HGBA1C 5 7 09/07/2018     (L) 06/23/2015     05/21/2015     (L) 05/16/2015    K 4 2 08/13/2019    K 4 1 03/10/2019    K 4 0 03/09/2019    K 4 3 06/23/2015    K 3 6 05/21/2015    K 4 7 05/16/2015    CL 98 (L) 08/13/2019     03/10/2019     03/09/2019    CL 98 (L) 06/23/2015    CL 93 (L) 05/21/2015    CL 98 (L) 05/16/2015    CO2 31 08/13/2019    CO2 26 03/10/2019    CO2 31 03/09/2019    CO2 28 06/23/2015    CO2 37 (H) 05/21/2015    CO2 28 05/16/2015    GLUCOSE 90 06/23/2015    GLUCOSE 146 (H) 05/21/2015 GLUCOSE 132 05/16/2015    CREATININE 0 84 08/13/2019    CREATININE 0 71 03/10/2019    CREATININE 0 66 03/09/2019    CREATININE 0 68 06/23/2015    CREATININE 0 91 05/21/2015    CREATININE 0 75 05/16/2015    BUN 19 08/13/2019    BUN 16 03/10/2019    BUN 20 03/09/2019    BUN 16 06/23/2015    BUN 20 05/21/2015    BUN 18 05/16/2015    MG 2 0 12/11/2018    MG 2 3 12/10/2018    MG 1 8 12/10/2018    MG 2 1 05/16/2015    MG 1 9 05/15/2015    MG 1 7 05/14/2015     Lab Results   Component Value Date    WBC 5 82 08/13/2019    WBC 4 85 06/23/2015    RBC 4 07 08/13/2019    RBC 4 00 06/23/2015    HGB 13 0 08/13/2019    HGB 11 4 (L) 06/23/2015    HCT 39 5 08/13/2019    HCT 34 9 06/23/2015    MCV 97 08/13/2019    MCV 87 06/23/2015    MCH 31 9 08/13/2019    MCH 28 5 06/23/2015    RDW 12 5 08/13/2019    RDW 17 0 (H) 06/23/2015     08/13/2019     06/23/2015     NT-proBNP: No results for input(s): NTBNP in the last 72 hours  Coags:  Results from last 7 days   Lab Units 02/21/20   INR  1 80*     Lipid Profile:   Lab Results   Component Value Date    CHOL 210 11/24/2015     Lab Results   Component Value Date    HDL 57 08/13/2019     Lab Results   Component Value Date    LDLCALC 102 (H) 08/13/2019     Lab Results   Component Value Date    TRIG 162 (H) 08/13/2019       Cardiac testing:   EKG reviewed personally:       ECHO  2/20 - EF normal, mod to sev low gradient AS    Stress Myoview 3/19-EF normal, small amount of apical ischemia    Catheterization   2017-LAD stent placed    Device check   4/15/2019-normal device function, 6 seconds of AFib  1/29 - 99% v paced, no NSVT or Afib    Army Nadine MD    Portions of the record may have been created with voice recognition software   Occasional wrong word or "sound a like" substitutions may have occurred due to the inherent limitations of voice recognition software   Read the chart carefully and recognize, using context, where substitutions have occurred

## 2020-02-21 ENCOUNTER — ANTICOAG VISIT (OUTPATIENT)
Dept: CARDIOLOGY CLINIC | Facility: CLINIC | Age: 85
End: 2020-02-21

## 2020-02-21 DIAGNOSIS — I48.0 PAROXYSMAL ATRIAL FIBRILLATION (HCC): ICD-10-CM

## 2020-02-21 LAB — INR PPP: 1.8 (ref 0.84–1.19)

## 2020-02-21 NOTE — PROGRESS NOTES
SPOKE TO PT / NO MISSED DOSES OR CHANGES IN MEDS  PT IS TO TAKE 3 75 MG ON SUN AND THUR AND 2 5 MG ALL OTHER DAYS   [facility] CALLED TO TEST IN 2 WKS

## 2020-03-05 ENCOUNTER — OFFICE VISIT (OUTPATIENT)
Dept: CARDIAC SURGERY | Facility: CLINIC | Age: 85
End: 2020-03-05
Payer: MEDICARE

## 2020-03-05 VITALS
HEART RATE: 90 BPM | OXYGEN SATURATION: 96 % | SYSTOLIC BLOOD PRESSURE: 142 MMHG | TEMPERATURE: 97.9 F | DIASTOLIC BLOOD PRESSURE: 76 MMHG | RESPIRATION RATE: 14 BRPM | BODY MASS INDEX: 38.41 KG/M2 | WEIGHT: 183 LBS | HEIGHT: 58 IN

## 2020-03-05 DIAGNOSIS — I35.0 NONRHEUMATIC AORTIC VALVE STENOSIS: Primary | ICD-10-CM

## 2020-03-05 PROCEDURE — 99204 OFFICE O/P NEW MOD 45 MIN: CPT | Performed by: NURSE PRACTITIONER

## 2020-03-05 NOTE — LETTER
March 5, 2020     Ninfa MoreiraOmeri [id number]    Patient: Catalino Pina   YOB: 1929   Date of Visit: 3/5/2020       Dear Dr Beck Mckeon:    Thank you for referring Tggeorgette Verde to me for evaluation  Below are my notes for this consultation  If you have questions, please do not hesitate to call me  I look forward to following your patient along with you  Sincerely,        Corby Raines MD        CC: Mya Lawton MD  [address]  3/5/2020 11:44 AM  Attested  Consultation - Cardiothoracic Surgery   Catalino Pina 80 y o  female MRN: [id number]    Physician Requesting Consult: Ninfa Moreira MD  PCP: Mya Lawton MD    Reason for Consult / Principal Problem: Aortic stenosis, Non-Rheumatic    History of Present Illness: Catalino Pina is a 80y o  year old female who presents for initial outpatient surgical consultation for severe aortic stenosis  Her PMHx is notable for CAD s/p STONE LAD, PAF/A Flutter s/p ablation (2015), 2 degree HB s/p PPM, HTN, HLD, NSVT, hypothyroidism,OA w/ ambulatory dysfunction, h/o DVT/?PE s/p R THR (2005), DM2 and remote renal calculi  Patient was admitte dto the hospital 10/2018 with near syncope, w/u negative and echo demonstrated mild AS  She returned tot he hospital 12/2018 with syncope and found to have Mobitz II HB & LBBB and subsequently underwent PPM implantation  She was admitted to the hospital 3/2019 with CP, negative troponin & w/u  She was then noted to have NSVT on her PPM device check  She was treated conservitative with Amiodarone and referred for EP consultation  She was seen by Dr Ulices Vernon 12/2019 and echo ordered to reassess LV function  Echo was performed 2/4/20 and demonstrated progression of her aortic stenosis to a moderate to severe range with an JN 0 94 cm2 and MG 16 mm Hg; EF 60%  Patient is accompanied today by her 80 y o , daughter and niece   Patient states she and her  live independently in a single story home  She is independent with ADL's and uses a walker due to ambulatory dysfunction primarily due to arthritis  Neither she nor her  drive and rely on family for transportation  She resorts [place] if she walks or moves "too fast " She admits to occasional lightheadedness and mild LE edema  She denies chest pain, palpitations, PND or orthopnea  She states she sleeps well and has a good appetite  Patient is a lifelong non-smoker  She currently does not drink alcohol  She denies allergies   She report she has a few missing teeth and obtains routine dental care, last visit approximately 6 months ago    Past Medical History:  Past Medical History:   Diagnosis Date    Abnormal nuclear stress test     last assessed 04/03/2017    Anxiety     Arthritis     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    Bundle branch block, left     Cardiomyopathy ([facility] [address] )     Chronic pain     chronic b/l shoulder pain    Coronary artery disease     Diabetes mellitus ([facility] )     GERD (gastroesophageal reflux disease)     H/O atrial flutter     History of DVT (deep vein thrombosis)     History of pulmonary embolism     Hyperlipidemia     Hypertension     Hypothyroid     Renal calculi     Shortness of breath          Past Surgical History:   Past Surgical History:   Procedure Laterality Date    ATRIAL ABLATION SURGERY  01/2015    CARDIOVERSION      CHELA/DCCV 10/22/2014    CARPAL TUNNEL RELEASE Right     CATARACT EXTRACTION      CORONARY ANGIOPLASTY WITH STENT PLACEMENT      HYSTERECTOMY      JOINT REPLACEMENT Right     AL AMPUTATION TOE,MT-P JT Left 10/11/2017    Procedure: AMPUTATION SECOND TOE;  Surgeon: Ana Low DPM;  [facility];  Service: Podiatry    TONSILLECTOMY      TOTAL HIP ARTHROPLASTY      Right         Family History:  Family History   Problem Relation Age of Onset    Parkinsonism Sister     Cancer Sister         unknown type    Heart attack Neg Hx     Stroke Neg Hx     Anuerysm Neg Hx     Clotting disorder Neg Hx     Arrhythmia Neg Hx     Heart failure Neg Hx     Coronary artery disease Neg Hx          Social History:    Social History     Substance and Sexual Activity   Alcohol Use Yes    Comment: occasional     Social History     Substance and Sexual Activity   Drug Use Yes    Types: Marijuana    Comment: MEDICAL MARIJUANA-HAS NOT USED FOR 3 WEEKS     Social History     Tobacco Use   Smoking Status Never Smoker   Smokeless Tobacco Never Used         Home Medications:   Prior to Admission medications    Medication Sig Start Date End Date Taking? Authorizing Provider   ACCU-CHEK RUBINA PLUS test strip 1 each by Other route daily Use as instructed 6/14/19   Isidro Brenner MD   ACCU-CHEK SOFTCLIX LANCETS lancets by Other route daily Use daily as instructed   9/13/19   Isidro Brenner MD   acetaminophen (TYLENOL) 500 mg tablet Take 1 tablet (500 mg total) by mouth every 6 (six) hours as needed for mild pain or moderate pain 12/13/18   Mainor Allan MD   amiodarone 200 mg tablet Take 1 tablet (200 mg total) by mouth daily TAKE ONE TABLET BY MOUTH DAILY 9/23/19   Tyrone Sauer PA-C   ASPIRIN LOW DOSE 81 MG EC tablet TAKE ONE TABLET BY MOUTH EVERY DAY 1/28/20   Fito Garcia DO   Calcium Carbonate-Vitamin D (CALTRATE 600+D PO) Take 1 capsule by mouth 2 (two) times a day      Historical Provider, MD   isosorbide mononitrate (IMDUR) 30 mg 24 hr tablet Take 1 tablet (30 mg total) by mouth daily 4/15/19   Kelvin Sequeira MD   levothyroxine 25 mcg tablet TAKE ONE TABLET BY MOUTH EVERY DAY 11/11/19   Isidro Brenner MD   lisinopril (ZESTRIL) 2 5 mg tablet TAKE ONE TABLET BY MOUTH EVERY DAY 1/14/20   Isidro Brenner MD   metoprolol succinate (TOPROL XL) 25 mg 24 hr tablet Take 1 tablet (25 mg total) by mouth daily after dinner 4/15/19   Kelvin Sequeira MD   metroNIDAZOLE (METROCREAM) 0 75 % cream Apply topically 2 (two) times a day 1/11/19   Isidro Brenner MD   Misc Natural Products (ENERGY FOCUS PO) Take 1 tablet by mouth 2 (two) times a day      Historical Provider, MD   multivitamin-iron-minerals-folic acid (CENTRUM) chewable tablet Chew 1 tablet daily  Historical Provider, MD   pravastatin (PRAVACHOL) 80 mg tablet Take 1 tablet (80 mg total) by mouth daily at bedtime 8/21/19 8/20/20  Philomena Fontenot MD   warfarin (COUMADIN) 2 5 mg tablet Take 1 tablet (2 5 mg total) by mouth daily 10/2/19   Philomena Fontenot MD   warfarin (COUMADIN) 2 5 mg tablet TAKE 1 TO 2 TABS BY MOUTH DAILY OR AS DIRECTED BY PHYSICIAN 11/25/19   Aneesh Santamaria MD       Allergies: Allergies   Allergen Reactions    Atorvastatin      Reaction unknown 10/23/2018-    Other Rash     Patient sensitive to adhesives from tape       Review of Systems:  Review of Systems - History obtained from chart review and the patient  General ROS: negative  Psychological ROS: negative  Ophthalmic ROS: negative  ENT ROS: negative  Allergy and Immunology ROS: denies allergy to IV contrast  Hematological and Lymphatic ROS: negative for - bleeding problems, bruising or jaundice  Endocrine ROS: negative  Respiratory ROS: no cough, shortness of breath, or wheezing  Cardiovascular ROS: positive for - dyspnea on exertion, edema and murmur  negative for - no loss of consciousness recently since PPM, orthopnea, palpitations or paroxysmal nocturnal dyspnea  Gastrointestinal ROS: no abdominal pain, change in bowel habits, or black or bloody stools  Genito-Urinary ROS: no dysuria, trouble voiding, or hematuria  Musculoskeletal ROS: positive for - gait disturbance, joint pain and joint stiffness  Neurological ROS: positive for occasional lightheadedness and memory loss   NO CVA or TIA symptoms  Dermatological ROS: negative    Vital Signs:     Vitals:    03/05/20 1000 03/05/20 1002   BP: 136/72 142/76   BP Location: Left arm Right arm   Cuff Size: Adult Adult   Pulse: 90    Resp: 14    Temp: 97 9 °F (36 6 °C)    TempSrc: Tympanic    SpO2: 96% Weight: 83 kg (183 lb)    Height: 4' 10" (1 473 m)        Physical Exam:    General: Alert, oriented, well developed, no acute distress  HEENT/NECK:  PERRLA  No jugular venous distention  Cardiac:Regular rate and rhythm, II/VI harsh systolic murmur RUSB   Carotid arteries: 1+ pulses, delayed upstrokes, no bruits  Pulmonary:  Breath sounds clear bilaterally  Abdomen:  Non-tender, Non-distended  Positive bowel sounds  Upper extremities: 2+ radial pulses; brisk capillary refill, hands warm  Lower extremities: Extremities warm/dry  PT/DP pulses 1+ bilaterally  1+ edema B/L  Neuro: Alert and oriented X 3  Sensation is grossly intact  No focal deficits  Musculoskeletal: MAEE, ambulates with cane  Skin: Warm/Dry, without rashes or lesions  Lab Results:     Lab Results   Component Value Date    HGBA1C 5 6 08/13/2019     Lab Results   Component Value Date    CKTOTAL 52 07/07/2016    TROPONINI <0 02 03/09/2019       Imaging Studies:     Echocardiogram: 2/4/2020  LEFT VENTRICLE:  Systolic function was normal  Ejection fraction was estimated to be 60 %  Although no diagnostic regional wall motion abnormality was identified, this possibility cannot be completely excluded on the basis of this study  Wall thickness was mildly increased  Doppler parameters were consistent with abnormal left ventricular relaxation (grade 1 diastolic dysfunction)      MITRAL VALVE:  There was mild regurgitation      AORTIC VALVE:  The valve was trileaflet  Leaflets exhibited moderately to markedly increased thickness and markedly reduced cuspal separation  There was moderate to severe stenosis  Valve mean gradient was 16 mmHg  The aortic valve obstructive index (by VTI) was 0 3  Estimated aortic valve area (by VTI) was 0 94 cmï¾²       TRICUSPID VALVE:  There was mild regurgitation      I have personally reviewed pertinent films in PACS    TAVR evaluation Assessment:     Mary Munoz 122: II    5 Meter Walk: deferred    STS risk score (preliminary): 6 7%    Assessment:  Patient Active Problem List    Diagnosis Date Noted    Nonrheumatic aortic valve stenosis 02/20/2020    Primary osteoarthritis of left knee 10/15/2019    Primary osteoarthritis of right knee 10/15/2019    Effusion of left knee 10/15/2019    Ventricular tachycardia ([facility] [address] ) 08/15/2019    Urinary urgency 08/15/2019    Cardiomyopathy ([facility] [address] ) 08/15/2019    Chronic pain of right knee 04/02/2019    Primary osteoarthritis of both knees 04/02/2019    Trochanteric bursitis of right hip 04/02/2019    Chest pain 03/09/2019    Atrial fibrillation ([facility] [address] ) 03/04/2019    Pacemaker 01/11/2019    Ambulatory dysfunction 12/12/2018    Heart block AV second degree 12/09/2018    Cervical spine degeneration 09/28/2018    Primary osteoarthritis of both shoulders 09/28/2018    Tremors of nervous system 09/28/2018    Right hip pain 03/02/2018    Gait disturbance 03/02/2018    History of placement of stent in LAD coronary artery 01/26/2018    Hyperlipidemia 01/26/2018    History of atrial flutter 01/26/2018    LBBB (left bundle branch block) 01/26/2018    Coronary artery disease involving native coronary artery of native heart without angina pectoris 04/07/2017    Essential hypertension 04/07/2017    Chronic low back pain 08/16/2016    Hypothyroidism 01/27/2016    Type 2 diabetes mellitus without complication, without long-term current use of insulin ([facility] [address] ) 06/19/2015    Adhesive capsulitis 06/19/2015     Severe aortic stenosis; Ongoing TAVR workup    Plan:    Darryl Nelson has moderate to severe  aortic stenosis  Her most recent echocardiogram is of poor quality and difficulty to make an accurate assessment of her AS  Her symptoms, by her report, are mild  We recommend a 3D CHELA to better evaluate the aortic valve prior to ordering the remaining TAVR preop tests  Patient will return to review 3D CHELA results       Loraine Trujillo her family were comfortable with our recommendations, and their questions were answered to their satisfaction  Thank you for allowing us to participate in the care of this patient  Routine referral to gastroenterology for colonoscopy screening was not indicated, as the patient is over 76years old    SIGNATURE: FERMIN Weston  DATE: March 5, 2020  TIME: 10:45 AM  Attestation signed by Mehdi Johnson MD at 3/5/2020 11:49 AM:  Patient seen and evaluated with Tania Carlin / SONNY  I agree with the above assessment and plan with the following additions  The patient is a 79-year-old female with aortic stenosis, transthoracic echocardiogram shows a normal EF, valve area is 0 94 and she has a low gradient, the valve does look calcified    This study quality is not ideal   She appears to have symptoms, although her activity is very limited secondary to arthritis  I explained the diagnosis to the patient and the treatment options, at this point we are going to send her for preoperative testing and if indicated proceed with TAVR

## 2020-03-05 NOTE — H&P (VIEW-ONLY)
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), DM2 and remote renal calculi  Patient was admitte dto the hospital 10/2018 with near syncope, w/u negative and echo demonstrated mild AS  She returned tot  hospital 12/2018 with syncope and found to have Mobitz II HB & LBBB and subsequently underwent PPM implantation  She was admitted to the hospital 3/2019 with CP, negative troponin & w/u  She was then noted to have NSVT on her PPM device check  She was treated conservitative with Amiodarone and referred for EP consultation  She was seen by Dr Jacky Scheuermann 12/2019 and echo ordered to reassess LV function  Echo was performed 2/4/20 and demonstrated progression of her aortic stenosis to a moderate to severe range with an JN 0 94 cm2 and MG 16 mm Hg; EF 60%  Patient is accompanied today by her 80 y o , daughter and niece  Patient states she and her  live independently in a single story home  She is independent with ADL's and uses a walker due to ambulatory dysfunction primarily due to arthritis  Neither she nor her  drive and rely on family for transportation  She resorts PEREIRA if she walks or moves "too fast " She admits to occasional lightheadedness and mild LE edema  She denies chest pain, palpitations, PND or orthopnea  She states she sleeps well and has a good appetite  Patient is a lifelong non-smoker  She currently does not drink alcohol   She denies allergies   She report she has a few missing teeth and obtains routine dental care, last visit approximately 6 months ago    Past Medical History:  Past Medical History:   Diagnosis Date    Abnormal nuclear stress test     last assessed 04/03/2017    Anxiety     Arthritis     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    Bundle branch block, left     Cardiomyopathy [facility] [address]     Chronic pain     chronic b/l shoulder pain    Coronary artery disease     Diabetes mellitus ([facility] )     GERD (gastroesophageal reflux disease)     H/O atrial flutter     History of DVT (deep vein thrombosis)     History of pulmonary embolism     Hyperlipidemia     Hypertension     Hypothyroid     Renal calculi     Shortness of breath          Past Surgical History:   Past Surgical History:   Procedure Laterality Date    ATRIAL ABLATION SURGERY  01/2015    CARDIOVERSION      CHELA/DCCV 10/22/2014    CARPAL TUNNEL RELEASE Right     CATARACT EXTRACTION      CORONARY ANGIOPLASTY WITH STENT PLACEMENT      HYSTERECTOMY      JOINT REPLACEMENT Right     [place] AMPUTATION TOE,MT-P JT Left 10/11/2017    Procedure: AMPUTATION SECOND TOE;  Surgeon: Daiana Arroyo DPM;  [facility];  Service: Podiatry    TONSILLECTOMY      TOTAL HIP ARTHROPLASTY      Right         Family History:  Family History   Problem Relation Age of Onset    Parkinsonism Sister     Cancer Sister         unknown type    Heart attack Neg Hx     Stroke Neg Hx     Anuerysm Neg Hx     Clotting disorder Neg Hx     Arrhythmia Neg Hx     Heart failure Neg Hx     Coronary artery disease Neg Hx          Social History:    Social History     Substance and Sexual Activity   Alcohol Use Yes    Comment: occasional     Social History     Substance and Sexual Activity   Drug Use Yes    Types: Marijuana    Comment: MEDICAL MARIJUANA-HAS NOT USED FOR 3 WEEKS     Social History     Tobacco Use   Smoking Status Never Smoker   Smokeless Tobacco Never Used Home Medications:   Prior to Admission medications    Medication Sig Start Date End Date Taking? Authorizing Provider   ACCU-CHEK RUBINA PLUS test strip 1 each by Other route daily Use as instructed 6/14/19   Rupal Shi MD   ACCU-CHEK SOFTCLIX LANCETS lancets by Other route daily Use daily as instructed  9/13/19   Rupal Shi MD   acetaminophen (TYLENOL) 500 mg tablet Take 1 tablet (500 mg total) by mouth every 6 (six) hours as needed for mild pain or moderate pain 12/13/18   Catherine Hidalgo MD   amiodarone 200 mg tablet Take 1 tablet (200 mg total) by mouth daily TAKE ONE TABLET BY MOUTH DAILY 9/23/19   Joshua Chaves PA-C   ASPIRIN LOW DOSE 81 MG EC tablet TAKE ONE TABLET BY MOUTH EVERY DAY 1/28/20   Fito Traore DO   Calcium Carbonate-Vitamin D (CALTRATE 600+D PO) Take 1 capsule by mouth 2 (two) times a day      Historical Provider, MD   isosorbide mononitrate (IMDUR) 30 mg 24 hr tablet Take 1 tablet (30 mg total) by mouth daily 4/15/19   Philomena Fontenot MD   levothyroxine 25 mcg tablet TAKE ONE TABLET BY MOUTH EVERY DAY 11/11/19   Rupal Shi MD   lisinopril (ZESTRIL) 2 5 mg tablet TAKE ONE TABLET BY MOUTH EVERY DAY 1/14/20   Rupal Shi MD   metoprolol succinate (TOPROL XL) 25 mg 24 hr tablet Take 1 tablet (25 mg total) by mouth daily after dinner 4/15/19   Philomena Fontenot MD   metroNIDAZOLE (METROCREAM) 0 75 % cream Apply topically 2 (two) times a day 1/11/19   Rupal Shi MD   [facility] Natural Products (ENERGY FOCUS PO) Take 1 tablet by mouth 2 (two) times a day      Historical Provider, MD   multivitamin-iron-minerals-folic acid (CENTRUM) chewable tablet Chew 1 tablet daily      Historical Provider, MD   pravastatin (PRAVACHOL) 80 mg tablet Take 1 tablet (80 mg total) by mouth daily at bedtime 8/21/19 8/20/20  Philomena Fontenot MD   warfarin (COUMADIN) 2 5 mg tablet Take 1 tablet (2 5 mg total) by mouth daily 10/2/19   Philomena Fontenot MD   warfarin (COUMADIN) 2 5 mg tablet TAKE 1 TO 2 TABS BY MOUTH DAILY OR AS DIRECTED BY PHYSICIAN 11/25/19   Armen Witt MD       Allergies: Allergies   Allergen Reactions    Atorvastatin      Reaction unknown 10/23/2018-    Other Rash     Patient sensitive to adhesives from tape       Review of Systems:  Review of Systems - History obtained from chart review and the patient  General ROS: negative  Psychological ROS: negative  Ophthalmic ROS: negative  ENT ROS: negative  Allergy and Immunology ROS: denies allergy to IV contrast  Hematological and Lymphatic ROS: negative for - bleeding problems, bruising or jaundice  Endocrine ROS: negative  Respiratory ROS: no cough, shortness of breath, or wheezing  Cardiovascular ROS: positive for - dyspnea on exertion, edema and murmur  negative for - no loss of consciousness recently since PPM, orthopnea, palpitations or paroxysmal nocturnal dyspnea  Gastrointestinal ROS: no abdominal pain, change in bowel habits, or black or bloody stools  Genito-Urinary ROS: no dysuria, trouble voiding, or hematuria  Musculoskeletal ROS: positive for - gait disturbance, joint pain and joint stiffness  Neurological ROS: positive for occasional lightheadedness and memory loss  NO CVA or TIA symptoms  Dermatological ROS: negative    Vital Signs:     Vitals:    03/05/20 1000 03/05/20 1002   BP: 136/72 142/76   BP Location: Left arm Right arm   Cuff Size: Adult Adult   Pulse: 90    Resp: 14    Temp: 97 9 °F (36 6 °C)    TempSrc: Tympanic    SpO2: 96%    Weight: 83 kg (183 lb)    Height: 4' 10" (1 473 m)        Physical Exam:    General: Alert, oriented, well developed, no acute distress  HEENT/NECK:  PERRLA  No jugular venous distention  Cardiac:Regular rate and rhythm, II/VI harsh systolic murmur RUSB   Carotid arteries: 1+ pulses, delayed upstrokes, no bruits  Pulmonary:  Breath sounds clear bilaterally  Abdomen:  Non-tender, Non-distended  Positive bowel sounds    Upper extremities: 2+ radial pulses; brisk capillary refill, hands warm  Lower extremities: Extremities warm/dry  PT/DP pulses 1+ bilaterally  1+ edema B/L  Neuro: Alert and oriented X 3  Sensation is grossly intact  No focal deficits  Musculoskeletal: MAEE, ambulates with cane  Skin: Warm/Dry, without rashes or lesions  Lab Results:     Lab Results   Component Value Date    HGBA1C 5 6 08/13/2019     Lab Results   Component Value Date    CKTOTAL 52 07/07/2016    TROPONINI <0 02 03/09/2019       Imaging Studies:     Echocardiogram: 2/4/2020  LEFT VENTRICLE:  Systolic function was normal  Ejection fraction was estimated to be 60 %  Although no diagnostic regional wall motion abnormality was identified, this possibility cannot be completely excluded on the basis of this study  Wall thickness was mildly increased  Doppler parameters were consistent with abnormal left ventricular relaxation (grade 1 diastolic dysfunction)      MITRAL VALVE:  There was mild regurgitation      AORTIC VALVE:  The valve was trileaflet  Leaflets exhibited moderately to markedly increased thickness and markedly reduced cuspal separation  There was moderate to severe stenosis  Valve mean gradient was 16 mmHg  The aortic valve obstructive index (by VTI) was 0 3  Estimated aortic valve area (by VTI) was 0 94 cmï¾²       TRICUSPID VALVE:  There was mild regurgitation      I have personally reviewed pertinent films in PACS    TAVR evaluation Assessment:     Mary Munoz 122: II    5 Meter Walk: deferred    STS risk score (preliminary): 6 7%    Assessment:  Patient Active Problem List    Diagnosis Date Noted    Nonrheumatic aortic valve stenosis 02/20/2020    Primary osteoarthritis of left knee 10/15/2019    Primary osteoarthritis of right knee 10/15/2019    Effusion of left knee 10/15/2019    Ventricular tachycardia ([facility] [address] ) 08/15/2019    Urinary urgency 08/15/2019    Cardiomyopathy ([facility] [address] ) 08/15/2019    Chronic pain of right knee 04/02/2019    Primary osteoarthritis of both knees 04/02/2019    Trochanteric bursitis of right hip 04/02/2019    Chest pain 03/09/2019    Atrial fibrillation ([facility] [address] [facility] 03/04/2019    Pacemaker 01/11/2019    Ambulatory dysfunction 12/12/2018    Heart block AV second degree 12/09/2018    Cervical spine degeneration 09/28/2018    Primary osteoarthritis of both shoulders 09/28/2018    Tremors of nervous system 09/28/2018    Right hip pain 03/02/2018    Gait disturbance 03/02/2018    History of placement of stent in LAD coronary artery 01/26/2018    Hyperlipidemia 01/26/2018    History of atrial flutter 01/26/2018    LBBB (left bundle branch block) 01/26/2018    Coronary artery disease involving native coronary artery of native heart without angina pectoris 04/07/2017    Essential hypertension 04/07/2017    Chronic low back pain 08/16/2016    Hypothyroidism 01/27/2016    Type 2 diabetes mellitus without complication, without long-term current use of insulin ([facility] [address] ) 06/19/2015    Adhesive capsulitis 06/19/2015     Severe aortic stenosis; Ongoing TAVR workup    Plan:    Shaka Person has moderate to severe  aortic stenosis  Her most recent echocardiogram is of poor quality and difficulty to make an accurate assessment of her AS  Her symptoms, by her report, are mild  We recommend a 3D CHELA to better evaluate the aortic valve prior to ordering the remaining TAVR preop tests  Patient will return to review 3D CHELA results  Kaci Staples her family were comfortable with our recommendations, and their questions were answered to their satisfaction  Thank you for allowing us to participate in the care of this patient       Routine referral to gastroenterology for colonoscopy screening was not indicated, as the patient is over 76years old    SIGNATURE: FERMIN Galvez  DATE: March 5, 2020  TIME: 10:45 AM

## 2020-03-05 NOTE — PROGRESS NOTES
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), DM2 and remote renal calculi  Patient was admitte dto the hospital 10/2018 with near syncope, w/u negative and echo demonstrated mild AS  She returned tot  hospital 12/2018 with syncope and found to have Mobitz II HB & LBBB and subsequently underwent PPM implantation  She was admitted to the hospital 3/2019 with CP, negative troponin & w/u  She was then noted to have NSVT on her PPM device check  She was treated conservitative with Amiodarone and referred for EP consultation  She was seen by Dr Ulices Vernon 12/2019 and echo ordered to reassess LV function  Echo was performed 2/4/20 and demonstrated progression of her aortic stenosis to a moderate to severe range with an JN 0 94 cm2 and MG 16 mm Hg; EF 60%  Patient is accompanied today by her 80 y o , daughter and niece  Patient states she and her  live independently in a single story home  She is independent with ADL's and uses a walker due to ambulatory dysfunction primarily due to arthritis  Neither she nor her  drive and rely on family for transportation  She resorts [place] if she walks or moves "too fast " She admits to occasional lightheadedness and mild LE edema  She denies chest pain, palpitations, PND or orthopnea  She states she sleeps well and has a good appetite  Patient is a lifelong non-smoker  She currently does not drink alcohol   She denies allergies   She report she has a few missing teeth and obtains routine dental care, last visit approximately 6 months ago    Past Medical History:  Past Medical History:   Diagnosis Date    Abnormal nuclear stress test     last assessed 04/03/2017    Anxiety     Arthritis     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    Bundle branch block, left     Cardiomyopathy ([facility] [address] [facility]     Chronic pain     chronic b/l shoulder pain    Coronary artery disease     Diabetes mellitus ([facility] )     GERD (gastroesophageal reflux disease)     H/O atrial flutter     History of DVT (deep vein thrombosis)     History of pulmonary embolism     Hyperlipidemia     Hypertension     Hypothyroid     Renal calculi     Shortness of breath          Past Surgical History:   Past Surgical History:   Procedure Laterality Date    ATRIAL ABLATION SURGERY  01/2015    CARDIOVERSION      CHELA/DCCV 10/22/2014    CARPAL TUNNEL RELEASE Right     CATARACT EXTRACTION      CORONARY ANGIOPLASTY WITH STENT PLACEMENT      HYSTERECTOMY      JOINT REPLACEMENT Right     [place] AMPUTATION TOE,MT-P JT Left 10/11/2017    Procedure: AMPUTATION SECOND TOE;  Surgeon: Pablo Monsivais DPM;  Location: [facility] OR;  Service: Podiatry    TONSILLECTOMY      TOTAL HIP ARTHROPLASTY      Right         Family History:  Family History   Problem Relation Age of Onset    Parkinsonism Sister     Cancer Sister         unknown type    Heart attack Neg Hx     Stroke Neg Hx     Anuerysm Neg Hx     Clotting disorder Neg Hx     Arrhythmia Neg Hx     Heart failure Neg Hx     Coronary artery disease Neg Hx          Social History:    Social History     Substance and Sexual Activity   Alcohol Use Yes    Comment: occasional     Social History     Substance and Sexual Activity   Drug Use Yes    Types: Marijuana    Comment: MEDICAL MARIJUANA-HAS NOT USED FOR 3 WEEKS     Social History     Tobacco Use   Smoking Status Never Smoker   Smokeless Tobacco Never Used Home Medications:   Prior to Admission medications    Medication Sig Start Date End Date Taking? Authorizing Provider   ACCU-CHEK RUBINA PLUS test strip 1 each by Other route daily Use as instructed 6/14/19   Yanni Davison MD   ACCU-CHEK SOFTCLIX LANCETS lancets by Other route daily Use daily as instructed  9/13/19   Yanni Davison MD   acetaminophen (TYLENOL) 500 mg tablet Take 1 tablet (500 mg total) by mouth every 6 (six) hours as needed for mild pain or moderate pain 12/13/18   Anni Millan MD   amiodarone 200 mg tablet Take 1 tablet (200 mg total) by mouth daily TAKE ONE TABLET BY MOUTH DAILY 9/23/19   Sue Washington PA-C   ASPIRIN LOW DOSE 81 MG EC tablet TAKE ONE TABLET BY MOUTH EVERY DAY 1/28/20   Fito Knutson DO   Calcium Carbonate-Vitamin D (CALTRATE 600+D PO) Take 1 capsule by mouth 2 (two) times a day      Historical Provider, MD   isosorbide mononitrate (IMDUR) 30 mg 24 hr tablet Take 1 tablet (30 mg total) by mouth daily 4/15/19   Amilcar Mena MD   levothyroxine 25 mcg tablet TAKE ONE TABLET BY MOUTH EVERY DAY 11/11/19   Yanni Davison MD   lisinopril (ZESTRIL) 2 5 mg tablet TAKE ONE TABLET BY MOUTH EVERY DAY 1/14/20   Yanni Davison MD   metoprolol succinate (TOPROL XL) 25 mg 24 hr tablet Take 1 tablet (25 mg total) by mouth daily after dinner 4/15/19   Amilcar Mena MD   metroNIDAZOLE (METROCREAM) 0 75 % cream Apply topically 2 (two) times a day 1/11/19   Yanni Davison MD   Misc Natural Products (ENERGY FOCUS PO) Take 1 tablet by mouth 2 (two) times a day      Historical Provider, MD   multivitamin-iron-minerals-folic acid (CENTRUM) chewable tablet Chew 1 tablet daily      Historical Provider, MD   pravastatin (PRAVACHOL) 80 mg tablet Take 1 tablet (80 mg total) by mouth daily at bedtime 8/21/19 8/20/20  Amilcar Mena MD   warfarin (COUMADIN) 2 5 mg tablet Take 1 tablet (2 5 mg total) by mouth daily 10/2/19   Amilcar Mena MD   warfarin (COUMADIN) 2 5 mg tablet TAKE 1 TO 2 TABS BY MOUTH DAILY OR AS DIRECTED BY PHYSICIAN 11/25/19   Gregg Blake MD       Allergies: Allergies   Allergen Reactions    Atorvastatin      Reaction unknown 10/23/2018-    Other Rash     Patient sensitive to adhesives from tape       Review of Systems:  Review of Systems - History obtained from chart review and the patient  General ROS: negative  Psychological ROS: negative  Ophthalmic ROS: negative  ENT ROS: negative  Allergy and Immunology ROS: denies allergy to IV contrast  Hematological and Lymphatic ROS: negative for - bleeding problems, bruising or jaundice  Endocrine ROS: negative  Respiratory ROS: no cough, shortness of breath, or wheezing  Cardiovascular ROS: positive for - dyspnea on exertion, edema and murmur  negative for - no loss of consciousness recently since PPM, orthopnea, palpitations or paroxysmal nocturnal dyspnea  Gastrointestinal ROS: no abdominal pain, change in bowel habits, or black or bloody stools  Genito-Urinary ROS: no dysuria, trouble voiding, or hematuria  Musculoskeletal ROS: positive for - gait disturbance, joint pain and joint stiffness  Neurological ROS: positive for occasional lightheadedness and memory loss  NO CVA or TIA symptoms  Dermatological ROS: negative    Vital Signs:     Vitals:    03/05/20 1000 03/05/20 1002   BP: 136/72 142/76   BP Location: Left arm Right arm   Cuff Size: Adult Adult   Pulse: 90    Resp: 14    Temp: 97 9 °F (36 6 °C)    TempSrc: Tympanic    SpO2: 96%    Weight: 83 kg (183 lb)    Height: 4' 10" (1 473 m)        Physical Exam:    General: Alert, oriented, well developed, no acute distress  HEENT/NECK:  PERRLA  No jugular venous distention  Cardiac:Regular rate and rhythm, II/VI harsh systolic murmur RUSB   Carotid arteries: 1+ pulses, delayed upstrokes, no bruits  Pulmonary:  Breath sounds clear bilaterally  Abdomen:  Non-tender, Non-distended  Positive bowel sounds    Upper extremities: 2+ radial pulses; brisk capillary refill, hands warm  Lower extremities: Extremities warm/dry  PT/DP pulses 1+ bilaterally  1+ edema B/L  Neuro: Alert and oriented X 3  Sensation is grossly intact  No focal deficits  Musculoskeletal: MAEE, ambulates with cane  Skin: Warm/Dry, without rashes or lesions  Lab Results:     Lab Results   Component Value Date    HGBA1C 5 6 08/13/2019     Lab Results   Component Value Date    CKTOTAL 52 07/07/2016    TROPONINI <0 02 03/09/2019       Imaging Studies:     Echocardiogram: 2/4/2020  LEFT VENTRICLE:  Systolic function was normal  Ejection fraction was estimated to be 60 %  Although no diagnostic regional wall motion abnormality was identified, this possibility cannot be completely excluded on the basis of this study  Wall thickness was mildly increased  Doppler parameters were consistent with abnormal left ventricular relaxation (grade 1 diastolic dysfunction)      MITRAL VALVE:  There was mild regurgitation      AORTIC VALVE:  The valve was trileaflet  Leaflets exhibited moderately to markedly increased thickness and markedly reduced cuspal separation  There was moderate to severe stenosis  Valve mean gradient was 16 mmHg  The aortic valve obstructive index (by VTI) was 0 3  Estimated aortic valve area (by VTI) was 0 94 cmï¾²       TRICUSPID VALVE:  There was mild regurgitation      I have personally reviewed pertinent films in PACS    TAVR evaluation Assessment:     Mary Munoz 122: II    5 Meter Walk: deferred    STS risk score (preliminary): 6 7%    Assessment:  Patient Active Problem List    Diagnosis Date Noted    Nonrheumatic aortic valve stenosis 02/20/2020    Primary osteoarthritis of left knee 10/15/2019    Primary osteoarthritis of right knee 10/15/2019    Effusion of left knee 10/15/2019    Ventricular tachycardia ([facility] [address] [facility] 08/15/2019    Urinary urgency 08/15/2019    Cardiomyopathy ([facility] [address] ) 08/15/2019    Chronic pain of right knee 04/02/2019    Primary osteoarthritis of both knees 04/02/2019    Trochanteric bursitis of right hip 04/02/2019    Chest pain 03/09/2019    Atrial fibrillation ([facility] [address] [facility] 03/04/2019    Pacemaker 01/11/2019    Ambulatory dysfunction 12/12/2018    Heart block AV second degree 12/09/2018    Cervical spine degeneration 09/28/2018    Primary osteoarthritis of both shoulders 09/28/2018    Tremors of nervous system 09/28/2018    Right hip pain 03/02/2018    Gait disturbance 03/02/2018    History of placement of stent in LAD coronary artery 01/26/2018    Hyperlipidemia 01/26/2018    History of atrial flutter 01/26/2018    LBBB (left bundle branch block) 01/26/2018    Coronary artery disease involving native coronary artery of native heart without angina pectoris 04/07/2017    Essential hypertension 04/07/2017    Chronic low back pain 08/16/2016    Hypothyroidism 01/27/2016    Type 2 diabetes mellitus without complication, without long-term current use of insulin ([facility] [address] ) 06/19/2015    Adhesive capsulitis 06/19/2015     Severe aortic stenosis; Ongoing TAVR workup    Plan:    Shaka Person has moderate to severe  aortic stenosis  Her most recent echocardiogram is of poor quality and difficulty to make an accurate assessment of her AS  Her symptoms, by her report, are mild  We recommend a 3D CHELA to better evaluate the aortic valve prior to ordering the remaining TAVR preop tests  Patient will return to review 3D CHELA results  Kaci Staples her family were comfortable with our recommendations, and their questions were answered to their satisfaction  Thank you for allowing us to participate in the care of this patient       Routine referral to gastroenterology for colonoscopy screening was not indicated, as the patient is over 76years old    SIGNATURE: FERMIN Galvez  DATE: March 5, 2020  TIME: 10:45 AM

## 2020-03-06 ENCOUNTER — TELEPHONE (OUTPATIENT)
Dept: NON INVASIVE DIAGNOSTICS | Facility: HOSPITAL | Age: 85
End: 2020-03-06

## 2020-03-06 LAB
HBA1C MFR BLD HPLC: 6.2 %
INR PPP: 2 (ref 0.84–1.19)

## 2020-03-09 ENCOUNTER — ANTICOAG VISIT (OUTPATIENT)
Dept: CARDIOLOGY CLINIC | Facility: CLINIC | Age: 85
End: 2020-03-09

## 2020-03-09 DIAGNOSIS — I48.0 PAROXYSMAL ATRIAL FIBRILLATION (HCC): ICD-10-CM

## 2020-03-10 ENCOUNTER — ANESTHESIA EVENT (OUTPATIENT)
Dept: NON INVASIVE DIAGNOSTICS | Facility: HOSPITAL | Age: 85
End: 2020-03-10

## 2020-03-10 ENCOUNTER — HOSPITAL ENCOUNTER (OUTPATIENT)
Dept: NON INVASIVE DIAGNOSTICS | Facility: HOSPITAL | Age: 85
Discharge: HOME | End: 2020-03-10
Payer: MEDICARE

## 2020-03-10 ENCOUNTER — ANESTHESIA (OUTPATIENT)
Dept: NON INVASIVE DIAGNOSTICS | Facility: HOSPITAL | Age: 85
End: 2020-03-10

## 2020-03-10 VITALS
RESPIRATION RATE: 18 BRPM | OXYGEN SATURATION: 97 % | DIASTOLIC BLOOD PRESSURE: 63 MMHG | SYSTOLIC BLOOD PRESSURE: 136 MMHG | HEART RATE: 74 BPM

## 2020-03-10 DIAGNOSIS — I35.0 NONRHEUMATIC AORTIC VALVE STENOSIS: ICD-10-CM

## 2020-03-10 PROCEDURE — 93312 ECHO TRANSESOPHAGEAL: CPT

## 2020-03-10 PROCEDURE — 93320 DOPPLER ECHO COMPLETE: CPT | Performed by: INTERNAL MEDICINE

## 2020-03-10 PROCEDURE — 93312 ECHO TRANSESOPHAGEAL: CPT | Performed by: INTERNAL MEDICINE

## 2020-03-10 PROCEDURE — 93325 DOPPLER ECHO COLOR FLOW MAPG: CPT | Performed by: INTERNAL MEDICINE

## 2020-03-10 RX ORDER — SODIUM CHLORIDE 9 MG/ML
INJECTION, SOLUTION INTRAVENOUS CONTINUOUS PRN
Status: DISCONTINUED | OUTPATIENT
Start: 2020-03-10 | End: 2020-03-10 | Stop reason: SURG

## 2020-03-10 RX ORDER — PROPOFOL 10 MG/ML
INJECTION, EMULSION INTRAVENOUS AS NEEDED
Status: DISCONTINUED | OUTPATIENT
Start: 2020-03-10 | End: 2020-03-10 | Stop reason: SURG

## 2020-03-10 RX ADMIN — PROPOFOL 20 MG: 10 INJECTION, EMULSION INTRAVENOUS at 11:16

## 2020-03-10 RX ADMIN — PROPOFOL 20 MG: 10 INJECTION, EMULSION INTRAVENOUS at 11:29

## 2020-03-10 RX ADMIN — PROPOFOL 30 MG: 10 INJECTION, EMULSION INTRAVENOUS at 11:20

## 2020-03-10 RX ADMIN — PROPOFOL 20 MG: 10 INJECTION, EMULSION INTRAVENOUS at 11:33

## 2020-03-10 RX ADMIN — PROPOFOL 80 MG: 10 INJECTION, EMULSION INTRAVENOUS at 11:08

## 2020-03-10 RX ADMIN — PROPOFOL 20 MG: 10 INJECTION, EMULSION INTRAVENOUS at 11:26

## 2020-03-10 RX ADMIN — PROPOFOL 20 MG: 10 INJECTION, EMULSION INTRAVENOUS at 11:12

## 2020-03-10 RX ADMIN — PROPOFOL 30 MG: 10 INJECTION, EMULSION INTRAVENOUS at 11:23

## 2020-03-10 RX ADMIN — SODIUM CHLORIDE: 0.9 INJECTION, SOLUTION INTRAVENOUS at 10:49

## 2020-03-10 RX ADMIN — PROPOFOL 20 MG: 10 INJECTION, EMULSION INTRAVENOUS at 11:18

## 2020-03-10 RX ADMIN — PROPOFOL 20 MG: 10 INJECTION, EMULSION INTRAVENOUS at 11:10

## 2020-03-10 NOTE — ANESTHESIA POSTPROCEDURE EVALUATION
Post-Op Assessment Note    CV Status:  Stable  Pain Score: 1    Pain management: adequate     Mental Status:  Alert   Hydration Status:  Stable   PONV Controlled:  Controlled   Post Op Vitals Reviewed: Yes      Staff: Anesthesiologist, CRNA           BP      Temp      Pulse     Resp      SpO2

## 2020-03-10 NOTE — DISCHARGE INSTRUCTIONS
Transesophageal Echocardiogram   WHAT YOU NEED TO KNOW:   A transesophageal echocardiogram (CHELA) is a procedure used to check for problems with your heart  It will also show any problems in the blood vessels near your heart  Sound waves are sent to the heart through a tube inserted into your throat  The sound waves show the structure and function of your heart through pictures on a monitor  DISCHARGE INSTRUCTIONS:   Rest:  Rest until you are fully awake  You may be sleepy for a while after your procedure  Do not eat or drink until you are able to swallow normally:  Start with soft foods, such as oatmeal, yogurt, or applesauce  Once you can eat soft foods easily, you may begin to eat solid foods  Follow up with your healthcare provider as directed:  Write down your questions so you remember to ask them during your visits  Contact your healthcare provider if:   · You have a fever or chills  · You taste blood in your mouth  · You have a severe sore throat or difficulty swallowing  · You have questions or concerns about your condition or care  Seek care immediately or call 911 if:   · You have any of the following signs of a heart attack:      ¨ Squeezing, pressure, or pain in your chest that lasts longer than 5 minutes or returns    ¨ Discomfort or pain in your back, neck, jaw, stomach, or arm     ¨ Trouble breathing    ¨ Nausea or vomiting    ¨ Lightheadedness or a sudden cold sweat, especially with chest pain or trouble breathing    © 2017 [address] [place] [address] Street is for End User's use only and may not be sold, redistributed or otherwise used for commercial purposes  All illustrations and images included in CareNotes® are the copyrighted property of [employer] M [employer]  or [employer]  The above information is an  only  It is not intended as medical advice for individual conditions or treatments   Talk to your doctor, nurse or pharmacist before following any medical regimen to see if it is safe and effective for you  Moderate Sedation   WHAT YOU NEED TO KNOW:   Moderate sedation, or conscious sedation, is medicine used during procedures to help you feel relaxed and calm  You will be awake and able to follow directions without anxiety or pain  You will remember little to none of the procedure  You may feel tired, weak, or unsteady on your feet after you get sedation  You may also have trouble concentrating or short-term memory loss  These symptoms should go away in 24 hours or less  DISCHARGE INSTRUCTIONS:   Call 911 or have someone else call for any of the following:   · You have sudden trouble breathing  · You cannot be woken  Seek care immediately if:   · You have a severe headache or dizziness  · Your heart is beating faster than usual   Contact your healthcare provider if:   · You have a fever  · You have nausea or are vomiting for more than 8 hours after the procedure  · Your skin is itchy, swollen, or you have a rash  · You have questions or concerns about your condition or care  Self-care:   · Have someone stay with you for 24 hours  This person can drive you to errands and help you do things around the house  This person can also watch for problems  · Rest and do quiet activities for 24 hours  Do not exercise, ride a bike, or play sports  Stand up slowly to prevent dizziness and falls  Take short walks around the house with another person  Slowly return to your usual activities the next day  · Do not drive or use dangerous machines or tools for 24 hours  You may injure yourself or others  Examples include a lawnmower, saw, or drill  Do not return to work for 24 hours if you use dangerous machines or tools for work  · Do not make important decisions for 24 hours  For example, do not sign important papers or invest money  · Drink liquids as directed  Liquids help flush the sedation medicine out of your body   Ask how much liquid to drink each day and which liquids are best for you  · Eat small, frequent meals to prevent nausea and vomiting  Start with clear liquids such as juice or broth  If you do not vomit after clear liquids, you can eat your usual foods  · Do not drink alcohol or take medicines that make you drowsy  This includes medicines that help you sleep and anxiety medicines  Ask your healthcare provider if it is safe for you to take pain medicine  Follow up with your healthcare provider as directed:  Write down your questions so you remember to ask them during your visits  © 2017 [facility] Information is for End User's use only and may not be sold, redistributed or otherwise used for commercial purposes  All illustrations and images included in CareNotes® are the copyrighted property of [employer]  or [employer]  The above information is an  only  It is not intended as medical advice for individual conditions or treatments  Talk to your doctor, nurse or pharmacist before following any medical regimen to see if it is safe and effective for you

## 2020-03-10 NOTE — ANESTHESIA PREPROCEDURE EVALUATION
Review of Systems/Medical History  Patient summary reviewed  Chart reviewed  No history of anesthetic complications     Cardiovascular  Exercise tolerance (METS): <4,  Pacemaker/AICD (PM for heart block), Hyperlipidemia, Hypertension , Valvular heart disease , aortic valve stenosis, No past MI , CAD , Cardiac stents (LAD) > 1 year History of percutaneous transluminal coronary angioplasty, Dysrhythmias (Hx LBBB  S/p ablation) , atrial flutter and atrial fibrillation, DVT  Comment: TTE 2/2020  LVEF 60 %  Grade 1 diastolic dysfunction   Moderate to severe stenosis  Valve mean gradient was 16 mmHg  The aortic valve obstructive index (by VTI) was 0 3  Estimated aortic valve area (by VTI) was 0 94 cmï¾²  Normal RV  Mild MR, PE (2005 assoc with post-op hip surgery),  Pulmonary  Shortness of breath,        GI/Hepatic    GERD well controlled,        Kidney stones,        Endo/Other  Diabetes well controlled type 2 Oral agent, History of thyroid disease , hypothyroidism,   Obesity    GYN  Negative gynecology ROS          Hematology  Negative hematology ROS   Coagulation disorder (Coumadin, last taken 3/9/2020) currently taking oral anticoagulants,    Musculoskeletal    Arthritis     Neurology      Comment: Frequent falls, near syncope Psychology   Anxiety,              Physical Exam    Airway    Mallampati score: II  TM Distance: >3 FB  Neck ROM: full     Dental   No notable dental hx     Cardiovascular      Pulmonary      Other Findings        Anesthesia Plan  ASA Score- 4     Anesthesia Type- IV sedation with anesthesia with ASA Monitors  Additional Monitors:   Airway Plan:     Comment: Back up GA  Plan Factors-    Induction- intravenous  Postoperative Plan-     Informed Consent- Anesthetic plan and risks discussed with patient  I personally reviewed this patient with the CRNA  Discussed and agreed on the Anesthesia Plan with the CRNA  Jaime Eubanks

## 2020-03-12 DIAGNOSIS — I47.2 VENTRICULAR TACHYCARDIA (HCC): ICD-10-CM

## 2020-03-12 DIAGNOSIS — E11.8 TYPE 2 DIABETES MELLITUS WITH COMPLICATION, WITHOUT LONG-TERM CURRENT USE OF INSULIN (HCC): ICD-10-CM

## 2020-03-12 DIAGNOSIS — E11.9 TYPE 2 DIABETES MELLITUS WITHOUT COMPLICATION, WITHOUT LONG-TERM CURRENT USE OF INSULIN (HCC): Chronic | ICD-10-CM

## 2020-03-12 RX ORDER — AMIODARONE HYDROCHLORIDE 200 MG/1
TABLET ORAL
Qty: 30 TABLET | Refills: 1 | Status: SHIPPED | OUTPATIENT
Start: 2020-03-12 | End: 2020-08-17 | Stop reason: SDUPTHER

## 2020-03-12 RX ORDER — BLOOD SUGAR DIAGNOSTIC
1 STRIP MISCELLANEOUS DAILY
Qty: 100 EACH | Refills: 2 | Status: SHIPPED | OUTPATIENT
Start: 2020-03-12 | End: 2020-12-02

## 2020-03-13 ENCOUNTER — TELEPHONE (OUTPATIENT)
Dept: CARDIOLOGY CLINIC | Facility: CLINIC | Age: 85
End: 2020-03-13

## 2020-03-13 DIAGNOSIS — I35.0 SEVERE AORTIC STENOSIS: Primary | ICD-10-CM

## 2020-03-13 NOTE — TELEPHONE ENCOUNTER
----- Message from Jenna Cruz sent at 3/13/2020  9:40 AM EDT -----  Regarding: Schedule Cath Request  Contact: [phone]  Please contact patient's daughter, Andrei Press at [phone] to schedule catherization for TAVR evaluation at [facility]       Thanks,     [employer]

## 2020-03-13 NOTE — TELEPHONE ENCOUNTER
Patient schedule for R+LHC at [facility] on 03/20/20 will be perform by Dr Porfirio Ravi  Patient daughter Cindy Vivas) aware of general instructions and medications holds ( Warfarin)  Patient will need to check INR 2/3 days prior of procedure  Patient daughter confirmed she is cover under medicare A+B

## 2020-03-18 ENCOUNTER — ANTICOAG VISIT (OUTPATIENT)
Dept: CARDIOLOGY CLINIC | Facility: CLINIC | Age: 85
End: 2020-03-18

## 2020-03-18 DIAGNOSIS — I48.0 PAROXYSMAL ATRIAL FIBRILLATION (HCC): ICD-10-CM

## 2020-03-18 LAB — INR PPP: 2.2 (ref 0.84–1.19)

## 2020-03-19 ENCOUNTER — TELEPHONE (OUTPATIENT)
Dept: INPATIENT UNIT | Facility: HOSPITAL | Age: 85
End: 2020-03-19

## 2020-03-19 RX ORDER — CLOPIDOGREL BISULFATE 75 MG/1
600 TABLET ORAL ONCE
Status: CANCELLED | OUTPATIENT
Start: 2020-03-19 | End: 2020-03-19

## 2020-03-19 RX ORDER — ASPIRIN 81 MG/1
324 TABLET, CHEWABLE ORAL ONCE
Status: CANCELLED | OUTPATIENT
Start: 2020-03-19 | End: 2020-03-19

## 2020-03-19 RX ORDER — SODIUM CHLORIDE 9 MG/ML
125 INJECTION, SOLUTION INTRAVENOUS CONTINUOUS
Status: CANCELLED | OUTPATIENT
Start: 2020-03-19

## 2020-03-20 ENCOUNTER — HOSPITAL ENCOUNTER (OUTPATIENT)
Dept: NON INVASIVE DIAGNOSTICS | Facility: HOSPITAL | Age: 85
Discharge: HOME | End: 2020-03-20
Attending: INTERNAL MEDICINE | Admitting: INTERNAL MEDICINE
Payer: MEDICARE

## 2020-03-20 VITALS
OXYGEN SATURATION: 96 % | TEMPERATURE: 98.3 F | HEIGHT: 59 IN | DIASTOLIC BLOOD PRESSURE: 69 MMHG | HEART RATE: 71 BPM | WEIGHT: 178 LBS | RESPIRATION RATE: 18 BRPM | BODY MASS INDEX: 35.88 KG/M2 | SYSTOLIC BLOOD PRESSURE: 158 MMHG

## 2020-03-20 DIAGNOSIS — I35.0 SEVERE AORTIC STENOSIS: ICD-10-CM

## 2020-03-20 LAB
ANION GAP SERPL CALCULATED.3IONS-SCNC: 5 MMOL/L (ref 4–13)
ATRIAL RATE: 72 BPM
BUN SERPL-MCNC: 23 MG/DL (ref 5–25)
CALCIUM SERPL-MCNC: 9.1 MG/DL (ref 8.3–10.1)
CHLORIDE SERPL-SCNC: 103 MMOL/L (ref 100–108)
CO2 SERPL-SCNC: 29 MMOL/L (ref 21–32)
CREAT SERPL-MCNC: 0.85 MG/DL (ref 0.6–1.3)
GFR SERPL CREATININE-BSD FRML MDRD: 61 ML/MIN/1.73SQ M
GLUCOSE P FAST SERPL-MCNC: 94 MG/DL (ref 65–99)
GLUCOSE SERPL-MCNC: 94 MG/DL (ref 65–140)
INR PPP: 1.36 (ref 0.84–1.19)
P AXIS: 16 DEGREES
POTASSIUM SERPL-SCNC: 4.3 MMOL/L (ref 3.5–5.3)
PR INTERVAL: 214 MS
PROTHROMBIN TIME: 16.3 SECONDS (ref 11.6–14.5)
QRS AXIS: -2 DEGREES
QRSD INTERVAL: 130 MS
QT INTERVAL: 418 MS
QTC INTERVAL: 457 MS
SODIUM SERPL-SCNC: 137 MMOL/L (ref 136–145)
T WAVE AXIS: 115 DEGREES
VENTRICULAR RATE: 72 BPM

## 2020-03-20 PROCEDURE — C1894 INTRO/SHEATH, NON-LASER: HCPCS | Performed by: NURSE PRACTITIONER

## 2020-03-20 PROCEDURE — 93458 L HRT ARTERY/VENTRICLE ANGIO: CPT | Performed by: NURSE PRACTITIONER

## 2020-03-20 PROCEDURE — 85610 PROTHROMBIN TIME: CPT | Performed by: INTERNAL MEDICINE

## 2020-03-20 PROCEDURE — C1769 GUIDE WIRE: HCPCS | Performed by: NURSE PRACTITIONER

## 2020-03-20 PROCEDURE — 93458 L HRT ARTERY/VENTRICLE ANGIO: CPT | Performed by: INTERNAL MEDICINE

## 2020-03-20 PROCEDURE — 93010 ELECTROCARDIOGRAM REPORT: CPT | Performed by: INTERNAL MEDICINE

## 2020-03-20 PROCEDURE — 93005 ELECTROCARDIOGRAM TRACING: CPT

## 2020-03-20 PROCEDURE — C1887 CATHETER, GUIDING: HCPCS | Performed by: NURSE PRACTITIONER

## 2020-03-20 PROCEDURE — 99152 MOD SED SAME PHYS/QHP 5/>YRS: CPT | Performed by: NURSE PRACTITIONER

## 2020-03-20 PROCEDURE — 80048 BASIC METABOLIC PNL TOTAL CA: CPT | Performed by: INTERNAL MEDICINE

## 2020-03-20 PROCEDURE — 99153 MOD SED SAME PHYS/QHP EA: CPT | Performed by: NURSE PRACTITIONER

## 2020-03-20 PROCEDURE — 99152 MOD SED SAME PHYS/QHP 5/>YRS: CPT | Performed by: INTERNAL MEDICINE

## 2020-03-20 RX ORDER — HEPARIN SODIUM 1000 [USP'U]/ML
INJECTION, SOLUTION INTRAVENOUS; SUBCUTANEOUS CODE/TRAUMA/SEDATION MEDICATION
Status: COMPLETED | OUTPATIENT
Start: 2020-03-20 | End: 2020-03-20

## 2020-03-20 RX ORDER — SODIUM CHLORIDE 9 MG/ML
100 INJECTION, SOLUTION INTRAVENOUS CONTINUOUS
Status: DISCONTINUED | OUTPATIENT
Start: 2020-03-20 | End: 2020-03-20 | Stop reason: HOSPADM

## 2020-03-20 RX ORDER — FENTANYL CITRATE 50 UG/ML
INJECTION, SOLUTION INTRAMUSCULAR; INTRAVENOUS CODE/TRAUMA/SEDATION MEDICATION
Status: COMPLETED | OUTPATIENT
Start: 2020-03-20 | End: 2020-03-20

## 2020-03-20 RX ORDER — CLOPIDOGREL BISULFATE 75 MG/1
600 TABLET ORAL ONCE
Status: COMPLETED | OUTPATIENT
Start: 2020-03-20 | End: 2020-03-20

## 2020-03-20 RX ORDER — NITROGLYCERIN 20 MG/100ML
INJECTION INTRAVENOUS CODE/TRAUMA/SEDATION MEDICATION
Status: COMPLETED | OUTPATIENT
Start: 2020-03-20 | End: 2020-03-20

## 2020-03-20 RX ORDER — MIDAZOLAM HYDROCHLORIDE 2 MG/2ML
INJECTION, SOLUTION INTRAMUSCULAR; INTRAVENOUS CODE/TRAUMA/SEDATION MEDICATION
Status: COMPLETED | OUTPATIENT
Start: 2020-03-20 | End: 2020-03-20

## 2020-03-20 RX ORDER — SODIUM CHLORIDE 9 MG/ML
125 INJECTION, SOLUTION INTRAVENOUS CONTINUOUS
Status: DISCONTINUED | OUTPATIENT
Start: 2020-03-20 | End: 2020-03-20 | Stop reason: HOSPADM

## 2020-03-20 RX ORDER — ASPIRIN 81 MG/1
324 TABLET, CHEWABLE ORAL ONCE
Status: COMPLETED | OUTPATIENT
Start: 2020-03-20 | End: 2020-03-20

## 2020-03-20 RX ORDER — LIDOCAINE HYDROCHLORIDE 10 MG/ML
INJECTION, SOLUTION EPIDURAL; INFILTRATION; INTRACAUDAL; PERINEURAL CODE/TRAUMA/SEDATION MEDICATION
Status: COMPLETED | OUTPATIENT
Start: 2020-03-20 | End: 2020-03-20

## 2020-03-20 RX ORDER — VERAPAMIL HCL 2.5 MG/ML
AMPUL (ML) INTRAVENOUS CODE/TRAUMA/SEDATION MEDICATION
Status: COMPLETED | OUTPATIENT
Start: 2020-03-20 | End: 2020-03-20

## 2020-03-20 RX ADMIN — SODIUM CHLORIDE 125 ML/HR: 0.9 INJECTION, SOLUTION INTRAVENOUS at 09:01

## 2020-03-20 RX ADMIN — LIDOCAINE HYDROCHLORIDE 0.1 ML: 10 INJECTION, SOLUTION EPIDURAL; INFILTRATION; INTRACAUDAL; PERINEURAL at 12:08

## 2020-03-20 RX ADMIN — MIDAZOLAM 2 MG: 1 INJECTION INTRAMUSCULAR; INTRAVENOUS at 11:59

## 2020-03-20 RX ADMIN — Medication 200 MCG: at 12:19

## 2020-03-20 RX ADMIN — Medication 200 MCG: at 12:09

## 2020-03-20 RX ADMIN — VERAPAMIL HYDROCHLORIDE 1.25 MG: 2.5 INJECTION INTRAVENOUS at 12:09

## 2020-03-20 RX ADMIN — FENTANYL CITRATE 50 MCG: 50 INJECTION, SOLUTION INTRAMUSCULAR; INTRAVENOUS at 11:59

## 2020-03-20 RX ADMIN — ASPIRIN 81 MG 324 MG: 81 TABLET ORAL at 08:58

## 2020-03-20 RX ADMIN — HEPARIN SODIUM 4000 UNITS: 1000 INJECTION INTRAVENOUS; SUBCUTANEOUS at 12:10

## 2020-03-20 RX ADMIN — IOHEXOL 60 ML: 350 INJECTION, SOLUTION INTRAVENOUS at 12:39

## 2020-03-20 RX ADMIN — CLOPIDOGREL 600 MG: 75 TABLET, FILM COATED ORAL at 09:22

## 2020-03-20 NOTE — INTERVAL H&P NOTE
H&P reviewed  After examining the patient, I find no changed to the H&P since it had been written  Patient re-evaluated   Accept as history and physical   /62 (BP Location: Left arm)   Pulse 72   Temp 98 3 °F (36 8 °C) (Oral)   Resp 18   Ht 4' 11" (1 499 m)   Wt 80 7 kg (178 lb)   SpO2 96%   BMI 35 95 kg/m²     Leonardo Median, DO/March 20, 2020/12:44 PM

## 2020-03-20 NOTE — DISCHARGE INSTRUCTIONS
1  Please see the post cardiac catheterization dishcarge instructions  No heavy lifting, greater than 10 lbs  or strenuous  activity for 48 hrs  2 Remove band aid tomorrow  Shower and wash area- wrist gently with soap and water- beginning tomorrow  Rinse and pat dry  Apply new water seal band aid  Repeat this process for 5 days  No powders, creams lotions or antibiotic ointments  for 5 days  No tub baths, hot tubs or swimming for 5 days  3  Please call our office ([phone]) if you have any fever, redness, swelling, discharge from your wrist access site  4 No driving for 1 day      Left Heart Catheterization   WHAT YOU NEED TO KNOW:   A left heart catheterization is a procedure to look at your heart and its arteries  You may need this procedure if you have chest pain, heart disease, or your heart is not working as it should  DISCHARGE INSTRUCTIONS:   Follow up with your healthcare provider as directed:  Write down your questions so you remember to ask them during your visits  Limit activity as directed:   · Avoid unnecessary stair climbing for 48 hours, if a catheter was put in your groin  · Do not place pressure on your arm, hand, or wrist, if the catheter was placed in your wrist  Avoid pushing, pulling, or heavy lifting with that arm  · If you need to cough, support the area where the catheter was inserted with your hand  · Ask your healthcare provider how long you need to limit movement and avoid certain activities  · You may feel like resting more after your procedure  Slowly start to do more each day  Rest when you feel it is needed  Drink liquids as directed:  Liquids help flush the dye used for your procedure out of your body  Ask your healthcare provider how much liquid to drink each day, and which liquids to drink  Some foods, such as soup and fruit, also provide liquid  Wound care:  Ask your healthcare provider about how to care for your incision wound   Ask when you can get into a tub, shower, or pool  Contact your healthcare provider if:   · You have a fever  · The skin around your wound is red, swollen, or has pus coming from it  · You have trouble breathing, or your skin is itchy, swollen, or has a rash  · You have questions or concerns about your condition or care  Seek care immediately or call 911 if:   · The area where the catheter was placed is swollen and filled with blood or is bleeding  · The leg or arm used for the procedure becomes numb or turns white or blue  · You feel lightheaded, short of breath, and have chest pain  · You cough up blood  · You have any of the following signs of a heart attack:      ¨ Squeezing, pressure, or pain in your chest that lasts longer than 5 minutes or returns    ¨ Discomfort or pain in your back, neck, jaw, stomach, or arm     ¨ Trouble breathing    ¨ Nausea or vomiting    ¨ Lightheadedness or a sudden cold sweat, especially with chest pain or trouble breathing    · Your arm or leg feels warm, tender, and painful  It may look swollen and red  · You have any of the following signs of a stroke:     ¨ Part of your face droops or is numb    ¨ Weakness in an arm or leg    ¨ Confusion or difficulty speaking    ¨ Dizziness, a severe headache, or vision loss  © 2017 [address] Information is for End User's use only and may not be sold, redistributed or otherwise used for commercial purposes  All illustrations and images included in CareNotes® are the copyrighted property of [employer] A M [employer] Inc  or [employer]  The above information is an  only  It is not intended as medical advice for individual conditions or treatments  Talk to your doctor, nurse or pharmacist before following any medical regimen to see if it is safe and effective for you

## 2020-03-27 ENCOUNTER — ANTICOAG VISIT (OUTPATIENT)
Dept: CARDIOLOGY CLINIC | Facility: CLINIC | Age: 85
End: 2020-03-27

## 2020-03-27 DIAGNOSIS — I48.0 PAROXYSMAL ATRIAL FIBRILLATION (HCC): ICD-10-CM

## 2020-03-27 LAB — INR PPP: 1.8 (ref 0.84–1.19)

## 2020-03-30 ENCOUNTER — TELEPHONE (OUTPATIENT)
Dept: CARDIOLOGY CLINIC | Facility: CLINIC | Age: 85
End: 2020-03-30

## 2020-04-01 ENCOUNTER — ANTICOAG VISIT (OUTPATIENT)
Dept: CARDIOLOGY CLINIC | Facility: CLINIC | Age: 85
End: 2020-04-01

## 2020-04-01 DIAGNOSIS — I48.0 PAROXYSMAL ATRIAL FIBRILLATION (HCC): ICD-10-CM

## 2020-04-01 LAB — INR PPP: 2.3 (ref 0.84–1.19)

## 2020-04-02 DIAGNOSIS — I10 ESSENTIAL HYPERTENSION: Chronic | ICD-10-CM

## 2020-04-02 DIAGNOSIS — I44.1 HEART BLOCK AV SECOND DEGREE: Primary | ICD-10-CM

## 2020-04-03 RX ORDER — FUROSEMIDE 40 MG/1
40 TABLET ORAL DAILY
Qty: 30 TABLET | Refills: 5 | Status: SHIPPED | OUTPATIENT
Start: 2020-04-03 | End: 2020-08-31 | Stop reason: ALTCHOICE

## 2020-04-10 ENCOUNTER — ANTICOAG VISIT (OUTPATIENT)
Dept: CARDIOLOGY CLINIC | Facility: CLINIC | Age: 85
End: 2020-04-10

## 2020-04-10 DIAGNOSIS — I48.0 PAROXYSMAL ATRIAL FIBRILLATION (HCC): ICD-10-CM

## 2020-04-10 LAB — INR PPP: 2.5 (ref 0.84–1.19)

## 2020-04-22 ENCOUNTER — REMOTE DEVICE CLINIC VISIT (OUTPATIENT)
Dept: CARDIOLOGY CLINIC | Facility: CLINIC | Age: 85
End: 2020-04-22
Payer: MEDICARE

## 2020-04-22 DIAGNOSIS — Z95.0 PRESENCE OF CARDIAC PACEMAKER: Primary | ICD-10-CM

## 2020-04-22 PROCEDURE — 93294 REM INTERROG EVL PM/LDLS PM: CPT | Performed by: INTERNAL MEDICINE

## 2020-04-22 PROCEDURE — 93296 REM INTERROG EVL PM/IDS: CPT | Performed by: INTERNAL MEDICINE

## 2020-04-27 ENCOUNTER — ANTICOAG VISIT (OUTPATIENT)
Dept: CARDIOLOGY CLINIC | Facility: CLINIC | Age: 85
End: 2020-04-27

## 2020-04-27 DIAGNOSIS — I48.0 PAROXYSMAL ATRIAL FIBRILLATION (HCC): ICD-10-CM

## 2020-04-27 LAB — INR PPP: 2.5 (ref 0.84–1.19)

## 2020-04-30 DIAGNOSIS — I25.10 CAD (CORONARY ARTERY DISEASE): ICD-10-CM

## 2020-05-04 RX ORDER — ASPIRIN 81 MG/1
TABLET, COATED ORAL
Qty: 90 TABLET | Refills: 0 | Status: SHIPPED | OUTPATIENT
Start: 2020-05-04 | End: 2020-07-27

## 2020-05-13 DIAGNOSIS — E03.9 HYPOTHYROIDISM, UNSPECIFIED TYPE: ICD-10-CM

## 2020-05-13 LAB — INR PPP: 2.5 (ref 0.84–1.19)

## 2020-05-13 RX ORDER — LEVOTHYROXINE SODIUM 0.03 MG/1
TABLET ORAL
Qty: 90 TABLET | Refills: 1 | Status: SHIPPED | OUTPATIENT
Start: 2020-05-13 | End: 2020-08-27 | Stop reason: SDUPTHER

## 2020-05-14 ENCOUNTER — ANTICOAG VISIT (OUTPATIENT)
Dept: CARDIOLOGY CLINIC | Facility: CLINIC | Age: 85
End: 2020-05-14

## 2020-05-14 DIAGNOSIS — I48.0 PAROXYSMAL ATRIAL FIBRILLATION (HCC): ICD-10-CM

## 2020-05-15 DIAGNOSIS — I25.10 CORONARY ARTERY DISEASE INVOLVING NATIVE CORONARY ARTERY OF NATIVE HEART WITHOUT ANGINA PECTORIS: Chronic | ICD-10-CM

## 2020-05-15 RX ORDER — ISOSORBIDE MONONITRATE 30 MG/1
30 TABLET, EXTENDED RELEASE ORAL DAILY
Qty: 90 TABLET | Refills: 3 | Status: SHIPPED | OUTPATIENT
Start: 2020-05-15 | End: 2021-03-17 | Stop reason: SDUPTHER

## 2020-05-18 ENCOUNTER — TELEPHONE (OUTPATIENT)
Dept: INTERNAL MEDICINE CLINIC | Facility: CLINIC | Age: 85
End: 2020-05-18

## 2020-05-19 ENCOUNTER — OFFICE VISIT (OUTPATIENT)
Dept: INTERNAL MEDICINE CLINIC | Facility: CLINIC | Age: 85
End: 2020-05-19
Payer: MEDICARE

## 2020-05-19 VITALS
HEIGHT: 59 IN | HEART RATE: 106 BPM | BODY MASS INDEX: 36.04 KG/M2 | RESPIRATION RATE: 16 BRPM | TEMPERATURE: 98.6 F | SYSTOLIC BLOOD PRESSURE: 156 MMHG | OXYGEN SATURATION: 90 % | WEIGHT: 178.8 LBS | DIASTOLIC BLOOD PRESSURE: 96 MMHG

## 2020-05-19 DIAGNOSIS — N63.14 UNSPECIFIED LUMP IN THE RIGHT BREAST, LOWER INNER QUADRANT: ICD-10-CM

## 2020-05-19 DIAGNOSIS — N63.10 BREAST MASS, RIGHT: Primary | ICD-10-CM

## 2020-05-19 PROCEDURE — 3008F BODY MASS INDEX DOCD: CPT | Performed by: INTERNAL MEDICINE

## 2020-05-19 PROCEDURE — 99214 OFFICE O/P EST MOD 30 MIN: CPT | Performed by: INTERNAL MEDICINE

## 2020-05-19 PROCEDURE — 3044F HG A1C LEVEL LT 7.0%: CPT | Performed by: INTERNAL MEDICINE

## 2020-05-19 PROCEDURE — 3077F SYST BP >= 140 MM HG: CPT | Performed by: INTERNAL MEDICINE

## 2020-05-19 PROCEDURE — 3080F DIAST BP >= 90 MM HG: CPT | Performed by: INTERNAL MEDICINE

## 2020-05-19 PROCEDURE — 1036F TOBACCO NON-USER: CPT | Performed by: INTERNAL MEDICINE

## 2020-05-19 PROCEDURE — 2022F DILAT RTA XM EVC RTNOPTHY: CPT | Performed by: INTERNAL MEDICINE

## 2020-05-19 PROCEDURE — 4040F PNEUMOC VAC/ADMIN/RCVD: CPT | Performed by: INTERNAL MEDICINE

## 2020-05-19 PROCEDURE — 1160F RVW MEDS BY RX/DR IN RCRD: CPT | Performed by: INTERNAL MEDICINE

## 2020-05-21 DIAGNOSIS — N63.14 UNSPECIFIED LUMP IN THE RIGHT BREAST, LOWER INNER QUADRANT: ICD-10-CM

## 2020-05-21 DIAGNOSIS — N63.10 BREAST MASS, RIGHT: ICD-10-CM

## 2020-05-28 ENCOUNTER — OFFICE VISIT (OUTPATIENT)
Dept: OBGYN CLINIC | Facility: HOSPITAL | Age: 85
End: 2020-05-28
Payer: MEDICARE

## 2020-05-28 VITALS
HEIGHT: 59 IN | DIASTOLIC BLOOD PRESSURE: 69 MMHG | WEIGHT: 181 LBS | BODY MASS INDEX: 36.49 KG/M2 | HEART RATE: 84 BPM | SYSTOLIC BLOOD PRESSURE: 158 MMHG

## 2020-05-28 DIAGNOSIS — M19.011 PRIMARY OSTEOARTHRITIS OF BOTH SHOULDERS: Primary | ICD-10-CM

## 2020-05-28 DIAGNOSIS — M19.012 PRIMARY OSTEOARTHRITIS OF BOTH SHOULDERS: Primary | ICD-10-CM

## 2020-05-28 DIAGNOSIS — M17.12 PRIMARY OSTEOARTHRITIS OF LEFT KNEE: ICD-10-CM

## 2020-05-28 DIAGNOSIS — M17.11 PRIMARY OSTEOARTHRITIS OF RIGHT KNEE: ICD-10-CM

## 2020-05-28 PROCEDURE — 99213 OFFICE O/P EST LOW 20 MIN: CPT | Performed by: ORTHOPAEDIC SURGERY

## 2020-05-28 PROCEDURE — 3077F SYST BP >= 140 MM HG: CPT | Performed by: ORTHOPAEDIC SURGERY

## 2020-05-28 PROCEDURE — 4040F PNEUMOC VAC/ADMIN/RCVD: CPT | Performed by: ORTHOPAEDIC SURGERY

## 2020-05-28 PROCEDURE — 2022F DILAT RTA XM EVC RTNOPTHY: CPT | Performed by: ORTHOPAEDIC SURGERY

## 2020-05-28 PROCEDURE — 1160F RVW MEDS BY RX/DR IN RCRD: CPT | Performed by: ORTHOPAEDIC SURGERY

## 2020-05-28 PROCEDURE — 3078F DIAST BP <80 MM HG: CPT | Performed by: ORTHOPAEDIC SURGERY

## 2020-05-28 PROCEDURE — 3008F BODY MASS INDEX DOCD: CPT | Performed by: ORTHOPAEDIC SURGERY

## 2020-05-28 PROCEDURE — 3044F HG A1C LEVEL LT 7.0%: CPT | Performed by: ORTHOPAEDIC SURGERY

## 2020-05-28 PROCEDURE — 20610 DRAIN/INJ JOINT/BURSA W/O US: CPT | Performed by: PHYSICIAN ASSISTANT

## 2020-05-28 PROCEDURE — 1036F TOBACCO NON-USER: CPT | Performed by: ORTHOPAEDIC SURGERY

## 2020-05-28 RX ORDER — BETAMETHASONE SODIUM PHOSPHATE AND BETAMETHASONE ACETATE 3; 3 MG/ML; MG/ML
12 INJECTION, SUSPENSION INTRA-ARTICULAR; INTRALESIONAL; INTRAMUSCULAR; SOFT TISSUE
Status: COMPLETED | OUTPATIENT
Start: 2020-05-28 | End: 2020-05-28

## 2020-05-28 RX ORDER — LIDOCAINE HYDROCHLORIDE 10 MG/ML
2 INJECTION, SOLUTION INFILTRATION; PERINEURAL
Status: COMPLETED | OUTPATIENT
Start: 2020-05-28 | End: 2020-05-28

## 2020-05-28 RX ORDER — BUPIVACAINE HYDROCHLORIDE 2.5 MG/ML
2 INJECTION, SOLUTION INFILTRATION; PERINEURAL
Status: COMPLETED | OUTPATIENT
Start: 2020-05-28 | End: 2020-05-28

## 2020-05-28 RX ADMIN — BUPIVACAINE HYDROCHLORIDE 2 ML: 2.5 INJECTION, SOLUTION INFILTRATION; PERINEURAL at 14:19

## 2020-05-28 RX ADMIN — BETAMETHASONE SODIUM PHOSPHATE AND BETAMETHASONE ACETATE 12 MG: 3; 3 INJECTION, SUSPENSION INTRA-ARTICULAR; INTRALESIONAL; INTRAMUSCULAR; SOFT TISSUE at 14:20

## 2020-05-28 RX ADMIN — BETAMETHASONE SODIUM PHOSPHATE AND BETAMETHASONE ACETATE 12 MG: 3; 3 INJECTION, SUSPENSION INTRA-ARTICULAR; INTRALESIONAL; INTRAMUSCULAR; SOFT TISSUE at 14:19

## 2020-05-28 RX ADMIN — LIDOCAINE HYDROCHLORIDE 2 ML: 10 INJECTION, SOLUTION INFILTRATION; PERINEURAL at 14:19

## 2020-05-28 RX ADMIN — LIDOCAINE HYDROCHLORIDE 2 ML: 10 INJECTION, SOLUTION INFILTRATION; PERINEURAL at 14:20

## 2020-05-28 RX ADMIN — BUPIVACAINE HYDROCHLORIDE 2 ML: 2.5 INJECTION, SOLUTION INFILTRATION; PERINEURAL at 14:20

## 2020-06-04 ENCOUNTER — ANTICOAG VISIT (OUTPATIENT)
Dept: CARDIOLOGY CLINIC | Facility: CLINIC | Age: 85
End: 2020-06-04

## 2020-06-04 DIAGNOSIS — I48.0 PAROXYSMAL ATRIAL FIBRILLATION (HCC): ICD-10-CM

## 2020-06-04 LAB — INR PPP: 3.2 (ref 0.84–1.19)

## 2020-06-13 DIAGNOSIS — E11.8 TYPE 2 DIABETES MELLITUS WITH COMPLICATION, WITHOUT LONG-TERM CURRENT USE OF INSULIN (HCC): ICD-10-CM

## 2020-06-15 RX ORDER — LANCETS
EACH MISCELLANEOUS DAILY
Qty: 100 EACH | Refills: 2 | Status: SHIPPED | OUTPATIENT
Start: 2020-06-15 | End: 2021-02-03

## 2020-06-17 DIAGNOSIS — I10 ESSENTIAL HYPERTENSION: Chronic | ICD-10-CM

## 2020-06-17 RX ORDER — METOPROLOL SUCCINATE 25 MG/1
25 TABLET, EXTENDED RELEASE ORAL
Qty: 90 TABLET | Refills: 3 | Status: SHIPPED | OUTPATIENT
Start: 2020-06-17 | End: 2021-08-03 | Stop reason: HOSPADM

## 2020-06-18 ENCOUNTER — ANTICOAG VISIT (OUTPATIENT)
Dept: CARDIOLOGY CLINIC | Facility: CLINIC | Age: 85
End: 2020-06-18

## 2020-06-18 DIAGNOSIS — I48.0 PAROXYSMAL ATRIAL FIBRILLATION (HCC): ICD-10-CM

## 2020-06-18 LAB — INR PPP: 2 (ref 0.84–1.19)

## 2020-07-01 ENCOUNTER — ANTICOAG VISIT (OUTPATIENT)
Dept: CARDIOLOGY CLINIC | Facility: CLINIC | Age: 85
End: 2020-07-01

## 2020-07-01 DIAGNOSIS — I48.0 PAROXYSMAL ATRIAL FIBRILLATION (HCC): ICD-10-CM

## 2020-07-01 LAB — INR PPP: 2.1 (ref 0.84–1.19)

## 2020-07-07 LAB
LEFT EYE DIABETIC RETINOPATHY: NORMAL
RIGHT EYE DIABETIC RETINOPATHY: NORMAL

## 2020-07-15 ENCOUNTER — ANTICOAG VISIT (OUTPATIENT)
Dept: CARDIOLOGY CLINIC | Facility: CLINIC | Age: 85
End: 2020-07-15

## 2020-07-15 DIAGNOSIS — I48.0 PAROXYSMAL ATRIAL FIBRILLATION (HCC): ICD-10-CM

## 2020-07-15 LAB — INR PPP: 1.8 (ref 0.84–1.19)

## 2020-07-21 DIAGNOSIS — E11.8 TYPE 2 DIABETES MELLITUS WITH COMPLICATION, WITHOUT LONG-TERM CURRENT USE OF INSULIN (HCC): ICD-10-CM

## 2020-07-21 DIAGNOSIS — I10 ESSENTIAL HYPERTENSION: Chronic | ICD-10-CM

## 2020-07-21 RX ORDER — LISINOPRIL 2.5 MG/1
TABLET ORAL
Qty: 90 TABLET | Refills: 1 | Status: SHIPPED | OUTPATIENT
Start: 2020-07-21 | End: 2021-03-09

## 2020-07-22 ENCOUNTER — REMOTE DEVICE CLINIC VISIT (OUTPATIENT)
Dept: CARDIOLOGY CLINIC | Facility: CLINIC | Age: 85
End: 2020-07-22
Payer: MEDICARE

## 2020-07-22 DIAGNOSIS — Z95.0 PRESENCE OF PERMANENT CARDIAC PACEMAKER: Primary | ICD-10-CM

## 2020-07-22 PROCEDURE — 93294 REM INTERROG EVL PM/LDLS PM: CPT | Performed by: INTERNAL MEDICINE

## 2020-07-22 PROCEDURE — 93296 REM INTERROG EVL PM/IDS: CPT | Performed by: INTERNAL MEDICINE

## 2020-07-22 NOTE — PROGRESS NOTES
Results for orders placed or performed in visit on 07/22/20   Cardiac EP device report    Narrative    MDT DUAL PM - ACTIVE SYSTEM IS MRI CONDITIONAL  CARELINK TRANSMISSION: BATTERY VOLTAGE ADEQUATE  ( 10 7 YRS) AP 27%  99%  ALL AVAILABLE LEAD PARAMETERS WITHIN NORMAL LIMITS  NO SIGNIFICANT HIGH RATE EPISODES  NORMAL DEVICE FUNCTION  ----SANCHEZ

## 2020-07-27 DIAGNOSIS — I25.10 CAD (CORONARY ARTERY DISEASE): ICD-10-CM

## 2020-07-27 RX ORDER — ASPIRIN 81 MG/1
TABLET, COATED ORAL
Qty: 90 TABLET | Refills: 0 | Status: SHIPPED | OUTPATIENT
Start: 2020-07-27 | End: 2020-10-29

## 2020-07-30 LAB — INR PPP: 2.1 (ref 0.84–1.19)

## 2020-07-31 ENCOUNTER — ANTICOAG VISIT (OUTPATIENT)
Dept: CARDIOLOGY CLINIC | Facility: CLINIC | Age: 85
End: 2020-07-31

## 2020-07-31 DIAGNOSIS — I48.0 PAROXYSMAL ATRIAL FIBRILLATION (HCC): ICD-10-CM

## 2020-08-14 ENCOUNTER — OFFICE VISIT (OUTPATIENT)
Dept: INTERNAL MEDICINE CLINIC | Facility: CLINIC | Age: 85
End: 2020-08-14
Payer: MEDICARE

## 2020-08-14 VITALS
RESPIRATION RATE: 16 BRPM | WEIGHT: 177.2 LBS | SYSTOLIC BLOOD PRESSURE: 138 MMHG | HEIGHT: 59 IN | OXYGEN SATURATION: 93 % | BODY MASS INDEX: 35.72 KG/M2 | TEMPERATURE: 97.1 F | HEART RATE: 96 BPM | DIASTOLIC BLOOD PRESSURE: 78 MMHG

## 2020-08-14 DIAGNOSIS — I10 ESSENTIAL HYPERTENSION: Primary | Chronic | ICD-10-CM

## 2020-08-14 DIAGNOSIS — I48.0 PAROXYSMAL ATRIAL FIBRILLATION (HCC): ICD-10-CM

## 2020-08-14 DIAGNOSIS — E03.9 HYPOTHYROIDISM, UNSPECIFIED TYPE: Chronic | ICD-10-CM

## 2020-08-14 DIAGNOSIS — M17.0 PRIMARY OSTEOARTHRITIS OF BOTH KNEES: ICD-10-CM

## 2020-08-14 DIAGNOSIS — N64.89 HEMATOMA OF RIGHT BREAST: ICD-10-CM

## 2020-08-14 DIAGNOSIS — E11.8 TYPE 2 DIABETES MELLITUS WITH COMPLICATION, WITHOUT LONG-TERM CURRENT USE OF INSULIN (HCC): ICD-10-CM

## 2020-08-14 DIAGNOSIS — E78.2 MIXED HYPERLIPIDEMIA: Chronic | ICD-10-CM

## 2020-08-14 DIAGNOSIS — E11.9 TYPE 2 DIABETES MELLITUS WITHOUT COMPLICATION, WITHOUT LONG-TERM CURRENT USE OF INSULIN (HCC): Chronic | ICD-10-CM

## 2020-08-14 DIAGNOSIS — Z00.00 MEDICARE ANNUAL WELLNESS VISIT, SUBSEQUENT: ICD-10-CM

## 2020-08-14 DIAGNOSIS — Z95.0 PACEMAKER: ICD-10-CM

## 2020-08-14 DIAGNOSIS — I25.10 CORONARY ARTERY DISEASE INVOLVING NATIVE CORONARY ARTERY OF NATIVE HEART WITHOUT ANGINA PECTORIS: Chronic | ICD-10-CM

## 2020-08-14 PROCEDURE — G0439 PPPS, SUBSEQ VISIT: HCPCS | Performed by: INTERNAL MEDICINE

## 2020-08-14 PROCEDURE — 2022F DILAT RTA XM EVC RTNOPTHY: CPT | Performed by: INTERNAL MEDICINE

## 2020-08-14 PROCEDURE — 3044F HG A1C LEVEL LT 7.0%: CPT | Performed by: INTERNAL MEDICINE

## 2020-08-14 PROCEDURE — 1036F TOBACCO NON-USER: CPT | Performed by: INTERNAL MEDICINE

## 2020-08-14 PROCEDURE — 1160F RVW MEDS BY RX/DR IN RCRD: CPT | Performed by: INTERNAL MEDICINE

## 2020-08-14 PROCEDURE — 3075F SYST BP GE 130 - 139MM HG: CPT | Performed by: INTERNAL MEDICINE

## 2020-08-14 PROCEDURE — 1125F AMNT PAIN NOTED PAIN PRSNT: CPT | Performed by: INTERNAL MEDICINE

## 2020-08-14 PROCEDURE — 4040F PNEUMOC VAC/ADMIN/RCVD: CPT | Performed by: INTERNAL MEDICINE

## 2020-08-14 PROCEDURE — 3078F DIAST BP <80 MM HG: CPT | Performed by: INTERNAL MEDICINE

## 2020-08-14 PROCEDURE — 1170F FXNL STATUS ASSESSED: CPT | Performed by: INTERNAL MEDICINE

## 2020-08-14 PROCEDURE — 99214 OFFICE O/P EST MOD 30 MIN: CPT | Performed by: INTERNAL MEDICINE

## 2020-08-14 NOTE — PROGRESS NOTES
Assessment/Plan:    Type 2 diabetes mellitus without complication, without long-term current use of insulin (HCC)  Diet controlled  Lab Results   Component Value Date    HGBA1C 6 2 (H) 03/06/2020       Hypothyroidism  Maintained on low dose levothyroxine    Coronary artery disease involving native coronary artery of native heart without angina pectoris  On ASA lisinopril metoprolol Imdur and pravastatin    Essential hypertension  Controlled on metoprolol and lisinopril    Atrial fibrillation (HCC)  Remains on amiodarone  F/U with cardiology  INR managed by cardiology    Hyperlipidemia  Controlled on pravastatin    Primary osteoarthritis of both knees  Continue routine cortisone injections    Right breast F/U ultrasound scheduled already to assure resolution of what appeared to be a hematoma     Problem List Items Addressed This Visit        Endocrine    Type 2 diabetes mellitus without complication, without long-term current use of insulin (HCC) (Chronic)     Diet controlled  Lab Results   Component Value Date    HGBA1C 6 2 (H) 03/06/2020            Hypothyroidism (Chronic)     Maintained on low dose levothyroxine         Relevant Orders    TSH, 3rd generation with Free T4 reflex       Cardiovascular and Mediastinum    Coronary artery disease involving native coronary artery of native heart without angina pectoris (Chronic)     On ASA lisinopril metoprolol Imdur and pravastatin         Essential hypertension - Primary (Chronic)     Controlled on metoprolol and lisinopril         Atrial fibrillation (HCC)     Remains on amiodarone  F/U with cardiology  INR managed by cardiology            Other    Pacemaker    Hyperlipidemia (Chronic)     Controlled on pravastatin         Primary osteoarthritis of both knees     Continue routine cortisone injections           Other Visit Diagnoses     Type 2 diabetes mellitus with complication, without long-term current use of insulin (HCC)        Relevant Orders    CBC and Platelet Comprehensive metabolic panel    HEMOGLOBIN A1C W/ EAG ESTIMATION    TSH, 3rd generation with Free T4 reflex    Hematoma of right breast        Medicare annual wellness visit, subsequent                Subjective:      Patient ID: Pancho Coates is a 80 y o  female  HPI  Here for a follow up  OA and is taking Tylenol Arthritis 2 tabs BID  Seeing ortho in 2 weeks  Cortisone in May to both knees  Uses a walker  Afib, INR managed by cardiology  Pacemaker placed December 2018  Right breast mass in May appeared to be a hematoma on US  3month  F/U US in August scheduled  She no longer feels it anymore  The following portions of the patient's history were reviewed and updated as appropriate: allergies, current medications, past family history, past medical history, past social history, past surgical history and problem list     Review of Systems   Constitutional: Negative for fever  HENT: Negative for congestion  Respiratory: Positive for cough (chronic mild)  Negative for shortness of breath  Cardiovascular: Negative for chest pain, palpitations and leg swelling  Gastrointestinal: Negative for abdominal pain, constipation, diarrhea, nausea and vomiting  Genitourinary: Positive for frequency and urgency  Musculoskeletal: Positive for arthralgias  Psychiatric/Behavioral: Positive for sleep disturbance  Objective:      /78   Pulse 96   Temp (!) 97 1 °F (36 2 °C) (Temporal)   Resp 16   Ht 4' 11" (1 499 m)   Wt 80 4 kg (177 lb 3 2 oz)   SpO2 93%   BMI 35 79 kg/m²          Physical Exam  Vitals signs reviewed  Constitutional:       General: She is not in acute distress  Appearance: She is obese  She is not ill-appearing, toxic-appearing or diaphoretic  Neck:      Musculoskeletal: Neck supple  Cardiovascular:      Rate and Rhythm: Normal rate and regular rhythm  Heart sounds: No murmur  Pulmonary:      Effort: Pulmonary effort is normal  No respiratory distress  Breath sounds: No stridor  No wheezing, rhonchi or rales  Abdominal:      General: There is no distension  Palpations: Abdomen is soft  Tenderness: There is no abdominal tenderness  There is no guarding  Musculoskeletal:      Right lower leg: No edema  Left lower leg: No edema  Neurological:      Mental Status: She is oriented to person, place, and time  Psychiatric:         Mood and Affect: Mood normal          Behavior: Behavior normal          Thought Content:  Thought content normal          Judgment: Judgment normal

## 2020-08-14 NOTE — PATIENT INSTRUCTIONS
Medicare Preventive Visit Patient Instructions  Thank you for completing your Welcome to Medicare Visit or Medicare Annual Wellness Visit today  Your next wellness visit will be due in one year (8/14/2021)  The screening/preventive services that you may require over the next 5-10 years are detailed below  Some tests may not apply to you based off risk factors and/or age  Screening tests ordered at today's visit but not completed yet may show as past due  Also, please note that scanned in results may not display below  Preventive Screenings:  Service Recommendations Previous Testing/Comments   Colorectal Cancer Screening  * Colonoscopy    * Fecal Occult Blood Test (FOBT)/Fecal Immunochemical Test (FIT)  * Fecal DNA/Cologuard Test  * Flexible Sigmoidoscopy Age: 54-65 years old   Colonoscopy: every 10 years (may be performed more frequently if at higher risk)  OR  FOBT/FIT: every 1 year  OR  Cologuard: every 3 years  OR  Sigmoidoscopy: every 5 years  Screening may be recommended earlier than age 48 if at higher risk for colorectal cancer  Also, an individualized decision between you and your healthcare provider will decide whether screening between the ages of 74-80 would be appropriate  Colonoscopy: Not on file  FOBT/FIT: Not on file  Cologuard: Not on file  Sigmoidoscopy: Not on file         Breast Cancer Screening Age: 36 years old  Frequency: every 1-2 years  Not required if history of left and right mastectomy Mammogram: 05/20/2020       Cervical Cancer Screening Between the ages of 21-29, pap smear recommended once every 3 years  Between the ages of 33-67, can perform pap smear with HPV co-testing every 5 years     Recommendations may differ for women with a history of total hysterectomy, cervical cancer, or abnormal pap smears in past  Pap Smear: Not on file       Hepatitis C Screening Once for adults born between [facility]  More frequently in patients at high risk for Hepatitis C Hep C Antibody: Not on file       Diabetes Screening 1-2 times per year if you're at risk for diabetes or have pre-diabetes Fasting glucose: 94 mg/dL   A1C: 6 2 %       Cholesterol Screening Once every 5 years if you don't have a lipid disorder  May order more often based on risk factors  Lipid panel: 08/13/2019         Other Preventive Screenings Covered by Medicare:  1  Abdominal Aortic Aneurysm (AAA) Screening: covered once if your at risk  You're considered to be at risk if you have a family history of AAA  2  Lung Cancer Screening: covers low dose CT scan once per year if you meet all of the following conditions: (1) Age 50-69; (2) No signs or symptoms of lung cancer; (3) Current smoker or have quit smoking within the last 15 years; (4) You have a tobacco smoking history of at least 30 pack years (packs per day multiplied by number of years you smoked); (5) You get a written order from a healthcare provider  3  Glaucoma Screening: covered annually if you're considered high risk: (1) You have diabetes OR (2) Family history of glaucoma OR (3)  aged 48 and older OR (3)  American aged 72 and older  3  Osteoporosis Screening: covered every 2 years if you meet one of the following conditions: (1) You're estrogen deficient and at risk for osteoporosis based off medical history and other findings; (2) Have a vertebral abnormality; (3) On glucocorticoid therapy for more than 3 months; (4) Have primary hyperparathyroidism; (5) On osteoporosis medications and need to assess response to drug therapy  · Last bone density test (DXA Scan): Not on file  5  HIV Screening: covered annually if you're between the age of 12-76  Also covered annually if you are younger than 13 and older than 72 with risk factors for HIV infection  For pregnant patients, it is covered up to 3 times per pregnancy      Immunizations:  Immunization Recommendations   Influenza Vaccine Annual influenza vaccination during flu season is recommended for all persons aged >= 6 months who do not have contraindications   Pneumococcal Vaccine (Prevnar and Pneumovax)  * Prevnar = PCV13  * Pneumovax = PPSV23   Adults 25-60 years old: 1-3 doses may be recommended based on certain risk factors  Adults 72 years old: Prevnar (PCV13) vaccine recommended followed by Pneumovax (PPSV23) vaccine  If already received PPSV23 since turning 65, then PCV13 recommended at least one year after PPSV23 dose  Hepatitis B Vaccine 3 dose series if at intermediate or high risk (ex: diabetes, end stage renal disease, liver disease)   Tetanus (Td) Vaccine - COST NOT COVERED BY MEDICARE PART B Following completion of primary series, a booster dose should be given every 10 years to maintain immunity against tetanus  Td may also be given as tetanus wound prophylaxis  Tdap Vaccine - COST NOT COVERED BY MEDICARE PART B Recommended at least once for all adults  For pregnant patients, recommended with each pregnancy  Shingles Vaccine (Shingrix) - COST NOT COVERED BY MEDICARE PART B  2 shot series recommended in those aged 48 and above     Health Maintenance Due:  There are no preventive care reminders to display for this patient  Immunizations Due:      Topic Date Due    Influenza Vaccine  07/01/2020     Advance Directives   What are advance directives? Advance directives are legal documents that state your wishes and plans for medical care  These plans are made ahead of time in case you lose your ability to make decisions for yourself  Advance directives can apply to any medical decision, such as the treatments you want, and if you want to donate organs  What are the types of advance directives? There are many types of advance directives, and each state has rules about how to use them  You may choose a combination of any of the following:  · Living will: This is a written record of the treatment you want   You can also choose which treatments you do not want, which to limit, and which to stop at a certain time  This includes surgery, medicine, IV fluid, and tube feedings  · Durable power of  for healthcare [facility]): This is a written record that states who you want to make healthcare choices for you when you are unable to make them for yourself  This person, called a proxy, is usually a family member or a friend  You may choose more than 1 proxy  · Do not resuscitate (DNR) order:  A DNR order is used in case your heart stops beating or you stop breathing  It is a request not to have certain forms of treatment, such as CPR  A DNR order may be included in other types of advance directives  · Medical directive: This covers the care that you want if you are in a coma, near death, or unable to make decisions for yourself  You can list the treatments you want for each condition  Treatment may include pain medicine, surgery, blood transfusions, dialysis, IV or tube feedings, and a ventilator (breathing machine)  · Values history: This document has questions about your views, beliefs, and how you feel and think about life  This information can help others choose the care that you would choose  Why are advance directives important? An advance directive helps you control your care  Although spoken wishes may be used, it is better to have your wishes written down  Spoken wishes can be misunderstood, or not followed  Treatments may be given even if you do not want them  An advance directive may make it easier for your family to make difficult choices about your care  Weight Management   Why it is important to manage your weight:  Being overweight increases your risk of health conditions such as heart disease, high blood pressure, type 2 diabetes, and certain types of cancer  It can also increase your risk for osteoarthritis, sleep apnea, and other respiratory problems  Aim for a slow, steady weight loss  Even a small amount of weight loss can lower your risk of health problems    How to lose weight safely:  A safe and healthy way to lose weight is to eat fewer calories and get regular exercise  You can lose up about 1 pound a week by decreasing the number of calories you eat by 500 calories each day  Healthy meal plan for weight management:  A healthy meal plan includes a variety of foods, contains fewer calories, and helps you stay healthy  A healthy meal plan includes the following:  · Eat whole-grain foods more often  A healthy meal plan should contain fiber  Fiber is the part of grains, fruits, and vegetables that is not broken down by your body  Whole-grain foods are healthy and provide extra fiber in your diet  Some examples of whole-grain foods are whole-wheat breads and pastas, oatmeal, brown rice, and bulgur  · Eat a variety of vegetables every day  Include dark, leafy greens such as spinach, kale, [place]ona greens, and mustard greens  Eat yellow and orange vegetables such as carrots, sweet potatoes, and winter squash  · Eat a variety of fruits every day  Choose fresh or canned fruit (canned in its own juice or light syrup) instead of juice  Fruit juice has very little or no fiber  · Eat low-fat dairy foods  Drink fat-free (skim) milk or 1% milk  Eat fat-free yogurt and low-fat cottage cheese  Try low-fat cheeses such as mozzarella and other reduced-fat cheeses  · Choose meat and other protein foods that are low in fat  Choose beans or other legumes such as split peas or lentils  Choose fish, skinless poultry (chicken or turkey), or lean cuts of red meat (beef or pork)  Before you cook meat or poultry, cut off any visible fat  · Use less fat and oil  Try baking foods instead of frying them  Add less fat, such as margarine, sour cream, regular salad dressing and mayonnaise to foods  Eat fewer high-fat foods  Some examples of high-fat foods include french fries, doughnuts, ice cream, and cakes  · Eat fewer sweets  Limit foods and drinks that are high in sugar   This includes candy, cookies, regular soda, and sweetened drinks  Exercise:  Exercise at least 30 minutes per day on most days of the week  Some examples of exercise include walking, biking, dancing, and swimming  You can also fit in more physical activity by taking the stairs instead of the elevator or parking farther away from stores  Ask your healthcare provider about the best exercise plan for you  © Copyright [employer] 2018 Information is for End User's use only and may not be sold, redistributed or otherwise used for commercial purposes   All illustrations and images included in CareNotes® are the copyrighted property of A D A M , Inc  or [address] [place] [address]

## 2020-08-14 NOTE — PROGRESS NOTES
Assessment and Plan:     Problem List Items Addressed This Visit        Endocrine    Type 2 diabetes mellitus without complication, without long-term current use of insulin (HCC) (Chronic)     Diet controlled  Lab Results   Component Value Date    HGBA1C 6 2 (H) 03/06/2020            Hypothyroidism (Chronic)     Maintained on low dose levothyroxine         Relevant Orders    TSH, 3rd generation with Free T4 reflex       Cardiovascular and Mediastinum    Coronary artery disease involving native coronary artery of native heart without angina pectoris (Chronic)     On ASA lisinopril metoprolol Imdur and pravastatin         Essential hypertension - Primary (Chronic)     Controlled on metoprolol and lisinopril         Atrial fibrillation ([facility] [address] )     Remains on amiodarone  F/U with cardiology  INR managed by cardiology            Other    Pacemaker    Hyperlipidemia (Chronic)     Controlled on pravastatin         Primary osteoarthritis of both knees     Continue routine cortisone injections           Other Visit Diagnoses     Type 2 diabetes mellitus with complication, without long-term current use of insulin (HCC)        Relevant Orders    CBC and Platelet    Comprehensive metabolic panel    HEMOGLOBIN A1C W/ EAG ESTIMATION    TSH, 3rd generation with Free T4 reflex    Hematoma of right breast        Medicare annual wellness visit, subsequent            BMI Counseling: Body mass index is 35 79 kg/m²  Follow-up plan was not completed due to elderly patient (72 years old) where weight reduction/weight gain would complicate underlying health condition such as: illness or physical disability  Preventive health issues were discussed with patient, and age appropriate screening tests were ordered as noted in patient's After Visit Summary  Personalized health advice and appropriate referrals for health education or preventive services given if needed, as noted in patient's After Visit Summary       History of Present Illness:     Patient presents for Medicare Annual Wellness visit    Patient Care Team:  Erich Baca MD as PCP - General (Internal Medicine)  MD Jose Manuel Vences MD Quenten Oven, MD Keitha Hasty, MD     Problem List:     Patient Active Problem List   Diagnosis    Coronary artery disease involving native coronary artery of native heart without angina pectoris    Essential hypertension    History of placement of stent in LAD coronary artery    Hyperlipidemia    History of atrial flutter    LBBB (left bundle branch block)    Right hip pain    Type 2 diabetes mellitus without complication, without long-term current use of insulin ([facility])    Hypothyroidism    Adhesive capsulitis    Chronic low back pain    Gait disturbance    Cervical spine degeneration    Primary osteoarthritis of both shoulders    Tremors of nervous system    Heart block AV second degree    Ambulatory dysfunction    Pacemaker    Atrial fibrillation ([facility] [address] )    Primary osteoarthritis of both knees    Trochanteric bursitis of right hip    Ventricular tachycardia ([address] )    Urinary urgency    Cardiomyopathy ([address] )    Primary osteoarthritis of left knee    Primary osteoarthritis of right knee    Effusion of left knee    Nonrheumatic aortic valve stenosis      Past Medical and Surgical History:     Past Medical History:   Diagnosis Date    Abnormal nuclear stress test     last assessed 04/03/2017    Anxiety     Arthritis     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    Bundle branch block, left     Cardiomyopathy ([address] )     Chest pain 3/9/2019    Chronic pain     chronic b/l shoulder pain    Chronic pain of right knee 4/2/2019    Coronary artery disease     Diabetes mellitus ([address] )     GERD (gastroesophageal reflux disease)     H/O atrial flutter     History of DVT (deep vein thrombosis)     History of pulmonary embolism     Hyperlipidemia     Hypertension     Hypothyroid     Renal calculi     Shortness of breath      Past Surgical History:   Procedure Laterality Date    ATRIAL ABLATION SURGERY  01/2015    CARDIOVERSION      CHELA/DCCV 10/22/2014    CARPAL TUNNEL RELEASE Right     CATARACT EXTRACTION      CORONARY ANGIOPLASTY WITH STENT PLACEMENT      HYSTERECTOMY      JOINT REPLACEMENT Right     DE AMPUTATION TOE,MT-P JT Left 10/11/2017    Procedure: AMPUTATION SECOND TOE;  Surgeon: Dafne Wiseman DPM;  Location[facility];  Service: Podiatry    TONSILLECTOMY      TOTAL HIP ARTHROPLASTY      Right      Family History:     Family History   Problem Relation Age of Onset   Richard Polio Parkinsonism Sister     Cancer Sister         unknown type    Heart attack Neg Hx     Stroke Neg Hx     Anuerysm Neg Hx     Clotting disorder Neg Hx     Arrhythmia Neg Hx     Heart failure Neg Hx     Coronary artery disease Neg Hx       Social History:        Social History     Socioeconomic History    Marital status: /Civil Union     Spouse name: None    Number of children: None    Years of education: None    Highest education level: None   Occupational History    None   Social Needs    Financial resource strain: None    Food insecurity     Worry: None     Inability: None    Transportation needs     Medical: None     Non-medical: None   Tobacco Use    Smoking status: Never Smoker    Smokeless tobacco: Never Used   Substance and Sexual Activity    Alcohol use: Yes     Comment: occasional    Drug use: Yes     Types: Marijuana     Comment: MEDICAL MARIJUANA-HAS NOT USED FOR 3 WEEKS    Sexual activity: None   Lifestyle    Physical activity     Days per week: None     Minutes per session: None    Stress: None   Relationships    Social connections     Talks on phone: None     Gets together: None     Attends Denominational service: None     Active member of club or organization: None     Attends meetings of clubs or organizations: None     Relationship status: None    Intimate partner violence     Fear of current or ex partner: None     Emotionally abused: None     Physically abused: None     Forced sexual activity: None   Other Topics Concern    None   Social History Narrative    None      Medications and Allergies:     Current Outpatient Medications   Medication Sig Dispense Refill    ACCU-CHEK RUBINA PLUS test strip 1 each by Other route daily 100 each 2    Accu-Chek Softclix Lancets lancets by Other route daily 100 each 2    acetaminophen (TYLENOL) 500 mg tablet Take 1 tablet (500 mg total) by mouth every 6 (six) hours as needed for mild pain or moderate pain 30 tablet 0    amiodarone 200 mg tablet TAKE 1 TABLET BY MOUTH DAILY 30 tablet 1    ASPIRIN LOW DOSE 81 MG EC tablet TAKE ONE TABLET BY MOUTH EVERY DAY 90 tablet 0    Calcium Carbonate-Vitamin D (CALTRATE 600+D PO) Take 1 capsule by mouth 2 (two) times a day        furosemide (LASIX) 40 mg tablet Take 1 tablet (40 mg total) by mouth daily 30 tablet 5    isosorbide mononitrate (IMDUR) 30 mg 24 hr tablet Take 1 tablet (30 mg total) by mouth daily 90 tablet 3    levothyroxine 25 mcg tablet TAKE ONE TABLET BY MOUTH EVERY DAY 90 tablet 1    lisinopril (ZESTRIL) 2 5 mg tablet TAKE ONE TABLET BY MOUTH EVERY DAY 90 tablet 1    metoprolol succinate (Toprol XL) 25 mg 24 hr tablet Take 1 tablet (25 mg total) by mouth daily after dinner 90 tablet 3    metroNIDAZOLE (METROCREAM) 0 75 % cream Apply topically 2 (two) times a day 45 g 0    multivitamin-iron-minerals-folic acid (CENTRUM) chewable tablet Chew 1 tablet daily   pravastatin (PRAVACHOL) 80 mg tablet Take 1 tablet (80 mg total) by mouth daily at bedtime 30 tablet 11    warfarin (COUMADIN) 2 5 mg tablet TAKE 1 TO 2 TABS BY MOUTH DAILY OR AS DIRECTED BY PHYSICIAN 60 tablet 11    Misc Natural Products (ENERGY FOCUS PO) Take 1 tablet by mouth 2 (two) times a day         No current facility-administered medications for this visit        Allergies   Allergen Reactions    Atorvastatin Reaction unknown 10/23/2018-    Other Rash     Patient sensitive to adhesives from tape      Immunizations:     Immunization History   Administered Date(s) Administered    INFLUENZA 11/01/2004, 10/01/2008, 10/01/2009, 09/20/2010, 10/04/2011, 11/10/2012, 10/16/2013, 10/06/2014, 09/14/2016, 09/25/2017    Influenza Split High Dose Preservative Free IM 10/15/2015, 09/14/2016, 09/25/2017, 10/23/2019    Influenza, high dose seasonal 0 5 mL 09/28/2018    Pneumococcal Conjugate 13-Valent 12/24/2015    Pneumococcal Polysaccharide PPV23 12/17/1998, 01/23/2007, 01/31/2014    Td (adult), Unspecified 04/01/1998    Tdap 09/21/2016      Health Maintenance: There are no preventive care reminders to display for this patient  Topic Date Due    Influenza Vaccine  07/01/2020      Medicare Health Risk Assessment:     /78   Pulse 96   Temp (!) 97 1 °F (36 2 °C) (Temporal)   Resp 16   Ht 4' 11" (1 499 m)   Wt 80 4 kg (177 lb 3 2 oz)   SpO2 93%   BMI 35 79 kg/m²      Lashawn Sandoval is here for her Subsequent Wellness visit  Health Risk Assessment:   Patient rates overall health as fair  Patient feels that their physical health rating is slightly worse  Eyesight was rated as same  Hearing was rated as same  Patient feels that their emotional and mental health rating is same  Pain experienced in the last 7 days has been a lot  Patient's pain rating has been 8/10  Patient states that she has experienced no weight loss or gain in last 6 months  Pain in the knees    Depression Screening:   PHQ-2 Score: 3  PHQ-9 Score: 10      Fall Risk Screening: In the past year, patient has experienced: no history of falling in past year      Urinary Incontinence Screening:   Patient has not leaked urine accidently in the last six months  Home Safety:  Patient does not have trouble with stairs inside or outside of their home  Patient has working smoke alarms and has no working carbon monoxide detector   Home safety hazards include: loose rugs on the floor, household clutter and not having non-slip bath and/or shower mats  Nutrition:   Current diet is Diabetic  The patient states she is suppose to be on a diabetic diet but eats whatever she wants  Medications:   Patient is not currently taking any over-the-counter supplements  Patient is able to manage medications  Activities of Daily Living (ADLs)/Instrumental Activities of Daily Living (IADLs):   Walk and transfer into and out of bed and chair?: Yes  Dress and groom yourself?: Yes    Bathe or shower yourself?: Yes    Feed yourself? Yes  Do your laundry/housekeeping?: No  Manage your money, pay your bills and track your expenses?: Yes  Make your own meals?: No    Do your own shopping?: Yes    ADL comments: The patient has someone come into the home to do house keeping and make meals  Durable Medical Equipment Suppliers  walker    Previous Hospitalizations:   Any hospitalizations or ED visits within the last 12 months?: No      Hospitalization Comments: Cardiac cath    Advance Care Planning:   Living will: Yes    Durable POA for healthcare:  Yes    Advanced directive: Yes      Cognitive Screening:   Provider or family/friend/caregiver concerned regarding cognition?: No    PREVENTIVE SCREENINGS      Cardiovascular Screening:    General: Screening Not Indicated and History Lipid Disorder      Diabetes Screening:     General: Screening Not Indicated and History Diabetes      Colorectal Cancer Screening:     General: Screening Not Indicated      Breast Cancer Screening:     General: Screening Current      Cervical Cancer Screening:    General: Screening Not Indicated      Osteoporosis Screening:    General: Screening Not Indicated      Abdominal Aortic Aneurysm (AAA) Screening:        General: Screening Not Indicated      Lung Cancer Screening:     General: Screening Not Indicated      Hepatitis C Screening:    General: Screening Not Indicated      Emerald Hickman MD

## 2020-08-16 PROBLEM — M25.561 CHRONIC PAIN OF RIGHT KNEE: Status: RESOLVED | Noted: 2019-04-02 | Resolved: 2020-08-16

## 2020-08-16 PROBLEM — R07.9 CHEST PAIN: Status: RESOLVED | Noted: 2019-03-09 | Resolved: 2020-08-16

## 2020-08-16 PROBLEM — G89.29 CHRONIC PAIN OF RIGHT KNEE: Status: RESOLVED | Noted: 2019-04-02 | Resolved: 2020-08-16

## 2020-08-17 ENCOUNTER — OFFICE VISIT (OUTPATIENT)
Dept: CARDIOLOGY CLINIC | Facility: CLINIC | Age: 85
End: 2020-08-17
Payer: MEDICARE

## 2020-08-17 VITALS
HEIGHT: 59 IN | OXYGEN SATURATION: 95 % | WEIGHT: 180 LBS | HEART RATE: 83 BPM | SYSTOLIC BLOOD PRESSURE: 122 MMHG | DIASTOLIC BLOOD PRESSURE: 74 MMHG | BODY MASS INDEX: 36.29 KG/M2

## 2020-08-17 DIAGNOSIS — E78.2 MIXED HYPERLIPIDEMIA: Chronic | ICD-10-CM

## 2020-08-17 DIAGNOSIS — Z95.0 PACEMAKER: ICD-10-CM

## 2020-08-17 DIAGNOSIS — Z86.79 HISTORY OF ATRIAL FLUTTER: Chronic | ICD-10-CM

## 2020-08-17 DIAGNOSIS — I10 ESSENTIAL HYPERTENSION: Chronic | ICD-10-CM

## 2020-08-17 DIAGNOSIS — I42.9 CARDIOMYOPATHY, UNSPECIFIED TYPE (HCC): ICD-10-CM

## 2020-08-17 DIAGNOSIS — I44.1 HEART BLOCK AV SECOND DEGREE: ICD-10-CM

## 2020-08-17 DIAGNOSIS — I47.2 VENTRICULAR TACHYCARDIA (HCC): ICD-10-CM

## 2020-08-17 DIAGNOSIS — I44.7 LBBB (LEFT BUNDLE BRANCH BLOCK): ICD-10-CM

## 2020-08-17 DIAGNOSIS — I35.0 NONRHEUMATIC AORTIC VALVE STENOSIS: ICD-10-CM

## 2020-08-17 DIAGNOSIS — Z95.5 HISTORY OF PLACEMENT OF STENT IN LAD CORONARY ARTERY: Chronic | ICD-10-CM

## 2020-08-17 DIAGNOSIS — I48.0 PAROXYSMAL ATRIAL FIBRILLATION (HCC): Primary | ICD-10-CM

## 2020-08-17 DIAGNOSIS — I25.10 CORONARY ARTERY DISEASE INVOLVING NATIVE CORONARY ARTERY OF NATIVE HEART WITHOUT ANGINA PECTORIS: Chronic | ICD-10-CM

## 2020-08-17 PROCEDURE — 1160F RVW MEDS BY RX/DR IN RCRD: CPT | Performed by: INTERNAL MEDICINE

## 2020-08-17 PROCEDURE — 3008F BODY MASS INDEX DOCD: CPT | Performed by: INTERNAL MEDICINE

## 2020-08-17 PROCEDURE — 99215 OFFICE O/P EST HI 40 MIN: CPT | Performed by: INTERNAL MEDICINE

## 2020-08-17 PROCEDURE — 2022F DILAT RTA XM EVC RTNOPTHY: CPT | Performed by: INTERNAL MEDICINE

## 2020-08-17 PROCEDURE — 1170F FXNL STATUS ASSESSED: CPT | Performed by: INTERNAL MEDICINE

## 2020-08-17 PROCEDURE — 4040F PNEUMOC VAC/ADMIN/RCVD: CPT | Performed by: INTERNAL MEDICINE

## 2020-08-17 PROCEDURE — 3074F SYST BP LT 130 MM HG: CPT | Performed by: INTERNAL MEDICINE

## 2020-08-17 PROCEDURE — 93000 ELECTROCARDIOGRAM COMPLETE: CPT | Performed by: INTERNAL MEDICINE

## 2020-08-17 PROCEDURE — 3044F HG A1C LEVEL LT 7.0%: CPT | Performed by: INTERNAL MEDICINE

## 2020-08-17 PROCEDURE — 1036F TOBACCO NON-USER: CPT | Performed by: INTERNAL MEDICINE

## 2020-08-17 PROCEDURE — 3078F DIAST BP <80 MM HG: CPT | Performed by: INTERNAL MEDICINE

## 2020-08-17 RX ORDER — PRAVASTATIN SODIUM 40 MG
40 TABLET ORAL
Qty: 30 TABLET | Refills: 11 | Status: SHIPPED | OUTPATIENT
Start: 2020-08-17 | End: 2020-08-26

## 2020-08-17 RX ORDER — AMIODARONE HYDROCHLORIDE 200 MG/1
100 TABLET ORAL DAILY
Qty: 45 TABLET | Refills: 3 | Status: SHIPPED | OUTPATIENT
Start: 2020-08-17 | End: 2021-03-17 | Stop reason: SDUPTHER

## 2020-08-17 NOTE — PATIENT INSTRUCTIONS
We have followed your aortic valve blockage called " aortic valve stenosis"  As of the CHELA in March 2020, it seemed it was only moderately blocked, so unlikely the cause of shortness of breath  Your cath at that time did not show any blockages  At this point, especially with limited ambulatory ability and age 80, you do not really qualify for TAVR (valve replacement) and I believe it may be another couple years before the valve becomes severe  Decrease your amiodarone to half a tablet daily  Take Furosemide (water pill) daily      Call me for any worsening swelling or shortness of breath

## 2020-08-17 NOTE — PROGRESS NOTES
[facility]  Follow up note  Ginger Delgado 80 y o  female MRN: [id number]        Problems    1  Paroxysmal atrial fibrillation (HCC)     2  Cardiomyopathy, unspecified type ([address] )     3  Coronary artery disease involving native coronary artery of native heart without angina pectoris     4  Heart block AV second degree     5  LBBB (left bundle branch block)     6  Nonrheumatic aortic valve stenosis     7  Ventricular tachycardia ([facility] )     8  Essential hypertension     9  History of atrial flutter     10  History of placement of stent in LAD coronary artery     11  Pacemaker         Impression:    Will Dunlap returns for a six-month follow-up  She is a 80-year-old female who has ambulatory function has drastically declined due to diffuse osteoarthritis, very gingerly walking with a walker, and having had an aortic stenosis workup in March of this year, has not been felt inappropriate candidate for TAVR based on her functional status, age, and the fact that her valve was only moderate, and not necessarily felt to be the source of her shortness of breath      Hypertension  · Blood pressure remains well controlled    Hyperlipidemia  · Intolerant to atorvastatin  · Continues on pravastatin 40 mg, lipids pending    CAD  · Remote LAD stent  · She has baseline left bundle branch block  · Dyspnea on exertion had been a bigger complaint, but ambulatory dysfunction has decline due to arthritis, and unable to push herself far enough to develop any dyspnea at this point in my opinion    History of atrial flutter  · Status post remote atrial flutter ablation with resolution of tachy mediated cardiomyopathy    PAF  · Warfarin based on Eliquis cost being prohibitive  · Some bruising  · On suppressive amiodarone 200 mg daily and metoprolol succinate    Ventricular tachycardia  · Noted on device checks  · No recurrence on device checks since January in the setting of amiodarone    2nd degree heart block  · S/p PPM 12/18 due to syncope  · 99% V paced    Aortic Stenosis  · Moderate by CHELA 3/20  · At 80years of age, with declining functional status and severe osteoarthritis, I do not think proceeding with TAVR workup is in her best interest, but focusing more on quality of life measures        Plan:    Decrease amiodarone to half a tablet a day  Amiodarone screening labs ordered by PCP last week, she will have them done this week  INR stable  Six-month follow-up    I have spent 40 minutes with Patient and family today in which greater than 50% of this time was spent in counseling/coordination of care regarding Prognosis, Risks and benefits of tx options, Patient and family education and Impressions  HPI:   Ashli Chopra is a 80y o  year old female with coronary artery disease, lad stent, PAF, history of atrial flutter status post ablation, second-degree heart block resulting in syncope with placement of a permanent pacemaker December 2018, ambulatory dysfunction, hypertension, hyperlipidemia presents for a follow-up visit  CAD - she offers no complaints of chest pain, she is on aspirin, slight bruising    She denies recent syncope or falling  AFib remains suppressed on device checks, no evidence of nonsustained VT, and continues on amiodarone 200 mg daily, has been on this almost a year  Initially felt to have some intolerance with lightheadedness, but has continued on it without persistent symptoms  She continues on warfarin, previously Eliquis but cost prohibitive  INRs have been stable    Dyspnea workup prompted evaluation of moderate to severe aortic stenosis with low velocities, but CHELA suggested valve area 1 3 in March of 2020, and did not pursue TAVR workup, partly due to the COVID quarantine, but at this point declining functional status, she is unable to walk very far, constant more than 5 minutes, very gingerly walking with a walker down the hallway at the end of our visit today      Lipids have been reasonable on pravastatin, intolerant to atorvastatin      Review of Systems   Constitutional: Positive for fatigue  Negative for appetite change, diaphoresis and fever  Respiratory: Negative for chest tightness, shortness of breath and wheezing  Cardiovascular: Negative for chest pain, palpitations and leg swelling  Gastrointestinal: Negative for abdominal pain and blood in stool  Musculoskeletal: Positive for arthralgias, back pain and gait problem  Negative for joint swelling  Skin: Negative for rash  Neurological: Negative for dizziness, syncope and light-headedness  Past Medical History:   Diagnosis Date    Abnormal nuclear stress test     last assessed 04/03/2017    Anxiety     Arthritis     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    Bundle branch block, left     Cardiomyopathy ([facility] )     Chest pain 3/9/2019    Chronic pain     chronic b/l shoulder pain    Chronic pain of right knee 4/2/2019    Coronary artery disease     Diabetes mellitus ([facility] )     GERD (gastroesophageal reflux disease)     H/O atrial flutter     History of DVT (deep vein thrombosis)     History of pulmonary embolism     Hyperlipidemia     Hypertension     Hypothyroid     Renal calculi     Shortness of breath      Social History     Substance and Sexual Activity   Alcohol Use Yes    Comment: occasional     Social History     Substance and Sexual Activity   Drug Use Yes    Types: Marijuana    Comment: MEDICAL MARIJUANA-HAS NOT USED FOR 3 WEEKS     Social History     Tobacco Use   Smoking Status Never Smoker   Smokeless Tobacco Never Used       Allergies:   Allergies   Allergen Reactions    Atorvastatin      Reaction unknown 10/23/2018-    Other Rash     Patient sensitive to adhesives from tape       Medications:     Current Outpatient Medications:     ACCU-CHEK RUBINA PLUS test strip, 1 each by Other route daily, Disp: 100 each, Rfl: 2    Accu-Chek Softclix Lancets lancets, by Other route daily, Disp: 100 each, Rfl: 2    acetaminophen (TYLENOL) 500 mg tablet, Take 1 tablet (500 mg total) by mouth every 6 (six) hours as needed for mild pain or moderate pain, Disp: 30 tablet, Rfl: 0    amiodarone 200 mg tablet, TAKE 1 TABLET BY MOUTH DAILY, Disp: 30 tablet, Rfl: 1    ASPIRIN LOW DOSE 81 MG EC tablet, TAKE ONE TABLET BY MOUTH EVERY DAY, Disp: 90 tablet, Rfl: 0    Calcium Carbonate-Vitamin D (CALTRATE 600+D PO), Take 1 capsule by mouth 2 (two) times a day  , Disp: , Rfl:     furosemide (LASIX) 40 mg tablet, Take 1 tablet (40 mg total) by mouth daily, Disp: 30 tablet, Rfl: 5    isosorbide mononitrate (IMDUR) 30 mg 24 hr tablet, Take 1 tablet (30 mg total) by mouth daily, Disp: 90 tablet, Rfl: 3    levothyroxine 25 mcg tablet, TAKE ONE TABLET BY MOUTH EVERY DAY, Disp: 90 tablet, Rfl: 1    lisinopril (ZESTRIL) 2 5 mg tablet, TAKE ONE TABLET BY MOUTH EVERY DAY, Disp: 90 tablet, Rfl: 1    metoprolol succinate (Toprol XL) 25 mg 24 hr tablet, Take 1 tablet (25 mg total) by mouth daily after dinner, Disp: 90 tablet, Rfl: 3    metroNIDAZOLE (METROCREAM) 0 75 % cream, Apply topically 2 (two) times a day, Disp: 45 g, Rfl: 0    Misc Natural Products (ENERGY FOCUS PO), Take 1 tablet by mouth 2 (two) times a day  , Disp: , Rfl:     multivitamin-iron-minerals-folic acid (CENTRUM) chewable tablet, Chew 1 tablet daily  , Disp: , Rfl:     pravastatin (PRAVACHOL) 80 mg tablet, Take 1 tablet (80 mg total) by mouth daily at bedtime, Disp: 30 tablet, Rfl: 11    warfarin (COUMADIN) 2 5 mg tablet, TAKE 1 TO 2 TABS BY MOUTH DAILY OR AS DIRECTED BY PHYSICIAN, Disp: 60 tablet, Rfl: 11      Vitals:    08/17/20 1253   BP: 122/74   Pulse: 83   SpO2: 95%     Weight (last 2 days)     Date/Time   Weight    08/17/20 1253   81 6 (180)            Physical Exam  Constitutional:       General: She is not in acute distress  Appearance: She is not diaphoretic  HENT:      Head: Normocephalic and atraumatic     Eyes: General: No scleral icterus  Conjunctiva/sclera: Conjunctivae normal    Neck:      Musculoskeletal: Normal range of motion  Vascular: No JVD  Cardiovascular:      Rate and Rhythm: Normal rate and regular rhythm  Heart sounds: Murmur present  Pulmonary:      Effort: Pulmonary effort is normal  No respiratory distress  Breath sounds: Normal breath sounds  No wheezing, rhonchi or rales  Musculoskeletal:         General: No tenderness  Right lower leg: Normal  No edema  Left lower leg: Normal  No edema  Skin:     General: Skin is warm and dry             Laboratory Studies:  Lab Results   Component Value Date    HGBA1C 6 2 (H) 03/06/2020    HGBA1C 6 2 03/06/2020    HGBA1C 5 6 08/13/2019    HGBA1C 6 0 01/30/2019    HGBA1C 5 7 09/07/2018     (L) 06/23/2015     05/21/2015     (L) 05/16/2015    K 4 3 03/20/2020    K 4 2 08/13/2019    K 4 1 03/10/2019    K 4 3 06/23/2015    K 3 6 05/21/2015    K 4 7 05/16/2015     03/20/2020    CL 98 (L) 08/13/2019     03/10/2019    CL 98 (L) 06/23/2015    CL 93 (L) 05/21/2015    CL 98 (L) 05/16/2015    CO2 29 03/20/2020    CO2 31 08/13/2019    CO2 26 03/10/2019    CO2 28 06/23/2015    CO2 37 (H) 05/21/2015    CO2 28 05/16/2015    GLUCOSE 90 06/23/2015    GLUCOSE 146 (H) 05/21/2015    GLUCOSE 132 05/16/2015    CREATININE 0 85 03/20/2020    CREATININE 0 84 08/13/2019    CREATININE 0 71 03/10/2019    CREATININE 0 68 06/23/2015    CREATININE 0 91 05/21/2015    CREATININE 0 75 05/16/2015    BUN 23 03/20/2020    BUN 19 08/13/2019    BUN 16 03/10/2019    BUN 16 06/23/2015    BUN 20 05/21/2015    BUN 18 05/16/2015    MG 2 0 12/11/2018    MG 2 3 12/10/2018    MG 1 8 12/10/2018    MG 2 1 05/16/2015    MG 1 9 05/15/2015    MG 1 7 05/14/2015     Lab Results   Component Value Date    WBC 5 82 08/13/2019    WBC 4 85 06/23/2015    RBC 4 07 08/13/2019    RBC 4 00 06/23/2015    HGB 13 0 08/13/2019    HGB 11 4 (L) 06/23/2015    HCT 39 5 08/13/2019    HCT 34 9 06/23/2015    MCV 97 08/13/2019    MCV 87 06/23/2015    MCH 31 9 08/13/2019    MCH 28 5 06/23/2015    RDW 12 5 08/13/2019    RDW 17 0 (H) 06/23/2015     08/13/2019     06/23/2015     NT-proBNP: No results for input(s): NTBNP in the last 72 hours  Coags:      Lipid Profile:   Lab Results   Component Value Date    CHOL 210 11/24/2015     Lab Results   Component Value Date    HDL 57 08/13/2019     Lab Results   Component Value Date    LDLCALC 102 (H) 08/13/2019     Lab Results   Component Value Date    TRIG 162 (H) 08/13/2019       Cardiac testing:   EKG reviewed personally:   8/20-sinus rhythm, first-degree AV block, left bundle branch block    ECHO  2/20 - EF normal, mod to sev low gradient AS  3/20-CHELA-EF normal, moderate LVH, moderate aortic stenosis, valve area 1 3 cm squared    Stress Myoview   3/19-EF normal, small amount of apical ischemia    Catheterization   2017-LAD stent placed  3/20-60% vasospastic RCA improved with nitro, no other significant stenosis    Device check   4/15/2019-normal device function, 6 seconds of AFib  1/29 - 99% v paced, no NSVT or Afib  7/22/2020-no AFib or nonsustained VT, normal pacing function    Adelita Davila MD    Portions of the record may have been created with voice recognition software   Occasional wrong word or "sound a like" substitutions may have occurred due to the inherent limitations of voice recognition software   Read the chart carefully and recognize, using context, where substitutions have occurred

## 2020-08-20 LAB
HBA1C MFR BLD HPLC: 6.1 %
INR PPP: 2.2 (ref 0.84–1.19)

## 2020-08-21 ENCOUNTER — ANTICOAG VISIT (OUTPATIENT)
Dept: CARDIOLOGY CLINIC | Facility: CLINIC | Age: 85
End: 2020-08-21

## 2020-08-21 DIAGNOSIS — I48.0 PAROXYSMAL ATRIAL FIBRILLATION (HCC): ICD-10-CM

## 2020-08-25 ENCOUNTER — OFFICE VISIT (OUTPATIENT)
Dept: OBGYN CLINIC | Facility: HOSPITAL | Age: 85
End: 2020-08-25
Payer: MEDICARE

## 2020-08-25 VITALS
DIASTOLIC BLOOD PRESSURE: 78 MMHG | WEIGHT: 173 LBS | SYSTOLIC BLOOD PRESSURE: 146 MMHG | HEART RATE: 92 BPM | HEIGHT: 59 IN | BODY MASS INDEX: 34.88 KG/M2

## 2020-08-25 DIAGNOSIS — M25.369 INSTABILITY OF KNEE JOINT, UNSPECIFIED LATERALITY: ICD-10-CM

## 2020-08-25 DIAGNOSIS — Z96.641 HISTORY OF RIGHT HIP REPLACEMENT: ICD-10-CM

## 2020-08-25 DIAGNOSIS — M17.0 PRIMARY OSTEOARTHRITIS OF BOTH KNEES: Primary | ICD-10-CM

## 2020-08-25 DIAGNOSIS — M70.61 TROCHANTERIC BURSITIS OF RIGHT HIP: ICD-10-CM

## 2020-08-25 PROCEDURE — 4040F PNEUMOC VAC/ADMIN/RCVD: CPT | Performed by: PHYSICIAN ASSISTANT

## 2020-08-25 PROCEDURE — 1160F RVW MEDS BY RX/DR IN RCRD: CPT | Performed by: PHYSICIAN ASSISTANT

## 2020-08-25 PROCEDURE — 3078F DIAST BP <80 MM HG: CPT | Performed by: PHYSICIAN ASSISTANT

## 2020-08-25 PROCEDURE — 3077F SYST BP >= 140 MM HG: CPT | Performed by: PHYSICIAN ASSISTANT

## 2020-08-25 PROCEDURE — 1036F TOBACCO NON-USER: CPT | Performed by: PHYSICIAN ASSISTANT

## 2020-08-25 PROCEDURE — 1170F FXNL STATUS ASSESSED: CPT | Performed by: PHYSICIAN ASSISTANT

## 2020-08-25 PROCEDURE — 3008F BODY MASS INDEX DOCD: CPT | Performed by: PHYSICIAN ASSISTANT

## 2020-08-25 PROCEDURE — 20610 DRAIN/INJ JOINT/BURSA W/O US: CPT | Performed by: PHYSICIAN ASSISTANT

## 2020-08-25 PROCEDURE — 2022F DILAT RTA XM EVC RTNOPTHY: CPT | Performed by: PHYSICIAN ASSISTANT

## 2020-08-25 PROCEDURE — 3044F HG A1C LEVEL LT 7.0%: CPT | Performed by: PHYSICIAN ASSISTANT

## 2020-08-25 PROCEDURE — 99213 OFFICE O/P EST LOW 20 MIN: CPT | Performed by: PHYSICIAN ASSISTANT

## 2020-08-25 RX ORDER — KETOROLAC TROMETHAMINE 30 MG/ML
60 INJECTION, SOLUTION INTRAMUSCULAR; INTRAVENOUS
Status: COMPLETED | OUTPATIENT
Start: 2020-08-25 | End: 2020-08-25

## 2020-08-25 RX ORDER — LIDOCAINE HYDROCHLORIDE 10 MG/ML
2 INJECTION, SOLUTION INFILTRATION; PERINEURAL
Status: COMPLETED | OUTPATIENT
Start: 2020-08-25 | End: 2020-08-25

## 2020-08-25 RX ORDER — BUPIVACAINE HYDROCHLORIDE 2.5 MG/ML
2 INJECTION, SOLUTION INFILTRATION; PERINEURAL
Status: COMPLETED | OUTPATIENT
Start: 2020-08-25 | End: 2020-08-25

## 2020-08-25 RX ADMIN — KETOROLAC TROMETHAMINE 60 MG: 30 INJECTION, SOLUTION INTRAMUSCULAR; INTRAVENOUS at 12:09

## 2020-08-25 RX ADMIN — LIDOCAINE HYDROCHLORIDE 2 ML: 10 INJECTION, SOLUTION INFILTRATION; PERINEURAL at 12:09

## 2020-08-25 RX ADMIN — BUPIVACAINE HYDROCHLORIDE 2 ML: 2.5 INJECTION, SOLUTION INFILTRATION; PERINEURAL at 12:09

## 2020-08-25 RX ADMIN — KETOROLAC TROMETHAMINE 60 MG: 30 INJECTION, SOLUTION INTRAMUSCULAR; INTRAVENOUS at 12:10

## 2020-08-25 RX ADMIN — LIDOCAINE HYDROCHLORIDE 2 ML: 10 INJECTION, SOLUTION INFILTRATION; PERINEURAL at 12:10

## 2020-08-25 NOTE — PROGRESS NOTES
Subjective;    70-year-old female, with known history of degenerative osteoarthritis of both knees  Her arthritis is severe by x-ray and by its clinical presents  Her knees are both unstable she having severe lateral compartmental wear on the right medial greater than lateral compartment wear on the left, in an adult female who takes warfarin  She still tries to go up and down the stairs to use her washer and do her wash,    At a previous appointment in May she had injections to both knees,   After 3 short days of improvement she had her pain return  She also voices that she had frequent urination after her steroid injections to both knees  She is accompanied by another adult female for today's appointment      There are x-rays of her knees for review    Past Medical History:   Diagnosis Date    Abnormal nuclear stress test     last assessed 04/03/2017    Anxiety     Arthritis     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    Bundle branch block, left     Cardiomyopathy ([address] )     Chest pain 3/9/2019    Chronic pain     chronic b/l shoulder pain    Chronic pain of right knee 4/2/2019    Coronary artery disease     Diabetes mellitus (HCC)     GERD (gastroesophageal reflux disease)     H/O atrial flutter     History of DVT (deep vein thrombosis)     History of pulmonary embolism     Hyperlipidemia     Hypertension     Hypothyroid     Renal calculi     Shortness of breath        Past Surgical History:   Procedure Laterality Date    ATRIAL ABLATION SURGERY  01/2015    CARDIOVERSION      CHELA/DCCV 10/22/2014    CARPAL TUNNEL RELEASE Right     CATARACT EXTRACTION      CORONARY ANGIOPLASTY WITH STENT PLACEMENT      HYSTERECTOMY      JOINT REPLACEMENT Right     [place] AMPUTATION TOE,MT-P JT Left 10/11/2017    Procedure: AMPUTATION SECOND TOE;  Surgeon: Kyler Styles DPM;  Location: [facility];  Service: Podiatry    TONSILLECTOMY      TOTAL HIP ARTHROPLASTY      Right Family History   Problem Relation Age of Onset   Min Abt Parkinsonism Sister    Min Abt Cancer Sister         unknown type    Heart attack Neg Hx     Stroke Neg Hx     Anuerysm Neg Hx     Clotting disorder Neg Hx     Arrhythmia Neg Hx     Heart failure Neg Hx     Coronary artery disease Neg Hx        Social History     Tobacco Use    Smoking status: Never Smoker    Smokeless tobacco: Never Used   Substance Use Topics    Alcohol use: Yes     Comment: occasional    Drug use: Yes     Types: Marijuana     Comment: MEDICAL MARIJUANA-HAS NOT USED FOR 3 WEEKS     Exam;    She has palpable reproducible pain over the proximal lateral thigh over the hip bursa  She has reproducible pain in the lateral musculature of the right mid thigh  She allows me to range the right and the left knee gently between 0 and 45° without pain    The right knee is valgus the left knee is varus  She has no significant effusion of either knee    Past x-rays of both knees, show severe tricompartmental osteoarthritis of both knees      Large joint arthrocentesis: R knee  Date/Time: 8/25/2020 12:09 PM  Consent given by: patient  Supporting Documentation  Indications: pain   Procedure Details  Location: knee - R knee  Needle size: 22 G  Ultrasound guidance: no  Medications administered: 2 mL bupivacaine 0 25 %; 60 mg ketorolac 60 mg/2 mL      Large joint arthrocentesis: L knee  Date/Time: 8/25/2020 12:09 PM  Consent given by: patient  Supporting Documentation  Indications: pain   Procedure Details  Location: knee - L knee  Needle size: 22 G  Medications administered: 2 mL bupivacaine 0 25 %; 2 mL lidocaine 1 %; 60 mg ketorolac 60 mg/2 mL    Patient tolerance: patient tolerated the procedure well with no immediate complications  Dressing:  Sterile dressing applied    Large joint arthrocentesis: R greater trochanteric bursa  Date/Time: 8/25/2020 12:10 PM  Consent given by: patient  Procedure Details  Location: hip - R greater trochanteric bursa  Needle size: 22 G  Approach: lateral  Medications administered: 2 mL lidocaine 1 %; 60 mg ketorolac 60 mg/2 mL    Patient tolerance: patient tolerated the procedure well with no immediate complications  Dressing:  Sterile dressing applied              Impression;    Bilateral knee osteoarthritis  Bilateral knee instability  Warfarin dependent adult female  Hip bursitis right greater trochanteric bursa      Plan;    She was offered and received Toradol injections to both knees, and her right hip bursa  We hope to avoid the frequent urination that she had from previous steroid injections  Treatment was confined to the lower extremities her knees and her hip bursa excluding the shoulders  Should she have significant discomfort from the shoulders in the future this will be addressed at a different time,   When medication improvement has been achieved on her behalf      She was counseled regarding the use of warfarin and presenting herself to occasions that would make her a fall risk    We will see her in 3 months for a scheduled follow-up appointment,   For symptomatic decline she may call the office at any time, and a sooner appointment can be generated    Her entire experience was supervised by and plan formulated by the attending surgeon it was my privilege to assist him in its delivery

## 2020-08-26 DIAGNOSIS — E78.2 MIXED HYPERLIPIDEMIA: Chronic | ICD-10-CM

## 2020-08-26 RX ORDER — PRAVASTATIN SODIUM 40 MG
TABLET ORAL
Qty: 90 TABLET | Refills: 1 | Status: ON HOLD | OUTPATIENT
Start: 2020-08-26 | End: 2021-06-03 | Stop reason: SDUPTHER

## 2020-08-27 DIAGNOSIS — E03.9 HYPOTHYROIDISM, UNSPECIFIED TYPE: ICD-10-CM

## 2020-08-27 RX ORDER — LEVOTHYROXINE SODIUM 0.03 MG/1
TABLET ORAL
Qty: 90 TABLET | Refills: 1
Start: 2020-08-27 | End: 2020-10-30

## 2020-08-31 ENCOUNTER — TELEPHONE (OUTPATIENT)
Dept: CARDIOLOGY CLINIC | Facility: CLINIC | Age: 85
End: 2020-08-31

## 2020-08-31 RX ORDER — FUROSEMIDE 20 MG/1
20 TABLET ORAL DAILY
COMMUNITY
End: 2021-03-01 | Stop reason: SDUPTHER

## 2020-08-31 NOTE — TELEPHONE ENCOUNTER
P/C for BW results - completed @ [facility]  Also c/o frequent urination w/ incontinence, sometimes large and small amounts  Asking if she can decrease lasix 40mg daily to 20mg?    Wt stable, minimal LE edema exists  C/b# [phone]

## 2020-08-31 NOTE — TELEPHONE ENCOUNTER
Labs look fine, and she can cut down the furosemide, but if she notices worsening leg swelling, she needs to go back to 40 mg intermittently

## 2020-09-10 ENCOUNTER — ANTICOAG VISIT (OUTPATIENT)
Dept: CARDIOLOGY CLINIC | Facility: CLINIC | Age: 85
End: 2020-09-10

## 2020-09-10 DIAGNOSIS — I48.0 PAROXYSMAL ATRIAL FIBRILLATION (HCC): ICD-10-CM

## 2020-09-10 LAB — INR PPP: 2 (ref 0.84–1.19)

## 2020-09-11 ENCOUNTER — TELEPHONE (OUTPATIENT)
Dept: INTERNAL MEDICINE CLINIC | Facility: CLINIC | Age: 85
End: 2020-09-11

## 2020-09-11 ENCOUNTER — TELEPHONE (OUTPATIENT)
Dept: OBGYN CLINIC | Facility: HOSPITAL | Age: 85
End: 2020-09-11

## 2020-09-11 NOTE — TELEPHONE ENCOUNTER
Message reviewed    I called the patient    She would like an appointment next Tuesday, 15 September,   Either late morning or early afternoon,   In the [place] office      Could you please have 1 of the professional nurses in the phone room make her an appointment, of her choosing, within the above mentioned time frame    She is expecting a call within the hour    Thank you,    CHAYITO

## 2020-09-11 NOTE — TELEPHONE ENCOUNTER
Dr Juel Dakins office calling in asking if pt may have another steroid injection for pain in the knees however last time she received the injection she complained of increased urination/frequent urination    please advise, Courtney Burciaga: [phone]

## 2020-09-11 NOTE — TELEPHONE ENCOUNTER
Zac Bloch    [phone]    Dr Juan Anna    Patient was in the office on 8/25 for injections in both knees  She states that she is still having pain  Please advise

## 2020-09-11 NOTE — TELEPHONE ENCOUNTER
Call placed to Dr Jono Bryant' office to find out if patient would be able to have steroid injection due to her past side-effect of increased urination  Awaiting response by Dr Jono Bryant

## 2020-09-11 NOTE — TELEPHONE ENCOUNTER
Patient sees Dr Colan Ormond    Patient called in stating that her PCP,Dr Amy Lindo gave her the ok for her injections  Thank you       [phone]

## 2020-09-11 NOTE — TELEPHONE ENCOUNTER
Message reviewed    At her last appointment patient related the adverse effect of increased urination, and frequent urination after steroid injections  In lieu of this we use Toradol, as her injection  Clearly she has had a different result, when I talked to her on the phone just now  I would like to offer her injections of steroid,     If it is deemed safe to use despite her frequent urination    We will reach out to her PCP and discuss this with them    Alex Solomon, will you reach out to Dr Thor Gamino office regarding our desire to give this individual steroid    Thank you    CHAYITO

## 2020-09-15 ENCOUNTER — OFFICE VISIT (OUTPATIENT)
Dept: OBGYN CLINIC | Facility: HOSPITAL | Age: 85
End: 2020-09-15
Payer: MEDICARE

## 2020-09-15 VITALS
HEIGHT: 59 IN | BODY MASS INDEX: 35.54 KG/M2 | HEART RATE: 71 BPM | DIASTOLIC BLOOD PRESSURE: 55 MMHG | SYSTOLIC BLOOD PRESSURE: 92 MMHG

## 2020-09-15 DIAGNOSIS — M25.561 CHRONIC PAIN OF RIGHT KNEE: ICD-10-CM

## 2020-09-15 DIAGNOSIS — M25.462 EFFUSION OF LEFT KNEE: ICD-10-CM

## 2020-09-15 DIAGNOSIS — M17.12 PRIMARY OSTEOARTHRITIS OF LEFT KNEE: Primary | ICD-10-CM

## 2020-09-15 DIAGNOSIS — G89.29 CHRONIC PAIN OF RIGHT KNEE: ICD-10-CM

## 2020-09-15 DIAGNOSIS — M25.562 CHRONIC PAIN OF LEFT KNEE: ICD-10-CM

## 2020-09-15 DIAGNOSIS — M12.811 ROTATOR CUFF ARTHROPATHY OF BOTH SHOULDERS: ICD-10-CM

## 2020-09-15 DIAGNOSIS — M17.11 PRIMARY OSTEOARTHRITIS OF RIGHT KNEE: ICD-10-CM

## 2020-09-15 DIAGNOSIS — M12.812 ROTATOR CUFF ARTHROPATHY OF BOTH SHOULDERS: ICD-10-CM

## 2020-09-15 DIAGNOSIS — G89.29 CHRONIC PAIN OF LEFT KNEE: ICD-10-CM

## 2020-09-15 PROCEDURE — 99213 OFFICE O/P EST LOW 20 MIN: CPT | Performed by: ORTHOPAEDIC SURGERY

## 2020-09-15 PROCEDURE — 20610 DRAIN/INJ JOINT/BURSA W/O US: CPT | Performed by: ORTHOPAEDIC SURGERY

## 2020-09-15 RX ORDER — LIDOCAINE HYDROCHLORIDE 10 MG/ML
2 INJECTION, SOLUTION INFILTRATION; PERINEURAL
Status: COMPLETED | OUTPATIENT
Start: 2020-09-15 | End: 2020-09-15

## 2020-09-15 RX ORDER — BETAMETHASONE SODIUM PHOSPHATE AND BETAMETHASONE ACETATE 3; 3 MG/ML; MG/ML
12 INJECTION, SUSPENSION INTRA-ARTICULAR; INTRALESIONAL; INTRAMUSCULAR; SOFT TISSUE
Status: COMPLETED | OUTPATIENT
Start: 2020-09-15 | End: 2020-09-15

## 2020-09-15 RX ORDER — BUPIVACAINE HYDROCHLORIDE 2.5 MG/ML
2 INJECTION, SOLUTION INFILTRATION; PERINEURAL
Status: COMPLETED | OUTPATIENT
Start: 2020-09-15 | End: 2020-09-15

## 2020-09-15 RX ADMIN — BETAMETHASONE SODIUM PHOSPHATE AND BETAMETHASONE ACETATE 12 MG: 3; 3 INJECTION, SUSPENSION INTRA-ARTICULAR; INTRALESIONAL; INTRAMUSCULAR; SOFT TISSUE at 12:13

## 2020-09-15 RX ADMIN — BUPIVACAINE HYDROCHLORIDE 2 ML: 2.5 INJECTION, SOLUTION INFILTRATION; PERINEURAL at 12:13

## 2020-09-15 RX ADMIN — LIDOCAINE HYDROCHLORIDE 2 ML: 10 INJECTION, SOLUTION INFILTRATION; PERINEURAL at 12:13

## 2020-09-15 RX ADMIN — LIDOCAINE HYDROCHLORIDE 2 ML: 10 INJECTION, SOLUTION INFILTRATION; PERINEURAL at 12:02

## 2020-09-15 RX ADMIN — BUPIVACAINE HYDROCHLORIDE 2 ML: 2.5 INJECTION, SOLUTION INFILTRATION; PERINEURAL at 12:02

## 2020-09-15 RX ADMIN — BETAMETHASONE SODIUM PHOSPHATE AND BETAMETHASONE ACETATE 12 MG: 3; 3 INJECTION, SUSPENSION INTRA-ARTICULAR; INTRALESIONAL; INTRAMUSCULAR; SOFT TISSUE at 12:02

## 2020-09-15 NOTE — PROGRESS NOTES
Assessment:   Diagnosis ICD-10-CM Associated Orders   1  Primary osteoarthritis of left knee  M17 12 Large joint arthrocentesis: L knee   2  Chronic pain of left knee  M25 562 Large joint arthrocentesis: L knee    G89 29    3  Effusion of left knee  M25 462 Large joint arthrocentesis: L knee   4  Primary osteoarthritis of right knee  M17 11 Large joint arthrocentesis: R knee   5  Chronic pain of right knee  M25 561 Large joint arthrocentesis: R knee    G89 29    6  Rotator cuff arthropathy of both shoulders  M12 811 Large joint arthrocentesis: bilateral subacromial bursa    M12 812        Plan:  Due to her return of her pain at both her knees as well as both shoulder she was offered, performed, injected cortisone injections at all 4 joints  Prior to her left knee injection of cortisone an aspiration was performed successfully  Post injection protocol advised  Weight-bearing and activities as tolerated  Patient was educated on appropriate blood sugar monitoring following today's injections  Any increased knee be referred to her primary care physician who approved these injections to be administered today  To do next visit:  Return for re-check in 8-10 weeks if needed  The above stated was discussed in layman's terms and the patient expressed understanding  All questions were answered to the patient's satisfaction  Scribe Attestation    I,:   Adolfo Simms am acting as a scribe while in the presence of the attending physician :        I,:   Neal Jewell MD personally performed the services described in this documentation    as scribed in my presence :              Subjective:   Leonidas Rai is a 80 y o  female who presents for repeat evaluation due to return of pain at both knees as well as pain at shoulders  She has known osteoarthritis of both knees as well as rotator cuff arthropathy of both shoulders    At her visit last month both knees were injected with Toradol as the respective for diabetic condition  She had very short term relief for only a day or 2  She previously had cortisone in May with lasting relief  We received approval from her primary care physician to go ahead with today's cortisone injections as the patient requested          Review of systems negative unless otherwise specified in HPI    Past Medical History:   Diagnosis Date    Abnormal nuclear stress test     last assessed 04/03/2017    Anxiety     Arthritis     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    Bundle branch block, left     Cardiomyopathy ([facility] [address] )     Chest pain 3/9/2019    Chronic pain     chronic b/l shoulder pain    Chronic pain of right knee 4/2/2019    Coronary artery disease     Diabetes mellitus ([facility] [address] [facility]     GERD (gastroesophageal reflux disease)     H/O atrial flutter     History of DVT (deep vein thrombosis)     History of pulmonary embolism     Hyperlipidemia     Hypertension     Hypothyroid     Renal calculi     Shortness of breath        Past Surgical History:   Procedure Laterality Date    ATRIAL ABLATION SURGERY  01/2015    CARDIOVERSION      [facility]/DCCV 10/22/2014    CARPAL TUNNEL RELEASE Right     CATARACT EXTRACTION      CORONARY ANGIOPLASTY WITH STENT PLACEMENT      HYSTERECTOMY      JOINT REPLACEMENT Right     VT AMPUTATION TOE,MT-P JT Left 10/11/2017    Procedure: AMPUTATION SECOND TOE;  Surgeon: Ryan Downing DPM;  Location: [facility] OR;  Service: Podiatry    TONSILLECTOMY      TOTAL HIP ARTHROPLASTY      Right       Family History   Problem Relation Age of Onset    Parkinsonism Sister     Cancer Sister         unknown type    Heart attack Neg Hx     Stroke Neg Hx     Anuerysm Neg Hx     Clotting disorder Neg Hx     Arrhythmia Neg Hx     Heart failure Neg Hx     Coronary artery disease Neg Hx        Social History     Occupational History    Not on file   Tobacco Use    Smoking status: Never Smoker    Smokeless tobacco: Never Used   Substance and Sexual Activity    Alcohol use: Yes     Comment: occasional    Drug use: Yes     Types: Marijuana     Comment: MEDICAL MARIJUANA-HAS NOT USED FOR 3 WEEKS    Sexual activity: Not on file         Current Outpatient Medications:     ACCU-CHEK RUBINA PLUS test strip, 1 each by Other route daily, Disp: 100 each, Rfl: 2    Accu-Chek Softclix Lancets lancets, by Other route daily, Disp: 100 each, Rfl: 2    acetaminophen (TYLENOL) 500 mg tablet, Take 1 tablet (500 mg total) by mouth every 6 (six) hours as needed for mild pain or moderate pain, Disp: 30 tablet, Rfl: 0    amiodarone 200 mg tablet, Take 0 5 tablets (100 mg total) by mouth daily, Disp: 45 tablet, Rfl: 3    ASPIRIN LOW DOSE 81 MG EC tablet, TAKE ONE TABLET BY MOUTH EVERY DAY, Disp: 90 tablet, Rfl: 0    Calcium Carbonate-Vitamin D (CALTRATE 600+D PO), Take 1 capsule by mouth 2 (two) times a day  , Disp: , Rfl:     furosemide (LASIX) 20 mg tablet, Take 20 mg by mouth daily, Disp: , Rfl:     isosorbide mononitrate (IMDUR) 30 mg 24 hr tablet, Take 1 tablet (30 mg total) by mouth daily, Disp: 90 tablet, Rfl: 3    levothyroxine 25 mcg tablet, 2 tabs MWF and 1 tab the rest of the week, Disp: 90 tablet, Rfl: 1    lisinopril (ZESTRIL) 2 5 mg tablet, TAKE ONE TABLET BY MOUTH EVERY DAY, Disp: 90 tablet, Rfl: 1    metoprolol succinate (Toprol XL) 25 mg 24 hr tablet, Take 1 tablet (25 mg total) by mouth daily after dinner, Disp: 90 tablet, Rfl: 3    metroNIDAZOLE (METROCREAM) 0 75 % cream, Apply topically 2 (two) times a day, Disp: 45 g, Rfl: 0    Misc Natural Products (ENERGY FOCUS PO), Take 1 tablet by mouth 2 (two) times a day  , Disp: , Rfl:     multivitamin-iron-minerals-folic acid (CENTRUM) chewable tablet, Chew 1 tablet daily  , Disp: , Rfl:     pravastatin (PRAVACHOL) 40 mg tablet, TAKE ONE TABLET BY MOUTH EVERY DAY, Disp: 90 tablet, Rfl: 1    warfarin (COUMADIN) 2 5 mg tablet, TAKE 1 TO 2 TABS BY MOUTH DAILY OR AS DIRECTED BY PHYSICIAN, Disp: 60 tablet, Rfl: 11    Allergies   Allergen Reactions    Atorvastatin      Reaction unknown 10/23/2018-    Other Rash     Patient sensitive to adhesives from tape            Vitals:    09/15/20 1120   BP: 92/55   Pulse: 71       Objective:                    Right Knee Exam     Tenderness   The patient is experiencing tenderness in the medial joint line and lateral joint line  Range of Motion   Extension: 5   Flexion: 110     Comments:    Moderate valgus alignment      Left Knee Exam     Tenderness   The patient is experiencing tenderness in the medial joint line and lateral joint line  Range of Motion   Extension: 10   Flexion: 100     Comments:    Moderate varus alignment with bony enlargement medially  There is mild laxity but no gross instability at either knee  Intact extensor mechanisms  Mild quadriceps weakness at both knees  Right Shoulder Exam     Tenderness   Right shoulder tenderness location: Diffuse  Other   Erythema: absent  Sensation: normal      Left Shoulder Exam     Tenderness   Left shoulder tenderness location: Diffuse  Other   Erythema: absent  Sensation: normal     Comments: Both shoulders no effusion or swelling  Both shoulders have moderate rotator cuff weakness  Both shoulders have restricted passive range of motion due to pain with significant crepitation              Diagnostics, reviewed and taken today if performed as documented:    None performed          Procedures, if performed today:    Large joint arthrocentesis: L knee  Date/Time: 9/15/2020 12:02 PM  Consent given by: patient  Site marked: site marked  Supporting Documentation  Indications: pain   Procedure Details  Location: knee - L knee  Preparation: Patient was prepped and draped in the usual sterile fashion  Needle size: 18 G  Ultrasound guidance: no  Approach: supralateral   Medications administered: 12 mg betamethasone acetate-betamethasone sodium phosphate 6 (3-3) mg/mL; 2 mL lidocaine 1 %; 2 mL bupivacaine 0 25 %    Aspirate amount: 30 mL  Aspirate: clear and yellow    Patient tolerance: patient tolerated the procedure well with no immediate complications  Dressing:  Sterile dressing applied    Large joint arthrocentesis: R knee  Date/Time: 9/15/2020 12:13 PM  Consent given by: patient  Site marked: site marked  Supporting Documentation  Indications: pain   Procedure Details  Location: knee - R knee  Preparation: Patient was prepped and draped in the usual sterile fashion  Needle size: 22 G  Ultrasound guidance: no  Medications administered: 12 mg betamethasone acetate-betamethasone sodium phosphate 6 (3-3) mg/mL; 2 mL lidocaine 1 %; 2 mL bupivacaine 0 25 %    Patient tolerance: patient tolerated the procedure well with no immediate complications  Dressing:  Sterile dressing applied    Large joint arthrocentesis: bilateral subacromial bursa  Date/Time: 9/15/2020 12:13 PM  Consent given by: patient  Site marked: site marked  Supporting Documentation  Indications: pain   Procedure Details  Location: shoulder - bilateral subacromial bursa  Preparation: Patient was prepped and draped in the usual sterile fashion  Needle size: 22 G  Ultrasound guidance: no  Approach: lateral    Medications (Right): 2 mL bupivacaine 0 25 %; 2 mL lidocaine 1 %; 12 mg betamethasone acetate-betamethasone sodium phosphate 6 (3-3) mg/mLMedications (Left): 2 mL bupivacaine 0 25 %; 2 mL lidocaine 1 %; 12 mg betamethasone acetate-betamethasone sodium phosphate 6 (3-3) mg/mL   Patient tolerance: patient tolerated the procedure well with no immediate complications  Dressing:  Sterile dressing applied              Portions of the record may have been created with voice recognition software  Occasional wrong word or "sound a like" substitutions may have occurred due to the inherent limitations of voice recognition software  Read the chart carefully and recognize, using context, where substitutions have occurred

## 2020-09-21 ENCOUNTER — REMOTE DEVICE CLINIC VISIT (OUTPATIENT)
Dept: CARDIOLOGY CLINIC | Facility: CLINIC | Age: 85
End: 2020-09-21
Payer: MEDICARE

## 2020-09-21 DIAGNOSIS — Z95.0 PACEMAKER: Primary | ICD-10-CM

## 2020-09-21 PROCEDURE — 93294 REM INTERROG EVL PM/LDLS PM: CPT | Performed by: INTERNAL MEDICINE

## 2020-09-21 PROCEDURE — 93296 REM INTERROG EVL PM/IDS: CPT | Performed by: INTERNAL MEDICINE

## 2020-09-21 NOTE — PROGRESS NOTES
Results for orders placed or performed in visit on 09/21/20   Cardiac EP device report    Narrative    MDT DUAL PM - ACTIVE SYSTEM IS MRI CONDITIONAL  CARELINK TRANSMISSION: BATTERY VOLTAGE ADEQUATE (10 7 YRS)  AP 21 4%  99 7% (>40%/DEPENDENT)  ALL AVAILABLE LEAD PARAMETERS WITHIN NORMAL LIMITS  1 VT-NS EPISODE, 15 BEATS @  BPM  EF 60% (3/2020 [facility])  HX: NSVT & PATIENT TAKES ASA 81, WARFARIN, AMIODORONE, METOPROLOL SUCC  TASK TO DR Desire Carlisle FOR NSVT, >200  PACEMAKER FUNCTIONING APPROPRIATELY         EB

## 2020-10-01 LAB — INR PPP: 2.2 (ref 0.84–1.19)

## 2020-10-02 ENCOUNTER — CLINICAL SUPPORT (OUTPATIENT)
Dept: INTERNAL MEDICINE CLINIC | Facility: CLINIC | Age: 85
End: 2020-10-02
Payer: MEDICARE

## 2020-10-02 ENCOUNTER — ANTICOAG VISIT (OUTPATIENT)
Dept: CARDIOLOGY CLINIC | Facility: CLINIC | Age: 85
End: 2020-10-02

## 2020-10-02 DIAGNOSIS — I48.0 PAROXYSMAL ATRIAL FIBRILLATION (HCC): ICD-10-CM

## 2020-10-02 DIAGNOSIS — Z23 ENCOUNTER FOR IMMUNIZATION: Primary | ICD-10-CM

## 2020-10-02 PROCEDURE — G0008 ADMIN INFLUENZA VIRUS VAC: HCPCS

## 2020-10-02 PROCEDURE — 90662 IIV NO PRSV INCREASED AG IM: CPT

## 2020-10-12 ENCOUNTER — ANTICOAG VISIT (OUTPATIENT)
Dept: CARDIOLOGY CLINIC | Facility: CLINIC | Age: 85
End: 2020-10-12

## 2020-10-12 DIAGNOSIS — I48.0 PAROXYSMAL ATRIAL FIBRILLATION (HCC): ICD-10-CM

## 2020-10-12 LAB — INR PPP: 2.3 (ref 0.84–1.19)

## 2020-10-29 DIAGNOSIS — I25.10 CAD (CORONARY ARTERY DISEASE): ICD-10-CM

## 2020-10-29 DIAGNOSIS — E03.9 HYPOTHYROIDISM, UNSPECIFIED TYPE: ICD-10-CM

## 2020-10-29 RX ORDER — ASPIRIN 81 MG/1
TABLET, COATED ORAL
Qty: 90 TABLET | Refills: 0 | Status: SHIPPED | OUTPATIENT
Start: 2020-10-29 | End: 2021-03-09

## 2020-10-30 RX ORDER — LEVOTHYROXINE SODIUM 0.03 MG/1
TABLET ORAL
Qty: 90 TABLET | Refills: 1 | Status: SHIPPED | OUTPATIENT
Start: 2020-10-30 | End: 2021-01-14 | Stop reason: SDUPTHER

## 2020-11-02 LAB — INR PPP: 2.5 (ref 0.84–1.19)

## 2020-11-03 ENCOUNTER — ANTICOAG VISIT (OUTPATIENT)
Dept: CARDIOLOGY CLINIC | Facility: CLINIC | Age: 85
End: 2020-11-03

## 2020-11-03 DIAGNOSIS — I48.0 PAROXYSMAL ATRIAL FIBRILLATION (HCC): ICD-10-CM

## 2020-11-18 ENCOUNTER — TELEPHONE (OUTPATIENT)
Dept: CARDIOLOGY CLINIC | Facility: CLINIC | Age: 85
End: 2020-11-18

## 2020-11-23 RX ORDER — FUROSEMIDE 40 MG/1
40 TABLET ORAL DAILY
COMMUNITY
Start: 2020-11-15 | End: 2021-01-28 | Stop reason: HOSPADM

## 2020-11-24 ENCOUNTER — OFFICE VISIT (OUTPATIENT)
Dept: OBGYN CLINIC | Facility: HOSPITAL | Age: 85
End: 2020-11-24
Payer: MEDICARE

## 2020-11-24 VITALS
HEIGHT: 59 IN | HEART RATE: 81 BPM | SYSTOLIC BLOOD PRESSURE: 104 MMHG | BODY MASS INDEX: 35.54 KG/M2 | DIASTOLIC BLOOD PRESSURE: 64 MMHG

## 2020-11-24 DIAGNOSIS — M17.0 PRIMARY OSTEOARTHRITIS OF BOTH KNEES: Primary | ICD-10-CM

## 2020-11-24 DIAGNOSIS — G89.29 CHRONIC PAIN OF BOTH KNEES: ICD-10-CM

## 2020-11-24 DIAGNOSIS — M25.561 CHRONIC PAIN OF BOTH KNEES: ICD-10-CM

## 2020-11-24 DIAGNOSIS — M25.562 CHRONIC PAIN OF BOTH KNEES: ICD-10-CM

## 2020-11-24 DIAGNOSIS — G89.4 CHRONIC PAIN SYNDROME: ICD-10-CM

## 2020-11-24 PROCEDURE — 20610 DRAIN/INJ JOINT/BURSA W/O US: CPT | Performed by: ORTHOPAEDIC SURGERY

## 2020-11-24 PROCEDURE — 99213 OFFICE O/P EST LOW 20 MIN: CPT | Performed by: ORTHOPAEDIC SURGERY

## 2020-11-24 RX ORDER — LIDOCAINE HYDROCHLORIDE 10 MG/ML
2 INJECTION, SOLUTION INFILTRATION; PERINEURAL
Status: COMPLETED | OUTPATIENT
Start: 2020-11-24 | End: 2020-11-24

## 2020-11-24 RX ORDER — BETAMETHASONE SODIUM PHOSPHATE AND BETAMETHASONE ACETATE 3; 3 MG/ML; MG/ML
12 INJECTION, SUSPENSION INTRA-ARTICULAR; INTRALESIONAL; INTRAMUSCULAR; SOFT TISSUE
Status: COMPLETED | OUTPATIENT
Start: 2020-11-24 | End: 2020-11-24

## 2020-11-24 RX ORDER — BUPIVACAINE HYDROCHLORIDE 2.5 MG/ML
2 INJECTION, SOLUTION INFILTRATION; PERINEURAL
Status: COMPLETED | OUTPATIENT
Start: 2020-11-24 | End: 2020-11-24

## 2020-11-24 RX ORDER — METHYLPREDNISOLONE ACETATE 40 MG/ML
1 INJECTION, SUSPENSION INTRA-ARTICULAR; INTRALESIONAL; INTRAMUSCULAR; SOFT TISSUE
Status: COMPLETED | OUTPATIENT
Start: 2020-11-24 | End: 2020-11-24

## 2020-11-24 RX ADMIN — LIDOCAINE HYDROCHLORIDE 2 ML: 10 INJECTION, SOLUTION INFILTRATION; PERINEURAL at 11:51

## 2020-11-24 RX ADMIN — METHYLPREDNISOLONE ACETATE 1 ML: 40 INJECTION, SUSPENSION INTRA-ARTICULAR; INTRALESIONAL; INTRAMUSCULAR; SOFT TISSUE at 11:51

## 2020-11-24 RX ADMIN — BETAMETHASONE SODIUM PHOSPHATE AND BETAMETHASONE ACETATE 12 MG: 3; 3 INJECTION, SUSPENSION INTRA-ARTICULAR; INTRALESIONAL; INTRAMUSCULAR; SOFT TISSUE at 11:51

## 2020-11-24 RX ADMIN — BUPIVACAINE HYDROCHLORIDE 2 ML: 2.5 INJECTION, SOLUTION INFILTRATION; PERINEURAL at 11:51

## 2020-11-30 LAB — INR PPP: 2.8 (ref 0.84–1.19)

## 2020-12-01 ENCOUNTER — ANTICOAG VISIT (OUTPATIENT)
Dept: CARDIOLOGY CLINIC | Facility: CLINIC | Age: 85
End: 2020-12-01

## 2020-12-01 DIAGNOSIS — I48.0 PAROXYSMAL ATRIAL FIBRILLATION (HCC): ICD-10-CM

## 2020-12-02 DIAGNOSIS — E11.9 TYPE 2 DIABETES MELLITUS WITHOUT COMPLICATION, WITHOUT LONG-TERM CURRENT USE OF INSULIN (HCC): Chronic | ICD-10-CM

## 2020-12-02 DIAGNOSIS — E11.8 TYPE 2 DIABETES MELLITUS WITH COMPLICATION, WITHOUT LONG-TERM CURRENT USE OF INSULIN (HCC): ICD-10-CM

## 2020-12-02 RX ORDER — BLOOD SUGAR DIAGNOSTIC
STRIP MISCELLANEOUS
Qty: 100 EACH | Refills: 2 | Status: SHIPPED | OUTPATIENT
Start: 2020-12-02 | End: 2021-02-03

## 2020-12-08 ENCOUNTER — IN-CLINIC DEVICE VISIT (OUTPATIENT)
Dept: CARDIOLOGY CLINIC | Facility: CLINIC | Age: 85
End: 2020-12-08
Payer: MEDICARE

## 2020-12-08 DIAGNOSIS — Z95.0 PACEMAKER: ICD-10-CM

## 2020-12-08 DIAGNOSIS — I42.9 CARDIOMYOPATHY, UNSPECIFIED TYPE (HCC): ICD-10-CM

## 2020-12-08 PROCEDURE — 93280 PM DEVICE PROGR EVAL DUAL: CPT | Performed by: INTERNAL MEDICINE

## 2020-12-21 DIAGNOSIS — I48.0 PAROXYSMAL ATRIAL FIBRILLATION (HCC): ICD-10-CM

## 2020-12-21 RX ORDER — WARFARIN SODIUM 2.5 MG/1
TABLET ORAL
Qty: 120 TABLET | Refills: 3 | Status: SHIPPED | OUTPATIENT
Start: 2020-12-21 | End: 2021-02-09

## 2020-12-22 ENCOUNTER — TELEMEDICINE (OUTPATIENT)
Dept: INTERNAL MEDICINE CLINIC | Facility: CLINIC | Age: 85
End: 2020-12-22
Payer: MEDICARE

## 2020-12-22 VITALS — BODY MASS INDEX: 36.36 KG/M2 | WEIGHT: 177 LBS

## 2020-12-22 DIAGNOSIS — I48.0 PAROXYSMAL ATRIAL FIBRILLATION (HCC): ICD-10-CM

## 2020-12-22 DIAGNOSIS — M19.012 PRIMARY OSTEOARTHRITIS OF BOTH SHOULDERS: ICD-10-CM

## 2020-12-22 DIAGNOSIS — R25.1 TREMORS OF NERVOUS SYSTEM: ICD-10-CM

## 2020-12-22 DIAGNOSIS — E78.2 MIXED HYPERLIPIDEMIA: Chronic | ICD-10-CM

## 2020-12-22 DIAGNOSIS — R26.2 AMBULATORY DYSFUNCTION: Primary | ICD-10-CM

## 2020-12-22 DIAGNOSIS — M79.10 MYALGIA: ICD-10-CM

## 2020-12-22 DIAGNOSIS — M19.011 PRIMARY OSTEOARTHRITIS OF BOTH SHOULDERS: ICD-10-CM

## 2020-12-22 DIAGNOSIS — E11.9 TYPE 2 DIABETES MELLITUS WITHOUT COMPLICATION, WITHOUT LONG-TERM CURRENT USE OF INSULIN (HCC): Chronic | ICD-10-CM

## 2020-12-22 DIAGNOSIS — M17.11 PRIMARY OSTEOARTHRITIS OF RIGHT KNEE: ICD-10-CM

## 2020-12-22 DIAGNOSIS — M17.12 PRIMARY OSTEOARTHRITIS OF LEFT KNEE: ICD-10-CM

## 2020-12-22 DIAGNOSIS — E03.9 HYPOTHYROIDISM, UNSPECIFIED TYPE: Chronic | ICD-10-CM

## 2020-12-22 PROBLEM — R26.9 GAIT DISTURBANCE: Status: RESOLVED | Noted: 2018-03-02 | Resolved: 2020-12-22

## 2020-12-22 PROCEDURE — 99442 PR PHYS/QHP TELEPHONE EVALUATION 11-20 MIN: CPT | Performed by: INTERNAL MEDICINE

## 2020-12-23 ENCOUNTER — TELEPHONE (OUTPATIENT)
Dept: INTERNAL MEDICINE CLINIC | Facility: CLINIC | Age: 85
End: 2020-12-23

## 2020-12-28 ENCOUNTER — TELEPHONE (OUTPATIENT)
Dept: INTERNAL MEDICINE CLINIC | Facility: CLINIC | Age: 85
End: 2020-12-28

## 2020-12-28 LAB — INR PPP: 2.4 (ref 0.84–1.19)

## 2020-12-29 ENCOUNTER — ANTICOAG VISIT (OUTPATIENT)
Dept: CARDIOLOGY CLINIC | Facility: CLINIC | Age: 85
End: 2020-12-29

## 2020-12-29 DIAGNOSIS — I48.0 PAROXYSMAL ATRIAL FIBRILLATION (HCC): ICD-10-CM

## 2020-12-30 ENCOUNTER — TELEMEDICINE (OUTPATIENT)
Dept: INTERNAL MEDICINE CLINIC | Facility: CLINIC | Age: 85
End: 2020-12-30
Payer: MEDICARE

## 2020-12-30 ENCOUNTER — TELEPHONE (OUTPATIENT)
Dept: OTHER | Facility: OTHER | Age: 85
End: 2020-12-30

## 2020-12-30 VITALS — WEIGHT: 177 LBS | BODY MASS INDEX: 36.36 KG/M2

## 2020-12-30 DIAGNOSIS — M19.012 PRIMARY OSTEOARTHRITIS OF BOTH SHOULDERS: Primary | ICD-10-CM

## 2020-12-30 DIAGNOSIS — M17.0 PRIMARY OSTEOARTHRITIS OF BOTH KNEES: ICD-10-CM

## 2020-12-30 DIAGNOSIS — M79.10 MYALGIA: ICD-10-CM

## 2020-12-30 DIAGNOSIS — I10 ESSENTIAL HYPERTENSION: Chronic | ICD-10-CM

## 2020-12-30 DIAGNOSIS — E11.9 TYPE 2 DIABETES MELLITUS WITHOUT COMPLICATION, WITHOUT LONG-TERM CURRENT USE OF INSULIN (HCC): Primary | Chronic | ICD-10-CM

## 2020-12-30 DIAGNOSIS — E78.2 MIXED HYPERLIPIDEMIA: Chronic | ICD-10-CM

## 2020-12-30 DIAGNOSIS — M19.011 PRIMARY OSTEOARTHRITIS OF BOTH SHOULDERS: Primary | ICD-10-CM

## 2020-12-30 PROCEDURE — 99442 PR PHYS/QHP TELEPHONE EVALUATION 11-20 MIN: CPT | Performed by: INTERNAL MEDICINE

## 2020-12-30 RX ORDER — TRAMADOL HYDROCHLORIDE 50 MG/1
50 TABLET ORAL EVERY 6 HOURS PRN
Qty: 20 TABLET | Refills: 0 | Status: SHIPPED | OUTPATIENT
Start: 2020-12-30 | End: 2021-02-02

## 2021-01-04 ENCOUNTER — TELEPHONE (OUTPATIENT)
Dept: INTERNAL MEDICINE CLINIC | Facility: CLINIC | Age: 86
End: 2021-01-04

## 2021-01-04 NOTE — TELEPHONE ENCOUNTER
Patient said you ordered blood work for her  She said someone comes to the house to do it for her, but she doesn't know who  She hasn't gotten it done yet and she said she was told that if it wasn't done by today to give us a call  Please advise

## 2021-01-05 NOTE — TELEPHONE ENCOUNTER
Patient has not yet received a call from [facility] to schedule her in home blood draw  Please contact them and advise patient

## 2021-01-06 DIAGNOSIS — I48.0 PAROXYSMAL ATRIAL FIBRILLATION (HCC): Primary | ICD-10-CM

## 2021-01-06 NOTE — TELEPHONE ENCOUNTER
Pt wants to know if [facility] was called for home draw because she is fasting and needs to know if they're coming or not   [facility] [place] Also Dr Lashae Winkler, she would like for you to call her when you have a free moment

## 2021-01-08 LAB — HBA1C MFR BLD HPLC: 6 %

## 2021-01-12 ENCOUNTER — TELEPHONE (OUTPATIENT)
Dept: PAIN MEDICINE | Facility: CLINIC | Age: 86
End: 2021-01-12

## 2021-01-12 NOTE — TELEPHONE ENCOUNTER
Patient called stating someone from office called her but didn't leave a message  She is requesting her her OVS scheduled tomorrow be a virtual  She states hr mobility is limited due to cervical, back and knee pain   Please reach out to patient again at # [phone]

## 2021-01-12 NOTE — TELEPHONE ENCOUNTER
Patient called in requesting a telephone virtual due to the fact that she cant walk   Thank you      [phone]

## 2021-01-12 NOTE — TELEPHONE ENCOUNTER
6 minutes ago (9:44 AM)      Brian Elizabeth 10 minutes ago (9:40 AM)        Patient called stating someone from office called her but didn't leave a message  She is requesting her her OVS scheduled tomorrow be a virtual  She states hr mobility is limited due to cervical, back and knee pain   Please reach out to patient again at # [phone]          Documentation

## 2021-01-12 NOTE — TELEPHONE ENCOUNTER
Attempted to contact pt, line was connected and answered but person on other end did not respond after several attempts to introduce  Line disconnected, attempted to call back, line busy  Please try again later

## 2021-01-12 NOTE — TELEPHONE ENCOUNTER
S/w pt, informed that because she has not been evaluated in the office for several years OV would need to be keep in person rather than virtual  Pt verbalized understanding, she is going to call a friend to ensure they will be able to provide transportation  Pt will cb with she needs to reschedule appt

## 2021-01-13 ENCOUNTER — TELEPHONE (OUTPATIENT)
Dept: PAIN MEDICINE | Facility: CLINIC | Age: 86
End: 2021-01-13

## 2021-01-13 NOTE — TELEPHONE ENCOUNTER
Pt called to cancel her appt today and would like a nurse to call her back when available    Pt can be reached at [phone]

## 2021-01-13 NOTE — TELEPHONE ENCOUNTER
I s/w pt who said she cx'd her appt today b/c she can't hardly walk and she can't drive, she wanted to s/w the doctor instead  I explained that she would need to be seen in person as she has not been seen in our office since 2018  Pt said she will c/b to R/S once she has someone that can drive her  I told pt to reach out to her PCP in the interim

## 2021-01-14 DIAGNOSIS — E03.9 HYPOTHYROIDISM, UNSPECIFIED TYPE: ICD-10-CM

## 2021-01-14 RX ORDER — LEVOTHYROXINE SODIUM 0.05 MG/1
50 TABLET ORAL DAILY
Qty: 90 TABLET | Refills: 0 | Status: SHIPPED | OUTPATIENT
Start: 2021-01-14 | End: 2021-02-02

## 2021-01-23 ENCOUNTER — HOSPITAL ENCOUNTER (INPATIENT)
Facility: HOSPITAL | Age: 86
LOS: 4 days | Discharge: NURSING, REHAB OR LONG-TERM CARE | DRG: 554 | End: 2021-01-28
Attending: EMERGENCY MEDICINE | Admitting: INTERNAL MEDICINE
Payer: MEDICARE

## 2021-01-23 ENCOUNTER — APPOINTMENT (EMERGENCY)
Dept: RADIOLOGY | Facility: HOSPITAL | Age: 86
DRG: 554 | End: 2021-01-23
Payer: MEDICARE

## 2021-01-23 DIAGNOSIS — R79.89 ELEVATED TSH: ICD-10-CM

## 2021-01-23 DIAGNOSIS — R35.0 URINARY FREQUENCY: ICD-10-CM

## 2021-01-23 DIAGNOSIS — R26.2 AMBULATORY DYSFUNCTION: Primary | ICD-10-CM

## 2021-01-23 LAB
ALBUMIN SERPL BCP-MCNC: 3.2 G/DL (ref 3.5–5)
ALP SERPL-CCNC: 76 U/L (ref 46–116)
ALT SERPL W P-5'-P-CCNC: 19 U/L (ref 12–78)
ANION GAP SERPL CALCULATED.3IONS-SCNC: 3 MMOL/L (ref 4–13)
APTT PPP: 31 SECONDS (ref 23–37)
AST SERPL W P-5'-P-CCNC: 14 U/L (ref 5–45)
ATRIAL RATE: 220 BPM
BASOPHILS # BLD AUTO: 0.02 THOUSANDS/ΜL (ref 0–0.1)
BASOPHILS NFR BLD AUTO: 0 % (ref 0–1)
BILIRUB SERPL-MCNC: 0.35 MG/DL (ref 0.2–1)
BILIRUB UR QL STRIP: NEGATIVE
BUN SERPL-MCNC: 19 MG/DL (ref 5–25)
CALCIUM ALBUM COR SERPL-MCNC: 9.6 MG/DL (ref 8.3–10.1)
CALCIUM SERPL-MCNC: 9 MG/DL (ref 8.3–10.1)
CHLORIDE SERPL-SCNC: 101 MMOL/L (ref 100–108)
CLARITY UR: CLEAR
CO2 SERPL-SCNC: 33 MMOL/L (ref 21–32)
COLOR UR: YELLOW
CREAT SERPL-MCNC: 0.79 MG/DL (ref 0.6–1.3)
EOSINOPHIL # BLD AUTO: 0.07 THOUSAND/ΜL (ref 0–0.61)
EOSINOPHIL NFR BLD AUTO: 1 % (ref 0–6)
ERYTHROCYTE [DISTWIDTH] IN BLOOD BY AUTOMATED COUNT: 13.4 % (ref 11.6–15.1)
FLUAV RNA RESP QL NAA+PROBE: NEGATIVE
FLUBV RNA RESP QL NAA+PROBE: NEGATIVE
GFR SERPL CREATININE-BSD FRML MDRD: 66 ML/MIN/1.73SQ M
GLUCOSE SERPL-MCNC: 212 MG/DL (ref 65–140)
GLUCOSE SERPL-MCNC: 224 MG/DL (ref 65–140)
GLUCOSE SERPL-MCNC: 97 MG/DL (ref 65–140)
GLUCOSE UR STRIP-MCNC: NEGATIVE MG/DL
HCT VFR BLD AUTO: 34.7 % (ref 34.8–46.1)
HGB BLD-MCNC: 11.2 G/DL (ref 11.5–15.4)
HGB UR QL STRIP.AUTO: NEGATIVE
IMM GRANULOCYTES # BLD AUTO: 0.01 THOUSAND/UL (ref 0–0.2)
IMM GRANULOCYTES NFR BLD AUTO: 0 % (ref 0–2)
INR PPP: 2 (ref 0.84–1.19)
KETONES UR STRIP-MCNC: NEGATIVE MG/DL
LACTATE SERPL-SCNC: 1.2 MMOL/L (ref 0.5–2)
LEUKOCYTE ESTERASE UR QL STRIP: NEGATIVE
LIPASE SERPL-CCNC: 110 U/L (ref 73–393)
LYMPHOCYTES # BLD AUTO: 0.99 THOUSANDS/ΜL (ref 0.6–4.47)
LYMPHOCYTES NFR BLD AUTO: 17 % (ref 14–44)
MCH RBC QN AUTO: 31.2 PG (ref 26.8–34.3)
MCHC RBC AUTO-ENTMCNC: 32.3 G/DL (ref 31.4–37.4)
MCV RBC AUTO: 97 FL (ref 82–98)
MONOCYTES # BLD AUTO: 0.61 THOUSAND/ΜL (ref 0.17–1.22)
MONOCYTES NFR BLD AUTO: 11 % (ref 4–12)
NEUTROPHILS # BLD AUTO: 4.01 THOUSANDS/ΜL (ref 1.85–7.62)
NEUTS SEG NFR BLD AUTO: 71 % (ref 43–75)
NITRITE UR QL STRIP: NEGATIVE
NRBC BLD AUTO-RTO: 0 /100 WBCS
PH UR STRIP.AUTO: 6.5 [PH]
PLATELET # BLD AUTO: 226 THOUSANDS/UL (ref 149–390)
PMV BLD AUTO: 9.1 FL (ref 8.9–12.7)
POTASSIUM SERPL-SCNC: 3.8 MMOL/L (ref 3.5–5.3)
PROT SERPL-MCNC: 6.9 G/DL (ref 6.4–8.2)
PROT UR STRIP-MCNC: NEGATIVE MG/DL
PROTHROMBIN TIME: 22.6 SECONDS (ref 11.6–14.5)
QRS AXIS: -7 DEGREES
QRSD INTERVAL: 124 MS
QT INTERVAL: 412 MS
QTC INTERVAL: 457 MS
RBC # BLD AUTO: 3.59 MILLION/UL (ref 3.81–5.12)
RSV RNA RESP QL NAA+PROBE: NEGATIVE
SARS-COV-2 RNA RESP QL NAA+PROBE: NEGATIVE
SODIUM SERPL-SCNC: 137 MMOL/L (ref 136–145)
SP GR UR STRIP.AUTO: 1.03 (ref 1–1.03)
T WAVE AXIS: 113 DEGREES
T4 FREE SERPL-MCNC: 0.75 NG/DL (ref 0.76–1.46)
TROPONIN I SERPL-MCNC: <0.02 NG/ML
TSH SERPL DL<=0.05 MIU/L-ACNC: 9.03 UIU/ML (ref 0.36–3.74)
UROBILINOGEN UR QL STRIP.AUTO: 0.2 E.U./DL
VENTRICULAR RATE: 74 BPM
WBC # BLD AUTO: 5.71 THOUSAND/UL (ref 4.31–10.16)

## 2021-01-23 PROCEDURE — 84439 ASSAY OF FREE THYROXINE: CPT | Performed by: EMERGENCY MEDICINE

## 2021-01-23 PROCEDURE — 99220 PR INITIAL OBSERVATION CARE/DAY 70 MINUTES: CPT | Performed by: INTERNAL MEDICINE

## 2021-01-23 PROCEDURE — 85730 THROMBOPLASTIN TIME PARTIAL: CPT | Performed by: EMERGENCY MEDICINE

## 2021-01-23 PROCEDURE — 36415 COLL VENOUS BLD VENIPUNCTURE: CPT | Performed by: EMERGENCY MEDICINE

## 2021-01-23 PROCEDURE — 85610 PROTHROMBIN TIME: CPT | Performed by: EMERGENCY MEDICINE

## 2021-01-23 PROCEDURE — 3E043GC INTRODUCTION OF OTHER THERAPEUTIC SUBSTANCE INTO CENTRAL VEIN, PERCUTANEOUS APPROACH: ICD-10-PCS | Performed by: INTERNAL MEDICINE

## 2021-01-23 PROCEDURE — 85025 COMPLETE CBC W/AUTO DIFF WBC: CPT | Performed by: EMERGENCY MEDICINE

## 2021-01-23 PROCEDURE — 84443 ASSAY THYROID STIM HORMONE: CPT | Performed by: EMERGENCY MEDICINE

## 2021-01-23 PROCEDURE — 81003 URINALYSIS AUTO W/O SCOPE: CPT | Performed by: EMERGENCY MEDICINE

## 2021-01-23 PROCEDURE — 93005 ELECTROCARDIOGRAM TRACING: CPT

## 2021-01-23 PROCEDURE — 1123F ACP DISCUSS/DSCN MKR DOCD: CPT | Performed by: EMERGENCY MEDICINE

## 2021-01-23 PROCEDURE — 0241U HB NFCT DS VIR RESP RNA 4 TRGT: CPT | Performed by: EMERGENCY MEDICINE

## 2021-01-23 PROCEDURE — 80053 COMPREHEN METABOLIC PANEL: CPT | Performed by: EMERGENCY MEDICINE

## 2021-01-23 PROCEDURE — 84484 ASSAY OF TROPONIN QUANT: CPT | Performed by: EMERGENCY MEDICINE

## 2021-01-23 PROCEDURE — 82948 REAGENT STRIP/BLOOD GLUCOSE: CPT

## 2021-01-23 PROCEDURE — 83690 ASSAY OF LIPASE: CPT | Performed by: EMERGENCY MEDICINE

## 2021-01-23 PROCEDURE — 93010 ELECTROCARDIOGRAM REPORT: CPT | Performed by: INTERNAL MEDICINE

## 2021-01-23 PROCEDURE — 83605 ASSAY OF LACTIC ACID: CPT | Performed by: EMERGENCY MEDICINE

## 2021-01-23 PROCEDURE — 99285 EMERGENCY DEPT VISIT HI MDM: CPT

## 2021-01-23 PROCEDURE — 74177 CT ABD & PELVIS W/CONTRAST: CPT

## 2021-01-23 PROCEDURE — 99285 EMERGENCY DEPT VISIT HI MDM: CPT | Performed by: EMERGENCY MEDICINE

## 2021-01-23 RX ORDER — POLYETHYLENE GLYCOL 3350 17 G/17G
17 POWDER, FOR SOLUTION ORAL DAILY
Status: DISCONTINUED | OUTPATIENT
Start: 2021-01-24 | End: 2021-01-28 | Stop reason: HOSPADM

## 2021-01-23 RX ORDER — ASPIRIN 81 MG/1
81 TABLET ORAL DAILY
Status: DISCONTINUED | OUTPATIENT
Start: 2021-01-24 | End: 2021-01-28 | Stop reason: HOSPADM

## 2021-01-23 RX ORDER — PRAVASTATIN SODIUM 40 MG
40 TABLET ORAL DAILY
Status: DISCONTINUED | OUTPATIENT
Start: 2021-01-24 | End: 2021-01-28 | Stop reason: HOSPADM

## 2021-01-23 RX ORDER — TRAMADOL HYDROCHLORIDE 50 MG/1
50 TABLET ORAL EVERY 6 HOURS PRN
Status: DISCONTINUED | OUTPATIENT
Start: 2021-01-23 | End: 2021-01-24

## 2021-01-23 RX ORDER — METOPROLOL SUCCINATE 25 MG/1
25 TABLET, EXTENDED RELEASE ORAL
Status: DISCONTINUED | OUTPATIENT
Start: 2021-01-23 | End: 2021-01-28 | Stop reason: HOSPADM

## 2021-01-23 RX ORDER — FUROSEMIDE 20 MG/1
20 TABLET ORAL DAILY
Status: DISCONTINUED | OUTPATIENT
Start: 2021-01-24 | End: 2021-01-24

## 2021-01-23 RX ORDER — AMIODARONE HYDROCHLORIDE 200 MG/1
100 TABLET ORAL DAILY
Status: DISCONTINUED | OUTPATIENT
Start: 2021-01-24 | End: 2021-01-28 | Stop reason: HOSPADM

## 2021-01-23 RX ORDER — LISINOPRIL 2.5 MG/1
2.5 TABLET ORAL DAILY
Status: DISCONTINUED | OUTPATIENT
Start: 2021-01-24 | End: 2021-01-28 | Stop reason: HOSPADM

## 2021-01-23 RX ORDER — ISOSORBIDE MONONITRATE 30 MG/1
30 TABLET, EXTENDED RELEASE ORAL DAILY
Status: DISCONTINUED | OUTPATIENT
Start: 2021-01-24 | End: 2021-01-28 | Stop reason: HOSPADM

## 2021-01-23 RX ORDER — ONDANSETRON 2 MG/ML
4 INJECTION INTRAMUSCULAR; INTRAVENOUS EVERY 6 HOURS PRN
Status: DISCONTINUED | OUTPATIENT
Start: 2021-01-23 | End: 2021-01-28 | Stop reason: HOSPADM

## 2021-01-23 RX ORDER — LEVOTHYROXINE SODIUM 0.05 MG/1
50 TABLET ORAL DAILY
Status: DISCONTINUED | OUTPATIENT
Start: 2021-01-24 | End: 2021-01-28 | Stop reason: HOSPADM

## 2021-01-23 RX ORDER — WARFARIN SODIUM 2.5 MG/1
2.5 TABLET ORAL
Status: DISCONTINUED | OUTPATIENT
Start: 2021-01-23 | End: 2021-01-28 | Stop reason: HOSPADM

## 2021-01-23 RX ORDER — B-COMPLEX WITH VITAMIN C
2 TABLET ORAL 2 TIMES DAILY WITH MEALS
Status: DISCONTINUED | OUTPATIENT
Start: 2021-01-24 | End: 2021-01-28 | Stop reason: HOSPADM

## 2021-01-23 RX ORDER — LIDOCAINE 50 MG/G
2 PATCH TOPICAL DAILY
Status: DISCONTINUED | OUTPATIENT
Start: 2021-01-24 | End: 2021-01-28 | Stop reason: HOSPADM

## 2021-01-23 RX ORDER — ACETAMINOPHEN 325 MG/1
975 TABLET ORAL EVERY 8 HOURS SCHEDULED
Status: DISCONTINUED | OUTPATIENT
Start: 2021-01-23 | End: 2021-01-28 | Stop reason: HOSPADM

## 2021-01-23 RX ORDER — MAGNESIUM HYDROXIDE/ALUMINUM HYDROXICE/SIMETHICONE 120; 1200; 1200 MG/30ML; MG/30ML; MG/30ML
30 SUSPENSION ORAL EVERY 6 HOURS PRN
Status: DISCONTINUED | OUTPATIENT
Start: 2021-01-23 | End: 2021-01-28 | Stop reason: HOSPADM

## 2021-01-23 RX ORDER — DOCUSATE SODIUM 100 MG/1
100 CAPSULE, LIQUID FILLED ORAL 2 TIMES DAILY
Status: DISCONTINUED | OUTPATIENT
Start: 2021-01-23 | End: 2021-01-28 | Stop reason: HOSPADM

## 2021-01-23 RX ADMIN — METOPROLOL SUCCINATE 25 MG: 25 TABLET, EXTENDED RELEASE ORAL at 20:34

## 2021-01-23 RX ADMIN — WARFARIN SODIUM 2.5 MG: 2.5 TABLET ORAL at 20:32

## 2021-01-23 RX ADMIN — ACETAMINOPHEN 975 MG: 325 TABLET, FILM COATED ORAL at 20:29

## 2021-01-23 RX ADMIN — IOHEXOL 100 ML: 350 INJECTION, SOLUTION INTRAVENOUS at 13:32

## 2021-01-23 RX ADMIN — DOCUSATE SODIUM 100 MG: 100 CAPSULE, LIQUID FILLED ORAL at 20:29

## 2021-01-23 NOTE — ASSESSMENT & PLAN NOTE
Osteoarthritis of both knees  Tylenol  Outpatient follow-up with orthopedics  She received intra-articular steroid injections with Orthopedics  Patient reports she was offered knee surgery by her orthopedician however patient has declined  Outpatient follow-up with Orthopedics

## 2021-01-23 NOTE — ASSESSMENT & PLAN NOTE
TSH 9  Free T4 0 75  Patient on levothyroxine 50 mcg daily  Recheck in 4-6 weeks and follow-up with primary care physician

## 2021-01-23 NOTE — ASSESSMENT & PLAN NOTE
Lab Results   Component Value Date    HGBA1C 6 0 (H) 01/08/2021       No results for input(s): POCGLU in the last 72 hours      Blood Sugar Average: Last 72 hrs:     Controlled  Monitor Accu-Cheks  Avoid hypoglycemia

## 2021-01-23 NOTE — PLAN OF CARE
Problem: Potential for Falls  Goal: Patient will remain free of falls  Description: INTERVENTIONS:  - Assess patient frequently for physical needs  -  Identify cognitive and physical deficits and behaviors that affect risk of falls    -  [place] fall precautions as indicated by assessment   - Educate patient/family on patient safety including physical limitations  - Instruct patient to call for assistance with activity based on assessment  - Modify environment to reduce risk of injury  - Consider OT/PT consult to assist with strengthening/mobility  Outcome: Progressing     Problem: PAIN - ADULT  Goal: Verbalizes/displays adequate comfort level or baseline comfort level  Description: Interventions:  - Encourage patient to monitor pain and request assistance  - Assess pain using appropriate pain scale  - Administer analgesics based on type and severity of pain and evaluate response  - Implement non-pharmacological measures as appropriate and evaluate response  - Consider cultural and social influences on pain and pain management  - Notify physician/advanced practitioner if interventions unsuccessful or patient reports new pain  Outcome: Progressing     Problem: INFECTION - ADULT  Goal: Absence or prevention of progression during hospitalization  Description: INTERVENTIONS:  - Assess and monitor for signs and symptoms of infection  - Monitor lab/diagnostic results  - Monitor all insertion sites, i e  indwelling lines, tubes, and drains  - Monitor endotracheal if appropriate and nasal secretions for changes in amount and color  - [place] appropriate cooling/warming therapies per order  - Administer medications as ordered  - Instruct and encourage patient and family to use good hand hygiene technique  - Identify and instruct in appropriate isolation precautions for identified infection/condition  Outcome: Progressing  Goal: Absence of fever/infection during neutropenic period  Description: INTERVENTIONS:  - Monitor WBC    Outcome: Progressing     Problem: SAFETY ADULT  Goal: Patient will remain free of falls  Description: INTERVENTIONS:  - Assess patient frequently for physical needs  -  Identify cognitive and physical deficits and behaviors that affect risk of falls    -  [place] fall precautions as indicated by assessment   - Educate patient/family on patient safety including physical limitations  - Instruct patient to call for assistance with activity based on assessment  - Modify environment to reduce risk of injury  - Consider OT/PT consult to assist with strengthening/mobility  Outcome: Progressing  Goal: Maintain or return to baseline ADL function  Description: INTERVENTIONS:  -  Assess patient's ability to carry out ADLs; assess patient's baseline for ADL function and identify physical deficits which impact ability to perform ADLs (bathing, care of mouth/teeth, toileting, grooming, dressing, etc )  - Assess/evaluate cause of self-care deficits   - Assess range of motion  - Assess patient's mobility; develop plan if impaired  - Assess patient's need for assistive devices and provide as appropriate  - Encourage maximum independence but intervene and supervise when necessary  - Involve family in performance of ADLs  - Assess for home care needs following discharge   - Consider OT consult to assist with ADL evaluation and planning for discharge  - Provide patient education as appropriate  Outcome: Progressing  Goal: Maintain or return mobility status to optimal level  Description: INTERVENTIONS:  - Assess patient's baseline mobility status (ambulation, transfers, stairs, etc )    - Identify cognitive and physical deficits and behaviors that affect mobility  - Identify mobility aids required to assist with transfers and/or ambulation (gait belt, sit-to-stand, lift, walker, cane, etc )  - [place] fall precautions as indicated by assessment  - Record patient progress and toleration of activity level on Mobility SBAR; progress patient to next Phase/Stage  - Instruct patient to call for assistance with activity based on assessment  - Consider rehabilitation consult to assist with strengthening/weightbearing, etc   Outcome: Progressing     Problem: DISCHARGE PLANNING  Goal: Discharge to home or other facility with appropriate resources  Description: INTERVENTIONS:  - Identify barriers to discharge w/patient and caregiver  - Arrange for needed discharge resources and transportation as appropriate  - Identify discharge learning needs (meds, wound care, etc )  - Arrange for interpretive services to assist at discharge as needed  - Refer to Case Management Department for coordinating discharge planning if the patient needs post-hospital services based on physician/advanced practitioner order or complex needs related to functional status, cognitive ability, or social support system  Outcome: Progressing     Problem: Knowledge Deficit  Goal: Patient/family/caregiver demonstrates understanding of disease process, treatment plan, medications, and discharge instructions  Description: Complete learning assessment and assess knowledge base    Interventions:  - Provide teaching at level of understanding  - Provide teaching via preferred learning methods  Outcome: Progressing

## 2021-01-23 NOTE — ED NOTES
Pt ambulatory with walker with slow steady gait, c/o discomfort and states she feels worse than she did when she came in  Resident notified        Jeramie Rolon RN  01/23/21 4042

## 2021-01-23 NOTE — ED PROVIDER NOTES
History  Chief Complaint   Patient presents with    Pain     pt presents by [facility] with c/o generalized body pain from neck down, states pain is worst in her knees and "my knees buckle when i try to walk " denies trauma  hx arthritis      Catia Magana is an 80y o  year old female with PMHx significant for CAD, cardiomyopathy, DM, atrial flutter on warfarin, HTN, who presents to the ED today with generalized weakness for one month  States that she felt too weak to even stand today  States that she has pain from her neck to her ankles for a long time but worse the last few days  For a few days she has been having urinary urgency and frequency, lower abdominal discomfort worst prior to needing to urinate  Pain is not exacerbated by eating or bowel movements  The patient denies fevers, chills, headaches, lightheadedness or syncope, nausea, vomiting, chest pain, shortness of breath, cough, changes in usual bowel movements, back pain, pain anywhere else in body  The patient has no sick contacts, recent travel history, new or changing medications, or immunocompromise  History provided by:  Medical records and patient   used: No    Fatigue  Associated symptoms: abdominal pain, frequency and urgency    Associated symptoms: no arthralgias, no chest pain, no cough, no diarrhea, no dizziness, no fever, no headaches, no myalgias, no nausea, no shortness of breath and no vomiting        Prior to Admission Medications   Prescriptions Last Dose Informant Patient Reported? Taking?    Accu-Chek Farheen Plus test strip   No No   Sig: TEST ONCE DAILY   Accu-Chek Softclix Lancets lancets  Self No No   Sig: by Other route daily   Aspirin Low Dose 81 MG EC tablet   No Yes   Sig: TAKE 1 TABLET BY MOUTH EVERY DAY   Calcium Carbonate-Vitamin D (CALTRATE 600+D PO)  Self Yes Yes   Sig: Take 1 capsule by mouth 2 (two) times a day     Misc Natural Products (ENERGY FOCUS PO)  Self Yes Yes   Sig: Take 1 tablet by mouth 2 (two) times a day     acetaminophen (TYLENOL) 500 mg tablet  Self No Yes   Sig: Take 1 tablet (500 mg total) by mouth every 6 (six) hours as needed for mild pain or moderate pain   amiodarone 200 mg tablet  Self No Yes   Sig: Take 0 5 tablets (100 mg total) by mouth daily   furosemide (LASIX) 20 mg tablet   Yes Yes   Sig: Take 20 mg by mouth daily   furosemide (LASIX) 40 mg tablet Not Taking at Unknown time  Yes No   Sig: Take 40 mg by mouth daily   isosorbide mononitrate (IMDUR) 30 mg 24 hr tablet  Self No Yes   Sig: Take 1 tablet (30 mg total) by mouth daily   levothyroxine 50 mcg tablet   No Yes   Sig: Take 1 tablet (50 mcg total) by mouth daily TAKE ONE TABLET BY MOUTH EVERY DAY   Patient taking differently: Take 25 mcg by mouth daily TAKE ONE TABLET BY MOUTH EVERY DAY   lisinopril (ZESTRIL) 2 5 mg tablet  Self No Yes   Sig: TAKE ONE TABLET BY MOUTH EVERY DAY   metoprolol succinate (Toprol XL) 25 mg 24 hr tablet  Self No Yes   Sig: Take 1 tablet (25 mg total) by mouth daily after dinner   metroNIDAZOLE (METROCREAM) 0 75 % cream  Self No No   Sig: Apply topically 2 (two) times a day   multivitamin-iron-minerals-folic acid (CENTRUM) chewable tablet  Self Yes Yes   Sig: Chew 1 tablet daily  pravastatin (PRAVACHOL) 40 mg tablet   No Yes   Sig: TAKE ONE TABLET BY MOUTH EVERY DAY   traMADol (ULTRAM) 50 mg tablet   No No   Sig: Take 1 tablet (50 mg total) by mouth every 6 (six) hours as needed for moderate pain   warfarin (COUMADIN) 2 5 mg tablet   No Yes   Sig: TAKE 1 TO 1 1/2 TABS BY MOUTH DAILY OR AS DIRECTED BY PHYSICIAN        Facility-Administered Medications: None       Past Medical History:   Diagnosis Date    Abnormal nuclear stress test     last assessed 04/03/2017    Anxiety     Arthritis     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    Bundle branch block, left     Cardiomyopathy (HCC)     Chest pain 3/9/2019    Chronic pain     chronic b/l shoulder pain    Chronic pain of right knee 4/2/2019    Coronary artery disease     Diabetes mellitus ([address] )     Gait disturbance 3/2/2018    GERD (gastroesophageal reflux disease)     H/O atrial flutter     History of DVT (deep vein thrombosis)     History of pulmonary embolism     Hyperlipidemia     Hypertension     Hypothyroid     Renal calculi     Shortness of breath        Past Surgical History:   Procedure Laterality Date    ATRIAL ABLATION SURGERY  01/2015    CARDIOVERSION      CHELA/DCCV 10/22/2014    CARPAL TUNNEL RELEASE Right     CATARACT EXTRACTION      CORONARY ANGIOPLASTY WITH STENT PLACEMENT      HYSTERECTOMY      JOINT REPLACEMENT Right     ME AMPUTATION TOE,MT-P JT Left 10/11/2017    Procedure: AMPUTATION SECOND TOE;  Surgeon: Ravi Weaver DPM;  Location: [facility];  Service: Podiatry    TONSILLECTOMY      TOTAL HIP ARTHROPLASTY      Right       Family History   Problem Relation Age of Onset    Parkinsonism Sister     Cancer Sister         unknown type    Heart attack Neg Hx     Stroke Neg Hx     Anuerysm Neg Hx     Clotting disorder Neg Hx     Arrhythmia Neg Hx     Heart failure Neg Hx     Coronary artery disease Neg Hx      I have reviewed and agree with the history as documented  E-Cigarette/Vaping     E-Cigarette/Vaping Substances     Social History     Tobacco Use    Smoking status: Never Smoker    Smokeless tobacco: Never Used   Substance Use Topics    Alcohol use: Yes     Comment: occasional    Drug use: Yes     Types: Marijuana     Comment: MEDICAL MARIJUANA-HAS NOT USED FOR 3 WEEKS        Review of Systems   Constitutional: Positive for fatigue  Negative for chills and fever  HENT: Negative for rhinorrhea and sore throat  Eyes: Negative for visual disturbance  Respiratory: Negative for cough and shortness of breath  Cardiovascular: Negative for chest pain  Gastrointestinal: Positive for abdominal pain  Negative for diarrhea, nausea and vomiting  Genitourinary: Positive for frequency, pelvic pain and urgency  Musculoskeletal: Negative for arthralgias and myalgias  Neurological: Positive for weakness  Negative for dizziness, light-headedness and headaches  All other systems reviewed and are negative  Physical Exam  ED Triage Vitals   Temperature Pulse Respirations Blood Pressure SpO2   01/23/21 1147 01/23/21 1147 01/23/21 1147 01/23/21 1147 01/23/21 1147   98 °F (36 7 °C) 82 18 145/71 98 %      Temp Source Heart Rate Source Patient Position - Orthostatic VS BP Location FiO2 (%)   01/23/21 1147 01/23/21 1147 01/23/21 1512 01/23/21 1512 --   Oral Monitor Lying Right arm       Pain Score       01/23/21 1512       7             Orthostatic Vital Signs  Vitals:    01/23/21 1147 01/23/21 1512   BP: 145/71 142/67   Pulse: 82 71   Patient Position - Orthostatic VS:  Lying       Physical Exam  Vitals signs and nursing note reviewed  Constitutional:       General: She is not in acute distress  Appearance: She is well-developed  She is not diaphoretic  HENT:      Head: Normocephalic and atraumatic  Eyes:      General: No scleral icterus  Conjunctiva/sclera: Conjunctivae normal    Neck:      Musculoskeletal: Neck supple  Vascular: No JVD  Trachea: No tracheal deviation  Cardiovascular:      Rate and Rhythm: Normal rate and regular rhythm  Pulmonary:      Effort: Pulmonary effort is normal  No respiratory distress  Breath sounds: Normal breath sounds  Abdominal:      Palpations: Abdomen is soft  Tenderness: There is abdominal tenderness (diffuse)  There is no guarding  Musculoskeletal:         General: No deformity  Right lower leg: Edema present  Left lower leg: Edema present  Skin:     General: Skin is warm and dry  Findings: No rash  Neurological:      Mental Status: She is alert and oriented to person, place, and time        Comments: Moves all extremities   Psychiatric:         Behavior: Behavior normal          ED Medications  Medications   iohexol (OMNIPAQUE) 350 MG/ML injection (MULTI-DOSE) 100 mL (100 mL Intravenous Given 1/23/21 1332)       Diagnostic Studies  Results Reviewed     Procedure Component Value Units Date/Time    T4, free [[id number]]  (Abnormal) Collected: 01/23/21 1226    Lab Status: Final result Specimen: Blood from Arm, Right Updated: 01/23/21 1559     Free T4 0 75 ng/dL     UA w Reflex to Microscopic w Reflex to Culture [[id number]] Collected: 01/23/21 1453    Lab Status: Final result Specimen: Urine, Clean Catch Updated: 01/23/21 1519     Color, UA Yellow     Clarity, UA Clear     Specific Gravity, UA 1 026     pH, UA 6 5     Leukocytes, UA Negative     Nitrite, UA Negative     Protein, UA Negative mg/dl      Glucose, UA Negative mg/dl      Ketones, UA Negative mg/dl      Urobilinogen, UA 0 2 E U /dl      Bilirubin, UA Negative     Blood, UA Negative    COVID19, Influenza A/B, RSV PCR, [facility] [[id number]]  (Normal) Collected: 01/23/21 1242    Lab Status: Final result Specimen: Nares from Nasopharyngeal Swab Updated: 01/23/21 1340     SARS-CoV-2 Negative     INFLUENZA A PCR Negative     INFLUENZA B PCR Negative     RSV PCR Negative    Narrative: This test has been authorized by FDA under an EUA (Emergency Use Assay) for use by authorized laboratories  Clinical caution and judgement should be used with the interpretation of these results with consideration of the clinical impression and other laboratory testing  Testing reported as "Positive" or "Negative" has been proven to be accurate according to standard laboratory validation requirements  All testing is performed with control materials showing appropriate reactivity at standard intervals      Protime-INR [[id number]]  (Abnormal) Collected: 01/23/21 1242    Lab Status: Final result Specimen: Blood from Arm, Right Updated: 01/23/21 1337     Protime 22 6 seconds      INR 2 00    APTT [[id number]]  (Normal) Collected: 01/23/21 1242    Lab Status: Final result Specimen: Blood from Arm, Right Updated: 01/23/21 1337     PTT 31 seconds     TSH [[id number]]  (Abnormal) Collected: 01/23/21 1226    Lab Status: Final result Specimen: Blood from Arm, Right Updated: 01/23/21 1323     TSH 3RD GENERATON 9 030 uIU/mL     Narrative:      Patients undergoing fluorescein dye angiography may retain small amounts of fluorescein in the body for 48-72 hours post procedure  Samples containing fluorescein can produce falsely depressed TSH values  If the patient had this procedure,a specimen should be resubmitted post fluorescein clearance  Lactic acid [[id number]]  (Normal) Collected: 01/23/21 1225    Lab Status: Final result Specimen: Blood from Arm, Right Updated: 01/23/21 1303     LACTIC ACID 1 2 mmol/L     Narrative:      Result may be elevated if tourniquet was used during collection      Troponin I [[id number]]  (Normal) Collected: 01/23/21 1225    Lab Status: Final result Specimen: Blood from Arm, Right Updated: 01/23/21 1300     Troponin I <0 02 ng/mL     Comprehensive metabolic panel [[id number]]  (Abnormal) Collected: 01/23/21 1226    Lab Status: Final result Specimen: Blood from Arm, Right Updated: 01/23/21 1258     Sodium 137 mmol/L      Potassium 3 8 mmol/L      Chloride 101 mmol/L      CO2 33 mmol/L      ANION GAP 3 mmol/L      BUN 19 mg/dL      Creatinine 0 79 mg/dL      Glucose 97 mg/dL      Calcium 9 0 mg/dL      Corrected Calcium 9 6 mg/dL      AST 14 U/L      ALT 19 U/L      Alkaline Phosphatase 76 U/L      Total Protein 6 9 g/dL      Albumin 3 2 g/dL      Total Bilirubin 0 35 mg/dL      eGFR 66 ml/min/1 73sq m     Narrative:      [place] guidelines for Chronic Kidney Disease (CKD):     Stage 1 with normal or high GFR (GFR > 90 mL/min/1 73 square meters)    Stage 2 Mild CKD (GFR = 60-89 mL/min/1 73 square meters)    Stage 3A Moderate CKD (GFR = 45-59 mL/min/1 73 square meters)    Stage 3B Moderate CKD (GFR = 30-44 mL/min/1 73 square meters)    Stage 4 Severe CKD (GFR = 15-29 mL/min/1 73 square meters)    Stage 5 End Stage CKD (GFR <15 mL/min/1 73 square meters)  Note: GFR calculation is accurate only with a steady state creatinine    Lipase [[id number]]  (Normal) Collected: 01/23/21 1226    Lab Status: Final result Specimen: Blood from Arm, Right Updated: 01/23/21 1258     Lipase 110 u/L     CBC and differential [[id number]]  (Abnormal) Collected: 01/23/21 1225    Lab Status: Final result Specimen: Blood from Arm, Right Updated: 01/23/21 1240     WBC 5 71 Thousand/uL      RBC 3 59 Million/uL      Hemoglobin 11 2 g/dL      Hematocrit 34 7 %      MCV 97 fL      MCH 31 2 pg      MCHC 32 3 g/dL      RDW 13 4 %      MPV 9 1 fL      Platelets 136 Thousands/uL      nRBC 0 /100 WBCs      Neutrophils Relative 71 %      Immat GRANS % 0 %      Lymphocytes Relative 17 %      Monocytes Relative 11 %      Eosinophils Relative 1 %      Basophils Relative 0 %      Neutrophils Absolute 4 01 Thousands/µL      Immature Grans Absolute 0 01 Thousand/uL      Lymphocytes Absolute 0 99 Thousands/µL      Monocytes Absolute 0 61 Thousand/µL      Eosinophils Absolute 0 07 Thousand/µL      Basophils Absolute 0 02 Thousands/µL                  CT abdomen pelvis with contrast   Final Result by Polly Harper MD (01/23 1417)      No acute abdominopelvic pathology appreciated  Tiny gallstone without evidence of cholecystitis  Colonic diverticulosis without diverticulitis  Stable right renal cyst    Arthritis of spine and sacroiliac joints            Workstation performed: [id number]               Procedures  Procedures      ED Course                             SBIRT 20yo+      Most Recent Value   SBIRT (22 yo +)   In order to provide better care to our patients, we are screening all of our patients for alcohol and drug use  Would it be okay to ask you these screening questions?   No Filed at: 01/23/2021 1530                [facility]  Number of Diagnoses or Management Options  Diagnosis management comments: Patient presenting with urinary symptoms, weakness, worsening of arthritis pain  Will do labs, urinalysis, CT abdomen pelvis and likely admit given significant weakness and inability to care for herself at home  Patient hemodynamically stable on initial exam   No pain out of proportion to exam or worsening of pain with or shortly after eating  Pain not colicky  No peritoneal signs on exam   Patient has no pulsatile abdominal mass, known aneurysm, pulse deficit or asymmetry, tearing or ripping pain  No tenderness at McBurney's point and no positive Wells sign, or Rovsing sign  No radiation of pain from flank to groin, CVA tenderness  No history of abdominal trauma or sickle cell disease                   Amount and/or Complexity of Data Reviewed  Clinical lab tests: ordered and reviewed  Tests in the radiology section of CPT®: ordered and reviewed  Tests in the medicine section of CPT®: reviewed and ordered  Review and summarize past medical records: yes  Discuss the patient with other providers: yes    Risk of Complications, Morbidity, and/or Mortality  Presenting problems: moderate  Diagnostic procedures: low  Management options: moderate    Patient Progress  Patient progress: stable      Disposition  Final diagnoses:   Ambulatory dysfunction   Elevated TSH   Urinary frequency     Time reflects when diagnosis was documented in both MDM as applicable and the Disposition within this note     Time User Action Codes Description Comment    1/23/2021  5:03 PM Eyvonne Guess Add [R26 2] Ambulatory dysfunction     1/23/2021  5:03 PM Eyvonne Guess Add [R79 89] Elevated TSH     1/23/2021  5:03 PM Eyvonne Guess Add [R35 0] Urinary frequency       ED Disposition     ED Disposition Condition Date/Time Comment    Admit Stable Sat Jan 23, 2021  5:03 PM Case was discussed with Dr Tio Winn and the patient's admission status was agreed to be Admission Status: observation status to the service of Dr Ena Newton  Follow-up Information    None         Patient's Medications   Discharge Prescriptions    No medications on file     No discharge procedures on file  PDMP Review       Value Time User    PDMP Reviewed  Yes 12/30/2020  2:00 PM Yue Abdi MD           ED Provider  Attending physically available and evaluated Graylin Kehr I managed the patient along with the ED Attending      Electronically Signed by         Tierra Cordova DO  01/23/21 9112

## 2021-01-23 NOTE — ED ATTENDING ATTESTATION
1/23/2021  IAlfredo MD, saw and evaluated the patient  I have discussed the patient with the resident/non-physician practitioner and agree with the resident's/non-physician practitioner's findings, Plan of Care, and MDM as documented in the resident's/non-physician practitioner's note, except where noted  All available labs and Radiology studies were reviewed  I was present for key portions of any procedure(s) performed by the resident/non-physician practitioner and I was immediately available to provide assistance  At this point I agree with the current assessment done in the Emergency Department  I have conducted an independent evaluation of this patient a history and physical is as follows:    ED Course         Critical Care Time  Procedures    81 yo female with generalized bodyaches, weakness, urinary hesitancy and discomfort  Pt with no cp, no sob, no abdominal pain  Pt with symptoms for few weeks  No n/v/d  No fever  pmh afib, dvt, gerd, dm, cad  Vss, afebrile, lungs cta, rrr, abdomen soft mild tenderness diffusely  Cardiac workup, urine, lipase, ivf, tsh, inr, ct a/p

## 2021-01-23 NOTE — ASSESSMENT & PLAN NOTE
Multifactorial  Patient with extensive osteoarthritis both knees  Reports pain both knees unable to bear weight  Safe ambulation, fall precautions  Physical therapy

## 2021-01-23 NOTE — ED NOTES
Pt assisted to stand and pivot to commode at bedside  Pt states feeling weak  Pt denies trauma recently  Pt continent of urine       Kieran De Jesus RN  01/23/21 5968

## 2021-01-23 NOTE — H&P
H&P- Levy Arms 7/24/1929, 80 y o  female MRN: [id number]    Unit/Bed#: -Lisa Encounter: [id number]    Primary Care Provider: Ector Hernandez MD   Date and time admitted to hospital: 1/23/2021 11:41 AM        * Ambulatory dysfunction  Assessment & Plan  Multifactorial  Patient with extensive osteoarthritis both knees  Reports pain both knees unable to bear weight  Safe ambulation, fall precautions  Physical therapy    Primary osteoarthritis of left knee  Assessment & Plan  Osteoarthritis of both knees  Tylenol  Outpatient follow-up with orthopedics  She received intra-articular steroid injections with Orthopedics  Patient reports she was offered knee surgery by her orthopedician however patient has declined  Outpatient follow-up with Orthopedics    Primary osteoarthritis of both shoulders  Assessment & Plan  Analgesics    Hypothyroidism  Assessment & Plan  TSH 9  Free T4 0 75  Patient on levothyroxine 50 mcg daily  Recheck in 4-6 weeks and follow-up with primary care physician    Type 2 diabetes mellitus without complication, without long-term current use of insulin ([facility] [address] )  Assessment & Plan  Lab Results   Component Value Date    HGBA1C 6 0 (H) 01/08/2021       No results for input(s): POCGLU in the last 72 hours      Blood Sugar Average: Last 72 hrs:     Controlled  Monitor Accu-Cheks  Avoid hypoglycemia    History of atrial flutter  Assessment & Plan  Status post ablation  Continue amiodarone  Coumadin for anticoagulation  Monitor INR    Hyperlipidemia  Assessment & Plan  Continue statin    Essential hypertension  Assessment & Plan  Monitor blood pressures  Avoid hypotension        VTE Prophylaxis: Warfarin (Coumadin)  / sequential compression device   Code Status:  DNR discussed with the patient  POLST: There is no POLST form on file for this patient (pre-hospital)  Discussion with family:  Discussed with the patient, called and updated spouse Loren Nicole    Anticipated Length of Stay:  Patient will be admitted on an Observation basis with an anticipated length of stay of  less than 2 midnights  Chief Complaint:       Ambulatory dysfunction    History of Present Illness:    Jennifer Montano is a 80 y o  female who presents with ambulatory dysfunction  Patient stays at home with her spouse, she has history of bilateral knee osteoarthritis, shoulder osteoarthritis, reports uses a walker for ambulation  The last few days she is finding it increasingly difficult to ambulate, she reports she weakness both her lower extremities, she reports her legs cannot support her, "my knees buckle"  She also reports pain in both her knees  Her ambulatory dysfunction is getting progressively worse for last few weeks  No history of chest pain shortness breath palpitations presyncope syncope  Denies fever chills sweats constitutional symptoms  Denies cough chest tightness wheezing  Denies urinary symptoms  Denies abdominal pain nausea vomiting diarrhea  Review of Systems:    Review of Systems   All other systems reviewed and are negative        Past Medical and Surgical History:     Past Medical History:   Diagnosis Date    Abnormal nuclear stress test     last assessed 04/03/2017    Anxiety     Arthritis     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    Bundle branch block, left     Cardiomyopathy ([facility] [address] )     Chest pain 3/9/2019    Chronic pain     chronic b/l shoulder pain    Chronic pain of right knee 4/2/2019    Coronary artery disease     Diabetes mellitus ([facility] [address] )     Gait disturbance 3/2/2018    GERD (gastroesophageal reflux disease)     H/O atrial flutter     History of DVT (deep vein thrombosis)     History of pulmonary embolism     Hyperlipidemia     Hypertension     Hypothyroid     Renal calculi     Shortness of breath        Past Surgical History:   Procedure Laterality Date    ATRIAL ABLATION SURGERY  01/2015    CARDIOVERSION      CHELA/DCCV 10/22/2014    CARPAL TUNNEL RELEASE Right     CATARACT EXTRACTION      CORONARY ANGIOPLASTY WITH STENT PLACEMENT      HYSTERECTOMY      JOINT REPLACEMENT Right     MS AMPUTATION TOE,MT-P JT Left 10/11/2017    Procedure: AMPUTATION SECOND TOE;  Surgeon: Sarah Leiva DPM;  Location: [facility];  Service: Podiatry    TONSILLECTOMY      TOTAL HIP ARTHROPLASTY      Right       Meds/Allergies:    Prior to Admission medications    Medication Sig Start Date End Date Taking? Authorizing Provider   acetaminophen (TYLENOL) 500 mg tablet Take 1 tablet (500 mg total) by mouth every 6 (six) hours as needed for mild pain or moderate pain 12/13/18  Yes Collette Savage MD   amiodarone 200 mg tablet Take 0 5 tablets (100 mg total) by mouth daily 8/17/20  Yes Rena Darby MD   Aspirin Low Dose 81 MG EC tablet TAKE 1 TABLET BY MOUTH EVERY DAY 10/29/20  Yes Fito Perdomo DO   Calcium Carbonate-Vitamin D (CALTRATE 600+D PO) Take 1 capsule by mouth 2 (two) times a day     Yes Historical Provider, MD   furosemide (LASIX) 20 mg tablet Take 20 mg by mouth daily   Yes Historical Provider, MD   isosorbide mononitrate (IMDUR) 30 mg 24 hr tablet Take 1 tablet (30 mg total) by mouth daily 5/15/20  Yes Rena Darby MD   levothyroxine 50 mcg tablet Take 1 tablet (50 mcg total) by mouth daily [facility]  Patient taking differently: Take 25 mcg by mouth daily TAKE ONE TABLET BY MOUTH EVERY DAY 1/14/21  Yes Yoanna Loza MD   lisinopril (ZESTRIL) 2 5 mg tablet TAKE ONE TABLET BY MOUTH EVERY DAY 7/21/20  Yes Yoanna Loza MD   metoprolol succinate (Toprol XL) 25 mg 24 hr tablet Take 1 tablet (25 mg total) by mouth daily after dinner 6/17/20  Yes Rena Darby MD   Misc Natural Products (ENERGY FOCUS PO) Take 1 tablet by mouth 2 (two) times a day     Yes Historical Provider, MD   multivitamin-iron-minerals-folic acid (CENTRUM) chewable tablet Chew 1 tablet daily     Yes Historical Provider, MD   pravastatin (PRAVACHOL) 40 mg tablet TAKE ONE TABLET BY MOUTH EVERY DAY 8/26/20  Yes Duy Naylor MD   warfarin (COUMADIN) 2 5 mg tablet TAKE 1 TO 1 1/2 TABS BY MOUTH DAILY OR AS DIRECTED BY PHYSICIAN  12/21/20  Yes Lauren Rebolledo MD   Accu-Chekirsten Farheen Plus test strip TEST ONCE DAILY 12/2/20   Duy Naylor MD   Accu-Chekirsten Softclix Lancets lancets by Other route daily 6/15/20   Duy Naylor MD   furosemide (LASIX) 40 mg tablet Take 40 mg by mouth daily 11/15/20   Historical Provider, MD   metroNIDAZOLE (METROCREAM) 0 75 % cream Apply topically 2 (two) times a day 1/11/19   Duy Naylor MD   traMADol (ULTRAM) 50 mg tablet Take 1 tablet (50 mg total) by mouth every 6 (six) hours as needed for moderate pain 12/30/20   Duy Naylor MD     Epic    Allergies:    Allergies   Allergen Reactions    Atorvastatin      Reaction unknown 10/23/2018-    Other Rash     Patient sensitive to adhesives from tape       Social History:     Marital Status: /Civil Union   Occupation:   Patient Pre-hospital Living Situation: home  Patient Pre-hospital Level of Mobility:  Ambulatory dysfunction, ambulates with a walker  Patient Pre-hospital Diet Restrictions: no  Substance Use History:   Social History     Substance and Sexual Activity   Alcohol Use Yes    Comment: occasional     Social History     Tobacco Use   Smoking Status Never Smoker   Smokeless Tobacco Never Used     Social History     Substance and Sexual Activity   Drug Use Yes    Types: Marijuana    Comment: MEDICAL MARIJUANA-HAS NOT USED FOR 3 WEEKS       Family History:    Family History   Problem Relation Age of Onset    Parkinsonism Sister    Jesica Garcia Cancer Sister         unknown type    Heart attack Neg Hx     Stroke Neg Hx     Anuerysm Neg Hx     Clotting disorder Neg Hx     Arrhythmia Neg Hx     Heart failure Neg Hx     Coronary artery disease Neg Hx        Physical Exam:     Vitals:   Blood Pressure: 158/79 (01/23/21 1753)  Pulse: 80 (01/23/21 1753)  Temperature: 97 9 °F (36 6 °C) (01/23/21 1751)  Temp Source: Oral (01/23/21 1147)  Respirations: 16 (01/23/21 1512)  Weight - Scale: 90 7 kg (200 lb) (01/23/21 1147)  SpO2: 94 % (01/23/21 1753)    Physical Exam    Comfortably sitting up in bed  Obese  Short thick neck  Lungs diminished breath sounds bilaterally  Heart sounds S1-S2 noted  Abdomen soft nontender  Awake alert obeys simple commands  Pulses noted  Features of osteoarthritic changes both knees noted  No rash        Additional Data:     Lab Results: I have personally reviewed pertinent reports  Results from last 7 days   Lab Units 01/23/21  1225   WBC Thousand/uL 5 71   HEMOGLOBIN g/dL 11 2*   HEMATOCRIT % 34 7*   PLATELETS Thousands/uL 226   NEUTROS PCT % 71   LYMPHS PCT % 17   MONOS PCT % 11   EOS PCT % 1     Results from last 7 days   Lab Units 01/23/21  1226   SODIUM mmol/L 137   POTASSIUM mmol/L 3 8   CHLORIDE mmol/L 101   CO2 mmol/L 33*   BUN mg/dL 19   CREATININE mg/dL 0 79   ANION GAP mmol/L 3*   CALCIUM mg/dL 9 0   ALBUMIN g/dL 3 2*   TOTAL BILIRUBIN mg/dL 0 35   ALK PHOS U/L 76   ALT U/L 19   AST U/L 14   GLUCOSE RANDOM mg/dL 97     Results from last 7 days   Lab Units 01/23/21  1242   INR  2 00*             Results from last 7 days   Lab Units 01/23/21  1225   LACTIC ACID mmol/L 1 2       Imaging: I have personally reviewed pertinent reports  CT abdomen pelvis with contrast   Final Result by Marie Baptiste MD (01/23 1417)      No acute abdominopelvic pathology appreciated  Tiny gallstone without evidence of cholecystitis  Colonic diverticulosis without diverticulitis  Stable right renal cyst    Arthritis of spine and sacroiliac joints            Workstation performed: [id number]             [employer] / [employer] Records Reviewed: Yes     ** Please Note: This note has been constructed using a voice recognition system   **

## 2021-01-24 LAB
ANION GAP SERPL CALCULATED.3IONS-SCNC: 6 MMOL/L (ref 4–13)
BUN SERPL-MCNC: 15 MG/DL (ref 5–25)
CALCIUM SERPL-MCNC: 8.8 MG/DL (ref 8.3–10.1)
CHLORIDE SERPL-SCNC: 103 MMOL/L (ref 100–108)
CO2 SERPL-SCNC: 31 MMOL/L (ref 21–32)
CREAT SERPL-MCNC: 0.83 MG/DL (ref 0.6–1.3)
GFR SERPL CREATININE-BSD FRML MDRD: 62 ML/MIN/1.73SQ M
GLUCOSE SERPL-MCNC: 103 MG/DL (ref 65–140)
GLUCOSE SERPL-MCNC: 107 MG/DL (ref 65–140)
GLUCOSE SERPL-MCNC: 125 MG/DL (ref 65–140)
GLUCOSE SERPL-MCNC: 191 MG/DL (ref 65–140)
GLUCOSE SERPL-MCNC: 197 MG/DL (ref 65–140)
INR PPP: 1.92 (ref 0.84–1.19)
POTASSIUM SERPL-SCNC: 3.4 MMOL/L (ref 3.5–5.3)
PROTHROMBIN TIME: 21.9 SECONDS (ref 11.6–14.5)
SODIUM SERPL-SCNC: 140 MMOL/L (ref 136–145)

## 2021-01-24 PROCEDURE — 99232 SBSQ HOSP IP/OBS MODERATE 35: CPT | Performed by: PHYSICIAN ASSISTANT

## 2021-01-24 PROCEDURE — 85610 PROTHROMBIN TIME: CPT | Performed by: INTERNAL MEDICINE

## 2021-01-24 PROCEDURE — 80048 BASIC METABOLIC PNL TOTAL CA: CPT | Performed by: INTERNAL MEDICINE

## 2021-01-24 PROCEDURE — 82948 REAGENT STRIP/BLOOD GLUCOSE: CPT

## 2021-01-24 RX ORDER — OXYCODONE HYDROCHLORIDE 5 MG/1
5 TABLET ORAL EVERY 4 HOURS PRN
Status: DISCONTINUED | OUTPATIENT
Start: 2021-01-24 | End: 2021-01-28 | Stop reason: HOSPADM

## 2021-01-24 RX ORDER — OXYCODONE HYDROCHLORIDE 5 MG/1
2.5 TABLET ORAL EVERY 4 HOURS PRN
Status: DISCONTINUED | OUTPATIENT
Start: 2021-01-24 | End: 2021-01-28 | Stop reason: HOSPADM

## 2021-01-24 RX ADMIN — INSULIN LISPRO 2 UNITS: 100 INJECTION, SOLUTION INTRAVENOUS; SUBCUTANEOUS at 01:06

## 2021-01-24 RX ADMIN — Medication 2 TABLET: at 17:22

## 2021-01-24 RX ADMIN — ASPIRIN 81 MG: 81 TABLET, COATED ORAL at 09:20

## 2021-01-24 RX ADMIN — DOCUSATE SODIUM 100 MG: 100 CAPSULE, LIQUID FILLED ORAL at 09:23

## 2021-01-24 RX ADMIN — LIDOCAINE 2 PATCH: 50 PATCH TOPICAL at 09:25

## 2021-01-24 RX ADMIN — ACETAMINOPHEN 975 MG: 325 TABLET, FILM COATED ORAL at 22:40

## 2021-01-24 RX ADMIN — ACETAMINOPHEN 975 MG: 325 TABLET, FILM COATED ORAL at 06:27

## 2021-01-24 RX ADMIN — POLYETHYLENE GLYCOL 3350 17 G: 17 POWDER, FOR SOLUTION ORAL at 09:24

## 2021-01-24 RX ADMIN — DOCUSATE SODIUM 100 MG: 100 CAPSULE, LIQUID FILLED ORAL at 17:28

## 2021-01-24 RX ADMIN — LEVOTHYROXINE SODIUM 50 MCG: 50 TABLET ORAL at 09:22

## 2021-01-24 RX ADMIN — METOPROLOL SUCCINATE 25 MG: 25 TABLET, EXTENDED RELEASE ORAL at 17:29

## 2021-01-24 RX ADMIN — ACETAMINOPHEN 975 MG: 325 TABLET, FILM COATED ORAL at 17:27

## 2021-01-24 RX ADMIN — Medication 1 TABLET: at 09:22

## 2021-01-24 RX ADMIN — Medication 2 TABLET: at 09:22

## 2021-01-24 RX ADMIN — WARFARIN SODIUM 2.5 MG: 2.5 TABLET ORAL at 17:23

## 2021-01-24 RX ADMIN — AMIODARONE HYDROCHLORIDE 100 MG: 200 TABLET ORAL at 09:21

## 2021-01-24 RX ADMIN — PRAVASTATIN SODIUM 40 MG: 40 TABLET ORAL at 09:21

## 2021-01-24 RX ADMIN — INSULIN LISPRO 2 UNITS: 100 INJECTION, SOLUTION INTRAVENOUS; SUBCUTANEOUS at 11:14

## 2021-01-24 RX ADMIN — SODIUM CHLORIDE 500 ML: 0.9 INJECTION, SOLUTION INTRAVENOUS at 11:45

## 2021-01-24 NOTE — PROGRESS NOTES
Progress Note - Ashley Peed 7/24/1929, 80 y o  female MRN: [id number]  Unit/Bed#: -01 Encounter: [id number]  Primary Care Provider: Gadiel Mcginnis MD   Date and time admitted to hospital: 1/23/2021 11:41 AM    * Ambulatory dysfunction  Assessment & Plan  Pt presents with 1 week hx of acute on chronic ambulatory dysfunction   Patient with extensive osteoarthritis both knees and follows Dr Brie Castano   Reports pain both knees unable to bear weight  Safe ambulation, fall precautions  Consult ortho surgery, consider intraarticular injections, has previously declined arthroplasty   Pain control   PT/OT evals     History of atrial flutter  Assessment & Plan  Status post ablation  PPM in place  Continue amiodarone  Coumadin for anticoagulation  Monitor INR, 1 92 today    Type 2 diabetes mellitus without complication, without long-term current use of insulin [facility])  Assessment & Plan  Lab Results   Component Value Date    HGBA1C 6 0 (H) 01/08/2021     Recent Labs     01/23/21 2053 01/23/21 2104 01/24/21  0658   POCGLU 224* 212* 103     Blood Sugar Average: Last 72 hrs:  (P) 626 [id number]   Controlled  Monitor Accu-Cheks, SSI   Avoid hypoglycemia    Essential hypertension  Assessment & Plan  Medications include lisinopril 2 5 mg daily, Toprol-XL 25 mg qPM   Hold lasix 20mg daily, give IVF bolus now   HOLD BP MEDS THIS AM   SBO 60s, on recheck this has improved     Hypothyroidism  Assessment & Plan  TSH 9, Free T4 0 75  Patient on levothyroxine 50 mcg daily  Recheck in 4-6 weeks and follow-up with primary care physician    Right hip pain  Assessment & Plan  XR R hip/pelvis 01/2021- No acute osseous abnormality  Right total hip arthroplasty is intact without complication    PRN pain control     Hyperlipidemia  Assessment & Plan  Continue statin    VTE Pharmacologic Prophylaxis:   Pharmacologic: Warfarin (Coumadin)  Mechanical VTE Prophylaxis in Place: Yes    Patient Centered Rounds: I have performed bedside rounds with nursing staff today  Discussions with Specialists or Other Care Team Provider: nursing     Education and Discussions with Family / Patient: patient, called      Time Spent for Care: 30 minutes  More than 50% of total time spent on counseling and coordination of care as described above  Current Length of Stay: 0 day(s)    Current Patient Status: Observation   Certification Statement: The patient will continue to require additional inpatient hospital stay due to pending improvement in b/l knee pain inhibiting ability to ambulate safely, very low blood pressure requiring IV fluid bolus     Discharge Plan: pending PT OT plan     Code Status: Level 3 - DNAR and DNI      Subjective:   Pt seen and examined at bedside  States that she had right lower quadrant and right hip pain earlier this morning while eating breakfast and suddenly felt nauseous  Nursing check blood pressure and this was in the 61P systolic  This has now improved  She denies dizziness or lightheadedness  Objective:     Vitals:   Temp (24hrs), Av 2 °F (36 8 °C), Min:97 9 °F (36 6 °C), Max:98 6 °F (37 °C)    Temp:  [97 9 °F (36 6 °C)-98 6 °F (37 °C)] 98 6 °F (37 °C)  HR:  [71-82] 73  Resp:  [16-18] 16  BP: ()/() 121/58  SpO2:  [92 %-98 %] 96 %  Body mass index is 35 43 kg/m²  Input and Output Summary (last 24 hours): Intake/Output Summary (Last 24 hours) at 2021 1043  Last data filed at 2021 0810  Gross per 24 hour   Intake --   Output 550 ml   Net -550 ml       Physical Exam:     Physical Exam  Constitutional:       Appearance: Normal appearance  Comments: Appears younger than stated age    HENT:      Head: Normocephalic and atraumatic  Mouth/Throat:      Mouth: Mucous membranes are dry  Eyes:      Extraocular Movements: Extraocular movements intact  Neck:      Musculoskeletal: Normal range of motion and neck supple     Cardiovascular:      Rate and Rhythm: Normal rate and regular rhythm  Pulmonary:      Effort: Pulmonary effort is normal       Breath sounds: Normal breath sounds  Abdominal:      General: Abdomen is flat  Palpations: Abdomen is soft  Musculoskeletal:         General: No tenderness  Comments: Dec range of motion b/l knees, left shoulder    Skin:     General: Skin is warm  Neurological:      General: No focal deficit present  Mental Status: She is alert  Psychiatric:         Mood and Affect: Mood normal          Behavior: Behavior normal        Additional Data:     Labs:    Results from last 7 days   Lab Units 01/23/21  1225   WBC Thousand/uL 5 71   HEMOGLOBIN g/dL 11 2*   HEMATOCRIT % 34 7*   PLATELETS Thousands/uL 226   NEUTROS PCT % 71   LYMPHS PCT % 17   MONOS PCT % 11   EOS PCT % 1     Results from last 7 days   Lab Units 01/23/21  1226   SODIUM mmol/L 137   POTASSIUM mmol/L 3 8   CHLORIDE mmol/L 101   CO2 mmol/L 33*   BUN mg/dL 19   CREATININE mg/dL 0 79   ANION GAP mmol/L 3*   CALCIUM mg/dL 9 0   ALBUMIN g/dL 3 2*   TOTAL BILIRUBIN mg/dL 0 35   ALK PHOS U/L 76   ALT U/L 19   AST U/L 14   GLUCOSE RANDOM mg/dL 97     Results from last 7 days   Lab Units 01/24/21  0010   INR  1 92*     Results from last 7 days   Lab Units 01/24/21  0658 01/23/21  2104 01/23/21  2053   POC GLUCOSE mg/dl 103 212* 224*         Results from last 7 days   Lab Units 01/23/21  1225   LACTIC ACID mmol/L 1 2           * I Have Reviewed All Lab Data Listed Above  * Additional Pertinent Lab Tests Reviewed:  Lucía 66 Admission Reviewed    Imaging:    Imaging Reports Reviewed Today Include: all  Imaging Personally Reviewed by Myself Includes:  none    Recent Cultures (last 7 days):           Last 24 Hours Medication List:   Current Facility-Administered Medications   Medication Dose Route Frequency Provider Last Rate    acetaminophen  975 mg Oral Q8H Richard Robin MD      aluminum-magnesium hydroxide-simethicone  30 mL Oral Q6H PRN Jamee Bustillos, MD      amiodarone  100 mg Oral Daily Jamee Bustillos, MD      aspirin  81 mg Oral Daily Jamee Bustillos, MD      calcium carbonate-vitamin D  2 tablet Oral BID With Meals Jamee Bustillos, MD      docusate sodium  100 mg Oral BID Jamee Bustillos, MD      insulin lispro  1-6 Units Subcutaneous TID AC Qian Tapia PA-C      insulin lispro  1-6 Units Subcutaneous HS Qian Tapia PA-C      isosorbide mononitrate  30 mg Oral Daily Jamee Tressa, MD      levothyroxine  50 mcg Oral Daily Jamee Drop, MD      lidocaine  2 patch Topical Daily Jamee Bustillos, MD      lisinopril  2 5 mg Oral Daily Jamee Drop, MD      metoprolol succinate  25 mg Oral After Mike Gardner MD      multivitamin-minerals  1 tablet Oral Daily Jamee Drop, MD      ondansetron  4 mg Intravenous Q6H PRN Jamee Bustillos, MD      oxyCODONE  2 5 mg Oral Q4H PRN Saud Serrato PA-C      oxyCODONE  5 mg Oral Q4H PRN Saud Serrato PA-C      polyethylene glycol  17 g Oral Daily Jamee Bustillos, MD      pravastatin  40 mg Oral Daily Jamee Bustillos, MD      sodium chloride  500 mL Intravenous Once Saud Serrato PA-C      warfarin  2 5 mg Oral Daily (warfarin) Jamee Bustillos MD          Today, Patient Was Seen By: Vijay Villalobos PA-C    ** Please Note: Dictation voice to text software may have been used in the creation of this document   **

## 2021-01-24 NOTE — ASSESSMENT & PLAN NOTE
Status post ablation  PPM in place     Continue amiodarone  Coumadin for anticoagulation  Monitor INR, 1 92 today

## 2021-01-24 NOTE — UTILIZATION REVIEW
Initial Clinical Review    WAS OBSERVATION 01/23/2021 @ 1703, CONVERTED TO INPATIENT ADMISSION 01/24/2021 @ 1059, DUE TO CONTINUED STAY REQUIRED TO CARE FOR PATIENT WITH  Deejay [place] Ambulatory dysfunction  Consult PT  Consult Ortho  Analgesia PRN  Admission: Date/Time/Statement:   01/24/21 1059  Inpatient Admission Once     Question Answer Comment   Level of Care Med Surg    Estimated length of stay More than 2 Midnights    Certification I certify that inpatient services are medically necessary for this patient for a duration of greater than two midnights  See H&P and MD Progress Notes for additional information about the patient's course of treatment  ED Arrival Information     Expected Arrival Acuity Means of Arrival Escorted By Service Admission Type    - 1/23/2021 11:41 Urgent Ambulance [facility] Hospitalist Urgent    Arrival Complaint    [facility] PAIN        Chief Complaint   Patient presents with    Pain     pt presents by [facility] ambulance with c/o generalized body pain from neck down, states pain is worst in her knees and "my knees buckle when i try to walk " denies trauma  hx arthritis      Assessment/Plan: 80year old female, presented to the ED @ [address] [facility] from home via EMS  Admitted as Observation due to Ambulatory dysfunction  These last few weeks the ambulatory dysfunction has been getting worse  Pain in both knees unable to bear weight  Analgesia PRN  Consult PT       01/24/2021  Progress Note:  Consult ortho surgery, consider intraarticular injections, has previously declined arthroplasty  Pain control PT/OT vik      ED Triage Vitals   Temperature Pulse Respirations Blood Pressure SpO2   01/23/21 1147 01/23/21 1147 01/23/21 1147 01/23/21 1147 01/23/21 1147   98 °F (36 7 °C) 82 18 145/71 98 %      Temp Source Heart Rate Source Patient Position - Orthostatic VS BP Location FiO2 (%)   01/23/21 1147 01/23/21 1147 01/23/21 1512 01/23/21 1512 --   Oral Monitor Lying Right arm       Pain Score       21 1512       7          Wt Readings from Last 1 Encounters:   21 90 7 kg (200 lb)     Additional Vital Signs:   Date/Time  Temp  Pulse  Resp  BP  MAP (mmHg)  SpO2  O2 Device  Patient Position - Orthostatic VS   21 10:17:02  --  73  --  121/58  79  96 %  --  --   21 09:23:55  --  75  --  83/54Abnormal   64  93 %  --  --   21 22:14:49  98 6 °F (37 °C)  79  --  107/48Abnormal   68  92 %  --  --   21 22:11:34  98 6 °F (37 °C)  76  --  --  --  92 %  --  --   21 20:34:01  --  80  --  146/85  105  95 %  --  --   21  --  80  --  146/85  --  --  --  --   21  --  --  --  --  --  --  None (Room air)  --   21 1753  --  80  --  158/79  105  94 %  None (Room air)  --   21 17:51:07  97 9 °F (36 6 °C)  75  --  175/126Abnormal   142  95 %  --  --   21 17:48:09  97 9 °F (36 6 °C)  74  --  --  --  94 %  --  --   21 1512  --  71  16  142/67  --  96 %  None (Room air)  Lying     Date and Time Eye Opening Best Verbal Response Best Motor Response Rosa Coma Scale Score   21 4 5 6 15   21 1753 4 5 6 15     2021 @ 1351  CT abd/pel:  No acute abdominopelvic pathology appreciated  Tiny gallstone without evidence of cholecystitis  Colonic diverticulosis without diverticulitis     Stable right renal cyst    Arthritis of spine and sacroiliac joints     2021 @ 1225  EC, NSR    Pertinent Labs/Diagnostic Test Results:   Results from last 7 days   Lab Units 21  1242   SARS-COV-2  Negative     Results from last 7 days   Lab Units 21  1225   WBC Thousand/uL 5 71   HEMOGLOBIN g/dL 11 2*   HEMATOCRIT % 34 7*   PLATELETS Thousands/uL 226   NEUTROS ABS Thousands/µL 4 01     Results from last 7 days   Lab Units 21  1226   SODIUM mmol/L 137   POTASSIUM mmol/L 3 8   CHLORIDE mmol/L 101   CO2 mmol/L 33*   ANION GAP mmol/L 3*   BUN mg/dL 19   CREATININE mg/dL 0 79   EGFR ml/min/1 73sq m 66   CALCIUM mg/dL 9 0     Results from last 7 days   Lab Units 01/23/21  1226   AST U/L 14   ALT U/L 19   ALK PHOS U/L 76   TOTAL PROTEIN g/dL 6 9   ALBUMIN g/dL 3 2*   TOTAL BILIRUBIN mg/dL 0 35     Results from last 7 days   Lab Units 01/24/21  0658 01/23/21  2104 01/23/21  2053   POC GLUCOSE mg/dl 103 212* 224*     Results from last 7 days   Lab Units 01/23/21  1226   GLUCOSE RANDOM mg/dL 97     Results from last 7 days   Lab Units 01/23/21  1225   TROPONIN I ng/mL <0 02     Results from last 7 days   Lab Units 01/24/21  0010 01/23/21  1242   PROTIME seconds 21 9* 22 6*   INR  1 92* 2 00*   PTT seconds  --  31     Results from last 7 days   Lab Units 01/23/21  1226   TSH 3RD GENERATON uIU/mL 9 030*     Results from last 7 days   Lab Units 01/23/21  1225   LACTIC ACID mmol/L 1 2     Results from last 7 days   Lab Units 01/23/21  1226   LIPASE u/L 110     Results from last 7 days   Lab Units 01/23/21  1453   CLARITY UA  Clear   COLOR UA  Yellow   SPEC GRAV UA  1 026   PH UA  6 5   GLUCOSE UA mg/dl Negative   KETONES UA mg/dl Negative   BLOOD UA  Negative   PROTEIN UA mg/dl Negative   NITRITE UA  Negative   BILIRUBIN UA  Negative   UROBILINOGEN UA E U /dl 0 2   LEUKOCYTES UA  Negative     Results from last 7 days   Lab Units 01/23/21  1242   INFLUENZA A PCR  Negative   INFLUENZA B PCR  Negative   RSV PCR  Negative     ED Treatment:   Medication Administration from 01/23/2021 1141 to 01/23/2021 1731       Date/Time Order Dose Route Action     01/23/2021 1332 iohexol (OMNIPAQUE) 350 MG/ML injection (MULTI-DOSE) 100 mL 100 mL Intravenous Given        Past Medical History:   Diagnosis Date    Abnormal nuclear stress test     last assessed 04/03/2017    Anxiety     Arthritis     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    Bundle branch block, left     Cardiomyopathy ([facility] [address] )     Chest pain 3/9/2019    Chronic pain     chronic b/l shoulder pain    Chronic pain of right knee 4/2/2019    Coronary artery disease     Diabetes mellitus ([facility] [address] )     Gait disturbance 3/2/2018    GERD (gastroesophageal reflux disease)     H/O atrial flutter     History of DVT (deep vein thrombosis)     History of pulmonary embolism     Hyperlipidemia     Hypertension     Hypothyroid     Renal calculi     Shortness of breath      Present on Admission:   Type 2 diabetes mellitus without complication, without long-term current use of insulin ([facility])   Hypothyroidism   Hyperlipidemia   Essential hypertension   Primary osteoarthritis of right knee   Primary osteoarthritis of left knee   Primary osteoarthritis of both shoulders   Pacemaker   Ambulatory dysfunction   Right hip pain      Admitting Diagnosis: Urinary frequency [R35 0]  Knee pain [M25 569]  Elevated TSH [R79 89]  Ambulatory dysfunction [R26 2]  Age/Sex: 80 y o  female  Admission Orders:  Scheduled Medications:  acetaminophen, 975 mg, Oral, Q8H [address]  amiodarone, 100 mg, Oral, Daily  aspirin, 81 mg, Oral, Daily  calcium carbonate-vitamin D, 2 tablet, Oral, BID With Meals  docusate sodium, 100 mg, Oral, BID  insulin lispro, 1-6 Units, Subcutaneous, TID AC  insulin lispro, 1-6 Units, Subcutaneous, HS  isosorbide mononitrate, 30 mg, Oral, Daily  levothyroxine, 50 mcg, Oral, Daily  lidocaine, 2 patch, Topical, Daily  lisinopril, 2 5 mg, Oral, Daily  metoprolol succinate, 25 mg, Oral, After Dinner  multivitamin-minerals, 1 tablet, Oral, Daily  polyethylene glycol, 17 g, Oral, Daily  pravastatin, 40 mg, Oral, Daily  sodium chloride, 500 mL, Intravenous, Once  warfarin, 2 5 mg, Oral, Daily (warfarin)      Continuous IV Infusions:     PRN Meds:  aluminum-magnesium hydroxide-simethicone, 30 mL, Oral, Q6H PRN  ondansetron, 4 mg, Intravenous, Q6H PRN  oxyCODONE, 2 5 mg, Oral, Q4H PRN  oxyCODONE, 5 mg, Oral, Q4H PRN        Roberth SCDs  Consult PT/OT    Network Utilization Review Department  ATTENTION: Please call with any questions or concerns to [phone] and carefully listen to the prompts so that you are directed to the right person  All voicemails are confidential   [place] all requests for admission clinical reviews, approved or denied determinations and any other requests to dedicated fax number below belonging to the campus where the patient is receiving treatment   List of dedicated fax numbers for the Facilities:  [address] DENIALS (Administrative/Medical Necessity) [phone]   [address] (Maternity/NICU/Pediatrics) [phone]   [address] Mikie Moreland 7199 (  Jason Acosta "Ramya" 103) [address] [place] [address] Ester Martinez 1481 [address] [place] [address] [phone]

## 2021-01-24 NOTE — ASSESSMENT & PLAN NOTE
Lab Results   Component Value Date    HGBA1C 6 0 (H) 01/08/2021     Recent Labs     01/23/21 2053 01/23/21 2104 01/24/21  0658   POCGLU 224* 212* 103     Blood Sugar Average: Last 72 hrs:  (P) 57 [id number]   Controlled  Monitor Accu-Cheks, SSI   Avoid hypoglycemia

## 2021-01-24 NOTE — PLAN OF CARE
Problem: Potential for Falls  Goal: Patient will remain free of falls  Description: INTERVENTIONS:  - Assess patient frequently for physical needs  -  Identify cognitive and physical deficits and behaviors that affect risk of falls    -  [place] fall precautions as indicated by assessment   - Educate patient/family on patient safety including physical limitations  - Instruct patient to call for assistance with activity based on assessment  - Modify environment to reduce risk of injury  - Consider OT/PT consult to assist with strengthening/mobility  Outcome: Progressing     Problem: PAIN - ADULT  Goal: Verbalizes/displays adequate comfort level or baseline comfort level  Description: Interventions:  - Encourage patient to monitor pain and request assistance  - Assess pain using appropriate pain scale  - Administer analgesics based on type and severity of pain and evaluate response  - Implement non-pharmacological measures as appropriate and evaluate response  - Consider cultural and social influences on pain and pain management  - Notify physician/advanced practitioner if interventions unsuccessful or patient reports new pain  Outcome: Progressing     Problem: INFECTION - ADULT  Goal: Absence or prevention of progression during hospitalization  Description: INTERVENTIONS:  - Assess and monitor for signs and symptoms of infection  - Monitor lab/diagnostic results  - Monitor all insertion sites, i e  indwelling lines, tubes, and drains  - Monitor endotracheal if appropriate and nasal secretions for changes in amount and color  - [place] appropriate cooling/warming therapies per order  - Administer medications as ordered  - Instruct and encourage patient and family to use good hand hygiene technique  - Identify and instruct in appropriate isolation precautions for identified infection/condition  Outcome: Progressing  Goal: Absence of fever/infection during neutropenic period  Description: INTERVENTIONS:  - Monitor WBC    Outcome: Progressing     Problem: SAFETY ADULT  Goal: Patient will remain free of falls  Description: INTERVENTIONS:  - Assess patient frequently for physical needs  -  Identify cognitive and physical deficits and behaviors that affect risk of falls    -  [place] fall precautions as indicated by assessment   - Educate patient/family on patient safety including physical limitations  - Instruct patient to call for assistance with activity based on assessment  - Modify environment to reduce risk of injury  - Consider OT/PT consult to assist with strengthening/mobility  Outcome: Progressing  Goal: Maintain or return to baseline ADL function  Description: INTERVENTIONS:  -  Assess patient's ability to carry out ADLs; assess patient's baseline for ADL function and identify physical deficits which impact ability to perform ADLs (bathing, care of mouth/teeth, toileting, grooming, dressing, etc )  - Assess/evaluate cause of self-care deficits   - Assess range of motion  - Assess patient's mobility; develop plan if impaired  - Assess patient's need for assistive devices and provide as appropriate  - Encourage maximum independence but intervene and supervise when necessary  - Involve family in performance of ADLs  - Assess for home care needs following discharge   - Consider OT consult to assist with ADL evaluation and planning for discharge  - Provide patient education as appropriate  Outcome: Progressing  Goal: Maintain or return mobility status to optimal level  Description: INTERVENTIONS:  - Assess patient's baseline mobility status (ambulation, transfers, stairs, etc )    - Identify cognitive and physical deficits and behaviors that affect mobility  - Identify mobility aids required to assist with transfers and/or ambulation (gait belt, sit-to-stand, lift, walker, cane, etc )  - [place] fall precautions as indicated by assessment  - Record patient progress and toleration of activity level on Mobility SBAR; progress patient to next Phase/Stage  - Instruct patient to call for assistance with activity based on assessment  - Consider rehabilitation consult to assist with strengthening/weightbearing, etc   Outcome: Progressing     Problem: DISCHARGE PLANNING  Goal: Discharge to home or other facility with appropriate resources  Description: INTERVENTIONS:  - Identify barriers to discharge w/patient and caregiver  - Arrange for needed discharge resources and transportation as appropriate  - Identify discharge learning needs (meds, wound care, etc )  - Arrange for interpretive services to assist at discharge as needed  - Refer to Case Management Department for coordinating discharge planning if the patient needs post-hospital services based on physician/advanced practitioner order or complex needs related to functional status, cognitive ability, or social support system  Outcome: Progressing     Problem: Knowledge Deficit  Goal: Patient/family/caregiver demonstrates understanding of disease process, treatment plan, medications, and discharge instructions  Description: Complete learning assessment and assess knowledge base    Interventions:  - Provide teaching at level of understanding  - Provide teaching via preferred learning methods  Outcome: Progressing

## 2021-01-24 NOTE — ASSESSMENT & PLAN NOTE
TSH 9, Free T4 0 75  Patient on levothyroxine 50 mcg daily  Recheck in 4-6 weeks and follow-up with primary care physician

## 2021-01-24 NOTE — ASSESSMENT & PLAN NOTE
Pt presents with 1 week hx of acute on chronic ambulatory dysfunction   Patient with extensive osteoarthritis both knees and follows Dr Eric Bajwa   Reports pain both knees unable to bear weight  Safe ambulation, fall precautions  Consult ortho surgery, consider intraarticular injections, has previously declined arthroplasty   Pain control   PT/OT evals

## 2021-01-24 NOTE — ASSESSMENT & PLAN NOTE
Medications include lisinopril 2 5 mg daily, Toprol-XL 25 mg qPM   Hold lasix 20mg daily, give IVF bolus now   HOLD BP MEDS THIS AM   SBO 60s, on recheck this has improved

## 2021-01-24 NOTE — ASSESSMENT & PLAN NOTE
XR R hip/pelvis 01/2021- No acute osseous abnormality  Right total hip arthroplasty is intact without complication    PRN pain control

## 2021-01-25 PROBLEM — M17.0 PRIMARY OSTEOARTHRITIS OF BOTH KNEES: Status: ACTIVE | Noted: 2019-10-15

## 2021-01-25 LAB
GLUCOSE SERPL-MCNC: 103 MG/DL (ref 65–140)
GLUCOSE SERPL-MCNC: 107 MG/DL (ref 65–140)
GLUCOSE SERPL-MCNC: 112 MG/DL (ref 65–140)
GLUCOSE SERPL-MCNC: 99 MG/DL (ref 65–140)

## 2021-01-25 PROCEDURE — 97167 OT EVAL HIGH COMPLEX 60 MIN: CPT

## 2021-01-25 PROCEDURE — 99232 SBSQ HOSP IP/OBS MODERATE 35: CPT | Performed by: PHYSICIAN ASSISTANT

## 2021-01-25 PROCEDURE — 82948 REAGENT STRIP/BLOOD GLUCOSE: CPT

## 2021-01-25 PROCEDURE — 97163 PT EVAL HIGH COMPLEX 45 MIN: CPT

## 2021-01-25 RX ADMIN — PRAVASTATIN SODIUM 40 MG: 40 TABLET ORAL at 08:05

## 2021-01-25 RX ADMIN — WARFARIN SODIUM 2.5 MG: 2.5 TABLET ORAL at 16:57

## 2021-01-25 RX ADMIN — ACETAMINOPHEN 975 MG: 325 TABLET, FILM COATED ORAL at 06:36

## 2021-01-25 RX ADMIN — AMIODARONE HYDROCHLORIDE 100 MG: 200 TABLET ORAL at 08:04

## 2021-01-25 RX ADMIN — DOCUSATE SODIUM 100 MG: 100 CAPSULE, LIQUID FILLED ORAL at 08:05

## 2021-01-25 RX ADMIN — Medication 2 TABLET: at 08:04

## 2021-01-25 RX ADMIN — ASPIRIN 81 MG: 81 TABLET, COATED ORAL at 08:04

## 2021-01-25 RX ADMIN — LEVOTHYROXINE SODIUM 50 MCG: 50 TABLET ORAL at 08:05

## 2021-01-25 RX ADMIN — ISOSORBIDE MONONITRATE 30 MG: 30 TABLET, EXTENDED RELEASE ORAL at 08:05

## 2021-01-25 RX ADMIN — LIDOCAINE 2 PATCH: 50 PATCH TOPICAL at 08:06

## 2021-01-25 RX ADMIN — LISINOPRIL 2.5 MG: 2.5 TABLET ORAL at 08:04

## 2021-01-25 RX ADMIN — ACETAMINOPHEN 975 MG: 325 TABLET, FILM COATED ORAL at 21:45

## 2021-01-25 RX ADMIN — METOPROLOL SUCCINATE 25 MG: 25 TABLET, EXTENDED RELEASE ORAL at 16:57

## 2021-01-25 RX ADMIN — ACETAMINOPHEN 975 MG: 325 TABLET, FILM COATED ORAL at 14:33

## 2021-01-25 RX ADMIN — POLYETHYLENE GLYCOL 3350 17 G: 17 POWDER, FOR SOLUTION ORAL at 08:03

## 2021-01-25 RX ADMIN — DOCUSATE SODIUM 100 MG: 100 CAPSULE, LIQUID FILLED ORAL at 16:57

## 2021-01-25 RX ADMIN — Medication 1 TABLET: at 08:04

## 2021-01-25 RX ADMIN — Medication 2 TABLET: at 16:57

## 2021-01-25 NOTE — PROGRESS NOTES
Progress Note - Vanessa Yari 7/24/1929, 80 y o  female MRN: [id number]  Unit/Bed#: MS Denny-01 Encounter: [id number]  Primary Care Provider: Gena Durbin MD   Date and time admitted to hospital: 1/23/2021 11:41 AM    * Ambulatory dysfunction  Assessment & Plan  · Pt presents with 1 week hx of acute on chronic ambulatory dysfunction  · Patient with extensive osteoarthritis both knees and follows with Dr Ludwin Rice as an out-patient  · PT/OT eval for discharge recommendations  Patient would like to go to [facility] if able since she's been there in the past     Type 2 diabetes mellitus without complication, without long-term current use of insulin [facility])  Assessment & Plan  Lab Results   Component Value Date    HGBA1C 6 0 (H) 01/08/2021     Recent Labs     01/24/21  1633 01/24/21  2154 01/25/21  0733 01/25/21  1047   POCGLU 107 125 99 112     Blood Sugar Average: Last 72 hrs:  (P) 147 375   · Well controlled as evidenced by A1c on diet alone  · Monitor Accu-Cheks, SSI   · Avoid hypoglycemia  Acquired hypothyroidism  Assessment & Plan  · TSH 9, Free T4 0 75  · Patient on levothyroxine 50 mcg daily  · Recheck in 4-6 weeks and follow-up with primary care physician    Primary osteoarthritis of both knees  Assessment & Plan  · Osteoarthritis of both knees  · Continue Tylenol 975 mg every 8 hours  · Outpatient follow-up with orthopedics  · She received intra-articular steroid injections with Orthopedics  · Patient reports she was offered knee surgery by her orthopedician however patient has declined  Atrial fibrillation (HCC)  Assessment & Plan  · Anticoagulated on Coumadin with current INR of 1 92  · Rate controlled on amiodarone and metoprolol  Right hip pain  Assessment & Plan  · R hip/pelvis xray (01/21/2021):  No acute osseous abnormality  Right total hip arthroplasty is intact without complication    · PRN pain control     VTE Pharmacologic Prophylaxis:   Pharmacologic: Warfarin (Coumadin)  Mechanical: Mechanical VTE prophylaxis in place  Patient Centered Rounds: I have performed bedside rounds with nursing staff today  Discussions with Specialists or Other Care Team Provider: None  Education and Discussions with Family / Patient: All patient questions answered to the best of my ability  Time Spent for Care: 30 minutes  More than 50% of total time spent on counseling and coordination of care as described above  Current Length of Stay: 1 day(s)  Current Patient Status: Inpatient   Certification Statement: The patient will continue to require additional inpatient hospital stay due to pending PT evaluation and likely SNF placement  Discharge Plan: Patient is medically stable; however will need rehab placement  Code Status: Level 3 - DNAR and DNI    Subjective:   Patient feels better that she has been out of bed to the bedside chair but continues to have severe arthritic pain in her knees that prevents her from living independently  Objective:   Vitals:   Temp (24hrs), Av °F (36 7 °C), Min:97 6 °F (36 4 °C), Max:98 2 °F (36 8 °C)    Temp:  [97 6 °F (36 4 °C)-98 2 °F (36 8 °C)] 97 6 °F (36 4 °C)  HR:  [69-75] 75  BP: (113-186)/(58-83) 186/83  SpO2:  [94 %-96 %] 96 %  Body mass index is 35 43 kg/m²  Input and Output Summary (last 24 hours): Intake/Output Summary (Last 24 hours) at 2021 1239  Last data filed at 2021 0300  Gross per 24 hour   Intake 480 ml   Output 200 ml   Net 280 ml       Physical Exam:     Physical Exam  Vitals signs reviewed  HENT:      Head: Normocephalic and atraumatic  Eyes:      General: No scleral icterus  Cardiovascular:      Rate and Rhythm: Normal rate and regular rhythm  Heart sounds: Murmur present  Pulmonary:      Breath sounds: Normal breath sounds  No wheezing or rales  Chest:      Chest wall: No tenderness  Abdominal:      General: Bowel sounds are normal  There is no distension        Palpations: Abdomen is soft       Tenderness: There is no abdominal tenderness  Musculoskeletal: Normal range of motion  Skin:     General: Skin is warm and dry  Findings: No rash  Additional Data:   Labs:  Results from last 7 days   Lab Units 01/23/21  1225   WBC Thousand/uL 5 71   HEMOGLOBIN g/dL 11 2*   HEMATOCRIT % 34 7*   PLATELETS Thousands/uL 226   NEUTROS PCT % 71   LYMPHS PCT % 17   MONOS PCT % 11   EOS PCT % 1     Results from last 7 days   Lab Units 01/24/21  1105 01/23/21  1226   POTASSIUM mmol/L 3 4* 3 8   CHLORIDE mmol/L 103 101   CO2 mmol/L 31 33*   BUN mg/dL 15 19   CREATININE mg/dL 0 83 0 79   CALCIUM mg/dL 8 8 9 0   ALK PHOS U/L  --  76   ALT U/L  --  19   AST U/L  --  14     Results from last 7 days   Lab Units 01/24/21  0010   INR  1 92*       * I Have Reviewed All Lab Data Listed Above  * Additional Pertinent Lab Tests Reviewed:  All Labs Within Last 24 Hours Reviewed    Imaging:    Imaging Reports Reviewed Today Include: None new    Cultures:   Blood Culture: No results found for: BLOODCX  Urine Culture: No results found for: URINECX  Sputum Culture: No components found for: SPUTUMCX  Wound Culture: No results found for: WOUNDCULT    Last 24 Hours Medication List:   Current Facility-Administered Medications   Medication Dose Route Frequency Provider Last Rate    acetaminophen  975 mg Oral Q8H [address] Aviva Coulter MD      aluminum-magnesium hydroxide-simethicone  30 mL Oral Q6H PRN Aviva Coulter MD      amiodarone  100 mg Oral Daily Aviva Coulter MD      aspirin  81 mg Oral Daily Aviva Coulter MD      calcium carbonate-vitamin D  2 tablet Oral BID With Meals Aviva Coulter MD      docusate sodium  100 mg Oral BID Aviva Coulter MD      insulin lispro  1-6 Units Subcutaneous TID AC Qian Tapia PA-C      insulin lispro  1-6 Units Subcutaneous HS Qian Tapia PA-C      isosorbide mononitrate  30 mg Oral Daily Aviva Coulter MD     Flores Splinter levothyroxine  50 mcg Oral Daily Collette Hire, MD      lidocaine  2 patch Topical Daily Collette Hire, MD      lisinopril  2 5 mg Oral Daily Collette Hire, MD      metoprolol succinate  25 mg Oral After Jackie Donovan MD      multivitamin-minerals  1 tablet Oral Daily Collette Hire, MD      ondansetron  4 mg Intravenous Q6H PRN Collette Hire, MD      oxyCODONE  2 5 mg Oral Q4H PRN Saud Serrato PA-C      oxyCODONE  5 mg Oral Q4H PRN Saud Serrato PA-C      polyethylene glycol  17 g Oral Daily Collette Hire, MD      pravastatin  40 mg Oral Daily Collette Hire, MD      warfarin  2 5 mg Oral Daily (warfarin) Collette Hire, MD          Today, Patient Was Seen By: Berta Moseley PA-C    ** Please Note: Dragon 360 Dictation voice to text software may have been used in the creation of this document   **

## 2021-01-25 NOTE — PLAN OF CARE
Problem: OCCUPATIONAL THERAPY ADULT  Goal: Performs self-care activities at highest level of function for planned discharge setting  See evaluation for individualized goals  Description: Treatment Interventions: ADL retraining, Functional transfer training, UE strengthening/ROM, Endurance training, Patient/family training, Equipment evaluation/education, Compensatory technique education, Continued evaluation, Energy conservation, Activityengagement          See flowsheet documentation for full assessment, interventions and recommendations  Outcome: Progressing  Note: Limitation: Decreased ADL status, Decreased UE ROM, Decreased UE strength, Decreased Safe judgement during ADL, Decreased endurance, Decreased self-care trans  Prognosis: Fair  Assessment: Pt is a 80 y o  female admitted to [facility] on 1/23/2021 w/ Ambulatory dysfunction  Pt with active OT orders and up with assistance  orders  As per pt report, pta, resides in a 1STH with   Pt was I w/  ADLS, receives A for IADLS, (-) drove  Upon evaluation, pt currently requires MAX A x 2 with transfers and mobility  Exhibits decreased strength and ROM to B/L UE's limiting performance in ADLs/transfers  Pt currently  requires S UB ADLs, MAX A LB ADLs, and MAX A toileting  Pt is limited at this time 2*: pain, endurance, activity tolerance, balance, functional standing tolerance, decreased I w/ ADLS/IADLS, strength and ROM  The following Occupational Performance Areas to address include: grooming, bathing/shower, toilet hygiene, dressing, health maintenance, functional mobility, community mobility and clothing management  Pt to benefit from continued skilled OT services while in the hospital to address deficits as defined above and maximize level of functional independence w ADL's and functional mobility  Pt's raw score on the AM-PAC Daily Activity inpatient short form is 17, standardized score is 37 26, < 39 4    Patients at this level are likely to benefit from DC to post-acute rehab services, however, please refer to therapist recommendation for safe DC planning  From OT standpoint, recommendation at time of d/c would be STR  Pt would benefit from immediate acute skilled OT to address above deficits, improve overall functional independence, and reduce caregiver burden  Pt was left in chair with alarm on after session with all current needs met  OT Discharge Recommendation: Post-Acute Rehabilitation Services  OT - OK to Discharge: Yes(to rehab when medically stable )       Problem: OCCUPATIONAL THERAPY ADULT  Goal: Performs self-care activities at highest level of function for planned discharge setting  See evaluation for individualized goals  Description: Treatment Interventions: ADL retraining, Functional transfer training, UE strengthening/ROM, Endurance training, Patient/family training, Equipment evaluation/education, Compensatory technique education, Continued evaluation, Energy conservation, Activityengagement          See flowsheet documentation for full assessment, interventions and recommendations  Outcome: Progressing  Note: Limitation: Decreased ADL status, Decreased UE ROM, Decreased UE strength, Decreased Safe judgement during ADL, Decreased endurance, Decreased self-care trans  Prognosis: Fair  Assessment: Pt is a 80 y o  female admitted to [facility] on 1/23/2021 w/ Ambulatory dysfunction  Pt with active OT orders and up with assistance  orders  As per pt report, pta, resides in a 1STH with   Pt was I w/  ADLS, receives A for IADLS, (-) drove  Upon evaluation, pt currently requires MAX A x 2 with transfers and mobility  Exhibits decreased strength and ROM to B/L UE's limiting performance in ADLs/transfers  Pt currently  requires S UB ADLs, MAX A LB ADLs, and MAX A toileting  Pt is limited at this time 2*: pain, endurance, activity tolerance, balance, functional standing tolerance, decreased I w/ ADLS/IADLS, strength and ROM  The following Occupational Performance Areas to address include: grooming, bathing/shower, toilet hygiene, dressing, health maintenance, functional mobility, community mobility and clothing management  Pt to benefit from continued skilled OT services while in the hospital to address deficits as defined above and maximize level of functional independence w ADL's and functional mobility  Pt's raw score on the AM-PAC Daily Activity inpatient short form is 17, standardized score is 37 26, < 39 4  Patients at this level are likely to benefit from DC to post-acute rehab services, however, please refer to therapist recommendation for safe DC planning  From OT standpoint, recommendation at time of d/c would be STR  Pt would benefit from immediate acute skilled OT to address above deficits, improve overall functional independence, and reduce caregiver burden  Pt was left in chair with alarm on after session with all current needs met        OT Discharge Recommendation: Post-Acute Rehabilitation Services  OT - OK to Discharge: Yes(to rehab when medically stable )

## 2021-01-25 NOTE — ASSESSMENT & PLAN NOTE
· Osteoarthritis of both knees  · Continue Tylenol 975 mg every 8 hours  · Outpatient follow-up with orthopedics  · She received intra-articular steroid injections with Orthopedics  · Patient reports she was offered knee surgery by her orthopedician however patient has declined

## 2021-01-25 NOTE — CASE MANAGEMENT
LOS 1 Day  Not a Bundle  Not a Readmission  Unplanned Readmission Risk is 14    CM spoke with pt to discuss role of CM and d/c planning  Pt lives with her  in a 1 sh, 3 [address] to enter  Pt uses a RW  States she was IPTA  Pt has a hx of VNA and a hx of IP rehab and MV  Pt would like referral to MV if therapy recommends STR  Denies hx of MH or D&A  Pt uses [facility] on [facility]  PCP is Dr Dillon Gallegos  Pt does not drive, she relies on family and friends  Primary Contact:  Zabrina Fernandez (daughter) [phone]  POA is daughter-Pallavi  Transport tbd if she is able to find a ride vs ambulance to STR    CM reviewed d/c planning process including the following: identifying help at home, patient preference for d/c planning needs, Discharge Lounge, [facility] Meds to Bed program, availability of treatment team to discuss questions or concerns patient and/or family may have regarding understanding medications and recognizing signs and symptoms once discharged  CM also encouraged patient to follow up with all recommended appointments after discharge  Patient advised of importance for patient and family to participate in managing patients medical well being

## 2021-01-25 NOTE — RESTORATIVE TECHNICIAN NOTE
Restorative Specialist Mobility Note       Activity: Other (Comment)(Educated/encouraged pt to get In/OOB with assistance, pt states she is comfortable/wants to stay in the chair nursing aware   Pt callbell, phone/tray within reach )          [employer] BS, Restorative Technician, [employer]

## 2021-01-25 NOTE — PLAN OF CARE
Problem: PHYSICAL THERAPY ADULT  Goal: Performs mobility at highest level of function for planned discharge setting  See evaluation for individualized goals  Description: Treatment/Interventions: LE strengthening/ROM, Functional transfer training, ADL retraining, Therapeutic exercise, Endurance training, Elevations, Patient/family training, Bed mobility, Gait training  Equipment Recommended: Anahi Castillo       See flowsheet documentation for full assessment, interventions and recommendations  Note: Prognosis: Good  Problem List: Decreased strength, Decreased endurance, Impaired balance, Decreased mobility, Decreased coordination, Decreased safety awareness, Impaired judgement, Pain  Assessment: Pt is 80 y o  female seen for high complexity PT evaluation s/p admission to [facility] on 1/23/21 with ambulatory dysfunction likely due to OA in both LE  Comorbidities affecting pt's physical performance at time of assessment include: OA of left knee, OA of both shoulders, hypothyroidism, type 2 DM, history of A flutter, hyperlipidemia, and essential HTN  Pt reports she resides with her  in a [facility] with 3 RADHA  Pt is normally independent with ambulation using RW and ADLs  Currently, pt required mod A x 1 for bed mobility, max A x 2 for transfers, and max A x 2 for ambulation with HHA x 2  Standing tolerance and ambulation limited due to BLE weakness and BLE pain likely due to OA  Patient's decreased mobility level and increased fall risk is secondary to deficits in pain, activity tolerance, endurance, BLE strength, standing tolerance, balance, gait deviations, and gait speed  Current clinical presentation is unstable/unpredictable seen in pt's presentation of ongoing medical management, increased reliance on assistance compared to PLOF, impaired judgement/safety awareness, and significant PMH   Pt to benefit from continued PT tx to address deficits as defined above and maximize level of functional independent mobility and consistency  From PT/mobility standpoint, recommendation at time of d/c would be STR pending progress in order to facilitate return to PLOF  Barriers to Discharge: Decreased caregiver support, Inaccessible home environment     PT Discharge Recommendation: [id number]     PT - OK to Discharge: Yes    See flowsheet documentation for full assessment

## 2021-01-25 NOTE — OCCUPATIONAL THERAPY NOTE
Occupational Therapy Evaluation     Patient Name: Ammon Sanchez  BYDXK'T Date: 1/25/2021  Problem List  Principal Problem:    Ambulatory dysfunction  Active Problems:    Essential hypertension    Hyperlipidemia    History of atrial flutter    Right hip pain    Type 2 diabetes mellitus without complication, without long-term current use of insulin (HCC)    Acquired hypothyroidism    Primary osteoarthritis of both shoulders    Pacemaker    Primary osteoarthritis of both knees    Past Medical History  Past Medical History:   Diagnosis Date    Abnormal nuclear stress test     last assessed 04/03/2017    Anxiety     Arthritis     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    Bundle branch block, left     Cardiomyopathy ([address] )     Chest pain 3/9/2019    Chronic pain     chronic b/l shoulder pain    Chronic pain of right knee 4/2/2019    Coronary artery disease     Diabetes mellitus ([address] )     Gait disturbance 3/2/2018    GERD (gastroesophageal reflux disease)     H/O atrial flutter     History of DVT (deep vein thrombosis)     History of pulmonary embolism     Hyperlipidemia     Hypertension     Hypothyroid     Renal calculi     Shortness of breath      Past Surgical History  Past Surgical History:   Procedure Laterality Date    ATRIAL ABLATION SURGERY  01/2015    CARDIOVERSION      CHELA/DCCV 10/22/2014    CARPAL TUNNEL RELEASE Right     CATARACT EXTRACTION      CORONARY ANGIOPLASTY WITH STENT PLACEMENT      HYSTERECTOMY      JOINT REPLACEMENT Right     TX AMPUTATION TOE,MT-P JT Left 10/11/2017    Procedure: AMPUTATION SECOND TOE;  Surgeon: Estelita Hollins DPM;  Location: [facility];  Service: Podiatry    TONSILLECTOMY      TOTAL HIP ARTHROPLASTY      Right           01/25/21 1043   OT Last Visit   OT Visit Date 01/25/21   Note Type   Note type Evaluation   Restrictions/Precautions   Weight Bearing Precautions Per Order No   Other Precautions Chair Alarm; Fall Risk;Pain Pain Assessment   Pain Assessment Tool 0-10   Pain Score Worst Possible Pain   Pain Location/Orientation Orientation: Bilateral;Location: Knee   Pain Onset/Description Frequency: Constant/Continuous   Patient's Stated Pain Goal No pain   Hospital Pain Intervention(s) Repositioned; Ambulation/increased activity   Home Living   Type of [address] One level  (2STE)   Bathroom Shower/Tub Walk-in shower   Bathroom Toilet Raised   Bathroom Equipment Grab bars in shower; Shower chair   Bathroom Accessibility Accessible   Home Equipment Walker;Cane  (primarily uses RW)   Prior Function   Level of [place] Independent with ADLs and functional mobility; Needs assistance with IADLs   Lives With Spouse   Receives Help From Personal care attendant   ADL Assistance Independent   IADLs Needs assistance   Falls in the last 6 months 0   Vocational Retired   Lifestyle   Autonomy Pt reports living with  in ranch home  I with ADLs, aides A with IADLs  (-) drive    Reciprocal Relationships Spouse, aides   Intrinsic Gratification TV    Psychosocial   Psychosocial (WDL) WDL   Subjective   Subjective "I've barely gotten out of bed"   ADL   Eating Assistance 5  Supervision/Setup   Grooming Assistance 5  Supervision/Setup   Grooming Deficit Wash/dry face   UB Bathing Assistance 5  Supervision/Setup   LB Bathing Assistance 2  Maximal Assistance   UB Dressing Assistance 5  Supervision/Setup   LB Dressing Assistance 2  Maximal Assistance   LB Dressing Deficit Don/doff R sock; Don/doff L sock   Toileting Assistance  2  Maximal Assistance   Bed Mobility   Rolling R 4  Minimal assistance   Rolling L 4  Minimal assistance   Supine to Sit 3  Moderate assistance   Additional items Assist x 1; Increased time required   Sit to Supine 3  Moderate assistance   Additional items Assist x 1; Increased time required   Transfers   Sit to Stand 2  Maximal assistance   Additional items Assist x 2   Stand to Sit 2  Maximal assistance Additional items Assist x 2   Stand pivot 2  Maximal assistance   Additional items Assist x 2   Functional Mobility   Functional Mobility 2  Maximal assistance   Additional Comments minimal steps from bed to chair    Balance   Static Sitting Fair   Dynamic Sitting Fair -   Static Standing Poor -   Dynamic Standing Poor -   Ambulatory Poor -   Activity Tolerance   Activity Tolerance Patient limited by fatigue;Patient limited by pain   Medical Staff Made Aware PT Jakob Long    Nurse Made Aware yes    RUE Assessment   RUE Assessment   (unable to achieve sh ROM > 90*, pain noted, ~3/5 )   LUE Assessment   LUE Assessment   (unable to achieve sh ROM > 90*, pain noted, ~3/5 )   Vision-Basic Assessment   Current Vision Wears glasses all the time   Cognition   Overall Cognitive Status WFL   Arousal/Participation Responsive; Alert; Cooperative   Attention Within functional limits   Orientation Level Oriented X4   Memory Within functional limits   Following Commands Follows one step commands with increased time or repetition   Comments Pt pleasant t/o session  Some fear noted when moving from bed to chair    Assessment   Limitation Decreased ADL status; Decreased UE ROM; Decreased UE strength;Decreased Safe judgement during ADL;Decreased endurance;Decreased self-care trans   Prognosis Fair   Assessment Pt is a 80 y o  female admitted to [facility] on 1/23/2021 w/ Ambulatory dysfunction  Pt with active OT orders and up with assistance  orders  As per pt report, pta, resides in a 1STH with   Pt was I w/  ADLS, receives A for IADLS, (-) drove  Upon evaluation, pt currently requires MAX A x 2 with transfers and mobility  Exhibits decreased strength and ROM to B/L UE's limiting performance in ADLs/transfers  Pt currently  requires S UB ADLs, MAX A LB ADLs, and MAX A toileting  Pt is limited at this time 2*: pain, endurance, activity tolerance, balance, functional standing tolerance, decreased I w/ ADLS/IADLS, strength and ROM  The following Occupational Performance Areas to address include: grooming, bathing/shower, toilet hygiene, dressing, health maintenance, functional mobility, community mobility and clothing management  Pt to benefit from continued skilled OT services while in the hospital to address deficits as defined above and maximize level of functional independence w ADL's and functional mobility  Pt's raw score on the AM-PAC Daily Activity inpatient short form is 17, standardized score is 37 26, < 39 4  Patients at this level are likely to benefit from DC to post-acute rehab services, however, please refer to therapist recommendation for safe DC planning  From OT standpoint, recommendation at time of d/c would be STR  Pt would benefit from immediate acute skilled OT to address above deficits, improve overall functional independence, and reduce caregiver burden  Pt was left in chair with alarm on after session with all current needs met  Goals   LTG Time Frame 10-14   Plan   Treatment Interventions ADL retraining;Functional transfer training;UE strengthening/ROM; Endurance training;Patient/family training;Equipment evaluation/education; Compensatory technique education;Continued evaluation; Energy conservation; Activityengagement   Goal Expiration Date 02/08/21   OT Frequency 3-5x/wk   Recommendation   OT Discharge Recommendation Post-Acute Rehabilitation Services   OT - OK to Discharge Yes  (to rehab when medically stable )   [facility]   Lower Body Dressing 2   Bathing 2   Toileting 2   Upper Body Dressing 3   Grooming 4   Eating 4   Daily Activity Raw Score 17   Daily Activity Standardized Score (Calc for Raw Score >=11) 37 26   Modified [place] Scale   Modified [place] Scale 4     GOALS    1) Pt will increase activity tolerance to G for 30 min txment sessions    2) Pt will complete UB/LB dressing/self care w/ mod I using adaptive device and DME as needed    3) Pt will complete bathing w/ Mod I w/ use of AE and DME as needed    4) Pt will complete toileting w/ mod I w/ G hygiene/thoroughness using DME as needed    5) Pt will improve functional transfers to Mod I on/off all surfaces using DME as needed w/ G balance/safety     6) Pt will improve functional mobility during ADL/IADL/leisure tasks to Mod I using DME as needed w/ G balance/safety     7) Pt will participate in simulated IADL management task with DME as needed to increase independence to  w/ G safety and endurance    8) Pt will demonstrate G carryover of pt/caregiver education and training as appropriate      Bhanu Biggs MS, OTR/L

## 2021-01-25 NOTE — RESTORATIVE TECHNICIAN NOTE
Restorative Specialist Mobility Note       Activity: Other (Comment)(Educated/encouraged pt to get In/OOB with assistance, pt states she is comfortable/wants to stay in the chair RN aware/present   Pt callbell, phone/tray within reach )          [employer] BS, Restorative Technician, [employer]

## 2021-01-25 NOTE — ASSESSMENT & PLAN NOTE
· R hip/pelvis xray (01/21/2021):  No acute osseous abnormality  Right total hip arthroplasty is intact without complication    · PRN pain control

## 2021-01-25 NOTE — PLAN OF CARE
Problem: Potential for Falls  Goal: Patient will remain free of falls  Description: INTERVENTIONS:  - Assess patient frequently for physical needs  -  Identify cognitive and physical deficits and behaviors that affect risk of falls    -  [place] fall precautions as indicated by assessment   - Educate patient/family on patient safety including physical limitations  - Instruct patient to call for assistance with activity based on assessment  - Modify environment to reduce risk of injury  - Consider OT/PT consult to assist with strengthening/mobility  Outcome: Progressing     Problem: PAIN - ADULT  Goal: Verbalizes/displays adequate comfort level or baseline comfort level  Description: Interventions:  - Encourage patient to monitor pain and request assistance  - Assess pain using appropriate pain scale  - Administer analgesics based on type and severity of pain and evaluate response  - Implement non-pharmacological measures as appropriate and evaluate response  - Consider cultural and social influences on pain and pain management  - Notify physician/advanced practitioner if interventions unsuccessful or patient reports new pain  Outcome: Progressing     Problem: INFECTION - ADULT  Goal: Absence or prevention of progression during hospitalization  Description: INTERVENTIONS:  - Assess and monitor for signs and symptoms of infection  - Monitor lab/diagnostic results  - Monitor all insertion sites, i e  indwelling lines, tubes, and drains  - Monitor endotracheal if appropriate and nasal secretions for changes in amount and color  - [place] appropriate cooling/warming therapies per order  - Administer medications as ordered  - Instruct and encourage patient and family to use good hand hygiene technique  - Identify and instruct in appropriate isolation precautions for identified infection/condition  Outcome: Progressing  Goal: Absence of fever/infection during neutropenic period  Description: INTERVENTIONS:  - Monitor WBC    Outcome: Progressing     Problem: SAFETY ADULT  Goal: Patient will remain free of falls  Description: INTERVENTIONS:  - Assess patient frequently for physical needs  -  Identify cognitive and physical deficits and behaviors that affect risk of falls    -  [place] fall precautions as indicated by assessment   - Educate patient/family on patient safety including physical limitations  - Instruct patient to call for assistance with activity based on assessment  - Modify environment to reduce risk of injury  - Consider OT/PT consult to assist with strengthening/mobility  Outcome: Progressing  Goal: Maintain or return to baseline ADL function  Description: INTERVENTIONS:  -  Assess patient's ability to carry out ADLs; assess patient's baseline for ADL function and identify physical deficits which impact ability to perform ADLs (bathing, care of mouth/teeth, toileting, grooming, dressing, etc )  - Assess/evaluate cause of self-care deficits   - Assess range of motion  - Assess patient's mobility; develop plan if impaired  - Assess patient's need for assistive devices and provide as appropriate  - Encourage maximum independence but intervene and supervise when necessary  - Involve family in performance of ADLs  - Assess for home care needs following discharge   - Consider OT consult to assist with ADL evaluation and planning for discharge  - Provide patient education as appropriate  Outcome: Progressing  Goal: Maintain or return mobility status to optimal level  Description: INTERVENTIONS:  - Assess patient's baseline mobility status (ambulation, transfers, stairs, etc )    - Identify cognitive and physical deficits and behaviors that affect mobility  - Identify mobility aids required to assist with transfers and/or ambulation (gait belt, sit-to-stand, lift, walker, cane, etc )  - [place] fall precautions as indicated by assessment  - Record patient progress and toleration of activity level on Mobility SBAR; progress patient to next Phase/Stage  - Instruct patient to call for assistance with activity based on assessment  - Consider rehabilitation consult to assist with strengthening/weightbearing, etc   Outcome: Progressing     Problem: DISCHARGE PLANNING  Goal: Discharge to home or other facility with appropriate resources  Description: INTERVENTIONS:  - Identify barriers to discharge w/patient and caregiver  - Arrange for needed discharge resources and transportation as appropriate  - Identify discharge learning needs (meds, wound care, etc )  - Arrange for interpretive services to assist at discharge as needed  - Refer to Case Management Department for coordinating discharge planning if the patient needs post-hospital services based on physician/advanced practitioner order or complex needs related to functional status, cognitive ability, or social support system  Outcome: Progressing     Problem: Knowledge Deficit  Goal: Patient/family/caregiver demonstrates understanding of disease process, treatment plan, medications, and discharge instructions  Description: Complete learning assessment and assess knowledge base    Interventions:  - Provide teaching at level of understanding  - Provide teaching via preferred learning methods  Outcome: Progressing

## 2021-01-25 NOTE — ASSESSMENT & PLAN NOTE
· Anticoagulated on Coumadin with current INR of 1 92  · Rate controlled on amiodarone and metoprolol

## 2021-01-25 NOTE — ASSESSMENT & PLAN NOTE
· Pt presents with 1 week hx of acute on chronic ambulatory dysfunction  · Patient with extensive osteoarthritis both knees and follows with Dr Ludwin Rice as an out-patient  · PT/OT eval for discharge recommendations    Patient would like to go to [facility] if able since she's been there in the past

## 2021-01-25 NOTE — ASSESSMENT & PLAN NOTE
Lab Results   Component Value Date    HGBA1C 6 0 (H) 01/08/2021     Recent Labs     01/24/21  1633 01/24/21  2154 01/25/21  0733 01/25/21  1047   POCGLU 107 125 99 112     Blood Sugar Average: Last 72 hrs:  (P) 147 375   · Well controlled as evidenced by A1c on diet alone  · Monitor Accu-Cheks, SSI   · Avoid hypoglycemia

## 2021-01-25 NOTE — PHYSICAL THERAPY NOTE
Physical Therapy Evaluation    Patient Name: Job Cortez    ZJOUH'PATRICIA Date: 1/25/2021     Problem List  Principal Problem:    Ambulatory dysfunction  Active Problems:    Essential hypertension    Hyperlipidemia    History of atrial flutter    Right hip pain    Type 2 diabetes mellitus without complication, without long-term current use of insulin (HCC)    Acquired hypothyroidism    Primary osteoarthritis of both shoulders    Pacemaker    Atrial fibrillation (HCC)    Primary osteoarthritis of both knees       Past Medical History  Past Medical History:   Diagnosis Date    Abnormal nuclear stress test     last assessed 04/03/2017    Anxiety     Arthritis     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    Bundle branch block, left     Cardiomyopathy ([facility] [address] )     Chest pain 3/9/2019    Chronic pain     chronic b/l shoulder pain    Chronic pain of right knee 4/2/2019    Coronary artery disease     Diabetes mellitus ([facility] [address] )     Gait disturbance 3/2/2018    GERD (gastroesophageal reflux disease)     H/O atrial flutter     History of DVT (deep vein thrombosis)     History of pulmonary embolism     Hyperlipidemia     Hypertension     Hypothyroid     Renal calculi     Shortness of breath         Past Surgical History  Past Surgical History:   Procedure Laterality Date    ATRIAL ABLATION SURGERY  01/2015    CARDIOVERSION      CHELA/DCCV 10/22/2014    CARPAL TUNNEL RELEASE Right     CATARACT EXTRACTION      CORONARY ANGIOPLASTY WITH STENT PLACEMENT      HYSTERECTOMY      JOINT REPLACEMENT Right     OR AMPUTATION TOE,MT-P JT Left 10/11/2017    Procedure: AMPUTATION SECOND TOE;  Surgeon: Sal Arroyo DPM;  Location: [facility];  Service: Podiatry    TONSILLECTOMY      TOTAL HIP ARTHROPLASTY      Right         01/25/21 1044   PT Last Visit   PT Visit Date 01/25/21   Note Type   Note type Evaluation   Pain Assessment   Pain Assessment Tool 0-10   Pain Score Worst Possible Pain   Pain Location/Orientation Orientation: Bilateral;Location: Knee   Home Living   Type of Home House  [facility] with 3 RADHA)   Home Layout One level;Stairs to enter with rails; Able to live on main level with bedroom/bathroom; Performs ADLs on one level   Bathroom Shower/Tub Walk-in shower   Bathroom Toilet Raised   Bathroom Equipment Grab bars in shower   Bathroom Accessibility Accessible   Home Equipment Walker;Cane   Additional Comments Independent with RW at baseline   Prior Function   Level of [place] Independent with ADLs and functional mobility   Lives With Spouse   Receives Help From Family   ADL Assistance Independent   IADLs Needs assistance   Falls in the last 6 months 0   Vocational Retired   Restrictions/Precautions   [employer] Bearing Precautions Per Order No   Other Precautions Chair Alarm; Bed Alarm; Fall Risk;Pain   Cognition   Overall Cognitive Status WFL   Attention Within functional limits   Orientation Level Oriented X4   Memory Within functional limits   Following Commands Follows one step commands with increased time or repetition   Comments Very pleasant  Fearful of movement 2* pain   RLE Assessment   RLE Assessment   (hip flexion: 3+/5, Knee extension: 4/5)   LLE Assessment   LLE Assessment   (hip flexion: 3+/5, Knee extension: 4/5)   Light Touch   RLE Light Touch Grossly intact   LLE Light Touch Grossly intact   Bed Mobility   Supine to Sit 3  Moderate assistance   Additional items Assist x 1; Increased time required;Verbal cues;LE management   Additional Comments Unsupported sitting balance, initially required mod A x 1 but progressed to supervision level  Concluded session with pt seated OOB with chair alarm on   Transfers   Sit to Stand 2  Maximal assistance   Additional items Assist x 2; Increased time required;Verbal cues   Stand to Sit 2  Maximal assistance   Additional items Assist x 2; Increased time required;Verbal cues   Stand pivot 2  Maximal assistance   Additional items Assist x 2; Increased time required;Verbal cues   Additional Comments 1st trial, only able to achieve approx ~75% of standing with using RW  2nd trial, able to achieve full standing with HHA x 2 and rocking forward x3 before standing  Ambulation/Elevation   Gait pattern Improper Weight shift; Shuffling;Decreased foot clearance; Short stride; Step to   Gait Assistance 2  Maximal assist   Additional items Assist x 2;Verbal cues; Tactile cues   Assistive Device   (HHA x 2)   Distance 2 feet from EOB to chair    Balance   Static Sitting Fair -   Dynamic Sitting Fair -   Static Standing Poor -   Dynamic Standing Poor -   Ambulatory Poor -   Endurance Deficit   Endurance Deficit Yes   Endurance Deficit Description generalized weakness   Activity Tolerance   Activity Tolerance Patient limited by fatigue;Patient limited by pain   Medical Staff Made Aware OT   Nurse Made Aware YES   Assessment   Prognosis Good   Problem List Decreased strength;Decreased endurance; Impaired balance;Decreased mobility; Decreased coordination;Decreased safety awareness; Impaired judgement;Pain   Assessment Pt is 80 y o  female seen for high complexity PT evaluation s/p admission to [facility] on 1/23/21 with ambulatory dysfunction likely due to OA in both LE  Comorbidities affecting pt's physical performance at time of assessment include: OA of left knee, OA of both shoulders, hypothyroidism, type 2 DM, history of A flutter, hyperlipidemia, and essential HTN  Pt reports she resides with her  in a [facility] with 3 RADHA  Pt is normally independent with ambulation using RW and ADLs  Currently, pt required mod A x 1 for bed mobility, max A x 2 for transfers, and max A x 2 for ambulation with HHA x 2  Standing tolerance and ambulation limited due to BLE weakness and BLE pain likely due to OA   Patient's decreased mobility level and increased fall risk is secondary to deficits in pain, activity tolerance, endurance, BLE strength, standing tolerance, balance, gait deviations, and gait speed  Current clinical presentation is unstable/unpredictable seen in pt's presentation of ongoing medical management, increased reliance on assistance compared to PLOF, impaired judgement/safety awareness, and significant PMH  Pt to benefit from continued PT tx to address deficits as defined above and maximize level of functional independent mobility and consistency  From PT/mobility standpoint, recommendation at time of d/c would be STR pending progress in order to facilitate return to PLOF  Barriers to Discharge Decreased caregiver support; Inaccessible home environment   Goals   Patient Goals to get better and walk    STG Expiration Date 02/04/21   Short Term Goal #1 In 1-2 weeks, the patient will complete the following 1) Perform all aspect of bed mobility independently 2) Perform functional transfer with LRAD and min A x1  3) Ambulate >15 feet with LRAD and min A x 1 level  4) Negotiate 3 steps with 1 handrail and mod A x 1 5) Patient will improve dynamic standing balance from poor + to fair + in order to perform everyday activities  6) Patient will continue with ongoing rehab services for family education, DME, and D/C planning    Plan   Treatment/Interventions LE strengthening/ROM; Functional transfer training;ADL retraining; Therapeutic exercise; Endurance training;Elevations; Patient/family training;Bed mobility;Gait training   PT Frequency   (3-5x/wk)   Recommendation   PT Discharge Recommendation Post-Acute Rehabilitation Services   Equipment Recommended Walker   PT - OK to Discharge Yes   Additional Comments to STR once medically stable   AM-PAC Basic Mobility Inpatient   Turning in Bed Without Bedrails 2   Lying on Back to Sitting on Edge of Flat Bed 2   Moving Bed to Chair 1   Standing Up From Chair 1   Walk in Room 1   Climb 3-5 Stairs 1   Basic Mobility Inpatient Raw Score 8   Turning Head Towards Sound 4   Follow Simple Instructions 4   Low Function Basic Mobility Raw Score 16   Low Function Basic Mobility Standardized Score 25 72       Beverly Abad PT, DPT

## 2021-01-25 NOTE — ASSESSMENT & PLAN NOTE
· TSH 9, Free T4 0 75  · Patient on levothyroxine 50 mcg daily  · Recheck in 4-6 weeks and follow-up with primary care physician

## 2021-01-26 LAB
ANION GAP SERPL CALCULATED.3IONS-SCNC: 3 MMOL/L (ref 4–13)
BASOPHILS # BLD AUTO: 0.02 THOUSANDS/ΜL (ref 0–0.1)
BASOPHILS NFR BLD AUTO: 0 % (ref 0–1)
BUN SERPL-MCNC: 17 MG/DL (ref 5–25)
CALCIUM SERPL-MCNC: 9.3 MG/DL (ref 8.3–10.1)
CHLORIDE SERPL-SCNC: 103 MMOL/L (ref 100–108)
CO2 SERPL-SCNC: 32 MMOL/L (ref 21–32)
CREAT SERPL-MCNC: 0.76 MG/DL (ref 0.6–1.3)
EOSINOPHIL # BLD AUTO: 0.17 THOUSAND/ΜL (ref 0–0.61)
EOSINOPHIL NFR BLD AUTO: 3 % (ref 0–6)
ERYTHROCYTE [DISTWIDTH] IN BLOOD BY AUTOMATED COUNT: 13.3 % (ref 11.6–15.1)
GFR SERPL CREATININE-BSD FRML MDRD: 69 ML/MIN/1.73SQ M
GLUCOSE SERPL-MCNC: 100 MG/DL (ref 65–140)
GLUCOSE SERPL-MCNC: 106 MG/DL (ref 65–140)
GLUCOSE SERPL-MCNC: 119 MG/DL (ref 65–140)
GLUCOSE SERPL-MCNC: 153 MG/DL (ref 65–140)
GLUCOSE SERPL-MCNC: 96 MG/DL (ref 65–140)
HCT VFR BLD AUTO: 36 % (ref 34.8–46.1)
HGB BLD-MCNC: 11.3 G/DL (ref 11.5–15.4)
IMM GRANULOCYTES # BLD AUTO: 0.02 THOUSAND/UL (ref 0–0.2)
IMM GRANULOCYTES NFR BLD AUTO: 0 % (ref 0–2)
INR PPP: 2.08 (ref 0.84–1.19)
LYMPHOCYTES # BLD AUTO: 1.03 THOUSANDS/ΜL (ref 0.6–4.47)
LYMPHOCYTES NFR BLD AUTO: 19 % (ref 14–44)
MCH RBC QN AUTO: 30.8 PG (ref 26.8–34.3)
MCHC RBC AUTO-ENTMCNC: 31.4 G/DL (ref 31.4–37.4)
MCV RBC AUTO: 98 FL (ref 82–98)
MONOCYTES # BLD AUTO: 0.48 THOUSAND/ΜL (ref 0.17–1.22)
MONOCYTES NFR BLD AUTO: 9 % (ref 4–12)
NEUTROPHILS # BLD AUTO: 3.77 THOUSANDS/ΜL (ref 1.85–7.62)
NEUTS SEG NFR BLD AUTO: 69 % (ref 43–75)
NRBC BLD AUTO-RTO: 0 /100 WBCS
PLATELET # BLD AUTO: 234 THOUSANDS/UL (ref 149–390)
PMV BLD AUTO: 9.4 FL (ref 8.9–12.7)
POTASSIUM SERPL-SCNC: 4.1 MMOL/L (ref 3.5–5.3)
PROTHROMBIN TIME: 23.3 SECONDS (ref 11.6–14.5)
RBC # BLD AUTO: 3.67 MILLION/UL (ref 3.81–5.12)
SODIUM SERPL-SCNC: 138 MMOL/L (ref 136–145)
WBC # BLD AUTO: 5.49 THOUSAND/UL (ref 4.31–10.16)

## 2021-01-26 PROCEDURE — 80048 BASIC METABOLIC PNL TOTAL CA: CPT | Performed by: PHYSICIAN ASSISTANT

## 2021-01-26 PROCEDURE — 97110 THERAPEUTIC EXERCISES: CPT

## 2021-01-26 PROCEDURE — 82948 REAGENT STRIP/BLOOD GLUCOSE: CPT

## 2021-01-26 PROCEDURE — 99232 SBSQ HOSP IP/OBS MODERATE 35: CPT | Performed by: PHYSICIAN ASSISTANT

## 2021-01-26 PROCEDURE — 85025 COMPLETE CBC W/AUTO DIFF WBC: CPT | Performed by: PHYSICIAN ASSISTANT

## 2021-01-26 PROCEDURE — 85610 PROTHROMBIN TIME: CPT | Performed by: PHYSICIAN ASSISTANT

## 2021-01-26 PROCEDURE — 97112 NEUROMUSCULAR REEDUCATION: CPT

## 2021-01-26 PROCEDURE — 97116 GAIT TRAINING THERAPY: CPT

## 2021-01-26 PROCEDURE — 97530 THERAPEUTIC ACTIVITIES: CPT

## 2021-01-26 RX ADMIN — Medication 2 TABLET: at 08:13

## 2021-01-26 RX ADMIN — Medication 1 TABLET: at 08:14

## 2021-01-26 RX ADMIN — DOCUSATE SODIUM 100 MG: 100 CAPSULE, LIQUID FILLED ORAL at 08:14

## 2021-01-26 RX ADMIN — PRAVASTATIN SODIUM 40 MG: 40 TABLET ORAL at 08:14

## 2021-01-26 RX ADMIN — POLYETHYLENE GLYCOL 3350 17 G: 17 POWDER, FOR SOLUTION ORAL at 08:17

## 2021-01-26 RX ADMIN — INSULIN LISPRO 1 UNITS: 100 INJECTION, SOLUTION INTRAVENOUS; SUBCUTANEOUS at 11:31

## 2021-01-26 RX ADMIN — AMIODARONE HYDROCHLORIDE 100 MG: 200 TABLET ORAL at 08:16

## 2021-01-26 RX ADMIN — WARFARIN SODIUM 2.5 MG: 2.5 TABLET ORAL at 16:40

## 2021-01-26 RX ADMIN — ACETAMINOPHEN 975 MG: 325 TABLET, FILM COATED ORAL at 06:41

## 2021-01-26 RX ADMIN — ACETAMINOPHEN 975 MG: 325 TABLET, FILM COATED ORAL at 21:30

## 2021-01-26 RX ADMIN — LEVOTHYROXINE SODIUM 50 MCG: 50 TABLET ORAL at 08:14

## 2021-01-26 RX ADMIN — ACETAMINOPHEN 975 MG: 325 TABLET, FILM COATED ORAL at 14:08

## 2021-01-26 RX ADMIN — LIDOCAINE 2 PATCH: 50 PATCH TOPICAL at 08:13

## 2021-01-26 RX ADMIN — Medication 2 TABLET: at 16:39

## 2021-01-26 RX ADMIN — ISOSORBIDE MONONITRATE 30 MG: 30 TABLET, EXTENDED RELEASE ORAL at 08:16

## 2021-01-26 RX ADMIN — DOCUSATE SODIUM 100 MG: 100 CAPSULE, LIQUID FILLED ORAL at 16:40

## 2021-01-26 RX ADMIN — METOPROLOL SUCCINATE 25 MG: 25 TABLET, EXTENDED RELEASE ORAL at 16:40

## 2021-01-26 RX ADMIN — ASPIRIN 81 MG: 81 TABLET, COATED ORAL at 08:13

## 2021-01-26 NOTE — PLAN OF CARE
Problem: Potential for Falls  Goal: Patient will remain free of falls  Description: INTERVENTIONS:  - Assess patient frequently for physical needs  -  Identify cognitive and physical deficits and behaviors that affect risk of falls  -  [place] fall precautions as indicated by assessment   - Educate patient/family on patient safety including physical limitations  - Instruct patient to call for assistance with activity based on assessment  - Modify environment to reduce risk of injury  - Consider OT/PT consult to assist with strengthening/mobility  Outcome: Progressing     Problem: PAIN - ADULT  Goal: Verbalizes/displays adequate comfort level or baseline comfort level  Description: Interventions:  - Encourage patient to monitor pain and request assistance  - Assess pain using appropriate pain scale  - Administer analgesics based on type and severity of pain and evaluate response  - Implement non-pharmacological measures as appropriate and evaluate response  - Consider cultural and social influences on pain and pain management  - Notify physician/advanced practitioner if interventions unsuccessful or patient reports new pain  Outcome: Progressing     Problem: SAFETY ADULT  Goal: Patient will remain free of falls  Description: INTERVENTIONS:  - Assess patient frequently for physical needs  -  Identify cognitive and physical deficits and behaviors that affect risk of falls    -  [place] fall precautions as indicated by assessment   - Educate patient/family on patient safety including physical limitations  - Instruct patient to call for assistance with activity based on assessment  - Modify environment to reduce risk of injury  - Consider OT/PT consult to assist with strengthening/mobility  Outcome: Progressing  Goal: Maintain or return to baseline ADL function  Description: INTERVENTIONS:  - Assess patient frequently for physical needs  -  Identify cognitive and physical deficits and behaviors that affect risk of falls   -  [place] fall precautions as indicated by assessment   - Educate patient/family on patient safety including physical limitations  - Instruct patient to call for assistance with activity based on assessment  - Modify environment to reduce risk of injury  - Consider OT/PT consult to assist with strengthening/mobility  Outcome: Progressing  Goal: Maintain or return mobility status to optimal level  Description: INTERVENTIONS:  -  Assess patient's ability to carry out ADLs; assess patient's baseline for ADL function and identify physical deficits which impact ability to perform ADLs (bathing, care of mouth/teeth, toileting, grooming, dressing, etc )  - Assess/evaluate cause of self-care deficits   - Assess range of motion  - Assess patient's mobility; develop plan if impaired  - Assess patient's need for assistive devices and provide as appropriate  - Encourage maximum independence but intervene and supervise when necessary  - Involve family in performance of ADLs  - Assess for home care needs following discharge   - Consider OT consult to assist with ADL evaluation and planning for discharge  - Provide patient education as appropriate  Outcome: Progressing     Problem: DISCHARGE PLANNING  Goal: Discharge to home or other facility with appropriate resources  Description: INTERVENTIONS:  - Identify barriers to discharge w/patient and caregiver  - Arrange for needed discharge resources and transportation as appropriate  - Identify discharge learning needs (meds, wound care, etc )  - Arrange for interpretive services to assist at discharge as needed  - Refer to Case Management Department for coordinating discharge planning if the patient needs post-hospital services based on physician/advanced practitioner order or complex needs related to functional status, cognitive ability, or social support system  Outcome: Progressing     Problem: Knowledge Deficit  Goal: Patient/family/caregiver demonstrates understanding of disease process, treatment plan, medications, and discharge instructions  Description: Complete learning assessment and assess knowledge base    Interventions:  - Provide teaching at level of understanding  - Provide teaching via preferred learning methods  Outcome: Progressing

## 2021-01-26 NOTE — PLAN OF CARE
Problem: PHYSICAL THERAPY ADULT  Goal: Performs mobility at highest level of function for planned discharge setting  See evaluation for individualized goals  Description: Treatment/Interventions: LE strengthening/ROM, Functional transfer training, ADL retraining, Therapeutic exercise, Endurance training, Elevations, Patient/family training, Bed mobility, Gait training  Equipment Recommended: Elizabeth Maciel       See flowsheet documentation for full assessment, interventions and recommendations  Outcome: Progressing  Note: Prognosis: Good  Problem List: Decreased strength, Decreased endurance, Impaired balance, Decreased mobility, Decreased coordination, Decreased safety awareness, Impaired judgement, Pain  Assessment: The pt  is eager to regain her mobility and independence  She was able to transfer a total of 9 times, and ambulate a few feet four times  She does require assistance in order to facilitate hip extension, but once she is over her feet she is able to take steps  She does remain limited due to pain, and educated the pt  on the benefit of taking prescribed pain medication to help facilitate her mobility  She was able to readily demonstrate therapeutic exercise with just instruction  She was incontinent during the session, and she was cleaned and provided a new [place] as well as extension tubing so that she could sit out in the chair on the other side of the bed  She continues to remain limited from her baseline independence, but she is demonstrating notable improvement from yesterday's session  Barriers to Discharge: Inaccessible home environment, Decreased caregiver support     PT Discharge Recommendation: [id number]     PT - OK to Discharge: Yes    See flowsheet documentation for full assessment

## 2021-01-26 NOTE — RESTORATIVE TECHNICIAN NOTE
Restorative Specialist Mobility Note       Activity: Ambulate in room, Chair, Dangle, Stand at bedside(Educated/encouraged pt to ambulate with assistance 3-4 x's/day  Chair alarm on   Pt callbell, phone/tray within reach )     Assistive Device: Front wheel walker(Assist x2 OOB to the chair )       Meghana SWARTZ, Restorative Technician, [employer]

## 2021-01-26 NOTE — ASSESSMENT & PLAN NOTE
· Pt presents with 1 week hx of acute on chronic ambulatory dysfunction  · Patient with extensive osteoarthritis both knees and follows with Dr Deward Kanner as an out-patient  · PT/OT eval for discharge recommendations    Patient would like to go to [address] [place] [address] if able since she's been there in the past

## 2021-01-26 NOTE — PHYSICAL THERAPY NOTE
Physical Therapy Progress Note     01/26/21 1550   PT Last Visit   PT Visit Date 01/26/21   Note Type   Note Type Treatment   Pain Assessment   Pain Assessment Tool 0-10   Pain Score 6   Pain Location/Orientation Orientation: Bilateral;Location: Knee   Pain Onset/Description Onset: Ongoing   Hospital Pain Intervention(s) Cold applied;Repositioned; Ambulation/increased activity; Emotional support   Restrictions/Precautions   Other Precautions Chair Alarm; Fall Risk;Pain  (Alarm active post session )   Subjective   Subjective The pt  states that she is feeling better today  She notes continuing chronic knee pain  Transfers   Sit to Stand 3  Moderate assistance   Additional items Assist x 1; Increased time required   Stand to Sit 4  Minimal assistance   Additional items Assist x 1; Increased time required   Ambulation/Elevation   Gait pattern Excessively slow; Step to;Short stride; Inconsistent sandra;Decreased foot clearance; Forward Flexion;Narrow REBEKA   Gait Assistance 4  Minimal assist   Additional items Assist x 1;Verbal cues   Assistive Device Rolling walker   Distance 3 feet, 2 feet x 2, 3 feet  Balance   Static Sitting Fair +   Dynamic Sitting Fair   Static Standing Fair -   Ambulatory Poor +   Activity Tolerance   Activity Tolerance Patient tolerated treatment well;Patient limited by pain   Exercises   TKR Sitting;Bilateral;AROM;10 reps;5 reps  (x2 sets )   Assessment   Prognosis Good   Problem List Decreased strength;Decreased endurance; Impaired balance;Decreased mobility; Decreased coordination;Decreased safety awareness; Impaired judgement;Pain   Assessment The pt  is eager to regain her mobility and independence  She was able to transfer a total of 9 times, and ambulate a few feet four times  She does require assistance in order to facilitate hip extension, but once she is over her feet she is able to take steps  She does remain limited due to pain, and educated the pt   on the benefit of taking prescribed pain medication to help facilitate her mobility  She was able to readily demonstrate therapeutic exercise with just instruction  She was incontinent during the session, and she was cleaned and provided a new [place] as well as extension tubing so that she could sit out in the chair on the other side of the bed  She continues to remain limited from her baseline independence, but she is demonstrating notable improvement from yesterday's session  Barriers to Discharge Inaccessible home environment;Decreased caregiver support   Goals   Patient Goals To get stronger, and to walk again  STG Expiration Date 02/04/21   PT Treatment Day 1   Plan   Treatment/Interventions Functional transfer training;LE strengthening/ROM; Therapeutic exercise; Endurance training;Patient/family training;Bed mobility;Gait training;Elevations   Progress Progressing toward goals   PT Frequency   (3-5x a week )   Recommendation   PT Discharge Recommendation Post-Acute Rehabilitation Services   Equipment Recommended [place] 11 Basic Mobility Inpatient   Turning in Bed Without Bedrails 3   Lying on Back to Sitting on Edge of Flat Bed 2   Moving Bed to Chair 2   Standing Up From Chair 2   Walk in Room 2   Climb 3-5 Stairs 1   Basic Mobility Inpatient Raw Score 12   Basic Mobility Standardized Score 32 23       Shakila Hall PTA  The patient's AM-PAC Basic Mobility Inpatient Short Form Raw Score is 12, Standardized Score is 32 23  A standardized score less than 42 9 suggests the patient may benefit from discharge to post-acute rehabilitation services  Please also refer to the recommendation of the Physical Therapist for safe discharge planning

## 2021-01-26 NOTE — ASSESSMENT & PLAN NOTE
· Anticoagulated on Coumadin with current INR of 2 08  · Goal: 2 0-3 0  · Rate controlled on amiodarone and metoprolol

## 2021-01-26 NOTE — ASSESSMENT & PLAN NOTE
· Status post ablation  PPM in place     · Continue amiodarone  · Coumadin for anticoagulation  · INR today: 2 08

## 2021-01-26 NOTE — ASSESSMENT & PLAN NOTE
· Home regimen includes lisinopril 2 5 mg daily, Toprol-XL 25 mg daily and lasix 20 mg daily  · BPs are quite volatile  Continue current regimen for now

## 2021-01-26 NOTE — CASE MANAGEMENT
MV called CM to express concern of pt being IPTA and now max assist with her PT stevenal   MV concerned about pt's ability to participate in therapy due to her bilateral OA in her knees  CM called pt to discuss  Pt refuses to go to any other facility  She feels that she struggled today because she has not been OOB for 3 days  Pt willing to try to work more with therapy tomorrow to show MV her ability to progress  ROSAS informed SONNY Jones and inquired with PT-Yousif if PT can treat twice tomorrow in AM and afternoon

## 2021-01-26 NOTE — PROGRESS NOTES
Progress Note - Jolie Schmitzilor 7/24/1929, 80 y o  female MRN: [id number]  Unit/Bed#: MS Diaz5-01 Encounter: [id number]  Primary Care Provider: Rupal Shi MD   Date and time admitted to hospital: 1/23/2021 11:41 AM    * Ambulatory dysfunction  Assessment & Plan  · Pt presents with 1 week hx of acute on chronic ambulatory dysfunction  · Patient with extensive osteoarthritis both knees and follows with Dr Dorothy Singh as an out-patient  · PT/OT eval for discharge recommendations  Patient would like to go to [facility] if able since she's been there in the past     Essential hypertension  Assessment & Plan  · Home regimen includes lisinopril 2 5 mg daily, Toprol-XL 25 mg daily and lasix 20 mg daily  · BPs are quite volatile  Continue current regimen for now  Type 2 diabetes mellitus without complication, without long-term current use of insulin [facility])  Assessment & Plan  Lab Results   Component Value Date    HGBA1C 6 0 (H) 01/08/2021     Recent Labs     01/25/21  1047 01/25/21  1607 01/25/21  2046 01/26/21  0640   POCGLU 112 103 107 100     Blood Sugar Average: Last 72 hrs:  (P) 135   · Well controlled as evidenced by A1c on diet alone  · Monitor Accu-Cheks, SSI   · Avoid hypoglycemia  Acquired hypothyroidism  Assessment & Plan  · TSH 9, Free T4 0 75  · Patient on levothyroxine 50 mcg daily  · Recheck in 4-6 weeks and follow-up with primary care physician    Primary osteoarthritis of both knees  Assessment & Plan  · Osteoarthritis of both knees  · Continue Tylenol 975 mg every 8 hours  · Outpatient follow-up with orthopedics  · She received intra-articular steroid injections with Orthopedics  · Patient reports she was offered knee surgery by her orthopedician however patient has declined  Atrial fibrillation (HCC)  Assessment & Plan  · Anticoagulated on Coumadin with current INR of 2 08  · Goal: 2 0-3 0  · Rate controlled on amiodarone and metoprolol      Right hip pain  Assessment & Plan  · R hip/pelvis xray (2021):  No acute osseous abnormality  Right total hip arthroplasty is intact without complication  · PRN pain control     History of atrial flutter  Assessment & Plan  · Status post ablation  PPM in place  · Continue amiodarone  · Coumadin for anticoagulation  · INR today: 2 08      VTE Pharmacologic Prophylaxis:   Pharmacologic: Warfarin (Coumadin)  Mechanical: Mechanical VTE prophylaxis in place  Patient Centered Rounds: I have performed bedside rounds with nursing staff today  Discussions with Specialists or Other Care Team Provider: Case management  Education and Discussions with Family / Patient: All patient questions answered to the best of my ability  Called patient's daughter to provide an update  Time Spent for Care: 30 minutes  More than 50% of total time spent on counseling and coordination of care as described above  Current Length of Stay: 2 day(s)  Current Patient Status: Inpatient   Certification Statement: The patient will continue to require additional inpatient hospital stay due to pending rehab placement  Discharge Plan: Patient is medically stable for discharge to rehab  Code Status: Level 3 - DNAR and DNI    Subjective:   Patient complaining of feeling extremely stiff from laying in the bed so much  Otherwise, she has no new complaints  Objective:   Vitals:   Temp (24hrs), Av 2 °F (36 8 °C), Min:97 9 °F (36 6 °C), Max:98 6 °F (37 °C)    Temp:  [97 9 °F (36 6 °C)-98 6 °F (37 °C)] 98 1 °F (36 7 °C)  HR:  [65-72] 72  Resp:  [18] 18  BP: ()/(58-90) 95/80  SpO2:  [95 %-96 %] 96 %  Body mass index is 35 43 kg/m²  Input and Output Summary (last 24 hours): Intake/Output Summary (Last 24 hours) at 2021 1132  Last data filed at 2021 0101  Gross per 24 hour   Intake --   Output 1450 ml   Net -1450 ml       Physical Exam:     Physical Exam  Vitals signs reviewed  HENT:      Head: Normocephalic and atraumatic     Eyes:      General: No scleral icterus  Cardiovascular:      Rate and Rhythm: Normal rate and regular rhythm  Heart sounds: Murmur present  Pulmonary:      Breath sounds: Normal breath sounds  No wheezing or rales  Chest:      Chest wall: No tenderness  Abdominal:      General: Bowel sounds are normal  There is no distension  Palpations: Abdomen is soft  Tenderness: There is no abdominal tenderness  Musculoskeletal: Normal range of motion  Skin:     General: Skin is warm and dry  Findings: No rash  Additional Data:   Labs:  Results from last 7 days   Lab Units 01/26/21  0450   WBC Thousand/uL 5 49   HEMOGLOBIN g/dL 11 3*   HEMATOCRIT % 36 0   PLATELETS Thousands/uL 234   NEUTROS PCT % 69   LYMPHS PCT % 19   MONOS PCT % 9   EOS PCT % 3     Results from last 7 days   Lab Units 01/26/21  0450  01/23/21  1226   POTASSIUM mmol/L 4 1   < > 3 8   CHLORIDE mmol/L 103   < > 101   CO2 mmol/L 32   < > 33*   BUN mg/dL 17   < > 19   CREATININE mg/dL 0 76   < > 0 79   CALCIUM mg/dL 9 3   < > 9 0   ALK PHOS U/L  --   --  76   ALT U/L  --   --  19   AST U/L  --   --  14    < > = values in this interval not displayed  Results from last 7 days   Lab Units 01/26/21  0450   INR  2 08*       * I Have Reviewed All Lab Data Listed Above  * Additional Pertinent Lab Tests Reviewed:  All Labs Within Last 24 Hours Reviewed    Imaging:    Imaging Reports Reviewed Today Include: None new    Cultures:   Blood Culture: No results found for: BLOODCX  Urine Culture: No results found for: URINECX  Sputum Culture: No components found for: SPUTUMCX  Wound Culture: No results found for: WOUNDCULT    Last 24 Hours Medication List:   Current Facility-Administered Medications   Medication Dose Route Frequency Provider Last Rate    acetaminophen  975 mg Oral Q8H Zara Salvador MD      aluminum-magnesium hydroxide-simethicone  30 mL Oral Q6H PRN Noelle Mcgill MD      amiodarone  100 mg Oral Daily Sanjuanita Peterson Izquierdo MD      aspirin  81 mg Oral Daily Cooper Campuzano MD      calcium carbonate-vitamin D  2 tablet Oral BID With Meals Cooper Campuzano MD      docusate sodium  100 mg Oral BID Cooper Campuzano MD      insulin lispro  1-6 Units Subcutaneous TID AC Qian Tapia PA-C      insulin lispro  1-6 Units Subcutaneous HS Qian Tapia PA-C      isosorbide mononitrate  30 mg Oral Daily Cooper Campuzano MD      levothyroxine  50 mcg Oral Daily Cooper Campuzano MD      lidocaine  2 patch Topical Daily Cooper Campuzano MD      lisinopril  2 5 mg Oral Daily Cooper Campuzano MD      metoprolol succinate  25 mg Oral After Ronnell Burton MD      multivitamin-minerals  1 tablet Oral Daily Cooper Campuzano MD      ondansetron  4 mg Intravenous Q6H PRN Cooper Campuzano MD      oxyCODONE  2 5 mg Oral Q4H PRN Saud Serrato PA-C      oxyCODONE  5 mg Oral Q4H PRN Saud Serrato PA-C      polyethylene glycol  17 g Oral Daily Cooper Campuzano MD      pravastatin  40 mg Oral Daily Cooper Campuzano MD      warfarin  2 5 mg Oral Daily (warfarin) Cooper Campuzano MD          Today, Patient Was Seen By: Eusebio Bender PA-C    ** Please Note: Dragon 360 Dictation voice to text software may have been used in the creation of this document   **

## 2021-01-26 NOTE — ASSESSMENT & PLAN NOTE
Lab Results   Component Value Date    HGBA1C 6 0 (H) 01/08/2021     Recent Labs     01/25/21  1047 01/25/21  1607 01/25/21 2046 01/26/21  0640   POCGLU 112 103 107 100     Blood Sugar Average: Last 72 hrs:  (P) 135   · Well controlled as evidenced by A1c on diet alone  · Monitor Accu-Cheks, SSI   · Avoid hypoglycemia

## 2021-01-27 LAB
FLUAV RNA RESP QL NAA+PROBE: NEGATIVE
FLUBV RNA RESP QL NAA+PROBE: NEGATIVE
GLUCOSE SERPL-MCNC: 109 MG/DL (ref 65–140)
GLUCOSE SERPL-MCNC: 116 MG/DL (ref 65–140)
GLUCOSE SERPL-MCNC: 146 MG/DL (ref 65–140)
GLUCOSE SERPL-MCNC: 93 MG/DL (ref 65–140)
RSV RNA RESP QL NAA+PROBE: NEGATIVE
SARS-COV-2 RNA RESP QL NAA+PROBE: NEGATIVE

## 2021-01-27 PROCEDURE — 99232 SBSQ HOSP IP/OBS MODERATE 35: CPT | Performed by: PHYSICIAN ASSISTANT

## 2021-01-27 PROCEDURE — 0241U HB NFCT DS VIR RESP RNA 4 TRGT: CPT | Performed by: PHYSICIAN ASSISTANT

## 2021-01-27 PROCEDURE — 97110 THERAPEUTIC EXERCISES: CPT

## 2021-01-27 PROCEDURE — 97116 GAIT TRAINING THERAPY: CPT

## 2021-01-27 PROCEDURE — 97112 NEUROMUSCULAR REEDUCATION: CPT

## 2021-01-27 PROCEDURE — 82948 REAGENT STRIP/BLOOD GLUCOSE: CPT

## 2021-01-27 PROCEDURE — 97530 THERAPEUTIC ACTIVITIES: CPT

## 2021-01-27 RX ADMIN — LIDOCAINE 2 PATCH: 50 PATCH TOPICAL at 08:51

## 2021-01-27 RX ADMIN — PRAVASTATIN SODIUM 40 MG: 40 TABLET ORAL at 08:52

## 2021-01-27 RX ADMIN — Medication 2 TABLET: at 17:42

## 2021-01-27 RX ADMIN — ACETAMINOPHEN 975 MG: 325 TABLET, FILM COATED ORAL at 13:09

## 2021-01-27 RX ADMIN — Medication 2 TABLET: at 08:51

## 2021-01-27 RX ADMIN — Medication 1 TABLET: at 08:53

## 2021-01-27 RX ADMIN — AMIODARONE HYDROCHLORIDE 100 MG: 200 TABLET ORAL at 08:51

## 2021-01-27 RX ADMIN — DOCUSATE SODIUM 100 MG: 100 CAPSULE, LIQUID FILLED ORAL at 17:42

## 2021-01-27 RX ADMIN — LEVOTHYROXINE SODIUM 50 MCG: 50 TABLET ORAL at 08:53

## 2021-01-27 RX ADMIN — LISINOPRIL 2.5 MG: 2.5 TABLET ORAL at 08:51

## 2021-01-27 RX ADMIN — WARFARIN SODIUM 2.5 MG: 2.5 TABLET ORAL at 17:42

## 2021-01-27 RX ADMIN — ACETAMINOPHEN 975 MG: 325 TABLET, FILM COATED ORAL at 21:28

## 2021-01-27 RX ADMIN — ACETAMINOPHEN 975 MG: 325 TABLET, FILM COATED ORAL at 05:53

## 2021-01-27 RX ADMIN — POLYETHYLENE GLYCOL 3350 17 G: 17 POWDER, FOR SOLUTION ORAL at 08:51

## 2021-01-27 RX ADMIN — DOCUSATE SODIUM 100 MG: 100 CAPSULE, LIQUID FILLED ORAL at 08:52

## 2021-01-27 RX ADMIN — ASPIRIN 81 MG: 81 TABLET, COATED ORAL at 08:52

## 2021-01-27 RX ADMIN — ISOSORBIDE MONONITRATE 30 MG: 30 TABLET, EXTENDED RELEASE ORAL at 08:53

## 2021-01-27 RX ADMIN — METOPROLOL SUCCINATE 25 MG: 25 TABLET, EXTENDED RELEASE ORAL at 17:42

## 2021-01-27 NOTE — CASE MANAGEMENT
CM informed MV-Maya that updated PT has been uploaded  MV will review and inform if able to accept for d/c today

## 2021-01-27 NOTE — ASSESSMENT & PLAN NOTE
Lab Results   Component Value Date    HGBA1C 6 0 (H) 01/08/2021     Recent Labs     01/26/21  1644 01/26/21  2128 01/27/21  0632 01/27/21  1121   POCGLU 106 119 109 116     Blood Sugar Average: Last 72 hrs:  (P) 135   · Well controlled as evidenced by A1c on diet alone  · Maintain on ADA diet

## 2021-01-27 NOTE — PHYSICAL THERAPY NOTE
Physical Therapy Progress Note     01/27/21 0930   PT Last Visit   PT Visit Date 01/27/21   Note Type   Note Type Treatment   Pain Assessment   Pain Assessment Tool 0-10   Pain Score 5   Pain Location/Orientation Orientation: Bilateral;Location: Knee   Hospital Pain Intervention(s) Repositioned; Ambulation/increased activity; Emotional support   Restrictions/Precautions   Other Precautions Chair Alarm; Bed Alarm; Fall Risk;Pain  (Alarm active post session )   Subjective   Subjective The pt  notes that she slept well, and that she is feeling better  Transfers   Sit to Stand 4  Minimal assistance   Additional items Assist x 1; Increased time required   Stand to Sit 4  Minimal assistance   Additional items Assist x 1; Increased time required   Ambulation/Elevation   Gait pattern Excessively slow; Step to;Short stride; Inconsistent sandra;Decreased foot clearance; Forward Flexion   Gait Assistance 4  Minimal assist  (Contact guard )   Additional items Assist x 1   Assistive Device Rolling walker   Distance 10 feet  Balance   Static Sitting Good   Dynamic Sitting Fair +   Static Standing Fair -   Ambulatory Fair -   Activity Tolerance   Activity Tolerance Patient tolerated treatment well   Nurse [address] , RN  Exercises   TKR Sitting;Bilateral;AROM;20 reps   Assessment   Prognosis Good   Problem List Decreased strength;Decreased endurance; Impaired balance;Decreased mobility; Decreased coordination;Decreased safety awareness; Impaired judgement;Pain   Assessment The pt  has improved strength, balance, and activity tolerance  She was able to actively perform therapeutic exercises without any rests as well as full range of motion  She does continue to require increased time for transfers and ambulation, but she required less assistance  She only required contact guard in order to ambulate 10 feet   Of note, this was performed right after application of her lidocaine patches and morning meds with only reports of moderate pain  She was able to transfer and stand for three more trials for 2-4 minutes while being cleaned and changing chair linens  The pt  is demonstrating notable progress the past two visits, but she remains limited from her independent baseline, and she will benefit from continued therapies  Barriers to Discharge Inaccessible home environment;Decreased caregiver support   Goals   Patient Goals To get stronger, and to get back home  STG Expiration Date 02/04/21   PT Treatment Day 2   Plan   Treatment/Interventions Functional transfer training;LE strengthening/ROM; Therapeutic exercise; Endurance training;Patient/family training;Gait training;Bed mobility;Elevations   Progress Progressing toward goals   PT Frequency   (3-5x a week )   Recommendation   PT Discharge Recommendation Post-Acute Rehabilitation Services   Equipment Recommended [address] Basic Mobility Inpatient   Turning in Bed Without Bedrails 3   Lying on Back to Sitting on Edge of Flat Bed 2   Moving Bed to Chair 3   Standing Up From Chair 3   Walk in Room 3   Climb 3-5 Stairs 1   Basic Mobility Inpatient Raw Score 15   Basic Mobility Standardized Score 36 97     Shakila Hall PTA   The patient's AM-PAC Basic Mobility Inpatient Short Form Raw Score is 15, Standardized Score is 36 97  A standardized score less than 42 9 suggests the patient may benefit from discharge to post-acute rehabilitation services  Please also refer to the recommendation of the Physical Therapist for safe discharge planning

## 2021-01-27 NOTE — ASSESSMENT & PLAN NOTE
· Osteoarthritis of both knees  · Continue Tylenol 975 mg every 8 hours  · Outpatient follow-up with orthopedics  · She received intra-articular steroid injections with Orthopedics  · Patient reports she was offered knee surgery by her orthopedic physician however patient has declined

## 2021-01-27 NOTE — ASSESSMENT & PLAN NOTE
· Pt presents with 1 week hx of acute on chronic ambulatory dysfunction  · Patient with extensive osteoarthritis both knees and follows with Dr Dunia Florez as an out-patient  · PT/OT recommends rehab placement  Patient will be discharged to [facility]

## 2021-01-27 NOTE — CASE MANAGEMENT
Covid test pending  CM attempted to push back transport, earliest time would be 9:30pm   MV requesting transport for tomorrow morning  CM arranged with [facility] BLS for 9:30am   CM informed SONNY RN, message for dtr-titi Olivo, and DEBBIE-Maya

## 2021-01-27 NOTE — ASSESSMENT & PLAN NOTE
· Status post ablation  PPM in place  · Continue amiodarone  · Coumadin for anticoagulation  · INR has been therapeutic    Goal INR is 2 0-3 0

## 2021-01-27 NOTE — ASSESSMENT & PLAN NOTE
· Home regimen includes lisinopril 2 5 mg daily, Toprol-XL 25 mg daily and lasix 20 mg daily  Lasix was held but can be resumed at discharge  · BPs are quite volatile  Continue current regimen for now

## 2021-01-27 NOTE — RESTORATIVE TECHNICIAN NOTE
Restorative Specialist Mobility Note       Activity: Ambulate in room, Bathroom privileges, Chair, Dangle, Stand at bedside(Educated/encouraged pt to ambulate with assistance 3-4 x's/day  Chair alarm on   Pt callbell, phone/tray within reach )     Assistive Device: Front wheel walker    Luz SWARTZ, Restorative Technician, [employer]

## 2021-01-27 NOTE — TRANSPORTATION MEDICAL NECESSITY
Section I - General Information    Name of Patient: Raymond Lopez                 : 1929    Medicare #: [id number]  Transport Date: 21 (PCS is valid for round trips on this date and for all repetitive trips in the 60-day range as noted below )  Origin: [address] [place] [address] 7                                            Destination: [facility]  Is the pt's stay covered under Medicare Part A (PPS/DRG)   []     Closest appropriate facility? If no, why is transport to more distant facility required? Yes  If hospice pt, is this transport related to pt's terminal illness? NA   Section II - Medical Necessity Questionnaire  Ambulance transportation is medically necessary only if other means of transport are contraindicated or would be potentially harmful to the patient  To meet this requirement, the patient must either be "bed confined" or suffer from a condition such that transport by means other than ambulance is contraindicated by the patient's condition  The following questions must be answered by the medical professional signing below for this form to be valid:    1)  Describe the MEDICAL CONDITION (physical and/or mental) of this patient AT [address] [place] [address] that requires the patient to be transported in an ambulance and why transport by other means is contraindicated by the patient's condition: bilateral knee pain-unable to tolerated seated for time of transport, impulsive, fall/safety risk    2) Is the patient "bed confined" as defined below? No  To be "be confined" the patient must satisfy all three of the following conditions: (1) unable to get up from bed without Assistance; AND (2) unable to ambulate; AND (3) unable to sit in a chair or wheelchair  3) Can this patient safely be transported by car or wheelchair van (i e , seated during transport without a medical attendant or monitoring)?    No    4) In addition to completing questions 1-3 above, please check any of the following conditions that apply*:   *Note: supporting documentation for any boxes checked must be maintained in the patient's medical records  If hosp-hosp transfer, describe services needed at 2nd facility not available at 1st facility? Moderate/severe pain on movement   Unable to tolerate seated position for time needed to transport   Section III - Signature of Physician or Healthcare Professional  I certify that the above information is true and correct based on my evaluation of this patient, and represent that the patient requires transport by ambulance and that other forms of transport are contraindicated  I understand that this information will be used by the Centers for Medicare and Medicaid Services (CMS) to support the determination of medical necessity for ambulance services, and I represent that I have personal knowledge of the patient's condition at time of transport  []  If this box is checked, I also certify that the patient is physically or mentally incapable of signing the ambulance service's claim and that the institution with which I am affiliated has furnished care, services, or assistance to the patient  My signature below is made on behalf of the patient pursuant to 42 CFR §424 36(b)(4)  In accordance with 42 CFR §424 37, the specific reason(s) that the patient is physically or mentally incapable of signing the claim form is as follows: Penny Leung of Physician* or Healthcare Professional______________________________  Signature Date 01/27/21 (For scheduled repetitive transports, this form is not valid for transports performed more than 60 days after this date)  Printed Name & Credentials of Physician or Healthcare Professional (MD, DO, RN, etc )_____Nighat Torre  *Form must be signed by patient's attending physician for scheduled, repetitive transports   For non-repetitive, unscheduled ambulance transports, if unable to obtain the signature of the attending physician, any of the following may sign (choose appropriate option below)  [] Physician Assistant []  Clinical Nurse Specialist []  Registered Nurse  []  Nurse Practitioner  [x] Discharge Planner

## 2021-01-27 NOTE — CASE MANAGEMENT
[facility] accepted pt for d/c today  CM arranged BLS transport with [facility] for 6pm   CM informed Pt, RN-Oc, DEBBIE-Maya, and SONNY-Lucero

## 2021-01-27 NOTE — PLAN OF CARE
Problem: PHYSICAL THERAPY ADULT  Goal: Performs mobility at highest level of function for planned discharge setting  See evaluation for individualized goals  Description: Treatment/Interventions: LE strengthening/ROM, Functional transfer training, ADL retraining, Therapeutic exercise, Endurance training, Elevations, Patient/family training, Bed mobility, Gait training  Equipment Recommended: Vicky Dyson       See flowsheet documentation for full assessment, interventions and recommendations  Outcome: Progressing  Note: Prognosis: Good  Problem List: Decreased strength, Decreased endurance, Impaired balance, Decreased mobility, Decreased coordination, Decreased safety awareness, Impaired judgement, Pain  Assessment: The pt  has improved strength, balance, and activity tolerance  She was able to actively perform therapeutic exercises without any rests as well as full range of motion  She does continue to require increased time for transfers and ambulation, but she required less assistance  She only required contact guard in order to ambulate 10 feet  Of note, this was performed right after application of her lidocaine patches and morning meds with only reports of moderate pain  She was able to transfer and stand for three more trials for 2-4 minutes while being cleaned and changing chair linens  The pt  is demonstrating notable progress the past two visits, but she remains limited from her independent baseline, and she will benefit from continued therapies  Barriers to Discharge: Inaccessible home environment, Decreased caregiver support     PT Discharge Recommendation: [id number]     PT - OK to Discharge: Yes    See flowsheet documentation for full assessment

## 2021-01-27 NOTE — DISCHARGE SUMMARY
Discharge- Laurita Pope 7/24/1929, 80 y o  female MRN: [id number]  Unit/Bed#: -01 Encounter: [id number]  Primary Care Provider: Payton Villa MD   Date and time admitted to hospital: 1/23/2021 11:41 AM    Patient's discharge was canceled yesterday because of pending COVID results, which are negative  Patient remains medically cleared for discharge to [facility] today and will be transported at 9:30A    * Ambulatory dysfunction  Assessment & Plan  · Pt presents with 1 week hx of acute on chronic ambulatory dysfunction  · Patient with extensive osteoarthritis both knees and follows with Dr Frida Ortiz as an out-patient  · PT/OT recommends rehab placement  Patient will be discharged to [facility]  Essential hypertension  Assessment & Plan  · Home regimen includes lisinopril 2 5 mg daily, Toprol-XL 25 mg daily and lasix 20 mg daily  Lasix was held but can be resumed at discharge  · BPs are quite volatile  Continue current regimen for now  Type 2 diabetes mellitus without complication, without long-term current use of insulin [facility])  Assessment & Plan  Lab Results   Component Value Date    HGBA1C 6 0 (H) 01/08/2021     Recent Labs     01/26/21  1644 01/26/21  2128 01/27/21  0632 01/27/21  1121   POCGLU 106 119 109 116     Blood Sugar Average: Last 72 hrs:  (P) 135   · Well controlled as evidenced by A1c on diet alone  · Maintain on ADA diet  Acquired hypothyroidism  Assessment & Plan  · TSH 9, Free T4 0 75  · Patient on levothyroxine 50 mcg daily  · Recheck in 4-6 weeks and follow-up with primary care physician    Primary osteoarthritis of both knees  Assessment & Plan  · Osteoarthritis of both knees  · Continue Tylenol 975 mg every 8 hours  · Outpatient follow-up with orthopedics  · She received intra-articular steroid injections with Orthopedics  · Patient reports she was offered knee surgery by her orthopedic physician however patient has declined      Atrial fibrillation [facility])  Assessment & Plan  · Anticoagulated on Coumadin with current INR of 2 08  · Goal: 2 0-3 0  · Rate controlled on amiodarone and metoprolol  Primary osteoarthritis of both shoulders  Assessment & Plan  · Encourage therapy and PRN analgesics    Right hip pain  Assessment & Plan  · R hip/pelvis xray (01/21/2021):  No acute osseous abnormality  Right total hip arthroplasty is intact without complication  · PRN pain control     History of atrial flutter  Assessment & Plan  · Status post ablation  PPM in place  · Continue amiodarone  · Coumadin for anticoagulation  · INR has been therapeutic  Goal INR is 2 0-3 0      Discharging Physician / Practitioner: Lisa Lin PA-C  PCP: Melina Spears MD  Admission Date:   Admission Orders (From admission, onward)     Ordered        01/24/21 1059  Inpatient Admission  Once         01/23/21 1703  Place in Observation  Once                   Discharge Date: 01/28/21    Resolved Problems  Date Reviewed: 1/27/2021    None        Consultations During Hospital Stay:  · None    Procedures Performed:   · Right hip xray (01/21/2021): No acute osseous abnormality  Right total hip arthroplasty is intact without complication  · CT abdomen/pelvis (01/23/2021): No acute abdominopelvic pathology appreciated  Tiny gallstone without evidence of cholecystitis  Colonic diverticulosis without diverticulitis  Stable right renal cyst   Arthritis of spine and sacroiliac joints  Significant Findings / Test Results:   · None    Incidental Findings:   · None     Test Results Pending at Discharge (will require follow up): · None     Outpatient Tests Requested:  · None    Complications:  None    Reason for Admission: Ambulatory dysfunction secondary to knee pain    Hospital Course:     Fátima Espinoza is a 80 y o  female patient who originally presented to the hospital on 1/23/2021 due to ambulatory dysfunction secondary to knee pain    She reportedly has a history of arthritis and has been offered replacement but declined  She was evaluated by therapy who recommends rehab  The patient was cleared for discharge to [facility]  She had no significant medical issues during her hospital stay  Please see above list of diagnoses and related plan for additional information  Condition at Discharge: stable     Discharge Day Visit / Exam:     Subjective: On the day of discharge, she is doing fairly well without current complaints  She worked with PT this AM and was able to ambulate about in her room with their assistance  Vitals: Blood Pressure: 112/77 (01/27/21 0729)  Pulse: 75 (01/27/21 0729)  Temperature: 98 7 °F (37 1 °C) (01/27/21 0729)  Temp Source: Oral (01/27/21 0729)  Respirations: 18 (01/27/21 0729)  Height: 5' 3" (160 cm) (01/23/21 2214)  Weight - Scale: 90 7 kg (200 lb) (01/23/21 2214)  SpO2: 95 % (01/27/21 0729)    Exam:   Physical Exam  Vitals signs and nursing note reviewed  Constitutional:       General: She is not in acute distress  Appearance: She is well-developed  HENT:      Head: Normocephalic and atraumatic  Eyes:      Conjunctiva/sclera: Conjunctivae normal    Neck:      Musculoskeletal: Neck supple  Cardiovascular:      Rate and Rhythm: Normal rate and regular rhythm  Heart sounds: No murmur  Pulmonary:      Effort: Pulmonary effort is normal  No respiratory distress  Breath sounds: Normal breath sounds  Abdominal:      Palpations: Abdomen is soft  Tenderness: There is no abdominal tenderness  Skin:     General: Skin is warm and dry  Neurological:      Mental Status: She is alert  Discussion with Family: Patient's daughter  Discharge instructions/Information to patient and family:   See after visit summary for information provided to patient and family  Provisions for Follow-Up Care:  See after visit summary for information related to follow-up care and any pertinent home health orders        Disposition:     Other [facility]  For Discharges to [address]:   · Not Applicable to this Patient - Not Applicable to this Patient    Planned Readmission: No     Discharge Statement:  I spent 45 minutes discharging the patient  This time was spent on the day of discharge  I had direct contact with the patient on the day of discharge  Greater than 50% of the total time was spent examining patient, answering all patient questions, arranging and discussing plan of care with patient as well as directly providing post-discharge instructions  Additional time then spent on discharge activities  Discharge Medications:  See after visit summary for reconciled discharge medications provided to patient and family        ** Please Note: This note has been constructed using a voice recognition system **

## 2021-01-28 ENCOUNTER — TRANSITIONAL CARE MANAGEMENT (OUTPATIENT)
Dept: INTERNAL MEDICINE CLINIC | Facility: CLINIC | Age: 86
End: 2021-01-28

## 2021-01-28 VITALS
RESPIRATION RATE: 18 BRPM | SYSTOLIC BLOOD PRESSURE: 142 MMHG | OXYGEN SATURATION: 97 % | TEMPERATURE: 97.6 F | BODY MASS INDEX: 35.44 KG/M2 | WEIGHT: 200 LBS | HEIGHT: 63 IN | HEART RATE: 67 BPM | DIASTOLIC BLOOD PRESSURE: 70 MMHG

## 2021-01-28 LAB — GLUCOSE SERPL-MCNC: 109 MG/DL (ref 65–140)

## 2021-01-28 PROCEDURE — 99239 HOSP IP/OBS DSCHRG MGMT >30: CPT | Performed by: PHYSICIAN ASSISTANT

## 2021-01-28 PROCEDURE — 82948 REAGENT STRIP/BLOOD GLUCOSE: CPT

## 2021-01-28 RX ADMIN — ACETAMINOPHEN 975 MG: 325 TABLET, FILM COATED ORAL at 05:29

## 2021-01-28 RX ADMIN — Medication 2 TABLET: at 09:01

## 2021-01-28 RX ADMIN — ASPIRIN 81 MG: 81 TABLET, COATED ORAL at 08:59

## 2021-01-28 RX ADMIN — LISINOPRIL 2.5 MG: 2.5 TABLET ORAL at 09:01

## 2021-01-28 RX ADMIN — LEVOTHYROXINE SODIUM 50 MCG: 50 TABLET ORAL at 09:01

## 2021-01-28 RX ADMIN — Medication 1 TABLET: at 08:58

## 2021-01-28 RX ADMIN — DOCUSATE SODIUM 100 MG: 100 CAPSULE, LIQUID FILLED ORAL at 08:59

## 2021-01-28 RX ADMIN — PRAVASTATIN SODIUM 40 MG: 40 TABLET ORAL at 08:59

## 2021-01-28 RX ADMIN — ISOSORBIDE MONONITRATE 30 MG: 30 TABLET, EXTENDED RELEASE ORAL at 09:01

## 2021-01-28 RX ADMIN — LIDOCAINE 2 PATCH: 50 PATCH TOPICAL at 08:58

## 2021-01-28 RX ADMIN — AMIODARONE HYDROCHLORIDE 100 MG: 200 TABLET ORAL at 08:59

## 2021-01-31 ENCOUNTER — NURSING HOME VISIT (OUTPATIENT)
Dept: GERIATRICS | Facility: OTHER | Age: 86
End: 2021-01-31
Payer: MEDICARE

## 2021-01-31 DIAGNOSIS — E03.9 ACQUIRED HYPOTHYROIDISM: ICD-10-CM

## 2021-01-31 DIAGNOSIS — I25.10 CORONARY ARTERY DISEASE INVOLVING NATIVE CORONARY ARTERY OF NATIVE HEART WITHOUT ANGINA PECTORIS: Chronic | ICD-10-CM

## 2021-01-31 DIAGNOSIS — E03.9 HYPOTHYROIDISM, UNSPECIFIED TYPE: ICD-10-CM

## 2021-01-31 DIAGNOSIS — I50.32 CHRONIC DIASTOLIC CHF (CONGESTIVE HEART FAILURE) (HCC): Primary | ICD-10-CM

## 2021-01-31 DIAGNOSIS — N18.31 STAGE 3A CHRONIC KIDNEY DISEASE (HCC): ICD-10-CM

## 2021-01-31 DIAGNOSIS — M15.9 GENERALIZED OA: ICD-10-CM

## 2021-01-31 DIAGNOSIS — Z79.01 ANTICOAGULATED ON COUMADIN: ICD-10-CM

## 2021-01-31 DIAGNOSIS — R26.2 AMBULATORY DYSFUNCTION: ICD-10-CM

## 2021-01-31 DIAGNOSIS — N18.31 TYPE 2 DIABETES MELLITUS WITH STAGE 3A CHRONIC KIDNEY DISEASE, WITHOUT LONG-TERM CURRENT USE OF INSULIN (HCC): ICD-10-CM

## 2021-01-31 DIAGNOSIS — E11.22 TYPE 2 DIABETES MELLITUS WITH STAGE 3A CHRONIC KIDNEY DISEASE, WITHOUT LONG-TERM CURRENT USE OF INSULIN (HCC): ICD-10-CM

## 2021-01-31 DIAGNOSIS — I48.0 PAROXYSMAL ATRIAL FIBRILLATION (HCC): ICD-10-CM

## 2021-01-31 DIAGNOSIS — I10 ESSENTIAL HYPERTENSION: Chronic | ICD-10-CM

## 2021-01-31 PROCEDURE — 99306 1ST NF CARE HIGH MDM 50: CPT | Performed by: FAMILY MEDICINE

## 2021-01-31 NOTE — PROGRESS NOTES
[facility]   [address] [facility], [place], [address]  History and Physical  POS: SNF-31    Records Reviewed include: [facility] records  Unable to obtain from patient due to: History obtained from patient; additional history obtained speaking with nursing and medical record review    Chief Complaint/ Reason for Admission: Generalized OA with pain, Afib on coumadin, Ambulatory dysfunction    History of Present Illness:            80year old female admitted to SNF rehab following hospitalization at [facility]  Presented with severe knee pain and difficulty with ambulation  Additional concerns for abdominal pain and generalized weakness  Imaging showing degenerative arthritis, no acute pathology  Placed on scheduled acetaminophen for pain; additionally topical lidocaine patches on her knees and back, both of which worked well to control pain during hospitalization per patient  At time of admission exam, she relates poor sleep overnight due to knee pain and positioning  Follows with orthopedics outpatient, had injections to knees last in November  Continues on levothyroxine for hypothyroidism; TSH elevated inpatient (see below)  Has Afib on coumadin; s/p pacemaker; see INR trends below  INR low at 1 5 on Friday 1/29 on coumadin 2mg, increased to 2 5mg at that time  HR trending 70s since SNF admission  With chronic diastolic CHF and hypertension, on metoprolol, lasix, lisinopril; SBP trending 90s-120s  No shortness of breath, lower extremity edema  DM2 is diet controlled, all accuchecks have been <200 since SNF admission         Allergies    Allergies   Allergen Reactions    Atorvastatin      Reaction unknown 10/23/2018-    Other Rash     Patient sensitive to adhesives from tape       Past Medical History  Past Medical History:   Diagnosis Date    Abnormal nuclear stress test     last assessed 04/03/2017    Anxiety     Arthritis     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  Bundle branch block, left     Cardiomyopathy ([facility] )     Chest pain 3/9/2019    Chronic pain     chronic b/l shoulder pain    Chronic pain of right knee 4/2/2019    Coronary artery disease     Diabetes mellitus ([facility] )     Gait disturbance 3/2/2018    GERD (gastroesophageal reflux disease)     H/O atrial flutter     History of DVT (deep vein thrombosis)     History of pulmonary embolism     Hyperlipidemia     Hypertension     Hypothyroid     Renal calculi     Shortness of breath       CHF baseline VZ:14%, grade 1 diastolic dysfunction (4/0038)  CKD: Stage 3, baseline creatinine 0 85    Past Surgical History:   Procedure Laterality Date    ATRIAL ABLATION SURGERY  01/2015    CARDIOVERSION      CHELA/DCCV 10/22/2014    CARPAL TUNNEL RELEASE Right     CATARACT EXTRACTION      CORONARY ANGIOPLASTY WITH STENT PLACEMENT      HYSTERECTOMY      JOINT REPLACEMENT Right     MO AMPUTATION TOE,MT-P JT Left 10/11/2017    Procedure: AMPUTATION SECOND TOE;  Surgeon: Trinity Sethi DPM;  Location: [facility];  Service: Podiatry    TONSILLECTOMY      TOTAL HIP ARTHROPLASTY      Right       Family History  Family History   Problem Relation Age of Onset    Parkinsonism Sister     Cancer Sister         unknown type    Heart attack Neg Hx     Stroke Neg Hx     Anuerysm Neg Hx     Clotting disorder Neg Hx     Arrhythmia Neg Hx     Heart failure Neg Hx     Coronary artery disease Neg Hx        Social History  Social History     Tobacco Use   Smoking Status Never Smoker   Smokeless Tobacco Never Used      Social History     Substance and Sexual Activity   Alcohol Use Yes    Comment: occasional      Social History     Substance and Sexual Activity   Drug Use Yes    Types: Marijuana    Comment: MEDICAL MARIJUANA-HAS NOT USED FOR 3 WEEKS        Lives: Home, with spouse,  Social Support: Family  Education Level:  Occupation: Retired  Fall in the past 12 months: None reported  Use of assistance Device: Rolling Walker    Physical Exam    Weight: 186 6lb Temp:97 8F BP:120/68 (99//71) Pulse:74 Resp:16 O2 Sat:94% RA  Constitutional: Well-nourished and Normocephalic  Orientation:Person and Place, situation     Physical Exam  Vitals signs and nursing note reviewed  Constitutional:       General: She is awake  She is not in acute distress  Appearance: She is well-developed and well-groomed  She is not ill-appearing, toxic-appearing or diaphoretic  HENT:      Head: Normocephalic and atraumatic  Right Ear: External ear normal       Left Ear: External ear normal       Nose: No rhinorrhea  Mouth/Throat:      Mouth: Mucous membranes are moist       Pharynx: Oropharynx is clear  Eyes:      General: No scleral icterus  Right eye: No discharge  Left eye: No discharge  Conjunctiva/sclera: Conjunctivae normal    Neck:      Musculoskeletal: Neck supple  No neck rigidity  Cardiovascular:      Rate and Rhythm: Normal rate and regular rhythm  Heart sounds: S1 normal and S2 normal  Murmur present  Systolic murmur present  Pulmonary:      Effort: Pulmonary effort is normal  No respiratory distress  Abdominal:      General: There is no distension  Palpations: Abdomen is soft  Tenderness: There is no abdominal tenderness  Musculoskeletal:         General: No tenderness  Right lower leg: No edema  Left lower leg: No edema  Skin:     General: Skin is warm and dry  Capillary Refill: Capillary refill takes less than 2 seconds  Coloration: Skin is not jaundiced or pale  Neurological:      General: No focal deficit present  Mental Status: She is alert  Cranial Nerves: No cranial nerve deficit  Psychiatric:         Attention and Perception: Attention and perception normal          Mood and Affect: Mood and affect normal          Speech: Speech normal          Behavior: Behavior is cooperative           Review of Systems:  Review of Systems Constitutional: Negative for chills and fever  Respiratory: Negative for cough and shortness of breath  Cardiovascular: Negative for leg swelling  Musculoskeletal: Positive for arthralgias, back pain and gait problem  Negative for joint swelling  Psychiatric/Behavioral: Positive for sleep disturbance  All other systems reviewed and are negative        List of Current Medications: Medication list reviewed and updated in Epic to reflect most current SNF orders    Allergies    Allergies   Allergen Reactions    Atorvastatin      Reaction unknown 10/23/2018-    Other Rash     Patient sensitive to adhesives from tape       Labs/Diagnostics (reviewed by this provider): Hospital Paperwork and Old Records  Lab Results   Component Value Date    WBC 5 49 01/26/2021    HGB 11 3 (L) 01/26/2021    HCT 36 0 01/26/2021    MCV 98 01/26/2021     01/26/2021     Lab Results   Component Value Date     (L) 06/23/2015    SODIUM 138 01/26/2021    K 4 1 01/26/2021     01/26/2021    CO2 32 01/26/2021    ANIONGAP 8 06/23/2015    AGAP 3 (L) 01/26/2021    BUN 17 01/26/2021    CREATININE 0 76 01/26/2021    GLUC 96 01/26/2021    GLUF 94 03/20/2020    CALCIUM 9 3 01/26/2021    AST 14 01/23/2021    ALT 19 01/23/2021    ALKPHOS 76 01/23/2021    PROT 5 9 (L) 05/14/2015    TP 6 9 01/23/2021    BILITOT 0 54 05/14/2015    TBILI 0 35 01/23/2021    EGFR 69 01/26/2021     Lab Results   Component Value Date    HGBA1C 6 0 (H) 01/08/2021     Lab Results   Component Value Date    INR 2 08 (H) 01/26/2021    INR 1 92 (H) 01/24/2021    INR 2 00 (H) 01/23/2021    PROTIME 23 3 (H) 01/26/2021    PROTIME 21 9 (H) 01/24/2021    PROTIME 22 6 (H) 01/23/2021     Lab Results   Component Value Date    NZC4GCRVOKWY 9 030 (H) 01/23/2021     Imaging Reviewed:  EKG: (1/23/21)- NSR, LBBB  CT Scan: Abdomen/pelvis (1/23/21)- right hip prosthesis intact; degenerative arthritis symphysis pubis, SI joints, lumbar spine; cholelithiasis without cholecystitis; colonic diverticulosis without divertculitis; stable right renal cyst    Assessment/Plan:  80year old female with:    Chronic diastolic CHF (congestive heart failure) ([facility])  Wt Readings from Last 3 Encounters:   01/23/21 90 7 kg (200 lb)   12/30/20 80 3 kg (177 lb)   12/22/20 80 3 kg (177 lb)   Monitor daily weights/CHF pathway  Management of hypertension as outlined  SBP trending low- hold parameters added for lasix <105 and lisinopril <110  BMP ordered for 1/29 on admission- results reviewed and stable; recheck BMP with next routine INR    Acquired hypothyroidism  TSH elevated inpatient with borderline low free T4  Continue levothyroxine 50mcg daily  Recheck thyroid studies in 6 weeks outpatient    Generalized OA  With bilateral knee and low back pain  Associated ambulatory dysfunction  Order scheduled acetaminophen 650mg TID- increased to 975mg if pain not well controlled  Add topical aspercreme 4% lidocaine patches to b/l knees and low back  D/c PRN tramadol due to lack of use/potential adverse effects    Atrial fibrillation ([address] )  With pacemaker in place in setting of heart block  Anticoagulation management as outlined below  Continue metoprolol, amiodarone (previous NSVT per record review)    Anticoagulated on Coumadin  Goal INR 2-3 in setting of Afib  INR low at 1 5 on 1/29; coumadin increased to 2 5mg daily- monitor INR closely with addition of scheduled acetaminophen- recheck 2/1    Essential hypertension  Goal <140/90  Avoid hypotension in setting of CKD3  Hold lasix for SBP<105  Hold lisinopril for SBP<110  Continue metoprolol    Type 2 diabetes mellitus with stage 3a chronic kidney disease, without long-term current use of insulin ([facility])    Lab Results   Component Value Date    HGBA1C 6 0 (H) 01/08/2021   Diet controlled; goal A1c <8 5 given age and comorbidities  D/c accuchecks     Coronary artery disease involving native coronary artery of native heart without angina pectoris  Stable on current medications  Continue metoprolol, isosorbide, pravastatin  Risks vs benefits of continued aspirin use in setting of chronic anticoagulation     Ambulatory dysfunction  Multifactorial  Admit to SNF for rehab  PT/OT consult- evaluate and treat  Fall precautions    Stage 3a chronic kidney disease  Lab Results   Component Value Date    EGFR 69 01/26/2021    EGFR 62 01/24/2021    EGFR 66 01/23/2021    CREATININE 0 76 01/26/2021    CREATININE 0 83 01/24/2021    CREATININE 0 79 01/23/2021   Baseline creatinine 0 85    Pain: Present yes- bilateral knees, low back- see HPI  Rehab Potential:Good  Patient Informed of Medical Condition: yes  Patient is Capable of Understanding Their Right: yes  Prognosis:Fair  Discharge Plan: STR-> Home  Surrogate Decision Maker: Daughter  Advanced Directives: Yes  Code status:DNR (Per discussion with resident or POA); code status order updated to DNR/DNI while at SNF  PCP: Isidro Brenner MD    Immunization History   Administered Date(s) Administered    INFLUENZA 11/01/2004, 10/01/2008, 10/01/2009, 09/20/2010, 10/04/2011, 11/10/2012, 10/16/2013, 10/06/2014, 09/14/2016, 09/25/2017    Influenza Split High Dose Preservative Free IM 10/15/2015, 09/14/2016, 09/25/2017, 10/23/2019    Influenza, high dose seasonal 0 7 mL 09/28/2018, 10/02/2020    Pneumococcal Conjugate 13-Valent 12/24/2015    Pneumococcal Polysaccharide PPV23 12/17/1998, 01/23/2007, 01/31/2014    Td (adult), Unspecified 04/01/1998    Tdap 09/21/2016     Salinas Mitchell DO  1/31/21

## 2021-02-02 PROBLEM — Z79.01 ANTICOAGULATED ON COUMADIN: Status: ACTIVE | Noted: 2021-02-02

## 2021-02-02 PROBLEM — I50.32 CHRONIC DIASTOLIC CHF (CONGESTIVE HEART FAILURE) (HCC): Status: ACTIVE | Noted: 2021-02-02

## 2021-02-02 PROBLEM — M15.9 GENERALIZED OA: Status: ACTIVE | Noted: 2021-02-02

## 2021-02-02 PROBLEM — N18.31 STAGE 3A CHRONIC KIDNEY DISEASE (HCC): Status: ACTIVE | Noted: 2021-02-02

## 2021-02-02 RX ORDER — LIDOCAINE 4 G/G
3 PATCH TOPICAL DAILY
COMMUNITY

## 2021-02-02 RX ORDER — ACETAMINOPHEN 325 MG/1
975 TABLET ORAL 3 TIMES DAILY
COMMUNITY

## 2021-02-02 RX ORDER — LEVOTHYROXINE SODIUM 0.05 MG/1
50 TABLET ORAL DAILY
Start: 2021-02-02 | End: 2021-05-11 | Stop reason: HOSPADM

## 2021-02-02 NOTE — ASSESSMENT & PLAN NOTE
Stable on current medications  Continue metoprolol, isosorbide, pravastatin  Risks vs benefits of continued aspirin use in setting of chronic anticoagulation

## 2021-02-02 NOTE — ASSESSMENT & PLAN NOTE
With bilateral knee and low back pain  Associated ambulatory dysfunction  Order scheduled acetaminophen 650mg TID- increased to 975mg if pain not well controlled  Add topical aspercreme 4% lidocaine patches to b/l knees and low back  D/c PRN tramadol due to lack of use/potential adverse effects

## 2021-02-02 NOTE — ASSESSMENT & PLAN NOTE
With pacemaker in place in setting of heart block  Anticoagulation management as outlined below  Continue metoprolol, amiodarone (previous NSVT per record review)

## 2021-02-02 NOTE — ASSESSMENT & PLAN NOTE
Goal <140/90  Avoid hypotension in setting of CKD3  Hold lasix for SBP<105  Hold lisinopril for SBP<110  Continue metoprolol

## 2021-02-02 NOTE — ASSESSMENT & PLAN NOTE
Lab Results   Component Value Date    HGBA1C 6 0 (H) 01/08/2021   Diet controlled; goal A1c <8 5 given age and comorbidities  D/c accuchecks

## 2021-02-02 NOTE — ASSESSMENT & PLAN NOTE
Wt Readings from Last 3 Encounters:   01/23/21 90 7 kg (200 lb)   12/30/20 80 3 kg (177 lb)   12/22/20 80 3 kg (177 lb)   Monitor daily weights/CHF pathway  Management of hypertension as outlined  SBP trending low- hold parameters added for lasix <105 and lisinopril <110  BMP ordered for 1/29 on admission- results reviewed and stable; recheck BMP with next routine INR

## 2021-02-02 NOTE — ASSESSMENT & PLAN NOTE
Goal INR 2-3 in setting of Afib  INR low at 1 5 on 1/29; coumadin increased to 2 5mg daily- monitor INR closely with addition of scheduled acetaminophen- recheck 2/1

## 2021-02-02 NOTE — ASSESSMENT & PLAN NOTE
TSH elevated inpatient with borderline low free T4  Continue levothyroxine 50mcg daily  Recheck thyroid studies in 6 weeks outpatient

## 2021-02-02 NOTE — ASSESSMENT & PLAN NOTE
Lab Results   Component Value Date    EGFR 69 01/26/2021    EGFR 62 01/24/2021    EGFR 66 01/23/2021    CREATININE 0 76 01/26/2021    CREATININE 0 83 01/24/2021    CREATININE 0 79 01/23/2021   Baseline creatinine 0 85

## 2021-02-03 ENCOUNTER — NURSING HOME VISIT (OUTPATIENT)
Dept: GERIATRICS | Facility: OTHER | Age: 86
End: 2021-02-03
Payer: MEDICARE

## 2021-02-03 DIAGNOSIS — I48.0 PAROXYSMAL ATRIAL FIBRILLATION (HCC): ICD-10-CM

## 2021-02-03 DIAGNOSIS — Z79.01 ANTICOAGULATED ON COUMADIN: ICD-10-CM

## 2021-02-03 DIAGNOSIS — E03.9 ACQUIRED HYPOTHYROIDISM: ICD-10-CM

## 2021-02-03 DIAGNOSIS — I50.32 CHRONIC DIASTOLIC CHF (CONGESTIVE HEART FAILURE) (HCC): ICD-10-CM

## 2021-02-03 DIAGNOSIS — K59.00 CONSTIPATION, UNSPECIFIED CONSTIPATION TYPE: ICD-10-CM

## 2021-02-03 DIAGNOSIS — R26.2 AMBULATORY DYSFUNCTION: ICD-10-CM

## 2021-02-03 DIAGNOSIS — M15.9 GENERALIZED OA: Primary | ICD-10-CM

## 2021-02-03 PROCEDURE — 99309 SBSQ NF CARE MODERATE MDM 30: CPT | Performed by: FAMILY MEDICINE

## 2021-02-03 NOTE — PROGRESS NOTES
[facility]   [address] [facility], [place], [address]  Progress Note  POS: SNF-31    Unable to obtain from patient due to: History obtained from patient; additional history obtained speaking with nursing along with nursing note review  Chief Complaint/Reason for visit: STR follow up  History of Present Illness: 80year old female evaluated for STR follow up  Has been taking coumadin 2 5mg daily, INR low at 1 3 today  Notes some improvement in knee pain with addition of topical lidocaine patches  Continues on lasix 20mg daily; notes urinary urgency at times after taking diuretic with some episodes on incontinence; no dysuria, fever, chills  Weight -2lb from 1/29 to 2/2; no shortness of breath, significant lower extremity edema  SBP trending 100s-150s  Past Medical History: unchanged from history and physical  Past Medical History:   Diagnosis Date    Abnormal nuclear stress test     last assessed 04/03/2017    Anxiety     Arthritis     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 [address] [facility]     Chest pain 3/9/2019    Chronic pain     chronic b/l shoulder pain    Chronic pain of right knee 4/2/2019    Coronary artery disease     Diabetes mellitus ([address] )     Gait disturbance 3/2/2018    GERD (gastroesophageal reflux disease)     H/O atrial flutter     History of DVT (deep vein thrombosis)     History of pulmonary embolism     Hyperlipidemia     Hypertension     Hypothyroid     Renal calculi     Shortness of breath      Family History: unchanged from history and physical  Social History: unchanged from history and physical  Resident Since: STR- 1/28/21  Review of systems: Review of Systems   Constitutional: Negative for chills, fever and unexpected weight change  Respiratory: Negative for chest tightness and shortness of breath  Cardiovascular: Negative for leg swelling     Gastrointestinal: Positive for constipation (dulcolax on 2/1, miralax on 2/2)  Negative for abdominal pain, nausea and vomiting  Genitourinary: Positive for urgency  Negative for difficulty urinating and dysuria  Musculoskeletal: Positive for arthralgias and gait problem  Neurological: Negative for headaches  All other systems reviewed and are negative  Medications: Changes made- see written orders- Medication list reviewed and updated to reflect most current SNF orders  Allergies: Reviewed and unchanged  Consults reviewed:PT and OT  Labs/Diagnostics (reviewed by this provider): Copy in Chart  BMP: (2/3/21) Na:135 K:5 23 Cl:99 CO:26 BUN:25 Cr:0 77 Glu:86 Ca:9 6   Other: INR (2/3/21): 1 3 <- 1 5 (1/29) <- 2 08 (1/26)    Physical Exam    Weight: 184 8lb (186 6) Temp:98 7F BP:138/80 (108//84)  Pulse:86 (74-) Resp:18 O2 Sat:93% RA  Constitutional: Well-nourished and Normocephalic  Orientation:Person, Place and Day, situation     Physical Exam  Vitals signs and nursing note reviewed  Constitutional:       General: She is awake  She is not in acute distress  Appearance: Normal appearance  She is well-developed and well-groomed  She is not ill-appearing, toxic-appearing or diaphoretic  HENT:      Head: Normocephalic and atraumatic  Right Ear: External ear normal       Left Ear: External ear normal       Nose: No rhinorrhea  Mouth/Throat:      Mouth: Mucous membranes are moist       Pharynx: Oropharynx is clear  Eyes:      General: No scleral icterus  Right eye: No discharge  Left eye: No discharge  Conjunctiva/sclera: Conjunctivae normal    Neck:      Musculoskeletal: Neck supple  No neck rigidity  Cardiovascular:      Rate and Rhythm: Normal rate and regular rhythm  Heart sounds: S1 normal and S2 normal  Murmur present  Systolic murmur present  Pulmonary:      Effort: Pulmonary effort is normal  No respiratory distress  Breath sounds: No wheezing  Abdominal:      General: There is no distension        Palpations: Abdomen is soft  Tenderness: There is no abdominal tenderness  Musculoskeletal:         General: No tenderness  Right lower leg: No edema  Left lower leg: No edema  Skin:     General: Skin is warm and dry  Coloration: Skin is not jaundiced or pale  Neurological:      General: No focal deficit present  Mental Status: She is alert  Mental status is at baseline  Cranial Nerves: No cranial nerve deficit  Psychiatric:         Attention and Perception: Attention and perception normal          Mood and Affect: Mood and affect normal          Speech: Speech normal          Behavior: Behavior is cooperative         Assessment/Plan:  80year old female with:    Generalized OA  With bilateral knee and low back pain  Associated ambulatory dysfunction  Continue scheduled acetaminophen 650mg TID- increased to 975mg if pain not well controlled  Continue topical aspercreme 4% lidocaine patches to b/l knees and low back  Continue PT/OT    Constipation  Ensure adequate nutrition and hydration  Management of hypothyroidism as outlined  Continue PRN dulcolax, miralax- plan to scheduled miralax daily if persistent symptoms    Atrial fibrillation ([address] )  With pacemaker in place in setting of heart block  Anticoagulation management as outlined below  Continue metoprolol, amiodarone (previous NSVT per record review)    Anticoagulated on Coumadin  Goal INR 2-3 in setting of Afib  INR subtherapeutic; increase coumadin to 5mg on 2/3, 2/4  Recheck PT/INR 2/5    Chronic diastolic CHF (congestive heart failure) (HCC)  Monitor daily weights/CHF pathway  Continue lasix, lisinopril with hold parameters   BMP reviewed/stable    Acquired hypothyroidism  TSH elevated inpatient with borderline low free T4  Continue levothyroxine 50mcg daily  Recheck thyroid studies in 6 weeks outpatient    Ambulatory dysfunction  Multifactorial  Continue SNF rehab  Continue PT/OT  Fall precautions    [address], DO  2/3/21

## 2021-02-04 NOTE — ASSESSMENT & PLAN NOTE
Ensure adequate nutrition and hydration  Management of hypothyroidism as outlined  Continue PRN dulcolax, miralax- plan to scheduled miralax daily if persistent symptoms

## 2021-02-04 NOTE — ASSESSMENT & PLAN NOTE
With bilateral knee and low back pain  Associated ambulatory dysfunction  Continue scheduled acetaminophen 650mg TID- increased to 975mg if pain not well controlled  Continue topical aspercreme 4% lidocaine patches to b/l knees and low back  Continue PT/OT

## 2021-02-04 NOTE — ASSESSMENT & PLAN NOTE
Goal INR 2-3 in setting of Afib  INR subtherapeutic; increase coumadin to 5mg on 2/3, 2/4  Recheck PT/INR 2/5

## 2021-02-04 NOTE — ASSESSMENT & PLAN NOTE
Monitor daily weights/CHF pathway  Continue lasix, lisinopril with hold parameters   BMP reviewed/stable

## 2021-02-05 ENCOUNTER — NURSING HOME VISIT (OUTPATIENT)
Dept: GERIATRICS | Facility: OTHER | Age: 86
End: 2021-02-05
Payer: MEDICARE

## 2021-02-05 DIAGNOSIS — N39.46 MIXED STRESS AND URGE URINARY INCONTINENCE: ICD-10-CM

## 2021-02-05 DIAGNOSIS — I48.0 PAROXYSMAL ATRIAL FIBRILLATION (HCC): ICD-10-CM

## 2021-02-05 DIAGNOSIS — M15.9 GENERALIZED OA: Primary | ICD-10-CM

## 2021-02-05 DIAGNOSIS — R26.2 AMBULATORY DYSFUNCTION: ICD-10-CM

## 2021-02-05 DIAGNOSIS — Z79.01 ANTICOAGULATED ON COUMADIN: ICD-10-CM

## 2021-02-05 DIAGNOSIS — I50.32 CHRONIC DIASTOLIC CHF (CONGESTIVE HEART FAILURE) (HCC): ICD-10-CM

## 2021-02-05 PROBLEM — R39.15 URINARY URGENCY: Status: RESOLVED | Noted: 2019-08-15 | Resolved: 2021-02-05

## 2021-02-05 PROCEDURE — 99309 SBSQ NF CARE MODERATE MDM 30: CPT | Performed by: FAMILY MEDICINE

## 2021-02-05 NOTE — PROGRESS NOTES
[facility]   [address] [facility], [place], [address]  Progress Note  POS: SNF-31    Unable to obtain from patient due to: Additional history obtained speaking with nursing/nursing note review  Chief Complaint/Reason for visit: STR follow up  History of Present Illness: 80year old female evaluated for STR follow up  Continues with pain in bilateral knees; exacerbated with movement; partially relieved with rest, position change, topical agents  Has been able to increase ambulatory distance, relays she is walking length of her room 3+ times  Notes continued episodes of urinary incontinence, with leakage upon standing  Continues on coumadin, INR remains subtherapeutic  Was taking "1 5 tablets Thursday and Sunday and 1 tablet the rest of the days" at home with stable therapeutic level per patient (2 5mg tablets noted in Epic chart as outpatient prescribed tablets)  Past Medical History: unchanged from history and physical  Past Medical History:   Diagnosis Date    Abnormal nuclear stress test     last assessed 04/03/2017    Anxiety     Arthritis     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    Bundle branch block, left     Cardiomyopathy ([facility] [address] )     Chest pain 3/9/2019    Chronic pain     chronic b/l shoulder pain    Chronic pain of right knee 4/2/2019    Coronary artery disease     Diabetes mellitus ([address] )     Gait disturbance 3/2/2018    GERD (gastroesophageal reflux disease)     H/O atrial flutter     History of DVT (deep vein thrombosis)     History of pulmonary embolism     Hyperlipidemia     Hypertension     Hypothyroid     Renal calculi     Shortness of breath      Family History: unchanged from history and physical  Social History: unchanged from history and physical  Resident Since: STR- 1/28/21  Review of systems: Review of Systems   Constitutional: Negative for chills and fever  Respiratory: Negative for cough and shortness of breath      Cardiovascular: Negative for leg swelling  Endocrine: Negative for polyuria  Genitourinary: Negative for dysuria and urgency  Urinary incontinence   Musculoskeletal: Positive for arthralgias and gait problem  All other systems reviewed and are negative  Medications: Changes made- see written orders- Medication list reviewed and updated in Epic to reflect most current SNF orders   Allergies: Reviewed and unchanged  Consults reviewed:PT and OT  Labs/Diagnostics (reviewed by this provider): Copy in Chart  BMP: (2/3/21)   Other: INR (2/5) 1 4 <-1 3 (2/3)    Physical Exam    Weight: 184 8lb Temp:97 5F BP:123/76  Pulse:93 (74-) Resp:18 O2 Sat:93% RA  Constitutional: Well-nourished and Normocephalic  Orientation:Person and Place, situation     Physical Exam  Vitals signs and nursing note reviewed  Constitutional:       General: She is awake  She is not in acute distress  Appearance: Normal appearance  She is well-developed and well-groomed  She is not ill-appearing, toxic-appearing or diaphoretic  HENT:      Head: Normocephalic and atraumatic  Right Ear: External ear normal       Left Ear: External ear normal       Nose: No rhinorrhea  Mouth/Throat:      Mouth: Mucous membranes are moist       Pharynx: Oropharynx is clear  Eyes:      General: No scleral icterus  Right eye: No discharge  Left eye: No discharge  Conjunctiva/sclera: Conjunctivae normal    Neck:      Musculoskeletal: Neck supple  No neck rigidity  Pulmonary:      Effort: Pulmonary effort is normal  No respiratory distress  Abdominal:      General: There is no distension  Palpations: Abdomen is soft  Musculoskeletal:      Right knee: She exhibits no ecchymosis, no erythema and no bony tenderness  No tenderness found  Left knee: She exhibits no ecchymosis, no erythema and no bony tenderness  No tenderness found  Right lower leg: No edema  Left lower leg: No edema  Skin:     General: Skin is warm and dry  Capillary Refill: Capillary refill takes less than 2 seconds  Coloration: Skin is not jaundiced or pale  Neurological:      General: No focal deficit present  Mental Status: She is alert  Mental status is at baseline  Cranial Nerves: No cranial nerve deficit  Psychiatric:         Attention and Perception: Attention and perception normal          Mood and Affect: Mood and affect normal          Speech: Speech normal          Behavior: Behavior is cooperative         Assessment/Plan:  80year old female with:    Generalized OA  With bilateral knee and low back pain  Associated ambulatory dysfunction  Increase scheduled acetaminophen 975mg TID  Continue topical aspercreme 4% lidocaine patches to b/l knees and low back  Plan to add OxyIR 2 5mg prior to therapy and daily PRN for severe pain if no improvement with changes as above  Continue PT/OT    Atrial fibrillation (HCC)  With pacemaker in place in setting of heart block  Anticoagulation management as outlined below  Continue metoprolol, amiodarone (previous NSVT per record review)    Anticoagulated on Coumadin  Goal INR 2-3 in setting of Afib  INR remains subtherapeutic; continue coumadin 5mg daily   Recheck PT/INR 2/8    Chronic diastolic CHF (congestive heart failure) ([facility])  Monitor daily weights/CHF pathway  Continue lasix, lisinopril with hold parameters   BMP ordered for 2/8    Mixed stress and urge urinary incontinence  Start scheduled toileting program    Ambulatory dysfunction  Multifactorial  Continue SNF rehab  Continue PT/OT  Fall precautions    Wojciech Boyce DO  2/5/21

## 2021-02-06 NOTE — ASSESSMENT & PLAN NOTE
With bilateral knee and low back pain  Associated ambulatory dysfunction  Increase scheduled acetaminophen 975mg TID  Continue topical aspercreme 4% lidocaine patches to b/l knees and low back  Plan to add OxyIR 2 5mg prior to therapy and daily PRN for severe pain if no improvement with changes as above  Continue PT/OT

## 2021-02-06 NOTE — ASSESSMENT & PLAN NOTE
Monitor daily weights/CHF pathway  Continue lasix, lisinopril with hold parameters   BMP ordered for 2/8

## 2021-02-06 NOTE — ASSESSMENT & PLAN NOTE
Goal INR 2-3 in setting of Afib  INR remains subtherapeutic; continue coumadin 5mg daily   Recheck PT/INR 2/8

## 2021-02-08 ENCOUNTER — NURSING HOME VISIT (OUTPATIENT)
Dept: GERIATRICS | Facility: OTHER | Age: 86
End: 2021-02-08
Payer: MEDICARE

## 2021-02-08 DIAGNOSIS — I48.0 PAROXYSMAL ATRIAL FIBRILLATION (HCC): ICD-10-CM

## 2021-02-08 DIAGNOSIS — I50.32 CHRONIC DIASTOLIC CHF (CONGESTIVE HEART FAILURE) (HCC): ICD-10-CM

## 2021-02-08 DIAGNOSIS — R26.2 AMBULATORY DYSFUNCTION: ICD-10-CM

## 2021-02-08 DIAGNOSIS — M15.9 GENERALIZED OA: Primary | ICD-10-CM

## 2021-02-08 DIAGNOSIS — N18.31 STAGE 3A CHRONIC KIDNEY DISEASE (HCC): ICD-10-CM

## 2021-02-08 DIAGNOSIS — Z79.01 ANTICOAGULATED ON COUMADIN: ICD-10-CM

## 2021-02-08 PROCEDURE — 99309 SBSQ NF CARE MODERATE MDM 30: CPT | Performed by: FAMILY MEDICINE

## 2021-02-08 NOTE — PROGRESS NOTES
[facility]   [address] [place] [address], [place], [address]  Progress Note  POS: SNF-31    Unable to obtain from patient due to:  History obtained from patient  Additional history obtained speaking with nursing/nursing note review  Chief Complaint/Reason for visit: STR follow up  History of Present Illness: 80year old female evaluated for STR follow up  Started on topical voltaren gel per PM&R last week; patient concerned it is only being placed on her back/shoulders once daily  Continues on scheduled acetaminophen, topical lidocaine for knee pain; continues with pain with activity; reviewed option to trial low dose oxycodone for severe pain, patient declines at this time  INR 2 0 today, currently on coumadin 5mg daily  Weight -2lb/48h  Past Medical History: unchanged from history and physical  Past Medical History:   Diagnosis Date    Abnormal nuclear stress test     last assessed 04/03/2017    Anxiety     Arthritis     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 [address] )     Gait disturbance 3/2/2018    GERD (gastroesophageal reflux disease)     H/O atrial flutter     History of DVT (deep vein thrombosis)     History of pulmonary embolism     Hyperlipidemia     Hypertension     Hypothyroid     Renal calculi     Shortness of breath      Family History: unchanged from history and physical  Social History: unchanged from history and physical  Resident Since: STR- 1/28/21  Review of systems: Review of Systems   Constitutional: Negative for chills and fever  Respiratory: Negative for cough and shortness of breath  Cardiovascular: Negative for leg swelling  Musculoskeletal: Positive for arthralgias, back pain and gait problem     All other systems reviewed and are negative  Medications: Changes made- see written orders- Medication list reviewed and updated in Epic to reflect most current SNF orders  Allergies: Reviewed and unchanged  Consults reviewed:PT and OT; PM&R  Labs/Diagnostics (reviewed by this provider): Copy in Chart (2/8/21)  CBC: Hb:11 9 Hct:34 5 WBC:4 7 PLT:274  BMP: Na:136 K:4 6 Cl:100 CO:28 BUN:30 Cr:0 87 Glu:101 Ca:9 0   Other: INR: 2 0    Physical Exam    Weight: 181 6lb (182<183 4) Temp:98F BP:115/71 (-140/86)  Pulse:74 (-93) Resp:16 O2 Sat:96% RA  Constitutional: Well-nourished and Normocephalic  Orientation:Person, Place and Day, situation     Physical Exam  Vitals signs and nursing note reviewed  Exam conducted with a chaperone present (PCA at bedside)  Constitutional:       General: She is awake  She is not in acute distress  Appearance: Normal appearance  She is well-developed, well-groomed and overweight  She is not ill-appearing, toxic-appearing or diaphoretic  HENT:      Head: Normocephalic and atraumatic  Right Ear: External ear normal       Left Ear: External ear normal       Nose: No rhinorrhea  Mouth/Throat:      Mouth: Mucous membranes are moist       Pharynx: Oropharynx is clear  Eyes:      General: No scleral icterus  Right eye: No discharge  Left eye: No discharge  Conjunctiva/sclera: Conjunctivae normal    Neck:      Musculoskeletal: Neck supple  No neck rigidity  Pulmonary:      Effort: Pulmonary effort is normal  No respiratory distress  Abdominal:      General: There is no distension  Musculoskeletal:         General: Deformity (kyphosis) present  Right lower leg: No edema  Left lower leg: No edema  Skin:     General: Skin is warm and dry  Coloration: Skin is not jaundiced or pale  Neurological:      General: No focal deficit present  Mental Status: She is alert  Mental status is at baseline  Cranial Nerves: No cranial nerve deficit     Psychiatric: Attention and Perception: Attention and perception normal          Mood and Affect: Mood and affect normal          Speech: Speech normal          Behavior: Behavior is cooperative         Assessment/Plan:  80year old female with:    Generalized OA  With bilateral knee and low back pain, bilateral shoulder pain  Associated ambulatory dysfunction  Continue scheduled acetaminophen 975mg TID  Continue topical aspercreme 4% lidocaine patches to b/l knees and low back  Volatren gel added to bilateral shoulders  Patient declines addition of low dose opioid at this time  Consider trial of gabapentin QHS vs low dose opioid at next visit if pain not controlled with regimen as above  Continue PT/OT    Atrial fibrillation ([address] )  With pacemaker in place in setting of heart block  Anticoagulation management as outlined below  Continue metoprolol, amiodarone    Anticoagulated on Coumadin  Goal INR 2-3 in setting of Afib  INR 2 0 on 2 8  Coumadin 2 5mg daily on 2/8, 5mg daily on 2/9, 2/10  Recheck PT/INR 2/11    Chronic diastolic CHF (congestive heart failure) (HCC)  Monitor daily weights/CHF pathway  Weight net negative 6 6 lb since admission  Continue lasix, lisinopril with hold parameters   BMP 2/8 reviewed/stable- recheck in 1 week    Stage 3a chronic kidney disease  Baseline creatinine 0 85  BMP 2/8 reviewed and at baseline- recheck in one week    Ambulatory dysfunction  Multifactorial  Continue SNF rehab  Continue PT/OT  Fall precautions    [address], DO  2/8/21

## 2021-02-09 RX ORDER — WARFARIN SODIUM 2.5 MG/1
TABLET ORAL
Qty: 120 TABLET | Refills: 3
Start: 2021-02-09 | End: 2021-02-16

## 2021-02-09 NOTE — ASSESSMENT & PLAN NOTE
Goal INR 2-3 in setting of Afib  INR 2 0 on 2 8  Coumadin 2 5mg daily on 2/8, 5mg daily on 2/9, 2/10  Recheck PT/INR 2/11

## 2021-02-09 NOTE — ASSESSMENT & PLAN NOTE
With bilateral knee and low back pain, bilateral shoulder pain  Associated ambulatory dysfunction  Continue scheduled acetaminophen 975mg TID  Continue topical aspercreme 4% lidocaine patches to b/l knees and low back  Volatren gel added to bilateral shoulders  Patient declines addition of low dose opioid at this time  Consider trial of gabapentin QHS vs low dose opioid at next visit if pain not controlled with regimen as above  Continue PT/OT

## 2021-02-09 NOTE — ASSESSMENT & PLAN NOTE
Monitor daily weights/CHF pathway  Weight net negative 6 6 lb since admission  Continue lasix, lisinopril with hold parameters   [facility] 2/8 reviewed/stable- recheck in 1 week

## 2021-02-09 NOTE — ASSESSMENT & PLAN NOTE
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

## 2021-02-12 ENCOUNTER — NURSING HOME VISIT (OUTPATIENT)
Dept: GERIATRICS | Facility: OTHER | Age: 86
End: 2021-02-12
Payer: MEDICARE

## 2021-02-12 DIAGNOSIS — I48.0 PAROXYSMAL ATRIAL FIBRILLATION (HCC): ICD-10-CM

## 2021-02-12 DIAGNOSIS — M15.9 GENERALIZED OA: Primary | ICD-10-CM

## 2021-02-12 DIAGNOSIS — Z79.01 ANTICOAGULATED ON COUMADIN: ICD-10-CM

## 2021-02-12 DIAGNOSIS — N18.31 STAGE 3A CHRONIC KIDNEY DISEASE (HCC): ICD-10-CM

## 2021-02-12 DIAGNOSIS — I10 ESSENTIAL HYPERTENSION: Chronic | ICD-10-CM

## 2021-02-12 DIAGNOSIS — R26.2 AMBULATORY DYSFUNCTION: ICD-10-CM

## 2021-02-12 PROCEDURE — 99309 SBSQ NF CARE MODERATE MDM 30: CPT | Performed by: FAMILY MEDICINE

## 2021-02-12 NOTE — PROGRESS NOTES
[facility]   [address] [place] [address], [place], [address]  Progress Note  POS: SNF-31    Unable to obtain from patient due to: History obtained from patient  Additional history obtained speaking with nursing/nursing note review  Chief Complaint/Reason for visit: STR follow up  History of Present Illness: 80year old female evaluated for STR follow up  Shoulder pain has improved with voltaren gel  Continues with knee and low back pain, exacerbated following therapy sessions; continues on scheduled acetaminophen, topical lidocaine  INR 2 7 on 2/11, continues on coumadin for Afib; HR trending in 80s; continues on amiodarone  Isolated SBP elevation in 170s noted; otherwise trending 120s-150s  Past Medical History: unchanged from history and physical  Past Medical History:   Diagnosis Date    Abnormal nuclear stress test     last assessed 04/03/2017    Anxiety     Arthritis     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    Bundle branch block, left     Cardiomyopathy [facility]     Chest pain 3/9/2019    Chronic pain     chronic b/l shoulder pain    Chronic pain of right knee 4/2/2019    Coronary artery disease     Diabetes mellitus ([facility] )     Gait disturbance 3/2/2018    GERD (gastroesophageal reflux disease)     H/O atrial flutter     History of DVT (deep vein thrombosis)     History of pulmonary embolism     Hyperlipidemia     Hypertension     Hypothyroid     Renal calculi     Shortness of breath      Family History: unchanged from history and physical  Social History: unchanged from history and physical  Resident Since: STR- 1/28/21  Review of systems: Review of Systems   Constitutional: Negative for chills, fever and unexpected weight change  Respiratory: Negative for cough and shortness of breath  Cardiovascular: Negative for leg swelling  Gastrointestinal: Negative for abdominal distention  Musculoskeletal: Positive for arthralgias, back pain and gait problem     All other systems reviewed and are negative  Medications: Changes made- see written orders- Medication list reviewed and updated in Epic to reflect most current SNF orders  Allergies: Reviewed and unchanged  Consults reviewed:PT and OT  Labs/Diagnostics (reviewed by this provider): Copy in Chart  INR (2/11/21): 2 7    Physical Exam    Weight: 179 2lb (178 2<180 2<181 6) Temp:97 5F BP:171/92 (122//85)  Pulse:87 Resp:18 O2 Sat:93% RA  Constitutional: Well-nourished and Normocephalic  Orientation:Person, Place, Day and Month, situation     Physical Exam  Vitals signs and nursing note reviewed  Constitutional:       General: She is awake  She is not in acute distress  Appearance: She is well-developed, well-groomed and overweight  She is not ill-appearing, toxic-appearing or diaphoretic  HENT:      Head: Normocephalic and atraumatic  Right Ear: External ear normal       Left Ear: External ear normal       Nose: No rhinorrhea  Mouth/Throat:      Mouth: Mucous membranes are moist       Pharynx: Oropharynx is clear  Eyes:      General: No scleral icterus  Right eye: No discharge  Left eye: No discharge  Conjunctiva/sclera: Conjunctivae normal    Neck:      Musculoskeletal: No neck rigidity  Pulmonary:      Effort: Pulmonary effort is normal  No respiratory distress  Abdominal:      General: There is no distension  Palpations: Abdomen is soft  Musculoskeletal:         General: No tenderness  Right lower leg: No edema  Left lower leg: No edema  Skin:     General: Skin is warm and dry  Capillary Refill: Capillary refill takes less than 2 seconds  Coloration: Skin is not jaundiced or pale  Neurological:      General: No focal deficit present  Mental Status: She is alert  Mental status is at baseline  Cranial Nerves: No cranial nerve deficit     Psychiatric:         Attention and Perception: Attention and perception normal          Mood and Affect: Mood and affect normal          Speech: Speech normal          Behavior: Behavior normal  Behavior is cooperative  Thought Content:  Thought content normal          Cognition and Memory: Cognition and memory normal          Judgment: Judgment normal        Assessment/Plan:  80year old female with:    Generalized OA  With bilateral knee and low back pain, bilateral shoulder pain  Associated ambulatory dysfunction  Continue scheduled acetaminophen 975mg TID  Continue topical aspercreme 4% lidocaine patches to b/l knees and low back  Continue volatren gel to bilateral shoulders  Patient agreeable to trial addition of low dose opioid- will add OxyIR 2 5mg daily PRN severe pain; PDMP reviewed; paper script to [facility] #10 no RF  Continue PT/OT    Atrial fibrillation ([address] )  With pacemaker in place in setting of heart block  Anticoagulation management as outlined below  Continue metoprolol, amiodarone    Anticoagulated on Coumadin  Goal INR 2-3 in setting of Afib  INR 2 7 on 2/11  Coumadin 2 5mg daily on 2/11, 2/12, 2/14; 5mg daily on 2/13  Recheck PT/INR 2/15    Essential hypertension  Goal <140-150/90; isolated SBP in 170s, otherwise BPs reviewed and within acceptable range  Avoid hypotension in setting of CKD3  Hold lasix for SBP<105  Hold lisinopril for SBP<110  Continue metoprolol    Ambulatory dysfunction  Multifactorial  Continue SNF rehab  Continue PT/OT  Fall precautions    Stage 3a chronic kidney disease  Baseline creatinine 0 85  BMP 2/8 reviewed and at baseline- recheck 2/15    Jamil Tapia DO  2/12/21

## 2021-02-15 ENCOUNTER — NURSING HOME VISIT (OUTPATIENT)
Dept: GERIATRICS | Facility: OTHER | Age: 86
End: 2021-02-15
Payer: MEDICARE

## 2021-02-15 DIAGNOSIS — I50.32 CHRONIC DIASTOLIC CHF (CONGESTIVE HEART FAILURE) (HCC): Primary | ICD-10-CM

## 2021-02-15 DIAGNOSIS — I48.0 PAROXYSMAL ATRIAL FIBRILLATION (HCC): ICD-10-CM

## 2021-02-15 DIAGNOSIS — M15.9 GENERALIZED OA: ICD-10-CM

## 2021-02-15 DIAGNOSIS — Z79.01 ANTICOAGULATED ON COUMADIN: ICD-10-CM

## 2021-02-15 DIAGNOSIS — R26.2 AMBULATORY DYSFUNCTION: ICD-10-CM

## 2021-02-15 PROCEDURE — 99309 SBSQ NF CARE MODERATE MDM 30: CPT | Performed by: FAMILY MEDICINE

## 2021-02-15 NOTE — PROGRESS NOTES
[facility]   [address] [place] [address], [place], [address]  Progress Note  POS: SNF-31    Unable to obtain from patient due to: History obtained from patient  Additional history obtained speaking with nursing and therapy along with nursing note review  Chief Complaint/Reason for visit: STR follow up  History of Present Illness: 80year old female evaluated for STR follow up  Continues on scheduled acetaminophen, topical voltaren and lidocaine for pain; has not required PRN OxyIR for severe pain  Working with therapy, improved endurance with ambulation overall; does get fatigued and overall pain/discomfort following therapy sessions  Weight stable  INR elevated at 3 3  Past Medical History: unchanged from history and physical  Past Medical History:   Diagnosis Date    Abnormal nuclear stress test     last assessed 04/03/2017    Anxiety     Arthritis     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    Bundle branch block, left     Cardiomyopathy ([facility] )     Chest pain 3/9/2019    Chronic pain     chronic b/l shoulder pain    Chronic pain of right knee 4/2/2019    Coronary artery disease     Diabetes mellitus ([facility] [address] )     Gait disturbance 3/2/2018    GERD (gastroesophageal reflux disease)     H/O atrial flutter     History of DVT (deep vein thrombosis)     History of pulmonary embolism     Hyperlipidemia     Hypertension     Hypothyroid     Renal calculi     Shortness of breath      Family History: unchanged from history and physical  Social History: unchanged from history and physical  Resident Since: STR- 1/28/21  Review of systems: Review of Systems   Constitutional: Negative for activity change, appetite change, chills, fever and unexpected weight change  Respiratory: Negative for cough and shortness of breath  Cardiovascular: Negative for leg swelling  Musculoskeletal: Positive for arthralgias and back pain  All other systems reviewed and are negative      Medications: Changes made- see written orders- Medication list reviewed and updated in Epic to reflect most current SNF orders  Allergies: Reviewed and unchanged  Consults reviewed:PT and OT  Labs/Diagnostics (reviewed by this provider): Copy in Chart (2/15/21)  BMP: Na:135 K:4 8 Cl:98 CO:29 BUN:45 Cr:0 97 Glu:97 Ca:9 7   Other: INR: 3 3    Physical Exam    Weight: 179 8lb (178 8<179 6) Temp:97 6F BP:149/70 (110/77-)  Pulse:77 (71-87) Resp:18 O2 Sat:94% RA  Constitutional: Well-nourished and Normocephalic  Orientation:Person, Place and Day, situation     Physical Exam  Vitals signs and nursing note reviewed  Constitutional:       General: She is awake  She is not in acute distress  Appearance: She is well-developed and well-groomed  She is obese  She is not ill-appearing, toxic-appearing or diaphoretic  HENT:      Head: Normocephalic and atraumatic  Right Ear: External ear normal       Left Ear: External ear normal       Nose: No rhinorrhea  Mouth/Throat:      Mouth: Mucous membranes are moist       Pharynx: Oropharynx is clear  Eyes:      General: No scleral icterus  Right eye: No discharge  Left eye: No discharge  Conjunctiva/sclera: Conjunctivae normal    Neck:      Musculoskeletal: Neck supple  No neck rigidity  Cardiovascular:      Rate and Rhythm: Normal rate and regular rhythm  Heart sounds: S1 normal and S2 normal    Pulmonary:      Effort: Pulmonary effort is normal  No respiratory distress  Breath sounds: No wheezing  Abdominal:      General: There is no distension  Palpations: Abdomen is soft  Musculoskeletal:         General: No deformity  Right lower leg: No edema  Left lower leg: No edema  Skin:     General: Skin is warm and dry  Coloration: Skin is not jaundiced or pale  Neurological:      General: No focal deficit present  Mental Status: She is alert  Mental status is at baseline  Cranial Nerves: No cranial nerve deficit     Psychiatric: Attention and Perception: Attention and perception normal          Mood and Affect: Mood and affect normal          Speech: Speech normal          Behavior: Behavior normal  Behavior is cooperative  Thought Content:  Thought content normal          Cognition and Memory: Cognition and memory normal        Assessment/Plan:  80year old female with:    Chronic diastolic CHF (congestive heart failure) ([facility])  Continue to monitor daily weights/CHF pathway; weight stable overall  Continue lasix, lisinopril with hold parameters   BMP 2/15 reviewed/stable    Atrial fibrillation ([address] )  With pacemaker in place in setting of heart block  Anticoagulation management as outlined below  Continue metoprolol, amiodarone    Anticoagulated on Coumadin  Goal INR 2-3 in setting of Afib  INR 3 3 on 2/15  Hold coumadin 2/15, 2/16  Recheck PT/INR 2/17  Home regimen 2 5mg 5x/week and 3 5mg 2x/week per patient    Generalized OA  With bilateral knee and low back pain, bilateral shoulder pain  Associated ambulatory dysfunction  Continue scheduled acetaminophen 975mg TID  Continue topical aspercreme 4% lidocaine patches to b/l knees and low back  Continue volatren gel to bilateral shoulders  Continue have low dose OxyIR 2 5mg daily PRN severe pain    Ambulatory dysfunction  Multifactorial  Continue SNF rehab  Continue PT/OT  Fall precautions    Glorious Furnish, DO  2/15/21

## 2021-02-16 RX ORDER — OXYCODONE HYDROCHLORIDE 5 MG/1
2.5 TABLET ORAL DAILY PRN
Qty: 10 TABLET | Refills: 0
Start: 2021-02-16 | End: 2021-02-23

## 2021-02-16 RX ORDER — WARFARIN SODIUM 2.5 MG/1
TABLET ORAL
Qty: 120 TABLET | Refills: 3
Start: 2021-02-16 | End: 2021-02-16

## 2021-02-16 RX ORDER — WARFARIN SODIUM 2.5 MG/1
TABLET ORAL
Qty: 120 TABLET | Refills: 3
Start: 2021-02-16 | End: 2021-02-21

## 2021-02-16 NOTE — ASSESSMENT & PLAN NOTE
Goal <140-150/90; isolated SBP in 170s, otherwise BPs reviewed and within acceptable range  Avoid hypotension in setting of CKD3  Hold lasix for SBP<105  Hold lisinopril for SBP<110  Continue metoprolol

## 2021-02-16 NOTE — ASSESSMENT & PLAN NOTE
Goal INR 2-3 in setting of Afib  INR 2 7 on 2/11  Coumadin 2 5mg daily on 2/11, 2/12, 2/14; 5mg daily on 2/13  Recheck PT/INR 2/15

## 2021-02-16 NOTE — ASSESSMENT & PLAN NOTE
With bilateral knee and low back pain, bilateral shoulder pain  Associated ambulatory dysfunction  Continue scheduled acetaminophen 975mg TID  Continue topical aspercreme 4% lidocaine patches to b/l knees and low back  Continue volatren gel to bilateral shoulders  Patient agreeable to trial addition of low dose opioid- will add OxyIR 2 5mg daily PRN severe pain; PDMP reviewed; paper script to [facility] #10 no RF  Continue PT/OT

## 2021-02-17 NOTE — ASSESSMENT & PLAN NOTE
With bilateral knee and low back pain, bilateral shoulder pain  Associated ambulatory dysfunction  Continue scheduled acetaminophen 975mg TID  Continue topical aspercreme 4% lidocaine patches to b/l knees and low back  Continue volatren gel to bilateral shoulders  Continue have low dose OxyIR 2 5mg daily PRN severe pain

## 2021-02-17 NOTE — ASSESSMENT & PLAN NOTE
Goal INR 2-3 in setting of Afib  INR 3 3 on 2/15  Hold coumadin 2/15, 2/16  Recheck PT/INR 2/17  Home regimen 2 5mg 5x/week and 3 5mg 2x/week per patient

## 2021-02-17 NOTE — ASSESSMENT & PLAN NOTE
Continue to monitor daily weights/CHF pathway; weight stable overall  Continue lasix, lisinopril with hold parameters   BMP 2/15 reviewed/stable

## 2021-02-19 ENCOUNTER — NURSING HOME VISIT (OUTPATIENT)
Dept: GERIATRICS | Facility: OTHER | Age: 86
End: 2021-02-19
Payer: MEDICARE

## 2021-02-19 DIAGNOSIS — M15.9 GENERALIZED OA: Primary | ICD-10-CM

## 2021-02-19 DIAGNOSIS — Z79.01 ANTICOAGULATED ON COUMADIN: ICD-10-CM

## 2021-02-19 DIAGNOSIS — I50.32 CHRONIC DIASTOLIC CHF (CONGESTIVE HEART FAILURE) (HCC): ICD-10-CM

## 2021-02-19 DIAGNOSIS — R26.2 AMBULATORY DYSFUNCTION: ICD-10-CM

## 2021-02-19 DIAGNOSIS — I48.0 PAROXYSMAL ATRIAL FIBRILLATION (HCC): ICD-10-CM

## 2021-02-19 PROCEDURE — 99309 SBSQ NF CARE MODERATE MDM 30: CPT | Performed by: FAMILY MEDICINE

## 2021-02-19 NOTE — PROGRESS NOTES
[facility]   [address] [facility], [facility], [address]  Progress Note  POS: SNF-31    Unable to obtain from patient due to: History obtained from patient; additional history obtained speaking with nursing/nursing note review  Chief Complaint/Reason for visit: STR follow up  History of Present Illness: 80year old female evaluated for STR follow up  Used PRN OxyIR 2 5mg x1 on 2/17 prior to therapy; patient did not note significant change in knee pain, no adverse effects noted  Continues on coumadin for Afib, see INR trend below, orders as outlined  Past Medical History: unchanged from history and physical  Past Medical History:   Diagnosis Date    Abnormal nuclear stress test     last assessed 04/03/2017    Anxiety     Arthritis     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    Bundle branch block, left     Cardiomyopathy ([facility]     Chest pain 3/9/2019    Chronic pain     chronic b/l shoulder pain    Chronic pain of right knee 4/2/2019    Coronary artery disease     Diabetes mellitus ([facility] )     Gait disturbance 3/2/2018    GERD (gastroesophageal reflux disease)     H/O atrial flutter     History of DVT (deep vein thrombosis)     History of pulmonary embolism     Hyperlipidemia     Hypertension     Hypothyroid     Renal calculi     Shortness of breath      Family History: unchanged from history and physical  Social History: unchanged from history and physical  Resident Since: STR- 1/28/21  Review of systems: Review of Systems   Constitutional: Negative for activity change, chills and fever  Respiratory: Negative for cough and shortness of breath  Cardiovascular: Negative for leg swelling  Musculoskeletal: Positive for arthralgias  Hematological: Does not bruise/bleed easily  All other systems reviewed and are negative      Medications: No changes made- Medication list reviewed and updated in Epic to reflect most current SNF orders  Allergies: Reviewed and unchanged  Consults reviewed:PT and OT  Labs/Diagnostics (reviewed by this provider): Copy in Chart  BMP: (2/15/21) Na:135 K:4 8 Cl:98 CO:29 BUN:45 Cr:0 97 Glu:97 Ca:9 7  INR (2/17): 2 4 < 3 3 (2/15)    Physical Exam    Weight: 180 2lb Temp:98 4F BP:146/73 (115/67-)  Pulse:89 (73-) Resp:18 O2 Sat:93% RA  Constitutional: Well-nourished and Normocephalic  Orientation:Person, Place and Day, situation     Physical Exam  Vitals signs and nursing note reviewed  Constitutional:       General: She is awake  She is not in acute distress  Appearance: She is well-developed and well-groomed  She is not ill-appearing, toxic-appearing or diaphoretic  HENT:      Head: Normocephalic and atraumatic  Right Ear: External ear normal       Left Ear: External ear normal       Nose: No rhinorrhea  Mouth/Throat:      Mouth: Mucous membranes are moist       Pharynx: Oropharynx is clear  Eyes:      General: No scleral icterus  Right eye: No discharge  Left eye: No discharge  Conjunctiva/sclera: Conjunctivae normal    Neck:      Musculoskeletal: Neck supple  No neck rigidity  Pulmonary:      Effort: Pulmonary effort is normal  No respiratory distress  Abdominal:      General: There is no distension  Palpations: Abdomen is soft  Musculoskeletal:         General: No deformity  Right lower leg: No edema  Left lower leg: No edema  Skin:     General: Skin is warm and dry  Coloration: Skin is not jaundiced or pale  Findings: No bruising  Neurological:      General: No focal deficit present  Mental Status: She is alert  Mental status is at baseline  Cranial Nerves: No cranial nerve deficit  Psychiatric:         Attention and Perception: Attention and perception normal          Mood and Affect: Mood and affect normal          Speech: Speech normal          Behavior: Behavior normal  Behavior is cooperative           Cognition and Memory: Cognition and memory normal  Assessment/Plan:  80year old female with:    Generalized OA  With bilateral knee and low back pain, bilateral shoulder pain  Associated ambulatory dysfunction  Continue scheduled acetaminophen 975mg TID  Continue topical aspercreme 4% lidocaine patches to b/l knees and low back  Continue volatren gel to bilateral shoulders  Continue low dose OxyIR 2 5mg daily PRN severe pain- used x1 with minimal relief    Chronic diastolic CHF (congestive heart failure) ([facility])  Continue to monitor daily weights/CHF pathway; weight stable overall  Continue lasix, lisinopril with hold parameters   BMP 2/15 reviewed/stable except for uptrend in BUN- no hypotension noted, no signs of bleeding on exam; plan to check CBC, BMP in one week    Atrial fibrillation ([facility] [address] [address] )  With pacemaker in place in setting of heart block  Anticoagulation management as outlined below  Continue metoprolol, amiodarone    Anticoagulated on Coumadin  Goal INR 2-3 in setting of Afib  INR 2 4 on 2/17  Coumadin 5mg on 2/17  Coumadin 2 5mg 2/18 through 2/21  Recheck PT/INR 2/22  Home regimen 2 5mg 5x/week and 3 5mg 2x/week per patient    Ambulatory dysfunction  Multifactorial  Continue SNF rehab  Continue PT/OT  Fall precautions    Leverne Siemens, DO  2/19/21

## 2021-02-21 RX ORDER — WARFARIN SODIUM 2.5 MG/1
TABLET ORAL
Qty: 120 TABLET | Refills: 3
Start: 2021-02-21 | End: 2021-02-23

## 2021-02-22 ENCOUNTER — NURSING HOME VISIT (OUTPATIENT)
Dept: GERIATRICS | Facility: OTHER | Age: 86
End: 2021-02-22
Payer: MEDICARE

## 2021-02-22 ENCOUNTER — TRANSITIONAL CARE MANAGEMENT (OUTPATIENT)
Dept: INTERNAL MEDICINE CLINIC | Facility: CLINIC | Age: 86
End: 2021-02-22

## 2021-02-22 DIAGNOSIS — N18.31 TYPE 2 DIABETES MELLITUS WITH STAGE 3A CHRONIC KIDNEY DISEASE, WITHOUT LONG-TERM CURRENT USE OF INSULIN (HCC): ICD-10-CM

## 2021-02-22 DIAGNOSIS — M15.9 GENERALIZED OA: Primary | ICD-10-CM

## 2021-02-22 DIAGNOSIS — Z79.01 ANTICOAGULATED ON COUMADIN: ICD-10-CM

## 2021-02-22 DIAGNOSIS — R26.2 AMBULATORY DYSFUNCTION: ICD-10-CM

## 2021-02-22 DIAGNOSIS — I50.32 CHRONIC DIASTOLIC CHF (CONGESTIVE HEART FAILURE) (HCC): ICD-10-CM

## 2021-02-22 DIAGNOSIS — I48.0 PAROXYSMAL ATRIAL FIBRILLATION (HCC): ICD-10-CM

## 2021-02-22 DIAGNOSIS — E11.22 TYPE 2 DIABETES MELLITUS WITH STAGE 3A CHRONIC KIDNEY DISEASE, WITHOUT LONG-TERM CURRENT USE OF INSULIN (HCC): ICD-10-CM

## 2021-02-22 DIAGNOSIS — N18.31 STAGE 3A CHRONIC KIDNEY DISEASE (HCC): ICD-10-CM

## 2021-02-22 PROCEDURE — 99316 NF DSCHRG MGMT 30 MIN+: CPT | Performed by: FAMILY MEDICINE

## 2021-02-22 NOTE — ASSESSMENT & PLAN NOTE
Goal INR 2-3 in setting of Afib  INR 2 4 on 2/17  Coumadin 5mg on 2/17  Coumadin 2 5mg 2/18 through 2/21  Recheck PT/INR 2/22  Home regimen 2 5mg 5x/week and 3 5mg 2x/week per patient

## 2021-02-22 NOTE — PROGRESS NOTES
[facility]  [address]  [phone]  [facility]:  [address]: SNF/Short Term Rehab   Date of Service: 2/22/21    NAME: Timoteo Wang  AGE: 80 y o  SEX: female  DATE OF ADMISSION: 1/28/21 DATE OF DISCHARGE:2/23/21 DISCHARGE DISPOSITION: Home    Reason for admission: Patient was admitted from [facility] for rehabilitation after hospitalization for generalized OA with acute on chronic pain and debility/ambulatory dysfunction      Admission Diagnoses:   Generalized OA  With bilateral knee and low back pain, bilateral shoulder pain  Associated ambulatory dysfunction  Continue scheduled acetaminophen 975mg TID  Continue topical aspercreme 4% lidocaine patches to b/l knees and low back  Continue volatren gel to bilateral shoulders  Continue PT through home health at discharge    Atrial fibrillation [facility])  With pacemaker in place in setting of heart block  Anticoagulation management as outlined below  Continue metoprolol, amiodarone    Anticoagulated on Coumadin  Goal INR 2-3 in setting of Afib  INR 2 0 on 2/22  Coumadin 5mg on 2/22, 2/24, 2/26  Coumadin 2 5mg 2/23, 2/25  Recheck PT/INR and further coumadin instructions as per PCP- patient has scheduled appointment on Friday 2/26    Type 2 diabetes mellitus with stage 3a chronic kidney disease, without long-term current use of insulin ([facility] )    Lab Results   Component Value Date    HGBA1C 6 0 (H) 01/08/2021   Diet controlled; goal A1c <8 5 given age and comorbidities    Chronic diastolic CHF (congestive heart failure) (HCC)  Weight stable  Continues on lasix, lisinopril, metoprolol  Recommend repeat BMP in 1-2 weeks        Stage 3a chronic kidney disease  Baseline creatinine 0 85    Ambulatory dysfunction  Multifactorial  Completed SNF rehab  Continue PT/OT through home health at discharge  Fall precautions    Additional Problems: None  Discharge Diagnoses: Same as above    Course of stay: Patient was admitted to [facility] for rehabilitation due to above  Significant events during the stay include:  -Variable INR; see coumadin management recommendations as above    The patient participated in PT/OT  See SNF therapy notes for full details  Labs and testing performed during stay:  INR (2/22/21): 2 0 <- 2 4 (2/17) <- 3 3 (2/15)    New Medications:   -Acetaminophen 975mg TID  -Voltaren gel 1%  2g topically TID to bilateral shoulders  -Aspercreme 4% patches, to bilateral knees and low back daily- on 12h, off 12h  Medication Changes:   -Coumadin 5mg on 2/22, 2/24, 2/26; coumadin 2 5mg 2/23, 2/25  Discontinued Medications:  -Tramadol  -OxyIR  Discharge Medications: See discharge medication list which was reviewed and updated in Epic to reflect most current SNF orders  Status at time of discharge: Stable    Today's Visit: 2/22/21    Subjective:80year old female evaluated for STR follow up and discharge visit  INR 2 0 today, see coumadin orders as above  Pain controlled with scheduled acetaminophen and topical agents; has not used PRN OxyIR since last visit  Weight stable  Patient looking forward to going home 2/22; discharge delayed until 2/23 due to snow  Vitals:129/76 (-153/93) 86 98 1F 92% RA 181lb    Exam: Physical Exam  Vitals signs and nursing note reviewed  Constitutional:       General: She is awake  She is not in acute distress  Appearance: Normal appearance  She is well-developed, well-groomed and overweight  She is not ill-appearing, toxic-appearing or diaphoretic  HENT:      Head: Normocephalic and atraumatic  Right Ear: External ear normal       Left Ear: External ear normal       Nose: No rhinorrhea  Mouth/Throat:      Mouth: Mucous membranes are moist       Pharynx: Oropharynx is clear  Eyes:      General: No scleral icterus  Right eye: No discharge  Left eye: No discharge        Conjunctiva/sclera: Conjunctivae normal  Neck:      Musculoskeletal: Neck supple  No neck rigidity  Pulmonary:      Effort: Pulmonary effort is normal  No respiratory distress  Abdominal:      General: There is no distension  Palpations: Abdomen is soft  Musculoskeletal:         General: No tenderness  Right lower leg: No edema  Left lower leg: No edema  Skin:     General: Skin is warm and dry  Coloration: Skin is not jaundiced or pale  Neurological:      General: No focal deficit present  Mental Status: She is alert  Mental status is at baseline  Cranial Nerves: No cranial nerve deficit  Psychiatric:         Attention and Perception: Attention and perception normal          Mood and Affect: Mood and affect normal          Speech: Speech normal          Behavior: Behavior normal  Behavior is cooperative  Thought Content: Thought content normal          Cognition and Memory: Cognition and memory normal          Judgment: Judgment normal          Discussion with patient/family and further instructions:  -Fall precautions  -Coumadin management instructions  -Medication list was reviewed and updated in Epic to reflect most current SNF orders  -Face to face form for home health (PT, OT, nursing) was completed and placed in chart    Follow-up Recommendations: Please follow-up with your primary care physician within 7-10 days of discharge to review medication changes and current status  Discharge summary forwarded to PCP through Epic  Problem List Follow-up Recommendations:  -See above    I have spent 40 minutes with Patient  today in which greater than 50% of this time was spent in counseling/coordination of care regarding Risks and benefits of tx options, Intructions for management, Patient and family education, Risk factor reductions, Impressions and coordination of discharge care with patient, social work and nursing      [address] [facility],   2/22/21

## 2021-02-22 NOTE — ASSESSMENT & PLAN NOTE
With bilateral knee and low back pain, bilateral shoulder pain  Associated ambulatory dysfunction  Continue scheduled acetaminophen 975mg TID  Continue topical aspercreme 4% lidocaine patches to b/l knees and low back  Continue volatren gel to bilateral shoulders  Continue low dose OxyIR 2 5mg daily PRN severe pain- used x1 with minimal relief

## 2021-02-22 NOTE — ASSESSMENT & PLAN NOTE
Continue to monitor daily weights/CHF pathway; weight stable overall  Continue lasix, lisinopril with hold parameters   BMP 2/15 reviewed/stable except for uptrend in BUN- no hypotension noted, no signs of bleeding on exam; plan to check CBC, BMP in one week

## 2021-02-23 RX ORDER — WARFARIN SODIUM 2.5 MG/1
TABLET ORAL
Qty: 120 TABLET | Refills: 3
Start: 2021-02-23 | End: 2021-02-26

## 2021-02-23 NOTE — ASSESSMENT & PLAN NOTE
Lab Results   Component Value Date    HGBA1C 6 0 (H) 01/08/2021   Diet controlled; goal A1c <8 5 given age and comorbidities

## 2021-02-23 NOTE — ASSESSMENT & PLAN NOTE
Multifactorial  Completed SNF rehab  Continue PT/OT through home health at discharge  Fall precautions

## 2021-02-23 NOTE — ASSESSMENT & PLAN NOTE
Goal INR 2-3 in setting of Afib  INR 2 0 on 2/22  Coumadin 5mg on 2/22, 2/24, 2/26  Coumadin 2 5mg 2/23, 2/25  Recheck PT/INR and further coumadin instructions as per PCP- patient has scheduled appointment on Friday 2/26

## 2021-02-26 ENCOUNTER — TELEMEDICINE (OUTPATIENT)
Dept: INTERNAL MEDICINE CLINIC | Facility: CLINIC | Age: 86
End: 2021-02-26
Payer: MEDICARE

## 2021-02-26 ENCOUNTER — TELEPHONE (OUTPATIENT)
Dept: INTERNAL MEDICINE CLINIC | Facility: CLINIC | Age: 86
End: 2021-02-26

## 2021-02-26 DIAGNOSIS — I10 ESSENTIAL HYPERTENSION: Chronic | ICD-10-CM

## 2021-02-26 DIAGNOSIS — L71.9 ROSACEA: ICD-10-CM

## 2021-02-26 DIAGNOSIS — I48.0 PAROXYSMAL ATRIAL FIBRILLATION (HCC): ICD-10-CM

## 2021-02-26 DIAGNOSIS — M15.9 GENERALIZED OA: Primary | ICD-10-CM

## 2021-02-26 DIAGNOSIS — I25.10 CORONARY ARTERY DISEASE INVOLVING NATIVE CORONARY ARTERY OF NATIVE HEART WITHOUT ANGINA PECTORIS: Chronic | ICD-10-CM

## 2021-02-26 DIAGNOSIS — N18.31 TYPE 2 DIABETES MELLITUS WITH STAGE 3A CHRONIC KIDNEY DISEASE, WITHOUT LONG-TERM CURRENT USE OF INSULIN (HCC): ICD-10-CM

## 2021-02-26 DIAGNOSIS — N18.31 TYPE 2 DIABETES MELLITUS WITH STAGE 3A CHRONIC KIDNEY DISEASE, WITHOUT LONG-TERM CURRENT USE OF INSULIN (HCC): Primary | ICD-10-CM

## 2021-02-26 DIAGNOSIS — E78.2 MIXED HYPERLIPIDEMIA: Chronic | ICD-10-CM

## 2021-02-26 DIAGNOSIS — H35.30 MACULAR DEGENERATION OF BOTH EYES, UNSPECIFIED TYPE: ICD-10-CM

## 2021-02-26 DIAGNOSIS — I50.32 CHRONIC DIASTOLIC CHF (CONGESTIVE HEART FAILURE) (HCC): ICD-10-CM

## 2021-02-26 DIAGNOSIS — Z79.01 ANTICOAGULATED ON COUMADIN: ICD-10-CM

## 2021-02-26 DIAGNOSIS — E11.22 TYPE 2 DIABETES MELLITUS WITH STAGE 3A CHRONIC KIDNEY DISEASE, WITHOUT LONG-TERM CURRENT USE OF INSULIN (HCC): Primary | ICD-10-CM

## 2021-02-26 DIAGNOSIS — E11.22 TYPE 2 DIABETES MELLITUS WITH STAGE 3A CHRONIC KIDNEY DISEASE, WITHOUT LONG-TERM CURRENT USE OF INSULIN (HCC): ICD-10-CM

## 2021-02-26 DIAGNOSIS — E03.9 ACQUIRED HYPOTHYROIDISM: ICD-10-CM

## 2021-02-26 PROCEDURE — 99495 TRANSJ CARE MGMT MOD F2F 14D: CPT | Performed by: INTERNAL MEDICINE

## 2021-02-26 RX ORDER — BLOOD SUGAR DIAGNOSTIC
STRIP MISCELLANEOUS
Qty: 100 EACH | Refills: 3 | Status: SHIPPED | OUTPATIENT
Start: 2021-02-26 | End: 2021-02-26 | Stop reason: ALTCHOICE

## 2021-02-26 RX ORDER — BLOOD SUGAR DIAGNOSTIC
1 STRIP MISCELLANEOUS DAILY
Qty: 100 EACH | Refills: 3 | Status: SHIPPED | OUTPATIENT
Start: 2021-02-26 | End: 2021-03-01

## 2021-02-26 RX ORDER — BLOOD-GLUCOSE METER
EACH MISCELLANEOUS DAILY
Qty: 1 KIT | Refills: 0 | Status: SHIPPED | OUTPATIENT
Start: 2021-02-26 | End: 2021-03-01

## 2021-02-26 RX ORDER — BLOOD-GLUCOSE METER
EACH MISCELLANEOUS DAILY
Qty: 1 KIT | Refills: 0 | Status: SHIPPED | OUTPATIENT
Start: 2021-02-26 | End: 2021-02-26 | Stop reason: ALTCHOICE

## 2021-02-26 RX ORDER — ANTIOX #8/OM3/DHA/EPA/LUT/ZEAX 250-2.5 MG
1 CAPSULE ORAL 2 TIMES DAILY
Start: 2021-02-26 | End: 2021-05-11 | Stop reason: HOSPADM

## 2021-02-26 RX ORDER — WARFARIN SODIUM 2.5 MG/1
TABLET ORAL
Qty: 120 TABLET | Refills: 3
Start: 2021-02-26 | End: 2021-05-11 | Stop reason: HOSPADM

## 2021-02-26 NOTE — PROGRESS NOTES
Assessment/Plan:   Continue all current medications  Continue PT  I will message the cardiology coumadin clinic to get her reestablished with them     Problem List Items Addressed This Visit        Endocrine    Acquired hypothyroidism    Type 2 diabetes mellitus with stage 3a chronic kidney disease, without long-term current use of insulin (HCC)    Relevant Medications    Blood Glucose Monitoring Suppl (Accu-Chek Farheen Plus) w/Device KIT    glucose blood (Accu-Chek Farheen Plus) test strip       Cardiovascular and Mediastinum    Coronary artery disease involving native coronary artery of native heart without angina pectoris (Chronic)    Essential hypertension (Chronic)    Atrial fibrillation (HCC)    Relevant Medications    warfarin (COUMADIN) 2 5 mg tablet    Chronic diastolic CHF (congestive heart failure) (HCC)       Musculoskeletal and Integument    Generalized OA - Primary       Other    Hyperlipidemia (Chronic)    Anticoagulated on Coumadin      Other Visit Diagnoses     Rosacea        Macular degeneration of both eyes, unspecified type        Relevant Medications    Multiple Vitamins-Minerals (PreserVision AREDS 2) CAPS             Reason for visit is TCM    Encounter provider Honorio Mcfarland MD       Provider located at [address] [place] [phone]      Recent Visits  No visits were found meeting these conditions  Showing recent visits within past 7 days and meeting all other requirements     Today's Visits  Date Type Provider Dept   02/26/21 Telephone MD Joceline Gil 33   02/26/21 Telephone MD Joceline Gil 33   02/26/21 Telemedicine Honorio Mcfarland MD [phone] today's visits and meeting all other requirements     Future Appointments  No visits were found meeting these conditions     Showing future appointments within next 150 days and meeting all other requirements        After connecting through aihuishou, the patient was identified by name and date of birth  Davie Smith was informed that this is a telemedicine visit and that the visit is being conducted through [employer] and patient was informed that this is a secure, HIPAA-compliant platform  She agrees to proceed     My office door was closed  No one else was in the room  She acknowledged consent and understanding of privacy and security of the video platform  The patient has agreed to participate and understands they can discontinue the visit at any time  Patient is aware this is a billable service  Subjective:     Patient ID: Davie Smith is a 80 y o  female  HPI  Known severe OA and admitted in January for ambulatory dysfunction  She was discharged to rehab and went back home 4 days ago  She has VNA and PT set up  Continues to have significant pain but admits that she is moving around better with her walker  She is using OTC lidocaine patches, Voltaren  and Tylenol  DM diet controlled  Needs a new meter and strips  A1C in Jan was 6    Review of Systems   HENT: Positive for voice change (hoarse at times)  Negative for congestion, rhinorrhea, sore throat and trouble swallowing  Respiratory: Positive for cough (occasional)  Gastrointestinal: Negative for abdominal pain, constipation, diarrhea, nausea and vomiting  Genitourinary: Negative for difficulty urinating  Incontinence   Musculoskeletal: Positive for arthralgias  Neurological: Negative for dizziness and headaches  Objective: There were no vitals filed for this visit  Physical Exam  Constitutional:       General: She is not in acute distress  Appearance: She is not toxic-appearing or diaphoretic  Pulmonary:      Effort: No respiratory distress  Neurological:      Mental Status: She is oriented to person, place, and time     Psychiatric:         Mood and Affect: Mood normal          Behavior: Behavior normal  Transitional Care Management Review:  Joycelyn Kang is a 80 y o  female here for TCM follow up  During the TCM phone call patient stated:    TCM Call (since 1/26/2021)     Date and time call was made  2/22/2021 10:26 AM    Hospital care reviewed  Records not available    Patient was hospitialized at  Other (comment)        [address] [place] [address]    Date of Admission  01/28/21    Date of discharge  02/22/21    Diagnosis  Ambulatory dysfunction    Disposition  Home    Were the patients medications reviewed and updated  No      TCM Call (since 1/26/2021)     Should patient be enrolled in anticoag monitoring? Yes    Scheduled for follow up? Yes    Not clinically warranted  Patient discharged to a Non  SNF  I have advised the patient to call PCP with any new or worsening symptoms  Lawerance Mess -     Counseling  Caregiver          I spent >30 minutes with the patient during this visit      Yanni Davison MD

## 2021-02-26 NOTE — TELEPHONE ENCOUNTER
[facility] calling in stating they received the order for the pt's testing supplies however they are discontinuing the Acc-Chek Farheen Plus so they are asking for a different meter to be sent in  Also will need and order for lancets and when you send in for the test strips it must have specifics on how often patient tests daily for Medicare purposes      Please send in new orders Dale Tillman

## 2021-02-26 NOTE — TELEPHONE ENCOUNTER
The patient's glucometer is not working correctly  She got it in  and it  in   She would like if you could give her a script for a new one   The patient has a virtual appointment this afternoon

## 2021-03-01 DIAGNOSIS — I50.9 CONGESTIVE HEART FAILURE, UNSPECIFIED HF CHRONICITY, UNSPECIFIED HEART FAILURE TYPE (HCC): Primary | ICD-10-CM

## 2021-03-01 DIAGNOSIS — N18.31 TYPE 2 DIABETES MELLITUS WITH STAGE 3A CHRONIC KIDNEY DISEASE, WITHOUT LONG-TERM CURRENT USE OF INSULIN (HCC): Primary | ICD-10-CM

## 2021-03-01 DIAGNOSIS — E11.22 TYPE 2 DIABETES MELLITUS WITH STAGE 3A CHRONIC KIDNEY DISEASE, WITHOUT LONG-TERM CURRENT USE OF INSULIN (HCC): Primary | ICD-10-CM

## 2021-03-01 RX ORDER — LANCETS
EACH MISCELLANEOUS
Qty: 100 EACH | Refills: 3 | Status: SHIPPED | OUTPATIENT
Start: 2021-03-01

## 2021-03-01 RX ORDER — BLOOD SUGAR DIAGNOSTIC
STRIP MISCELLANEOUS
Qty: 100 EACH | Refills: 3 | Status: SHIPPED | OUTPATIENT
Start: 2021-03-01

## 2021-03-01 RX ORDER — BLOOD-GLUCOSE METER
EACH MISCELLANEOUS
Qty: 1 KIT | Refills: 0 | Status: SHIPPED | OUTPATIENT
Start: 2021-03-01

## 2021-03-02 ENCOUNTER — ANTICOAG VISIT (OUTPATIENT)
Dept: CARDIOLOGY CLINIC | Facility: CLINIC | Age: 86
End: 2021-03-02

## 2021-03-02 DIAGNOSIS — I48.0 PAROXYSMAL ATRIAL FIBRILLATION (HCC): ICD-10-CM

## 2021-03-02 LAB — INR PPP: 4.5 (ref 0.84–1.19)

## 2021-03-05 RX ORDER — FUROSEMIDE 20 MG/1
20 TABLET ORAL DAILY
Qty: 90 TABLET | Refills: 2 | Status: SHIPPED | OUTPATIENT
Start: 2021-03-05 | End: 2022-07-29 | Stop reason: SDUPTHER

## 2021-03-09 ENCOUNTER — ANTICOAG VISIT (OUTPATIENT)
Dept: CARDIOLOGY CLINIC | Facility: CLINIC | Age: 86
End: 2021-03-09

## 2021-03-09 DIAGNOSIS — I10 ESSENTIAL HYPERTENSION: Chronic | ICD-10-CM

## 2021-03-09 DIAGNOSIS — I25.10 CAD (CORONARY ARTERY DISEASE): ICD-10-CM

## 2021-03-09 DIAGNOSIS — M15.9 GENERALIZED OA: ICD-10-CM

## 2021-03-09 DIAGNOSIS — I48.0 PAROXYSMAL ATRIAL FIBRILLATION (HCC): ICD-10-CM

## 2021-03-09 DIAGNOSIS — E11.8 TYPE 2 DIABETES MELLITUS WITH COMPLICATION, WITHOUT LONG-TERM CURRENT USE OF INSULIN (HCC): ICD-10-CM

## 2021-03-09 LAB — INR PPP: 3.6 (ref 0.84–1.19)

## 2021-03-09 RX ORDER — ASPIRIN 81 MG/1
TABLET, COATED ORAL
Qty: 90 TABLET | Refills: 0 | Status: SHIPPED | OUTPATIENT
Start: 2021-03-09

## 2021-03-09 RX ORDER — LISINOPRIL 2.5 MG/1
TABLET ORAL
Qty: 90 TABLET | Refills: 1 | Status: SHIPPED | OUTPATIENT
Start: 2021-03-09 | End: 2021-11-18

## 2021-03-11 ENCOUNTER — IMMUNIZATIONS (OUTPATIENT)
Dept: FAMILY MEDICINE CLINIC | Facility: HOSPITAL | Age: 86
End: 2021-03-11

## 2021-03-11 DIAGNOSIS — Z23 ENCOUNTER FOR IMMUNIZATION: Primary | ICD-10-CM

## 2021-03-11 PROCEDURE — 0001A SARS-COV-2 / COVID-19 MRNA VACCINE (PFIZER-BIONTECH) 30 MCG: CPT

## 2021-03-11 PROCEDURE — 91300 SARS-COV-2 / COVID-19 MRNA VACCINE (PFIZER-BIONTECH) 30 MCG: CPT

## 2021-03-15 ENCOUNTER — REMOTE DEVICE CLINIC VISIT (OUTPATIENT)
Dept: CARDIOLOGY CLINIC | Facility: CLINIC | Age: 86
End: 2021-03-15
Payer: MEDICARE

## 2021-03-15 DIAGNOSIS — Z95.0 PRESENCE OF CARDIAC PACEMAKER: Primary | ICD-10-CM

## 2021-03-15 PROCEDURE — 93296 REM INTERROG EVL PM/IDS: CPT | Performed by: INTERNAL MEDICINE

## 2021-03-15 PROCEDURE — 93294 REM INTERROG EVL PM/LDLS PM: CPT | Performed by: INTERNAL MEDICINE

## 2021-03-15 NOTE — PROGRESS NOTES
Results for orders placed or performed in visit on 03/15/21   Cardiac EP device report    Narrative    MDT-DUAL CHAMBER PPM (DDDR MODE) - ACTIVE SYSTEM IS MRI CONDITIONAL  CARELINK TRANSMISSION: BATTERY VOLTAGE ADEQUATE (10 1 YRS)  AP: 27 4%  : 99 9% (>40%~CHB)  ALL AVAILABLE LEAD PARAMETERS WITHIN NORMAL LIMITS  NO SIGNIFICANT HIGH RATE EPISODES  PACEMAKER FUNCTIONING APPROPRIATELY    [address] [place] [address]

## 2021-03-16 ENCOUNTER — ANTICOAG VISIT (OUTPATIENT)
Dept: CARDIOLOGY CLINIC | Facility: CLINIC | Age: 86
End: 2021-03-16

## 2021-03-16 DIAGNOSIS — I48.0 PAROXYSMAL ATRIAL FIBRILLATION (HCC): ICD-10-CM

## 2021-03-16 LAB — INR PPP: 2.7 (ref 0.84–1.19)

## 2021-03-17 DIAGNOSIS — I25.10 CORONARY ARTERY DISEASE INVOLVING NATIVE CORONARY ARTERY OF NATIVE HEART WITHOUT ANGINA PECTORIS: Chronic | ICD-10-CM

## 2021-03-17 DIAGNOSIS — I47.2 VENTRICULAR TACHYCARDIA (HCC): ICD-10-CM

## 2021-03-17 RX ORDER — AMIODARONE HYDROCHLORIDE 200 MG/1
100 TABLET ORAL DAILY
Qty: 45 TABLET | Refills: 3 | Status: SHIPPED | OUTPATIENT
Start: 2021-03-17 | End: 2021-07-02 | Stop reason: SDUPTHER

## 2021-03-17 RX ORDER — ISOSORBIDE MONONITRATE 30 MG/1
30 TABLET, EXTENDED RELEASE ORAL DAILY
Qty: 90 TABLET | Refills: 3 | Status: SHIPPED | OUTPATIENT
Start: 2021-03-17

## 2021-03-26 LAB
LEFT EYE DIABETIC RETINOPATHY: NORMAL
RIGHT EYE DIABETIC RETINOPATHY: NORMAL

## 2021-04-01 ENCOUNTER — IMMUNIZATIONS (OUTPATIENT)
Dept: FAMILY MEDICINE CLINIC | Facility: HOSPITAL | Age: 86
End: 2021-04-01

## 2021-04-01 DIAGNOSIS — Z23 ENCOUNTER FOR IMMUNIZATION: Primary | ICD-10-CM

## 2021-04-01 PROCEDURE — 91300 SARS-COV-2 / COVID-19 MRNA VACCINE (PFIZER-BIONTECH) 30 MCG: CPT

## 2021-04-01 PROCEDURE — 0002A SARS-COV-2 / COVID-19 MRNA VACCINE (PFIZER-BIONTECH) 30 MCG: CPT

## 2021-04-07 DIAGNOSIS — N18.31 TYPE 2 DIABETES MELLITUS WITH STAGE 3A CHRONIC KIDNEY DISEASE, WITHOUT LONG-TERM CURRENT USE OF INSULIN (HCC): Primary | ICD-10-CM

## 2021-04-07 DIAGNOSIS — E11.22 TYPE 2 DIABETES MELLITUS WITH STAGE 3A CHRONIC KIDNEY DISEASE, WITHOUT LONG-TERM CURRENT USE OF INSULIN (HCC): Primary | ICD-10-CM

## 2021-04-07 RX ORDER — LANCETS
EACH MISCELLANEOUS DAILY
Qty: 100 EACH | Refills: 1 | Status: SHIPPED | OUTPATIENT
Start: 2021-04-07

## 2021-04-08 ENCOUNTER — ANTICOAG VISIT (OUTPATIENT)
Dept: CARDIOLOGY CLINIC | Facility: CLINIC | Age: 86
End: 2021-04-08

## 2021-04-08 DIAGNOSIS — I48.0 PAROXYSMAL ATRIAL FIBRILLATION (HCC): ICD-10-CM

## 2021-04-08 LAB — INR PPP: 2.3 (ref 0.84–1.19)

## 2021-04-27 PROBLEM — N18.30 HYPERTENSIVE HEART AND KIDNEY DISEASE WITH HF AND WITH CKD STAGE III (HCC): Status: ACTIVE | Noted: 2021-04-27

## 2021-04-27 PROBLEM — E66.01 SEVERE OBESITY (BMI 35.0-39.9) WITH COMORBIDITY (HCC): Status: ACTIVE | Noted: 2021-04-27

## 2021-04-27 PROBLEM — Z89.422 ACQUIRED ABSENCE OF OTHER LEFT TOE(S) (HCC): Status: ACTIVE | Noted: 2021-04-27

## 2021-04-27 PROBLEM — I13.0 HYPERTENSIVE HEART AND KIDNEY DISEASE WITH HF AND WITH CKD STAGE III (HCC): Status: ACTIVE | Noted: 2021-04-27

## 2021-04-29 ENCOUNTER — ANTICOAG VISIT (OUTPATIENT)
Dept: CARDIOLOGY CLINIC | Facility: CLINIC | Age: 86
End: 2021-04-29

## 2021-04-29 DIAGNOSIS — I48.0 PAROXYSMAL ATRIAL FIBRILLATION (HCC): ICD-10-CM

## 2021-04-29 LAB — INR PPP: 1.9 (ref 0.84–1.19)

## 2021-05-09 ENCOUNTER — APPOINTMENT (EMERGENCY)
Dept: RADIOLOGY | Facility: HOSPITAL | Age: 86
DRG: 554 | End: 2021-05-09
Payer: MEDICARE

## 2021-05-09 ENCOUNTER — HOSPITAL ENCOUNTER (INPATIENT)
Facility: HOSPITAL | Age: 86
LOS: 2 days | Discharge: NURSING, REHAB OR LONG-TERM CARE | DRG: 554 | End: 2021-05-11
Attending: EMERGENCY MEDICINE | Admitting: INTERNAL MEDICINE
Payer: MEDICARE

## 2021-05-09 ENCOUNTER — APPOINTMENT (INPATIENT)
Dept: RADIOLOGY | Facility: HOSPITAL | Age: 86
DRG: 554 | End: 2021-05-09
Payer: MEDICARE

## 2021-05-09 DIAGNOSIS — M25.511 RIGHT SHOULDER PAIN: ICD-10-CM

## 2021-05-09 DIAGNOSIS — Z79.01 ANTICOAGULATED ON COUMADIN: ICD-10-CM

## 2021-05-09 DIAGNOSIS — M17.0 PRIMARY OSTEOARTHRITIS OF BOTH KNEES: ICD-10-CM

## 2021-05-09 DIAGNOSIS — R26.2 AMBULATORY DYSFUNCTION: Primary | ICD-10-CM

## 2021-05-09 DIAGNOSIS — S42.309A HUMERUS FRACTURE: ICD-10-CM

## 2021-05-09 DIAGNOSIS — M25.561 RIGHT KNEE PAIN: ICD-10-CM

## 2021-05-09 DIAGNOSIS — M54.50 ACUTE LUMBAR BACK PAIN: ICD-10-CM

## 2021-05-09 DIAGNOSIS — E03.9 ACQUIRED HYPOTHYROIDISM: ICD-10-CM
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
## 2021-05-09 PROBLEM — R82.71 ASYMPTOMATIC BACTERIURIA: Status: ACTIVE | Noted: 2021-05-09

## 2021-05-09 PROBLEM — G89.29 KNEE PAIN, CHRONIC: Status: ACTIVE | Noted: 2021-05-09

## 2021-05-09 PROBLEM — M25.569 KNEE PAIN, CHRONIC: Status: ACTIVE | Noted: 2021-05-09

## 2021-05-09 LAB
ALBUMIN SERPL BCP-MCNC: 3.1 G/DL (ref 3.5–5)
ALP SERPL-CCNC: 69 U/L (ref 46–116)
ALT SERPL W P-5'-P-CCNC: 14 U/L (ref 12–78)
ANION GAP SERPL CALCULATED.3IONS-SCNC: 3 MMOL/L (ref 4–13)
AST SERPL W P-5'-P-CCNC: 12 U/L (ref 5–45)
BACTERIA UR QL AUTO: ABNORMAL /HPF
BASOPHILS # BLD AUTO: 0.01 THOUSANDS/ΜL (ref 0–0.1)
BASOPHILS NFR BLD AUTO: 0 % (ref 0–1)
BILIRUB SERPL-MCNC: 0.58 MG/DL (ref 0.2–1)
BILIRUB UR QL STRIP: NEGATIVE
BUN SERPL-MCNC: 29 MG/DL (ref 5–25)
CALCIUM ALBUM COR SERPL-MCNC: 9.5 MG/DL (ref 8.3–10.1)
CALCIUM SERPL-MCNC: 8.8 MG/DL (ref 8.3–10.1)
CHLORIDE SERPL-SCNC: 105 MMOL/L (ref 100–108)
CLARITY UR: ABNORMAL
CO2 SERPL-SCNC: 31 MMOL/L (ref 21–32)
COLOR UR: YELLOW
CREAT SERPL-MCNC: 0.82 MG/DL (ref 0.6–1.3)
EOSINOPHIL # BLD AUTO: 0.12 THOUSAND/ΜL (ref 0–0.61)
EOSINOPHIL NFR BLD AUTO: 2 % (ref 0–6)
ERYTHROCYTE [DISTWIDTH] IN BLOOD BY AUTOMATED COUNT: 14.8 % (ref 11.6–15.1)
EST. AVERAGE GLUCOSE BLD GHB EST-MCNC: 117 MG/DL
GFR SERPL CREATININE-BSD FRML MDRD: 63 ML/MIN/1.73SQ M
GLUCOSE SERPL-MCNC: 106 MG/DL (ref 65–140)
GLUCOSE SERPL-MCNC: 142 MG/DL (ref 65–140)
GLUCOSE SERPL-MCNC: 86 MG/DL (ref 65–140)
GLUCOSE SERPL-MCNC: 88 MG/DL (ref 65–140)
GLUCOSE SERPL-MCNC: 89 MG/DL (ref 65–140)
GLUCOSE UR STRIP-MCNC: NEGATIVE MG/DL
HBA1C MFR BLD: 5.7 %
HCT VFR BLD AUTO: 34 % (ref 34.8–46.1)
HGB BLD-MCNC: 10.7 G/DL (ref 11.5–15.4)
HGB UR QL STRIP.AUTO: NEGATIVE
IMM GRANULOCYTES # BLD AUTO: 0.02 THOUSAND/UL (ref 0–0.2)
IMM GRANULOCYTES NFR BLD AUTO: 0 % (ref 0–2)
INR PPP: 2.03 (ref 0.84–1.19)
KETONES UR STRIP-MCNC: NEGATIVE MG/DL
LEUKOCYTE ESTERASE UR QL STRIP: ABNORMAL
LYMPHOCYTES # BLD AUTO: 1.15 THOUSANDS/ΜL (ref 0.6–4.47)
LYMPHOCYTES NFR BLD AUTO: 22 % (ref 14–44)
MAGNESIUM SERPL-MCNC: 2.1 MG/DL (ref 1.6–2.6)
MCH RBC QN AUTO: 30.2 PG (ref 26.8–34.3)
MCHC RBC AUTO-ENTMCNC: 31.5 G/DL (ref 31.4–37.4)
MCV RBC AUTO: 96 FL (ref 82–98)
MONOCYTES # BLD AUTO: 0.58 THOUSAND/ΜL (ref 0.17–1.22)
MONOCYTES NFR BLD AUTO: 11 % (ref 4–12)
NEUTROPHILS # BLD AUTO: 3.28 THOUSANDS/ΜL (ref 1.85–7.62)
NEUTS SEG NFR BLD AUTO: 65 % (ref 43–75)
NITRITE UR QL STRIP: POSITIVE
NON-SQ EPI CELLS URNS QL MICRO: ABNORMAL /HPF
NRBC BLD AUTO-RTO: 0 /100 WBCS
PH UR STRIP.AUTO: 6 [PH]
PHOSPHATE SERPL-MCNC: 4 MG/DL (ref 2.3–4.1)
PLATELET # BLD AUTO: 236 THOUSANDS/UL (ref 149–390)
PMV BLD AUTO: 9.4 FL (ref 8.9–12.7)
POTASSIUM SERPL-SCNC: 3.7 MMOL/L (ref 3.5–5.3)
PROT SERPL-MCNC: 6.8 G/DL (ref 6.4–8.2)
PROT UR STRIP-MCNC: ABNORMAL MG/DL
PROTHROMBIN TIME: 22.8 SECONDS (ref 11.6–14.5)
RBC # BLD AUTO: 3.54 MILLION/UL (ref 3.81–5.12)
RBC #/AREA URNS AUTO: ABNORMAL /HPF
SODIUM SERPL-SCNC: 139 MMOL/L (ref 136–145)
SP GR UR STRIP.AUTO: 1.02 (ref 1–1.03)
T4 FREE SERPL-MCNC: 0.48 NG/DL (ref 0.76–1.46)
TSH SERPL DL<=0.05 MIU/L-ACNC: 28.4 UIU/ML (ref 0.36–3.74)
UROBILINOGEN UR QL STRIP.AUTO: 0.2 E.U./DL
WBC # BLD AUTO: 5.16 THOUSAND/UL (ref 4.31–10.16)
WBC #/AREA URNS AUTO: ABNORMAL /HPF

## 2021-05-09 PROCEDURE — 74176 CT ABD & PELVIS W/O CONTRAST: CPT

## 2021-05-09 PROCEDURE — 73060 X-RAY EXAM OF HUMERUS: CPT

## 2021-05-09 PROCEDURE — 80053 COMPREHEN METABOLIC PANEL: CPT | Performed by: INTERNAL MEDICINE

## 2021-05-09 PROCEDURE — 70450 CT HEAD/BRAIN W/O DYE: CPT

## 2021-05-09 PROCEDURE — 84443 ASSAY THYROID STIM HORMONE: CPT | Performed by: INTERNAL MEDICINE

## 2021-05-09 PROCEDURE — 99223 1ST HOSP IP/OBS HIGH 75: CPT | Performed by: INTERNAL MEDICINE

## 2021-05-09 PROCEDURE — G1004 CDSM NDSC: HCPCS

## 2021-05-09 PROCEDURE — 73030 X-RAY EXAM OF SHOULDER: CPT

## 2021-05-09 PROCEDURE — 73564 X-RAY EXAM KNEE 4 OR MORE: CPT

## 2021-05-09 PROCEDURE — 97163 PT EVAL HIGH COMPLEX 45 MIN: CPT

## 2021-05-09 PROCEDURE — 84439 ASSAY OF FREE THYROXINE: CPT | Performed by: NURSE PRACTITIONER

## 2021-05-09 PROCEDURE — 84100 ASSAY OF PHOSPHORUS: CPT | Performed by: INTERNAL MEDICINE

## 2021-05-09 PROCEDURE — 85610 PROTHROMBIN TIME: CPT | Performed by: EMERGENCY MEDICINE

## 2021-05-09 PROCEDURE — 83735 ASSAY OF MAGNESIUM: CPT | Performed by: INTERNAL MEDICINE

## 2021-05-09 PROCEDURE — 97167 OT EVAL HIGH COMPLEX 60 MIN: CPT

## 2021-05-09 PROCEDURE — 83036 HEMOGLOBIN GLYCOSYLATED A1C: CPT | Performed by: INTERNAL MEDICINE

## 2021-05-09 PROCEDURE — 99285 EMERGENCY DEPT VISIT HI MDM: CPT

## 2021-05-09 PROCEDURE — 99285 EMERGENCY DEPT VISIT HI MDM: CPT | Performed by: EMERGENCY MEDICINE

## 2021-05-09 PROCEDURE — 99232 SBSQ HOSP IP/OBS MODERATE 35: CPT | Performed by: NURSE PRACTITIONER

## 2021-05-09 PROCEDURE — 36415 COLL VENOUS BLD VENIPUNCTURE: CPT | Performed by: EMERGENCY MEDICINE

## 2021-05-09 PROCEDURE — 72125 CT NECK SPINE W/O DYE: CPT

## 2021-05-09 PROCEDURE — 81001 URINALYSIS AUTO W/SCOPE: CPT | Performed by: INTERNAL MEDICINE

## 2021-05-09 PROCEDURE — 82948 REAGENT STRIP/BLOOD GLUCOSE: CPT

## 2021-05-09 PROCEDURE — 85025 COMPLETE CBC W/AUTO DIFF WBC: CPT | Performed by: INTERNAL MEDICINE

## 2021-05-09 RX ORDER — B-COMPLEX WITH VITAMIN C
1 TABLET ORAL 2 TIMES DAILY WITH MEALS
Status: DISCONTINUED | OUTPATIENT
Start: 2021-05-09 | End: 2021-05-11 | Stop reason: HOSPADM

## 2021-05-09 RX ORDER — ONDANSETRON 2 MG/ML
4 INJECTION INTRAMUSCULAR; INTRAVENOUS EVERY 6 HOURS PRN
Status: DISCONTINUED | OUTPATIENT
Start: 2021-05-09 | End: 2021-05-11 | Stop reason: HOSPADM

## 2021-05-09 RX ORDER — LEVOTHYROXINE SODIUM 0.05 MG/1
50 TABLET ORAL DAILY
Status: DISCONTINUED | OUTPATIENT
Start: 2021-05-09 | End: 2021-05-09

## 2021-05-09 RX ORDER — ACETAMINOPHEN 325 MG/1
975 TABLET ORAL 3 TIMES DAILY
Status: DISCONTINUED | OUTPATIENT
Start: 2021-05-09 | End: 2021-05-11 | Stop reason: HOSPADM

## 2021-05-09 RX ORDER — LIDOCAINE 50 MG/G
1 PATCH TOPICAL DAILY
Status: DISCONTINUED | OUTPATIENT
Start: 2021-05-09 | End: 2021-05-09

## 2021-05-09 RX ORDER — FUROSEMIDE 40 MG/1
20 TABLET ORAL DAILY
Status: DISCONTINUED | OUTPATIENT
Start: 2021-05-09 | End: 2021-05-11 | Stop reason: HOSPADM

## 2021-05-09 RX ORDER — METOPROLOL SUCCINATE 25 MG/1
25 TABLET, EXTENDED RELEASE ORAL
Status: DISCONTINUED | OUTPATIENT
Start: 2021-05-09 | End: 2021-05-11 | Stop reason: HOSPADM

## 2021-05-09 RX ORDER — AMIODARONE HYDROCHLORIDE 200 MG/1
100 TABLET ORAL DAILY
Status: DISCONTINUED | OUTPATIENT
Start: 2021-05-09 | End: 2021-05-11 | Stop reason: HOSPADM

## 2021-05-09 RX ORDER — HYDROMORPHONE HCL IN WATER/PF 6 MG/30 ML
0.2 PATIENT CONTROLLED ANALGESIA SYRINGE INTRAVENOUS
Status: DISCONTINUED | OUTPATIENT
Start: 2021-05-09 | End: 2021-05-09

## 2021-05-09 RX ORDER — ACETAMINOPHEN 325 MG/1
975 TABLET ORAL ONCE
Status: COMPLETED | OUTPATIENT
Start: 2021-05-09 | End: 2021-05-09

## 2021-05-09 RX ORDER — MAGNESIUM HYDROXIDE/ALUMINUM HYDROXICE/SIMETHICONE 120; 1200; 1200 MG/30ML; MG/30ML; MG/30ML
30 SUSPENSION ORAL EVERY 6 HOURS PRN
Status: DISCONTINUED | OUTPATIENT
Start: 2021-05-09 | End: 2021-05-11 | Stop reason: HOSPADM

## 2021-05-09 RX ORDER — POLYETHYLENE GLYCOL 3350 17 G/17G
17 POWDER, FOR SOLUTION ORAL DAILY
Status: DISCONTINUED | OUTPATIENT
Start: 2021-05-09 | End: 2021-05-11 | Stop reason: HOSPADM

## 2021-05-09 RX ORDER — OXYCODONE HYDROCHLORIDE 5 MG/1
5 TABLET ORAL EVERY 4 HOURS PRN
Status: DISCONTINUED | OUTPATIENT
Start: 2021-05-09 | End: 2021-05-11 | Stop reason: HOSPADM

## 2021-05-09 RX ORDER — PRAVASTATIN SODIUM 40 MG
40 TABLET ORAL DAILY
Status: DISCONTINUED | OUTPATIENT
Start: 2021-05-09 | End: 2021-05-11 | Stop reason: HOSPADM

## 2021-05-09 RX ORDER — LIDOCAINE 50 MG/G
1 PATCH TOPICAL DAILY
Status: DISCONTINUED | OUTPATIENT
Start: 2021-05-10 | End: 2021-05-11 | Stop reason: HOSPADM

## 2021-05-09 RX ORDER — ISOSORBIDE MONONITRATE 30 MG/1
30 TABLET, EXTENDED RELEASE ORAL DAILY
Status: DISCONTINUED | OUTPATIENT
Start: 2021-05-09 | End: 2021-05-11 | Stop reason: HOSPADM

## 2021-05-09 RX ORDER — OXYCODONE HYDROCHLORIDE 5 MG/1
2.5 TABLET ORAL EVERY 4 HOURS PRN
Status: DISCONTINUED | OUTPATIENT
Start: 2021-05-09 | End: 2021-05-11 | Stop reason: HOSPADM

## 2021-05-09 RX ORDER — DOCUSATE SODIUM 100 MG/1
100 CAPSULE, LIQUID FILLED ORAL 2 TIMES DAILY PRN
Status: DISCONTINUED | OUTPATIENT
Start: 2021-05-09 | End: 2021-05-11 | Stop reason: HOSPADM

## 2021-05-09 RX ORDER — ASPIRIN 81 MG/1
81 TABLET ORAL DAILY
Status: DISCONTINUED | OUTPATIENT
Start: 2021-05-09 | End: 2021-05-11 | Stop reason: HOSPADM

## 2021-05-09 RX ORDER — WARFARIN SODIUM 2.5 MG/1
2.5 TABLET ORAL
Status: DISCONTINUED | OUTPATIENT
Start: 2021-05-09 | End: 2021-05-11 | Stop reason: HOSPADM

## 2021-05-09 RX ORDER — LIDOCAINE 50 MG/G
1 PATCH TOPICAL ONCE
Status: DISCONTINUED | OUTPATIENT
Start: 2021-05-09 | End: 2021-05-09

## 2021-05-09 RX ORDER — LISINOPRIL 2.5 MG/1
2.5 TABLET ORAL DAILY
Status: DISCONTINUED | OUTPATIENT
Start: 2021-05-09 | End: 2021-05-11 | Stop reason: HOSPADM

## 2021-05-09 RX ORDER — OXYCODONE HYDROCHLORIDE 5 MG/1
5 TABLET ORAL ONCE
Status: COMPLETED | OUTPATIENT
Start: 2021-05-09 | End: 2021-05-09

## 2021-05-09 RX ADMIN — ACETAMINOPHEN 975 MG: 325 TABLET, FILM COATED ORAL at 17:24

## 2021-05-09 RX ADMIN — LIDOCAINE 1 PATCH: 50 PATCH TOPICAL at 03:34

## 2021-05-09 RX ADMIN — LEVOTHYROXINE SODIUM 50 MCG: 50 TABLET ORAL at 05:37

## 2021-05-09 RX ADMIN — ASPIRIN 81 MG: 81 TABLET, COATED ORAL at 08:51

## 2021-05-09 RX ADMIN — METOPROLOL SUCCINATE 25 MG: 25 TABLET, EXTENDED RELEASE ORAL at 17:24

## 2021-05-09 RX ADMIN — DICLOFENAC SODIUM 2 G: 10 GEL TOPICAL at 09:07

## 2021-05-09 RX ADMIN — Medication 1 TABLET: at 17:24

## 2021-05-09 RX ADMIN — LIDOCAINE 1 PATCH: 50 PATCH TOPICAL at 08:52

## 2021-05-09 RX ADMIN — ISOSORBIDE MONONITRATE 30 MG: 30 TABLET, EXTENDED RELEASE ORAL at 08:50

## 2021-05-09 RX ADMIN — DICLOFENAC SODIUM 4 G: 10 GEL TOPICAL at 17:25

## 2021-05-09 RX ADMIN — OXYCODONE HYDROCHLORIDE 5 MG: 5 TABLET ORAL at 18:38

## 2021-05-09 RX ADMIN — Medication 1 TABLET: at 08:52

## 2021-05-09 RX ADMIN — PRAVASTATIN SODIUM 40 MG: 40 TABLET ORAL at 08:52

## 2021-05-09 RX ADMIN — ACETAMINOPHEN 975 MG: 325 TABLET, FILM COATED ORAL at 03:24

## 2021-05-09 RX ADMIN — OXYCODONE HYDROCHLORIDE 5 MG: 5 TABLET ORAL at 14:34

## 2021-05-09 RX ADMIN — WARFARIN SODIUM 2.5 MG: 2.5 TABLET ORAL at 17:24

## 2021-05-09 RX ADMIN — Medication 1 TABLET: at 08:50

## 2021-05-09 RX ADMIN — ACETAMINOPHEN 975 MG: 325 TABLET, FILM COATED ORAL at 08:51

## 2021-05-09 RX ADMIN — DICLOFENAC SODIUM 4 G: 10 GEL TOPICAL at 21:00

## 2021-05-09 RX ADMIN — AMIODARONE HYDROCHLORIDE 100 MG: 200 TABLET ORAL at 08:51

## 2021-05-09 RX ADMIN — POLYETHYLENE GLYCOL 3350 17 G: 17 POWDER, FOR SOLUTION ORAL at 17:24

## 2021-05-09 RX ADMIN — OXYCODONE HYDROCHLORIDE 5 MG: 5 TABLET ORAL at 03:26

## 2021-05-09 RX ADMIN — LISINOPRIL 2.5 MG: 2.5 TABLET ORAL at 08:50

## 2021-05-09 RX ADMIN — FUROSEMIDE 20 MG: 20 TABLET ORAL at 08:51

## 2021-05-09 NOTE — CASE MANAGEMENT
PT IS NOT A 30 DAYS READMISSION  PT IS NOT A BUNDLE  CM spoke with pt and daughter Lo Pro to discuss DCP and cm role  Pt lives in a ranch home with her spouse  Prior to admission pt used a RW/SPC with ambulation  Pt has HHA 2 days/week to help with bathing  No prior hx of VNA  Pt's preference for pharmacy is [facility] in [place]  Pt denies any hx of drugs/ETOH or inpt psych stays  Pt has past rehab stay at [facility]  CM reviewed d/c planning process including the following: identifying help at home, patient preference for d/c planning needs, Discharge Lounge, [facility] Meds to Bed program, availability of treatment team to discuss questions or concerns patient and/or family may have regarding understanding medications and recognizing signs and symptoms once discharged  CM also encouraged patient to follow up with all recommended appointments after discharge  Patient advised of importance for patient and family to participate in managing patients medical well being

## 2021-05-09 NOTE — ASSESSMENT & PLAN NOTE
· Xray right shoulder shows Fracture of the anterior aspect of the right humeral head  · NWB right upper extremity  · Pain control with ATC APAP, lido patch, PRN oxycodone

## 2021-05-09 NOTE — ASSESSMENT & PLAN NOTE
Lab Results   Component Value Date    HGBA1C 5 7 (H) 05/09/2021   ·   · Not on any medication  Diet controlled  · Carbohydrate controlled diet  · Check blood sugars before meals and before bedtime with Accu-Cheks per protocol    Recent Labs     05/09/21  0725 05/09/21  1130   POCGLU 86 106       Blood Sugar Average: Last 72 hrs:  (P) 96

## 2021-05-09 NOTE — OCCUPATIONAL THERAPY NOTE
Occupational Therapy Evaluation     Patient Name: Hobart Kocher  GFRBO'O Date: 5/9/2021  Problem List  Principal Problem:    Ambulatory dysfunction  Active Problems:    Coronary artery disease involving native coronary artery of native heart without angina pectoris    Essential hypertension    Hyperlipidemia    Type 2 diabetes mellitus with stage 3a chronic kidney disease, without long-term current use of insulin (HCC)    Acquired hypothyroidism    Anticoagulated on Coumadin    Past Medical History  Past Medical History:   Diagnosis Date    Abnormal nuclear stress test     last assessed 04/03/2017    Anxiety     Arthritis     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    Bundle branch block, left     Cardiomyopathy ([address] )     Chest pain 3/9/2019    Chronic pain     chronic b/l shoulder pain    Chronic pain of right knee 4/2/2019    Coronary artery disease     Diabetes mellitus ([address] )     Gait disturbance 3/2/2018    GERD (gastroesophageal reflux disease)     H/O atrial flutter     History of DVT (deep vein thrombosis)     History of pulmonary embolism     Hyperlipidemia     Hypertension     Hypothyroid     Renal calculi     Shortness of breath      Past Surgical History  Past Surgical History:   Procedure Laterality Date    ATRIAL ABLATION SURGERY  01/2015    CARDIAC PACEMAKER PLACEMENT      CARDIOVERSION      CHELA/DCCV 10/22/2014    CARPAL TUNNEL RELEASE Right     CATARACT EXTRACTION      CORONARY ANGIOPLASTY WITH STENT PLACEMENT      HYSTERECTOMY      JOINT REPLACEMENT Right     AK AMPUTATION TOE,MT-P JT Left 10/11/2017    Procedure: AMPUTATION SECOND TOE;  Surgeon: Jasmin Rai DPM;  Location: [facility];  Service: Podiatry    TONSILLECTOMY      TOTAL HIP ARTHROPLASTY      Right         05/09/21 1015   OT Last Visit   OT Visit Date 05/09/21   Note Type   Note type Evaluation   Restrictions/Precautions   Weight Bearing Precautions Per Order No   Other Precautions Cognitive; Chair Alarm; Bed Alarm; Fall Risk;Pain   Pain Assessment   Pain Assessment Tool 0-10   Pain Score 6   Pain Location/Orientation Location: Generalized   Patient's Stated Pain Goal No pain   Hospital Pain Intervention(s) Repositioned; Ambulation/increased activity; Emotional support   Home Living   Type of [address] [place] [address] One level;Stairs to enter with rails  (3 RADHA )   Bathroom Shower/Tub Walk-in shower   Bathroom Toilet Standard   Bathroom Equipment Shower chair   [facility]; Wheelchair-manual  (TRANSPORT CHAIR )   Additional Comments PT REPORTS USE OF RW AND SC AT BASELINE    Prior Function   Level of [facility] Independent with ADLs and functional mobility; Needs assistance with IADLs   Lives With Spouse   Receives Help From Family;Home health   ADL Assistance Independent   IADLs Needs assistance   Falls in the last 6 months 1 to 4  (AT LEAST 2)   Vocational Retired   Lifestyle   Autonomy  [facility] [phone] [facility] [address] 2X/WK [address] [facility] [address]  PT REPORTS  YO SPOUSE ASSISTS HER AS NEEDED (?)  Reciprocal Relationships LIVES WITH 100 YO SPOUSE  HHA 2X/WK  LIMITED ADDITIONAL SUPPORT    Service to Others RETIRED   Intrinsic Gratification ENJOYS DOING STUFF AROUND THE HOUSE    Psychosocial   Psychosocial (WDL) WDL   ADL   Eating Assistance 5  Supervision/Setup   Grooming Assistance 4  Minimal Assistance   UB Bathing Assistance 3  Moderate Assistance   LB Bathing Assistance 2  Maximal Assistance   UB Dressing Assistance 3  Moderate Assistance   LB Dressing Assistance 2  Maximal Assistance   Toileting Assistance  2  Maximal Assistance   Functional Assistance 2  Maximal Assistance   Bed Mobility   Supine to Sit 3  Moderate assistance   Additional items Assist x 2; Increased time required;LE management;Verbal cues   Sit to Supine Unable to assess  (PT LEFT OOB WITH ALARM ON AND ALL NEEDS IN REACH )   Transfers Sit to Stand 3  Moderate assistance   Additional items Assist x 2; Increased time required;Verbal cues   Stand to Sit 3  Moderate assistance   Additional items Assist x 2; Increased time required;Verbal cues   Functional Mobility   Functional Mobility 3  Moderate assistance   Additional Comments AX2 WITH HHA FOR FEW STEPS TO DROP ARM CHAIR    Balance   Static Sitting Fair -   Dynamic Sitting Poor +   Static Standing Poor   Ambulatory Poor -   Activity Tolerance   Activity Tolerance Patient limited by fatigue;Patient limited by pain   Medical Staff Made Aware Fe De La Rosa, PT    Nurse Made Aware APPROPRIATE TO SEE PER RN    RUE Assessment   RUE Assessment WFL   LUE Assessment   LUE Assessment X  (LIMITED ROM, LESS THAN 90'S 2' ARTHRITIS )   Sensation   Light Touch No apparent deficits   Cognition   Overall Cognitive Status WFL   Arousal/Participation Alert; Cooperative   Attention Attends with cues to redirect   Orientation Level Oriented to person;Oriented to place;Oriented to situation   Memory Within functional limits   Following Commands Follows one step commands without difficulty   Comments PT IS PLEASANT AND COOPERATIVE  G INSIGHT INTO DEFICITS, AWARE AND AGREEABLE SHE WOULD BENEFIT FROM INPT REHAB  ALARM ON FOR SAFETY  Assessment   Limitation Decreased ADL status; Decreased Safe judgement during ADL;Decreased cognition;Decreased endurance;Decreased self-care trans;Decreased high-level ADLs   Prognosis Good;Fair   Assessment 81 YO Female SEEN FOR INITIAL OCCUPATIONAL THERAPY EVALUATION FOLLOWING ADMISSION TO [facility] S/P FALL, LANDING ON BUTTOCKS RESULTING IN ACUTE BACK PAIN  IMAGING (-)  PROBLEMS LIST INCLUDES DM, CKD, HLD, HTN, CAD, A-FLUTTER, ARTHRITIS, AND CHRONIC PAIN  PT IS FROM HOME WITH 100 YO SPOUSE WHERE SHE REPORTS BEING INDEPENDENT OVERALL WITH ADLS/LT IADLS PTA  PT REPORTS HAVING HHA 2X/WK TO ASSIST WITH BATHING/HEAVY IADLS, LIMITED ADDITIONAL SUPPORT   PT CURRENTLY REQUIRES OVERALL MOD-MAX A WITH ADLS AND MOD A X2 WITH TRANSFERS /LIMITED FUNCTIONAL MOBILITY WITH HHA  PT IS LIMITED 2' PAIN, FATIGUE, IMPAIRED BALANCE, FALL RISK , OVERALL WEAKNESS/DECONDITIONING , LIMITED FAMILY/FRIEND SUPPORT , INACCESSIBLE HOME ENVIRONMENT and OVERALL LIMITED ACTIVITY TOLERANCE  PT EDUCATED ON DEEP BREATHING TECHNIQUES T/O ACTIVITY and CONTINUE PARTICIPATION IN SELF-CARE/MOBILITY WITH STAFF WHILE IN THE HOSPITAL   The patient's raw score on the AM-PAC Daily Activity inpatient short form is 13, standardized score is 32 03, less than 39 4  Patients at this level are likely to benefit from discharge to post-acute rehabilitation services  Please refer to the recommendation of the Occupational Therapist for safe discharge planning  FROM AN OCCUPATIONAL THERAPY PERSPECTIVE, PT WOULD BENEFIT FROM ADDITIONAL OT SERVICES IN AN INPT REHAB SETTING UPON D/C  WILL CONT TO FOLLOW TO ADDRESS THE BELOW DESCRIBED GOALS      Goals   Patient Goals TO GET STRONGER    LTG Time Frame 10-14   Long Term Goal #1 SEE BELOW    Recommendation   Recommendation Geriatric Consult   OT Discharge Recommendation Post acute rehabilitation services   OT - OK to Discharge Yes   AM-PAC Daily Activity Inpatient   Lower Body Dressing 2   Bathing 2   Toileting 2   Upper Body Dressing 2   Grooming 2   Eating 3   Daily Activity Raw Score 13   Daily Activity Standardized Score (Calc for Raw Score >=11) 32 03   AM-PAC Applied Cognition Inpatient   Following a Speech/Presentation 3   Understanding Ordinary Conversation 4   Taking Medications 3   Remembering Where Things Are Placed or Put Away 4   Remembering List of 4-5 Errands 4   Taking Care of Complicated Tasks 3   Applied Cognition Raw Score 21   Applied Cognition Standardized Score 44 3   Modified [place] Scale   Modified [place] Scale 4       OCCUPATIONAL THERAPY GOALS TO BE MET WITHIN 14 DAYS:    -Pt will increase bed mobility to MOD I to participate in functional activities  -Pt will improve functional mobility and transfers to MOD I on/off all surfaces including toileting   -Pt will increase independence in UB/LB DRESSING to MOD I with G balance sitting upright in chair   -Pt will improve activity tolerance to G for 15 min txment sessions w/ G carry over of learned energy conservation techniques   -Pt will improve independence in lt homemaking activities to S LEVEL without requiring cues for safety   -Pt will demonstrate G carryover of learned safety techniques and proper body mechanics in functional and leisure activities with use of DME   -Pt will complete additional cognitive assessment with 100% attention to task in order to assist with safe d/c plan         Documentation completed by STACI Kelly, OTR/L 41

## 2021-05-09 NOTE — ASSESSMENT & PLAN NOTE
Status post accidental fall landing on buttocks with back pain  Imaging studies negative  Physical therapy consult for safety and activities of daily living  Case management consult regarding possibility of rehab  Geriatric pain protocol

## 2021-05-09 NOTE — ASSESSMENT & PLAN NOTE
Lab Results   Component Value Date    HGBA1C 6 0 (H) 01/08/2021     Not on any medication  Diet controlled  Carbohydrate controlled diet  Check blood sugars before meals and before bedtime with Accu-Cheks per protocol  No results for input(s): POCGLU in the last 72 hours      Blood Sugar Average: Last 72 hrs:

## 2021-05-09 NOTE — PLAN OF CARE
Problem: OCCUPATIONAL THERAPY ADULT  Goal: Performs self-care activities at highest level of function for planned discharge setting  See evaluation for individualized goals  Description:            See flowsheet documentation for full assessment, interventions and recommendations  Note: Limitation: Decreased ADL status, Decreased Safe judgement during ADL, Decreased cognition, Decreased endurance, Decreased self-care trans, Decreased high-level ADLs  Prognosis: Good, Fair  Assessment: 79 YO Female SEEN FOR INITIAL OCCUPATIONAL THERAPY EVALUATION FOLLOWING ADMISSION TO [facility] S/P FALL, LANDING ON BUTTOCKS RESULTING IN ACUTE BACK PAIN  IMAGING (-)  PROBLEMS LIST INCLUDES DM, CKD, HLD, HTN, CAD, A-FLUTTER, ARTHRITIS, AND CHRONIC PAIN  PT IS FROM HOME WITH 100 YO SPOUSE WHERE SHE REPORTS BEING INDEPENDENT OVERALL WITH ADLS/LT IADLS PTA  PT REPORTS HAVING HHA 2X/WK TO ASSIST WITH BATHING/HEAVY IADLS, LIMITED ADDITIONAL SUPPORT  PT CURRENTLY REQUIRES OVERALL MOD-MAX A WITH ADLS AND MOD A X2 WITH TRANSFERS /LIMITED FUNCTIONAL MOBILITY WITH HHA  PT IS LIMITED 2' PAIN, FATIGUE, IMPAIRED BALANCE, FALL RISK , OVERALL WEAKNESS/DECONDITIONING , LIMITED FAMILY/FRIEND SUPPORT , INACCESSIBLE HOME ENVIRONMENT and OVERALL LIMITED ACTIVITY TOLERANCE  PT EDUCATED ON DEEP BREATHING TECHNIQUES T/O ACTIVITY and CONTINUE PARTICIPATION IN SELF-CARE/MOBILITY WITH STAFF WHILE IN THE HOSPITAL   The patient's raw score on the AM-PAC Daily Activity inpatient short form is 13, standardized score is 32 03, less than 39 4  Patients at this level are likely to benefit from discharge to post-acute rehabilitation services  Please refer to the recommendation of the Occupational Therapist for safe discharge planning  FROM AN OCCUPATIONAL THERAPY PERSPECTIVE, PT WOULD BENEFIT FROM ADDITIONAL OT SERVICES IN AN INPT REHAB SETTING UPON D/C  WILL CONT TO FOLLOW TO ADDRESS THE BELOW DESCRIBED GOALS     Recommendation: Geriatric Consult  OT Discharge Recommendation: Post acute rehabilitation services  OT - OK to Discharge:  Yes

## 2021-05-09 NOTE — ASSESSMENT & PLAN NOTE
· Likely contributing to ambulatory dysfunction  · Known to Dr Diane Hussein, has had steroid injections in the past but no longer wishes to continue with this given no real benefit in past    · Add Voltaren gel  Continue ATC APAP, PRN Oxycodone    · PT/OT

## 2021-05-09 NOTE — ED ATTENDING ATTESTATION
5/9/2021  ILeanne MD, saw and evaluated the patient  I have discussed the patient with the resident/non-physician practitioner and agree with the resident's/non-physician practitioner's findings, Plan of Care, and MDM as documented in the resident's/non-physician practitioner's note, except where noted  All available labs and Radiology studies were reviewed  I was present for key portions of any procedure(s) performed by the resident/non-physician practitioner and I was immediately available to provide assistance  At this point I agree with the current assessment done in the Emergency Department  I have conducted an independent evaluation of this patient a history and physical is as follows:    ED Course         Critical Care Time  Procedures    81 yo female on asa, coumadin, lives at home with hx of dvt, a flutter, htn, hld, pe lost balance and had mechanical fall  Pt with no head strike, fell on right side  Pt with right shoulder and knee pain and low back pain  No neck pain, no numbness, tingling, weakness  Vss, afebrile, lungs cta, rrr, lumbar tenderness, limited rom of hips due to pain, no neuro deficits  Right shoulder and knee tenderness, limited rom due to pain    Ct head, cspine, ct a/p, xray knee and shoulder   inr

## 2021-05-09 NOTE — CASE MANAGEMENT
A post acute care recommendation was made by your care team for STR  Discussed Freedom of Choice with both patient and POA  Cm spoke with daughter Edu Schmid and pt and pt would like to go to [facility]  Daughter states that she is in the process of working with [facility] on long term placement for pt and spouse  Se would like a referral to both [facility] and [facility] and would like to honor pt's wishes if pt ultimately decides on [facility]  Per Edu Schmid pt will need to be transported at discharge, made aware of OOP cost if pt were to go by [facility]

## 2021-05-09 NOTE — ED PROVIDER NOTES
History  Chief Complaint   Patient presents with    Fall     per ems, pt had witnessed fall at home after tripping over feet  no loc, no head strike, +thinners       Fall  Mechanism of injury: fall    Injury location:  Torso  Torso injury location:  Back  Fall:     Fall occurred: From standing  Current pain details:     Pain quality:  Dull    Pain Severity:  Moderate    Pain timing:  Constant  Associated symptoms: back pain    Associated symptoms: no abdominal pain, no chest pain and no neck pain    Right knee and shoulder pain    Prior to Admission Medications   Prescriptions Last Dose Informant Patient Reported? Taking?    Accu-Chek FastClix Lancets MISC   No No   Sig: Check once daily   Accu-Chek Softclix Lancets lancets   No No   Sig: Use daily Use as instructed   Aspirin Low Dose 81 MG EC tablet   No No   Sig: TAKE 1 TABLET BY MOUTH EVERY DAY   Blood Glucose Monitoring Suppl (Accu-Chek Guide Me) w/Device KIT   No No   Sig: Check once daily   Calcium Carbonate-Vitamin D (CALTRATE 600+D PO)  Self Yes No   Sig: Take 1 capsule by mouth 2 (two) times a day     Diclofenac Sodium (VOLTAREN) 1 %   No No   Sig: Apply 2 g topically 3 (three) times a day Bilateral shoulders   Lancets (accu-chek soft touch) lancets   No No   Sig: Use daily Use as instructed   Lidocaine (Aspercreme Lidocaine) 4 % PTCH Not Taking at Unknown time  Yes No   Sig: Apply 3 patches topically daily B/l knees, low back   Multiple Vitamins-Minerals (PreserVision AREDS 2) CAPS Not Taking at Unknown time  No No   Sig: Take 1 capsule by mouth 2 (two) times a day   Patient not taking: Reported on 5/9/2021   acetaminophen (TYLENOL) 325 mg tablet   Yes No   Sig: Take 975 mg by mouth 3 (three) times a day   amiodarone 200 mg tablet   No No   Sig: Take 0 5 tablets (100 mg total) by mouth daily   furosemide (LASIX) 20 mg tablet   No No   Sig: Take 1 tablet (20 mg total) by mouth daily   glucose blood (Accu-Chek Guide) test strip   No No   Sig: Check once daily   isosorbide mononitrate (IMDUR) 30 mg 24 hr tablet   No No   Sig: Take 1 tablet (30 mg total) by mouth daily   levothyroxine 50 mcg tablet   No No   Sig: Take 1 tablet (50 mcg total) by mouth daily   lisinopril (ZESTRIL) 2 5 mg tablet   No No   Sig: TAKE ONE TABLET BY MOUTH EVERY DAY   metoprolol succinate (Toprol XL) 25 mg 24 hr tablet  Self No No   Sig: Take 1 tablet (25 mg total) by mouth daily after dinner   metroNIDAZOLE (METROCREAM) 0 75 % cream Not Taking at Unknown time Self No No   Sig: Apply topically 2 (two) times a day   Patient not taking: Reported on 2021   multivitamin-iron-minerals-folic acid (CENTRUM) chewable tablet  Self Yes No   Sig: Chew 1 tablet daily     pravastatin (PRAVACHOL) 40 mg tablet   No No   Sig: TAKE ONE TABLET BY MOUTH EVERY DAY   warfarin (COUMADIN) 2 5 mg tablet   No No   Simg daily except Th and Sun take 7 5 mg      Facility-Administered Medications: None       Past Medical History:   Diagnosis Date    Abnormal nuclear stress test     last assessed 2017    Anxiety     Arthritis     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    Bundle branch block, left     Cardiomyopathy ([facility] [address] [facility]     Chest pain 3/9/2019    Chronic pain     chronic b/l shoulder pain    Chronic pain of right knee 2019    Coronary artery disease     Diabetes mellitus ([facility] [address] [facility]     Gait disturbance 3/2/2018    GERD (gastroesophageal reflux disease)     H/O atrial flutter     History of DVT (deep vein thrombosis)     History of pulmonary embolism     Hyperlipidemia     Hypertension     Hypothyroid     Renal calculi     Shortness of breath        Past Surgical History:   Procedure Laterality Date    ATRIAL ABLATION SURGERY  2015    CARDIAC PACEMAKER PLACEMENT      CARDIOVERSION      CHELA/DCCV 10/22/2014    CARPAL TUNNEL RELEASE Right     CATARACT EXTRACTION      CORONARY ANGIOPLASTY WITH STENT PLACEMENT      HYSTERECTOMY      JOINT REPLACEMENT Right     OH AMPUTATION TOE,MT-P JT Left 10/11/2017    Procedure: AMPUTATION SECOND TOE;  Surgeon: Lilly Corral DPM;  Location: [facility];  Service: Podiatry    TONSILLECTOMY      TOTAL HIP ARTHROPLASTY      Right       Family History   Problem Relation Age of Onset   Clifm Carlos Parkinsonism Sister     Cancer Sister         unknown type    Heart attack Neg Hx     Stroke Neg Hx     Anuerysm Neg Hx     Clotting disorder Neg Hx     Arrhythmia Neg Hx     Heart failure Neg Hx     Coronary artery disease Neg Hx      I have reviewed and agree with the history as documented  E-Cigarette/Vaping     E-Cigarette/Vaping Substances     Social History     Tobacco Use    Smoking status: Never Smoker    Smokeless tobacco: Never Used   Substance Use Topics    Alcohol use: Yes     Frequency: Monthly or less     Drinks per session: 1 or 2     Binge frequency: Never     Comment: occasional    Drug use: Yes     Types: Marijuana     Comment: MEDICAL MARIJUANA-HAS NOT USED FOR 3 WEEKS        Review of Systems   Constitutional: Negative for chills and fever  Respiratory: Negative for shortness of breath  Cardiovascular: Negative for chest pain  Gastrointestinal: Negative for abdominal pain  Musculoskeletal: Positive for back pain  Negative for neck pain and neck stiffness  Skin: Negative for wound  Neurological: Negative for syncope, weakness and numbness  All other systems reviewed and are negative        Physical Exam  ED Triage Vitals [05/09/21 0043]   Temperature Pulse Respirations Blood Pressure SpO2   98 2 °F (36 8 °C) 84 20 142/63 96 %      Temp Source Heart Rate Source Patient Position - Orthostatic VS BP Location FiO2 (%)   Oral Monitor Lying Left arm --      Pain Score       8             Orthostatic Vital Signs  Vitals:    05/09/21 0043 05/09/21 0300 05/09/21 0400 05/09/21 0459   BP: 142/63 142/58 126/60 134/58   Pulse: 84 66 64 66   Patient Position - Orthostatic VS: Lying Lying Lying        Physical Exam  Vitals signs and nursing note reviewed  Constitutional:       General: She is not in acute distress  Appearance: She is well-developed  She is not diaphoretic  HENT:      Head: Normocephalic  Right Ear: External ear normal       Left Ear: External ear normal       Nose: Nose normal    Eyes:      Conjunctiva/sclera: Conjunctivae normal    Neck:      Musculoskeletal: Normal range of motion  Trachea: No tracheal deviation  Cardiovascular:      Rate and Rhythm: Normal rate  Pulmonary:      Effort: Pulmonary effort is normal  No respiratory distress  Abdominal:      General: There is no distension  Musculoskeletal:         General: No deformity  Comments: - C/ - T/ + L spine tenderness     No Abrasions    RUE:  No bony tenderness, crepitus, deformity, or effusion  Pain with range of motion of left shoulder otherwise full range of motion, compartments soft, intact motor and distal sensation, intact pincher grasp  LUE:  No bony tenderness, crepitus, deformity, or effusion  Full range of motion, compartments soft, intact motor and distal sensation, intact pincher grasp  LLE: No bony tenderness, crepitus, deformity, or effusion  Full range of motion, compartments soft, intact motor and distal sensation  RLE:  Tenderness to palpation over right knee, pain with range of motion of right knee  Otherwise no bony tenderness, crepitus, deformity, or effusion, compartments soft, intact motor and distal sensation         Skin:     General: Skin is dry  Findings: No rash  Neurological:      General: No focal deficit present  Mental Status: She is alert     Psychiatric:         Behavior: Behavior normal          ED Medications  Medications   lidocaine (LIDODERM) 5 % patch 1 patch (1 patch Topical Medication Applied 5/9/21 8438)   acetaminophen (TYLENOL) tablet 975 mg (has no administration in time range)   amiodarone tablet 100 mg (has no administration in time range)   aspirin (ECOTRIN LOW STRENGTH) EC tablet 81 mg (has no administration in time range)   calcium carbonate-vitamin D (OSCAL-D) 500 mg-200 units per tablet 1 tablet (has no administration in time range)   Diclofenac Sodium (VOLTAREN) 1 % topical gel 2 g (has no administration in time range)   furosemide (LASIX) tablet 20 mg (has no administration in time range)   isosorbide mononitrate (IMDUR) 24 hr tablet 30 mg (has no administration in time range)   levothyroxine tablet 50 mcg (50 mcg Oral Given 5/9/21 9087)   lidocaine (LIDODERM) 5 % patch 1 patch (has no administration in time range)   lisinopril (ZESTRIL) tablet 2 5 mg (has no administration in time range)   metoprolol succinate (TOPROL-XL) 24 hr tablet 25 mg (has no administration in time range)   multivitamin-minerals (CENTRUM) tablet 1 tablet (has no administration in time range)   pravastatin (PRAVACHOL) tablet 40 mg (has no administration in time range)   warfarin (COUMADIN) tablet 2 5 mg (has no administration in time range)   docusate sodium (COLACE) capsule 100 mg (has no administration in time range)   ondansetron (ZOFRAN) injection 4 mg (has no administration in time range)   aluminum-magnesium hydroxide-simethicone (MYLANTA) oral suspension 30 mL (has no administration in time range)   insulin lispro (HumaLOG) 100 units/mL subcutaneous injection 1-6 Units (has no administration in time range)   insulin lispro (HumaLOG) 100 units/mL subcutaneous injection 1-5 Units (has no administration in time range)   oxyCODONE (ROXICODONE) IR tablet 5 mg (has no administration in time range)   oxyCODONE (ROXICODONE) IR tablet 2 5 mg (has no administration in time range)   HYDROmorphone HCl (DILAUDID) injection 0 2 mg (has no administration in time range)   acetaminophen (TYLENOL) tablet 975 mg (975 mg Oral Given 5/9/21 4627)   oxyCODONE (ROXICODONE) IR tablet 5 mg (5 mg Oral Given 5/9/21 0987)       Diagnostic Studies  Results Reviewed     Procedure Component Value Units Date/Time    Protime-INR [[id number]]  (Abnormal) Collected: 05/09/21 0336    Lab Status: Final result Specimen: Blood from Arm, Right Updated: 05/09/21 0404     Protime 22 8 seconds      INR 2 03                 CT head without contrast   Final Result by Sherita Dodd MD (05/09 5878)      No acute intracranial abnormality  No significant interval change  Workstation performed: [id number]         CT cervical spine without contrast   Final Result by Sherita Dodd MD (05/09 0222)      No acute cervical spine fracture or traumatic malalignment  Moderate to advanced multilevel degenerative changes are present  Workstation performed: [id number]         CT abdomen pelvis wo contrast   Final Result by Sherita Dodd MD (05/09 0239)      No evidence of acute intra-abdominal or intrapelvic parenchymal organ injury  Colonic diverticulosis without evidence of acute diverticulitis  Cholelithiasis  No CT evidence of acute cholecystitis  Atherosclerosis  Cardiomegaly  Pacemaker  Workstation performed: [id number]         XR knee 4+ views Right injury    (Results Pending)   XR shoulder 2+ views RIGHT    (Results Pending)         Procedures  Procedures      ED Course    no acute fracture or dislocation appreciated on knee or shoulder x-rays            Identification of Seniors at Risk      Most Recent Value   (ISAR) Identification of Seniors at Risk   Before the illness or injury that brought you to the Emergency, did you need someone to help you on a regular basis? 0 Filed at: 05/09/2021 0045   In the last 24 hours, have you needed more help than usual?  0 Filed at: 05/09/2021 0045   Have you been hospitalized for one or more nights during the past 6 months? 1 Filed at: 05/09/2021 0045   In general, do you see well?  0 Filed at: 05/09/2021 0045   In general, do you have serious problems with your memory?   0 Filed at: 05/09/2021 0045   Do you take more than three different medications every day? 1 Filed at: 05/09/2021 0045   ISAR Score  2 Filed at: 05/09/2021 0045                              MDM  Number of Diagnoses or Management Options  Acute lumbar back pain:   Ambulatory dysfunction:   Fall, initial encounter:   Right knee pain:   Right shoulder pain:   Diagnosis management comments: This is a 35-year-old female who presents for evaluation following a fall  Patient reports that she fell while ambulating with her walker, ground level fall, mechanical, no loss of consciousness  Patient reports that she fell on her left side  She reports pain in her right shoulder, right knee, and lumbar back  No weakness or numbness, denies other injuries  She states she did not strike her head, she denies neck pain  Patient is noted to be on warfarin  Patient exam is noted to have pain with range of motion of the right shoulder and right knee, she also has some tenderness over the right knee, she has tenderness of the lumbar spine  No other injuries appreciated on head-to-toe examination  Her vital signs are stable she is noted to be moderately uncomfortable when attempting to move due to pain in her back but she is otherwise in no acute distress  Overall given age and mechanism and complaints of right shoulder, right knee, and back pain, will obtain CT abdomen pelvis to evaluate for lumbar spine injuries, hip/pelvic injury  Obtain CT head and C-spine to rule out head injury, patient is noted to be anticoagulated on warfarin and so will obtain PT to ensure patient is therapeutic  Will obtain x-rays of the right shoulder and the right knee  Will give Tylenol, oxycodone p r n  For pain  Will also place a Lidoderm patch  Given ambulatory dysfunction will plan to admit for further evaluation management         Amount and/or Complexity of Data Reviewed  Clinical lab tests: ordered and reviewed  Tests in the radiology section of CPT®: ordered and reviewed  Independent visualization of images, tracings, or specimens: yes    Risk of Complications, Morbidity, and/or Mortality  Presenting problems: high        Disposition  Final diagnoses:   Fall, initial encounter   Ambulatory dysfunction   Right knee pain   Right shoulder pain   Acute lumbar back pain     Time reflects when diagnosis was documented in both MDM as applicable and the Disposition within this note     Time User Action Codes Description Comment    5/9/2021  4:16 AM Mark Naranjo Add [N82  XXXA] Fall, initial encounter     5/9/2021  4:16 AM SchmeitzMark agurire Shaericky Add [R26 2] Ambulatory dysfunction     5/9/2021  4:16 AM SchMark gerardo Add [M25 562] Left knee pain     5/9/2021  4:16 AM Yfn Naranjo [Y81 643] Left knee pain     5/9/2021  4:16 AM Nguyễn Naranjo Add [M25 561] Right knee pain     5/9/2021  4:16 AM Nguyễn Naranjo Add [M25 511] Right shoulder pain     5/9/2021  4:17 AM Mark Naranjoy Add [M54 5] Acute lumbar back pain     5/9/2021  4:17 AM Nguyễn Naranjo Modify [L41  Erskin Bad Fall, initial encounter     5/9/2021  4:17 AM Mark Naranjo Modify [R26 2] Ambulatory dysfunction       ED Disposition     ED Disposition Condition Date/Time Comment    Admit Stable Sun May 9, 2021  3:21 AM Case was discussed with Dr Evelin Martino and the patient's admission status was agreed to be Admission Status: inpatient status to the service of Dr Evelin Martino   Follow-up Information    None         Current Discharge Medication List      CONTINUE these medications which have NOT CHANGED    Details   ! ! Accu-Chek FastClix Lancets MISC Check once daily  Qty: 100 each, Refills: 3    Associated Diagnoses: Type 2 diabetes mellitus with stage 3a chronic kidney disease, without long-term current use of insulin ([facility] [address] )      ! !  Accu-Chek Softclix Lancets lancets Use daily Use as instructed  Qty: 100 each, Refills: 1    Associated Diagnoses: Type 2 diabetes mellitus with stage 3a chronic kidney disease, without long-term current use of insulin ([facility])      acetaminophen (TYLENOL) 325 mg tablet Take 975 mg by mouth 3 (three) times a day      amiodarone 200 mg tablet Take 0 5 tablets (100 mg total) by mouth daily  Qty: 45 tablet, Refills: 3    Associated Diagnoses: Ventricular tachycardia ([facility])      Aspirin Low Dose 81 MG EC tablet TAKE 1 TABLET BY MOUTH EVERY DAY  Qty: 90 tablet, Refills: 0    Associated Diagnoses: CAD (coronary artery disease)      Blood Glucose Monitoring Suppl (Accu-Chek Guide Me) w/Device KIT Check once daily  Qty: 1 kit, Refills: 0    Associated Diagnoses: Type 2 diabetes mellitus with stage 3a chronic kidney disease, without long-term current use of insulin ([facility])      Calcium Carbonate-Vitamin D (CALTRATE 600+D PO) Take 1 capsule by mouth 2 (two) times a day        Diclofenac Sodium (VOLTAREN) 1 % Apply 2 g topically 3 (three) times a day Bilateral shoulders  Qty: 150 g, Refills: 1    Associated Diagnoses: Generalized OA      furosemide (LASIX) 20 mg tablet Take 1 tablet (20 mg total) by mouth daily  Qty: 90 tablet, Refills: 2    Associated Diagnoses: Congestive heart failure, unspecified HF chronicity, unspecified heart failure type ([facility])      glucose blood (Accu-Chek Guide) test strip Check once daily  Qty: 100 each, Refills: 3    Associated Diagnoses: Type 2 diabetes mellitus with stage 3a chronic kidney disease, without long-term current use of insulin ([facility])      isosorbide mononitrate (IMDUR) 30 mg 24 hr tablet Take 1 tablet (30 mg total) by mouth daily  Qty: 90 tablet, Refills: 3    Associated Diagnoses: Coronary artery disease involving native coronary artery of native heart without angina pectoris      !!  Lancets (accu-chek soft touch) lancets Use daily Use as instructed  Qty: 100 each, Refills: 3    Associated Diagnoses: Type 2 diabetes mellitus with stage 3a chronic kidney disease, without long-term current use of insulin ([facility])      levothyroxine 50 mcg tablet Take 1 tablet (50 mcg total) by mouth daily Associated Diagnoses: Hypothyroidism, unspecified type      Lidocaine (Aspercreme Lidocaine) 4 % PTCH Apply 3 patches topically daily B/l knees, low back      lisinopril (ZESTRIL) 2 5 mg tablet TAKE ONE TABLET BY MOUTH EVERY DAY  Qty: 90 tablet, Refills: 1    Associated Diagnoses: Type 2 diabetes mellitus with complication, without long-term current use of insulin ([address] ); Essential hypertension      metoprolol succinate (Toprol XL) 25 mg 24 hr tablet Take 1 tablet (25 mg total) by mouth daily after dinner  Qty: 90 tablet, Refills: 3    Associated Diagnoses: Essential hypertension      metroNIDAZOLE (METROCREAM) 0 75 % cream Apply topically 2 (two) times a day  Qty: 45 g, Refills: 0    Associated Diagnoses: Rosacea      Multiple Vitamins-Minerals (PreserVision AREDS 2) CAPS Take 1 capsule by mouth 2 (two) times a day    Associated Diagnoses: Macular degeneration of both eyes, unspecified type      multivitamin-iron-minerals-folic acid (CENTRUM) chewable tablet Chew 1 tablet daily  pravastatin (PRAVACHOL) 40 mg tablet TAKE ONE TABLET BY MOUTH EVERY DAY  Qty: 90 tablet, Refills: 1    Associated Diagnoses: Mixed hyperlipidemia      warfarin (COUMADIN) 2 5 mg tablet 5mg daily except Th and Sun take 7 5 mg  Qty: 120 tablet, Refills: 3    Associated Diagnoses: Paroxysmal atrial fibrillation ([address] )       ! ! - Potential duplicate medications found  Please discuss with provider  No discharge procedures on file  PDMP Review       Value Time User    PDMP Reviewed  Yes 2/16/2021  9:21 AM Sia Johnson DO           ED Provider  Attending physically available and evaluated Little Alvarado I managed the patient along with the ED Attending      Electronically Signed by         Julito Agee MD  05/09/21 8863

## 2021-05-09 NOTE — ASSESSMENT & PLAN NOTE
· Status post mechanical fall landing on buttocks with back pain  · CT A/P and C spine negative for acute injuries  Xray knee negative  Right should xray shows fracture of the right humeral head, see below  · PT/OT recommending rehab  CM made aware

## 2021-05-09 NOTE — PHYSICAL THERAPY NOTE
Physical Therapy Evaluation    Patient's Name: Tyrell Bradley    Admitting Diagnosis  Hip pain [M25 559]  Acute lumbar back pain [M54 5]  Right knee pain [M25 561]  Right shoulder pain [M25 511]  Fall, initial encounter [W19  XXXA]  Ambulatory dysfunction [R26 2]    Problem List  Patient Active Problem List   Diagnosis    Coronary artery disease involving native coronary artery of native heart without angina pectoris    Essential hypertension    History of placement of stent in LAD coronary artery    Hyperlipidemia    History of atrial flutter    LBBB (left bundle branch block)    Right hip pain    Type 2 diabetes mellitus with stage 3a chronic kidney disease, without long-term current use of insulin ([facility])    Acquired hypothyroidism    Adhesive capsulitis    Chronic low back pain    Cervical spine degeneration    Primary osteoarthritis of both shoulders    Tremors of nervous system    Heart block AV second degree    Ambulatory dysfunction    Pacemaker    Atrial fibrillation [facility]    Trochanteric bursitis of right hip    Ventricular tachycardia ([facility]    Cardiomyopathy ([facility]    Primary osteoarthritis of both knees    Effusion of left knee    Nonrheumatic aortic valve stenosis    Chronic diastolic CHF (congestive heart failure) (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 ([address]    Humerus fracture    Knee pain, chronic    Asymptomatic bacteriuria       Past Medical History  Past Medical History:   Diagnosis Date    Abnormal nuclear stress test     last assessed 04/03/2017    Anxiety     Arthritis     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    Bundle branch block, left     Cardiomyopathy ([facility]     Chest pain 3/9/2019    Chronic pain     chronic b/l shoulder pain    Chronic pain of right knee 4/2/2019    Coronary artery disease     Diabetes mellitus ([facility] )     Gait disturbance 3/2/2018    GERD (gastroesophageal reflux disease)     H/O atrial flutter     History of DVT (deep vein thrombosis)     History of pulmonary embolism     Hyperlipidemia     Hypertension     Hypothyroid     Renal calculi     Shortness of breath        Past Surgical History  Past Surgical History:   Procedure Laterality Date    ATRIAL ABLATION SURGERY  01/2015    CARDIAC PACEMAKER PLACEMENT      CARDIOVERSION      CHELA/DCCV 10/22/2014    CARPAL TUNNEL RELEASE Right     CATARACT EXTRACTION      CORONARY ANGIOPLASTY WITH STENT PLACEMENT      HYSTERECTOMY      JOINT REPLACEMENT Right     KS AMPUTATION TOE,MT-P JT Left 10/11/2017    Procedure: AMPUTATION SECOND TOE;  Surgeon: Prem Moody DPM;  Location: [facility];  Service: Podiatry    TONSILLECTOMY      TOTAL HIP ARTHROPLASTY      Right        05/09/21 1014   PT Last Visit   PT Visit Date 05/09/21   Note Type   Note type Evaluation   Pain Assessment   Pain Assessment Tool 0-10   Pain Score 6   Pain Location/Orientation Orientation: Bilateral;Location: Knee   Hospital Pain Intervention(s) Repositioned; Ambulation/increased activity   Home Living   Type of [address] [place] [address] One level;Stairs to enter with rails  (3 RADHA)   Home Equipment Walker  (transport chair)   Prior Function   Level of [place] Independent with ADLs and functional mobility   Lives With Spouse  (79 yo )   Receives Help From Family;Home health  (HHA 2x/week for 3 hours)   ADL Assistance Independent   IADLs Needs assistance   Falls in the last 6 months 1 to 4   Comments Pt states her elderly spouse assists in her care, she uses RW at baseline, but with increasing difficulty due to B/L knee OA   Restrictions/Precautions   Weight Bearing Precautions Per Order No   Other Precautions Cognitive; Chair Alarm; Fall Risk;Pain   General   Family/Caregiver Present No   Cognition   Overall Cognitive Status WFL   Attention Attends with cues to redirect   Orientation Level Oriented to person;Oriented to place;Oriented to situation   Following Commands Follows one step commands with increased time or repetition   Comments Pt very pleasant and cooperative throughout session   RLE Assessment   RLE Assessment X   Strength RLE   RLE Overall Strength 3+/5   LLE Assessment   LLE Assessment X   Strength LLE   LLE Overall Strength 4-/5   Light Touch   RLE Light Touch Grossly intact   LLE Light Touch Grossly intact   Bed Mobility   Supine to Sit 3  Moderate assistance   Additional items Assist x 2; Increased time required;Verbal cues   Transfers   Sit to Stand 3  Moderate assistance   Additional items Assist x 2; Increased time required;Verbal cues   Stand to Sit 3  Moderate assistance   Additional items Assist x 2; Increased time required;Verbal cues   Additional Comments Initial STS with RW, poor stability, R knee in flexion despite cues  Ambulation/Elevation   Gait pattern Improper Weight shift;Decreased foot clearance; Wide REBEKA; Excessively slow; Short stride;R Knee Mina   Gait Assistance 3  Moderate assist   Additional items Assist x 2   Assistive Device Other (Comment)  (B/L HHA for safety)   Distance 3 ft to chair with drop arm chair for safety  Minimal offloading, R knee mina   Balance   Static Sitting Fair -   Dynamic Sitting Poor +   Static Standing Poor   Dynamic Standing Poor -   Ambulatory Poor -   Activity Tolerance   Activity Tolerance Patient limited by fatigue;Patient limited by pain   Medical Staff Made Aware Jenn DOS SANTOS   Nurse Made Aware RN updated  Chair alarm engaged   Assessment   Prognosis Fair   Problem List Decreased strength;Decreased endurance; Impaired balance;Decreased mobility; Decreased safety awareness;Pain   Assessment Pt is a 80 y o  female seen for PT evaluation s/p admit [facility] on 5/9/[facility]21  Pt was admitted with a primary dx of: ambulatory dysfunction after sustaining a mechanical fall at home  PT now consulted for assessment of mobility and d/c needs  Pt with Up with assistance orders  Pts current comorbidities effecting treatment include: DM, HTN, CAD, Obesity, OA  Pts current clinical presentation is Unstable/ Unpredictable (high complexity) due to Ongoing medical management for primary dx, Increased reliance on more restrictive AD compared to baseline, Decreased activity tolerance compared to baseline, Fall risk, Trending lab values  Prior to admission, pt was independent with short distance household mobility with RW, increasing difficulty recently  Upon evaluation, pt currently is requiring modA x2 for bed mobility; modA x2 for transfers and modA x2 for ambulation 3 ft w/ B/L HHA  Pt presents at PT eval functioning below baseline and currently w/ overall mobility deficits 2* to: BLE weakness, impaired balance, decreased endurance, gait deviations, pain, decreased activity tolerance compared to baseline, decreased functional mobility tolerance compared to baseline, decreased safety awareness, fall risk  Pt currently at a fall risk 2* to impairments listed above  Pt will continue to benefit from skilled acute PT interventions to address stated impairments; to maximize functional mobility; for ongoing pt/ family training; and DME needs  At conclusion of PT session pt returned back in chair and chair alarm engaged with phone and call bell within reach  Pt denies any further questions at this time  Recommend IP rehab upon hospital D/C  Goals   Patient Goals to be able to do things myself again   STG Expiration Date 05/23/21   Short Term Goal #1 In 14 days pt will be able to: 1  Demonstrate ability to perform all aspects of bed mobility independently to increase functional independence    2  Perform functional transfers with LRAD and supervision to facilitate safe return to previous living environment  3   Ambulate 150 ft with LRAD and supervision with stable vitals to improve safety with household distances and reduce fall risk  4  Improve LE strength grades by 1 to increase ease of functional mobility with transfers and gait  5  Pt will demonstrate improved balance by one grade in order to decrease risk of falls  6  Climb 3 steps with Vini and 1 HR to simulate entrance to home  PT Treatment Day 0   Plan   Treatment/Interventions Functional transfer training;LE strengthening/ROM; Therapeutic exercise;Elevations; Endurance training;Patient/family training;Equipment eval/education; Bed mobility;Gait training   PT Frequency Other (Comment)  (3-5x/week)   Recommendation   PT Discharge Recommendation Post acute rehabilitation services   PT - OK to Discharge Yes  (to IP rehab)   Rafi 8 in Bed Without Bedrails 2   Lying on Back to Sitting on Edge of Flat Bed 1   Moving Bed to Chair 1   Standing Up From Chair 1   Walk in Room 1   Climb 3-5 Stairs 1   Basic Mobility Inpatient Raw Score 7   Turning Head Towards Sound 4   Follow Simple Instructions 4   Low Function Basic Mobility Raw Score 15   Low Function Basic Mobility Standardized Score 23 9       Gali Rivera, PT, DPT

## 2021-05-09 NOTE — PLAN OF CARE
Problem: Prexisting or High Potential for Compromised Skin Integrity  Goal: Skin integrity is maintained or improved  Description: INTERVENTIONS:  - Identify patients at risk for skin breakdown  - Assess and monitor skin integrity  - Assess and monitor nutrition and hydration status  - Monitor labs   - Assess for incontinence   - Turn and reposition patient  - Assist with mobility/ambulation  - Relieve pressure over bony prominences  - Avoid friction and shearing  - Provide appropriate hygiene as needed including keeping skin clean and dry  - Evaluate need for skin moisturizer/barrier cream  - Collaborate with interdisciplinary team   - Patient/family teaching  - Consider wound care consult   Outcome: Progressing     Problem: Potential for Falls  Goal: Patient will remain free of falls  Description: INTERVENTIONS:  - Assess patient frequently for physical needs  -  Identify cognitive and physical deficits and behaviors that affect risk of falls    -  [place] fall precautions as indicated by assessment   - Educate patient/family on patient safety including physical limitations  - Instruct patient to call for assistance with activity based on assessment  - Modify environment to reduce risk of injury  - Consider OT/PT consult to assist with strengthening/mobility  Outcome: Progressing     Problem: SKIN/TISSUE INTEGRITY - ADULT  Goal: Skin integrity remains intact  Description: INTERVENTIONS  - Identify patients at risk for skin breakdown  - Assess and monitor skin integrity  - Assess and monitor nutrition and hydration status  - Monitor labs (i e  albumin)  - Assess for incontinence   - Turn and reposition patient  - Assist with mobility/ambulation  - Relieve pressure over bony prominences  - Avoid friction and shearing  - Provide appropriate hygiene as needed including keeping skin clean and dry  - Evaluate need for skin moisturizer/barrier cream  - Collaborate with interdisciplinary team (i e  Nutrition, Rehabilitation, etc )   - Patient/family teaching  Outcome: Progressing  Goal: Incision(s), wounds(s) or drain site(s) healing without S/S of infection  Description: INTERVENTIONS  - Assess and document risk factors for skin impairment   - Assess and document dressing, incision, wound bed, drain sites and surrounding tissue  - Consider nutrition services referral as needed  - Oral mucous membranes remain intact  - Provide patient/ family education  Outcome: Progressing     Problem: MUSCULOSKELETAL - ADULT  Goal: Maintain or return mobility to safest level of function  Description: INTERVENTIONS:  - Assess patient's ability to carry out ADLs; assess patient's baseline for ADL function and identify physical deficits which impact ability to perform ADLs (bathing, care of mouth/teeth, toileting, grooming, dressing, etc )  - Assess/evaluate cause of self-care deficits   - Assess range of motion  - Assess patient's mobility  - Assess patient's need for assistive devices and provide as appropriate  - Encourage maximum independence but intervene and supervise when necessary  - Involve family in performance of ADLs  - Assess for home care needs following discharge   - Consider OT consult to assist with ADL evaluation and planning for discharge  - Provide patient education as appropriate  Outcome: Progressing  Goal: Maintain proper alignment of affected body part  Description: INTERVENTIONS:  - Support, maintain and protect limb and body alignment  - Provide patient/ family with appropriate education  Outcome: Progressing     Problem: PAIN - ADULT  Goal: Verbalizes/displays adequate comfort level or baseline comfort level  Description: Interventions:  - Encourage patient to monitor pain and request assistance  - Assess pain using appropriate pain scale  - Administer analgesics based on type and severity of pain and evaluate response  - Implement non-pharmacological measures as appropriate and evaluate response  - Consider cultural and social influences on pain and pain management  - Notify physician/advanced practitioner if interventions unsuccessful or patient reports new pain  Outcome: Progressing     Problem: INFECTION - ADULT  Goal: Absence or prevention of progression during hospitalization  Description: INTERVENTIONS:  - Assess and monitor for signs and symptoms of infection  - Monitor lab/diagnostic results  - Monitor all insertion sites, i e  indwelling lines, tubes, and drains  - Monitor endotracheal if appropriate and nasal secretions for changes in amount and color  - [place] appropriate cooling/warming therapies per order  - Administer medications as ordered  - Instruct and encourage patient and family to use good hand hygiene technique  - Identify and instruct in appropriate isolation precautions for identified infection/condition  Outcome: Progressing     Problem: SAFETY ADULT  Goal: Patient will remain free of falls  Description: INTERVENTIONS:  - Assess patient frequently for physical needs  -  Identify cognitive and physical deficits and behaviors that affect risk of falls    -  [place] fall precautions as indicated by assessment   - Educate patient/family on patient safety including physical limitations  - Instruct patient to call for assistance with activity based on assessment  - Modify environment to reduce risk of injury  - Consider OT/PT consult to assist with strengthening/mobility  Outcome: Progressing  Goal: Maintain or return to baseline ADL function  Description: INTERVENTIONS:  -  Assess patient's ability to carry out ADLs; assess patient's baseline for ADL function and identify physical deficits which impact ability to perform ADLs (bathing, care of mouth/teeth, toileting, grooming, dressing, etc )  - Assess/evaluate cause of self-care deficits   - Assess range of motion  - Assess patient's mobility; develop plan if impaired  - Assess patient's need for assistive devices and provide as appropriate  - Encourage maximum independence but intervene and supervise when necessary  - Involve family in performance of ADLs  - Assess for home care needs following discharge   - Consider OT consult to assist with ADL evaluation and planning for discharge  - Provide patient education as appropriate  Outcome: Progressing  Goal: Maintain or return mobility status to optimal level  Description: INTERVENTIONS:  - Assess patient's baseline mobility status (ambulation, transfers, stairs, etc )    - Identify cognitive and physical deficits and behaviors that affect mobility  - Identify mobility aids required to assist with transfers and/or ambulation (gait belt, sit-to-stand, lift, walker, cane, etc )  - [place] fall precautions as indicated by assessment  - Record patient progress and toleration of activity level on Mobility SBAR; progress patient to next Phase/Stage  - Instruct patient to call for assistance with activity based on assessment  - Consider rehabilitation consult to assist with strengthening/weightbearing, etc   Outcome: Progressing     Problem: DISCHARGE PLANNING  Goal: Discharge to home or other facility with appropriate resources  Description: INTERVENTIONS:  - Identify barriers to discharge w/patient and caregiver  - Arrange for needed discharge resources and transportation as appropriate  - Identify discharge learning needs (meds, wound care, etc )  - Arrange for interpretive services to assist at discharge as needed  - Refer to Case Management Department for coordinating discharge planning if the patient needs post-hospital services based on physician/advanced practitioner order or complex needs related to functional status, cognitive ability, or social support system  Outcome: Progressing     Problem: Knowledge Deficit  Goal: Patient/family/caregiver demonstrates understanding of disease process, treatment plan, medications, and discharge instructions  Description: Complete learning assessment and assess knowledge base    Interventions:  - Provide teaching at level of understanding  - Provide teaching via preferred learning methods  Outcome: Progressing

## 2021-05-09 NOTE — PLAN OF CARE
Problem: PHYSICAL THERAPY ADULT  Goal: Performs mobility at highest level of function for planned discharge setting  See evaluation for individualized goals  Description: Treatment/Interventions: Functional transfer training, LE strengthening/ROM, Therapeutic exercise, Elevations, Endurance training, Patient/family training, Equipment eval/education, Bed mobility, Gait training          See flowsheet documentation for full assessment, interventions and recommendations  Note: Prognosis: Fair  Problem List: Decreased strength, Decreased endurance, Impaired balance, Decreased mobility, Decreased safety awareness, Pain  Assessment: Pt is a 80 y o  female seen for PT evaluation s/p admit to [facility] on 5/9/2021  Pt was admitted with a primary dx of: ambulatory dysfunction after sustaining a mechanical fall at home  PT now consulted for assessment of mobility and d/c needs  Pt with Up with assistance orders  Pts current comorbidities effecting treatment include: DM, HTN, CAD, Obesity, OA  Pts current clinical presentation is Unstable/ Unpredictable (high complexity) due to Ongoing medical management for primary dx, Increased reliance on more restrictive AD compared to baseline, Decreased activity tolerance compared to baseline, Fall risk, Trending lab values  Prior to admission, pt was independent with short distance household mobility with RW, increasing difficulty recently  Upon evaluation, pt currently is requiring modA x2 for bed mobility; modA x2 for transfers and modA x2 for ambulation 3 ft w/ B/L HHA  Pt presents at PT eval functioning below baseline and currently w/ overall mobility deficits 2* to: BLE weakness, impaired balance, decreased endurance, gait deviations, pain, decreased activity tolerance compared to baseline, decreased functional mobility tolerance compared to baseline, decreased safety awareness, fall risk  Pt currently at a fall risk 2* to impairments listed above    Pt will continue to benefit from skilled acute PT interventions to address stated impairments; to maximize functional mobility; for ongoing pt/ family training; and DME needs  At conclusion of PT session pt returned back in chair and chair alarm engaged with phone and call bell within reach  Pt denies any further questions at this time  Recommend IP rehab upon hospital D/C  PT Discharge Recommendation: Post acute rehabilitation services     PT - OK to Discharge: Yes(to IP rehab)    See flowsheet documentation for full assessment

## 2021-05-09 NOTE — H&P
[address] 7/24/1929, 80 y o  female MRN: [id number]  Unit/Bed#: ED 01 Encounter: [id number]  Primary Care Provider: Jacqueline Goldman MD   Date and time admitted to hospital: 5/9/2021 12:39 AM    * Ambulatory dysfunction  Assessment & Plan  Status post accidental fall landing on buttocks with back pain  Imaging studies negative  Physical therapy consult for safety and activities of daily living  Case management consult regarding possibility of rehab  Geriatric pain protocol  Type 2 diabetes mellitus with stage 3a chronic kidney disease, without long-term current use of insulin [facility])  Assessment & Plan  Lab Results   Component Value Date    HGBA1C 6 0 (H) 01/08/2021     Not on any medication  Diet controlled  Carbohydrate controlled diet  Check blood sugars before meals and before bedtime with Accu-Cheks per protocol  No results for input(s): POCGLU in the last 72 hours  Blood Sugar Average: Last 72 hrs:      Hyperlipidemia  Assessment & Plan  Continue pravastatin 40 mg daily  Essential hypertension  Assessment & Plan  Continue Lasix 20 mg daily  Zestril 2 5 mg daily  Metoprolol succinate 25 mg daily  Coronary artery disease involving native coronary artery of native heart without angina pectoris  Assessment & Plan  Continue isosorbide mononitrate 30 mg daily  Metoprolol succinate 25 mg daily  Anticoagulated on Coumadin  Assessment & Plan  PT INR in the morning  Continue Coumadin  Acquired hypothyroidism  Assessment & Plan  Levothyroxine 50 mcg daily   TSH           VTE Prophylaxis: Warfarin (Coumadin)  / sequential compression device   Code Status: Prior full Code as discussed  POLST: There is no POLST form on file for this patient (pre-hospital)    Anticipated Length of Stay:  Patient will be admitted on an Inpatient basis with an anticipated length of stay of  greater than 2 midnights     Justification for Hospital Stay: Please see detailed plans noted above  Chief Complaint:     Status post accidental fall and landing on got up with lower back pain  History of Present Illness:  Anthony Guzmán is a 80 y o  female who has past medical history significant for morbid obesity, diabetes mellitus but diet controlled, essential hypertension, hypothyroidism, coronary artery disease with history of atrial flutter, history of LBBB, status post placement in the left anterior descending coronary artery  Patient is on chronic warfarin  Initially, the patient was seen as a level trauma be after accidental fall in the house tripping on her feet and landing on her buttocks  The patient currently has pain at the coccygeal area  Because the patient has multiple osteoarthrosis, she has bilateral pain of her knees  Patient currently has pain with walking and is afraid that she is unsteady on her feet  That said, the patient is placed in hospital for further evaluation and observation with physical therapy consult and case management evaluation  Patient denies any cough or cold or fever  Denies any abdominal pain or loss of consciousness, no note of any nausea vomiting or diarrhea        Review of Systems:    Constitutional:  Denies fever or chills   Eyes:  Denies change in visual acuity   HENT:  Denies nasal congestion or sore throat   Respiratory:  Denies cough or shortness of breath   Cardiovascular:  Denies chest pain or edema   GI:  Denies abdominal pain, nausea, vomiting, bloody stools or diarrhea   :  Denies dysuria   Musculoskeletal:  Lower back pain, or joint pain   Integument:  Denies rash   Neurologic:  Denies headache, focal weakness or sensory changes   Endocrine:  Denies polyuria or polydipsia   Lymphatic:  Denies swollen glands   Psychiatric:  Denies depression or anxiety     Past Medical and Surgical History:   Past Medical History:   Diagnosis Date    Abnormal nuclear stress test     last assessed 04/03/2017    Anxiety     Arthritis     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    Bundle branch block, left     Cardiomyopathy ([facility] [address] )     Chest pain 3/9/2019    Chronic pain     chronic b/l shoulder pain    Chronic pain of right knee 4/2/2019    Coronary artery disease     Diabetes mellitus ([facility] [address] )     Gait disturbance 3/2/2018    GERD (gastroesophageal reflux disease)     H/O atrial flutter     History of DVT (deep vein thrombosis)     History of pulmonary embolism     Hyperlipidemia     Hypertension     Hypothyroid     Renal calculi     Shortness of breath      Past Surgical History:   Procedure Laterality Date    ATRIAL ABLATION SURGERY  01/2015    CARDIOVERSION      CHELA/DCCV 10/22/2014    CARPAL TUNNEL RELEASE Right     CATARACT EXTRACTION      CORONARY ANGIOPLASTY WITH STENT PLACEMENT      HYSTERECTOMY      JOINT REPLACEMENT Right     IL AMPUTATION TOE,MT-P JT Left 10/11/2017    Procedure: AMPUTATION SECOND TOE;  Surgeon: Kathryn Arndt DPM;  Location: [facility];  Service: Podiatry    TONSILLECTOMY      TOTAL HIP ARTHROPLASTY      Right       Meds/Allergies:  (Not in a hospital admission)    Accu-Chek FastClix Lancets MISC Check once daily Bharathi Multani MD Reconciliation Not Required   Accu-Chek Softclix Lancets lancets Use daily Use as instructed Bharathi Multani MD Reconciliation Not Required   acetaminophen (TYLENOL) 325 mg tablet Take 975 mg by mouth 3 (three) times a day Bharathi Multani MD Reordered   Ordered as: acetaminophen (TYLENOL) tablet 975 mg - 975 mg, Oral, 3 times daily, First dose on Sun 5/9/21 at 0900   amiodarone 200 mg tablet Take 0 5 tablets (100 mg total) by mouth daily Bharathi Multani MD Reordered   Ordered as: amiodarone tablet 100 mg - 100 mg, Oral, Daily, First dose on Sun 5/9/21 at 0900 Take with food or milk      Aspirin Low Dose 81 MG EC tablet TAKE 1 TABLET BY MOUTH EVERY DAY Bharathi Multani MD Reordered   Ordered as: aspirin (ECOTRIN LOW STRENGTH) EC tablet 81 mg - 81 mg, Oral, Daily, First dose on Sun 5/9/21 at 0900 Do Not Crush - Enteric coated  Blood Glucose Monitoring Suppl (Accu-Chek Guide Me) w/Device KIT Check once daily Selena Wayne MD Reconciliation Not Required   Calcium Carbonate-Vitamin D (CALTRATE 600+D PO) Take 1 capsule by mouth 2 (two) times a day   Selena Wayne MD Reordered   Ordered as: calcium carbonate-vitamin D (OSCAL-D) 500 mg-200 units per tablet 1 tablet - 1 tablet, Oral, 2 times daily with meals, First dose on Sun 5/9/21 at 0730 5 mcg = 200 units of cholecalciferol (Vitamin D3); LOOK ALIKE SOUND ALIKE MED    Diclofenac Sodium (VOLTAREN) 1 % Apply 2 g topically 3 (three) times a day Bilateral shoulders Selena Wayne MD Reordered   Ordered as: Diclofenac Sodium (VOLTAREN) 1 % topical gel 2 g - 2 g, Topical, 3 times daily, First dose on Sun 5/9/21 at 0900 What is the location for this topical application? shoulders   furosemide (LASIX) 20 mg tablet Take 1 tablet (20 mg total) by mouth daily Selena Wayne MD Reordered   Ordered as: furosemide (LASIX) tablet 20 mg - 20 mg, Oral, Daily, First dose on Sun 5/9/21 at [address] [place] [address] for systolic blood pressure less than (mmHg): 110   glucose blood (Accu-Chek Guide) test strip Check once daily Selena Wayne MD Not Ordered   isosorbide mononitrate (IMDUR) 30 mg 24 hr tablet Take 1 tablet (30 mg total) by mouth daily Selena Wayne MD Reordered   Ordered as: isosorbide mononitrate (IMDUR) 24 hr tablet 30 mg - 30 mg, Oral, Daily, First dose on Sun 5/9/21 at 0900 Do not crush or chew   Hold for systolic blood pressure less than (mmHg): 110   Lancets (accu-chek soft touch) lancets Use daily Use as instructed Selena Wayne MD Reconciliation Not Required   levothyroxine 50 mcg tablet Take 1 tablet (50 mcg total) by mouth daily Selena Wayne MD Reordered   Ordered as: levothyroxine tablet 50 mcg - 50 mcg, Oral, Daily, First dose on Sun 5/9/21 at 0900 Administer in the morning on an empty stomach, at least 30 to 60 minutes before food  Tablets may be crushed and suspended in 5-10mls of water for administration  LOOK ALIKE SOUND ALIKE MED  Lidocaine (Aspercreme Lidocaine) 4 % PTCH Apply 3 patches topically daily B/l knees, low back Tod Cameron MD Reordered   Ordered as: lidocaine (LIDODERM) 5 % patch 1 patch - 1 patch, Topical, Administer over 12 Hours, Daily, First dose on Sun 5/9/21 at 0900 Patch may remain in place for up to 12 hours in a 24hr period What is the location for this topical application? low back   lisinopril (ZESTRIL) 2 5 mg tablet TAKE ONE TABLET BY MOUTH EVERY DAY Tod Cameron MD Reordered   Ordered as: lisinopril (ZESTRIL) tablet 2 5 mg - 2 5 mg, Oral, Daily, First dose on Sun 5/9/21 at [address] [place] [address] for systolic blood pressure less than (mmHg): 110   metoprolol succinate (Toprol XL) 25 mg 24 hr tablet Take 1 tablet (25 mg total) by mouth daily after dinner Tod Cameron MD Reordered   Ordered as: metoprolol succinate (TOPROL-XL) 24 hr tablet 25 mg - 25 mg, Oral, Daily after dinner, First dose on Sun 5/9/21 at 1800 Hold for heart rate less than 50 beats per minute  Do not crush or chew  LOOK ALIKE SOUND ALIKE MED Hold for systolic blood pressure less than (mmHg): 110   metroNIDAZOLE (METROCREAM) 0 75 % cream Apply topically 2 (two) times a day Tod Cameron MD Not Ordered   Multiple Vitamins-Minerals (PreserVision AREDS 2) CAPS Take 1 capsule by mouth 2 (two) times a day Tod Cameron MD Reordered   Ordered as: multivitamin-minerals (CENTRUM) tablet 1 tablet - 1 tablet, Oral, Daily, First dose on Sun 5/9/21 at 0900 **DISPOSE IN 8 GALLON BLACK CONTAINER**    multivitamin-iron-minerals-folic acid (CENTRUM) chewable tablet Chew 1 tablet daily   Tod Cameron MD Not Ordered   pravastatin (PRAVACHOL) 40 mg tablet TAKE ONE TABLET BY MOUTH EVERY DAY Tod Cameron MD Reordered   Ordered as: pravastatin (PRAVACHOL) tablet 40 mg - 40 mg, Oral, Daily, First dose on Sun 5/9/21 at 0900   warfarin (COUMADIN) 2 5 mg tablet 5mg daily except Th and Sun take 7 5 mg Stefan Ross MD Reordered   Ordered as: warfarin (COUMADIN) tablet 2 5 mg - 2 5 mg, Oral, Daily (warfarin), First dose on Sun 5/9/21 at 1800 High alert medication  Food-drug interaction teaching and documentation must be done  **DISPOSE EMPTY PACKAGING AND LEFTOVER MEDICATION IN 8 GALLON BLACK CONTAINER** Indication: INR goal: 2-3         Allergies:    Allergies   Allergen Reactions    Atorvastatin      Reaction unknown 10/23/2018-    Other Rash     Patient sensitive to adhesives from tape     History:  Marital Status: /Civil Union   Occupation:  Used to work in an office  Patient Pre-hospital Living Situation:  Lives at home  Patient Pre-hospital Level of Mobility:  Ambulatory  Patient Pre-hospital Diet Restrictions:  Regular diet  Substance Use History:   Social History     Substance and Sexual Activity   Alcohol Use Yes    Comment: occasional     Social History     Tobacco Use   Smoking Status Never Smoker   Smokeless Tobacco Never Used     Social History     Substance and Sexual Activity   Drug Use Yes    Types: Marijuana    Comment: MEDICAL MARIJUANA-HAS NOT USED FOR 3 WEEKS       Family History:  Family History   Problem Relation Age of Onset    Parkinsonism Sister    Stevenson Cancer Sister         unknown type    Heart attack Neg Hx     Stroke Neg Hx     Anuerysm Neg Hx     Clotting disorder Neg Hx     Arrhythmia Neg Hx     Heart failure Neg Hx     Coronary artery disease Neg Hx        Physical Exam:     Vitals:   Blood Pressure: 142/63 (05/09/21 0043)  Pulse: 84 (05/09/21 0043)  Temperature: 98 2 °F (36 8 °C) (05/09/21 0043)  Temp Source: Oral (05/09/21 0043)  Respirations: 20 (05/09/21 0043)  Weight - Scale: 80 3 kg (177 lb) (05/09/21 0043)  SpO2: 96 % (05/09/21 0043)    Constitutional:  Well developed, well nourished, no acute distress, non-toxic appearance   Eyes:  PERRL, conjunctiva normal   HENT:  Atraumatic, external ears normal, nose normal, oropharynx moist, no pharyngeal exudates  Neck- normal range of motion, no tenderness, supple   Respiratory:  No respiratory distress, normal breath sounds, no rales, no wheezing   Cardiovascular:  Normal rate, normal rhythm, no murmurs, no gallops, no rubs   GI:  Soft, nondistended, normal bowel sounds, nontender, no organomegaly, no mass, no rebound, no guarding   :  No costovertebral angle tenderness   Musculoskeletal:  No edema, no tenderness, with pain when palpated at the lower back and coccygeal area, no deformities  Back- no tenderness  Integument:  Well hydrated, no rash   Lymphatic:  No lymphadenopathy noted   Neurologic:  Alert &awake, communicative, CN 2-12 normal, normal motor function, normal sensory function, no focal deficits noted straight leg testing negative  Psychiatric:  Speech and behavior appropriate       Lab Results: I have personally reviewed pertinent reports  Invalid input(s): LABALBU            Imaging: I have personally reviewed pertinent reports  Ct Abdomen Pelvis Wo Contrast    Result Date: 5/9/2021  Narrative: CT ABDOMEN AND PELVIS WITHOUT IV CONTRAST INDICATION:   Fall  Witnessed fall, tripped and fell, on blood thinners (Coumadin and aspirin)  Low back pain, lumbar tenderness, limited range of motion of the hips  COMPARISON:  January 31, 2021  TECHNIQUE:  CT examination of the abdomen and pelvis was performed without intravenous contrast   Axial, sagittal, and coronal 2D reformatted images were created from the source data and submitted for interpretation  Radiation dose length product (DLP) for this visit:  705 68 mGy-cm   This examination, like all CT scans performed in the [facility], was performed utilizing techniques to minimize radiation dose exposure, including the use of iterative  reconstruction and automated exposure control  Enteric contrast was not administered  FINDINGS: ABDOMEN LOWER CHEST:  Mild bibasilar atelectasis  Cardiomegaly  Pacemaker  LIVER/BILIARY TREE:  Unremarkable  GALLBLADDER:  There are gallstone(s) within the gallbladder, without pericholecystic inflammatory changes  SPLEEN:  Unremarkable  PANCREAS:  Unremarkable  ADRENAL GLANDS:  Unremarkable  KIDNEYS/URETERS:  Unremarkable  No hydronephrosis  STOMACH AND BOWEL:  No bowel obstruction  There is colonic diverticulosis without evidence of acute diverticulitis  APPENDIX:  No findings to suggest appendicitis  ABDOMINOPELVIC CAVITY:  No ascites  No pneumoperitoneum  No lymphadenopathy  VESSELS:  Atherosclerosis  No abdominal aortic aneurysm  PELVIS REPRODUCTIVE ORGANS:  Prior hysterectomy  URINARY BLADDER:  Unremarkable  ABDOMINAL WALL/INGUINAL REGIONS:  Small fat-containing umbilical hernia  OSSEOUS STRUCTURES:  No acute fracture or destructive osseous lesion  A right total hip replacement is present  There is multilevel degenerative change of the spine, similar to the prior study  There is 4 to 5 mm of degenerative anterolisthesis of L5 upon S1, similar to the prior study  Mild levocurvature of the spine unchanged  Impression: No evidence of acute intra-abdominal or intrapelvic parenchymal organ injury  Colonic diverticulosis without evidence of acute diverticulitis  Cholelithiasis  No CT evidence of acute cholecystitis  Atherosclerosis  Cardiomegaly  Pacemaker  Workstation performed: [id number]     Ct Head Without Contrast    Result Date: 5/9/2021  Narrative: CT BRAIN - WITHOUT CONTRAST INDICATION:   Fall on thinners  Witnessed fall, tripped and fell, denies head strike, no loss of consciousness  Patient on blood thinners (Coumadin and aspirin)  COMPARISON:  March 9, 2019  TECHNIQUE:  CT examination of the brain was performed  In addition to axial images, sagittal and coronal 2D reformatted images were created and submitted for interpretation  Radiation dose length product (DLP) for this visit:  697 57 mGy-cm   This examination, like all CT scans performed in the [facility], was performed utilizing techniques to minimize radiation dose exposure, including the use of iterative  reconstruction and automated exposure control  IMAGE QUALITY:  Diagnostic  FINDINGS: PARENCHYMA: Decreased attenuation is noted in periventricular and subcortical white matter demonstrating an appearance that is statistically most likely to represent mild to moderate microangiopathic change; this appearance is similar when compared to most recent prior examination  Age-appropriate atrophy unchanged  No CT signs of acute infarction  No intracranial mass, mass effect or midline shift  No acute parenchymal hemorrhage  VENTRICLES AND EXTRA-AXIAL SPACES:  Ventricles and extra-axial CSF spaces are mildly prominent commensurate with the degree of volume loss, similar to the prior study  No hydrocephalus  No acute extra-axial hemorrhage  VISUALIZED ORBITS AND PARANASAL SINUSES:  Unremarkable  CALVARIUM AND EXTRACRANIAL SOFT TISSUES:  Partial opacification of the mastoid air cells bilaterally is similar to the prior study  No evidence of acute calvarial fracture  Impression: No acute intracranial abnormality  No significant interval change  Workstation performed: [id number]     Ct Cervical Spine Without Contrast    Result Date: 5/9/2021  Narrative: CT CERVICAL SPINE - WITHOUT CONTRAST INDICATION:   Fall  Patient fell, on blood thinners  COMPARISON:  Report of MRI dated December 1, 2017  TECHNIQUE:  CT examination of the cervical spine was performed without intravenous contrast   Contiguous axial images were obtained  Sagittal and coronal reconstructions were performed  Radiation dose length product (DLP) for this visit:  375 09 mGy-cm     This examination, like all CT scans performed in the [facility], was performed utilizing techniques to minimize radiation dose exposure, including the use of iterative  reconstruction and automated exposure control  IMAGE QUALITY:  Diagnostic  FINDINGS: ALIGNMENT:  Normal alignment of the cervical spine  No subluxation  VERTEBRAL BODIES:  No acute fracture  DEGENERATIVE CHANGES:  Moderate to advanced multilevel degenerative changes are present  PREVERTEBRAL AND PARASPINAL SOFT TISSUES:  Unremarkable  THORACIC INLET:  Mild apical pleural-parenchymal scarring  Impression: No acute cervical spine fracture or traumatic malalignment  Moderate to advanced multilevel degenerative changes are present  Workstation performed: [id number]         ** Please Note: Dragon 360 Dictation voice to text software was used in the creation of this document   **

## 2021-05-09 NOTE — PROGRESS NOTES
[facility]  Progress Note - Lavera Scriver 7/24/1929, 80 y o  female MRN: [id number]  Unit/Bed#: [facility] 627-01 Encounter: [id number]  Primary Care Provider: Linda Myers MD   Date and time admitted to hospital: 5/9/2021 12:39 AM    * Ambulatory dysfunction  Assessment & Plan  · Status post mechanical fall landing on buttocks with back pain  · CT A/P and C spine negative for acute injuries  Xray knee negative  Right should xray shows fracture of the right humeral head, see below  · PT/OT recommending rehab  CM made aware  Humerus fracture  Assessment & Plan  · Xray right shoulder shows Fracture of the anterior aspect of the right humeral head  · NWB right upper extremity  · Pain control with ATC APAP, lido patch, PRN oxycodone  Acquired hypothyroidism  Assessment & Plan  · TSH 28  Increase Levothyroxine to 75 mcg daily  · Check T4  · Repeat TFT's in 4-6 weeks  Type 2 diabetes mellitus with stage 3a chronic kidney disease, without long-term current use of insulin [facility])  Assessment & Plan  Lab Results   Component Value Date    HGBA1C 5 7 (H) 05/09/2021   ·   · Not on any medication  Diet controlled  · Carbohydrate controlled diet  · Check blood sugars before meals and before bedtime with Accu-Cheks per protocol  Recent Labs     05/09/21  0725 05/09/21  1130   POCGLU 86 106       Blood Sugar Average: Last 72 hrs:  (P) 96    Knee pain, chronic  Assessment & Plan  · Likely contributing to ambulatory dysfunction  · Known to Dr Juan Anna, has had steroid injections in the past but no longer wishes to continue with this given no real benefit in past    · Add Voltaren gel  Continue ATC APAP, PRN Oxycodone  · PT/OT     Constipation  Assessment & Plan  · Start bowel regimen  Hyperlipidemia  Assessment & Plan  · Continue pravastatin 40 mg daily  Atrial fibrillation ([address] )  Assessment & Plan  · Continue BB  AC with Coumadin  INR within range  Continue home dose  Essential hypertension  Assessment & Plan  · Continue Lasix 20 mg daily, Zestril 2 5 mg daily, Metoprolol succinate 25 mg daily  · Monitor    Coronary artery disease involving native coronary artery of native heart without angina pectoris  Assessment & Plan  · Continue ASA, BB and Imdur  Asymptomatic bacteriuria  Assessment & Plan  · UA shows bacteria  No urinary symptoms  Monitor off abx  VTE Pharmacologic Prophylaxis:   Moderate Risk (Score 3-4) - Pharmacological DVT Prophylaxis Ordered: warfarin (Coumadin)  Patient Centered Rounds: I performed bedside rounds with nursing staff today  Discussions with Specialists or Other Care Team Provider: nursing, case management (Oni Daly)     Education and Discussions with Family / Patient: Updated  (daughter) via phone  Time Spent for Care: 30 minutes  More than 50% of total time spent on counseling and coordination of care as described above  Current Length of Stay: 0 day(s)  Current Patient Status: Inpatient   Certification Statement: The patient will continue to require additional inpatient hospital stay due to pending rehab placement  Discharge Plan: pending rehab placement     Code Status: Level 1 - Full Code    Subjective:   Patient continues to complain of b/l knee pain, acute on chronic  She also complains of constipation  Mild right shoulder pain  Agreeable to rehab  Objective:     Vitals:   Temp (24hrs), Av 1 °F (36 7 °C), Min:98 1 °F (36 7 °C), Max:98 2 °F (36 8 °C)    Temp:  [98 1 °F (36 7 °C)-98 2 °F (36 8 °C)] 98 1 °F (36 7 °C)  HR:  [64-84] 67  Resp:  [16-20] 16  BP: (126-142)/(58-63) 134/59  SpO2:  [95 %-98 %] 95 %  Body mass index is 36 64 kg/m²  Input and Output Summary (last 24 hours):      Intake/Output Summary (Last 24 hours) at 2021 1437  Last data filed at 2021 1310  Gross per 24 hour   Intake 650 ml   Output 1050 ml   Net -400 ml       Physical Exam:   Physical Exam  Vitals signs and nursing note reviewed  Constitutional:       Appearance: She is obese  Cardiovascular:      Rate and Rhythm: Normal rate  Heart sounds: Murmur present  Pulmonary:      Breath sounds: Normal breath sounds  Abdominal:      Tenderness: There is no abdominal tenderness  Musculoskeletal:         General: Tenderness (bilateral knees ) present  No swelling  Skin:     General: Skin is warm  Neurological:      Mental Status: She is alert and oriented to person, place, and time  Mental status is at baseline     Psychiatric:         Mood and Affect: Mood normal           Additional Data:     Labs:  Results from last 7 days   Lab Units 05/09/21  0528   WBC Thousand/uL 5 16   HEMOGLOBIN g/dL 10 7*   HEMATOCRIT % 34 0*   PLATELETS Thousands/uL 236   NEUTROS PCT % 65   LYMPHS PCT % 22   MONOS PCT % 11   EOS PCT % 2     Results from last 7 days   Lab Units 05/09/21  0524   SODIUM mmol/L 139   POTASSIUM mmol/L 3 7   CHLORIDE mmol/L 105   CO2 mmol/L 31   BUN mg/dL 29*   CREATININE mg/dL 0 82   ANION GAP mmol/L 3*   CALCIUM mg/dL 8 8   ALBUMIN g/dL 3 1*   TOTAL BILIRUBIN mg/dL 0 58   ALK PHOS U/L 69   ALT U/L 14   AST U/L 12   GLUCOSE RANDOM mg/dL 88     Results from last 7 days   Lab Units 05/09/21  0336   INR  2 03*     Results from last 7 days   Lab Units 05/09/21  1130 05/09/21  0725   POC GLUCOSE mg/dl 106 86     Results from last 7 days   Lab Units 05/09/21  0524   HEMOGLOBIN A1C % 5 7*           Lines/Drains:  Invasive Devices     Peripheral Intravenous Line            Peripheral IV 05/09/21 Right Antecubital less than 1 day                      Imaging: Reviewed radiology reports from this admission including: abdominal/pelvic CT and CT c spine xray shoulder and knee     Recent Cultures (last 7 days):         Last 24 Hours Medication List:   Current Facility-Administered Medications   Medication Dose Route Frequency Provider Last Rate    acetaminophen  975 mg Oral TID Bharathi Multani MD      aluminum-magnesium hydroxide-simethicone  30 mL Oral Q6H PRN Oscar Manzo MD      amiodarone  100 mg Oral Daily Oscar Manzo MD      aspirin  81 mg Oral Daily Oscar Manzo MD      calcium carbonate-vitamin D  1 tablet Oral BID With Meals Oscar Manzo MD      Diclofenac Sodium  4 g Topical 4x Daily FERMIN Cullen      docusate sodium  100 mg Oral BID PRN Oscar Manzo MD      furosemide  20 mg Oral Daily Oscar Manzo MD      insulin lispro  1-5 Units Subcutaneous HS Oscar Manzo MD      insulin lispro  1-6 Units Subcutaneous TID [facility] Oscar Manzo MD      isosorbide mononitrate  30 mg Oral Daily Oscar Manzo MD      [START ON 5/10/2021] levothyroxine  75 mcg Oral Early Morning FERMIN Cullen      [START ON 5/10/2021] lidocaine  1 patch Topical Daily FERMIN Kennedy      lisinopril  2 5 mg Oral Daily Oscar Manzo MD      metoprolol succinate  25 mg Oral After Judi Drake MD      multivitamin-minerals  1 tablet Oral Daily Oscar Manzo MD      ondansetron  4 mg Intravenous Q6H PRN MD Grace Akbar oxyCODONE  2 5 mg Oral Q4H PRN Oscar Manzo MD      oxyCODONE  5 mg Oral Q4H PRN Oscar Manzo MD      polyethylene glycol  17 g Oral Daily FERMIN Cullen      pravastatin  40 mg Oral Daily Oscar Manzo MD      warfarin  2 5 mg Oral Daily (warfarin) Oscar Manzo MD          Today, Patient Was Seen By: FERMIN Hsu    **Please Note: This note may have been constructed using a voice recognition system  **

## 2021-05-09 NOTE — CONSULTS
Orthopedics   Danisha Patel 80 y o  female MRN: [id number]  Unit/Bed#: [facility] 435-09      Chief Complaint:   right arm and shoulder pain    HPI:   80 y o  right hand dominant female status post fall admitted for ambulatory dysfunction to medical service complaining of right shoulder pain  She states that the pain is at her baseline and has not been exacerbated by the fall  The patient has a history of knee osteoarthritis treated by Dr Preethi Jason and has received steroid injections in her knees in the past, but is not longer interested in injections because of diminishing benefit she was receiving  She has a pmh significant for CAD s/p stent and on chronic warfarin, a-flutter, LBBB, DM, hypothyroidism, and prior PE        Review Of Systems:   · Skin: Normal  · Neuro: See HPI  · Musculoskeletal: See HPI  · 14 point review of systems negative except as stated above     Past Medical History:   Past Medical History:   Diagnosis Date    Abnormal nuclear stress test     last assessed 04/03/2017    Anxiety     Arthritis     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    Bundle branch block, left     Cardiomyopathy ([address] )     Chest pain 3/9/2019    Chronic pain     chronic b/l shoulder pain    Chronic pain of right knee 4/2/2019    Coronary artery disease     Diabetes mellitus ([address] )     Gait disturbance 3/2/2018    GERD (gastroesophageal reflux disease)     H/O atrial flutter     History of DVT (deep vein thrombosis)     History of pulmonary embolism     Hyperlipidemia     Hypertension     Hypothyroid     Renal calculi     Shortness of breath        Past Surgical History:   Past Surgical History:   Procedure Laterality Date    ATRIAL ABLATION SURGERY  01/2015    CARDIAC PACEMAKER PLACEMENT      CARDIOVERSION      CHELA/DCCV 10/22/2014    CARPAL TUNNEL RELEASE Right     CATARACT EXTRACTION      CORONARY ANGIOPLASTY WITH STENT PLACEMENT      HYSTERECTOMY      JOINT REPLACEMENT Right     [place] AMPUTATION TOE,MT-P JT Left 10/11/2017    Procedure: AMPUTATION SECOND TOE;  Surgeon: Walker Downing DPM;  Location: [facility];  Service: Podiatry    TONSILLECTOMY      TOTAL HIP ARTHROPLASTY      Right       Family History:  Family history reviewed and non-contributory  Family History   Problem Relation Age of Onset    Parkinsonism Sister     Cancer Sister         unknown type    Heart attack Neg Hx     Stroke Neg Hx     Anuerysm Neg Hx     Clotting disorder Neg Hx     Arrhythmia Neg Hx     Heart failure Neg Hx     Coronary artery disease Neg Hx        Social History:  Social History     Socioeconomic History    Marital status: /Civil Union     Spouse name: None    Number of children: None    Years of education: None    Highest education level: None   Occupational History    None   Social Needs    Financial resource strain: None    Food insecurity     Worry: None     Inability: None    Transportation needs     Medical: None     Non-medical: None   Tobacco Use    Smoking status: Never Smoker    Smokeless tobacco: Never Used   Substance and Sexual Activity    Alcohol use: Yes     Frequency: Monthly or less     Drinks per session: 1 or 2     Binge frequency: Never     Comment: occasional    Drug use: Yes     Types: Marijuana     Comment: MEDICAL MARIJUANA-HAS NOT USED FOR 3 WEEKS    Sexual activity: None   Lifestyle    Physical activity     Days per week: None     Minutes per session: None    Stress: None   Relationships    Social connections     Talks on phone: None     Gets together: None     Attends [employer] service: None     Active member of club or organization: None     Attends meetings of clubs or organizations: None     Relationship status: None    Intimate partner violence     Fear of current or ex partner: None     Emotionally abused: None     Physically abused: None     Forced sexual activity: None   Other Topics Concern    None   Social History Narrative    None Allergies:    Allergies   Allergen Reactions    Atorvastatin      Reaction unknown 10/23/2018-    Other Rash     Patient sensitive to adhesives from tape           Labs:  0   Lab Value Date/Time    HCT 34 0 (L) 05/09/2021 0528    HCT 36 0 01/26/2021 0450    HCT 34 7 (L) 01/23/2021 1225    HCT 34 9 06/23/2015 1858    HCT 41 0 05/21/2015 1619    HCT 32 8 (L) 05/16/2015 0519    HGB 10 7 (L) 05/09/2021 0528    HGB 11 3 (L) 01/26/2021 0450    HGB 11 2 (L) 01/23/2021 1225    HGB 11 4 (L) 06/23/2015 1858    HGB 13 1 05/21/2015 1619    HGB 10 5 (L) 05/16/2015 0519    INR 2 03 (H) 05/09/2021 0336    INR 1 35 (H) 05/14/2015 1312    WBC 5 16 05/09/2021 0528    WBC 5 49 01/26/2021 0450    WBC 5 71 01/23/2021 1225    WBC 4 85 06/23/2015 1858    WBC 6 91 05/21/2015 1619    WBC 4 68 05/16/2015 0519       Meds:    Current Facility-Administered Medications:     acetaminophen (TYLENOL) tablet 975 mg, 975 mg, Oral, TID, Pino Valencia MD, 975 mg at 05/09/21 0851    aluminum-magnesium hydroxide-simethicone (MYLANTA) oral suspension 30 mL, 30 mL, Oral, Q6H PRN, Pino Valencia MD    amiodarone tablet 100 mg, 100 mg, Oral, Daily, Pino Valencia MD, 100 mg at 05/09/21 0851    aspirin (ECOTRIN LOW STRENGTH) EC tablet 81 mg, 81 mg, Oral, Daily, Pino Valencia MD, 81 mg at 05/09/21 0851    calcium carbonate-vitamin D (OSCAL-D) 500 mg-200 units per tablet 1 tablet, 1 tablet, Oral, BID With Meals, Pino Valencia MD, 1 tablet at 05/09/21 0850    Diclofenac Sodium (VOLTAREN) 1 % topical gel 4 g, 4 g, Topical, 4x Daily, FERMIN Cullen    docusate sodium (COLACE) capsule 100 mg, 100 mg, Oral, BID PRN, Pino Valencia MD    furosemide (LASIX) tablet 20 mg, 20 mg, Oral, Daily, Pino Valencia MD, 20 mg at 05/09/21 0851    insulin lispro (HumaLOG) 100 units/mL subcutaneous injection 1-5 Units, 1-5 Units, Subcutaneous, HS, Pino Valencia MD    insulin lispro (HumaLOG) 100 units/mL subcutaneous injection 1-6 Units, 1-6 Units, Subcutaneous, TID AC **AND** Fingerstick Glucose (POCT), , , TID AC, Diane Duggan MD    isosorbide mononitrate (IMDUR) 24 hr tablet 30 mg, 30 mg, Oral, Daily, Diane Duggan MD, 30 mg at 05/09/21 0850    [START ON 5/10/2021] levothyroxine tablet 75 mcg, 75 mcg, Oral, Early Morning, FERMIN Cullen    [START ON 5/10/2021] lidocaine (LIDODERM) 5 % patch 1 patch, 1 patch, Topical, Daily, FERMIN Cullen    lisinopril (ZESTRIL) tablet 2 5 mg, 2 5 mg, Oral, Daily, Diane Duggan MD, 2 5 mg at 05/09/21 0850    metoprolol succinate (TOPROL-XL) 24 hr tablet 25 mg, 25 mg, Oral, After Kate Cortez MD    multivitamin-minerals (CENTRUM) tablet 1 tablet, 1 tablet, Oral, Daily, Diane Duggan MD, 1 tablet at 05/09/21 0852    ondansetron (ZOFRAN) injection 4 mg, 4 mg, Intravenous, Q6H PRN, Diane Duggan MD    oxyCODONE (ROXICODONE) IR tablet 2 5 mg, 2 5 mg, Oral, Q4H PRN, Diane Duggan MD    oxyCODONE (ROXICODONE) IR tablet 5 mg, 5 mg, Oral, Q4H PRN, Diane Duggan MD, 5 mg at 05/09/21 1434    polyethylene glycol (MIRALAX) packet 17 g, 17 g, Oral, Daily, FERMIN Cullen    pravastatin (PRAVACHOL) tablet 40 mg, 40 mg, Oral, Daily, Diane Duggan MD, 40 mg at 05/09/21 95    warfarin (COUMADIN) tablet 2 5 mg, 2 5 mg, Oral, Daily (warfarin), Diane Duggan MD    Blood Culture:   No results found for: BLOODCX    Wound Culture:   No results found for: WOUNDCULT    Ins and Outs:  I/O last 24 hours: In: 650 [P O :650]  Out: 1050 [Urine:1050]          Physical Exam:   /59   Pulse 67   Temp 98 1 °F (36 7 °C)   Resp 16   Ht 4' 11" (1 499 m)   Wt 82 3 kg (181 lb 6 4 oz)   SpO2 95%   BMI 36 64 kg/m²   Gen: Alert and oriented to person, place, time  HEENT: EOMI, eyes clear, moist mucus membranes, hearing intact  Respiratory: Bilateral chest rise   No audible wheezing found  Cardiovascular: Regular Rate and Rhythm  Abdomen: soft nontender/nondistended  Musculoskeletal: right upper extremity  · Skin intact, no ecchymosis or swelling of the shoulder  · Minimally tender to palpation over the right shoulder  · Sensation intact to radial, ulna, median, musculocutaneous, and axillary nerve distributions  · Motor intact to  radial, ulna, median, musculocutaneous, and axillary nerve distributions  · 2+ rad pulse      Radiology:   I personally reviewed the films  X-rays right humerus shows avn of the right proximal humerus with flattening of the humeral head and calcification within the surrounding soft tissues     _*_*_*_*_*_*_*_*_*_*_*_*_*_*_*_*_*_*_*_*_*_*_*_*_*_*_*_*_*_*_*_*_*_*_*_*_*_*_*_*_*    Assessment:  80 y o  female S/P fall with right humeral head AVN  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

## 2021-05-10 PROBLEM — M87.00 AVASCULAR NECROSIS (HCC): Status: ACTIVE | Noted: 2021-05-09

## 2021-05-10 LAB
GLUCOSE SERPL-MCNC: 102 MG/DL (ref 65–140)
GLUCOSE SERPL-MCNC: 104 MG/DL (ref 65–140)
GLUCOSE SERPL-MCNC: 121 MG/DL (ref 65–140)
GLUCOSE SERPL-MCNC: 97 MG/DL (ref 65–140)

## 2021-05-10 PROCEDURE — NC001 PR NO CHARGE: Performed by: ORTHOPAEDIC SURGERY

## 2021-05-10 PROCEDURE — 99232 SBSQ HOSP IP/OBS MODERATE 35: CPT | Performed by: NURSE PRACTITIONER

## 2021-05-10 PROCEDURE — 99222 1ST HOSP IP/OBS MODERATE 55: CPT | Performed by: ORTHOPAEDIC SURGERY

## 2021-05-10 PROCEDURE — 82948 REAGENT STRIP/BLOOD GLUCOSE: CPT

## 2021-05-10 RX ADMIN — Medication 1 TABLET: at 09:13

## 2021-05-10 RX ADMIN — LIDOCAINE 1 PATCH: 50 PATCH TOPICAL at 09:23

## 2021-05-10 RX ADMIN — PRAVASTATIN SODIUM 40 MG: 40 TABLET ORAL at 09:24

## 2021-05-10 RX ADMIN — ISOSORBIDE MONONITRATE 30 MG: 30 TABLET, EXTENDED RELEASE ORAL at 09:24

## 2021-05-10 RX ADMIN — DICLOFENAC SODIUM 4 G: 10 GEL TOPICAL at 13:04

## 2021-05-10 RX ADMIN — LISINOPRIL 2.5 MG: 2.5 TABLET ORAL at 09:13

## 2021-05-10 RX ADMIN — POLYETHYLENE GLYCOL 3350 17 G: 17 POWDER, FOR SOLUTION ORAL at 09:24

## 2021-05-10 RX ADMIN — ASPIRIN 81 MG: 81 TABLET, COATED ORAL at 09:13

## 2021-05-10 RX ADMIN — DICLOFENAC SODIUM 4 G: 10 GEL TOPICAL at 09:24

## 2021-05-10 RX ADMIN — WARFARIN SODIUM 2.5 MG: 2.5 TABLET ORAL at 17:30

## 2021-05-10 RX ADMIN — AMIODARONE HYDROCHLORIDE 100 MG: 200 TABLET ORAL at 09:13

## 2021-05-10 RX ADMIN — Medication 1 TABLET: at 17:30

## 2021-05-10 RX ADMIN — DICLOFENAC SODIUM 4 G: 10 GEL TOPICAL at 21:52

## 2021-05-10 RX ADMIN — FUROSEMIDE 20 MG: 20 TABLET ORAL at 09:24

## 2021-05-10 RX ADMIN — LEVOTHYROXINE SODIUM 75 MCG: 75 TABLET ORAL at 05:27

## 2021-05-10 RX ADMIN — METOPROLOL SUCCINATE 25 MG: 25 TABLET, EXTENDED RELEASE ORAL at 17:30

## 2021-05-10 RX ADMIN — ACETAMINOPHEN 975 MG: 325 TABLET, FILM COATED ORAL at 21:52

## 2021-05-10 RX ADMIN — DICLOFENAC SODIUM 4 G: 10 GEL TOPICAL at 17:31

## 2021-05-10 RX ADMIN — ACETAMINOPHEN 975 MG: 325 TABLET, FILM COATED ORAL at 09:23

## 2021-05-10 NOTE — PROGRESS NOTES
Subjective: No acute events overnight  No acute distress  Currently in the bedpan    Objective:  A 10 point ROS was performed; negative except as noted above  Lab Results   Component Value Date/Time    WBC 5 16 05/09/2021 05:28 AM    WBC 4 85 06/23/2015 06:58 PM    HGB 10 7 (L) 05/09/2021 05:28 AM    HGB 11 4 (L) 06/23/2015 06:58 PM       Vitals:    05/09/21 2355   BP: 132/58   Pulse: 71   Resp: 16   Temp: 98 1 °F (36 7 °C)   SpO2: 94%     Right upper extremity:   Skin intact  Some TTP over shoulder  SILT r/m/u  Motor intact to r/m/u/pin/ain  Fingers WWP    Bilateral lower extremities  Skin intcat, mild ecchymosis over right knee  Bilateral knee effusions  SILT dp/sp/saph/kristian/tib  Feet WWP      Assessment: 80 y o  female  With Right humeral head AVN and bilateral knee arthritis   Patient currently not interested in injections    Plan:  WBAT all extremities  Pain control  PT/OT  Dispo: Ortho signing off

## 2021-05-10 NOTE — DISCHARGE INSTRUCTIONS
Discharge Instructions - Miles Potts 80 y o  female MRN: [id number]  Unit/Bed#: [facility] 627-01    Weight Bearing Status:                                           Weight bearing as tolerated right upper extremity     Pain:  Continue analgesics as directed by primary team     Appt Instructions: If you do not have your appointment, please call the clinic at [phone] to schedule follow up as needed with Dr Mat Bueno outpatient   Otherwise followup as scheduled     Contact the office sooner if you experience any increased numbness/tingling in the extremities        Miscellaneous:  None

## 2021-05-11 VITALS
BODY MASS INDEX: 37.07 KG/M2 | HEIGHT: 59 IN | WEIGHT: 183.86 LBS | TEMPERATURE: 98.8 F | RESPIRATION RATE: 16 BRPM | DIASTOLIC BLOOD PRESSURE: 63 MMHG | HEART RATE: 70 BPM | SYSTOLIC BLOOD PRESSURE: 132 MMHG | OXYGEN SATURATION: 94 %

## 2021-05-11 PROBLEM — K59.00 CONSTIPATION: Status: RESOLVED | Noted: 2021-02-03 | Resolved: 2021-05-11

## 2021-05-11 LAB
BASOPHILS # BLD AUTO: 0.01 THOUSANDS/ΜL (ref 0–0.1)
BASOPHILS NFR BLD AUTO: 0 % (ref 0–1)
EOSINOPHIL # BLD AUTO: 0.19 THOUSAND/ΜL (ref 0–0.61)
EOSINOPHIL NFR BLD AUTO: 4 % (ref 0–6)
ERYTHROCYTE [DISTWIDTH] IN BLOOD BY AUTOMATED COUNT: 14.6 % (ref 11.6–15.1)
EXT SARS-COV-2: NOT DETECTED
EXT SARS-COV-2: NOT DETECTED
GLUCOSE SERPL-MCNC: 127 MG/DL (ref 65–140)
GLUCOSE SERPL-MCNC: 93 MG/DL (ref 65–140)
HCT VFR BLD AUTO: 34.8 % (ref 34.8–46.1)
HGB BLD-MCNC: 11.4 G/DL (ref 11.5–15.4)
IMM GRANULOCYTES # BLD AUTO: 0.02 THOUSAND/UL (ref 0–0.2)
IMM GRANULOCYTES NFR BLD AUTO: 0 % (ref 0–2)
INR PPP: 2.32 (ref 0.84–1.19)
LYMPHOCYTES # BLD AUTO: 1.1 THOUSANDS/ΜL (ref 0.6–4.47)
LYMPHOCYTES NFR BLD AUTO: 20 % (ref 14–44)
MCH RBC QN AUTO: 30.6 PG (ref 26.8–34.3)
MCHC RBC AUTO-ENTMCNC: 32.8 G/DL (ref 31.4–37.4)
MCV RBC AUTO: 94 FL (ref 82–98)
MONOCYTES # BLD AUTO: 0.59 THOUSAND/ΜL (ref 0.17–1.22)
MONOCYTES NFR BLD AUTO: 11 % (ref 4–12)
NEUTROPHILS # BLD AUTO: 3.49 THOUSANDS/ΜL (ref 1.85–7.62)
NEUTS SEG NFR BLD AUTO: 65 % (ref 43–75)
NRBC BLD AUTO-RTO: 0 /100 WBCS
PLATELET # BLD AUTO: 242 THOUSANDS/UL (ref 149–390)
PMV BLD AUTO: 9.6 FL (ref 8.9–12.7)
PROTHROMBIN TIME: 25.3 SECONDS (ref 11.6–14.5)
RBC # BLD AUTO: 3.72 MILLION/UL (ref 3.81–5.12)
SARS-COV-2 RNA RESP QL NAA+PROBE: NEGATIVE
WBC # BLD AUTO: 5.4 THOUSAND/UL (ref 4.31–10.16)

## 2021-05-11 PROCEDURE — 85610 PROTHROMBIN TIME: CPT | Performed by: NURSE PRACTITIONER

## 2021-05-11 PROCEDURE — U0005 INFEC AGEN DETEC AMPLI PROBE: HCPCS | Performed by: PHYSICIAN ASSISTANT

## 2021-05-11 PROCEDURE — 99239 HOSP IP/OBS DSCHRG MGMT >30: CPT | Performed by: PHYSICIAN ASSISTANT

## 2021-05-11 PROCEDURE — U0003 INFECTIOUS AGENT DETECTION BY NUCLEIC ACID (DNA OR RNA); SEVERE ACUTE RESPIRATORY SYNDROME CORONAVIRUS 2 (SARS-COV-2) (CORONAVIRUS DISEASE [COVID-19]), AMPLIFIED PROBE TECHNIQUE, MAKING USE OF HIGH THROUGHPUT TECHNOLOGIES AS DESCRIBED BY CMS-2020-01-R: HCPCS | Performed by: PHYSICIAN ASSISTANT

## 2021-05-11 PROCEDURE — 85025 COMPLETE CBC W/AUTO DIFF WBC: CPT | Performed by: NURSE PRACTITIONER

## 2021-05-11 PROCEDURE — 82948 REAGENT STRIP/BLOOD GLUCOSE: CPT

## 2021-05-11 RX ORDER — WARFARIN SODIUM 2.5 MG/1
2.5 TABLET ORAL
Refills: 0
Start: 2021-05-11 | End: 2021-11-18

## 2021-05-11 RX ORDER — LEVOTHYROXINE SODIUM 0.07 MG/1
75 TABLET ORAL
Refills: 0
Start: 2021-05-12 | End: 2021-11-18

## 2021-05-11 RX ADMIN — ACETAMINOPHEN 975 MG: 325 TABLET, FILM COATED ORAL at 08:39

## 2021-05-11 RX ADMIN — PRAVASTATIN SODIUM 40 MG: 40 TABLET ORAL at 08:39

## 2021-05-11 RX ADMIN — ISOSORBIDE MONONITRATE 30 MG: 30 TABLET, EXTENDED RELEASE ORAL at 08:39

## 2021-05-11 RX ADMIN — Medication 1 TABLET: at 08:38

## 2021-05-11 RX ADMIN — LISINOPRIL 2.5 MG: 2.5 TABLET ORAL at 08:38

## 2021-05-11 RX ADMIN — FUROSEMIDE 20 MG: 20 TABLET ORAL at 08:39

## 2021-05-11 RX ADMIN — DICLOFENAC SODIUM 4 G: 10 GEL TOPICAL at 12:40

## 2021-05-11 RX ADMIN — LIDOCAINE 1 PATCH: 50 PATCH TOPICAL at 08:40

## 2021-05-11 RX ADMIN — AMIODARONE HYDROCHLORIDE 100 MG: 200 TABLET ORAL at 08:39

## 2021-05-11 RX ADMIN — DICLOFENAC SODIUM 4 G: 10 GEL TOPICAL at 08:40

## 2021-05-11 RX ADMIN — ASPIRIN 81 MG: 81 TABLET, COATED ORAL at 08:38

## 2021-05-11 RX ADMIN — LEVOTHYROXINE SODIUM 75 MCG: 75 TABLET ORAL at 05:25

## 2021-05-11 NOTE — ASSESSMENT & PLAN NOTE
· Likely contributing to ambulatory dysfunction  · Known to Dr Jimmy Horta, has had steroid injections in the past but no longer wishes to continue with this given no real benefit in past    · Add Voltaren gel  Continue ATC APAP, PRN Oxycodone    · PT/OT

## 2021-05-11 NOTE — DISCHARGE SUMMARY
[address] 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  No surgical intervention is warranted at this time  · PT/OT recommends rehab  Patient will be discharged to [facility]  Essential hypertension  Assessment & Plan  · Continue Lasix 20 mg daily, Zestril 2 5 mg daily, Metoprolol succinate 25 mg daily  Type 2 diabetes mellitus with stage 3a chronic kidney disease, without long-term current use of insulin [facility])  Assessment & Plan  Lab Results   Component Value Date    HGBA1C 5 7 (H) 05/09/2021     Recent Labs     05/10/21  1128 05/10/21  1704 05/10/21  2152 05/11/21  0724   POCGLU 121 104 102 93     Blood Sugar Average: Last 72 hrs:  (P) 104    · Diet controlled  · Carbohydrate controlled diet  Acquired hypothyroidism  Assessment & Plan  · TSH elevated @ 28 and free T4 low @ 0 48  · Increased Levothyroxine to 75 mcg daily  · Repeat TFT's in 4-6 weeks  Hyperlipidemia  Assessment & Plan  · Continue pravastatin 40 mg daily  Asymptomatic bacteriuria  Assessment & Plan  · UA shows bacteria  No urinary symptoms  Monitor off abx  Knee pain, chronic  Assessment & Plan  · Likely contributing to ambulatory dysfunction  · Known to Dr Joshua Newsome, has had steroid injections in the past but no longer wishes to continue with this given no real benefit in past    · Continue Voltaren gel  · Continue scheduled Tylenol  · Therapy recommending rehab    [facility]  Avascular necrosis (HCC)  Assessment & Plan  · Xray right shoulder shows fracture of the anterior aspect of the right humeral head  · No need for sling and can be weight bearing per orthopedics  Anticoagulated on Coumadin  Assessment & Plan  · Therapeutic INR: 2 0-3 0  · INR today: 2 32    Atrial fibrillation (HCC)  Assessment & Plan  · Rate controlled on amiodarone and metoprolol  · AC with Coumadin  INR is within therapeutic range of 2 32  · Continue Coumadin 2 5 mg daily  Coronary artery disease involving native coronary artery of native heart without angina pectoris  Assessment & Plan  · Continue ASA, metoprolol and Imdur  Constipation-resolved as of 5/11/2021  Assessment & Plan  · Patient has had several BMs today  · Narcotics have been discontinued  · Encourage adequate fluid intake  Resolved Problems  Date Reviewed: 5/11/2021          Resolved    Constipation 5/11/2021     Resolved by  Phuong Henson PA-C        Discharging Physician / Practitioner: Phuong Henson PA-C  PCP: Zohreh Méndez MD  Admission Date:   Admission Orders (From admission, onward)     Ordered        05/09/21 0328  Inpatient Admission  Once                   Discharge Date: 05/11/21    Consultations During Hospital Stay:  · Orthopedics    Procedures Performed:   · CT head (05/09/2021):  No acute intracranial abnormality  · CT cervical spine (05/09/2021):  No acute cervical spine fracture or traumatic malalignment  Moderate to advanced multilevel degenerative changes noted  · CT abdomen pelvis (05/09/2021): No evidence of acute intra-abdominal or intrapelvic parenchymal organ injury  Colonic diverticulosis without evidence of acute diverticulitis  Cholelithiasis  No CT evidence of acute cholecystitis  Atherosclerosis  Cardiomegaly  · Right knee x-ray (05/09/2021):  Osteoarthritis  No acute bony abnormality  · Right shoulder x-ray (05/09/2021):   Fracture of the anterior aspect of the right humeral head  Osteonecrosis and collapse of the rest of the right humeral head and associated advanced osteoarthritis of the right glenohumeral joint  Sclerosis of the coracoid process  · Right humerus x-ray (05/09/2021):  Chronic appearing deformity of the medial humeral head with ossific densities seen along the medial aspect of the glenohumeral joint  Advanced degenerative changes of the glenohumeral joint  Mid to distal right humerus appears intact  Significant Findings / Test Results:   · None     Incidental Findings:   · AVN humeral head     Test Results Pending at Discharge (will require follow up): · None     Outpatient Tests Requested:  · Repeat TSH and free T4 in 4-6 weeks  Complications:  None    Reason for Admission: [facility] Course:   Dennis Mora is a 80 y o  female patient who originally presented to the hospital on 5/9/2021 due to fall  There was some concern originally that the patient had a humerus fracture however after being seen by Orthopedics who reviewed imaging studies it is determined that this is likely chronic arthritic changes and 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  Therefore, no surgical intervention was warranted  The patient was seen in consultation by therapy who recommended rehabilitation  Please see above list of diagnoses and related plan for additional information  Condition at Discharge: stable    Discharge Day Visit / Exam:   Subjective: On the day of discharge, the patient reports that her pain is status quo without any new issues  She had 2 BMs today already        Vitals: Blood Pressure: 134/88 (05/11/21 0723)  Pulse: 72 (05/11/21 0723)  Temperature: 98 8 °F (37 1 °C) (05/11/21 0723)  Temp Source: Oral (05/09/21 0043)  Respirations: 16 (05/11/21 0723)  Height: 4' 11" (149 9 cm) (05/09/21 0459)  Weight - Scale: 83 4 kg (183 lb 13 8 oz) (05/11/21 0600)  SpO2: 91 % (05/11/21 0723)  Exam:   Physical Exam  Vitals signs and nursing note reviewed  Constitutional:       General: She is not in acute distress  Appearance: She is well-developed  HENT:      Head: Normocephalic and atraumatic  Eyes:      Conjunctiva/sclera: Conjunctivae normal    Neck:      Musculoskeletal: Neck supple  Cardiovascular:      Rate and Rhythm: Normal rate and regular rhythm  Heart sounds: Murmur present  Pulmonary:      Effort: Pulmonary effort is normal  No respiratory distress  Breath sounds: Normal breath sounds  Abdominal:      Palpations: Abdomen is soft  Tenderness: There is no abdominal tenderness  Skin:     General: Skin is warm and dry  Neurological:      Mental Status: She is alert  Discussion with Family: Attempted to update  ( and daughter) via phone  Unable to contact  Discharge instructions/Information to patient and family:   See after visit summary for information provided to patient and family  Provisions for Follow-Up Care:  See after visit summary for information related to follow-up care and any pertinent home health orders  Disposition:   Discharge to [facility]  Planned Readmission: No     Discharge Statement:  I spent 45 minutes discharging the patient  This time was spent on the day of discharge  I had direct contact with the patient on the day of discharge  Greater than 50% of the total time was spent examining patient, answering all patient questions, arranging and discussing plan of care with patient as well as directly providing post-discharge instructions  Additional time then spent on discharge activities  Discharge Medications:  See after visit summary for reconciled discharge medications provided to patient and/or family        **Please Note: This note may have been constructed using a voice recognition system**

## 2021-05-11 NOTE — ASSESSMENT & PLAN NOTE
Lab Results   Component Value Date    HGBA1C 5 7 (H) 05/09/2021     · Not on any medication  Diet controlled  · Carbohydrate controlled diet  · Check blood sugars before meals and before bedtime with Accu-Cheks per protocol    Recent Labs     05/09/21  2059 05/10/21  0728 05/10/21  1128 05/10/21  1704   POCGLU 142* 97 121 104       Blood Sugar Average: Last 72 hrs:  (P) 483 [id number]

## 2021-05-11 NOTE — ASSESSMENT & PLAN NOTE
· Status post mechanical fall landing on buttocks with back pain  · CT A/P and C spine negative for acute injuries  Xray knee negative  Right should xray shows fracture of the right humeral head, see below  · - However per orthopedics evaluation pt noted to have right humeral head AVN and bilateral knee arthritis   · - discussed with pt she refuses injections at this time   · PT/OT recommending rehab  CM made aware   - still pending placement discussed with cm today

## 2021-05-11 NOTE — ASSESSMENT & PLAN NOTE
· Patient has had several BMs today  · Narcotics have been discontinued  · Encourage adequate fluid intake

## 2021-05-11 NOTE — ASSESSMENT & PLAN NOTE
· Rate controlled on amiodarone and metoprolol  · AC with Coumadin  INR is within therapeutic range of 2 32  · Continue Coumadin 2 5 mg daily

## 2021-05-11 NOTE — PLAN OF CARE
Problem: Prexisting or High Potential for Compromised Skin Integrity  Goal: Skin integrity is maintained or improved  Description: INTERVENTIONS:  - Identify patients at risk for skin breakdown  - Assess and monitor skin integrity  - Assess and monitor nutrition and hydration status  - Monitor labs   - Assess for incontinence   - Turn and reposition patient  - Assist with mobility/ambulation  - Relieve pressure over bony prominences  - Avoid friction and shearing  - Provide appropriate hygiene as needed including keeping skin clean and dry  - Evaluate need for skin moisturizer/barrier cream  - Collaborate with interdisciplinary team   - Patient/family teaching  - Consider wound care consult   5/11/2021 1212 by Jennifer Sanabria RN  Outcome: Adequate for Discharge  5/11/2021 0745 by Jennifer Sanabria RN  Outcome: Progressing     Problem: Potential for Falls  Goal: Patient will remain free of falls  Description: INTERVENTIONS:  - Assess patient frequently for physical needs  -  Identify cognitive and physical deficits and behaviors that affect risk of falls    -  [place] fall precautions as indicated by assessment   - Educate patient/family on patient safety including physical limitations  - Instruct patient to call for assistance with activity based on assessment  - Modify environment to reduce risk of injury  - Consider OT/PT consult to assist with strengthening/mobility  5/11/2021 1212 by Jennifer Sanabria RN  Outcome: Adequate for Discharge  5/11/2021 0745 by Jennifer Sanabria RN  Outcome: Progressing     Problem: SKIN/TISSUE INTEGRITY - ADULT  Goal: Skin integrity remains intact  Description: INTERVENTIONS  - Identify patients at risk for skin breakdown  - Assess and monitor skin integrity  - Assess and monitor nutrition and hydration status  - Monitor labs (i e  albumin)  - Assess for incontinence   - Turn and reposition patient  - Assist with mobility/ambulation  - Relieve pressure over bony prominences  - Avoid friction and shearing  - Provide appropriate hygiene as needed including keeping skin clean and dry  - Evaluate need for skin moisturizer/barrier cream  - Collaborate with interdisciplinary team (i e  Nutrition, Rehabilitation, etc )   - Patient/family teaching  5/11/2021 1212 by Asad Almazan RN  Outcome: Adequate for Discharge  5/11/2021 0745 by Asad Almazan RN  Outcome: Progressing  Goal: Incision(s), wounds(s) or drain site(s) healing without S/S of infection  Description: INTERVENTIONS  - Assess and document risk factors for skin impairment   - Assess and document dressing, incision, wound bed, drain sites and surrounding tissue  - Consider nutrition services referral as needed  - Oral mucous membranes remain intact  - Provide patient/ family education  5/11/2021 1212 by Asad Almazan RN  Outcome: Adequate for Discharge  5/11/2021 0745 by Asad Almazan RN  Outcome: Progressing     Problem: MUSCULOSKELETAL - ADULT  Goal: Maintain or return mobility to safest level of function  Description: INTERVENTIONS:  - Assess patient's ability to carry out ADLs; assess patient's baseline for ADL function and identify physical deficits which impact ability to perform ADLs (bathing, care of mouth/teeth, toileting, grooming, dressing, etc )  - Assess/evaluate cause of self-care deficits   - Assess range of motion  - Assess patient's mobility  - Assess patient's need for assistive devices and provide as appropriate  - Encourage maximum independence but intervene and supervise when necessary  - Involve family in performance of ADLs  - Assess for home care needs following discharge   - Consider OT consult to assist with ADL evaluation and planning for discharge  - Provide patient education as appropriate  5/11/2021 1212 by Asad Almazan RN  Outcome: Adequate for Discharge  5/11/2021 0745 by Asad Almazan RN  Outcome: Progressing  Goal: Maintain proper alignment of affected body part  Description: INTERVENTIONS:  - Support, maintain and protect limb and body alignment  - Provide patient/ family with appropriate education  5/11/2021 1212 by Ryan Wheeler RN  Outcome: Adequate for Discharge  5/11/2021 0745 by Ryan Wheeler RN  Outcome: Progressing     Problem: PAIN - ADULT  Goal: Verbalizes/displays adequate comfort level or baseline comfort level  Description: Interventions:  - Encourage patient to monitor pain and request assistance  - Assess pain using appropriate pain scale  - Administer analgesics based on type and severity of pain and evaluate response  - Implement non-pharmacological measures as appropriate and evaluate response  - Consider cultural and social influences on pain and pain management  - Notify physician/advanced practitioner if interventions unsuccessful or patient reports new pain  5/11/2021 1212 by Ryan Wheeler RN  Outcome: Adequate for Discharge  5/11/2021 0745 by Ryan Wheeler RN  Outcome: Progressing     Problem: INFECTION - ADULT  Goal: Absence or prevention of progression during hospitalization  Description: INTERVENTIONS:  - Assess and monitor for signs and symptoms of infection  - Monitor lab/diagnostic results  - Monitor all insertion sites, i e  indwelling lines, tubes, and drains  - Monitor endotracheal if appropriate and nasal secretions for changes in amount and color  - [place] appropriate cooling/warming therapies per order  - Administer medications as ordered  - Instruct and encourage patient and family to use good hand hygiene technique  - Identify and instruct in appropriate isolation precautions for identified infection/condition  5/11/2021 1212 by Ryan Wheeler RN  Outcome: Adequate for Discharge  5/11/2021 0745 by Ryan Wheeler RN  Outcome: Progressing     Problem: SAFETY ADULT  Goal: Patient will remain free of falls  Description: INTERVENTIONS:  - Assess patient frequently for physical needs  -  Identify cognitive and physical deficits and behaviors that affect risk of falls    -  [place] fall precautions as indicated by assessment   - Educate patient/family on patient safety including physical limitations  - Instruct patient to call for assistance with activity based on assessment  - Modify environment to reduce risk of injury  - Consider OT/PT consult to assist with strengthening/mobility  5/11/2021 1212 by Marcia Figueredo RN  Outcome: Adequate for Discharge  5/11/2021 0745 by Marcia Figueredo RN  Outcome: Progressing  Goal: Maintain or return to baseline ADL function  Description: INTERVENTIONS:  -  Assess patient's ability to carry out ADLs; assess patient's baseline for ADL function and identify physical deficits which impact ability to perform ADLs (bathing, care of mouth/teeth, toileting, grooming, dressing, etc )  - Assess/evaluate cause of self-care deficits   - Assess range of motion  - Assess patient's mobility; develop plan if impaired  - Assess patient's need for assistive devices and provide as appropriate  - Encourage maximum independence but intervene and supervise when necessary  - Involve family in performance of ADLs  - Assess for home care needs following discharge   - Consider OT consult to assist with ADL evaluation and planning for discharge  - Provide patient education as appropriate  5/11/2021 1212 by Marcia Figueredo RN  Outcome: Adequate for Discharge  5/11/2021 0745 by Marcia Figueredo RN  Outcome: Progressing  Goal: Maintain or return mobility status to optimal level  Description: INTERVENTIONS:  - Assess patient's baseline mobility status (ambulation, transfers, stairs, etc )    - Identify cognitive and physical deficits and behaviors that affect mobility  - Identify mobility aids required to assist with transfers and/or ambulation (gait belt, sit-to-stand, lift, walker, cane, etc )  - [place] fall precautions as indicated by assessment  - Record patient progress and toleration of activity level on Mobility SBAR; progress patient to next Phase/Stage  - Instruct patient to call for assistance with activity based on assessment  - Consider rehabilitation consult to assist with strengthening/weightbearing, etc   5/11/2021 1212 by Evangelina Brand RN  Outcome: Adequate for Discharge  5/11/2021 0745 by Evangelina Brand RN  Outcome: Progressing     Problem: DISCHARGE PLANNING  Goal: Discharge to home or other facility with appropriate resources  Description: INTERVENTIONS:  - Identify barriers to discharge w/patient and caregiver  - Arrange for needed discharge resources and transportation as appropriate  - Identify discharge learning needs (meds, wound care, etc )  - Arrange for interpretive services to assist at discharge as needed  - Refer to Case Management Department for coordinating discharge planning if the patient needs post-hospital services based on physician/advanced practitioner order or complex needs related to functional status, cognitive ability, or social support system  5/11/2021 1212 by Evangelina Brand RN  Outcome: Adequate for Discharge  5/11/2021 0745 by Evangelina Brand RN  Outcome: Progressing     Problem: Knowledge Deficit  Goal: Patient/family/caregiver demonstrates understanding of disease process, treatment plan, medications, and discharge instructions  Description: Complete learning assessment and assess knowledge base    Interventions:  - Provide teaching at level of understanding  - Provide teaching via preferred learning methods  5/11/2021 1212 by Evangelina Brand RN  Outcome: Adequate for Discharge  5/11/2021 0745 by Evangelina Brand RN  Outcome: Progressing

## 2021-05-11 NOTE — ASSESSMENT & PLAN NOTE
· Xray right shoulder shows fracture of the anterior aspect of the right humeral head  · No need for sling and can be weight bearing per orthopedics

## 2021-05-11 NOTE — ASSESSMENT & PLAN NOTE
· Xray right shoulder shows Fracture of the anterior aspect of the right humeral head  · No need for sling and can be weight bearing   · Pain control with ATC APAP, lido patch, PRN oxycodone

## 2021-05-11 NOTE — ASSESSMENT & PLAN NOTE
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

## 2021-05-11 NOTE — ASSESSMENT & PLAN NOTE
· TSH elevated @ 28 and free T4 low @ 0 48  · Increased Levothyroxine to 75 mcg daily  · Repeat TFT's in 4-6 weeks

## 2021-05-11 NOTE — ASSESSMENT & PLAN NOTE
· Likely contributing to ambulatory dysfunction  · Known to Dr Collin Carranza, has had steroid injections in the past but no longer wishes to continue with this given no real benefit in past    · Continue Voltaren gel  · Continue scheduled Tylenol  · Therapy recommending rehab  Patient will be discharged to [facility]

## 2021-05-11 NOTE — PROGRESS NOTES
[address] [facility]  Progress Note - Vinay Moyer 7/24/1929, 80 y o  female MRN: [id number]  Unit/Bed#: [facility] 627-01 Encounter: [id number]  Primary Care Provider: Corinne Ferries, MD   Date and time admitted to hospital: 5/9/2021 12:39 AM    * Ambulatory dysfunction  Assessment & Plan  · Status post mechanical fall landing on buttocks with back pain  · CT A/P and C spine negative for acute injuries  Xray knee negative  Right should xray shows fracture of the right humeral head, see below  · - However per orthopedics evaluation pt noted to have right humeral head AVN and bilateral knee arthritis   · - discussed with pt she refuses injections at this time   · PT/OT recommending rehab  CM made aware  - still pending placement discussed with cm today     Asymptomatic bacteriuria  Assessment & Plan  · UA shows bacteria  No urinary symptoms  Monitor off abx  Knee pain, chronic  Assessment & Plan  · Likely contributing to ambulatory dysfunction  · Known to Dr Maday Lin, has had steroid injections in the past but no longer wishes to continue with this given no real benefit in past    · Add Voltaren gel  Continue ATC APAP, PRN Oxycodone  · PT/OT     Avascular necrosis (HCC)  Assessment & Plan  · Xray right shoulder shows Fracture of the anterior aspect of the right humeral head  · No need for sling and can be weight bearing   · Pain control with ATC APAP, lido patch, PRN oxycodone  Constipation  Assessment & Plan  · Start bowel regimen  Anticoagulated on Coumadin  Assessment & Plan  · PT INR in the morning  · Continue Coumadin  · chk lab  In am ordered     Atrial fibrillation ([address] )  Assessment & Plan  · Continue BB  AC with Coumadin  INR within range  Continue home dose  Acquired hypothyroidism  Assessment & Plan  · TSH 28  Increase Levothyroxine to 75 mcg daily  · Check T4  · Repeat TFT's in 4-6 weeks      Type 2 diabetes mellitus with stage 3a chronic kidney disease, without long-term current use of insulin [facility])  Assessment & Plan  Lab Results   Component Value Date    HGBA1C 5 7 (H) 2021     · Not on any medication  Diet controlled  · Carbohydrate controlled diet  · Check blood sugars before meals and before bedtime with Accu-Cheks per protocol  Recent Labs     05/09/21  2059 05/10/21  0728 05/10/21  1128 05/10/21  1704   POCGLU 142* 97 121 104       Blood Sugar Average: Last 72 hrs:  (P) 004 [id number]    Hyperlipidemia  Assessment & Plan  · Continue pravastatin 40 mg daily  Essential hypertension  Assessment & Plan  · Continue Lasix 20 mg daily, Zestril 2 5 mg daily, Metoprolol succinate 25 mg daily  · Monitor    Coronary artery disease involving native coronary artery of native heart without angina pectoris  Assessment & Plan  · Continue ASA, BB and Imdur  VTE Pharmacologic Prophylaxis:   Pharmacologic: Warfarin (Coumadin)  Mechanical VTE Prophylaxis in Place: Yes    Patient Centered Rounds: I have performed bedside rounds with nursing staff today  Discussions with Specialists or Other Care Team Provider: nursing     Education and Discussions with Family / Patient: patient     Time Spent for Care: 30 minutes  More than 50% of total time spent on counseling and coordination of care as described above      Current Length of Stay: 1 day(s)    Current Patient Status: Inpatient   Certification Statement: The patient will continue to require additional inpatient hospital stay due to pending bed placment     Discharge Plan: to rehab when bed available     Code Status: Level 1 - Full Code      Subjective:   Pt is doing well has some discomfort no real complaints no n/v/d no sob and understands waiting for rehab    Objective:     Vitals:   Temp (24hrs), Av 2 °F (36 8 °C), Min:98 1 °F (36 7 °C), Max:98 4 °F (36 9 °C)    Temp:  [98 1 °F (36 7 °C)-98 4 °F (36 9 °C)] 98 2 °F (36 8 °C)  HR:  [66-74] 70  Resp:  [16-18] 16  BP: (129-139)/(58-66) 130/62  SpO2:  [93 %-95 %] 95 %  Body mass index is 37 18 kg/m²  Input and Output Summary (last 24 hours): Intake/Output Summary (Last 24 hours) at 5/10/2021 2148  Last data filed at 5/10/2021 1252  Gross per 24 hour   Intake 530 ml   Output 3175 ml   Net -2645 ml       Physical Exam:     Physical Exam  Constitutional:       General: She is not in acute distress  Appearance: She is not ill-appearing, toxic-appearing or diaphoretic  HENT:      Head: Normocephalic  Comments: Little hard of hearing      Nose: No congestion  Mouth/Throat:      Pharynx: Oropharynx is clear  Eyes:      General:         Right eye: No discharge  Left eye: No discharge  Conjunctiva/sclera: Conjunctivae normal    Cardiovascular:      Rate and Rhythm: Normal rate  Heart sounds: No murmur  No friction rub  No gallop  Pulmonary:      Effort: No respiratory distress  Breath sounds: No stridor  No wheezing, rhonchi or rales  Chest:      Chest wall: No tenderness  Abdominal:      General: There is no distension  Palpations: There is no mass  Tenderness: There is no abdominal tenderness  There is no right CVA tenderness, left CVA tenderness, guarding or rebound  Hernia: No hernia is present  Musculoskeletal:         General: Tenderness (right shoulder ) present  No swelling, deformity or signs of injury  Right lower leg: No edema  Left lower leg: No edema  Skin:     Coloration: Skin is not jaundiced or pale  Findings: No bruising, erythema, lesion or rash  Neurological:      Mental Status: She is alert and oriented to person, place, and time     Psychiatric:         Behavior: Behavior normal            Additional Data:     Labs:    Results from last 7 days   Lab Units 05/09/21  0528   WBC Thousand/uL 5 16   HEMOGLOBIN g/dL 10 7*   HEMATOCRIT % 34 0*   PLATELETS Thousands/uL 236   NEUTROS PCT % 65   LYMPHS PCT % 22   MONOS PCT % 11   EOS PCT % 2     Results from last 7 days   Lab Units 05/09/21  0524   SODIUM mmol/L 139   POTASSIUM mmol/L 3 7   CHLORIDE mmol/L 105   CO2 mmol/L 31   BUN mg/dL 29*   CREATININE mg/dL 0 82   ANION GAP mmol/L 3*   CALCIUM mg/dL 8 8   ALBUMIN g/dL 3 1*   TOTAL BILIRUBIN mg/dL 0 58   ALK PHOS U/L 69   ALT U/L 14   AST U/L 12   GLUCOSE RANDOM mg/dL 88     Results from last 7 days   Lab Units 05/09/21  0336   INR  2 03*     Results from last 7 days   Lab Units 05/10/21  1704 05/10/21  1128 05/10/21  0728 05/09/21  2059 05/09/21  1722 05/09/21  1130 05/09/21  0725   POC GLUCOSE mg/dl 104 121 97 142* 89 106 86     Results from last 7 days   Lab Units 05/09/21  0524   HEMOGLOBIN A1C % 5 7*               * I Have Reviewed All Lab Data Listed Above  * Additional Pertinent Lab Tests Reviewed:  All Labs Within Last 24 Hours Reviewed    Imaging:    Imaging Reports Reviewed Today Include: reviewed     Recent Cultures (last 7 days):           Last 24 Hours Medication List:   Current Facility-Administered Medications   Medication Dose Route Frequency Provider Last Rate    acetaminophen  975 mg Oral TID Bharathi Multani MD      aluminum-magnesium hydroxide-simethicone  30 mL Oral Q6H PRN Bharathi Multani MD      amiodarone  100 mg Oral Daily Bharathi Multani MD      aspirin  81 mg Oral Daily Bharathi Multani MD      calcium carbonate-vitamin D  1 tablet Oral BID With Meals Bharathi Multani MD      Diclofenac Sodium  4 g Topical 4x Daily Delphine M FERMIN Richardson      docusate sodium  100 mg Oral BID PRN Bharathi Multani MD      furosemide  20 mg Oral Daily Bharathi Multani MD      insulin lispro  1-5 Units Subcutaneous HS Bharathi Multani MD      insulin lispro  1-6 Units Subcutaneous TID AC Bharathi Multani MD      isosorbide mononitrate  30 mg Oral Daily Bharathi Multani MD      levothyroxine  75 mcg Oral Early Morning Delphine Vadim MiserFERMIN      lidocaine  1 patch Topical Daily Delphine M DylanFERMIN      lisinopril  2 5 mg Oral Daily Hiren Sonu Powell MD      metoprolol succinate  25 mg Oral After Ivania Lambert MD      multivitamin-minerals  1 tablet Oral Daily Alphonso Lyon MD      ondansetron  4 mg Intravenous Q6H PRN MD Randy Avery oxyCODONE  2 5 mg Oral Q4H PRN MD Randy Avery oxyCODONE  5 mg Oral Q4H PRN Alphonso Lyon MD      polyethylene glycol  17 g Oral Daily FERMIN García      pravastatin  40 mg Oral Daily Alphonso Lyon MD      warfarin  2 5 mg Oral Daily (warfarin) Alphonso Lyon MD          Today, Patient Was Seen By: FERMIN Delgado    ** Please Note: Dictation voice to text software may have been used in the creation of this document   **

## 2021-05-11 NOTE — PLAN OF CARE
Problem: Prexisting or High Potential for Compromised Skin Integrity  Goal: Skin integrity is maintained or improved  Description: INTERVENTIONS:  - Identify patients at risk for skin breakdown  - Assess and monitor skin integrity  - Assess and monitor nutrition and hydration status  - Monitor labs   - Assess for incontinence   - Turn and reposition patient  - Assist with mobility/ambulation  - Relieve pressure over bony prominences  - Avoid friction and shearing  - Provide appropriate hygiene as needed including keeping skin clean and dry  - Evaluate need for skin moisturizer/barrier cream  - Collaborate with interdisciplinary team   - Patient/family teaching  - Consider wound care consult   Outcome: Progressing     Problem: Potential for Falls  Goal: Patient will remain free of falls  Description: INTERVENTIONS:  - Assess patient frequently for physical needs  -  Identify cognitive and physical deficits and behaviors that affect risk of falls    -  [place] fall precautions as indicated by assessment   - Educate patient/family on patient safety including physical limitations  - Instruct patient to call for assistance with activity based on assessment  - Modify environment to reduce risk of injury  - Consider OT/PT consult to assist with strengthening/mobility  Outcome: Progressing     Problem: SKIN/TISSUE INTEGRITY - ADULT  Goal: Skin integrity remains intact  Description: INTERVENTIONS  - Identify patients at risk for skin breakdown  - Assess and monitor skin integrity  - Assess and monitor nutrition and hydration status  - Monitor labs (i e  albumin)  - Assess for incontinence   - Turn and reposition patient  - Assist with mobility/ambulation  - Relieve pressure over bony prominences  - Avoid friction and shearing  - Provide appropriate hygiene as needed including keeping skin clean and dry  - Evaluate need for skin moisturizer/barrier cream  - Collaborate with interdisciplinary team (i e  Nutrition, Rehabilitation, etc )   - Patient/family teaching  Outcome: Progressing  Goal: Incision(s), wounds(s) or drain site(s) healing without S/S of infection  Description: INTERVENTIONS  - Assess and document risk factors for skin impairment   - Assess and document dressing, incision, wound bed, drain sites and surrounding tissue  - Consider nutrition services referral as needed  - Oral mucous membranes remain intact  - Provide patient/ family education  Outcome: Progressing     Problem: MUSCULOSKELETAL - ADULT  Goal: Maintain or return mobility to safest level of function  Description: INTERVENTIONS:  - Assess patient's ability to carry out ADLs; assess patient's baseline for ADL function and identify physical deficits which impact ability to perform ADLs (bathing, care of mouth/teeth, toileting, grooming, dressing, etc )  - Assess/evaluate cause of self-care deficits   - Assess range of motion  - Assess patient's mobility  - Assess patient's need for assistive devices and provide as appropriate  - Encourage maximum independence but intervene and supervise when necessary  - Involve family in performance of ADLs  - Assess for home care needs following discharge   - Consider OT consult to assist with ADL evaluation and planning for discharge  - Provide patient education as appropriate  Outcome: Progressing  Goal: Maintain proper alignment of affected body part  Description: INTERVENTIONS:  - Support, maintain and protect limb and body alignment  - Provide patient/ family with appropriate education  Outcome: Progressing     Problem: PAIN - ADULT  Goal: Verbalizes/displays adequate comfort level or baseline comfort level  Description: Interventions:  - Encourage patient to monitor pain and request assistance  - Assess pain using appropriate pain scale  - Administer analgesics based on type and severity of pain and evaluate response  - Implement non-pharmacological measures as appropriate and evaluate response  - Consider cultural and social influences on pain and pain management  - Notify physician/advanced practitioner if interventions unsuccessful or patient reports new pain  Outcome: Progressing     Problem: INFECTION - ADULT  Goal: Absence or prevention of progression during hospitalization  Description: INTERVENTIONS:  - Assess and monitor for signs and symptoms of infection  - Monitor lab/diagnostic results  - Monitor all insertion sites, i e  indwelling lines, tubes, and drains  - Monitor endotracheal if appropriate and nasal secretions for changes in amount and color  - [place] appropriate cooling/warming therapies per order  - Administer medications as ordered  - Instruct and encourage patient and family to use good hand hygiene technique  - Identify and instruct in appropriate isolation precautions for identified infection/condition  Outcome: Progressing     Problem: SAFETY ADULT  Goal: Patient will remain free of falls  Description: INTERVENTIONS:  - Assess patient frequently for physical needs  -  Identify cognitive and physical deficits and behaviors that affect risk of falls    -  [place] fall precautions as indicated by assessment   - Educate patient/family on patient safety including physical limitations  - Instruct patient to call for assistance with activity based on assessment  - Modify environment to reduce risk of injury  - Consider OT/PT consult to assist with strengthening/mobility  Outcome: Progressing  Goal: Maintain or return to baseline ADL function  Description: INTERVENTIONS:  -  Assess patient's ability to carry out ADLs; assess patient's baseline for ADL function and identify physical deficits which impact ability to perform ADLs (bathing, care of mouth/teeth, toileting, grooming, dressing, etc )  - Assess/evaluate cause of self-care deficits   - Assess range of motion  - Assess patient's mobility; develop plan if impaired  - Assess patient's need for assistive devices and provide as appropriate  - Encourage maximum independence but intervene and supervise when necessary  - Involve family in performance of ADLs  - Assess for home care needs following discharge   - Consider OT consult to assist with ADL evaluation and planning for discharge  - Provide patient education as appropriate  Outcome: Progressing  Goal: Maintain or return mobility status to optimal level  Description: INTERVENTIONS:  - Assess patient's baseline mobility status (ambulation, transfers, stairs, etc )    - Identify cognitive and physical deficits and behaviors that affect mobility  - Identify mobility aids required to assist with transfers and/or ambulation (gait belt, sit-to-stand, lift, walker, cane, etc )  - [place] fall precautions as indicated by assessment  - Record patient progress and toleration of activity level on Mobility SBAR; progress patient to next Phase/Stage  - Instruct patient to call for assistance with activity based on assessment  - Consider rehabilitation consult to assist with strengthening/weightbearing, etc   Outcome: Progressing     Problem: DISCHARGE PLANNING  Goal: Discharge to home or other facility with appropriate resources  Description: INTERVENTIONS:  - Identify barriers to discharge w/patient and caregiver  - Arrange for needed discharge resources and transportation as appropriate  - Identify discharge learning needs (meds, wound care, etc )  - Arrange for interpretive services to assist at discharge as needed  - Refer to Case Management Department for coordinating discharge planning if the patient needs post-hospital services based on physician/advanced practitioner order or complex needs related to functional status, cognitive ability, or social support system  Outcome: Progressing

## 2021-05-11 NOTE — CASE MANAGEMENT
TC to [place], and conversation with patient  Patient and daughter are agreeable to [facility] for rehab, due to the fact that she will be transitioning into pathways after rehab at [facility]  IMM reviewed with patient  patient agree with discharge determination    TC to [place], can transport patient to  via wc at 4:30 today        Patient and family made aware and in agreement with DCP

## 2021-05-11 NOTE — ASSESSMENT & PLAN NOTE
Lab Results   Component Value Date    HGBA1C 5 7 (H) 05/09/2021     Recent Labs     05/10/21  1128 05/10/21  1704 05/10/21  2152 05/11/21  0724   POCGLU 121 104 102 93     Blood Sugar Average: Last 72 hrs:  (P) 104    · Diet controlled  · Carbohydrate controlled diet

## 2021-05-12 ENCOUNTER — TRANSITIONAL CARE MANAGEMENT (OUTPATIENT)
Dept: INTERNAL MEDICINE CLINIC | Facility: CLINIC | Age: 86
End: 2021-05-12

## 2021-05-12 ENCOUNTER — TELEPHONE (OUTPATIENT)
Dept: OBGYN CLINIC | Facility: HOSPITAL | Age: 86
End: 2021-05-12

## 2021-05-12 PROCEDURE — TCMXX

## 2021-05-12 NOTE — TELEPHONE ENCOUNTER
Advised per the note that the patient is WBAT all extremities  Nurse is asking for an order to be sent to [phone] stating this  This nurse asked if the nurse in the facility can ask if patient wants follow up with ortho  It appears her concerns are chronic in nature and she is refusing injections  Please schedule if they call back to have her seen  Thanks

## 2021-05-12 NOTE — TELEPHONE ENCOUNTER
Karen Munoz called to find out the weightbearing status of the patient that was seen by Dr Sara Tejeda is the ED on 5/9      Call back # [phone]

## 2021-05-13 ENCOUNTER — TELEPHONE (OUTPATIENT)
Dept: INTERNAL MEDICINE CLINIC | Facility: CLINIC | Age: 86
End: 2021-05-13

## 2021-05-13 NOTE — TELEPHONE ENCOUNTER
Karie Carranza calling in for August Lopez to ask if Karie Carranza can have Tylenol 325mg, wants to take 3 of them daily and if that's OK she thinks we need to call [facility] to advise    [address]  [address]: [phone]

## 2021-05-13 NOTE — TELEPHONE ENCOUNTER
If she is in the nursing home of [facility], she must have a doctor there overseeing her care who will be the one prescribing any medications for her  Please call her daughter

## 2021-05-25 ENCOUNTER — TRANSITIONAL CARE MANAGEMENT (OUTPATIENT)
Dept: INTERNAL MEDICINE CLINIC | Facility: CLINIC | Age: 86
End: 2021-05-25

## 2021-05-28 ENCOUNTER — APPOINTMENT (EMERGENCY)
Dept: RADIOLOGY | Facility: HOSPITAL | Age: 86
DRG: 065 | End: 2021-05-28
Payer: MEDICARE

## 2021-05-28 ENCOUNTER — HOSPITAL ENCOUNTER (INPATIENT)
Facility: HOSPITAL | Age: 86
LOS: 6 days | Discharge: NURSING, REHAB OR LONG-TERM CARE | DRG: 065 | End: 2021-06-03
Attending: EMERGENCY MEDICINE | Admitting: HOSPITALIST
Payer: MEDICARE

## 2021-05-28 DIAGNOSIS — K59.00 CONSTIPATION: ICD-10-CM

## 2021-05-28 DIAGNOSIS — N18.31 TYPE 2 DIABETES MELLITUS WITH STAGE 3A CHRONIC KIDNEY DISEASE, WITHOUT LONG-TERM CURRENT USE OF INSULIN (HCC): ICD-10-CM

## 2021-05-28 DIAGNOSIS — E11.22 TYPE 2 DIABETES MELLITUS WITH STAGE 3A CHRONIC KIDNEY DISEASE, WITHOUT LONG-TERM CURRENT USE OF INSULIN (HCC): ICD-10-CM

## 2021-05-28 DIAGNOSIS — M15.9 GENERALIZED OA: ICD-10-CM

## 2021-05-28 DIAGNOSIS — M25.461 EFFUSION OF RIGHT KNEE: ICD-10-CM

## 2021-05-28 DIAGNOSIS — I10 ESSENTIAL HYPERTENSION: Primary | Chronic | ICD-10-CM

## 2021-05-28 DIAGNOSIS — E78.2 MIXED HYPERLIPIDEMIA: Chronic | ICD-10-CM

## 2021-05-28 DIAGNOSIS — I63.9 CVA (CEREBRAL VASCULAR ACCIDENT) (HCC): ICD-10-CM

## 2021-05-28 PROBLEM — I63.81 THALAMIC STROKE (HCC): Status: ACTIVE | Noted: 2021-05-28

## 2021-05-28 PROBLEM — R29.90 STROKE-LIKE SYMPTOMS: Status: ACTIVE | Noted: 2021-05-28

## 2021-05-28 PROBLEM — I48.92 ATRIAL FLUTTER (HCC): Status: ACTIVE | Noted: 2019-03-04

## 2021-05-28 PROBLEM — K52.9 COLITIS: Status: ACTIVE | Noted: 2021-05-28

## 2021-05-28 LAB
ANION GAP SERPL CALCULATED.3IONS-SCNC: 3 MMOL/L (ref 4–13)
APTT PPP: 37 SECONDS (ref 23–37)
ATRIAL RATE: 73 BPM
BUN SERPL-MCNC: 28 MG/DL (ref 5–25)
CALCIUM SERPL-MCNC: 9.9 MG/DL (ref 8.3–10.1)
CHLORIDE SERPL-SCNC: 100 MMOL/L (ref 100–108)
CO2 SERPL-SCNC: 33 MMOL/L (ref 21–32)
CREAT SERPL-MCNC: 0.94 MG/DL (ref 0.6–1.3)
ERYTHROCYTE [DISTWIDTH] IN BLOOD BY AUTOMATED COUNT: 14.8 % (ref 11.6–15.1)
EST. AVERAGE GLUCOSE BLD GHB EST-MCNC: 120 MG/DL
GFR SERPL CREATININE-BSD FRML MDRD: 53 ML/MIN/1.73SQ M
GLUCOSE SERPL-MCNC: 106 MG/DL (ref 65–140)
GLUCOSE SERPL-MCNC: 110 MG/DL (ref 65–140)
GLUCOSE SERPL-MCNC: 115 MG/DL (ref 65–140)
GLUCOSE SERPL-MCNC: 85 MG/DL (ref 65–140)
HBA1C MFR BLD: 5.8 %
HCT VFR BLD AUTO: 36.6 % (ref 34.8–46.1)
HGB BLD-MCNC: 11.7 G/DL (ref 11.5–15.4)
INR PPP: 2.88 (ref 0.84–1.19)
LIPASE SERPL-CCNC: 125 U/L (ref 73–393)
MCH RBC QN AUTO: 30.2 PG (ref 26.8–34.3)
MCHC RBC AUTO-ENTMCNC: 32 G/DL (ref 31.4–37.4)
MCV RBC AUTO: 94 FL (ref 82–98)
P AXIS: 52 DEGREES
PLATELET # BLD AUTO: 250 THOUSANDS/UL (ref 149–390)
PMV BLD AUTO: 9 FL (ref 8.9–12.7)
POTASSIUM SERPL-SCNC: 4.2 MMOL/L (ref 3.5–5.3)
PR INTERVAL: 208 MS
PROTHROMBIN TIME: 29.9 SECONDS (ref 11.6–14.5)
QRS AXIS: 36 DEGREES
QRSD INTERVAL: 122 MS
QT INTERVAL: 372 MS
QTC INTERVAL: 409 MS
RBC # BLD AUTO: 3.88 MILLION/UL (ref 3.81–5.12)
SARS-COV-2 RNA RESP QL NAA+PROBE: NEGATIVE
SODIUM SERPL-SCNC: 136 MMOL/L (ref 136–145)
T WAVE AXIS: 118 DEGREES
TROPONIN I SERPL-MCNC: <0.02 NG/ML
VENTRICULAR RATE: 73 BPM
WBC # BLD AUTO: 6.82 THOUSAND/UL (ref 4.31–10.16)

## 2021-05-28 PROCEDURE — U0003 INFECTIOUS AGENT DETECTION BY NUCLEIC ACID (DNA OR RNA); SEVERE ACUTE RESPIRATORY SYNDROME CORONAVIRUS 2 (SARS-COV-2) (CORONAVIRUS DISEASE [COVID-19]), AMPLIFIED PROBE TECHNIQUE, MAKING USE OF HIGH THROUGHPUT TECHNOLOGIES AS DESCRIBED BY CMS-2020-01-R: HCPCS

## 2021-05-28 PROCEDURE — U0005 INFEC AGEN DETEC AMPLI PROBE: HCPCS

## 2021-05-28 PROCEDURE — 80048 BASIC METABOLIC PNL TOTAL CA: CPT | Performed by: EMERGENCY MEDICINE

## 2021-05-28 PROCEDURE — 82948 REAGENT STRIP/BLOOD GLUCOSE: CPT

## 2021-05-28 PROCEDURE — 70496 CT ANGIOGRAPHY HEAD: CPT

## 2021-05-28 PROCEDURE — 99223 1ST HOSP IP/OBS HIGH 75: CPT | Performed by: PSYCHIATRY & NEUROLOGY

## 2021-05-28 PROCEDURE — 74177 CT ABD & PELVIS W/CONTRAST: CPT

## 2021-05-28 PROCEDURE — 92610 EVALUATE SWALLOWING FUNCTION: CPT

## 2021-05-28 PROCEDURE — 99285 EMERGENCY DEPT VISIT HI MDM: CPT | Performed by: EMERGENCY MEDICINE

## 2021-05-28 PROCEDURE — 99285 EMERGENCY DEPT VISIT HI MDM: CPT

## 2021-05-28 PROCEDURE — 99223 1ST HOSP IP/OBS HIGH 75: CPT | Performed by: HOSPITALIST

## 2021-05-28 PROCEDURE — 83690 ASSAY OF LIPASE: CPT | Performed by: EMERGENCY MEDICINE

## 2021-05-28 PROCEDURE — 83036 HEMOGLOBIN GLYCOSYLATED A1C: CPT | Performed by: HOSPITALIST

## 2021-05-28 PROCEDURE — 36415 COLL VENOUS BLD VENIPUNCTURE: CPT

## 2021-05-28 PROCEDURE — 70498 CT ANGIOGRAPHY NECK: CPT

## 2021-05-28 PROCEDURE — 93010 ELECTROCARDIOGRAM REPORT: CPT | Performed by: INTERNAL MEDICINE

## 2021-05-28 PROCEDURE — 85027 COMPLETE CBC AUTOMATED: CPT | Performed by: EMERGENCY MEDICINE

## 2021-05-28 PROCEDURE — 93005 ELECTROCARDIOGRAM TRACING: CPT

## 2021-05-28 PROCEDURE — 85610 PROTHROMBIN TIME: CPT | Performed by: EMERGENCY MEDICINE

## 2021-05-28 PROCEDURE — 84484 ASSAY OF TROPONIN QUANT: CPT | Performed by: EMERGENCY MEDICINE

## 2021-05-28 PROCEDURE — 85730 THROMBOPLASTIN TIME PARTIAL: CPT | Performed by: EMERGENCY MEDICINE

## 2021-05-28 RX ORDER — AMIODARONE HYDROCHLORIDE 200 MG/1
100 TABLET ORAL DAILY
Status: DISCONTINUED | OUTPATIENT
Start: 2021-05-28 | End: 2021-06-03 | Stop reason: HOSPADM

## 2021-05-28 RX ORDER — TRAMADOL HYDROCHLORIDE 50 MG/1
50 TABLET ORAL EVERY 6 HOURS PRN
Status: DISCONTINUED | OUTPATIENT
Start: 2021-05-28 | End: 2021-06-03 | Stop reason: HOSPADM

## 2021-05-28 RX ORDER — LEVOTHYROXINE SODIUM 0.07 MG/1
75 TABLET ORAL
Status: DISCONTINUED | OUTPATIENT
Start: 2021-05-29 | End: 2021-06-03 | Stop reason: HOSPADM

## 2021-05-28 RX ORDER — WARFARIN SODIUM 2.5 MG/1
2.5 TABLET ORAL
Status: DISCONTINUED | OUTPATIENT
Start: 2021-05-28 | End: 2021-06-03 | Stop reason: HOSPADM

## 2021-05-28 RX ORDER — FUROSEMIDE 20 MG/1
20 TABLET ORAL DAILY
Status: DISCONTINUED | OUTPATIENT
Start: 2021-05-28 | End: 2021-06-03 | Stop reason: HOSPADM

## 2021-05-28 RX ORDER — PRAVASTATIN SODIUM 40 MG
40 TABLET ORAL
Status: DISCONTINUED | OUTPATIENT
Start: 2021-05-28 | End: 2021-06-02

## 2021-05-28 RX ORDER — ASPIRIN 81 MG/1
81 TABLET ORAL DAILY
Status: DISCONTINUED | OUTPATIENT
Start: 2021-05-28 | End: 2021-06-03 | Stop reason: HOSPADM

## 2021-05-28 RX ORDER — METOPROLOL SUCCINATE 25 MG/1
25 TABLET, EXTENDED RELEASE ORAL
Status: DISCONTINUED | OUTPATIENT
Start: 2021-05-28 | End: 2021-06-03 | Stop reason: HOSPADM

## 2021-05-28 RX ORDER — TRAMADOL HYDROCHLORIDE 50 MG/1
50 TABLET ORAL EVERY 6 HOURS PRN
COMMUNITY
End: 2021-06-03 | Stop reason: HOSPADM

## 2021-05-28 RX ORDER — LISINOPRIL 2.5 MG/1
2.5 TABLET ORAL DAILY
Status: DISCONTINUED | OUTPATIENT
Start: 2021-05-28 | End: 2021-06-03 | Stop reason: HOSPADM

## 2021-05-28 RX ORDER — ISOSORBIDE MONONITRATE 30 MG/1
30 TABLET, EXTENDED RELEASE ORAL DAILY
Status: DISCONTINUED | OUTPATIENT
Start: 2021-05-28 | End: 2021-06-03 | Stop reason: HOSPADM

## 2021-05-28 RX ADMIN — ASPIRIN 81 MG: 81 TABLET, COATED ORAL at 16:33

## 2021-05-28 RX ADMIN — LISINOPRIL 2.5 MG: 2.5 TABLET ORAL at 16:33

## 2021-05-28 RX ADMIN — PRAVASTATIN SODIUM 40 MG: 40 TABLET ORAL at 16:33

## 2021-05-28 RX ADMIN — WARFARIN SODIUM 2.5 MG: 2.5 TABLET ORAL at 17:23

## 2021-05-28 RX ADMIN — METOPROLOL SUCCINATE 25 MG: 25 TABLET, FILM COATED, EXTENDED RELEASE ORAL at 17:23

## 2021-05-28 RX ADMIN — ISOSORBIDE MONONITRATE 30 MG: 30 TABLET, EXTENDED RELEASE ORAL at 16:33

## 2021-05-28 RX ADMIN — FUROSEMIDE 20 MG: 20 TABLET ORAL at 16:33

## 2021-05-28 RX ADMIN — AMIODARONE HYDROCHLORIDE 100 MG: 200 TABLET ORAL at 16:33

## 2021-05-28 RX ADMIN — IOHEXOL 100 ML: 350 INJECTION, SOLUTION INTRAVENOUS at 12:00

## 2021-05-28 NOTE — ASSESSMENT & PLAN NOTE
80year old female with afib on Coumadin with a therapeutic INR on presentation, HTN, HLD, DM2, CHD and recent dx of AVN of the right humeral head who presented to the hospital with dysarthria, right upper extremity weakness and numbness  CT scan revealed left thalamus hypodensity concerning for subacute ischemia with severe stenosis of left common carotid  NIHSS 5 on admission  Patient not a candidate for IV tPA secondary to therapeutic INR and no IR target identified on CTA for intervention  Of note, patient has a previous diagnosis of right shoulder old fracture or chronic degenerative changes with fragmentation  Currently, awaiting MRI at this time to determine if stroke is small vessel verses vessel to vessel embolization from left ICA  Workup:  · CTA: severe stenosis of the left distal common carotid artery and proximal cervical internal carotid artery  The remainder of the cervical internal carotid artery is normal in caliber  Mild narrowing of both vertebral artery origins and moderate stenosis of the left subclavian origin  The remainder of the vertebrobasilar system is unremarkable  There is minor smooth narrowing of both P2 segments of the posterior cerebral arteries  · CT brain with decreased attenuation within the cerebral hemispheres suggestive of moderate chronic microangiopathic change  There is a new hypodensity within the anterior left thalamus suspicious for subacute ischemia  No mass effect or hemorrhagic transformation  Plan:   - Continue on Coumadin and ASA 81 mg QD    - MRI brain without contrast pending  - Echo ordered and pending   - Hemoglobin A1c 5 8; goal euglycemia  - LDL is 122; unable to initiate statin secondary to reported allergy of hair loss and vision problems   - Monitor on telemetry     - normotension, avoid hypotension  - Goal normothermia and euglycemia    - PT/OT/PMR  - Stroke education  - medical management and supportive care per primary team  - please contact Neurology with any acute change in neuro exam

## 2021-05-28 NOTE — ED ATTENDING ATTESTATION
5/28/2021  Rober Paniagua DO, saw and evaluated the patient  I have discussed the patient with the resident/non-physician practitioner and agree with the resident's/non-physician practitioner's findings, Plan of Care, and MDM as documented in the resident's/non-physician practitioner's note, except where noted  All available labs and Radiology studies were reviewed  I was present for key portions of any procedure(s) performed by the resident/non-physician practitioner and I was immediately available to provide assistance  At this point I agree with the current assessment done in the Emergency Department  I have conducted an independent evaluation of this patient a history and physical is as follows:    81 yo female BIBA from facility for eval of stroke symptoms  A pre-hospital stroke alert was activated based on symptom onset around 0800h this AM described as dysarthria and RUE drift  Constant, but improved since onset  She continues to have some dysarthria  EMS initially called to facility for hypotension, reported to have BP in the 70s  When EMS arrived, BP in the 130s  Now /86  Pt takes coumadin for AF  Pt currently has abd TTP on exam and c/o some abd pain  Imp: CVA symptoms, reported hypotension, abd pain plan: stroke alert  CTA head/neck, follow through abdomen given symptoms of pain and hypotension  If no acute intraabdominal pathology identified, will require admission for CVA pathway        ED Course         Critical Care Time  Procedures

## 2021-05-28 NOTE — ASSESSMENT & PLAN NOTE
Wt Readings from Last 3 Encounters:   05/11/21 83 4 kg (183 lb 13 8 oz)   01/23/21 90 7 kg (200 lb)   12/30/20 80 3 kg (177 lb)     -cont BB/ana lilia

## 2021-05-28 NOTE — PROGRESS NOTES
asked Sharon Pardo if she could call anyone and Sharon Pardo said her  wouldn't be able to sit here with her and to call HIS daughter Flores Swenson at [phone] and [phone] (cell)

## 2021-05-28 NOTE — SPEECH THERAPY NOTE
Speech Language/Pathology  Speech/Language Pathology  Assessment    Patient Name: Lewis Ahumada  [id number] Date: 5/28/2021     Problem List  Principal Problem:    Stroke-like symptoms  Active Problems:    Coronary artery disease involving native coronary artery of native heart without angina pectoris    Essential hypertension    Type 2 diabetes mellitus with stage 3a chronic kidney disease, without long-term current use of insulin (HCC)    Acquired hypothyroidism    Atrial flutter (HCC)    Chronic diastolic CHF (congestive heart failure) ([facility])    Severe obesity (BMI 35 0-39  9) with comorbidity ([facility] [address] )    Colitis    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[facility])    Past Medical History  Past Medical History:   Diagnosis Date    Abnormal nuclear stress test     last assessed 04/03/2017    Anxiety     Arthritis     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    Bundle branch block, left     Cardiomyopathy ([facility] [address] )     Chest pain 3/9/2019    Chronic pain     chronic b/l shoulder pain    Chronic pain of right knee 4/2/2019    Coronary artery disease     Diabetes mellitus ([facility] )     Gait disturbance 3/2/2018    GERD (gastroesophageal reflux disease)     H/O atrial flutter     History of DVT (deep vein thrombosis)     History of pulmonary embolism     Hyperlipidemia     Hypertension     Hypothyroid     Renal calculi     Shortness of breath      Past Surgical History  Past Surgical History:   Procedure Laterality Date    ATRIAL ABLATION SURGERY  01/2015    CARDIAC PACEMAKER PLACEMENT      CARDIOVERSION      CHELA/DCCV 10/22/2014    CARPAL TUNNEL RELEASE Right     CATARACT EXTRACTION      CORONARY ANGIOPLASTY WITH STENT PLACEMENT      HYSTERECTOMY      JOINT REPLACEMENT Right     PA AMPUTATION TOE,MT-P JT Left 10/11/2017    Procedure: AMPUTATION SECOND TOE;  Surgeon: Juanita Elizondo DPM;  Location: [facility];  Service: Podiatry    TONSILLECTOMY      TOTAL HIP ARTHROPLASTY      Right        Bedside Swallow Evaluation:    Summary:  Pt presents w/ fairly functional oral, functional pharyngeal swallow w/ noted inattention to the bolus at time w/ solids  ? able loss w/ some coughing noted at those times  The pt is able to attend & transfer well w /o difficulty when prompted  Recommendations:  Diet: continue regular w/ thin  Meds: as desired  Supervision:-  Positioning:Upright  Strategies: Pt to take PO/Meds only when fully alert and upright  Oral care:frequently   Eval only, No f/u tx indicated  Will follow for motor  speech next week as able    Goal(s):  Pt will tolerate least restrictive diet w/out s/s aspiration or oral/pharyngeal difficulties  Patient's goal: I guess I will live    Consider consult w/:  Rehab  Nutrition    Pt is a 79 y/o f adm 5/28/2021 with stroke like symptoms- dysarthria, R sided weakness  NIHSS 5 on admission  Patient not a candidate for IV tPA secondary to therapeutic INR and no IR target identified on CTA for intervention  CT head-Decreased attenuation within the cerebral hemispheres suggestive of moderate chronic microangiopathic change  There is a new hypodensity within the anterior left thalamus suspicious for subacute ischemia    No mass effect or hemorrhagic transformation        Reason for consult:  R/o aspiration  Determine safest and least restrictive diet  New neuro event  Stroke protocol    Precautions:  fall    Current diet:  regularw/thin  Premorbid diet[de-identified]  Regularw/thin   Previous VBS:  -  O2 requirement:  RA  Voice/Speech:  Noted dysarthria - increased intelligibility w/ slow speech  Social:  [place] for rehab  Follows commands:                       yes   Cognitive Status:  A&Ox3  [facility]:  Dentition:natural, some missing   Labial strength and ROM: wfl  Lingual strength and ROM: fair  Mandibular strength and ROM: fair  Velum: NA  Secretion management: wfl   Volitional cough: -    Items administered:  Puree, soft solid, hard solid,  thin liquids, meds whole/water  Liquids were taken by straw/cup       Oral stage:  Lip closure:wfl  Mastication: fair,milldy prolonged   Bolus formation: wfl  Bolus control: some loss suspected when pt was talking w/ po in the oral cavity  Transfer:wfl  Oral residue: none   Pocketing:-    Pharyngeal stage:  Swallow promptness: fairly prompt   Laryngeal rise: wfl   Wet voice:-  Throat clear:-  Cough:-  Secondary swallows:periodically   Cough x2 w/ the hard solids while speaking   Esophageal stage:  No s/s reported    Results d/w:  Pt, nursing

## 2021-05-28 NOTE — CONSULTS
Consultation - Stroke   Janie Seip 80 y o  female MRN: [id number]  Unit/Bed#: ED 28 Encounter: [id number]      Assessment/Plan     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[facility])  Assessment & Plan  80year old female with atrial fibrillation on Coumadin with a therapeutic INR, HTN, HLD, DM2, CHD who presented to the hospital with dysarthria, right sided weakness and numbness  NIHSS 5 on admission  Patient not a candidate for IV tPA secondary to therapeutic INR and no IR target identified on CTA for intervention  Patient will be admitted to stroke pathway  Plan:   - Recommend stroke pathway  - Continue on Coumadin and ASA 81 mg QD    - Check MRI brain without contrast    - Check echocardiogram    - Check fasting lipid panel, hemoglobin A1c, and TSH    - Monitor on telemetry    - SBP goal 140-180  - Not on Atorvastatin secondary to reported allergy    - Goal normothermia and euglycemia    - PT/OT as able  - Stroke education    - Monitor exam and notify with changes  Essential hypertension  Assessment & Plan  - SBP goal 140-180  - Management as per medicine team     Type 2 diabetes mellitus with stage 3a chronic kidney disease, without long-term current use of insulin (HCC)  Assessment & Plan  Lab Results   Component Value Date    HGBA1C 5 7 (H) 05/09/2021       Recent Labs     05/28/21  1142   POCGLU 110       Blood Sugar Average: Last 72 hrs:  (P) 110     - Check hemoglobin A1c    - Goal euglycemia    - Management as per medicine team      Atrial flutter ([facility] [address] )  Assessment & Plan  - Rate control as per medicine team    - Continue with Coumadin, INR goal 2-3   - Follows with cardiology as an outpatient  TPA Decision: Patient not a TPA candidate  Bleeding risk  and patient on Coumadin with therapeutic INR  Recommendations for outpatient neurological follow up have yet to be determined      History of Present Illness     Reason for Consult / Principal Problem: Stroke Alert  Patient last known well: 0800  Stroke alert called: 4542 prehospital stroke alert  Neurology time of arrival: 1132   HPI: Dulce Padgett is a 80 y o   female with atrial flutter on Coumadin, CAD with remote stenting, 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, HTN, HLD, DM2, CHF who presents with dysarthria, right sided weakness and numbness  Per EMS, patient was noted by staff at nursing facility to have hypotension (reportedly SBP in 70s) this AM and EMS was called for evaluation  EMS arrived and patient was noted to be normotensive but with slurred speech and RUE drift  Patient notes that she did not feel well this AM  She notes she had several episodes of urinary incontinence which is not normal for her and she herself notes that her speech is not normal for her  She notes that she also had a difficult time swallowing her breakfast  Attempted to reach nursing facility to clarify last known well but unable to reach them  Patient denies history of TIA/stroke in the past  Patient notes she continues to have difficulty with her speech and also notes that she had some pain with urination this AM  Per discussion with patient's stepdaughter, she notes that patient has been having intermittent issues with speech over the past month  This is described more as confusion and not being aware of who she was talking to  Yesterday, this worsened and she appeared more confused per family  To review, patient was recently admitted to the hospital May 9, 2021-May 11, 2021 after a fall  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 as well as B/L knee arthritis  She was seen by orthopedics and ultimately discharged to rehab  She does follow with cardiology as well as her PCP  She has baseline ambulatory dysfunction in setting of OA and pain per chart review  Consult to Neurology  Consult performed by: Bess Jules PA-C  Consult ordered by: Ronnie Roth DO          Review of Systems   Constitutional: Negative for chills, fatigue and fever     HENT: Positive for trouble swallowing  Respiratory: Negative for shortness of breath  Cardiovascular: Negative for chest pain  Gastrointestinal: Negative for abdominal pain, nausea and vomiting  Genitourinary: Positive for dysuria  Negative for difficulty urinating  Musculoskeletal: Positive for gait problem  Negative for back pain and neck pain  Skin: Negative for rash  Neurological: Positive for weakness and numbness  Negative for dizziness and headaches  Psychiatric/Behavioral: Positive for confusion         Historical Information   Past Medical History:   Diagnosis Date    Abnormal nuclear stress test     last assessed 04/03/2017    Anxiety     Arthritis     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    Bundle branch block, left     Cardiomyopathy ([facility] [address] )     Chest pain 3/9/2019    Chronic pain     chronic b/l shoulder pain    Chronic pain of right knee 4/2/2019    Coronary artery disease     Diabetes mellitus ([facility] [address] )     Gait disturbance 3/2/2018    GERD (gastroesophageal reflux disease)     H/O atrial flutter     History of DVT (deep vein thrombosis)     History of pulmonary embolism     Hyperlipidemia     Hypertension     Hypothyroid     Renal calculi     Shortness of breath      Past Surgical History:   Procedure Laterality Date    ATRIAL ABLATION SURGERY  01/2015    CARDIAC PACEMAKER PLACEMENT      CARDIOVERSION      CHELA/DCCV 10/22/2014    CARPAL TUNNEL RELEASE Right     CATARACT EXTRACTION      CORONARY ANGIOPLASTY WITH STENT PLACEMENT      HYSTERECTOMY      JOINT REPLACEMENT Right     AK AMPUTATION TOE,MT-P JT Left 10/11/2017    Procedure: AMPUTATION SECOND TOE;  Surgeon: Nasima Harrison DPM;  Location: [facility];  Service: Podiatry    TONSILLECTOMY      TOTAL HIP ARTHROPLASTY      Right     Social History   Social History     Substance and Sexual Activity   Alcohol Use Yes    Frequency: Monthly or less    Drinks per session: 1 or 2    Binge frequency: Never Comment: occasional     Social History     Substance and Sexual Activity   Drug Use Yes    Types: Marijuana    Comment: MEDICAL MARIJUANA-HAS NOT USED FOR 3 WEEKS     E-Cigarette/Vaping     E-Cigarette/Vaping Substances     Social History     Tobacco Use   Smoking Status Never Smoker   Smokeless Tobacco Never Used     Family History:   Family History   Problem Relation Age of Onset    Parkinsonism Sister    Jaycob Pert Cancer Sister         unknown type    Heart attack Neg Hx     Stroke Neg Hx     Anuerysm Neg Hx     Clotting disorder Neg Hx     Arrhythmia Neg Hx     Heart failure Neg Hx     Coronary artery disease Neg Hx        Review of previous medical records was completed  Please see HPI  Meds/Allergies   Scheduled Meds:  Continuous Infusions:No current facility-administered medications for this encounter  PRN Meds:  Allergies   Allergen Reactions    Atorvastatin      Reaction unknown 10/23/2018-    Other Rash     Patient sensitive to adhesives from tape       Objective   Vitals:Blood pressure (!) 189/86, pulse 75, resp  rate 18, SpO2 98 %  ,There is no height or weight on file to calculate BMI  No intake or output data in the 24 hours ending 05/28/21 1149    Invasive Devices: Invasive Devices     Peripheral Intravenous Line            Peripheral IV 05/28/21 Left Hand less than 1 day    Peripheral IV 05/28/21 Right Antecubital less than 1 day                Physical Exam  Constitutional:       General: She is not in acute distress  Appearance: Normal appearance  She is ill-appearing  She is not toxic-appearing or diaphoretic  HENT:      Head: Normocephalic and atraumatic  Mouth/Throat:      Mouth: Mucous membranes are moist       Pharynx: Oropharynx is clear  Eyes:      General: No scleral icterus  Right eye: No discharge  Left eye: No discharge  Extraocular Movements: Extraocular movements intact        Conjunctiva/sclera: Conjunctivae normal       Pupils: Pupils are equal, round, and reactive to light  Cardiovascular:      Rate and Rhythm: Normal rate and regular rhythm  Pulmonary:      Effort: Pulmonary effort is normal  No respiratory distress  Breath sounds: Normal breath sounds  Abdominal:      General: There is no distension  Palpations: Abdomen is soft  Musculoskeletal: Normal range of motion  Right lower leg: No edema  Left lower leg: No edema  Skin:     General: Skin is warm and dry  Neurological:      Mental Status: She is alert  Coordination: Finger-Nose-Finger Test abnormal (RUE)  Psychiatric:         Mood and Affect: Mood normal          Speech: Speech is slurred  Behavior: Behavior normal        Neurologic Exam     Mental Status   Disoriented to person  Disoriented to place  Attention: normal  Concentration: normal    Speech: slurred   Level of consciousness: alert    Cranial Nerves     CN II   Right visual field deficit: none  Left visual field deficit: none     CN III, IV, VI   Pupils are equal, round, and reactive to light  Nystagmus: none   Diplopia: none  Ophthalmoparesis: none  Upgaze: normal  Downgaze: normal  Conjugate gaze: present    CN V   Right facial sensation deficit: none  Left facial sensation deficit: none    CN VII   Right facial weakness: none  Left facial weakness: none    CN XII   Tongue: not atrophic  Fasciculations: absent  Tongue deviation: none    Motor Exam   Muscle bulk: normal  Overall muscle tone: normal  Right arm tone: normal  Left arm tone: normal  Right leg tone: normal  Left leg tone: normalDrift noted with RUE and RLE  No drift LUE and LLE  Sensory Exam   Sensation diminished RUE and RLE compared to left  No extinction noted to double simultaneous stimuli  Gait, Coordination, and Reflexes     Coordination   Finger to nose coordination: abnormal (RUE)Gait deferred secondary to acuity of situation as well as fall risk         NIHSS:  1a Level of Consciousness: 0 = Alert 1b  LOC Questions: 0 = Answers both correctly   1c  LOC Commands: 0 = Obeys both correctly   2  Best Gaze: 0 = Normal   3  Visual: 0 = No visual field loss   4  Facial Palsy: 0=Normal symmetric movement   5a  Motor Right Arm: 1=Drift, limb holds 90 (or 45) degrees but drifts down before full 10 seconds: does not hit bed   5b  Motor Left Arm: 0=No drift, limb holds 90 (or 45) degrees for full 10 seconds   6a  Motor Right Le=Drift, limb holds 90 (or 45) degrees but drifts down before full 10 seconds: does not hit bed   6b  Motor Left Le=No drift, limb holds 90 (or 45) degrees for full 10 seconds   7  Limb Ataxia:  1=Present in one limb   8  Sensory: 1=Mild to moderate sensory loss; patient feels pinprick is less sharp or is dull on the affected side; there is a loss of superficial pain with pinprick but patient is aware She is being touched   9  Best Language:  0=No aphasia, normal   10  Dysarthria: 1=Mild to moderate, patient slurs at least some words and at worst, can be understood with some difficulty   11   Extinction and Inattention (formerly Neglect): 0=No abnormality   Total Score: 5     Time NIHSS was completed: 1150    Modified [place] Score:  Unable to determine currently, will gather additional data    Lab Results:  Recent Results (from the past 24 hour(s))   Fingerstick Glucose (POCT)    Collection Time: 21 11:42 AM   Result Value Ref Range    POC Glucose 110 65 - 140 mg/dl   Basic metabolic panel    Collection Time: 21 11:43 AM   Result Value Ref Range    Sodium 136 136 - 145 mmol/L    Potassium 4 2 3 5 - 5 3 mmol/L    Chloride 100 100 - 108 mmol/L    CO2 33 (H) 21 - 32 mmol/L    ANION GAP 3 (L) 4 - 13 mmol/L    BUN 28 (H) 5 - 25 mg/dL    Creatinine 0 94 0 60 - 1 30 mg/dL    Glucose 106 65 - 140 mg/dL    Calcium 9 9 8 3 - 10 1 mg/dL    eGFR 53 ml/min/1 73sq m   CBC and Platelet    Collection Time: 21 11:43 AM   Result Value Ref Range    WBC 6 82 4 31 - 10 16 Thousand/uL    RBC 3  88 3 81 - 5 12 Million/uL    Hemoglobin 11 7 11 5 - 15 4 g/dL    Hematocrit 36 6 34 8 - 46 1 %    MCV 94 82 - 98 fL    MCH 30 2 26 8 - 34 3 pg    MCHC 32 0 31 4 - 37 4 g/dL    RDW 14 8 11 6 - 15 1 %    Platelets 231 144 - 603 Thousands/uL    MPV 9 0 8 9 - 12 7 fL   Protime-INR    Collection Time: 05/28/21 11:43 AM   Result Value Ref Range    Protime 29 9 (H) 11 6 - 14 5 seconds    INR 2 88 (H) 0 84 - 1 19   APTT    Collection Time: 05/28/21 11:43 AM   Result Value Ref Range    PTT 37 23 - 37 seconds   Troponin I    Collection Time: 05/28/21 11:43 AM   Result Value Ref Range    Troponin I <0 02 <=0 04 ng/mL   Lipase    Collection Time: 05/28/21 11:43 AM   Result Value Ref Range    Lipase 125 73 - 393 u/L   ECG 12 lead    Collection Time: 05/28/21 12:01 PM   Result Value Ref Range    Ventricular Rate 73 BPM    Atrial Rate 73 BPM    NJ Interval 208 ms    QRSD Interval 122 ms    QT Interval 372 ms    QTC Interval 409 ms    P Axis 52 degrees    QRS Axis 36 degrees    T Wave Axis 118 degrees   Novel Coronavirus (Covid-19),PCR [facility] - 2 hour stat    Collection Time: 05/28/21 12:06 PM    Specimen: Nose; Nares   Result Value Ref Range    SARS-CoV-2 Negative Negative       Imaging Studies: I have personally reviewed pertinent reports  and I have personally reviewed pertinent films in PACS  CTH- Decreased attenuation within the cerebral hemispheres suggestive of moderate chronic microangiopathic change  There is a new hypodensity within the anterior left thalamus suspicious for subacute ischemia  No mass effect or hemorrhagic transformation  CTA head and neck with and without contrast- Severe stenosis of the left distal common carotid artery and proximal cervical internal carotid artery  The remainder of the cervical carotid artery is normal in caliber  Mild narrowing of both vertebral artery origins and moderate stenosis of the left subclavian origin  The remainder of the vertebrobasilar system is unremarkable   There is minor smooth narrowing of both P2 segments of the posterior cerebral arteries       VTE Prophylaxis: Warfarin (Coumadin)

## 2021-05-28 NOTE — ASSESSMENT & PLAN NOTE
Lab Results   Component Value Date    HGBA1C 5 8 (H) 05/28/2021       Recent Labs     05/30/21  1605 05/30/21  2049 05/31/21  0623 05/31/21  1051   POCGLU 99 119 97 108       Blood Sugar Average: Last 72 hrs:  (P) 620 [id number]     - Goal euglycemia    - Management as per medicine team

## 2021-05-28 NOTE — ASSESSMENT & PLAN NOTE
Lab Results   Component Value Date    HGBA1C 5 7 (H) 05/09/2021       Recent Labs     05/28/21  1142   POCGLU 110         Blood Sugar Average: Last 72 hrs:  (P) 110     -diet controlled  -sliding scale insulin/Accu-Cheks

## 2021-05-28 NOTE — ASSESSMENT & PLAN NOTE
- Rate control as per medicine team    - Continue with Coumadin, INR goal 2-3   - Follows with cardiology as an outpatient

## 2021-05-28 NOTE — ASSESSMENT & PLAN NOTE
-symptoms of dysarthria which started around 08:00 hours this morning  Also possible right pronator drift  -CTA head/neck: Severe stenosis of the left distal common carotid artery and proximal cervical internal carotid artery   The remainder of the cervical internal carotid artery is normal in caliber  Mild narrowing of both vertebral artery origins and moderate stenosis of the left subclavian origin   The remainder of the vertebrobasilar system is unremarkable  Oralia Alu is minor smooth narrowing of both P2 segments of the posterior cerebral arteries     -admit to CVA pathway  -check MRI brain  -c/s neuro  -start ASA

## 2021-05-28 NOTE — H&P
[employer] 7/24/1929, 80 y o  female MRN: [id number]  Unit/Bed#: ED 28 Encounter: [id number]  Primary Care Provider: Corinne Ferries, MD   Date and time admitted to hospital: 5/28/2021 11:37 AM    Colitis  Assessment & Plan  -stercoral colitis on CT A/P  -bowel regimen    Severe obesity (BMI 35 0-39  9) with comorbidity ([facility] )  Assessment & Plan  -encourage weight loss    Chronic diastolic CHF (congestive heart failure) ([facility])  Assessment & Plan  Wt Readings from Last 3 Encounters:   05/11/21 83 4 kg (183 lb 13 8 oz)   01/23/21 90 7 kg (200 lb)   12/30/20 80 3 kg (177 lb)     -cont BB/lasix        Atrial fibrillation ([facility] )  Assessment & Plan  -continue amiodarone/BB  -cont Coumadin (discussed with Dr Elsa Vuong)    Acquired hypothyroidism  Assessment & Plan  -cont Levoxyl    Type 2 diabetes mellitus with stage 3a chronic kidney disease, without long-term current use of insulin ([facility])  Assessment & Plan  Lab Results   Component Value Date    HGBA1C 5 7 (H) 05/09/2021       Recent Labs     05/28/21  1142   POCGLU 110         Blood Sugar Average: Last 72 hrs:  (P) 110     -diet controlled  -sliding scale insulin/Accu-Cheks    Essential hypertension  Assessment & Plan  -cont Lisinopril/BB/Imdur    Coronary artery disease involving native coronary artery of native heart without angina pectoris  Assessment & Plan  -cont BB/lasix/statin    * Stroke-like symptoms  Assessment & Plan  -symptoms of dysarthria which started around 08:00 hours this morning  Also possible right pronator drift  -CTA head/neck: Severe stenosis of the left distal common carotid artery and proximal cervical internal carotid artery   The remainder of the cervical internal carotid artery is normal in caliber   Mild narrowing of both vertebral artery origins and moderate stenosis of the left subclavian origin   The remainder of the vertebrobasilar system is unremarkable  Cassandria Tyler is minor smooth narrowing of both P2 segments of the posterior cerebral arteries  -admit to CVA pathway  -check MRI brain  -c/s neuro  -start ASA      VTE Prophylaxis: Warfarin (Coumadin)    Code Status: FULL  POLST: POLST is not applicable to this patient    Anticipated Length of Stay:  Patient will be admitted on an Inpatient basis with an anticipated length of stay of  > 2 midnights  Justification for Hospital Stay: stroke-like symptoms    Total Time for Visit, including Counseling / Coordination of Care: 45 minutes  Greater than 50% of this total time spent on direct patient counseling and coordination of care  Chief Complaint:   Dysarthria/expressive aphasia    History of Present Illness:    Little Alvarado is a 80 y o  female who presents with chief complaint of dysarthria and expressive aphasia  The patient reports her symptoms started around 08:00 this morning  They initially improved but now she says they are getting worse  She denies any other acute symptoms  She denies focal weakness, headache, change in vision, neck stiffness  She denies chest pain, palpitations, shortness of breath, nausea, vomiting, diarrhea, fevers, chills, rash, dysuria  It was reported that on the way to the ER the patient's systolic blood pressure was in the 70s  Upon arrival to the ER patient's blood pressure was in the 130s and then the systolic blood pressure increased to 189  Review of Systems:    Review of Systems   All other systems reviewed and are negative        Past Medical and Surgical History:     Past Medical History:   Diagnosis Date    Abnormal nuclear stress test     last assessed 04/03/2017    Anxiety     Arthritis     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    Bundle branch block, left     Cardiomyopathy ([facility] )     Chest pain 3/9/2019    Chronic pain     chronic b/l shoulder pain    Chronic pain of right knee 4/2/2019    Coronary artery disease     Diabetes mellitus ([address] )     Gait disturbance 3/2/2018    GERD (gastroesophageal reflux disease)     H/O atrial flutter     History of DVT (deep vein thrombosis)     History of pulmonary embolism     Hyperlipidemia     Hypertension     Hypothyroid     Renal calculi     Shortness of breath        Past Surgical History:   Procedure Laterality Date    ATRIAL ABLATION SURGERY  01/2015    CARDIAC PACEMAKER PLACEMENT      CARDIOVERSION      CHELA/DCCV 10/22/2014    CARPAL TUNNEL RELEASE Right     CATARACT EXTRACTION      CORONARY ANGIOPLASTY WITH STENT PLACEMENT      HYSTERECTOMY      JOINT REPLACEMENT Right     ND AMPUTATION TOE,MT-P JT Left 10/11/2017    Procedure: AMPUTATION SECOND TOE;  Surgeon: Daniel Talbot DPM;  Location: [facility];  Service: Podiatry    TONSILLECTOMY      TOTAL HIP ARTHROPLASTY      Right       Meds/Allergies:    Prior to Admission medications    Medication Sig Start Date End Date Taking?  Authorizing Provider   acetaminophen (TYLENOL) 325 mg tablet Take 975 mg by mouth 3 (three) times a day   Yes Historical Provider, MD   amiodarone 200 mg tablet Take 0 5 tablets (100 mg total) by mouth daily 3/17/21  Yes Frances Castellanos MD   Calcium Carbonate-Vitamin D (CALTRATE 600+D PO) Take 1 capsule by mouth 2 (two) times a day     Yes Historical Provider, MD   Diclofenac Sodium (VOLTAREN) 1 % Apply 2 g topically 3 (three) times a day Bilateral shoulders 3/9/21  Yes Faviola Jeong MD   furosemide (LASIX) 20 mg tablet Take 1 tablet (20 mg total) by mouth daily 3/5/21  Yes Frances Castellanos MD   isosorbide mononitrate (IMDUR) 30 mg 24 hr tablet Take 1 tablet (30 mg total) by mouth daily 3/17/21  Yes Frances Castellanos MD   levothyroxine 75 mcg tablet Take 1 tablet (75 mcg total) by mouth daily in the early morning 5/12/21  Yes Saeid Soriano PA-C   lisinopril (ZESTRIL) 2 5 mg tablet TAKE ONE TABLET BY MOUTH EVERY DAY 3/9/21  Yes Faviola Jeong MD   metoprolol succinate (Toprol XL) 25 mg 24 hr tablet Take 1 tablet (25 mg total) by mouth daily after dinner 6/17/20  Yes Renetta Perry MD   traMADol (ULTRAM) 50 mg tablet Take 50 mg by mouth every 6 (six) hours as needed for moderate pain   Yes Historical Provider, MD   warfarin (COUMADIN) 2 5 mg tablet Take 1 tablet (2 5 mg total) by mouth daily 5/11/21  Yes Pamela Portillo PA-C   Accu-Chek FastClix Lancets MISC Check once daily 3/1/21   James Robin MD   Accu-Chek Softclix Lancets lancets Use daily Use as instructed 4/7/21   James Robin MD   Aspirin Low Dose 81 MG EC tablet TAKE 1 TABLET BY MOUTH EVERY DAY 3/9/21   Wilhemena Bumps, DO   Blood Glucose Monitoring Suppl (Accu-Chek Guide Me) w/Device KIT Check once daily 3/1/21   James Robin MD   glucose blood (Accu-Chek Guide) test strip Check once daily 3/1/21   James Robin MD   Lancets (accu-chek soft touch) lancets Use daily Use as instructed 2/26/21   James Robin MD   Lidocaine (Aspercreme Lidocaine) 4 % PTCH Apply 3 patches topically daily B/l knees, low back    Historical Provider, MD   multivitamin-iron-minerals-folic acid (CENTRUM) chewable tablet Chew 1 tablet daily  Historical Provider, MD   pravastatin (PRAVACHOL) 40 mg tablet TAKE ONE TABLET BY MOUTH EVERY DAY 8/26/20   James Robin MD     I have reviewed home medications with a medical source (PCP, Pharmacy, other)  Allergies:    Allergies   Allergen Reactions    Atorvastatin      Reaction unknown 10/23/2018-    Other Rash     Patient sensitive to adhesives from tape       Social History:     Marital Status: /Civil Union   Substance Use History:   Social History     Substance and Sexual Activity   Alcohol Use Yes    Frequency: Monthly or less    Drinks per session: 1 or 2    Binge frequency: Never    Comment: occasional     Social History     Tobacco Use   Smoking Status Never Smoker   Smokeless Tobacco Never Used     Social History     Substance and Sexual Activity   Drug Use Yes    Types: Marijuana    Comment: MEDICAL MARIJUANA-HAS NOT USED FOR 3 WEEKS       Family History:    non-contributory    Physical Exam:     Vitals:   Blood Pressure: 152/70 (05/28/21 1345)  Pulse: 74 (05/28/21 1345)  Temperature: 97 7 °F (36 5 °C) (05/28/21 1215)  Temp Source: Oral (05/28/21 1215)  Respirations: 16 (05/28/21 1245)  SpO2: 97 % (05/28/21 1345)    Physical Exam    Gen: NAD, Awake and alert, well developed, well nourished  Eyes: EOMI, PERRLA, no scleral icterus  ENMT:  Oropharynx clear of erythema or exudates, no nasal discharge, no otic discharge, moist mucous membranes  Neck:  Supple  Lymph:  No anterior or posterior cervical or supraclavicular lymphadenopathy  Cardiovascular:  Regular rate and rhythm, normal S1-S2, no murmurs, rubs, or gallops  Lungs:  Clear to auscultation bilaterally, no wheezes, or rales, or rhonchi  Abdomen:  Positive bowel sounds, soft, nontender, nondistended, no palpable organomegaly   Skin:  Intact, no obvious lesions or rashes, no edema  Neuro: Cranial nerves 2-12 are intact, 4/5 strength RLE, RUE pronator drift      Additional Data:     Lab Results: I have personally reviewed pertinent reports  Results from last 7 days   Lab Units 05/28/21  1143   WBC Thousand/uL 6 82   HEMOGLOBIN g/dL 11 7   HEMATOCRIT % 36 6   PLATELETS Thousands/uL 250     Results from last 7 days   Lab Units 05/28/21  1143   SODIUM mmol/L 136   POTASSIUM mmol/L 4 2   CHLORIDE mmol/L 100   CO2 mmol/L 33*   BUN mg/dL 28*   CREATININE mg/dL 0 94   ANION GAP mmol/L 3*   CALCIUM mg/dL 9 9   GLUCOSE RANDOM mg/dL 106     Results from last 7 days   Lab Units 05/28/21  1143   INR  2 88*     Results from last 7 days   Lab Units 05/28/21  1142   POC GLUCOSE mg/dl 110               Imaging: I have personally reviewed pertinent reports  CTA stroke alert (head/neck)   Final Result by [address], DO (05/28 1207)      Severe stenosis of the left distal common carotid artery and proximal cervical internal carotid artery    The remainder of the cervical internal carotid artery is normal in caliber  Mild narrowing of both vertebral artery origins and moderate stenosis of the left subclavian origin  The remainder of the vertebrobasilar system is unremarkable  There is minor smooth narrowing of both P2 segments of the posterior cerebral arteries  Findings were directly discussed with Dr Elsa Vuong on 5/28/2021 11:56 AM                      Workstation performed: [id number]         CT abdomen pelvis with contrast   Final Result by William Church MD (05/28 1232)      1  Moderate rectal stool burden with some wall thickening, may represent early stercoral colitis   2  Sequela of chronic inflammatory sacroiliitis            Workstation performed: [id number]         CT stroke alert brain   Final Result by Dolores Gaston DO (05/28 1209)      Decreased attenuation within the cerebral hemispheres suggestive of moderate chronic microangiopathic change  There is a new hypodensity within the anterior left thalamus suspicious for subacute ischemia  No mass effect or hemorrhagic transformation  Findings were directly discussed with Dr Elsa Vuong of neurology on 5/28/2021 11:50 AM       Workstation performed: [id number]             Allscripts / Epic Records Reviewed: Yes     ** Please Note: This note has been constructed using a voice recognition system   **

## 2021-05-28 NOTE — ED PROVIDER NOTES
History  Chief Complaint   Patient presents with    STROKE Alert     Pt coming from [address], started w/ slurred speech and RUE weakness at 0800  HPI  81 yo female with PMH HTN, HLD, CAD, Aflutter on coumadin, cardiomyopathy, DM, PE/DVT, presents to the ED via EMS from her skilled nursing facility [facility]) as a prehospital stroke alert with concern for slurred/garbled speech and right upper extremity weakness  EMS was initially called to see the patient for hypotensive blood pressure reading (systolic 66H), however on their arrival pt was normotensive with dysarthria and weakness  Staff report that pt was with normal speech and strength when last seen at 0800  Currently in the ED pt c/o feeling "unwell " Speech is slurred but intelligible and she is able to answer questions appropriately  AOx3  Noted to have drifting of her bilateral upper extremities  Pt denies numbness, dizziness, headache, visual changes, nausea, vomiting, fever, chills, chest pain, SOB, lower extremity weakness  She does note some upper abdominal discomfort on exam    Prior to Admission Medications   Prescriptions Last Dose Informant Patient Reported? Taking?    Accu-Chek FastClix Lancets MISC   No No   Sig: Check once daily   Accu-Chek Softclix Lancets lancets   No No   Sig: Use daily Use as instructed   Aspirin Low Dose 81 MG EC tablet   No No   Sig: TAKE 1 TABLET BY MOUTH EVERY DAY   Blood Glucose Monitoring Suppl (Accu-Chek Guide Me) w/Device KIT   No No   Sig: Check once daily   Calcium Carbonate-Vitamin D (CALTRATE 600+D PO)  Self Yes Yes   Sig: Take 1 capsule by mouth 2 (two) times a day     Diclofenac Sodium (VOLTAREN) 1 %   No Yes   Sig: Apply 2 g topically 3 (three) times a day Bilateral shoulders   Lancets (accu-chek soft touch) lancets   No No   Sig: Use daily Use as instructed   Lidocaine (Aspercreme Lidocaine) 4 % PTCH   Yes No   Sig: Apply 3 patches topically daily B/l knees, low back   acetaminophen (TYLENOL) 325 mg tablet   Yes Yes   Sig: Take 975 mg by mouth 3 (three) times a day   amiodarone 200 mg tablet   No Yes   Sig: Take 0 5 tablets (100 mg total) by mouth daily   furosemide (LASIX) 20 mg tablet   No Yes   Sig: Take 1 tablet (20 mg total) by mouth daily   glucose blood (Accu-Chek Guide) test strip   No No   Sig: Check once daily   isosorbide mononitrate (IMDUR) 30 mg 24 hr tablet   No Yes   Sig: Take 1 tablet (30 mg total) by mouth daily   levothyroxine 75 mcg tablet   No Yes   Sig: Take 1 tablet (75 mcg total) by mouth daily in the early morning   lisinopril (ZESTRIL) 2 5 mg tablet   No Yes   Sig: TAKE ONE TABLET BY MOUTH EVERY DAY   metoprolol succinate (Toprol XL) 25 mg 24 hr tablet  Self No Yes   Sig: Take 1 tablet (25 mg total) by mouth daily after dinner   multivitamin-iron-minerals-folic acid (CENTRUM) chewable tablet  Self Yes No   Sig: Chew 1 tablet daily     pravastatin (PRAVACHOL) 40 mg tablet   No No   Sig: TAKE ONE TABLET BY MOUTH EVERY DAY   traMADol (ULTRAM) 50 mg tablet   Yes Yes   Sig: Take 50 mg by mouth every 6 (six) hours as needed for moderate pain   warfarin (COUMADIN) 2 5 mg tablet   No Yes   Sig: Take 1 tablet (2 5 mg total) by mouth daily      Facility-Administered Medications: None       Past Medical History:   Diagnosis Date    Abnormal nuclear stress test     last assessed 04/03/2017    Anxiety     Arthritis     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 [address] )     Chest pain 3/9/2019    Chronic pain     chronic b/l shoulder pain    Chronic pain of right knee 4/2/2019    Coronary artery disease     Diabetes mellitus ([address] )     Gait disturbance 3/2/2018    GERD (gastroesophageal reflux disease)     H/O atrial flutter     History of DVT (deep vein thrombosis)     History of pulmonary embolism     Hyperlipidemia     Hypertension     Hypothyroid     Renal calculi     Shortness of breath        Past Surgical History: Procedure Laterality Date    ATRIAL ABLATION SURGERY  01/2015    CARDIAC PACEMAKER PLACEMENT      CARDIOVERSION      CHELA/DCCV 10/22/2014    CARPAL TUNNEL RELEASE Right     CATARACT EXTRACTION      CORONARY ANGIOPLASTY WITH STENT PLACEMENT      HYSTERECTOMY      JOINT REPLACEMENT Right     [place] AMPUTATION TOE,MT-P JT Left 10/11/2017    Procedure: AMPUTATION SECOND TOE;  Surgeon: Preethi Lal DPM;  Location: [facility] OR;  Service: Podiatry    TONSILLECTOMY      TOTAL HIP ARTHROPLASTY      Right       Family History   Problem Relation Age of Onset   David Kellogg Parkinsonism Sister     Cancer Sister         unknown type    Heart attack Neg Hx     Stroke Neg Hx     Anuerysm Neg Hx     Clotting disorder Neg Hx     Arrhythmia Neg Hx     Heart failure Neg Hx     Coronary artery disease Neg Hx      I have reviewed and agree with the history as documented  E-Cigarette/Vaping     E-Cigarette/Vaping Substances     Social History     Tobacco Use    Smoking status: Never Smoker    Smokeless tobacco: Never Used   Substance Use Topics    Alcohol use: Yes     Frequency: Monthly or less     Drinks per session: 1 or 2     Binge frequency: Never     Comment: occasional    Drug use: Yes     Types: Marijuana     Comment: MEDICAL MARIJUANA-HAS NOT USED FOR 3 WEEKS        Review of Systems   Constitutional: Negative for chills and fever  HENT: Negative for congestion, rhinorrhea and sore throat  Respiratory: Negative for chest tightness and shortness of breath  Cardiovascular: Negative for chest pain  Gastrointestinal: Negative for abdominal pain, diarrhea, nausea and vomiting  Genitourinary: Negative for dysuria and hematuria  Musculoskeletal: Negative for arthralgias and myalgias  Skin: Negative for rash  Neurological: Positive for speech difficulty and weakness  Negative for dizziness, syncope, light-headedness, numbness and headaches  All other systems reviewed and are negative        Physical Exam  ED Triage Vitals   Temperature Pulse Respirations Blood Pressure SpO2   05/28/21 1215 05/28/21 1145 05/28/21 1145 05/28/21 1145 05/28/21 1145   97 7 °F (36 5 °C) 75 18 (!) 189/86 98 %      Temp Source Heart Rate Source Patient Position - Orthostatic VS BP Location FiO2 (%)   05/28/21 1215 05/28/21 1230 -- -- --   Oral Monitor         Pain Score       05/28/21 1500       No Pain             Orthostatic Vital Signs  Vitals:    05/28/21 1720 05/28/21 1721 05/28/21 1722 05/28/21 1723   BP:   127/52 127/52   Pulse: 81 80 81 75       Physical Exam  Vitals signs and nursing note reviewed  Constitutional:       General: She is not in acute distress  Appearance: She is well-developed  She is not diaphoretic  HENT:      Head: Normocephalic and atraumatic  Right Ear: External ear normal       Left Ear: External ear normal       Nose: Nose normal       Mouth/Throat:      Mouth: Mucous membranes are moist    Eyes:      General: No visual field deficit  Conjunctiva/sclera: Conjunctivae normal       Pupils: Pupils are equal, round, and reactive to light  Neck:      Musculoskeletal: Normal range of motion and neck supple  Cardiovascular:      Rate and Rhythm: Normal rate and regular rhythm  Heart sounds: Normal heart sounds  No murmur  No friction rub  No gallop  Pulmonary:      Effort: Pulmonary effort is normal  No respiratory distress  Breath sounds: Normal breath sounds  No wheezing, rhonchi or rales  Abdominal:      General: Bowel sounds are normal  There is no distension  Palpations: Abdomen is soft  There is no mass  Tenderness: There is no abdominal tenderness  There is no guarding or rebound  Musculoskeletal: Normal range of motion  Lymphadenopathy:      Cervical: No cervical adenopathy  Skin:     General: Skin is warm and dry  Neurological:      Mental Status: She is alert and oriented to person, place, and time  Cranial Nerves: Dysarthria present   No facial asymmetry  Sensory: Sensation is intact  No sensory deficit  Motor: Weakness (3/5 bilateral upper extremities  4/5 bilateral lower extremities) present           ED Medications  Medications   amiodarone tablet 100 mg (100 mg Oral Given 5/28/21 1633)   aspirin (ECOTRIN LOW STRENGTH) EC tablet 81 mg (81 mg Oral Given 5/28/21 1633)   furosemide (LASIX) tablet 20 mg (20 mg Oral Given 5/28/21 1633)   isosorbide mononitrate (IMDUR) 24 hr tablet 30 mg (30 mg Oral Given 5/28/21 1633)   levothyroxine tablet 75 mcg (has no administration in time range)   lisinopril (ZESTRIL) tablet 2 5 mg (2 5 mg Oral Given 5/28/21 1633)   metoprolol succinate (TOPROL-XL) 24 hr tablet 25 mg (25 mg Oral Given 5/28/21 1723)   pravastatin (PRAVACHOL) tablet 40 mg (40 mg Oral Given 5/28/21 1633)   traMADol (ULTRAM) tablet 50 mg (has no administration in time range)   warfarin (COUMADIN) tablet 2 5 mg (2 5 mg Oral Given 5/28/21 1723)   insulin lispro (HumaLOG) 100 units/mL subcutaneous injection 1-6 Units (1 Units Subcutaneous Not Given 5/28/21 1604)   iohexol (OMNIPAQUE) 350 MG/ML injection (SINGLE-DOSE) 100 mL (100 mL Intravenous Given 5/28/21 1200)       Diagnostic Studies  Results Reviewed     Procedure Component Value Units Date/Time    Novel Coronavirus (Covid-19),PCR [facility] - 2 hour stat [[id number]]  (Normal) Collected: 05/28/21 1206    Lab Status: Final result Specimen: Nares from Nose Updated: 05/28/21 1313     SARS-CoV-2 Negative    Narrative:           Kalpesh Dias [[id number]]  (Abnormal) Collected: 05/28/21 1143    Lab Status: Final result Specimen: Blood from Arm, Right Updated: 05/28/21 1224     Protime 29 9 seconds      INR 2 88    APTT [[id number]]  (Normal) Collected: 05/28/21 1143    Lab Status: Final result Specimen: Blood from Arm, Right Updated: 05/28/21 1224     PTT 37 seconds     Troponin I [[id number]]  (Normal) Collected: 05/28/21 1143    Lab Status: Final result Specimen: Blood from Arm, Right Updated: 05/28/21 1224 Troponin I <0 02 ng/mL     Basic metabolic panel [[id number]]  (Abnormal) Collected: 05/28/21 1143    Lab Status: Final result Specimen: Blood from Arm, Right Updated: 05/28/21 1217     Sodium 136 mmol/L      Potassium 4 2 mmol/L      Chloride 100 mmol/L      CO2 33 mmol/L      ANION GAP 3 mmol/L      BUN 28 mg/dL      Creatinine 0 94 mg/dL      Glucose 106 mg/dL      Calcium 9 9 mg/dL      eGFR 53 ml/min/1 73sq m     Narrative:      [place] guidelines for Chronic Kidney Disease (CKD):     Stage 1 with normal or high GFR (GFR > 90 mL/min/1 73 square meters)    Stage 2 Mild CKD (GFR = 60-89 mL/min/1 73 square meters)    Stage 3A Moderate CKD (GFR = 45-59 mL/min/1 73 square meters)    Stage 3B Moderate CKD (GFR = 30-44 mL/min/1 73 square meters)    Stage 4 Severe CKD (GFR = 15-29 mL/min/1 73 square meters)    Stage 5 End Stage CKD (GFR <15 mL/min/1 73 square meters)  Note: GFR calculation is accurate only with a steady state creatinine    Lipase [[id number]]  (Normal) Collected: 05/28/21 1143    Lab Status: Final result Specimen: Blood from Arm, Right Updated: 05/28/21 1217     Lipase 125 u/L     CBC and Platelet [[id number]]  (Normal) Collected: 05/28/21 1143    Lab Status: Final result Specimen: Blood from Arm, Right Updated: 05/28/21 1156     WBC 6 82 Thousand/uL      RBC 3 88 Million/uL      Hemoglobin 11 7 g/dL      Hematocrit 36 6 %      MCV 94 fL      MCH 30 2 pg      MCHC 32 0 g/dL      RDW 14 8 %      Platelets 675 Thousands/uL      MPV 9 0 fL     Fingerstick Glucose (POCT) [[id number]]  (Normal) Collected: 05/28/21 1142    Lab Status: Final result Updated: 05/28/21 1153     POC Glucose 110 mg/dl                  CTA stroke alert (head/neck)   Final Result by Fabio Berkowitz DO (05/28 1207)      Severe stenosis of the left distal common carotid artery and proximal cervical internal carotid artery  The remainder of the cervical internal carotid artery is normal in caliber  Mild narrowing of both vertebral artery origins and moderate stenosis of the left subclavian origin  The remainder of the vertebrobasilar system is unremarkable  There is minor smooth narrowing of both P2 segments of the posterior cerebral arteries  Findings were directly discussed with Dr Lobo Paige on 5/28/2021 11:56 AM                      Workstation performed: [id number]         CT abdomen pelvis with contrast   Final Result by Rudy Traore MD (05/28 1232)      1  Moderate rectal stool burden with some wall thickening, may represent early stercoral colitis   2  Sequela of chronic inflammatory sacroiliitis            Workstation performed: [id number]         CT stroke alert brain   Final Result by Chalino Hough DO (05/28 1209)      Decreased attenuation within the cerebral hemispheres suggestive of moderate chronic microangiopathic change  There is a new hypodensity within the anterior left thalamus suspicious for subacute ischemia  No mass effect or hemorrhagic transformation  Findings were directly discussed with Dr Lobo Paige of neurology on 5/28/2021 11:50 AM       Workstation performed: [id number]         MRI Inpatient Order    (Results Pending)         Procedures  Procedures    EKG sinus arrhythmia, LBBB  Nonspecific St elevation V1, V2  Nonspecific St depression and T wave inversion I, aVL  [id number]  When compared to prior on 1/23/21 not significantly changed  ED Course  ED Course as of May 28 1814   Fri May 28, 2021   1212 new hypodensity within the anterior left thalamus suspicious for subacute ischemia   CT stroke alert brain   1212 Severe stenosis of the left distal common carotid artery and proximal cervical internal carotid artery  The remainder of the cervical internal carotid artery is normal in caliber      Mild narrowing of both vertebral artery origins and moderate stenosis of the left subclavian origin  The remainder of the vertebrobasilar system is unremarkable    There is minor smooth narrowing of both P2 segments of the posterior cerebral arteries  CTA stroke alert (head/neck)   1232 Neurology AP evaluated, pending dispo plan       1235 Moderate rectal stool burden with some wall thickening, may represent early stercoral colitis  2  Sequela of chronic inflammatory sacroiliitis         CT abdomen pelvis with contrast   1317 Neurology requesting admission to internal medicine service      1328 Admitted to [facility]                    Stroke Assessment     Row Name 05/28/21 1233             NIH Stroke Scale    Interval  Baseline      Level of Consciousness (1a )  0      LOC Questions (1b )  0      LOC Commands (1c )  0      Best Gaze (2 )  0      Visual (3 )  0      Facial Palsy (4 )  0      Motor Arm, Left (5a )  1      Motor Arm, Right (5b )  1      Motor Leg, Left (6a )  0      Motor Leg, Right (6b )  0      Limb Ataxia (7 )  0      Sensory (8 )  0      Best Language (9 )  0      Dysarthria (10 )  1      Extinction and Inattention (11 ) (Formerly Neglect)  0      Total  3          First Filed Value   TPA Decision  Patient not a TPA candidate  Patient is not a candidate options  Bleeding risk  SBIRT 22yo+      Most Recent Value   SBIRT (22 yo +)   In order to provide better care to our patients, we are screening all of our patients for alcohol and drug use  Would it be okay to ask you these screening questions? Yes Filed at: 05/28/2021 1213   Initial Alcohol Screen: US AUDIT-C    1  How often do you have a drink containing alcohol?  0 Filed at: 05/28/2021 1213   2  How many drinks containing alcohol do you have on a typical day you are drinking? 0 Filed at: 05/28/2021 1213   3b  FEMALE Any Age, or MALE 65+: How often do you have 4 or more drinks on one occassion? 0 Filed at: 05/28/2021 1213   Audit-C Score  0 Filed at: 05/28/2021 1213   MARCO: How many times in the past year have you       Used an illegal drug or used a prescription medication for non-medical reasons? Never Filed at: 05/28/2021 26                MDM  81 yo female with PMH HTN, HLD, CAD, Aflutter on coumadin, cardiomyopathy, DM, PE/DVT, presenting with dysarthria and upper extremity weakness, concern for acute stroke  Also possible hypotension earlier and some abdominal discomfort now  Will obtain CT/CTA head and neck, CBC, CMP, Lipase, troponin, EKG, CT abd/pelvis to evaluate for stroke, leukocytosis, anemia, electrolyte abnormality, renal dysfunction, hepatic dysfunction, acute cardiac abnormality, pancreatitis, other intraabdominal abnormality  Neurology consulted  Pt will require admission for stroke pathway  Disposition  Final diagnoses:   Essential hypertension   CVA (cerebral vascular accident) ([facility] [address] )     Time reflects when diagnosis was documented in both MDM as applicable and the Disposition within this note     Time User Action Codes Description Comment    5/28/2021 11:33 AM [place] Saroj Add [I10] Essential hypertension     5/28/2021 11:33 AM Edgardo Saroj Modify [I10] Essential hypertension     5/28/2021 11:33 AM [place] Saroj Modify [I10] Essential hypertension     5/28/2021  1:29 PM Arvid Come Add [I63 9] CVA (cerebral vascular accident) ([facility] [address] )     5/28/2021  2:28 PM Hood Budd Add [E11 22,  N18 31] Type 2 diabetes mellitus with stage 3a chronic kidney disease, without long-term current use of insulin [facility])       ED Disposition     ED Disposition Condition Date/Time Comment    Admit Stable Fri May 28, 2021  1:29 PM Case was discussed with Dr Eula Aleman and the patient's admission status was agreed to be Admission Status: inpatient status to the service of Dr Eula Aleman           Follow-up Information    None         Current Discharge Medication List      CONTINUE these medications which have NOT CHANGED    Details   acetaminophen (TYLENOL) 325 mg tablet Take 975 mg by mouth 3 (three) times a day      amiodarone 200 mg tablet Take 0 5 tablets (100 mg total) by mouth daily  Qty: 45 tablet, Refills: 3    Associated Diagnoses: Ventricular tachycardia (HCC)      Calcium Carbonate-Vitamin D (CALTRATE 600+D PO) Take 1 capsule by mouth 2 (two) times a day        Diclofenac Sodium (VOLTAREN) 1 % Apply 2 g topically 3 (three) times a day Bilateral shoulders  Qty: 150 g, Refills: 1    Associated Diagnoses: Generalized OA      furosemide (LASIX) 20 mg tablet Take 1 tablet (20 mg total) by mouth daily  Qty: 90 tablet, Refills: 2    Associated Diagnoses: Congestive heart failure, unspecified HF chronicity, unspecified heart failure type (HCC)      isosorbide mononitrate (IMDUR) 30 mg 24 hr tablet Take 1 tablet (30 mg total) by mouth daily  Qty: 90 tablet, Refills: 3    Associated Diagnoses: Coronary artery disease involving native coronary artery of native heart without angina pectoris      levothyroxine 75 mcg tablet Take 1 tablet (75 mcg total) by mouth daily in the early morning  Qty:  , Refills: 0    Associated Diagnoses: Acquired hypothyroidism      lisinopril (ZESTRIL) 2 5 mg tablet TAKE ONE TABLET BY MOUTH EVERY DAY  Qty: 90 tablet, Refills: 1    Associated Diagnoses: Type 2 diabetes mellitus with complication, without long-term current use of insulin ([address] ); Essential hypertension      metoprolol succinate (Toprol XL) 25 mg 24 hr tablet Take 1 tablet (25 mg total) by mouth daily after dinner  Qty: 90 tablet, Refills: 3    Associated Diagnoses: Essential hypertension      traMADol (ULTRAM) 50 mg tablet Take 50 mg by mouth every 6 (six) hours as needed for moderate pain      warfarin (COUMADIN) 2 5 mg tablet Take 1 tablet (2 5 mg total) by mouth daily  Refills: 0    Associated Diagnoses: Anticoagulated on Coumadin      !! Accu-Chek FastClix Lancets MISC Check once daily  Qty: 100 each, Refills: 3    Associated Diagnoses: Type 2 diabetes mellitus with stage 3a chronic kidney disease, without long-term current use of insulin ([address] )      ! !  Accu-Chek Softclix Lancets lancets Use daily Use as instructed  Qty: 100 each, Refills: 1    Associated Diagnoses: Type 2 diabetes mellitus with stage 3a chronic kidney disease, without long-term current use of insulin (HCC)      Aspirin Low Dose 81 MG EC tablet TAKE 1 TABLET BY MOUTH EVERY DAY  Qty: 90 tablet, Refills: 0    Associated Diagnoses: CAD (coronary artery disease)      Blood Glucose Monitoring Suppl (Accu-Chek Guide Me) w/Device KIT Check once daily  Qty: 1 kit, Refills: 0    Associated Diagnoses: Type 2 diabetes mellitus with stage 3a chronic kidney disease, without long-term current use of insulin ([facility])      glucose blood (Accu-Chek Guide) test strip Check once daily  Qty: 100 each, Refills: 3    Associated Diagnoses: Type 2 diabetes mellitus with stage 3a chronic kidney disease, without long-term current use of insulin ([address] )      ! ! Lancets (accu-chek soft touch) lancets Use daily Use as instructed  Qty: 100 each, Refills: 3    Associated Diagnoses: Type 2 diabetes mellitus with stage 3a chronic kidney disease, without long-term current use of insulin ([facility])      Lidocaine (Aspercreme Lidocaine) 4 % PTCH Apply 3 patches topically daily B/l knees, low back      multivitamin-iron-minerals-folic acid (CENTRUM) chewable tablet Chew 1 tablet daily  pravastatin (PRAVACHOL) 40 mg tablet TAKE ONE TABLET BY MOUTH EVERY DAY  Qty: 90 tablet, Refills: 1    Associated Diagnoses: Mixed hyperlipidemia       !! - Potential duplicate medications found  Please discuss with provider  No discharge procedures on file  PDMP Review       Value Time User    PDMP Reviewed  Yes 2/16/2021  9:21 AM [facility],            ED Provider  Attending physically available and evaluated Anu Fischer I managed the patient along with the ED Attending      Electronically Signed by         Letty Hagen MD  05/28/21 1992

## 2021-05-29 LAB
ANION GAP SERPL CALCULATED.3IONS-SCNC: 5 MMOL/L (ref 4–13)
BUN SERPL-MCNC: 24 MG/DL (ref 5–25)
CALCIUM SERPL-MCNC: 9.1 MG/DL (ref 8.3–10.1)
CHLORIDE SERPL-SCNC: 101 MMOL/L (ref 100–108)
CHOLEST SERPL-MCNC: 204 MG/DL (ref 50–200)
CO2 SERPL-SCNC: 32 MMOL/L (ref 21–32)
CREAT SERPL-MCNC: 0.77 MG/DL (ref 0.6–1.3)
ERYTHROCYTE [DISTWIDTH] IN BLOOD BY AUTOMATED COUNT: 15 % (ref 11.6–15.1)
GFR SERPL CREATININE-BSD FRML MDRD: 68 ML/MIN/1.73SQ M
GLUCOSE SERPL-MCNC: 122 MG/DL (ref 65–140)
GLUCOSE SERPL-MCNC: 148 MG/DL (ref 65–140)
GLUCOSE SERPL-MCNC: 80 MG/DL (ref 65–140)
GLUCOSE SERPL-MCNC: 92 MG/DL (ref 65–140)
GLUCOSE SERPL-MCNC: 94 MG/DL (ref 65–140)
HCT VFR BLD AUTO: 36.4 % (ref 34.8–46.1)
HDLC SERPL-MCNC: 53 MG/DL
HGB BLD-MCNC: 11.3 G/DL (ref 11.5–15.4)
INR PPP: 2.95 (ref 0.84–1.19)
LDLC SERPL CALC-MCNC: 122 MG/DL (ref 0–100)
MCH RBC QN AUTO: 29.1 PG (ref 26.8–34.3)
MCHC RBC AUTO-ENTMCNC: 31 G/DL (ref 31.4–37.4)
MCV RBC AUTO: 94 FL (ref 82–98)
PLATELET # BLD AUTO: 231 THOUSANDS/UL (ref 149–390)
PMV BLD AUTO: 9.1 FL (ref 8.9–12.7)
POTASSIUM SERPL-SCNC: 4.4 MMOL/L (ref 3.5–5.3)
PROTHROMBIN TIME: 30.5 SECONDS (ref 11.6–14.5)
RBC # BLD AUTO: 3.88 MILLION/UL (ref 3.81–5.12)
SODIUM SERPL-SCNC: 138 MMOL/L (ref 136–145)
TRIGL SERPL-MCNC: 144 MG/DL
TSH SERPL DL<=0.05 MIU/L-ACNC: 8.29 UIU/ML (ref 0.36–3.74)
WBC # BLD AUTO: 4.74 THOUSAND/UL (ref 4.31–10.16)

## 2021-05-29 PROCEDURE — 85610 PROTHROMBIN TIME: CPT | Performed by: HOSPITALIST

## 2021-05-29 PROCEDURE — 99232 SBSQ HOSP IP/OBS MODERATE 35: CPT | Performed by: INTERNAL MEDICINE

## 2021-05-29 PROCEDURE — 85027 COMPLETE CBC AUTOMATED: CPT | Performed by: HOSPITALIST

## 2021-05-29 PROCEDURE — 97167 OT EVAL HIGH COMPLEX 60 MIN: CPT

## 2021-05-29 PROCEDURE — 80061 LIPID PANEL: CPT | Performed by: HOSPITALIST

## 2021-05-29 PROCEDURE — 80048 BASIC METABOLIC PNL TOTAL CA: CPT | Performed by: HOSPITALIST

## 2021-05-29 PROCEDURE — 84443 ASSAY THYROID STIM HORMONE: CPT | Performed by: PHYSICIAN ASSISTANT

## 2021-05-29 PROCEDURE — 97163 PT EVAL HIGH COMPLEX 45 MIN: CPT

## 2021-05-29 PROCEDURE — 97535 SELF CARE MNGMENT TRAINING: CPT

## 2021-05-29 PROCEDURE — 82948 REAGENT STRIP/BLOOD GLUCOSE: CPT

## 2021-05-29 RX ORDER — AMOXICILLIN 250 MG
1 CAPSULE ORAL 2 TIMES DAILY
Status: DISCONTINUED | OUTPATIENT
Start: 2021-05-29 | End: 2021-06-03 | Stop reason: HOSPADM

## 2021-05-29 RX ORDER — POLYETHYLENE GLYCOL 3350 17 G/17G
17 POWDER, FOR SOLUTION ORAL DAILY
Status: DISCONTINUED | OUTPATIENT
Start: 2021-05-29 | End: 2021-06-03 | Stop reason: HOSPADM

## 2021-05-29 RX ADMIN — PRAVASTATIN SODIUM 40 MG: 40 TABLET ORAL at 17:41

## 2021-05-29 RX ADMIN — LISINOPRIL 2.5 MG: 2.5 TABLET ORAL at 08:50

## 2021-05-29 RX ADMIN — ASPIRIN 81 MG: 81 TABLET, COATED ORAL at 08:50

## 2021-05-29 RX ADMIN — AMIODARONE HYDROCHLORIDE 100 MG: 200 TABLET ORAL at 08:53

## 2021-05-29 RX ADMIN — DOCUSATE SODIUM AND SENNOSIDES 1 TABLET: 8.6; 5 TABLET ORAL at 17:41

## 2021-05-29 RX ADMIN — METOPROLOL SUCCINATE 25 MG: 25 TABLET, FILM COATED, EXTENDED RELEASE ORAL at 17:41

## 2021-05-29 RX ADMIN — LEVOTHYROXINE SODIUM 75 MCG: 75 TABLET ORAL at 05:31

## 2021-05-29 RX ADMIN — POLYETHYLENE GLYCOL 3350 17 G: 17 POWDER, FOR SOLUTION ORAL at 17:41

## 2021-05-29 RX ADMIN — ISOSORBIDE MONONITRATE 30 MG: 30 TABLET, EXTENDED RELEASE ORAL at 08:53

## 2021-05-29 RX ADMIN — FUROSEMIDE 20 MG: 20 TABLET ORAL at 08:53

## 2021-05-29 RX ADMIN — WARFARIN SODIUM 2.5 MG: 2.5 TABLET ORAL at 17:41

## 2021-05-29 RX ADMIN — TRAMADOL HYDROCHLORIDE 50 MG: 50 TABLET, FILM COATED ORAL at 20:16

## 2021-05-29 NOTE — PLAN OF CARE
Problem: Potential for Falls  Goal: Patient will remain free of falls  Description: INTERVENTIONS:  - Assess patient frequently for physical needs  -  Identify cognitive and physical deficits and behaviors that affect risk of falls    -  [place] fall precautions as indicated by assessment   - Educate patient/family on patient safety including physical limitations  - Instruct patient to call for assistance with activity based on assessment  - Modify environment to reduce risk of injury  - Consider OT/PT consult to assist with strengthening/mobility  5/28/2021 2237 by [address]  Outcome: Progressing  5/28/2021 2236 by [address]  Outcome: Progressing     Problem: Prexisting or High Potential for Compromised Skin Integrity  Goal: Skin integrity is maintained or improved  Description: INTERVENTIONS:  - Identify patients at risk for skin breakdown  - Assess and monitor skin integrity  - Assess and monitor nutrition and hydration status  - Monitor labs   - Assess for incontinence   - Turn and reposition patient  - Assist with mobility/ambulation  - Relieve pressure over bony prominences  - Avoid friction and shearing  - Provide appropriate hygiene as needed including keeping skin clean and dry  - Evaluate need for skin moisturizer/barrier cream  - Collaborate with interdisciplinary team   - Patient/family teaching  - Consider wound care consult   5/28/2021 2237 by Hal Smith  Outcome: Progressing  5/28/2021 2236 by [address]  Outcome: Progressing     Problem: PAIN - ADULT  Goal: Verbalizes/displays adequate comfort level or baseline comfort level  Description: Interventions:  - Encourage patient to monitor pain and request assistance  - Assess pain using appropriate pain scale  - Administer analgesics based on type and severity of pain and evaluate response  - Implement non-pharmacological measures as appropriate and evaluate response  - Consider cultural and social influences on pain and pain management  - Notify physician/advanced practitioner if interventions unsuccessful or patient reports new pain  Outcome: Progressing     Problem: INFECTION - ADULT  Goal: Absence or prevention of progression during hospitalization  Description: INTERVENTIONS:  - Assess and monitor for signs and symptoms of infection  - Monitor lab/diagnostic results  - Monitor all insertion sites, i e  indwelling lines, tubes, and drains  - Monitor endotracheal if appropriate and nasal secretions for changes in amount and color  - [place] appropriate cooling/warming therapies per order  - Administer medications as ordered  - Instruct and encourage patient and family to use good hand hygiene technique  - Identify and instruct in appropriate isolation precautions for identified infection/condition  Outcome: Progressing  Goal: Absence of fever/infection during neutropenic period  Description: INTERVENTIONS:  - Monitor WBC    Outcome: Progressing     Problem: SAFETY ADULT  Goal: Patient will remain free of falls  Description: INTERVENTIONS:  - Assess patient frequently for physical needs  -  Identify cognitive and physical deficits and behaviors that affect risk of falls    -  [place] fall precautions as indicated by assessment   - Educate patient/family on patient safety including physical limitations  - Instruct patient to call for assistance with activity based on assessment  - Modify environment to reduce risk of injury  - Consider OT/PT consult to assist with strengthening/mobility  5/28/2021 2237 by Leanna Smith  Outcome: Progressing  5/28/2021 2236 by Lili Soliz  Outcome: Progressing  Goal: Maintain or return to baseline ADL function  Description: INTERVENTIONS:  -  Assess patient's ability to carry out ADLs; assess patient's baseline for ADL function and identify physical deficits which impact ability to perform ADLs (bathing, care of mouth/teeth, toileting, grooming, dressing, etc )  - Assess/evaluate cause of self-care deficits   - Assess range of motion  - Assess patient's mobility; develop plan if impaired  - Assess patient's need for assistive devices and provide as appropriate  - Encourage maximum independence but intervene and supervise when necessary  - Involve family in performance of ADLs  - Assess for home care needs following discharge   - Consider OT consult to assist with ADL evaluation and planning for discharge  - Provide patient education as appropriate  Outcome: Progressing  Goal: Maintain or return mobility status to optimal level  Description: INTERVENTIONS:  - Assess patient's baseline mobility status (ambulation, transfers, stairs, etc )    - Identify cognitive and physical deficits and behaviors that affect mobility  - Identify mobility aids required to assist with transfers and/or ambulation (gait belt, sit-to-stand, lift, walker, cane, etc )  - [place] fall precautions as indicated by assessment  - Record patient progress and toleration of activity level on Mobility SBAR; progress patient to next Phase/Stage  - Instruct patient to call for assistance with activity based on assessment  - Consider rehabilitation consult to assist with strengthening/weightbearing, etc   Outcome: Progressing     Problem: DISCHARGE PLANNING  Goal: Discharge to home or other facility with appropriate resources  Description: INTERVENTIONS:  - Identify barriers to discharge w/patient and caregiver  - Arrange for needed discharge resources and transportation as appropriate  - Identify discharge learning needs (meds, wound care, etc )  - Arrange for interpretive services to assist at discharge as needed  - Refer to Case Management Department for coordinating discharge planning if the patient needs post-hospital services based on physician/advanced practitioner order or complex needs related to functional status, cognitive ability, or social support system  Outcome: Progressing     Problem: Knowledge Deficit  Goal: Patient/family/caregiver demonstrates understanding of disease process, treatment plan, medications, and discharge instructions  Description: Complete learning assessment and assess knowledge base    Interventions:  - Provide teaching at level of understanding  - Provide teaching via preferred learning methods  Outcome: Progressing

## 2021-05-29 NOTE — PROGRESS NOTES
[facility]  Progress Note - Mary Joyce 7/24/1929, 80 y o  female MRN: [id number]  Unit/Bed#: [facility] 702-01 Encounter: [id number]  Primary Care Provider: Lillian Ely MD   Date and time admitted to hospital: 5/28/2021 11:37 AM    * Stroke-like symptoms  Assessment & Plan  -mild dysarthria still persist   -status post CTA head/neck: Severe stenosis of the left distal common carotid artery and proximal cervical internal carotid artery   The remainder of the cervical internal carotid artery is normal in caliber  Mild narrowing of both vertebral artery origins and moderate stenosis of the left subclavian origin   The remainder of the vertebrobasilar system is unremarkable  Dani Champ is minor smooth narrowing of both P2 segments of the posterior cerebral arteries  -admit to CVA pathway  -neurology consulted  -proceed with MRI brain, positive pacemaker but noted as MRI conditional  -pending echo  -cont telemetry monitoring  -CVA prophylaxis already optimized with aspirin/coumadin/statin, LDL not at goal put reported allergy to lipitor      Colitis  Assessment & Plan  ? S/p CT A/P revealing moderate stool burden with possibility of stercolar colitis  No clinical symptoms of colitis  bowel regimen    Severe obesity (BMI 35 0-39  9) with comorbidity [facility])  Assessment & Plan  Lifestyle/dietary modification    Chronic diastolic CHF (congestive heart failure) (HCC)  Assessment & Plan  Wt Readings from Last 3 Encounters:   05/28/21 78 9 kg (174 lb)   05/28/21 78 9 kg (174 lb)   05/11/21 83 4 kg (183 lb 13 8 oz)     Compensated  Cont lasix        Atrial flutter (HCC)  Assessment & Plan  -continue amiodarone/BB  -INR therapeutic, OK to cont per neurology    Acquired hypothyroidism  Assessment & Plan  Elevated TSH however trended down from most recent one  Dose was just recently changed   Thus cont at present dose  Repeat TFT in 4-6 weeks as outpatient    Type 2 diabetes mellitus with stage 3a chronic kidney disease, without long-term current use of insulin [facility])  Assessment & Plan  Lab Results   Component Value Date    HGBA1C 5 8 (H) 2021       Recent Labs     21  1601 21  2107 21  0649 21  1030   POCGLU 85 115 94 122         Blood Sugar Average: Last 72 hrs:  (P) 105 2     -diet controlled  -sliding scale insulin/Accu-Cheks    Essential hypertension  Assessment & Plan  Stable    Coronary artery disease involving native coronary artery of native heart without angina pectoris  Assessment & Plan  CP free  Cont cardiac meds      VTE Pharmacologic Prophylaxis:   Pharmacologic: Warfarin (Coumadin)  Mechanical VTE Prophylaxis in Place: No    Patient Centered Rounds: I have performed bedside rounds with nursing staff today  Discussions with Specialists or Other Care Team Provider:     Education and Discussions with Family / Patient: patient; called pt's dtr as well no answer left message    Time Spent for Care: 30 minutes  More than 50% of total time spent on counseling and coordination of care as described above  Current Length of Stay: 1 day(s)    Current Patient Status: Inpatient   Certification Statement: The patient will continue to require additional inpatient hospital stay due to R/o CVA    Discharge Plan:     Code Status: Level 1 - Full Code      Subjective:   Still has mild dysarthria frustrated that her speech has not returned to baseline  Denies abd pain, n/v/d  No acute events overnight    Objective:     Vitals:   Temp (24hrs), Av °F (36 7 °C), Min:97 8 °F (36 6 °C), Max:98 1 °F (36 7 °C)    Temp:  [97 8 °F (36 6 °C)-98 1 °F (36 7 °C)] 97 8 °F (36 6 °C)  HR:  [63-95] 71  Resp:  [17-18] 17  BP: ()/(47-69) 134/69  SpO2:  [91 %-98 %] 96 %  Body mass index is 35 14 kg/m²  Input and Output Summary (last 24 hours):        Intake/Output Summary (Last 24 hours) at 2021 1600  Last data filed at 2021 0700  Gross per 24 hour   Intake 298 ml   Output 750 ml   Net -452 ml       Physical Exam:     Physical Exam  Constitutional:       Appearance: Normal appearance  Neck:      Musculoskeletal: Normal range of motion and neck supple  Cardiovascular:      Rate and Rhythm: Normal rate and regular rhythm  Pulses: Normal pulses  Heart sounds: Normal heart sounds  Pulmonary:      Effort: Pulmonary effort is normal  No respiratory distress  Breath sounds: Normal breath sounds  No wheezing or rales  Abdominal:      General: Abdomen is flat  Bowel sounds are normal  There is no distension  Palpations: Abdomen is soft  Tenderness: There is no abdominal tenderness  There is no guarding  Skin:     General: Skin is warm and dry  Neurological:      Mental Status: She is alert and oriented to person, place, and time  Comments: Mild dysarthria           Additional Data:     Labs:    Results from last 7 days   Lab Units 05/29/21  0623   WBC Thousand/uL 4 74   HEMOGLOBIN g/dL 11 3*   HEMATOCRIT % 36 4   PLATELETS Thousands/uL 231     Results from last 7 days   Lab Units 05/29/21  0623   SODIUM mmol/L 138   POTASSIUM mmol/L 4 4   CHLORIDE mmol/L 101   CO2 mmol/L 32   BUN mg/dL 24   CREATININE mg/dL 0 77   ANION GAP mmol/L 5   CALCIUM mg/dL 9 1   GLUCOSE RANDOM mg/dL 80     Results from last 7 days   Lab Units 05/29/21  0623   INR  2 95*     Results from last 7 days   Lab Units 05/29/21  1547 05/29/21  1030 05/29/21  0649 05/28/21  2107 05/28/21  1601 05/28/21  1142   POC GLUCOSE mg/dl 92 122 94 115 85 110     Results from last 7 days   Lab Units 05/28/21  1738   HEMOGLOBIN A1C % 5 8*               * I Have Reviewed All Lab Data Listed Above  * Additional Pertinent Lab Tests Reviewed:  All Labs Within Last 24 Hours Reviewed    Imaging:    Imaging Reports Reviewed Today Include:   Imaging Personally Reviewed by Myself Includes:      Recent Cultures (last 7 days):           Last 24 Hours Medication List:   Current Facility-Administered Medications   Medication Dose Route Frequency Provider Last Rate    amiodarone  100 mg Oral Daily Olinda Sam MD      aspirin  81 mg Oral Daily Olinda Sam MD      furosemide  20 mg Oral Daily Olinda Sam MD      insulin lispro  1-6 Units Subcutaneous TID [facility] Olinda Sam MD      isosorbide mononitrate  30 mg Oral Daily Olinda Sam MD      levothyroxine  75 mcg Oral Early Morning Olinda Sam MD      lisinopril  2 5 mg Oral Daily Olinda Sam MD      metoprolol succinate  25 mg Oral After William Castillo MD      polyethylene glycol  17 g Oral Daily Guillermina Gaines DO      pravastatin  40 mg Oral Daily With William Castillo MD      senna-docusate sodium  1 tablet Oral BID Guillermina Gaiens DO      traMADol  50 mg Oral Q6H PRN Olinda Sam MD      warfarin  2 5 mg Oral Daily (warfarin) Olinda Sam MD          Today, Patient Was Seen By: Guillermina Gaines DO    ** Please Note: Dictation voice to text software may have been used in the creation of this document   **

## 2021-05-29 NOTE — ASSESSMENT & PLAN NOTE
Wt Readings from Last 3 Encounters:   05/28/21 78 9 kg (174 lb)   05/28/21 78 9 kg (174 lb)   05/11/21 83 4 kg (183 lb 13 8 oz)     Compensated  Cont lasix

## 2021-05-29 NOTE — PLAN OF CARE
Problem: OCCUPATIONAL THERAPY ADULT  Goal: Performs self-care activities at highest level of function for planned discharge setting  See evaluation for individualized goals  Description: Treatment Interventions: ADL retraining, Functional transfer training, UE strengthening/ROM, Visual perceptual retraining, Endurance training, Cognitive reorientation, Patient/family training, Equipment evaluation/education, Neuromuscular reeducation, Fine motor coordination activities, Compensatory technique education, Continued evaluation, Energy conservation, Activityengagement          See flowsheet documentation for full assessment, interventions and recommendations     Note: Limitation: Decreased ADL status, Decreased UE ROM, Decreased UE strength, Decreased Safe judgement during ADL, Decreased cognition, Decreased endurance, Decreased sensation, Decreased fine motor control, Decreased self-care trans, Decreased high-level ADLs, Mood limitation  Prognosis: Fair  Assessment:       OT Discharge Recommendation: (S) Post acute rehabilitation services(return to [address])  OT - OK to Discharge: Yes(to rehab when medically cleared)

## 2021-05-29 NOTE — OCCUPATIONAL THERAPY NOTE
Occupational Therapy Evaluation     Patient Name: Yasmine Valentin  OCTVC'D Date: 5/29/2021  Problem List  Principal Problem:    Stroke-like symptoms  Active Problems:    Coronary artery disease involving native coronary artery of native heart without angina pectoris    Essential hypertension    Type 2 diabetes mellitus with stage 3a chronic kidney disease, without long-term current use of insulin (HCC)    Acquired hypothyroidism    Atrial flutter (HCC)    Chronic diastolic CHF (congestive heart failure) ([facility])    Severe obesity (BMI 35 0-39  9) with comorbidity ([facility] )    Colitis    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[facility])    Past Medical History  Past Medical History:   Diagnosis Date    Abnormal nuclear stress test     last assessed 04/03/2017    Anxiety     Arthritis     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    Bundle branch block, left     Cardiomyopathy ([facility] )     Chest pain 3/9/2019    Chronic pain     chronic b/l shoulder pain    Chronic pain of right knee 4/2/2019    Coronary artery disease     Diabetes mellitus ([facility] )     Gait disturbance 3/2/2018    GERD (gastroesophageal reflux disease)     H/O atrial flutter     History of DVT (deep vein thrombosis)     History of pulmonary embolism     Hyperlipidemia     Hypertension     Hypothyroid     Renal calculi     Shortness of breath      Past Surgical History  Past Surgical History:   Procedure Laterality Date    ATRIAL ABLATION SURGERY  01/2015    CARDIAC PACEMAKER PLACEMENT      CARDIOVERSION      CHELA/DCCV 10/22/2014    CARPAL TUNNEL RELEASE Right     CATARACT EXTRACTION      CORONARY ANGIOPLASTY WITH STENT PLACEMENT      HYSTERECTOMY      JOINT REPLACEMENT Right     SD AMPUTATION TOE,MT-P JT Left 10/11/2017    Procedure: AMPUTATION SECOND TOE;  Surgeon: Rochelle Toledo DPM;  Location: [facility];  Service: Podiatry    TONSILLECTOMY      TOTAL HIP ARTHROPLASTY      Right             05/29/21 1009   OT Last Visit   OT Visit Date 05/29/21   Note Type   Note type Evaluation   Restrictions/Precautions   Weight Bearing Precautions Per Order No   Other Precautions Cognitive; Bed Alarm;Multiple lines;Telemetry; Fall Risk;Pain   Pain Assessment   Pain Assessment Tool 0-10   Pain Score 7   Pain Location/Orientation Location: Generalized   Pain Onset/Description Onset: Ongoing   Effect of Pain on Daily Activities Pain limits functional occupational participation   Patient's Stated Pain Goal No pain   Hospital Pain Intervention(s) Repositioned; Ambulation/increased activity; Emotional support   Home Living   Type of [address] One level;Stairs to enter with rails; Performs ADLs on one level  (3STE;)   Bathroom Shower/Tub Walk-in shower   Bathroom Toilet Standard   Bathroom Equipment Grab bars in shower; [address]; Wheelchair-manual   Additional Comments Pt admitted from [facility]  Prior to STR, pt resides in a [facility] w, 3STE w/ rails and uses a RW  Pt reports use of a manual w/c at STR  Prior Function   Level of [place] Independent with ADLs and functional mobility   Lives With Spouse   Receives Help From Family;Home health   ADL Assistance Needs assistance   IADLs Needs assistance   Falls in the last 6 months 1 to 4  (2)   Vocational Retired   Comments Pt reports performing stand pivot transfers from [employer] w/c at [facility] PTA w/ S  Lifestyle   Autonomy Pt reports recieving assistance w/ ADL/IADL tasks PTA  Pt reports home health assists w/ bathing 2x/wk  Pt does not drive and is retired  Reciprocal Relationships Pt resides w/ spouse who is 80 y o and not able to assist as needed  Pt reports home health aide assists 2x/wk w/ bathing      Service to Others Retired   Intrinsic Gratification Pt enjoys being home   Psychosocial   Psychosocial (WDL) WDL   Patient Behaviors/Mood Anxious   Subjective   Subjective "My legs feel so weak"   ADL   Where Assessed Edge of bed   Eating Assistance 4  Minimal Assistance   Grooming Assistance 4  Minimal Assistance   UB Bathing Assistance 3  Moderate Assistance   LB Bathing Assistance 2  Maximal Assistance   UB Dressing Assistance 3  Moderate Assistance   LB Dressing Assistance 2  Maximal Assistance   Toileting Assistance  2  Maximal Assistance   Functional Assistance 3  Moderate Assistance   Bed Mobility   Rolling R 3  Moderate assistance   Additional items Assist x 1; Increased time required   Rolling L 3  Moderate assistance   Additional items Assist x 1; Increased time required   Supine to Sit 4  Minimal assistance   Additional items Assist x 1;HOB elevated; Increased time required;Verbal cues;LE management   Sit to Supine 3  Moderate assistance   Additional items Assist x 1; Increased time required;Verbal cues;LE management   Additional Comments Pt supine in bed upon OT arrival  Pt required Mod A for rolling R and L in bed during toileting hygiene and sit>supine transfer  Pt required Min A for supine>sit transfer  Pt benefits from increased time and VC for safety enhancement and sequencing  Transfers   Sit to Stand 3  Moderate assistance   Additional items Assist x 2; Increased time required;Verbal cues   Stand to Sit 3  Moderate assistance   Additional items Assist x 2; Increased time required;Verbal cues   Additional Comments Pt completed functional transfers w/ Mod Ax2 w/ b/l knee blocking and HHAx2  Pt present w/ anxiousness and benefits from increased time and VC for safety/sequencing  Upon standing, pt had b/l knee buckling and flexed trunk 2' fatigue and generalized weakness  Functional Mobility   Additional Comments Unable to assess at this time 2' pt "feeling like legs are going to give out" upon standing  Balance   Static Sitting Fair -   Dynamic Sitting Poor +   Static Standing Poor -   Activity Tolerance   Activity Tolerance Patient limited by fatigue;Patient limited by pain; Other (Comment)  (generalized weakness;)   Medical Staff Made Aware OT Tasha; PT Tavares Bolus   Nurse Made Aware RN cleared pt for OT evaluation   RUE Assessment   RUE Assessment X  (generalized weakness;)   LUE Assessment   LUE Assessment X  (generalized weakness;)   Hand Function   Gross Motor Coordination Impaired  (RUE dysmetria;)   Fine Motor Coordination Impaired   Proprioception   Proprioception Partial deficits in the RUE   Vision-Basic Assessment   Current Vision Wears glasses only for reading  (not present at hospital;)   Vision - Complex Assessment   Tracking Decreased smoothness of horizontal tracking;Decreased smoothness of vertical tracking   Saccades Decreased speed of saccadic movement   Cognition   Overall Cognitive Status WFL   Arousal/Participation Cooperative   Attention Within functional limits   Orientation Level Oriented X4   Memory Decreased recall of precautions   Following Commands Follows one step commands with increased time or repetition   Comments Pt cooperative and agreeable to OT treatment  Pt demonstrates lack of insight into current functional status and benefits from VC/TC and repeated single-step direction to assist w/ safety and sequencing  Pt presents w/ anxiousness and benefits from encouragement and VC t/o functional tasks  Pt presents w/ dysarthria and expressive aphasia and demonstrated insight into those current deficits  Assessment   Limitation Decreased ADL status; Decreased UE ROM; Decreased UE strength;Decreased Safe judgement during ADL;Decreased cognition;Decreased endurance;Decreased sensation;Decreased fine motor control;Decreased self-care trans;Decreased high-level ADLs;Mood limitation   Prognosis Fair   Assessment Pt is a 80 y o  female who participated in an OT evaluation on 5/29/2021 after being admitted to [facility] from [facility] on 5/28/2021 w/ dysarthria and expressive aphasia  CT reveals thalamic stroke   Pt has a past medical history of Abnormal nuclear stress test, Anxiety, Arthritis, Bundle branch block, left, Cardiomyopathy ([facility] ), Chest pain (3/9/2019), Chronic pain, Chronic pain of right knee (4/2/2019), Coronary artery disease, Diabetes mellitus ([facility] ), Gait disturbance (3/2/2018), GERD (gastroesophageal reflux disease), H/O atrial flutter, History of DVT (deep vein thrombosis), History of pulmonary embolism, Hyperlipidemia, Hypertension, Hypothyroid, Renal calculi, and Shortness of breath  Pt with active OT orders and activity as tolerated orders  [facility]  Prior to STR, pt resides in a [facility] w/ 3STE w/ rails w/ spouse  Pt reports spouse is 80 y o and unable to provide assistance as needed  Pt was receiving assistance w/ ADL's and IADL's, does not drive, & required the use of a RW PTA  Currently pt completes UB ADL's w/ Mod A, LB ADL's w/ Max A, and functional transfers w/ HHAx2 and b/l knee blocking w/ Mod Ax2  Unable to assess functional mobility at this time 2' pt expressing feeling as though her "legs were going to give out" and b/l knee buckling  OTR to see for functional mobility goal  Pt is limited at this time 2' dysarthria, expressive aphasia, pain, endurance, activity tolerance, functional standing tolerance, unsupportive home environment, decreased I w/ ADLS/IADLS, strength, sensation deficits, decreased safety awareness, slurred speech and coordination deficits  The following occupational performance areas to address include: ADL retraining, visual perceptual retraining, functional transfer training, UE strengthening/ROM, endurance training, cognitive reorientation, patient/family training, equipment evaluation/education, neuromuscular reeducation, fine motor coordination activities, compensatory technique education, continued evaluation, energy conservation, and activity engagement  The patient's raw score on the AM-PAC Daily Activity inpatient short form is 14, standardized score is 33 39, less than 39 4   Patients at this level are likely to benefit from discharge to post-acute rehabilitation services  Please refer to the recommendation of the Occupational Therapist for safe discharge planning ) Based on the aforementioned OT evaluation, functional performance deficits, and assessments; pt has been identified as high complexity evaluation  From OT standpoint, anticipate d/c to post-acute rehabilitation services  Pt to continue to benefit from immediate acute care OT services to address the following goals  3-5x/week to  w/in 10-14 days:     Goals   Patient Goals To feel better   LTG Time Frame 10-14   Long Term Goal #1 Please refer to OT note for goals   Plan   Treatment Interventions ADL retraining;Functional transfer training;UE strengthening/ROM; Visual perceptual retraining; Endurance training;Cognitive reorientation;Patient/family training;Equipment evaluation/education; Neuromuscular reeducation; Fine motor coordination activities; Compensatory technique education;Continued evaluation; Energy conservation; Activityengagement   Goal Expiration Date 21   OT Treatment Day 1   OT Frequency 3-5x/wk   Additional Treatment Session   Start Time 3566   End Time 1009   Treatment Assessment Pt participated in an OT treatment session on 2021  Upon OT arrival, pt supine in bed and agreeable to OT treatment  Pt incontinent while lying in bed  Pt rolled R and L in bed w/ Mod Ax1 while therapist provided Max A for toileting hygiene of [place] area  Pt demonstrates good insight into current functional status and presents w/ anxiousness  Pt benefits from VC prior to and t/o functional tasks for assistance w/ safety and sequencing  The patient's raw score on the AM-PAC Daily Activity inpatient short form is 14, standardized score is 33 39, less than 39 4  Patients at this level are likely to benefit from discharge to post-acute rehabilitation services  Please refer to the recommendation of the Occupational Therapist for safe discharge planning   Pt will continue to benefit from acute OT to address current functional deficits and assist w/ safe d/c planning  Recommendation   OT Discharge Recommendation Post acute rehabilitation services  (return to previous [address] [place] [address])   OT - OK to Discharge Yes  (to rehab when medically cleared)   AM-PAC Daily Activity Inpatient   Lower Body Dressing 2   Bathing 2   Toileting 2   Upper Body Dressing 2   Grooming 3   Eating 3   Daily Activity Raw Score 14   Daily Activity Standardized Score (Calc for Raw Score >=11) 33 39   AM-PAC Applied Cognition Inpatient   Following a Speech/Presentation 2   Understanding Ordinary Conversation 3   Taking Medications 2   Remembering Where Things Are Placed or Put Away 2   Remembering List of 4-5 Errands 1   Taking Care of Complicated Tasks 1   Applied Cognition Raw Score 11   Applied Cognition Standardized Score 27 03   Barthel Index   Feeding 5   Bathing 0   Grooming Score 0   Dressing Score 5   Bladder Score 5   Bowels Score 5   Toilet Use Score 5   Transfers (Bed/Chair) Score 5   Mobility (Level Surface) Score 0   Stairs Score 0   Barthel Index Score 30   Modified Maryann Scale   Modified Maryann Scale 4       Goals:    OTR to see for functional mobility goal     Pt will complete UB ADL activities w/ S w/ the use of adaptive equipment/compensatory strategies as needed  Pt will complete LB ADL activities w/ Min A w/ the use of adaptive equipment/compensatory strategies as needed  Pt will demonstrate Good carryover of safety awareness/body mechanics/energy conservation/breathing techniques education t/o participation in meaningful ADL tasks w/o % of the time  Pt will complete safe, functional transfers to all necessary surfaces within desired daily activities w/ S while demonstrating good safety awareness  Pt will be attentive t/o a formal cognitive evaluation 100% of the time to prepare for safe discharge planning      Pt will follow single and multi-step directions to complete ADL tasks w/ less than 2 VC for redirection to tasks  Pt will demonstrate the ability to problem-solve/sequence through desired ADL/IADL activities w/o environmental/verbal cueing 100% of the time  Pt will identify and complete a leisure activity of enjoyment for 20 minutes to promote physical, emotional, and cognitive well being  Pt will demonstrate Good, functional activity tolerance for 30 minutes t/o desired ADL activities w/ S  Pt will demonstrate Good carryover of recommended DME proper and safe usage during functional tasks 100% of the time  Pt will demonstrate Good carryover of patient/caregiver education of necessary safety precautions/DME/adaptive equipment/energy conservation techniques         Con Fraction, OTS

## 2021-05-29 NOTE — ASSESSMENT & PLAN NOTE
Lab Results   Component Value Date    HGBA1C 5 8 (H) 05/28/2021       Recent Labs     05/28/21  1601 05/28/21  2107 05/29/21  0649 05/29/21  1030   POCGLU 85 115 94 122         Blood Sugar Average: Last 72 hrs:  (P) 105 2     -diet controlled  -sliding scale insulin/Accu-Cheks

## 2021-05-29 NOTE — ASSESSMENT & PLAN NOTE
-mild dysarthria still persist   -status post CTA head/neck: Severe stenosis of the left distal common carotid artery and proximal cervical internal carotid artery   The remainder of the cervical internal carotid artery is normal in caliber  Mild narrowing of both vertebral artery origins and moderate stenosis of the left subclavian origin   The remainder of the vertebrobasilar system is unremarkable  [place] is minor smooth narrowing of both P2 segments of the posterior cerebral arteries     -admit to CVA pathway  -neurology consulted  -proceed with MRI brain, positive pacemaker but noted as MRI conditional  -pending echo  -cont telemetry monitoring  -CVA prophylaxis already optimized with aspirin/coumadin/statin, LDL not at goal put reported allergy to lipitor

## 2021-05-29 NOTE — ASSESSMENT & PLAN NOTE
Elevated TSH however trended down from most recent one  Dose was just recently changed   Thus cont at present dose  Repeat TFT in 4-6 weeks as outpatient

## 2021-05-29 NOTE — PLAN OF CARE
Problem: PHYSICAL THERAPY ADULT  Goal: Performs mobility at highest level of function for planned discharge setting  See evaluation for individualized goals  Description: Treatment/Interventions: Functional transfer training, LE strengthening/ROM, Therapeutic exercise, Endurance training, Patient/family training, Equipment eval/education, Bed mobility, Gait training, Spoke to nursing  Equipment Recommended: Wheelchair       See flowsheet documentation for full assessment, interventions and recommendations  Note: Prognosis: Fair  Problem List: Decreased strength, Decreased range of motion, Decreased endurance, Impaired balance, Decreased mobility, Pain, Decreased cognition  Assessment: Pt is 80 y o  female seen for PT evaluation s/p admit to [facility] on 5/28/2021 w/ Stroke-like symptoms  Pt presents w/ dysarthria and expressive apahsia at [facility]  PT consulted to assess pt's functional mobility and d/c needs  Order placed for PT eval and tx, w/ up w/ A order  Comorbidities affecting pt's physical performance at time of assessment include: CAD, HTN, Type 2 DM, atrial flutter, CHF, adhesive capsulitis, OA B/L shoulder, CKD  PTA, pt was admitted from [facility] for [address] where she was performing SPT to w/c and requiring assistance w/ ADLs  Prior to rehab, pt was residing w/ spouse in [facility] w/ 3 RADHA  Pt was ambulating w/ RW and IND w/ ADLs  Personal factors affecting pt at time of IE include: inaccessible home environment, ambulating w/ assistive device, stairs to enter home, inability to ambulate household distances, inability to navigate community distances, limited home support, positive fall history and inability to perform IADLs   Please find objective findings from PT assessment regarding body systems outlined above with impairments and limitations including weakness, impaired balance, decreased endurance, gait deviations, pain, decreased activity tolerance, decreased functional mobility tolerance, fall risk and decreased cognition  Pt performed bed mobility at Min/Mod Ax1  Pt performed STS at Mod Ax2- only able to maintain standing ~10 seconds  The following objective measures performed on IE also reveal limitations: Barthel Index: 24/104 and AM-PAC 6-Clicks: 5/30  Pt's clinical presentation is currently unstable/unpredictable seen in pt's presentation of recent admission for stroke like symptoms requiring medical attention, recent decline in function as compared to baseline, multiple lines, fall risk, pain  Pt to benefit from continued PT tx to address deficits as defined above and maximize level of functional independent mobility and consistency  From PT/mobility standpoint, recommendation at time of d/c would be post acute rehabilitation services pending progress in order to facilitate return to PLOF  Barriers to Discharge: Inaccessible home environment, Decreased caregiver support        PT Discharge Recommendation: Post acute rehabilitation services     PT - OK to Discharge: Yes(when medically cleared to rehab)    See flowsheet documentation for full assessment

## 2021-05-29 NOTE — CASE MANAGEMENT
PT IS A 30 DAYS READMISSION  PT IS NOT A BUNDLE  CM met with pt to discuss DCP and cm role  Pt was admitted from [facility] where she was receiving rehab  Prior to admission pt used a RW with ambulation and was independent with ADLS at home  Pt has no hx of drugs/ETOH or inpt psych stays  CM reviewed d/c planning process including the following: identifying help at home, patient preference for d/c planning needs, Discharge Lounge, [facility] Meds to Bed program, availability of treatment team to discuss questions or concerns patient and/or family may have regarding understanding medications and recognizing signs and symptoms once discharged  CM also encouraged patient to follow up with all recommended appointments after discharge  Patient advised of importance for patient and family to participate in managing patients medical well being  Cm to continue following for d/c needs

## 2021-05-29 NOTE — PHYSICAL THERAPY NOTE
Physical Therapy Evaluation     Patient's Name: Yin Schmidt    Admitting Diagnosis  Essential hypertension [I10]  CVA (cerebral vascular accident) ([facility] ) [I63 9]  Stroke (cerebrum) ([facility] ) [I63 9]  Type 2 diabetes mellitus with stage 3a chronic kidney disease, without long-term current use of insulin ([facility] ) [E11 22, N18 31]    Problem List  Patient Active Problem List   Diagnosis    Coronary artery disease involving native coronary artery of native heart without angina pectoris    Essential hypertension    History of placement of stent in LAD coronary artery    Hyperlipidemia    History of atrial flutter    LBBB (left bundle branch block)    Right hip pain    Type 2 diabetes mellitus with stage 3a chronic kidney disease, without long-term current use of insulin ([facility] )    Acquired hypothyroidism    Adhesive capsulitis    Chronic low back pain    Cervical spine degeneration    Primary osteoarthritis of both shoulders    Tremors of nervous system    Heart block AV second degree    Ambulatory dysfunction    Pacemaker    Atrial flutter ([facility] )    Trochanteric bursitis of right hip    Ventricular tachycardia ([facility] )    Cardiomyopathy ([facility] )    Primary osteoarthritis of both knees    Effusion of left knee    Nonrheumatic aortic valve stenosis    Chronic diastolic CHF (congestive heart failure) ([facility])    Generalized OA    Anticoagulated on Coumadin    Stage 3a chronic kidney disease ([facility] )    Mixed stress and urge urinary incontinence    Acquired absence of other left toe(s) ([facility] )    Severe obesity (BMI 35 0-39  9) with comorbidity ([facility]    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)    Avascular necrosis (HCC)    Knee pain, chronic    Asymptomatic bacteriuria    Stroke-like symptoms    Colitis    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[facility])       Past Medical History  Past Medical History:   Diagnosis Date    Abnormal nuclear stress test     last assessed 04/03/2017    Anxiety     Arthritis     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    Bundle branch block, left     Cardiomyopathy ([facility] )     Chest pain 3/9/2019    Chronic pain     chronic b/l shoulder pain    Chronic pain of right knee 4/2/2019    Coronary artery disease     Diabetes mellitus ([facility] )     Gait disturbance 3/2/2018    GERD (gastroesophageal reflux disease)     H/O atrial flutter     History of DVT (deep vein thrombosis)     History of pulmonary embolism     Hyperlipidemia     Hypertension     Hypothyroid     Renal calculi     Shortness of breath        Past Surgical History  Past Surgical History:   Procedure Laterality Date    ATRIAL ABLATION SURGERY  01/2015    CARDIAC PACEMAKER PLACEMENT      CARDIOVERSION      CHELA/DCCV 10/22/2014    CARPAL TUNNEL RELEASE Right     CATARACT EXTRACTION      CORONARY ANGIOPLASTY WITH STENT PLACEMENT      HYSTERECTOMY      JOINT REPLACEMENT Right     [place] AMPUTATION TOE,MT-P JT Left 10/11/2017    Procedure: AMPUTATION SECOND TOE;  Surgeon: Melchor Kruse DPM;  Location: [facility];  Service: Podiatry    TONSILLECTOMY      TOTAL HIP ARTHROPLASTY      Right          05/29/21 1008   PT Last Visit   PT Visit Date 05/29/21   Note Type   Note type Evaluation   Pain Assessment   Pain Assessment Tool 0-10   Pain Score 7   Pain Location/Orientation Location: Generalized  ("everywhere")   Hospital Pain Intervention(s) Repositioned; Ambulation/increased activity; Emotional support   Home Living   Type of [facility] One level;Stairs to enter with [facility]   Additional Comments Pt admitted from [address] for [address] [facility]   Prior to rehab, pt was residing in [place] w/ 3 RUST and ambulating w/ RW PTA   Prior Function   Level of [place] Independent with ADLs and functional mobility   Lives With Spouse   Receives Help From Family;Home health   ADL Assistance Independent   IADLs Needs assistance   Falls in the last 6 months 1 to 4   Comments Pt was requiring assist w/ ADLs at [facility]  Prior to rehab, pt was residing w/ spouse and received assistance from [address] [place] [address] 2x/week for 3 hrs   Restrictions/Precautions   Weight Bearing Precautions Per Order No   Other Precautions Cognitive; Bed Alarm;Multiple lines;Telemetry; Fall Risk;Pain   General   Family/Caregiver Present No   Cognition   Overall Cognitive Status WFL   Arousal/Participation Alert   Orientation Level Oriented X4   Memory Decreased recall of precautions   Following Commands Follows one step commands without difficulty   Comments Pt pleasant and cooperative to work w/ therapy  Pt w/ mild dysarthria throughout   RLE Assessment   RLE Assessment   (grossly 4-/5)   LLE Assessment   LLE Assessment   (grossly 3+/5)   Coordination   Movements are Fluid and Coordinated 1   Light Touch   RLE Light Touch Grossly intact   LLE Light Touch Grossly intact   Bed Mobility   Rolling R 3  Moderate assistance   Additional items Assist x 1   Rolling L 3  Moderate assistance   Additional items Assist x 1   Supine to Sit 4  Minimal assistance   Additional items Assist x 1;HOB elevated; Increased time required;Verbal cues;LE management   Sit to Supine 3  Moderate assistance   Additional items Assist x 1; Increased time required;Verbal cues;LE management   Additional Comments Pt lying supine upon PT arrival  Pt returned lying supine at end of session w/ all needs within reach and bed alarm activated  Pt performed rolling R and L for pad change  Transfers   Sit to Stand 3  Moderate assistance   Additional items Assist x 2; Increased time required;Verbal cues   Stand to Sit 3  Moderate assistance   Additional items Assist x 2; Increased time required;Verbal cues   Additional Comments Transfers w/ B/L HHA and BLE knee block   Pt performed STS x2 and was only able to maintain standing ~10 seconds reporting significant pain in LEs   Ambulation/Elevation   Gait pattern Not tested  (decreased standing tolerance)   Balance   Static Sitting Fair -   Dynamic Sitting Poor +   Static Standing Poor -   Endurance Deficit   Endurance Deficit Yes   Endurance Deficit Description weakness, fatigue, pain   Activity Tolerance   Activity Tolerance Patient limited by fatigue;Patient limited by pain   Medical Staff Made Aware OT Tasha; Co-evaluation performed w/ OT due to pt's multiple co-morbidities, clinically unstable presentation, and regression in functional impairments as compared to baseline  PT focused on transfers, mobility, and LE assessment  Nurse Made Aware yes   Assessment   Prognosis Fair   Problem List Decreased strength;Decreased range of motion;Decreased endurance; Impaired balance;Decreased mobility;Pain;Decreased cognition   Assessment Pt is 80 y o  female seen for PT evaluation s/p admit to [facility]21 w/ Stroke-like symptoms  Pt presents w/ dysarthria and expressive apahsia at [facility]  PT consulted to assess pt's functional mobility and d/c needs  Order placed for PT eval and tx, w/ up w/ A order  Comorbidities affecting pt's physical performance at time of assessment include: CAD, HTN, Type 2 DM, atrial flutter, CHF, adhesive capsulitis, OA B/L shoulder, CKD  PTA, pt was admitted from [facility] for [address] [place] where she was performing SPT to w/c and requiring assistance w/ ADLs  Prior to rehab, pt was residing w/ spouse in [facility] w/ 3 [facility]  Pt was ambulating w/ RW and IND w/ ADLs  Personal factors affecting pt at time of IE include: inaccessible home environment, ambulating w/ assistive device, stairs to enter home, inability to ambulate household distances, inability to navigate community distances, limited home support, positive fall history and inability to perform IADLs   Please find objective findings from PT assessment regarding body systems outlined above with impairments and limitations including weakness, impaired balance, decreased endurance, gait deviations, pain, decreased activity tolerance, decreased functional mobility tolerance, fall risk and decreased cognition  Pt performed bed mobility at Min/Mod Ax1  Pt performed STS at Mod Ax2- only able to maintain standing ~10 seconds  The following objective measures performed on IE also reveal limitations: Barthel Index: 29/648 and AM-PAC 6-Clicks: 4/26  Pt's clinical presentation is currently unstable/unpredictable seen in pt's presentation of recent admission for stroke like symptoms requiring medical attention, recent decline in function as compared to baseline, multiple lines, fall risk, pain  Pt to benefit from continued PT tx to address deficits as defined above and maximize level of functional independent mobility and consistency  From PT/mobility standpoint, recommendation at time of d/c would be post acute rehabilitation services pending progress in order to facilitate return to PLOF  Barriers to Discharge Inaccessible home environment;Decreased caregiver support   Goals   Patient Goals to get stronger   STG Expiration Date 06/12/21   Short Term Goal #1 1  Pt will demonstrate the ability to perform all aspects of bed mobility at Mod I in onder to maximize functional independence and decrease burden on caregivers  2 Pt will demonstrate the ability to perform all functional transfers at [address] in order to maximize functional independence and decrease burden on caregivers  3 Pt will demonstrate the ability to ambulate at least 10ft with least restrictive device at [address] in order to maximize functional independence  4 Pt will demonstrate the ability to propel w/c at least 150ft at supervision in order to return to household/community mobility  5 Pt will demonstrate improved balance by one grade in order to decrease risk of falls  6 Pt will increase b/l LE strength by 1 grade in order to increase ease of functional mobility and transfers   PT Treatment Day 0   Plan   Treatment/Interventions Functional transfer training;LE strengthening/ROM; Therapeutic exercise; Endurance training;Patient/family training;Equipment eval/education; Bed mobility;Gait training;Spoke to nursing   PT Frequency   (3-5x/week)   Recommendation   PT Discharge Recommendation Post acute rehabilitation services   Equipment Recommended Wheelchair   PT - OK to Discharge Yes  (when medically cleared to rehab)   [address] Mobility Inpatient   Turning in Bed Without Bedrails 2   Lying on Back to Sitting on Edge of Flat Bed 2   Moving Bed to Chair 1   Standing Up From Chair 1   Walk in Room 1   Climb 3-5 Stairs 1   Basic Mobility Inpatient Raw Score 8   Turning Head Towards Sound 4   Follow Simple Instructions 3   Low Function Basic Mobility Raw Score 15   Low Function Basic Mobility Standardized Score 23 9   Modified Maryann Scale   Modified [place] Scale 4   Barthel Index   Feeding 10   Bathing 0   Grooming Score 5   Dressing Score 5   Bladder Score 5   Bowels Score 5   Toilet Use Score 5   Transfers (Bed/Chair) Score 5   Mobility (Level Surface) Score 0   Stairs Score 0   Barthel Index Score 40     The patient's AM-PAC Basic Mobility Inpatient Short Form Low Function Raw Score 15 , Standardized Score is 23 9  A standardized score less 42 9 suggests the patient may benefit from discharge to post-acute rehab services  Please also refer to the recommendation of the Physical Therapist for safe discharge planning      Levi Parks, PT, DPT

## 2021-05-29 NOTE — ASSESSMENT & PLAN NOTE
? S/p CT A/P revealing moderate stool burden with possibility of stercolar colitis  No clinical symptoms of colitis  bowel regimen

## 2021-05-30 ENCOUNTER — APPOINTMENT (INPATIENT)
Dept: RADIOLOGY | Facility: HOSPITAL | Age: 86
DRG: 065 | End: 2021-05-30
Payer: MEDICARE

## 2021-05-30 LAB
GLUCOSE SERPL-MCNC: 117 MG/DL (ref 65–140)
GLUCOSE SERPL-MCNC: 119 MG/DL (ref 65–140)
GLUCOSE SERPL-MCNC: 99 MG/DL (ref 65–140)
GLUCOSE SERPL-MCNC: 99 MG/DL (ref 65–140)
INR PPP: 2.55 (ref 0.84–1.19)
PROTHROMBIN TIME: 27.3 SECONDS (ref 11.6–14.5)

## 2021-05-30 PROCEDURE — 73564 X-RAY EXAM KNEE 4 OR MORE: CPT

## 2021-05-30 PROCEDURE — 99232 SBSQ HOSP IP/OBS MODERATE 35: CPT | Performed by: INTERNAL MEDICINE

## 2021-05-30 PROCEDURE — 82948 REAGENT STRIP/BLOOD GLUCOSE: CPT

## 2021-05-30 PROCEDURE — 99233 SBSQ HOSP IP/OBS HIGH 50: CPT | Performed by: NURSE PRACTITIONER

## 2021-05-30 PROCEDURE — 85610 PROTHROMBIN TIME: CPT | Performed by: INTERNAL MEDICINE

## 2021-05-30 RX ADMIN — DOCUSATE SODIUM AND SENNOSIDES 1 TABLET: 8.6; 5 TABLET ORAL at 16:40

## 2021-05-30 RX ADMIN — POLYETHYLENE GLYCOL 3350 17 G: 17 POWDER, FOR SOLUTION ORAL at 09:00

## 2021-05-30 RX ADMIN — METOPROLOL SUCCINATE 25 MG: 25 TABLET, FILM COATED, EXTENDED RELEASE ORAL at 16:40

## 2021-05-30 RX ADMIN — FUROSEMIDE 20 MG: 20 TABLET ORAL at 09:00

## 2021-05-30 RX ADMIN — TRAMADOL HYDROCHLORIDE 50 MG: 50 TABLET, FILM COATED ORAL at 06:17

## 2021-05-30 RX ADMIN — LEVOTHYROXINE SODIUM 75 MCG: 75 TABLET ORAL at 05:34

## 2021-05-30 RX ADMIN — AMIODARONE HYDROCHLORIDE 100 MG: 200 TABLET ORAL at 09:01

## 2021-05-30 RX ADMIN — WARFARIN SODIUM 2.5 MG: 2.5 TABLET ORAL at 16:40

## 2021-05-30 RX ADMIN — DOCUSATE SODIUM AND SENNOSIDES 1 TABLET: 8.6; 5 TABLET ORAL at 09:00

## 2021-05-30 RX ADMIN — ISOSORBIDE MONONITRATE 30 MG: 30 TABLET, EXTENDED RELEASE ORAL at 09:00

## 2021-05-30 RX ADMIN — PRAVASTATIN SODIUM 40 MG: 40 TABLET ORAL at 16:40

## 2021-05-30 RX ADMIN — LISINOPRIL 2.5 MG: 2.5 TABLET ORAL at 09:00

## 2021-05-30 RX ADMIN — ASPIRIN 81 MG: 81 TABLET, COATED ORAL at 09:00

## 2021-05-30 NOTE — PROGRESS NOTES
Progress Note - Neurology   Xiao Maldonado 80 y o  female [id number]  Unit/Bed#: [facility] 702/[facility] 702-01    Assessment/Plan:    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[facility])  Assessment & Plan  80year old female with atrial fibrillation on Coumadin with a therapeutic INR, HTN, HLD, DM2, CHD who presented to the hospital with dysarthria, right sided weakness and numbness  NIHSS 5 on admission  Patient not a candidate for IV tPA secondary to therapeutic INR and no IR target identified on CTA for intervention  Patient continues on stroke pathway  CT scan revealed left thalamus hypodensity concerning for subacute ischemia with severe stenosis of left common carotid  Awaiting MRI at this time to determine if stroke is small vessel verses vessel to vessel embolization from left ICA  Plan:   - Continue on Coumadin and ASA 81 mg QD    - MRI brain without contrast pending  - Will hold off on echocardiogram at this time, patient with known AFib  - Hemoglobin A1c 5 8; goal euglycemia  - LDL is 122; unable to initiate statin secondary to reported allergy  - Monitor on telemetry  - normotension, avoid hypotension  - Goal normothermia and euglycemia    - PT/OT/PMR  - Stroke education  - medical management and supportive care per primary team  - please contact Neurology with any acute change in neuro exam    Workup:  1  CTA head and neck severe stenosis of the left distal common carotid artery and proximal cervical internal carotid artery  The remainder of the cervical internal carotid artery is normal in caliber  Mild narrowing of both vertebral artery origins and moderate stenosis of the left subclavian origin  The remainder of the vertebrobasilar system is unremarkable  There is minor smooth narrowing of both P2 segments of the posterior cerebral arteries  2  CT brain with decreased attenuation within the cerebral hemispheres suggestive of moderate chronic microangiopathic change    There is a new hypodensity within the anterior left thalamus suspicious for subacute ischemia  No mass effect or hemorrhagic transformation  Atrial flutter (HCC)  Assessment & Plan  - Rate control as per medicine team    - Continue with Coumadin, INR goal 2-3   - Follows with cardiology as an outpatient  Essential hypertension  Assessment & Plan  - goal normotension:  Avoid hypotension  - Management as per medicine team     Type 2 diabetes mellitus with stage 3a chronic kidney disease, without long-term current use of insulin (HCC)  Assessment & Plan  Lab Results   Component Value Date    HGBA1C 5 7 (H) 05/09/2021       Recent Labs     05/28/21  1142   POCGLU 110       Blood Sugar Average: Last 72 hrs:  (P) 110     - Check hemoglobin A1c    - Goal euglycemia    - Management as per medicine team        Yin Schmidt will need follow up in in 6 weeks with neurovascular attending  She will not require outpatient neurological testing  Subjective:    Patient was seen and examined in her room today  Patient reports that she feels worse today, she reports that she is having difficulty speaking and continues to have right upper extremity weakness  She reports this is hard for her because she is right-handed    MRI pending      Past Medical History:   Diagnosis Date    Abnormal nuclear stress test     last assessed 04/03/2017    Anxiety     Arthritis     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    Bundle branch block, left     Cardiomyopathy ([address] )     Chest pain 3/9/2019    Chronic pain     chronic b/l shoulder pain    Chronic pain of right knee 4/2/2019    Coronary artery disease     Diabetes mellitus ([address] )     Gait disturbance 3/2/2018    GERD (gastroesophageal reflux disease)     H/O atrial flutter     History of DVT (deep vein thrombosis)     History of pulmonary embolism     Hyperlipidemia     Hypertension     Hypothyroid     Renal calculi     Shortness of breath      Past Surgical History:   Procedure Laterality Date    ATRIAL ABLATION SURGERY  01/2015    CARDIAC PACEMAKER PLACEMENT      CARDIOVERSION      CHELA/DCCV 10/22/2014    CARPAL TUNNEL RELEASE Right     CATARACT EXTRACTION      CORONARY ANGIOPLASTY WITH STENT PLACEMENT      HYSTERECTOMY      JOINT REPLACEMENT Right     TX AMPUTATION TOE,MT-P JT Left 10/11/2017    Procedure: AMPUTATION SECOND TOE;  Surgeon: Alonzo Jacob DPM;  Location: [facility];  Service: Podiatry    TONSILLECTOMY      TOTAL HIP ARTHROPLASTY      Right     Family History   Problem Relation Age of Onset   Tereza Martin Parkinsonism Sister     Cancer Sister         unknown type    Heart attack Neg Hx     Stroke Neg Hx     Anuerysm Neg Hx     Clotting disorder Neg Hx     Arrhythmia Neg Hx     Heart failure Neg Hx     Coronary artery disease Neg Hx      Social History     Socioeconomic History    Marital status: /Civil Union     Spouse name: None    Number of children: None    Years of education: None    Highest education level: None   Occupational History    None   Social Needs    Financial resource strain: None    Food insecurity     Worry: None     Inability: None    Transportation needs     Medical: None     Non-medical: None   Tobacco Use    Smoking status: Never Smoker    Smokeless tobacco: Never Used   Substance and Sexual Activity    Alcohol use: Yes     Frequency: Monthly or less     Drinks per session: 1 or 2     Binge frequency: Never     Comment: occasional    Drug use: Yes     Types: Marijuana     Comment: MEDICAL MARIJUANA-HAS NOT USED FOR 3 WEEKS    Sexual activity: None   Lifestyle    Physical activity     Days per week: None     Minutes per session: None    Stress: None   Relationships    Social connections     Talks on phone: None     Gets together: None     Attends Latter day service: None     Active member of club or organization: None     Attends meetings of clubs or organizations: None     Relationship status: None    Intimate partner violence Fear of current or ex partner: None     Emotionally abused: None     Physically abused: None     Forced sexual activity: None   Other Topics Concern    None   Social History Narrative    None         Medications: All current active meds have been reviewed and current meds:  Scheduled Meds:  Current Facility-Administered Medications   Medication Dose Route Frequency Provider Last Rate    amiodarone  100 mg Oral Daily Lin Valerio MD      aspirin  81 mg Oral Daily Lin Valerio MD      furosemide  20 mg Oral Daily Lin Valerio MD      insulin lispro  1-6 Units Subcutaneous TID [facility] Lin Valerio MD      isosorbide mononitrate  30 mg Oral Daily Lin Valerio MD      levothyroxine  75 mcg Oral Early Morning Lin Valerio MD      lisinopril  2 5 mg Oral Daily Lin Valerio MD      metoprolol succinate  25 mg Oral After Brendon Kahn MD      polyethylene glycol  17 g Oral Daily Pullman Appl, DO      pravastatin  40 mg Oral Daily With Brendon Kahn MD      senna-docusate sodium  1 tablet Oral BID Letty Appl, DO      traMADol  50 mg Oral Q6H PRN Lin Valerio MD      warfarin  2 5 mg Oral Daily (warfarin) Lin Valerio MD       Continuous Infusions:   PRN Meds: traMADol       ROS:   Review of Systems   Constitutional: Positive for activity change  Negative for appetite change, chills, diaphoresis, fatigue, fever and unexpected weight change  HENT: Negative for drooling and trouble swallowing  Eyes: Negative for photophobia and visual disturbance  Respiratory: Negative for apnea, cough, chest tightness and shortness of breath  Cardiovascular: Negative for chest pain, palpitations and leg swelling  Gastrointestinal: Negative for constipation, diarrhea, nausea and vomiting  Genitourinary: Negative for difficulty urinating  Musculoskeletal: Positive for gait problem  Negative for back pain, neck pain and neck stiffness     Skin: Negative for color change and rash    Neurological: Positive for facial asymmetry, speech difficulty and weakness  Psychiatric/Behavioral: Negative for agitation  All other systems reviewed and are negative  Vitals:   /56   Pulse 65   Temp 98 4 °F (36 9 °C)   Resp 16   Ht 4' 11" (1 499 m)   Wt 78 9 kg (174 lb)   SpO2 92%   BMI 35 14 kg/m²     Physical Exam:   Physical Exam  Vitals signs and nursing note reviewed  Constitutional:       General: She is not in acute distress  Appearance: She is obese  She is not ill-appearing  HENT:      Head: Normocephalic  Right Ear: External ear normal       Left Ear: External ear normal       Nose: Nose normal       Mouth/Throat:      Mouth: Mucous membranes are moist    Eyes:      General: No scleral icterus  Extraocular Movements: Extraocular movements intact and EOM normal       Pupils: Pupils are equal, round, and reactive to light  Neck:      Musculoskeletal: Normal range of motion  Cardiovascular:      Rate and Rhythm: Normal rate  Pulses: Normal pulses  Heart sounds: No murmur  Pulmonary:      Effort: Pulmonary effort is normal  No respiratory distress  Abdominal:      Palpations: Abdomen is soft  Musculoskeletal: Normal range of motion  General: No tenderness, deformity or signs of injury  Right lower leg: No edema  Left lower leg: No edema  Skin:     General: Skin is warm and dry  Capillary Refill: Capillary refill takes less than 2 seconds  Neurological:      Mental Status: She is alert and oriented to person, place, and time  Coordination: Finger-Nose-Finger Test abnormal       Comments: Detailed neuro exam outlined below   Psychiatric:         Mood and Affect: Mood normal          Speech: Speech is slurred  Neurologic Exam     Mental Status   Oriented to person, place, and time  Speech: slurred   Patient was seen examined today in her room  Patient is alert attentive to examiner    Patient is able to state the current month, location, and her birth date  Speech is slurred  Patient is able to identify objects and colors  Cranial Nerves     CN II   Visual fields full to confrontation  CN III, IV, VI   Pupils are equal, round, and reactive to light  Extraocular motions are normal    Right pupil: Size: 3 mm  Shape: regular  Left pupil: Size: 3 mm  Shape: regular  Nystagmus: none   Diplopia: none  Conjugate gaze: present    CN VII   Right facial weakness: central    CN VIII   Hearing: intact    CN XI   CN XI normal      CN XII   CN XII normal      Motor Exam   Muscle bulk: normal  Overall muscle tone: normal  Right arm pronator drift: present  Left arm pronator drift: absent    Strength   Strength 5/5 except as noted  Patient with 5/5  strength in bilateral upper extremities  Patient with 5/5 strength in bilateral lower extremities  Patient with positive pronator drift in right upper extremity  Sensory Exam   Light touch normal      Gait, Coordination, and Reflexes     Coordination   Finger to nose coordination: abnormal  Gait exam deferred at this time  Finger-nose testing with left upper extremity within normal limits, finger-nose testing with right upper extremity abnormal        Labs: I have personally reviewed pertinent reports     Recent Results (from the past 24 hour(s))   Fingerstick Glucose (POCT)    Collection Time: 05/29/21  3:47 PM   Result Value Ref Range    POC Glucose 92 65 - 140 mg/dl   Fingerstick Glucose (POCT)    Collection Time: 05/29/21  8:49 PM   Result Value Ref Range    POC Glucose 148 (H) 65 - 140 mg/dl   Protime-INR    Collection Time: 05/30/21  6:22 AM   Result Value Ref Range    Protime 27 3 (H) 11 6 - 14 5 seconds    INR 2 55 (H) 0 84 - 1 19   Fingerstick Glucose (POCT)    Collection Time: 05/30/21  6:25 AM   Result Value Ref Range    POC Glucose 99 65 - 140 mg/dl   Fingerstick Glucose (POCT)    Collection Time: 05/30/21 10:18 AM   Result Value Ref Range POC Glucose 117 65 - 140 mg/dl       Imaging: I have personally reviewed pertinent imaging in PACS, including CT brain,  and I have personally reviewed PACS reports  EKG, Pathology, and Other Studies: I have personally reviewed pertinent reports  VTE Prophylaxis: Warfarin (Coumadin)      Counseling / Coordination of Care  Total time spent today 35 minutes  Greater than 50% of total time was spent with the patient and/or family counseling and/or coordination of care  A description of the counseling/coordination of care:  Patient was seen and evaluated  Discussed with attending  Chart reviewed thoroughly including laboratory and imaging studies  Plan of care discussed with patient and primary team      **Please Note: This note may have been constructed using a voice recognition system  **

## 2021-05-30 NOTE — PROGRESS NOTES
[facility]  Progress Note - Ashli Chopra 7/24/1929, 80 y o  female MRN: [id number]  [facility] Encounter: [id number]  Primary Care Provider: Maritza La MD   Date and time admitted to hospital: 5/28/2021 11:37 AM    * Stroke-like symptoms  Assessment & Plan  mild dysarthria still persist   status post CTA head/neck: also revealing severe L distal common artery and ICA  CVA workup underway  Neurology on board  Pending MRI, + PPM but noted in chart as MRI conditional  Pending Echo  Contd cva prophylaxis with asa/coumadin/statin  LDL not at goal but reported allergy to lipitor  PT/OT eval      Colitis  Assessment & Plan  ? S/p CT A/P revealing moderate stool burden with possibility of stercolar colitis  No clinical symptoms of colitis  bowel regimen    Severe obesity (BMI 35 0-39  9) with comorbidity [facility])  Assessment & Plan  Lifestyle/dietary modification    Chronic diastolic CHF (congestive heart failure) (HCC)  Assessment & Plan  Wt Readings from Last 3 Encounters:   05/28/21 78 9 kg (174 lb)   05/28/21 78 9 kg (174 lb)   05/11/21 83 4 kg (183 lb 13 8 oz)     Compensated  Cont lasix        Atrial flutter ([address] )  Assessment & Plan  continue amiodarone/BB  INR therapeutic    Acquired hypothyroidism  Assessment & Plan  Elevated TSH however trended down from most recent one  Dose was just recently changed   Thus cont at present dose  Repeat TFT in 4-6 weeks as outpatient    Type 2 diabetes mellitus with stage 3a chronic kidney disease, without long-term current use of insulin [facility])  Assessment & Plan  Lab Results   Component Value Date    HGBA1C 5 8 (H) 05/28/2021       Recent Labs     05/29/21  1547 05/29/21  2049 05/30/21  0625 05/30/21  1018   POCGLU 92 148* 99 117         Blood Sugar Average: Last 72 hrs:  (P) 109 [id number]     -diet controlled  -sliding scale insulin/Accu-Cheks    Essential hypertension  Assessment & Plan  Stable    Coronary artery disease involving native coronary artery of native heart without angina pectoris  Assessment & Plan  CP free  Cont cardiac meds      VTE Pharmacologic Prophylaxis:   Pharmacologic: Warfarin (Coumadin)  Mechanical VTE Prophylaxis in Place: No    Patient Centered Rounds: I have performed bedside rounds with nursing staff today  Discussions with Specialists or Other Care Team Provider:     Education and Discussions with Family / Patient: Patient; called pt's dtr as well and updated at length    Time Spent for Care: 30 minutes  More than 50% of total time spent on counseling and coordination of care as described above  Current Length of Stay: 2 day(s)    Current Patient Status: Inpatient   Certification Statement: The patient will continue to require additional inpatient hospital stay due to Awaiting MRI    Discharge Plan: Planned for d/c back to snf    Code Status: Level 1 - Full Code      Subjective:   Speech is still slightly slurrred; c/o RT knee pain, denies trauma  Will obtain xr knee  Objective:     Vitals:   Temp (24hrs), Av 2 °F (36 8 °C), Min:97 8 °F (36 6 °C), Max:98 7 °F (37 1 °C)    Temp:  [97 8 °F (36 6 °C)-98 7 °F (37 1 °C)] 97 9 °F (36 6 °C)  HR:  [69-76] 76  Resp:  [16-18] 18  BP: (124-139)/(55-69) 139/58  SpO2:  [90 %-97 %] 97 %  Body mass index is 35 14 kg/m²  Input and Output Summary (last 24 hours): Intake/Output Summary (Last 24 hours) at 2021 1059  Last data filed at 2021 0501  Gross per 24 hour   Intake 222 ml   Output 2250 ml   Net -2028 ml       Physical Exam:     Physical Exam  Neck:      Musculoskeletal: Normal range of motion and neck supple  Cardiovascular:      Rate and Rhythm: Normal rate and regular rhythm  Pulses: Normal pulses  Heart sounds: Normal heart sounds  No murmur  Pulmonary:      Effort: Pulmonary effort is normal  No respiratory distress  Breath sounds: Normal breath sounds  No wheezing or rales     Abdominal:      General: Abdomen is flat  Bowel sounds are normal  There is no distension  Palpations: Abdomen is soft  Tenderness: There is no abdominal tenderness  There is no guarding  Musculoskeletal: Normal range of motion  Right lower leg: No edema  Left lower leg: No edema  Comments: RT knee tender to palpation, ROM intact   Skin:     General: Skin is warm and dry  Neurological:      Mental Status: She is alert and oriented to person, place, and time  Mental status is at baseline  Motor: No weakness  Comments: Mild dysarthria         Additional Data:     Labs:    Results from last 7 days   Lab Units 05/29/21  0623   WBC Thousand/uL 4 74   HEMOGLOBIN g/dL 11 3*   HEMATOCRIT % 36 4   PLATELETS Thousands/uL 231     Results from last 7 days   Lab Units 05/29/21  0623   SODIUM mmol/L 138   POTASSIUM mmol/L 4 4   CHLORIDE mmol/L 101   CO2 mmol/L 32   BUN mg/dL 24   CREATININE mg/dL 0 77   ANION GAP mmol/L 5   CALCIUM mg/dL 9 1   GLUCOSE RANDOM mg/dL 80     Results from last 7 days   Lab Units 05/30/21  0622   INR  2 55*     Results from last 7 days   Lab Units 05/30/21  1018 05/30/21  0625 05/29/21  2049 05/29/21  1547 05/29/21  1030 05/29/21  0649 05/28/21  2107 05/28/21  1601 05/28/21  1142   POC GLUCOSE mg/dl 117 99 148* 92 122 94 115 85 110     Results from last 7 days   Lab Units 05/28/21  1738   HEMOGLOBIN A1C % 5 8*               * I Have Reviewed All Lab Data Listed Above    * Additional Pertinent Lab Tests Reviewed: No New Labs Available For Today    Imaging:    Imaging Reports Reviewed Today Include:   Imaging Personally Reviewed by Myself Includes:     Recent Cultures (last 7 days):           Last 24 Hours Medication List:   Current Facility-Administered Medications   Medication Dose Route Frequency Provider Last Rate    amiodarone  100 mg Oral Daily Uday Laura MD      aspirin  81 mg Oral Daily Uday Laura MD      furosemide  20 mg Oral Daily Uday Laura MD      insulin lispro 1-6 Units Subcutaneous TID AC Mannie Hernandez MD      isosorbide mononitrate  30 mg Oral Daily Mannie Hernandez MD      levothyroxine  75 mcg Oral Early Morning Mannie Hernandez MD      lisinopril  2 5 mg Oral Daily Mannie Hernandez MD      metoprolol succinate  25 mg Oral After Karlee Correa MD      polyethylene glycol  17 g Oral Daily Yessy Méndez DO      pravastatin  40 mg Oral Daily With Karlee Correa MD      senna-docusate sodium  1 tablet Oral BID Yessy Méndez DO      traMADol  50 mg Oral Q6H PRN Mannie Hernandez MD      warfarin  2 5 mg Oral Daily (warfarin) Mannie Hernandez MD          Today, Patient Was Seen By: Yessy Méndez DO    ** Please Note: Dictation voice to text software may have been used in the creation of this document   **

## 2021-05-30 NOTE — ASSESSMENT & PLAN NOTE
mild dysarthria still persist   status post CTA head/neck: also revealing severe L distal common artery and ICA  CVA workup underway  Neurology on board  Pending MRI, + PPM but noted in chart as MRI conditional  Pending Echo  Contd cva prophylaxis with asa/coumadin/statin   LDL not at goal but reported allergy to lipitor  PT/OT eval

## 2021-05-30 NOTE — ASSESSMENT & PLAN NOTE
Lab Results   Component Value Date    HGBA1C 5 8 (H) 05/28/2021       Recent Labs     05/29/21  1547 05/29/21  2049 05/30/21  0625 05/30/21  1018   POCGLU 92 148* 99 117         Blood Sugar Average: Last 72 hrs:  (P) 109 [id number]     -diet controlled  -sliding scale insulin/Accu-Cheks

## 2021-05-30 NOTE — PLAN OF CARE
Problem: Potential for Falls  Goal: Patient will remain free of falls  Description: INTERVENTIONS:  - Assess patient frequently for physical needs  -  Identify cognitive and physical deficits and behaviors that affect risk of falls    -  [place] fall precautions as indicated by assessment   - Educate patient/family on patient safety including physical limitations  - Instruct patient to call for assistance with activity based on assessment  - Modify environment to reduce risk of injury  - Consider OT/PT consult to assist with strengthening/mobility  Outcome: Progressing     Problem: Prexisting or High Potential for Compromised Skin Integrity  Goal: Skin integrity is maintained or improved  Description: INTERVENTIONS:  - Identify patients at risk for skin breakdown  - Assess and monitor skin integrity  - Assess and monitor nutrition and hydration status  - Monitor labs   - Assess for incontinence   - Turn and reposition patient  - Assist with mobility/ambulation  - Relieve pressure over bony prominences  - Avoid friction and shearing  - Provide appropriate hygiene as needed including keeping skin clean and dry  - Evaluate need for skin moisturizer/barrier cream  - Collaborate with interdisciplinary team   - Patient/family teaching  - Consider wound care consult   Outcome: Progressing     Problem: PAIN - ADULT  Goal: Verbalizes/displays adequate comfort level or baseline comfort level  Description: Interventions:  - Encourage patient to monitor pain and request assistance  - Assess pain using appropriate pain scale  - Administer analgesics based on type and severity of pain and evaluate response  - Implement non-pharmacological measures as appropriate and evaluate response  - Consider cultural and social influences on pain and pain management  - Notify physician/advanced practitioner if interventions unsuccessful or patient reports new pain  Outcome: Progressing     Problem: INFECTION - ADULT  Goal: Absence or prevention of progression during hospitalization  Description: INTERVENTIONS:  - Assess and monitor for signs and symptoms of infection  - Monitor lab/diagnostic results  - Monitor all insertion sites, i e  indwelling lines, tubes, and drains  - Monitor endotracheal if appropriate and nasal secretions for changes in amount and color  - [place] appropriate cooling/warming therapies per order  - Administer medications as ordered  - Instruct and encourage patient and family to use good hand hygiene technique  - Identify and instruct in appropriate isolation precautions for identified infection/condition  Outcome: Progressing  Goal: Absence of fever/infection during neutropenic period  Description: INTERVENTIONS:  - Monitor WBC    Outcome: Progressing     Problem: SAFETY ADULT  Goal: Patient will remain free of falls  Description: INTERVENTIONS:  - Assess patient frequently for physical needs  -  Identify cognitive and physical deficits and behaviors that affect risk of falls    -  [place] fall precautions as indicated by assessment   - Educate patient/family on patient safety including physical limitations  - Instruct patient to call for assistance with activity based on assessment  - Modify environment to reduce risk of injury  - Consider OT/PT consult to assist with strengthening/mobility  Outcome: Progressing  Goal: Maintain or return to baseline ADL function  Description: INTERVENTIONS:  -  Assess patient's ability to carry out ADLs; assess patient's baseline for ADL function and identify physical deficits which impact ability to perform ADLs (bathing, care of mouth/teeth, toileting, grooming, dressing, etc )  - Assess/evaluate cause of self-care deficits   - Assess range of motion  - Assess patient's mobility; develop plan if impaired  - Assess patient's need for assistive devices and provide as appropriate  - Encourage maximum independence but intervene and supervise when necessary  - Involve family in performance of ADLs  - Assess for home care needs following discharge   - Consider OT consult to assist with ADL evaluation and planning for discharge  - Provide patient education as appropriate  Outcome: Progressing  Goal: Maintain or return mobility status to optimal level  Description: INTERVENTIONS:  - Assess patient's baseline mobility status (ambulation, transfers, stairs, etc )    - Identify cognitive and physical deficits and behaviors that affect mobility  - Identify mobility aids required to assist with transfers and/or ambulation (gait belt, sit-to-stand, lift, walker, cane, etc )  - [place] fall precautions as indicated by assessment  - Record patient progress and toleration of activity level on Mobility SBAR; progress patient to next Phase/Stage  - Instruct patient to call for assistance with activity based on assessment  - Consider rehabilitation consult to assist with strengthening/weightbearing, etc   Outcome: Progressing     Problem: DISCHARGE PLANNING  Goal: Discharge to home or other facility with appropriate resources  Description: INTERVENTIONS:  - Identify barriers to discharge w/patient and caregiver  - Arrange for needed discharge resources and transportation as appropriate  - Identify discharge learning needs (meds, wound care, etc )  - Arrange for interpretive services to assist at discharge as needed  - Refer to Case Management Department for coordinating discharge planning if the patient needs post-hospital services based on physician/advanced practitioner order or complex needs related to functional status, cognitive ability, or social support system  Outcome: Progressing     Problem: Knowledge Deficit  Goal: Patient/family/caregiver demonstrates understanding of disease process, treatment plan, medications, and discharge instructions  Description: Complete learning assessment and assess knowledge base    Interventions:  - Provide teaching at level of understanding  - Provide teaching via preferred learning methods  Outcome: Progressing     Problem: Nutrition/Hydration-ADULT  Goal: Nutrient/Hydration intake appropriate for improving, restoring or maintaining nutritional needs  Description: Monitor and assess patient's nutrition/hydration status for malnutrition  Collaborate with interdisciplinary team and initiate plan and interventions as ordered  Monitor patient's weight and dietary intake as ordered or per policy  Utilize nutrition screening tool and intervene as necessary  Determine patient's food preferences and provide high-protein, high-caloric foods as appropriate       INTERVENTIONS:  - Monitor oral intake, urinary output, labs, and treatment plans  - Assess nutrition and hydration status and recommend course of action  - Evaluate amount of meals eaten  - Assist patient with eating if necessary   - Allow adequate time for meals  - Recommend/ encourage appropriate diets, oral nutritional supplements, and vitamin/mineral supplements  - Order, calculate, and assess calorie counts as needed  - Assess need for intravenous fluids  - Provide specific nutrition/hydration education as appropriate  - Include patient/family/caregiver in decisions related to nutrition  Outcome: Progressing

## 2021-05-31 ENCOUNTER — APPOINTMENT (INPATIENT)
Dept: RADIOLOGY | Facility: HOSPITAL | Age: 86
DRG: 065 | End: 2021-05-31
Payer: MEDICARE

## 2021-05-31 PROBLEM — M25.561 RIGHT KNEE PAIN: Status: ACTIVE | Noted: 2021-05-31

## 2021-05-31 LAB
BACTERIA UR QL AUTO: ABNORMAL /HPF
BASOPHILS # BLD AUTO: 0.01 THOUSANDS/ΜL (ref 0–0.1)
BASOPHILS NFR BLD AUTO: 0 % (ref 0–1)
BILIRUB UR QL STRIP: NEGATIVE
CAOX CRY URNS QL MICRO: ABNORMAL /HPF
CLARITY UR: ABNORMAL
COLOR UR: ABNORMAL
EOSINOPHIL # BLD AUTO: 0.04 THOUSAND/ΜL (ref 0–0.61)
EOSINOPHIL NFR BLD AUTO: 1 % (ref 0–6)
ERYTHROCYTE [DISTWIDTH] IN BLOOD BY AUTOMATED COUNT: 14.6 % (ref 11.6–15.1)
GLUCOSE SERPL-MCNC: 108 MG/DL (ref 65–140)
GLUCOSE SERPL-MCNC: 113 MG/DL (ref 65–140)
GLUCOSE SERPL-MCNC: 129 MG/DL (ref 65–140)
GLUCOSE SERPL-MCNC: 97 MG/DL (ref 65–140)
GLUCOSE UR STRIP-MCNC: NEGATIVE MG/DL
HCT VFR BLD AUTO: 32.7 % (ref 34.8–46.1)
HGB BLD-MCNC: 10.7 G/DL (ref 11.5–15.4)
HGB UR QL STRIP.AUTO: ABNORMAL
IMM GRANULOCYTES # BLD AUTO: 0.04 THOUSAND/UL (ref 0–0.2)
IMM GRANULOCYTES NFR BLD AUTO: 1 % (ref 0–2)
INR PPP: 2.41 (ref 0.84–1.19)
KETONES UR STRIP-MCNC: NEGATIVE MG/DL
LACTATE SERPL-SCNC: 0.8 MMOL/L (ref 0.5–2)
LEUKOCYTE ESTERASE UR QL STRIP: ABNORMAL
LYMPHOCYTES # BLD AUTO: 1.65 THOUSANDS/ΜL (ref 0.6–4.47)
LYMPHOCYTES NFR BLD AUTO: 21 % (ref 14–44)
MCH RBC QN AUTO: 30.2 PG (ref 26.8–34.3)
MCHC RBC AUTO-ENTMCNC: 32.7 G/DL (ref 31.4–37.4)
MCV RBC AUTO: 92 FL (ref 82–98)
MONOCYTES # BLD AUTO: 1.01 THOUSAND/ΜL (ref 0.17–1.22)
MONOCYTES NFR BLD AUTO: 13 % (ref 4–12)
NEUTROPHILS # BLD AUTO: 4.99 THOUSANDS/ΜL (ref 1.85–7.62)
NEUTS SEG NFR BLD AUTO: 64 % (ref 43–75)
NITRITE UR QL STRIP: POSITIVE
NON-SQ EPI CELLS URNS QL MICRO: ABNORMAL /HPF
NRBC BLD AUTO-RTO: 0 /100 WBCS
PH UR STRIP.AUTO: 6 [PH]
PLATELET # BLD AUTO: 220 THOUSANDS/UL (ref 149–390)
PMV BLD AUTO: 9.1 FL (ref 8.9–12.7)
PROCALCITONIN SERPL-MCNC: <0.05 NG/ML
PROT UR STRIP-MCNC: NEGATIVE MG/DL
PROTHROMBIN TIME: 26.1 SECONDS (ref 11.6–14.5)
RBC # BLD AUTO: 3.54 MILLION/UL (ref 3.81–5.12)
RBC #/AREA URNS AUTO: ABNORMAL /HPF
SP GR UR STRIP.AUTO: 1.01 (ref 1–1.03)
UROBILINOGEN UR QL STRIP.AUTO: 1 E.U./DL
WBC # BLD AUTO: 7.74 THOUSAND/UL (ref 4.31–10.16)
WBC #/AREA URNS AUTO: ABNORMAL /HPF

## 2021-05-31 PROCEDURE — 87186 SC STD MICRODIL/AGAR DIL: CPT | Performed by: STUDENT IN AN ORGANIZED HEALTH CARE EDUCATION/TRAINING PROGRAM

## 2021-05-31 PROCEDURE — 99232 SBSQ HOSP IP/OBS MODERATE 35: CPT | Performed by: PSYCHIATRY & NEUROLOGY

## 2021-05-31 PROCEDURE — 81001 URINALYSIS AUTO W/SCOPE: CPT | Performed by: STUDENT IN AN ORGANIZED HEALTH CARE EDUCATION/TRAINING PROGRAM

## 2021-05-31 PROCEDURE — 83605 ASSAY OF LACTIC ACID: CPT | Performed by: STUDENT IN AN ORGANIZED HEALTH CARE EDUCATION/TRAINING PROGRAM

## 2021-05-31 PROCEDURE — 85610 PROTHROMBIN TIME: CPT | Performed by: INTERNAL MEDICINE

## 2021-05-31 PROCEDURE — 85025 COMPLETE CBC W/AUTO DIFF WBC: CPT | Performed by: STUDENT IN AN ORGANIZED HEALTH CARE EDUCATION/TRAINING PROGRAM

## 2021-05-31 PROCEDURE — 87040 BLOOD CULTURE FOR BACTERIA: CPT | Performed by: STUDENT IN AN ORGANIZED HEALTH CARE EDUCATION/TRAINING PROGRAM

## 2021-05-31 PROCEDURE — 87086 URINE CULTURE/COLONY COUNT: CPT | Performed by: STUDENT IN AN ORGANIZED HEALTH CARE EDUCATION/TRAINING PROGRAM

## 2021-05-31 PROCEDURE — 84145 PROCALCITONIN (PCT): CPT | Performed by: STUDENT IN AN ORGANIZED HEALTH CARE EDUCATION/TRAINING PROGRAM

## 2021-05-31 PROCEDURE — 97530 THERAPEUTIC ACTIVITIES: CPT

## 2021-05-31 PROCEDURE — 73560 X-RAY EXAM OF KNEE 1 OR 2: CPT

## 2021-05-31 PROCEDURE — 87077 CULTURE AEROBIC IDENTIFY: CPT | Performed by: STUDENT IN AN ORGANIZED HEALTH CARE EDUCATION/TRAINING PROGRAM

## 2021-05-31 PROCEDURE — 99232 SBSQ HOSP IP/OBS MODERATE 35: CPT | Performed by: INTERNAL MEDICINE

## 2021-05-31 PROCEDURE — 82948 REAGENT STRIP/BLOOD GLUCOSE: CPT

## 2021-05-31 RX ORDER — ACETAMINOPHEN 325 MG/1
650 TABLET ORAL EVERY 6 HOURS PRN
Status: DISCONTINUED | OUTPATIENT
Start: 2021-05-31 | End: 2021-06-03 | Stop reason: HOSPADM

## 2021-05-31 RX ADMIN — WARFARIN SODIUM 2.5 MG: 2.5 TABLET ORAL at 16:44

## 2021-05-31 RX ADMIN — FUROSEMIDE 20 MG: 20 TABLET ORAL at 08:50

## 2021-05-31 RX ADMIN — PRAVASTATIN SODIUM 40 MG: 40 TABLET ORAL at 16:44

## 2021-05-31 RX ADMIN — LEVOTHYROXINE SODIUM 75 MCG: 75 TABLET ORAL at 05:06

## 2021-05-31 RX ADMIN — ACETAMINOPHEN 650 MG: 325 TABLET, FILM COATED ORAL at 20:27

## 2021-05-31 RX ADMIN — AMIODARONE HYDROCHLORIDE 100 MG: 200 TABLET ORAL at 08:50

## 2021-05-31 RX ADMIN — POLYETHYLENE GLYCOL 3350 17 G: 17 POWDER, FOR SOLUTION ORAL at 08:49

## 2021-05-31 RX ADMIN — ASPIRIN 81 MG: 81 TABLET, COATED ORAL at 08:50

## 2021-05-31 RX ADMIN — LISINOPRIL 2.5 MG: 2.5 TABLET ORAL at 08:50

## 2021-05-31 RX ADMIN — DOCUSATE SODIUM AND SENNOSIDES 1 TABLET: 8.6; 5 TABLET ORAL at 08:50

## 2021-05-31 RX ADMIN — ISOSORBIDE MONONITRATE 30 MG: 30 TABLET, EXTENDED RELEASE ORAL at 08:50

## 2021-05-31 RX ADMIN — METOPROLOL SUCCINATE 25 MG: 25 TABLET, FILM COATED, EXTENDED RELEASE ORAL at 16:44

## 2021-05-31 RX ADMIN — DOCUSATE SODIUM AND SENNOSIDES 1 TABLET: 8.6; 5 TABLET ORAL at 16:44

## 2021-05-31 NOTE — ASSESSMENT & PLAN NOTE
Lab Results   Component Value Date    HGBA1C 5 8 (H) 05/28/2021       Recent Labs     05/30/21  1018 05/30/21  1605 05/30/21 2049 05/31/21  0623   POCGLU 117 99 119 97         Blood Sugar Average: Last 72 hrs:  (P) 992 [id number]     -diet controlled  -sliding scale insulin/Accu-Cheks

## 2021-05-31 NOTE — ASSESSMENT & PLAN NOTE
mild dysarthria  status post CTA head/neck: also revealing severe L distal common artery and ICA stenosis  CVA workup underway  Neurology on board  Pending MRI, + PPM but noted in chart as MRI conditional  Pending Echo  Contd cva prophylaxis with asa/coumadin/statin   LDL not at goal but reported allergy to lipitor  PT/OT eval  PMR consult

## 2021-05-31 NOTE — CONSULTS
Orthopedics   Kenn Bills 80 y o  female MRN: [id number]  Unit/Bed#: [facility] 5-12      Chief Complaint:   Bilateral knee pain    HPI:  80 y o  female complaining of bilateral knee pain who is admitted for stroke work up with new onset aphasia  The patient states that she has had chronic bilateral knee arthritis and that she has had steroid injections x3 for the bilateral knees with minimal benefit  Her last was in 11/2020 with Dr Neville Madsen  She states that her current pain is not increased compared to her baseline   She has had some benefit with voltaren gel in the past  Her pain is worse with motion and better with rest  She has no numbness or tingling    Review Of Systems:   · Skin: Normal  · Neuro: See HPI  · Musculoskeletal: See HPI  · 14 point review of systems negative except as stated above     Past Medical History:   Past Medical History:   Diagnosis Date    Abnormal nuclear stress test     last assessed 04/03/2017    Anxiety     Arthritis     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    Bundle branch block, left     Cardiomyopathy ([facility] [address]     Chest pain 3/9/2019    Chronic pain     chronic b/l shoulder pain    Chronic pain of right knee 4/2/2019    Coronary artery disease     Diabetes mellitus ([address] )     Gait disturbance 3/2/2018    GERD (gastroesophageal reflux disease)     H/O atrial flutter     History of DVT (deep vein thrombosis)     History of pulmonary embolism     Hyperlipidemia     Hypertension     Hypothyroid     Renal calculi     Shortness of breath        Past Surgical History:   Past Surgical History:   Procedure Laterality Date    ATRIAL ABLATION SURGERY  01/2015    CARDIAC PACEMAKER PLACEMENT      CARDIOVERSION      CHELA/DCCV 10/22/2014    CARPAL TUNNEL RELEASE Right     CATARACT EXTRACTION      CORONARY ANGIOPLASTY WITH STENT PLACEMENT      HYSTERECTOMY      JOINT REPLACEMENT Right     VA AMPUTATION TOE,MT-P JT Left 10/11/2017    Procedure: AMPUTATION SECOND TOE;  Surgeon: Quang Soto DPM;  Location: [facility];  Service: Podiatry    TONSILLECTOMY      TOTAL HIP ARTHROPLASTY      Right       Family History:  Family history reviewed and non-contributory  Family History   Problem Relation Age of Onset    Parkinsonism Sister     Cancer Sister         unknown type    Heart attack Neg Hx     Stroke Neg Hx     Anuerysm Neg Hx     Clotting disorder Neg Hx     Arrhythmia Neg Hx     Heart failure Neg Hx     Coronary artery disease Neg Hx        Social History:  Social History     Socioeconomic History    Marital status: /Civil Union     Spouse name: None    Number of children: None    Years of education: None    Highest education level: None   Occupational History    None   Social Needs    Financial resource strain: None    Food insecurity     Worry: None     Inability: None    Transportation needs     Medical: None     Non-medical: None   Tobacco Use    Smoking status: Never Smoker    Smokeless tobacco: Never Used   Substance and Sexual Activity    Alcohol use: Yes     Frequency: Monthly or less     Drinks per session: 1 or 2     Binge frequency: Never     Comment: occasional    Drug use: Yes     Types: Marijuana     Comment: MEDICAL MARIJUANA-HAS NOT USED FOR 3 WEEKS    Sexual activity: None   Lifestyle    Physical activity     Days per week: None     Minutes per session: None    Stress: None   Relationships    Social connections     Talks on phone: None     Gets together: None     Attends Shinto service: None     Active member of club or organization: None     Attends meetings of clubs or organizations: None     Relationship status: None    Intimate partner violence     Fear of current or ex partner: None     Emotionally abused: None     Physically abused: None     Forced sexual activity: None   Other Topics Concern    None   Social History Narrative    None       Allergies:    Allergies   Allergen Reactions    Atorvastatin      Reaction unknown 10/23/2018-    Other Rash     Patient sensitive to adhesives from tape           Labs:  0   Lab Value Date/Time    HCT 36 4 05/29/2021 0623    HCT 36 6 05/28/2021 1143    HCT 34 8 05/11/2021 0541    HCT 34 9 06/23/2015 1858    HCT 41 0 05/21/2015 1619    HCT 32 8 (L) 05/16/2015 0519    HGB 11 3 (L) 05/29/2021 0623    HGB 11 7 05/28/2021 1143    HGB 11 4 (L) 05/11/2021 0541    HGB 11 4 (L) 06/23/2015 1858    HGB 13 1 05/21/2015 1619    HGB 10 5 (L) 05/16/2015 0519    INR 2 41 (H) 05/31/2021 0430    INR 1 35 (H) 05/14/2015 1312    WBC 4 74 05/29/2021 0623    WBC 6 82 05/28/2021 1143    WBC 5 40 05/11/2021 0541    WBC 4 85 06/23/2015 1858    WBC 6 91 05/21/2015 1619    WBC 4 68 05/16/2015 0519       Meds:    Current Facility-Administered Medications:     amiodarone tablet 100 mg, 100 mg, Oral, Daily, Fritz Smith MD, 100 mg at 05/31/21 0850    aspirin (ECOTRIN LOW STRENGTH) EC tablet 81 mg, 81 mg, Oral, Daily, Fritz Smith MD, 81 mg at 05/31/21 0850    furosemide (LASIX) tablet 20 mg, 20 mg, Oral, Daily, Fritz Smith MD, 20 mg at 05/31/21 0850    insulin lispro (HumaLOG) 100 units/mL subcutaneous injection 1-6 Units, 1-6 Units, Subcutaneous, TID AC **AND** Fingerstick Glucose (POCT), , , TID AC, Fritz Smith MD    isosorbide mononitrate (IMDUR) 24 hr tablet 30 mg, 30 mg, Oral, Daily, Fritz Smith MD, 30 mg at 05/31/21 6447    levothyroxine tablet 75 mcg, 75 mcg, Oral, Early Morning, Fritz Smith MD, 75 mcg at 05/31/21 0506    lisinopril (ZESTRIL) tablet 2 5 mg, 2 5 mg, Oral, Daily, Fritz Smith MD, 2 5 mg at 05/31/21 0850    metoprolol succinate (TOPROL-XL) 24 hr tablet 25 mg, 25 mg, Oral, After Ti Gallegos MD, 25 mg at 05/30/21 1640    polyethylene glycol (MIRALAX) packet 17 g, 17 g, Oral, Daily, Dot Ground, DO, 17 g at 05/31/21 0849    pravastatin (PRAVACHOL) tablet 40 mg, 40 mg, Oral, Daily With Ti Gallegos MD, 40 mg at 05/30/21 1640    senna-docusate sodium (SENOKOT S) 8 6-50 mg per tablet 1 tablet, 1 tablet, Oral, BID, Ajay Linear, DO, 1 tablet at 05/31/21 0850    traMADol (ULTRAM) tablet 50 mg, 50 mg, Oral, Q6H PRN, Constantino Castro MD, 50 mg at 05/30/21 0617    warfarin (COUMADIN) tablet 2 5 mg, 2 5 mg, Oral, Daily (warfarin), Constantino Castro MD, 2 5 mg at 05/30/21 1640    Blood Culture:   No results found for: BLOODCX    Wound Culture:   No results found for: WOUNDCULT    Ins and Outs:  I/O last 24 hours: In: 841 [P O :841]  Out: 1900 [Urine:1900]          Physical Exam:   /68   Pulse 78   Temp 98 4 °F (36 9 °C)   Resp 16   Ht 4' 11" (1 499 m)   Wt 78 9 kg (174 lb)   SpO2 90%   BMI 35 14 kg/m²   Gen: Alert and oriented to person, place, time  HEENT: EOMI, eyes clear, moist mucus membranes, hearing intact  Respiratory: Bilateral chest rise  No audible wheezing found  Cardiovascular: Regular Rate and Rhythm  Abdomen: soft nontender/nondistended    Musculoskeletal: bilateral lower extremity  · Skin intact, mild/moderate effusions bilateral knees  · TTP medial and lateral joint lines  · Knee ROM bilaterally 5-90 deg flexion/extension  · Stable varus/valgus stress, anterior and posterior drawer  · SILT s/s/sp/dp/t  · Motor intact 5/5 strength with hip flexion/extension, knee flexion/extension, ankle dorsi/plantar flexion, EHL/FHL  · 2+ DP pulse    Tertiary: no tenderness over all other joints/long bones as except already stated  Radiology:   I personally reviewed the films  X-rays right knee: significant tricompartmental arthritis    _*_*_*_*_*_*_*_*_*_*_*_*_*_*_*_*_*_*_*_*_*_*_*_*_*_*_*_*_*_*_*_*_*_*_*_*_*_*_*_*_*    Assessment:  80 y  o female with bilateral knee osteoarthritis  Patient does not want any more steroid injections  Recommend physical therapy and anti-inflammatory medications including voltaren gel  Plan:   · WBAT B/L LE  · PT/OT  · Pain control per primary team  · Body mass index is 35 14 kg/m²  moderately obese  Recommend behavior modifications, nutrition and physical activity    · Dispo: ortho will follow    Orlando Frost MD

## 2021-05-31 NOTE — PROGRESS NOTES
NEUROLOGY RESIDENCY PROGRESS NOTE     Name: Vinay Moyer   Age & Sex: 80 y o  female   MRN: [id number]  Unit/Bed#: [facility] 906-22   Encounter: [id number]    Recommendations for outpatient neurological follow up have yet to be determined  ASSESSMENT & PLAN     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[facility])  Assessment & Plan  80year old female with afib on Coumadin with a therapeutic INR on presentation, HTN, HLD, DM2, CHD and recent dx of AVN of the right humeral head who presented to the hospital with dysarthria, right upper extremity weakness and numbness  CT scan revealed left thalamus hypodensity concerning for subacute ischemia with severe stenosis of left common carotid  NIHSS 5 on admission  Patient not a candidate for IV tPA secondary to therapeutic INR and no IR target identified on CTA for intervention  Of note, patient has a previous diagnosis of right shoulder old fracture or chronic degenerative changes with fragmentation  Currently, awaiting MRI at this time to determine if stroke is small vessel verses vessel to vessel embolization from left ICA  Workup:  · CTA: severe stenosis of the left distal common carotid artery and proximal cervical internal carotid artery  The remainder of the cervical internal carotid artery is normal in caliber  Mild narrowing of both vertebral artery origins and moderate stenosis of the left subclavian origin  The remainder of the vertebrobasilar system is unremarkable  There is minor smooth narrowing of both P2 segments of the posterior cerebral arteries  · CT brain with decreased attenuation within the cerebral hemispheres suggestive of moderate chronic microangiopathic change  There is a new hypodensity within the anterior left thalamus suspicious for subacute ischemia  No mass effect or hemorrhagic transformation      Plan:   - Continue on Coumadin and ASA 81 mg QD    - MRI brain without contrast pending  - Echo ordered and pending   - Hemoglobin A1c 5 8; goal euglycemia  - LDL is 122; unable to initiate statin secondary to reported allergy of hair loss and vision problems   - Monitor on telemetry  - normotension, avoid hypotension  - Goal normothermia and euglycemia    - PT/OT/PMR  - Stroke education  - medical management and supportive care per primary team  - please contact Neurology with any acute change in neuro exam    Atrial flutter ([address] )  Assessment & Plan  - Rate control as per medicine team    - Continue with Coumadin, INR goal 2-3   - Follows with cardiology as an outpatient  Type 2 diabetes mellitus with stage 3a chronic kidney disease, without long-term current use of insulin [facility])  Assessment & Plan  Lab Results   Component Value Date    HGBA1C 5 8 (H) 05/28/2021       Recent Labs     05/30/21  1605 05/30/21  2049 05/31/21  0623 05/31/21  1051   POCGLU 99 119 97 108       Blood Sugar Average: Last 72 hrs:  (P) 002 [id number]     - Goal euglycemia    - Management as per medicine team      Essential hypertension  Assessment & Plan  - goal normotension:  Avoid hypotension  - Management as per medicine team       SUBJECTIVE     Patient was seen and examined  No acute events overnight  She denies any new symptoms  She states that she has had upper extremity weakness for a few weeks  XR of right shoulder on 5/9/21 showed "Chronic appearing deformity of the medial humeral head with ossific densities seen along the medial aspect of the glenohumeral joint  Findings could be related to old fracture or chronic degenerative changes with fragmentation"       Review of Systems   Eyes: Negative for visual disturbance  Genitourinary: Positive for difficulty urinating  Musculoskeletal: Positive for arthralgias and gait problem  Neurological: Positive for speech difficulty and weakness  Negative for numbness and headaches  Psychiatric/Behavioral: Negative for behavioral problems and confusion         OBJECTIVE     Patient ID: Karl Park is a 80 y o  female  Vitals:    21 2152 21 0337 21 0717 21 1512   BP: 116/58 109/60 157/68 138/67   BP Location:       Pulse: 70  78 80   Resp: 16  16 17   Temp: 99 °F (37 2 °C) 98 4 °F (36 9 °C)  (!) 100 7 °F (38 2 °C)   TempSrc:       SpO2: 94%  90% 94%   Weight:       Height:          Temperature:   Temp (24hrs), Av 4 °F (37 4 °C), Min:98 4 °F (36 9 °C), Max:100 7 °F (38 2 °C)    Temperature: (!) 100 7 °F (38 2 °C)      Physical Exam  Eyes:      Extraocular Movements: EOM normal    Neurological:      Deep Tendon Reflexes:      Reflex Scores:       Bicep reflexes are 1+ on the right side and 1+ on the left side  Brachioradialis reflexes are 1+ on the right side and 1+ on the left side  Patellar reflexes are 1+ on the right side and 1+ on the left side  Achilles reflexes are 0 on the right side and 0 on the left side  Neurologic Exam     Mental Status   Attention: normal  Concentration: normal    Patient awake and alert  Alert and oriented to person, place, year, month and president  Able to identify a pen and the color blue   Speech is slurred but intelligible        Cranial Nerves     CN III, IV, VI   Extraocular motions are normal      CN V   Facial sensation intact  CN VII   Facial expression full, symmetric       CN VIII   CN VIII normal      CN XI   CN XI normal      Motor Exam     Strength   Right deltoid: 4/5  Left deltoid: 3/5  Right biceps: 5/5  Left biceps: 5/5  Right triceps: 5/5  Left triceps: 5/5  Right iliopsoas: 5/5  Left iliopsoas: 5/5  Right anterior tibial: 5/5  Left anterior tibial: 5/5  Right gastroc: 5/5  Left gastroc: 5/5  Limited testing quads 2/2 to pain    strength 5/5 bilaterally      Sensory Exam   Light touch normal      Gait, Coordination, and Reflexes     Tremor   Resting tremor: absent    Reflexes   Right brachioradialis: 1+  Left brachioradialis: 1+  Right biceps: 1+  Left biceps: 1+  Right patellar: 1+  Left patellar: 1+  Right achilles: 0  Left achilles: 0       LABORATORY DATA     Labs: I have personally reviewed pertinent reports  Results from last 7 days   Lab Units 05/29/21  0623 05/28/21  1143   WBC Thousand/uL 4 74 6 82   HEMOGLOBIN g/dL 11 3* 11 7   HEMATOCRIT % 36 4 36 6   PLATELETS Thousands/uL 231 250      Results from last 7 days   Lab Units 05/29/21  0623 05/28/21  1143   SODIUM mmol/L 138 136   POTASSIUM mmol/L 4 4 4 2   CHLORIDE mmol/L 101 100   CO2 mmol/L 32 33*   BUN mg/dL 24 28*   CREATININE mg/dL 0 77 0 94   CALCIUM mg/dL 9 1 9 9              Results from last 7 days   Lab Units 05/31/21  0430 05/30/21  0622 05/29/21  0623 05/28/21  1143   INR  2 41* 2 55* 2 95* 2 88*   PTT seconds  --   --   --  37         Results from last 7 days   Lab Units 05/28/21  1143   TROPONIN I ng/mL <0 02       IMAGING & DIAGNOSTIC TESTING     Radiology Results: I have personally reviewed pertinent reports  XR knee 4+ vw right injury   Final Result by Ancelmo Davies DO (05/30 1358)      No acute osseous abnormality  Moderate size knee joint effusion  Degenerative changes as described  Workstation performed: [id number]         CTA stroke alert (head/neck)   Final Result by Drake Borja DO (05/28 1207)      Severe stenosis of the left distal common carotid artery and proximal cervical internal carotid artery  The remainder of the cervical internal carotid artery is normal in caliber  Mild narrowing of both vertebral artery origins and moderate stenosis of the left subclavian origin  The remainder of the vertebrobasilar system is unremarkable  There is minor smooth narrowing of both P2 segments of the posterior cerebral arteries  Findings were directly discussed with Dr Woodrow Gentile on 5/28/2021 11:56 AM                      Workstation performed: [id number]         CT abdomen pelvis with contrast   Final Result by Dariel Heimlich, MD (05/28 1232)      1    Moderate rectal stool burden with some wall thickening, may represent early stercoral colitis   2  Sequela of chronic inflammatory sacroiliitis            Workstation performed: [id number]         CT stroke alert brain   Final Result by Vani Watson DO (05/28 1209)      Decreased attenuation within the cerebral hemispheres suggestive of moderate chronic microangiopathic change  There is a new hypodensity within the anterior left thalamus suspicious for subacute ischemia  No mass effect or hemorrhagic transformation  Findings were directly discussed with Dr Rhonda Venegas of neurology on 5/28/2021 11:50 AM       Workstation performed: [id number]         MRI Inpatient Order    (Results Pending)   XR knee 1 or 2 vw left    (Results Pending)       Other Diagnostic Testing: I have personally reviewed pertinent reports  ACTIVE MEDICATIONS     Current Facility-Administered Medications   Medication Dose Route Frequency    amiodarone tablet 100 mg  100 mg Oral Daily    aspirin (ECOTRIN LOW STRENGTH) EC tablet 81 mg  81 mg Oral Daily    furosemide (LASIX) tablet 20 mg  20 mg Oral Daily    insulin lispro (HumaLOG) 100 units/mL subcutaneous injection 1-6 Units  1-6 Units Subcutaneous TID AC    isosorbide mononitrate (IMDUR) 24 hr tablet 30 mg  30 mg Oral Daily    levothyroxine tablet 75 mcg  75 mcg Oral Early Morning    lisinopril (ZESTRIL) tablet 2 5 mg  2 5 mg Oral Daily    metoprolol succinate (TOPROL-XL) 24 hr tablet 25 mg  25 mg Oral After Dinner    polyethylene glycol (MIRALAX) packet 17 g  17 g Oral Daily    pravastatin (PRAVACHOL) tablet 40 mg  40 mg Oral Daily With Dinner    senna-docusate sodium (SENOKOT S) 8 6-50 mg per tablet 1 tablet  1 tablet Oral BID    traMADol (ULTRAM) tablet 50 mg  50 mg Oral Q6H PRN    warfarin (COUMADIN) tablet 2 5 mg  2 5 mg Oral Daily (warfarin)       Prior to Admission medications    Medication Sig Start Date End Date Taking?  Authorizing Provider   acetaminophen (TYLENOL) 325 mg tablet Take 975 mg by mouth 3 (three) times a day   Yes Historical Provider, MD   amiodarone 200 mg tablet Take 0 5 tablets (100 mg total) by mouth daily 3/17/21  Yes Frances Castellanos MD   Calcium Carbonate-Vitamin D (CALTRATE 600+D PO) Take 1 capsule by mouth 2 (two) times a day     Yes Historical Provider, MD   Diclofenac Sodium (VOLTAREN) 1 % Apply 2 g topically 3 (three) times a day Bilateral shoulders 3/9/21  Yes Faviola Jeong MD   furosemide (LASIX) 20 mg tablet Take 1 tablet (20 mg total) by mouth daily 3/5/21  Yes Frances Castellanos MD   isosorbide mononitrate (IMDUR) 30 mg 24 hr tablet Take 1 tablet (30 mg total) by mouth daily 3/17/21  Yes Frances Castellanos MD   levothyroxine 75 mcg tablet Take 1 tablet (75 mcg total) by mouth daily in the early morning 5/12/21  Yes Sabiha Blanton PA-C   lisinopril (ZESTRIL) 2 5 mg tablet TAKE ONE TABLET BY MOUTH EVERY DAY 3/9/21  Yes Faviola Jeong MD   metoprolol succinate (Toprol XL) 25 mg 24 hr tablet Take 1 tablet (25 mg total) by mouth daily after dinner 6/17/20  Yes Frances Castellanos MD   traMADol (ULTRAM) 50 mg tablet Take 50 mg by mouth every 6 (six) hours as needed for moderate pain   Yes Historical Provider, MD   warfarin (COUMADIN) 2 5 mg tablet Take 1 tablet (2 5 mg total) by mouth daily 5/11/21  Yes Sabiha Blanton PA-C   Accu-Chek FastClix Lancets MISC Check once daily 3/1/21   Faviola Jeong MD   Accu-Chek Softclix Lancets lancets Use daily Use as instructed 4/7/21   Faviola Jeong MD   Aspirin Low Dose 81 MG EC tablet TAKE 1 TABLET BY MOUTH EVERY DAY 3/9/21   Sandrine Heart, DO   Blood Glucose Monitoring Suppl (Accu-Chek Guide Me) w/Device KIT Check once daily 3/1/21   Faviola Jeong MD   glucose blood (Accu-Chek Guide) test strip Check once daily 3/1/21   Faviola Jeong MD   Lancets (accu-chek soft touch) lancets Use daily Use as instructed 2/26/21   Faviola Jeong MD   Lidocaine (Aspercreme Lidocaine) 4 % PTCH Apply 3 patches topically daily B/l knees, low back    Historical Provider, MD   multivitamin-iron-minerals-folic acid (CENTRUM) chewable tablet Chew 1 tablet daily      Historical Provider, MD   pravastatin (PRAVACHOL) 40 mg tablet TAKE ONE TABLET BY MOUTH EVERY DAY 8/26/20   Wei Wallace MD         VTE Pharmacologic Prophylaxis: Warfarin (Coumadin)  VTE Mechanical Prophylaxis: sequential compression device    ==  MD Genna Mcgregor's Neurology Residency, PGY-2

## 2021-05-31 NOTE — PLAN OF CARE
Problem: PHYSICAL THERAPY ADULT  Goal: Performs mobility at highest level of function for planned discharge setting  See evaluation for individualized goals  Description: Treatment/Interventions: Functional transfer training, LE strengthening/ROM, Therapeutic exercise, Endurance training, Patient/family training, Equipment eval/education, Bed mobility, Gait training, Spoke to nursing  Equipment Recommended: Wheelchair       See flowsheet documentation for full assessment, interventions and recommendations  Outcome: Progressing  Note: Prognosis: Fair  Problem List: Decreased strength, Decreased range of motion, Decreased endurance, Impaired balance, Decreased mobility, Decreased coordination, Pain, Decreased cognition  Assessment: Pt supien in bed upon arrival, pleasant and agreeable to OOB to chair  Pt performs all TE as listed, supine in bed  Mod A x1 for bed mobility to sit EOB, Mod Ax2 for sit to standusing bed pad as sling under pt buttocks, pt unable t oWB through arms onto rolling walker for SPT  Pt remained seated in recliner at end of session, pt limited by fatigue as well as c/o increased leg pain with mobility  Pt will contiue to benefit from skilled PT to maximize safe fucntional mobility  Barriers to Discharge: Inaccessible home environment, Decreased caregiver support        PT Discharge Recommendation: Post acute rehabilitation services     PT - OK to Discharge: Yes(when medically ready)    See flowsheet documentation for full assessment

## 2021-05-31 NOTE — CASE MANAGEMENT
Met with pt to discuss dc plan and recommendation for rehab  Pt is agreeable  A post acute care recommendation was made by your care team for STR  Discussed Freedom of Choice with patient  List of facilities given to patient via in person  patient aware the list is custom filtered for them by preference  and that [facility] post acute providers are designated  Pt is requesting a referral to [facility] referral has been sent

## 2021-05-31 NOTE — PROGRESS NOTES
[address] [facility]  Progress Note - Dulce Padgett 7/24/1929, 80 y o  female MRN: [id number]  [facility] Encounter: [id number]  Primary Care Provider: Freya Foster MD   Date and time admitted to hospital: 5/28/2021 11:37 AM    * Stroke-like symptoms  Assessment & Plan  mild dysarthria  status post CTA head/neck: also revealing severe L distal common artery and ICA stenosis  CVA workup underway  Neurology on board  Pending MRI, + PPM but noted in chart as MRI conditional  Pending Echo  Contd cva prophylaxis with asa/coumadin/statin  LDL not at goal but reported allergy to lipitor  PT/OT eval  PMR consult      Right knee pain  Assessment & Plan  S/p XR revealing moderate effusion  Ortho consulted ? If may consider therapeutic arthrocentesis  No clinical symptoms to suggest septic arthritis    Colitis  Assessment & Plan  ? S/p CT A/P revealing moderate stool burden with possibility of stercolar colitis  No clinical symptoms of colitis, monitoring off abx  Had bm today, cont bowel regimen    Severe obesity (BMI 35 0-39  9) with comorbidity [facility])  Assessment & Plan  Lifestyle/dietary modification    Chronic diastolic CHF (congestive heart failure) (HCC)  Assessment & Plan  Wt Readings from Last 3 Encounters:   05/28/21 78 9 kg (174 lb)   05/28/21 78 9 kg (174 lb)   05/11/21 83 4 kg (183 lb 13 8 oz)     Compensated  Cont lasix        Atrial flutter ([address] )  Assessment & Plan  continue amiodarone/BB  INR therapeutic    Acquired hypothyroidism  Assessment & Plan  Elevated TSH however trended down from most recent one  Dose was just recently changed   Thus cont at present dose  Repeat TFT in 4-6 weeks as outpatient    Type 2 diabetes mellitus with stage 3a chronic kidney disease, without long-term current use of insulin [facility])  Assessment & Plan  Lab Results   Component Value Date    HGBA1C 5 8 (H) 05/28/2021       Recent Labs     05/30/21  1018 05/30/21  1605 05/30/21 2049 21  0623   POCGLU 117 99 119 97         Blood Sugar Average: Last 72 hrs:  (P) 762 [id number]     -diet controlled  -sliding scale insulin/Accu-Cheks    Essential hypertension  Assessment & Plan  Stable    Coronary artery disease involving native coronary artery of native heart without angina pectoris  Assessment & Plan  Stable, CP free  Cont cardiac meds    VTE Pharmacologic Prophylaxis:   Pharmacologic: Warfarin (Coumadin)  Mechanical VTE Prophylaxis in Place: No    Patient Centered Rounds: I have performed bedside rounds with nursing staff today  Discussions with Specialists or Other Care Team Provider:     Education and Discussions with Family / Patient: Patient and called her dtr jf    Time Spent for Care: 30 minutes  More than 50% of total time spent on counseling and coordination of care as described above  Current Length of Stay: 3 day(s)    Current Patient Status: Inpatient   Certification Statement: The patient will continue to require additional inpatient hospital stay due to CVA wkup pending MRI, echo    Discharge Plan: Likely back to snf    Code Status: Level 1 - Full Code      Subjective:   OOB to chair  Complaining wishing to go back in bed  Encouraged her to stay in chair for another couple of hours  Mild dysarthria  No other acute events overnight  RT knee pain though states not as bad as yesterday    Objective:     Vitals:   Temp (24hrs), Av 6 °F (37 °C), Min:98 4 °F (36 9 °C), Max:99 °F (37 2 °C)    Temp:  [98 4 °F (36 9 °C)-99 °F (37 2 °C)] 98 4 °F (36 9 °C)  HR:  [65-78] 78  Resp:  [16] 16  BP: (109-157)/(55-68) 157/68  SpO2:  [90 %-94 %] 90 %  Body mass index is 35 14 kg/m²  Input and Output Summary (last 24 hours):        Intake/Output Summary (Last 24 hours) at 2021 1038  Last data filed at 2021 0717  Gross per 24 hour   Intake 621 ml   Output 1900 ml   Net -1279 ml       Physical Exam:     Physical Exam  Neck:      Musculoskeletal: Normal range of motion and neck supple  Cardiovascular:      Rate and Rhythm: Normal rate and regular rhythm  Pulses: Normal pulses  Heart sounds: Normal heart sounds  No murmur  Pulmonary:      Effort: Pulmonary effort is normal  No respiratory distress  Breath sounds: Normal breath sounds  No wheezing or rales  Abdominal:      General: Abdomen is flat  Bowel sounds are normal  There is no distension  Palpations: Abdomen is soft  Tenderness: There is no abdominal tenderness  There is no guarding  Musculoskeletal:      Comments: RT knee swelling, no warmth   Full ROM in RT knee though pain ilicited   Skin:     General: Skin is warm and dry  Neurological:      Mental Status: She is alert  Additional Data:     Labs:    Results from last 7 days   Lab Units 05/29/21  0623   WBC Thousand/uL 4 74   HEMOGLOBIN g/dL 11 3*   HEMATOCRIT % 36 4   PLATELETS Thousands/uL 231     Results from last 7 days   Lab Units 05/29/21  0623   SODIUM mmol/L 138   POTASSIUM mmol/L 4 4   CHLORIDE mmol/L 101   CO2 mmol/L 32   BUN mg/dL 24   CREATININE mg/dL 0 77   ANION GAP mmol/L 5   CALCIUM mg/dL 9 1   GLUCOSE RANDOM mg/dL 80     Results from last 7 days   Lab Units 05/31/21  0430   INR  2 41*     Results from last 7 days   Lab Units 05/31/21  0623 05/30/21  2049 05/30/21  1605 05/30/21  1018 05/30/21  0625 05/29/21  2049 05/29/21  1547 05/29/21  1030 05/29/21  0649 05/28/21  2107 05/28/21  1601 05/28/21  1142   POC GLUCOSE mg/dl 97 119 99 117 99 148* 92 122 94 115 85 110     Results from last 7 days   Lab Units 05/28/21  1738   HEMOGLOBIN A1C % 5 8*               * I Have Reviewed All Lab Data Listed Above  * Additional Pertinent Lab Tests Reviewed:  All Labs Within Last 24 Hours Reviewed    Imaging:    Imaging Reports Reviewed Today Include:   Imaging Personally Reviewed by Myself Includes:      Recent Cultures (last 7 days):           Last 24 Hours Medication List:   Current Facility-Administered Medications Medication Dose Route Frequency Provider Last Rate    amiodarone  100 mg Oral Daily Briana Lacey MD      aspirin  81 mg Oral Daily Briana Lacey MD      furosemide  20 mg Oral Daily Briana Lacey MD      insulin lispro  1-6 Units Subcutaneous TID [facility] Briana Lacey MD      isosorbide mononitrate  30 mg Oral Daily Briana Lacey MD      levothyroxine  75 mcg Oral Early Morning Briana Lacey MD      lisinopril  2 5 mg Oral Daily Briana Lacey MD      metoprolol succinate  25 mg Oral After Carlos Alberto Shane MD      polyethylene glycol  17 g Oral Daily Sukhjinder Schmidt DO      pravastatin  40 mg Oral Daily With Carlos Alberto Shane MD      senna-docusate sodium  1 tablet Oral BID Sukhjinder Schmidt DO      traMADol  50 mg Oral Q6H PRN Briana Lacey MD      warfarin  2 5 mg Oral Daily (warfarin) Briana Lacey MD          Today, Patient Was Seen By: Sukhjinder Schmidt DO    ** Please Note: Dictation voice to text software may have been used in the creation of this document   **

## 2021-05-31 NOTE — ASSESSMENT & PLAN NOTE
? S/p CT A/P revealing moderate stool burden with possibility of stercolar colitis  No clinical symptoms of colitis, monitoring off abx  Had bm today, cont bowel regimen

## 2021-05-31 NOTE — PHYSICAL THERAPY NOTE
Physical Therapy Progress Note    An AM-PAC Basic Mobility standardized score less than 42 9 suggests the patient may benefit from discharge to post-acute rehab services  05/31/21 0840   PT Last Visit   PT Visit Date 05/31/21   Note Type   Note Type Treatment   Pain Assessment   Pain Assessment Tool 0-10   Pain Score 7   Pain Location/Orientation Location: [facility]   Hospital Pain Intervention(s) Medication (See MAR); Repositioned; Ambulation/increased activity; Distraction: Television   Restrictions/Precautions   Weight Bearing Precautions Per Order No   Other Precautions Chair Alarm; Bed Alarm;Cognitive; Fall Risk;Pain   General   Chart Reviewed Yes   Response to Previous Treatment Patient with no complaints from previous session  Family/Caregiver Present No   Cognition   Overall Cognitive Status Impaired   Arousal/Participation Alert; Responsive; Cooperative   Attention Attends with cues to redirect   Orientation Level Oriented X4   Memory Decreased recall of precautions   Following Commands Follows one step commands with increased time or repetition   Bed Mobility   Rolling L 3  Moderate assistance   Additional items Assist x 1; Increased time required;Verbal cues;LE management;HOB elevated   Supine to Sit 3  Moderate assistance   Additional items Assist x 1;HOB elevated; Bedrails; Increased time required;Verbal cues;LE management   Transfers   Sit to Stand 3  Moderate assistance   Additional items Assist x 2; Increased time required;Verbal cues   Stand to Sit 3  Moderate assistance   Additional items Assist x 2; Increased time required;Verbal cues   Stand pivot 3  Moderate assistance   Additional items Assist x 2; Increased time required   Endurance Deficit   Endurance Deficit Yes   Activity Tolerance   Activity Tolerance Patient limited by fatigue;Patient limited by pain   Nurse Made Aware RN notified of pt OOB to chair   Exercises   Quad Sets Supine;10 reps   Glute Sets Supine;10 reps   Ankle Pumps Supine;10 reps   Assessment   Prognosis Fair   Problem List Decreased strength;Decreased range of motion;Decreased endurance; Impaired balance;Decreased mobility; Decreased coordination;Pain;Decreased cognition   Assessment Pt supien in bed upon arrival, pleasant and agreeable to OOB to chair  Pt performs all TE as listed, supine in bed  Mod A x1 for bed mobility to sit EOB, Mod Ax2 for sit to standusing bed pad as sling under pt buttocks, pt unable t oWB through arms onto rolling walker for SPT  Pt remained seated in recliner at end of session, pt limited by fatigue as well as c/o increased leg pain with mobility  Pt will contiue to benefit from skilled PT to maximize safe fucntional mobility  Barriers to Discharge Inaccessible home environment;Decreased caregiver support   Goals   Patient Goals to sit up   STG Expiration Date 06/12/21   Short Term Goal #1 1  Pt will demonstrate the ability to perform all aspects of bed mobility at Mod I in onder to maximize functional independence and decrease burden on caregivers  2 Pt will demonstrate the ability to perform all functional transfers at [address] [place] [address] Road in order to maximize functional independence and decrease burden on caregivers  3 Pt will demonstrate the ability to ambulate at least 10ft with least restrictive device at [address] [place] [address] Road in order to maximize functional independence  4 Pt will demonstrate the ability to propel w/c at least 150ft at supervision in order to return to household/community mobility  5 Pt will demonstrate improved balance by one grade in order to decrease risk of falls  6 Pt will increase b/l LE strength by 1 grade in order to increase ease of functional mobility and transfers   Plan   Treatment/Interventions Functional transfer training;LE strengthening/ROM; Therapeutic exercise; Endurance training;Bed mobility;Gait training   Progress Slow progress, decreased activity tolerance   PT Frequency   (3-5x/week)   Recommendation   PT Discharge Recommendation Post acute rehabilitation services   Equipment Recommended Wheelchair   Wheelchair Package Recommended Standard   PT - OK to Discharge Yes  (when medically ready)       Sera Escoto, PTA

## 2021-05-31 NOTE — ASSESSMENT & PLAN NOTE
S/p XR revealing moderate effusion  Ortho consulted ?  If may consider therapeutic arthrocentesis  No clinical symptoms to suggest septic arthritis

## 2021-06-01 ENCOUNTER — APPOINTMENT (INPATIENT)
Dept: NON INVASIVE DIAGNOSTICS | Facility: HOSPITAL | Age: 86
DRG: 065 | End: 2021-06-01
Payer: MEDICARE

## 2021-06-01 ENCOUNTER — APPOINTMENT (INPATIENT)
Dept: RADIOLOGY | Facility: HOSPITAL | Age: 86
DRG: 065 | End: 2021-06-01
Payer: MEDICARE

## 2021-06-01 PROBLEM — R50.9 LOW GRADE FEVER: Status: ACTIVE | Noted: 2021-06-01

## 2021-06-01 PROBLEM — M19.90 ARTHRITIS: Status: ACTIVE | Noted: 2021-05-31

## 2021-06-01 LAB
ANION GAP SERPL CALCULATED.3IONS-SCNC: 5 MMOL/L (ref 4–13)
BUN SERPL-MCNC: 29 MG/DL (ref 5–25)
CALCIUM SERPL-MCNC: 9.2 MG/DL (ref 8.3–10.1)
CHLORIDE SERPL-SCNC: 97 MMOL/L (ref 100–108)
CO2 SERPL-SCNC: 31 MMOL/L (ref 21–32)
CREAT SERPL-MCNC: 0.86 MG/DL (ref 0.6–1.3)
ERYTHROCYTE [DISTWIDTH] IN BLOOD BY AUTOMATED COUNT: 14.7 % (ref 11.6–15.1)
GFR SERPL CREATININE-BSD FRML MDRD: 59 ML/MIN/1.73SQ M
GLUCOSE SERPL-MCNC: 107 MG/DL (ref 65–140)
GLUCOSE SERPL-MCNC: 109 MG/DL (ref 65–140)
GLUCOSE SERPL-MCNC: 117 MG/DL (ref 65–140)
GLUCOSE SERPL-MCNC: 167 MG/DL (ref 65–140)
GLUCOSE SERPL-MCNC: 75 MG/DL (ref 65–140)
HCT VFR BLD AUTO: 34.3 % (ref 34.8–46.1)
HGB BLD-MCNC: 11.2 G/DL (ref 11.5–15.4)
INR PPP: 2.52 (ref 0.84–1.19)
MCH RBC QN AUTO: 30.5 PG (ref 26.8–34.3)
MCHC RBC AUTO-ENTMCNC: 32.7 G/DL (ref 31.4–37.4)
MCV RBC AUTO: 94 FL (ref 82–98)
PLATELET # BLD AUTO: 227 THOUSANDS/UL (ref 149–390)
PMV BLD AUTO: 9.8 FL (ref 8.9–12.7)
POTASSIUM SERPL-SCNC: 3.8 MMOL/L (ref 3.5–5.3)
PROCALCITONIN SERPL-MCNC: <0.05 NG/ML
PROTHROMBIN TIME: 27 SECONDS (ref 11.6–14.5)
RBC # BLD AUTO: 3.67 MILLION/UL (ref 3.81–5.12)
SODIUM SERPL-SCNC: 133 MMOL/L (ref 136–145)
WBC # BLD AUTO: 6.81 THOUSAND/UL (ref 4.31–10.16)

## 2021-06-01 PROCEDURE — NC001 PR NO CHARGE: Performed by: ORTHOPAEDIC SURGERY

## 2021-06-01 PROCEDURE — 84145 PROCALCITONIN (PCT): CPT | Performed by: STUDENT IN AN ORGANIZED HEALTH CARE EDUCATION/TRAINING PROGRAM

## 2021-06-01 PROCEDURE — 80048 BASIC METABOLIC PNL TOTAL CA: CPT | Performed by: INTERNAL MEDICINE

## 2021-06-01 PROCEDURE — 99221 1ST HOSP IP/OBS SF/LOW 40: CPT | Performed by: ORTHOPAEDIC SURGERY

## 2021-06-01 PROCEDURE — 85610 PROTHROMBIN TIME: CPT | Performed by: INTERNAL MEDICINE

## 2021-06-01 PROCEDURE — 99233 SBSQ HOSP IP/OBS HIGH 50: CPT | Performed by: GENERAL PRACTICE

## 2021-06-01 PROCEDURE — A9585 GADOBUTROL INJECTION: HCPCS | Performed by: HOSPITALIST

## 2021-06-01 PROCEDURE — 82948 REAGENT STRIP/BLOOD GLUCOSE: CPT

## 2021-06-01 PROCEDURE — G1004 CDSM NDSC: HCPCS

## 2021-06-01 PROCEDURE — 99222 1ST HOSP IP/OBS MODERATE 55: CPT | Performed by: PHYSICAL MEDICINE & REHABILITATION

## 2021-06-01 PROCEDURE — 85027 COMPLETE CBC AUTOMATED: CPT | Performed by: INTERNAL MEDICINE

## 2021-06-01 PROCEDURE — 70553 MRI BRAIN STEM W/O & W/DYE: CPT

## 2021-06-01 PROCEDURE — 99232 SBSQ HOSP IP/OBS MODERATE 35: CPT | Performed by: PSYCHIATRY & NEUROLOGY

## 2021-06-01 PROCEDURE — 93306 TTE W/DOPPLER COMPLETE: CPT

## 2021-06-01 RX ORDER — POTASSIUM CHLORIDE 20 MEQ/1
20 TABLET, EXTENDED RELEASE ORAL ONCE
Status: COMPLETED | OUTPATIENT
Start: 2021-06-01 | End: 2021-06-01

## 2021-06-01 RX ADMIN — LEVOTHYROXINE SODIUM 75 MCG: 75 TABLET ORAL at 04:49

## 2021-06-01 RX ADMIN — CEFTRIAXONE SODIUM 1000 MG: 10 INJECTION, POWDER, FOR SOLUTION INTRAVENOUS at 06:06

## 2021-06-01 RX ADMIN — DICLOFENAC SODIUM 2 G: 10 GEL TOPICAL at 16:47

## 2021-06-01 RX ADMIN — AMIODARONE HYDROCHLORIDE 100 MG: 200 TABLET ORAL at 08:31

## 2021-06-01 RX ADMIN — ASPIRIN 81 MG: 81 TABLET, COATED ORAL at 08:32

## 2021-06-01 RX ADMIN — INSULIN LISPRO 1 UNITS: 100 INJECTION, SOLUTION INTRAVENOUS; SUBCUTANEOUS at 16:49

## 2021-06-01 RX ADMIN — METOPROLOL SUCCINATE 25 MG: 25 TABLET, FILM COATED, EXTENDED RELEASE ORAL at 16:50

## 2021-06-01 RX ADMIN — DICLOFENAC SODIUM 2 G: 10 GEL TOPICAL at 10:48

## 2021-06-01 RX ADMIN — DICLOFENAC SODIUM 2 G: 10 GEL TOPICAL at 22:02

## 2021-06-01 RX ADMIN — WARFARIN SODIUM 2.5 MG: 2.5 TABLET ORAL at 16:47

## 2021-06-01 RX ADMIN — PRAVASTATIN SODIUM 40 MG: 40 TABLET ORAL at 16:47

## 2021-06-01 RX ADMIN — POTASSIUM CHLORIDE 20 MEQ: 1500 TABLET, EXTENDED RELEASE ORAL at 10:46

## 2021-06-01 RX ADMIN — GADOBUTROL 8 ML: 604.72 INJECTION INTRAVENOUS at 14:21

## 2021-06-01 NOTE — ASSESSMENT & PLAN NOTE
? S/p CT A/P revealing moderate stool burden with possibility of stercolar colitis  No clinical symptoms of colitis  cont bowel regimen

## 2021-06-01 NOTE — ASSESSMENT & PLAN NOTE
Lab Results   Component Value Date    HGBA1C 5 8 (H) 05/28/2021       Recent Labs     05/31/21  1607 05/31/21 2059 06/01/21  0614 06/01/21  1045   POCGLU 113 129 109 107         Blood Sugar Average: Last 72 hrs:  (P) 110 [id number]     -diet controlled  -sliding scale insulin/Accu-Cheks

## 2021-06-01 NOTE — PROGRESS NOTES
[facility]  Progress Note - Ana Treviño 7/24/1929, 80 y o  female MRN: [id number]  [facility]: [id number]  Primary Care Provider: Wilfred Shepard MD   Date and time admitted to hospital: 5/28/2021 11:37 AM    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[facility])  Assessment & Plan  81 yo female with afib on Coumadin with a therapeutic INR (2 88), HTN, HLD, DM2, CHF, CAD with Medtr[employer] Pacemaker and recent diagnosis of AVN of the right humeral head who presented to ED on 5/28/21 as a stroke alert  with dysarthria, right upper extremity weakness and numbness  CT Head revealed left thalamus hypodensity concerning for subacute ischemia with severe stenosis of left common carotid  NIHSS 5 on admission  Patient not a candidate for IV tPA secondary to therapeutic INR and no IR target identified on CTA for intervention  Of note, patient has a previous diagnosis of right shoulder old fracture or chronic degenerative changes with fragmentation  Currently, awaiting MRI at this time to determine if stroke is small vessel verses vessel to vessel embolization from left ICA  · CTA: severe stenosis of the left distal common carotid artery and proximal cervical internal carotid artery  Mild narrowing of both vertebral artery origins and moderate stenosis of the left subclavian origin  The remainder of the vertebrobasilar system is unremarkable  There is minor smooth narrowing of both P2 segments of the posterior cerebral arteries  · CT brain with "new hypodensity within the anterior left thalamus suspicious for subacute ischemia  · MRI Brain: Recent ischemia left thalamus descending to the upper midbrain, without hemorrhage or mass effect      Plan:   - Echocardiogram completed with report pending  - Continue on Coumadin and ASA 81 mg daily  - LDL is 122; unable to initiate statin secondary to reported allergy of hair loss and vision problems  - recommend normotensive BP goal, avoid hypotension  - Hemoglobin A1c 5 8; goal euglycemia   - PT/OT/PMR  - Stroke education  - medical management  per primary team including infectious and metabolic monitoring and correction  - requested follow up with [facility]; communication sent to the office for scheduling    Atrial flutter [facility])  Assessment & Plan  - Rate control as per medicine team    - Continue with Coumadin, INR goal 2-3   - Follows with cardiology as an outpatient  Type 2 diabetes mellitus with stage 3a chronic kidney disease, without long-term current use of insulin ([address] )  Assessment & Plan  Lab Results   Component Value Date    HGBA1C 5 8 (H) 05/28/2021     - Goal euglycemia    - Management as per medicine team      Results reviewed:   - labs: troponin < 0 02,  TSH 8 290,  HGB A1c 5 8, , HDL 53, triglycerides 144,  COVID negative,  UA+  Nitrates, leukocytes, procalcitonin < 0 05    - 5/28/21 CT head stroke alert:Decreased attenuation within the cerebral hemispheres suggestive of moderate chronic microangiopathic change  There is a new hypodensity within the anterior left thalamus suspicious for subacute ischemia  No mass effect or hemorrhagic transformation   - 5/28/21 CTA head and neck: Mild narrowing of both vertebral artery origins and moderate stenosis of the left subclavian origin  The remainder of the vertebrobasilar system is unremarkable  There is minor smooth narrowing of both P2 segments of the posterior cerebral arteries  - 5/28/21 CT ab/pelvis: Moderate rectal stool burden with some wall thickening, may represent early stercoral colitis  Timothy Favre of chronic inflammatory sacroiliitis     Recommendations for outpatient neurological follow up have yet to be determined  Subjective:   Patient continues with some slurred speech and right upper extremity weakness  She also has some congestion and her back hurts       Review of Systems   Constitutional:        Generalized weakness   HENT: Positive for congestion  Eyes:        Has macular degeneration, needs her eye drops   Respiratory: Negative  Cardiovascular: Negative  Gastrointestinal: Negative  Genitourinary: Negative  Musculoskeletal: Positive for back pain (lower back pain)  Pain in B/L shoulders  Gait dysfunction  Used walker in the past, and then started with a wheel chair starting 3 days prior to arrival due to  knees buckling   Skin:        Wound on buttocks   Neurological: Positive for tremors (chronic) and weakness (RUE, Bilateral lower extremities)  Negative for dizziness and headaches  Psychiatric/Behavioral: Negative  Vitals: Blood pressure (!) 105/44, pulse 59, temperature (!) 97 4 °F (36 3 °C), resp  rate 17, height 4' 11" (1 499 m), weight 78 9 kg (174 lb), SpO2 94 %, not currently breastfeeding  ,Body mass index is 35 14 kg/m²      MEDS:  Current Facility-Administered Medications   Medication Dose Route Frequency Provider Last Rate    acetaminophen  650 mg Oral Q6H PRN Savanah Sidhu PA-C      amiodarone  100 mg Oral Daily Shivani Montaño MD      aspirin  81 mg Oral Daily Shivani Montaño MD      cefTRIAXone  1,000 mg Intravenous Q24H Savanah Sidhu PA-C 1,000 mg (06/01/21 0606)    Diclofenac Sodium  2 g Topical 4x Daily Darinel Nipper, DO      furosemide  20 mg Oral Daily Shivani Montaño MD      insulin lispro  1-6 Units Subcutaneous TID [facility] Shivani Montaño MD      isosorbide mononitrate  30 mg Oral Daily Shivani Montaño MD      levothyroxine  75 mcg Oral Early Morning Shivani Montaño MD      lisinopril  2 5 mg Oral Daily Shivani Montaño MD      metoprolol succinate  25 mg Oral After Jacquie Torres MD      polyethylene glycol  17 g Oral Daily Gege Jenkins DO      pravastatin  40 mg Oral Daily With Jacquie Torres MD      senna-docusate sodium  1 tablet Oral BID Gege Jenkins, DO      traMADol  50 mg Oral Q6H PRN Shivani Montaño MD      warfarin  2 5 mg Oral Daily (warfarin) Silvia Arcos MD         Physical Exam  Vitals signs reviewed  HENT:      Head: Normocephalic and atraumatic  Mouth/Throat:      Mouth: Mucous membranes are moist    Eyes:      General:         Right eye: No discharge  Left eye: No discharge  Extraocular Movements: Extraocular movements intact  Conjunctiva/sclera: Conjunctivae normal       Pupils: Pupils are equal, round, and reactive to light  Neck:      Musculoskeletal: Normal range of motion  Cardiovascular:      Rate and Rhythm: Normal rate and regular rhythm  Heart sounds: Normal heart sounds  No murmur  Pulmonary:      Effort: No respiratory distress  Breath sounds: Normal breath sounds  Abdominal:      General: There is no distension  Palpations: Abdomen is soft  Musculoskeletal:      Right lower leg: No edema  Left lower leg: No edema  Skin:     General: Skin is warm and dry  Neurological:      Mental Status: She is alert and oriented to person, place, and time  Deep Tendon Reflexes:      Reflex Scores:       Tricep reflexes are 1+ on the right side and 1+ on the left side  Bicep reflexes are 2+ on the right side and 2+ on the left side  Brachioradialis reflexes are 2+ on the right side and 2+ on the left side  Patellar reflexes are 0 on the right side and 0 on the left side  Achilles reflexes are 0 on the right side and 0 on the left side  Psychiatric:         Speech: Speech is slurred  Comments: tearful         Neurologic Exam     Mental Status   Oriented to person, place, and time  Oriented to person  Oriented to place  Oriented to time  Oriented to year and month  Attention: normal  Concentration: normal    Speech: slurred   Level of consciousness: alert  Able to name object  Able to read  Able to repeat  Cranial Nerves     CN II   Visual fields full to confrontation     Visual acuity: (macular degeneration bilaterally)    CN III, IV, VI Pupils are equal, round, and reactive to light  Right pupil: Size: 2 mm  Shape: regular  Left pupil: Size: 2 mm  Shape: regular  Nystagmus: none   Diplopia: none    CN V   Facial sensation intact  CN IX, X   Palate: symmetric    CN XI   CN XI normal      CN XII   Tongue deviation: none  Trace right facial droop     Motor Exam Generalized weakness throughout  Bilateral upper extremities: deltiod limited by pain 4/4+, biceps/triceps/ 5/5  Bilateral lower extremities: hip 3/3, plantar/dorsi 5/5     Sensory Exam   Light touch normal    Temperature and vibratory sense decreased bilateral lower extremities below the knee     Gait, Coordination, and Reflexes     Reflexes   Right brachioradialis: 2+  Left brachioradialis: 2+  Right biceps: 2+  Left biceps: 2+  Right triceps: 1+  Left triceps: 1+  Right patellar: 0  Left patellar: 0  Right achilles: 0  Left achilles: 0  Right plantar: upgoing  Left plantar: normal      Lab Results:   I have personally reviewed pertinent reports      CBC:   Results from last 7 days   Lab Units 06/01/21 0441 05/31/21 2147 05/29/21  0623   WBC Thousand/uL 6 81 7 74 4 74   RBC Million/uL 3 67* 3 54* 3 88   HEMOGLOBIN g/dL 11 2* 10 7* 11 3*   HEMATOCRIT % 34 3* 32 7* 36 4   MCV fL 94 92 94   PLATELETS Thousands/uL 227 220 231   BMP/CMP:   Results from last 7 days   Lab Units 06/01/21  0441 05/29/21  0623 05/28/21  1143   SODIUM mmol/L 133* 138 136   POTASSIUM mmol/L 3 8 4 4 4 2   CHLORIDE mmol/L 97* 101 100   CO2 mmol/L 31 32 33*   BUN mg/dL 29* 24 28*   CREATININE mg/dL 0 86 0 77 0 94   CALCIUM mg/dL 9 2 9 1 9 9   EGFR ml/min/1 73sq m 59 68 53   HgBA1C:   Results from last 7 days   Lab Units 05/28/21  1738   HEMOGLOBIN A1C % 5 8*   TSH:   Results from last 7 days   Lab Units 05/29/21  0623   TSH 3RD GENERATON uIU/mL 8 290*   Coagulation:   Results from last 7 days   Lab Units 06/01/21  0441   INR  2 52*   Lipid Profile:   Results from last 7 days   Lab Units 05/29/21  9976 HDL mg/dL 53   LDL CALC mg/dL 122*   TRIGLYCERIDES mg/dL 144   Urinalysis:   Results from last 7 days   Lab Units 05/31/21 2029   COLOR UA  Dk Yellow   CLARITY UA  Turbid   SPEC GRAV UA  1 015   PH UA  6 0   LEUKOCYTES UA  Moderate*   NITRITE UA  Positive*   GLUCOSE UA mg/dl Negative   KETONES UA mg/dl Negative   BILIRUBIN UA  Negative   BLOOD UA  Moderate*     Imaging Studies: I have personally reviewed pertinent reports  and I have personally reviewed pertinent films in PACS     EEG, Pathology, and Other Studies: I have personally reviewed pertinent reports  and I have personally reviewed pertinent films in PACS    VTE Prophylaxis: Sequential compression device (Venodyne)  and Warfarin (Coumadin)     Counseling / Coordination of Care  Total time spent today 30 minutes  Greater than 50% of total time was spent with the patient and / or family counseling and / or coordination of care  A description of the counseling / coordination of care: Coordination of care with RN   Discussion of diagnostic results and plan of care with patient

## 2021-06-01 NOTE — ASSESSMENT & PLAN NOTE
mild dysarthria  status post CTA head/neck: also revealing severe L distal common artery and ICA stenosis  CVA workup underway  Neurology on board  MRI will be done today, + PPM but noted in chart as MRI conditional  Pending Echo  D/c tele as pt has known Aflutter on coumadin  Contd cva prophylaxis with asa/coumadin/statin   LDL not at goal but reported allergy to lipitor  PT/OT eval  PMR consult

## 2021-06-01 NOTE — CASE MANAGEMENT
Uday Caraballo [facility] states a bed is available when pt is medically stable for dc  CM informed Uday Caraballo that pt is a tentative dc within 24-48hrs  IMM reviewed with patient   patient agree with discharge determination

## 2021-06-01 NOTE — NURSING NOTE
Device interrogation for MRI  Normal device function prior to MRI  Leads and device meet all requirements per policy for MRI  Device programmed DOO 80bpm per Cardiology for MRI  Patient has no complaints  Vital signs stable throughout  Normal device function post MRI  Device reprogrammed to prior settings per Cardiology

## 2021-06-01 NOTE — ASSESSMENT & PLAN NOTE
81 yo female with afib on Coumadin with a therapeutic INR (2 88), HTN, HLD, DM2, CHF, CAD with [employer]tr[employer] Pacemaker and recent diagnosis of AVN of the right humeral head who presented to ED on 5/28/21 as a stroke alert  with dysarthria, right upper extremity weakness and numbness  CT Head revealed left thalamus hypodensity concerning for subacute ischemia with severe stenosis of left common carotid  NIHSS 5 on admission  Patient not a candidate for IV tPA secondary to therapeutic INR and no IR target identified on CTA for intervention  Of note, patient has a previous diagnosis of right shoulder old fracture or chronic degenerative changes with fragmentation  Currently, awaiting MRI at this time to determine if stroke is small vessel verses vessel to vessel embolization from left ICA  · CTA: severe stenosis of the left distal common carotid artery and proximal cervical internal carotid artery  Mild narrowing of both vertebral artery origins and moderate stenosis of the left subclavian origin  The remainder of the vertebrobasilar system is unremarkable  There is minor smooth narrowing of both P2 segments of the posterior cerebral arteries  · CT brain with "new hypodensity within the anterior left thalamus suspicious for subacute ischemia  · MRI Brain: Recent ischemia left thalamus descending to the upper midbrain, without hemorrhage or mass effect      Plan:   - Echocardiogram completed with report pending  - Continue on Coumadin and ASA 81 mg daily  - LDL is 122; unable to initiate statin secondary to reported allergy of hair loss and vision problems  - recommend normotensive BP goal, avoid hypotension  - Hemoglobin A1c 5 8; goal euglycemia   - PT/OT/PMR  - Stroke education  - medical management  per primary team including infectious and metabolic monitoring and correction  - requested follow up with [facility]; communication sent to the office for scheduling

## 2021-06-01 NOTE — QUICK NOTE
Notified by RN that pt was febrile with an oral temp of 100 1 at around 2010, 05/31/21  RN noted that her urine looked significantly darker than yesterday, pt was without complaints  Fever work up was ordered, tylenol given and pt packed in ice  UA resulted with moderate blood, positive nitrites, moderate leuks, and urine microscopic with 10-20 WBC, moderate bacteria and occasional Ca oxalate crystals  Ceftriaxone initiated until urine culture and sensitivity results  At this time pt remains afebrile

## 2021-06-01 NOTE — PROGRESS NOTES
[address] [facility]  Progress Note - Pancho Coates 7/24/1929, 80 y o  female MRN: [id number]  Unit/Bed#: [facility] 702-01 Encounter: [id number]  Primary Care Provider: Gaby Brown MD   Date and time admitted to hospital: 5/28/2021 11:37 AM    * Stroke-like symptoms  Assessment & Plan  mild dysarthria  status post CTA head/neck: also revealing severe L distal common artery and ICA stenosis  CVA workup underway  Neurology on board  MRI will be done today, + PPM but noted in chart as MRI conditional  Pending Echo  D/c tele as pt has known Aflutter on coumadin  Contd cva prophylaxis with asa/coumadin/statin  LDL not at goal but reported allergy to lipitor  PT/OT eval  PMR consult      Low grade fever  Assessment & Plan  · ?UTI  · Rocephin started  · F/u Cx  · Could be related to CVA    Arthritis  Assessment & Plan  Knee XRs revealing moderate effusions  Ortho consult apprecaited  No clinical symptoms to suggest septic arthritis  Pt refused arthrocentesis  Voltaren gel to knees and shoulders    Colitis  Assessment & Plan  ? S/p CT A/P revealing moderate stool burden with possibility of stercolar colitis  No clinical symptoms of colitis  cont bowel regimen    Chronic diastolic CHF (congestive heart failure) (HCC)  Assessment & Plan  Wt Readings from Last 3 Encounters:   05/28/21 78 9 kg (174 lb)   05/28/21 78 9 kg (174 lb)   05/11/21 83 4 kg (183 lb 13 8 oz)     Compensated  Cont lasix        Atrial flutter ([address] )  Assessment & Plan  continue amiodarone/BB  INR therapeutic    Acquired hypothyroidism  Assessment & Plan  Elevated TSH however trended down from most recent one  Dose was just recently changed   Thus cont at present dose  Repeat TFT in 4-6 weeks as outpatient    Type 2 diabetes mellitus with stage 3a chronic kidney disease, without long-term current use of insulin [facility])  Assessment & Plan  Lab Results   Component Value Date    HGBA1C 5 8 (H) 05/28/2021       Recent Labs 21  1607 219 21  0614 21  1045   POCGLU 113 129 109 107         Blood Sugar Average: Last 72 hrs:  (P) 110 [id number]     -diet controlled  -sliding scale insulin/Accu-Cheks    Essential hypertension  Assessment & Plan  Stable  BP goal normotension per neuro    Coronary artery disease involving native coronary artery of native heart without angina pectoris  Assessment & Plan  Stable, CP free  Cont cardiac meds    VTE Pharmacologic Prophylaxis:   Pharmacologic: Warfarin (Coumadin)  Mechanical VTE Prophylaxis in Place: Yes    Patient Centered Rounds: I have performed bedside rounds with nursing staff today  Discussions with Specialists or Other Care Team Provider: not yet    Education and Discussions with Family / Patient: pt - left VM w/ Edu Held    Time Spent for Care: 30 minutes  More than 50% of total time spent on counseling and coordination of care as described above  Current Length of Stay: 4 day(s)    Current Patient Status: Inpatient   Certification Statement: The patient will continue to require additional inpatient hospital stay due to need for MRI    Discharge Plan: pending MRI    Code Status: Level 1 - Full Code      Subjective:   C/o shoulder pain and knee pain which she says is chronic    Objective:     Vitals:   Temp (24hrs), Av 5 °F (37 5 °C), Min:97 4 °F (36 3 °C), Max:100 7 °F (38 2 °C)    Temp:  [97 4 °F (36 3 °C)-100 7 °F (38 2 °C)] 97 4 °F (36 3 °C)  HR:  [59-80] 59  Resp:  [17] 17  BP: (104-138)/(43-67) 105/44  SpO2:  [92 %-95 %] 94 %  Body mass index is 35 14 kg/m²  Input and Output Summary (last 24 hours): Intake/Output Summary (Last 24 hours) at 2021 1242  Last data filed at 2021 0606  Gross per 24 hour   Intake 700 ml   Output 2200 ml   Net -1500 ml       Physical Exam:     Physical Exam  HENT:      Head: Normocephalic and atraumatic        Nose: Nose normal       Mouth/Throat:      Mouth: Mucous membranes are moist    Eyes: Extraocular Movements: Extraocular movements intact  Conjunctiva/sclera: Conjunctivae normal    Neck:      Musculoskeletal: Normal range of motion and neck supple  Cardiovascular:      Rate and Rhythm: Normal rate and regular rhythm  Pulmonary:      Effort: Pulmonary effort is normal       Breath sounds: Normal breath sounds  No wheezing or rales  Abdominal:      General: Bowel sounds are normal  There is no distension  Palpations: Abdomen is soft  Tenderness: There is no abdominal tenderness  Musculoskeletal: Normal range of motion  Right lower leg: No edema  Left lower leg: No edema  Comments: 4/5 right  strength   Skin:     General: Skin is warm and dry  Neurological:      Mental Status: She is alert and oriented to person, place, and time  Mental status is at baseline  Additional Data:     Labs:    Results from last 7 days   Lab Units 06/01/21 0441 05/31/21 2147   WBC Thousand/uL 6 81 7 74   HEMOGLOBIN g/dL 11 2* 10 7*   HEMATOCRIT % 34 3* 32 7*   PLATELETS Thousands/uL 227 220   NEUTROS PCT %  --  64   LYMPHS PCT %  --  21   MONOS PCT %  --  13*   EOS PCT %  --  1     Results from last 7 days   Lab Units 06/01/21  0441   POTASSIUM mmol/L 3 8   CHLORIDE mmol/L 97*   CO2 mmol/L 31   BUN mg/dL 29*   CREATININE mg/dL 0 86   CALCIUM mg/dL 9 2     Results from last 7 days   Lab Units 06/01/21  0441   INR  2 52*       * I Have Reviewed All Lab Data Listed Above  * Additional Pertinent Lab Tests Reviewed: [address] Admission Reviewed        Recent Cultures (last 7 days):     Results from last 7 days   Lab Units 05/31/21 2149 05/31/21 2148   BLOOD CULTURE  Received in Microbiology Lab  Culture in Progress  Received in Microbiology Lab  Culture in Progress         Last 24 Hours Medication List:   Current Facility-Administered Medications   Medication Dose Route Frequency Provider Last Rate    acetaminophen  650 mg Oral Q6H MARLA Sidhu SONNY      amiodarone  100 mg Oral Daily Maria T Myrick MD      aspirin  81 mg Oral Daily Maria T Myrick MD      cefTRIAXone  1,000 mg Intravenous Q24H Nahun Garcia PA-C 1,000 mg (06/01/21 0606)    Diclofenac Sodium  2 g Topical 4x Daily Nani Ward DO      furosemide  20 mg Oral Daily Maria T Myrick MD      insulin lispro  1-6 Units Subcutaneous TID [facility] Maria T Myrick MD      isosorbide mononitrate  30 mg Oral Daily Maria T Myrick MD      levothyroxine  75 mcg Oral Early Morning Maria T Myrick MD      lisinopril  2 5 mg Oral Daily Maria T Myrick MD      metoprolol succinate  25 mg Oral After Ezio Gómez MD      polyethylene glycol  17 g Oral Daily Mika Letters, DO      pravastatin  40 mg Oral Daily With Ezio Gómez MD      senna-docusate sodium  1 tablet Oral BID Mika Letters, DO      traMADol  50 mg Oral Q6H PRN Maria T Myrick MD      warfarin  2 5 mg Oral Daily (warfarin) Maria T Myrick MD          Today, Patient Was Seen By: Nani Ward DO    ** Please Note: Dictation voice to text software may have been used in the creation of this document   **

## 2021-06-01 NOTE — ASSESSMENT & PLAN NOTE
Knee XRs revealing moderate effusions  Ortho consult apprecaited  No clinical symptoms to suggest septic arthritis    Pt refused arthrocentesis  Voltaren gel to knees and shoulders

## 2021-06-01 NOTE — PLAN OF CARE
Problem: Potential for Falls  Goal: Patient will remain free of falls  Description: INTERVENTIONS:  - Assess patient frequently for physical needs  -  Identify cognitive and physical deficits and behaviors that affect risk of falls    -  [place] fall precautions as indicated by assessment   - Educate patient/family on patient safety including physical limitations  - Instruct patient to call for assistance with activity based on assessment  - Modify environment to reduce risk of injury  - Consider OT/PT consult to assist with strengthening/mobility  Outcome: Progressing     Problem: Prexisting or High Potential for Compromised Skin Integrity  Goal: Skin integrity is maintained or improved  Description: INTERVENTIONS:  - Identify patients at risk for skin breakdown  - Assess and monitor skin integrity  - Assess and monitor nutrition and hydration status  - Monitor labs   - Assess for incontinence   - Turn and reposition patient  - Assist with mobility/ambulation  - Relieve pressure over bony prominences  - Avoid friction and shearing  - Provide appropriate hygiene as needed including keeping skin clean and dry  - Evaluate need for skin moisturizer/barrier cream  - Collaborate with interdisciplinary team   - Patient/family teaching  - Consider wound care consult   Outcome: Progressing     Problem: PAIN - ADULT  Goal: Verbalizes/displays adequate comfort level or baseline comfort level  Description: Interventions:  - Encourage patient to monitor pain and request assistance  - Assess pain using appropriate pain scale  - Administer analgesics based on type and severity of pain and evaluate response  - Implement non-pharmacological measures as appropriate and evaluate response  - Consider cultural and social influences on pain and pain management  - Notify physician/advanced practitioner if interventions unsuccessful or patient reports new pain  Outcome: Progressing     Problem: INFECTION - ADULT  Goal: Absence or prevention of progression during hospitalization  Description: INTERVENTIONS:  - Assess and monitor for signs and symptoms of infection  - Monitor lab/diagnostic results  - Monitor all insertion sites, i e  indwelling lines, tubes, and drains  - Monitor endotracheal if appropriate and nasal secretions for changes in amount and color  - [facility] appropriate cooling/warming therapies per order  - Administer medications as ordered  - Instruct and encourage patient and family to use good hand hygiene technique  - Identify and instruct in appropriate isolation precautions for identified infection/condition  Outcome: Progressing     Problem: SAFETY ADULT  Goal: Patient will remain free of falls  Description: INTERVENTIONS:  - Assess patient frequently for physical needs  -  Identify cognitive and physical deficits and behaviors that affect risk of falls    -  [facility] fall precautions as indicated by assessment   - Educate patient/family on patient safety including physical limitations  - Instruct patient to call for assistance with activity based on assessment  - Modify environment to reduce risk of injury  - Consider OT/PT consult to assist with strengthening/mobility  Outcome: Progressing  Goal: Maintain or return to baseline ADL function  Description: INTERVENTIONS:  -  Assess patient's ability to carry out ADLs; assess patient's baseline for ADL function and identify physical deficits which impact ability to perform ADLs (bathing, care of mouth/teeth, toileting, grooming, dressing, etc )  - Assess/evaluate cause of self-care deficits   - Assess range of motion  - Assess patient's mobility; develop plan if impaired  - Assess patient's need for assistive devices and provide as appropriate  - Encourage maximum independence but intervene and supervise when necessary  - Involve family in performance of ADLs  - Assess for home care needs following discharge   - Consider OT consult to assist with ADL evaluation and planning for discharge  - Provide patient education as appropriate  Outcome: Progressing  Goal: Maintain or return mobility status to optimal level  Description: INTERVENTIONS:  - Assess patient's baseline mobility status (ambulation, transfers, stairs, etc )    - Identify cognitive and physical deficits and behaviors that affect mobility  - Identify mobility aids required to assist with transfers and/or ambulation (gait belt, sit-to-stand, lift, walker, cane, etc )  - [place] fall precautions as indicated by assessment  - Record patient progress and toleration of activity level on Mobility SBAR; progress patient to next Phase/Stage  - Instruct patient to call for assistance with activity based on assessment  - Consider rehabilitation consult to assist with strengthening/weightbearing, etc   Outcome: Progressing     Problem: DISCHARGE PLANNING  Goal: Discharge to home or other facility with appropriate resources  Description: INTERVENTIONS:  - Identify barriers to discharge w/patient and caregiver  - Arrange for needed discharge resources and transportation as appropriate  - Identify discharge learning needs (meds, wound care, etc )  - Arrange for interpretive services to assist at discharge as needed  - Refer to Case Management Department for coordinating discharge planning if the patient needs post-hospital services based on physician/advanced practitioner order or complex needs related to functional status, cognitive ability, or social support system  Outcome: Progressing     Problem: Knowledge Deficit  Goal: Patient/family/caregiver demonstrates understanding of disease process, treatment plan, medications, and discharge instructions  Description: Complete learning assessment and assess knowledge base    Interventions:  - Provide teaching at level of understanding  - Provide teaching via preferred learning methods  Outcome: Progressing     Problem: Nutrition/Hydration-ADULT  Goal: Nutrient/Hydration intake appropriate for improving, restoring or maintaining nutritional needs  Description: Monitor and assess patient's nutrition/hydration status for malnutrition  Collaborate with interdisciplinary team and initiate plan and interventions as ordered  Monitor patient's weight and dietary intake as ordered or per policy  Utilize nutrition screening tool and intervene as necessary  Determine patient's food preferences and provide high-protein, high-caloric foods as appropriate       INTERVENTIONS:  - Monitor oral intake, urinary output, labs, and treatment plans  - Assess nutrition and hydration status and recommend course of action  - Evaluate amount of meals eaten  - Assist patient with eating if necessary   - Allow adequate time for meals  - Recommend/ encourage appropriate diets, oral nutritional supplements, and vitamin/mineral supplements  - Order, calculate, and assess calorie counts as needed  - Recommend, monitor, and adjust tube feedings and TPN/PPN based on assessed needs  - Assess need for intravenous fluids  - Provide specific nutrition/hydration education as appropriate  - Include patient/family/caregiver in decisions related to nutrition  Outcome: Progressing

## 2021-06-01 NOTE — DISCHARGE INSTR - OTHER ORDERS
Skin care plans:  1-Hydraguard to bilateral sacrum, buttock and heels TID and PRN  2-Elevate heels to offload pressure  3-Ehob cushion in chair when out of bed  4-Moisturize skin daily with skin nourishing cream   5-Turn/reposition q2h or when medically stable for pressure re-distribution on skin  6-Place patient on p500 alternating air mattress to provide most optimal skin support surface

## 2021-06-01 NOTE — WOUND OSTOMY CARE
Consult Note - Wound   Coye Friendly 80 y o  female MRN: [id number]  Unit/Bed#: [facility] 702-01 Encounter: [id number]      History and Present Illness:  Patient is a 80year old female who presented with complaints of aphasia and dysarthria which started the morning of admission  Patient seen this morning by wound care, she is in bed, awake, alert complaining of buttocks pain  Patient needing extensive assist for turns, no incontinence found, patient using pure wick for urinary incontinence management  BMI: Estimated body mass index is 35 14 kg/m² as calculated from the following:    Height as of this encounter: 4' 11" (1 499 m)  Weight as of this encounter: 78 9 kg (174 lb)      History  Past Medical History:   Diagnosis Date    Abnormal nuclear stress test     last assessed 04/03/2017    Anxiety     Arthritis     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    Bundle branch block, left     Cardiomyopathy [facility] [address] )     Chest pain 3/9/2019    Chronic pain     chronic b/l shoulder pain    Chronic pain of right knee 4/2/2019    Coronary artery disease     Diabetes mellitus ([facility] )     Gait disturbance 3/2/2018    GERD (gastroesophageal reflux disease)     H/O atrial flutter     History of DVT (deep vein thrombosis)     History of pulmonary embolism     Hyperlipidemia     Hypertension     Hypothyroid     Renal calculi     Shortness of breath      Past Surgical History:   Procedure Laterality Date    ATRIAL ABLATION SURGERY  01/2015   [facility] CARDIAC PACEMAKER PLACEMENT  12/10/2018    Medtronic YANNA XT DR MRI, model [id number]    CARDIOVERSION      CHELA/DCCV 10/22/2014    CARPAL TUNNEL RELEASE Right     CATARACT EXTRACTION      CORONARY ANGIOPLASTY WITH STENT PLACEMENT      HYSTERECTOMY      JOINT REPLACEMENT Right     W[place] AMPUTATION TOE,MT-P JT Left 10/11/2017    Procedure: AMPUTATION SECOND TOE;  Surgeon: Kristy Duran DPM;  Location: [facility];  Service: Podiatry    TONSILLECTOMY      TOTAL HIP ARTHROPLASTY      Right       Problem List:   Patient Active Problem List   Diagnosis    Coronary artery disease involving native coronary artery of native heart without angina pectoris    Essential hypertension    History of placement of stent in LAD coronary artery    Hyperlipidemia    History of atrial flutter    LBBB (left bundle branch block)    Right hip pain    Type 2 diabetes mellitus with stage 3a chronic kidney disease, without long-term current use of insulin ([facility])    Acquired hypothyroidism    Adhesive capsulitis    Chronic low back pain    Cervical spine degeneration    Primary osteoarthritis of both shoulders    Tremors of nervous system    Heart block AV second degree    Ambulatory dysfunction    Pacemaker    Atrial flutter ([facility] )    Trochanteric bursitis of right hip    Ventricular tachycardia ([place] [address] )    Cardiomyopathy ([place] [address] )    Primary osteoarthritis of both knees    Effusion of left knee    Nonrheumatic aortic valve stenosis    Chronic diastolic CHF (congestive heart failure) ([facility])    Generalized OA    Anticoagulated on Coumadin    Stage 3a chronic kidney disease ([facility] )    Mixed stress and urge urinary incontinence    Acquired absence of other left toe(s) ([facility]  )    Severe obesity (BMI 35 0-39  9) with comorbidity ([place] [address] )    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 ([facility])    Avascular necrosis ([facility])    Knee pain, chronic    Asymptomatic bacteriuria    Stroke-like symptoms    Colitis    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[facility])    Arthritis    Low grade fever       Medications:  Current Facility-Administered Medications   Medication Dose Route Frequency Provider Last Rate Last Admin    acetaminophen (TYLENOL) tablet 650 mg  650 mg Oral Q6H PRN Patti Maravilla PA-C   650 mg at 05/31/21 2027    amiodarone tablet 100 mg  100 mg Oral Daily Uday Laura MD   100 mg at 06/01/21 0831    aspirin (ECOTRIN LOW STRENGTH) EC tablet 81 mg  81 mg Oral Daily Vinay Diana MD   81 mg at 06/01/21 4911    cefTRIAXone (ROCEPHIN) 1,000 mg in dextrose 5 % 50 mL IVPB  1,000 mg Intravenous Q24H Jiles Sicard, PA-C 100 mL/hr at 06/01/21 0606 1,000 mg at 06/01/21 0606    Diclofenac Sodium (VOLTAREN) 1 % topical gel 2 g  2 g Topical 4x Daily Tiny Bettencourt DO   2 g at 06/01/21 1048    furosemide (LASIX) tablet 20 mg  20 mg Oral Daily Vinay Diana MD   20 mg at 05/31/21 0850    insulin lispro (HumaLOG) 100 units/mL subcutaneous injection 1-6 Units  1-6 Units Subcutaneous TID [facility] Vinay Diana MD        isosorbide mononitrate (IMDUR) 24 hr tablet 30 mg  30 mg Oral Daily Vinay Diana MD   30 mg at 05/31/21 0850    levothyroxine tablet 75 mcg  75 mcg Oral Early Morning Vinay Diana MD   75 mcg at 06/01/21 0449    lisinopril (ZESTRIL) tablet 2 5 mg  2 5 mg Oral Daily Vinay Diana MD   2 5 mg at 05/31/21 0850    metoprolol succinate (TOPROL-XL) 24 hr tablet 25 mg  25 mg Oral After Kwaku Bethea MD   25 mg at 05/31/21 1644    polyethylene glycol (MIRALAX) packet 17 g  17 g Oral Daily Mike Caceres, DO   17 g at 05/31/21 0849    pravastatin (PRAVACHOL) tablet 40 mg  40 mg Oral Daily With Kwaku Bethea MD   40 mg at 05/31/21 1644    senna-docusate sodium (SENOKOT S) 8 6-50 mg per tablet 1 tablet  1 tablet Oral BID Mike Caceres DO   1 tablet at 05/31/21 1644    traMADol (ULTRAM) tablet 50 mg  50 mg Oral Q6H PRN Vinay Diana MD   50 mg at 05/30/21 0617    warfarin (COUMADIN) tablet 2 5 mg  2 5 mg Oral Daily (warfarin) Vinay Diana MD   2 5 mg at 05/31/21 1644          Assessment Findings:   1-Mid sacrum with HA DTI  Wound presenting as non blanchable purple discoloration with, starting to evolve at R side of sacral DTI  Wound presenting in a semilunar shape        Skin care plans:  1-Hydraguard to bilateral sacrum, buttock and heels TID and PRN  2-Elevate heels to offload pressure  3-Ehob cushion in chair when out of bed    4-Moisturize skin daily with skin nourishing cream   5-Turn/reposition q2h or when medically stable for pressure re-distribution on skin  6-Place patient on p500 alternating air mattress to provide most optimal skin support surface  Vitals: Blood pressure (!) 105/44, pulse 59, temperature (!) 97 4 °F (36 3 °C), resp  rate 17, height 4' 11" (1 499 m), weight 78 9 kg (174 lb), SpO2 94 %, not currently breastfeeding  ,Body mass index is 35 14 kg/m²  Wound 05/31/21 Pressure Injury Sacrum (Active)   Wound Image    06/01/21 1005   Wound Description Light purple 06/01/21 1005   Pressure Injury Stage DTPI 06/01/21 1005   Ariella-wound Assessment Intact 06/01/21 1005   Wound Length (cm) 5 cm 06/01/21 1005   Wound Width (cm) 9 cm 06/01/21 1005   Wound Surface Area (cm^2) 45 cm^2 06/01/21 1005   Drainage Amount None 06/01/21 1005   Treatments Cleansed; Other (Comment) 06/01/21 1005   Dressing Protective barrier 06/01/21 1005   Patient Tolerance Tolerated well 06/01/21 1005         Our skin care recommendations are placed as nursing orders, wound care to continue following, please call or tiger text with questions and concerns          Izzy Vázquez, RN, BSN, [facility]

## 2021-06-01 NOTE — PROGRESS NOTES
Progress Note - Orthopedics   Ольгаera Scriver 80 y o  female MRN: [id number]  Unit/Bed#: [facility] 702-01      Subjective:    80 y  o female with bilateral knee osteoarthritis  No acute events, no complaints  Pt doing well       Labs:  0   Lab Value Date/Time    HCT 32 7 (L) 05/31/2021 2147    HCT 36 4 05/29/2021 0623    HCT 36 6 05/28/2021 1143    HCT 34 9 06/23/2015 1858    HCT 41 0 05/21/2015 1619    HCT 32 8 (L) 05/16/2015 0519    HGB 10 7 (L) 05/31/2021 2147    HGB 11 3 (L) 05/29/2021 0623    HGB 11 7 05/28/2021 1143    HGB 11 4 (L) 06/23/2015 1858    HGB 13 1 05/21/2015 1619    HGB 10 5 (L) 05/16/2015 0519    INR 2 41 (H) 05/31/2021 0430    INR 1 35 (H) 05/14/2015 1312    WBC 7 74 05/31/2021 2147    WBC 4 74 05/29/2021 0623    WBC 6 82 05/28/2021 1143    WBC 4 85 06/23/2015 1858    WBC 6 91 05/21/2015 1619    WBC 4 68 05/16/2015 0519       Meds:    Current Facility-Administered Medications:     acetaminophen (TYLENOL) tablet 650 mg, 650 mg, Oral, Q6H PRN, Crys Frey PA-C, 650 mg at 05/31/21 2027    amiodarone tablet 100 mg, 100 mg, Oral, Daily, Olinda Sam MD, 100 mg at 05/31/21 0850    aspirin (ECOTRIN LOW STRENGTH) EC tablet 81 mg, 81 mg, Oral, Daily, Olinda Sam MD, 81 mg at 05/31/21 0850    cefTRIAXone (ROCEPHIN) 1,000 mg in dextrose 5 % 50 mL IVPB, 1,000 mg, Intravenous, Q24H, Shy Urbano PA-C    furosemide (LASIX) tablet 20 mg, 20 mg, Oral, Daily, Olinda Sam MD, 20 mg at 05/31/21 0850    insulin lispro (HumaLOG) 100 units/mL subcutaneous injection 1-6 Units, 1-6 Units, Subcutaneous, TID AC **AND** Fingerstick Glucose (POCT), , , TID AC, Olinda Sam MD    isosorbide mononitrate (IMDUR) 24 hr tablet 30 mg, 30 mg, Oral, Daily, Olinda Sam MD, 30 mg at 05/31/21 0850    levothyroxine tablet 75 mcg, 75 mcg, Oral, Early Morning, Olinda Sam MD, 75 mcg at 06/01/21 0449    lisinopril (ZESTRIL) tablet 2 5 mg, 2 5 mg, Oral, Daily, Olinda Sam MD, 2 5 mg at 05/31/21 0850   metoprolol succinate (TOPROL-XL) 24 hr tablet 25 mg, 25 mg, Oral, After Malik Marshall MD, 25 mg at 05/31/21 1644    polyethylene glycol (MIRALAX) packet 17 g, 17 g, Oral, Daily, Yessy Marrow, DO, 17 g at 05/31/21 0849    pravastatin (PRAVACHOL) tablet 40 mg, 40 mg, Oral, Daily With Malik Marshall MD, 40 mg at 05/31/21 1644    senna-docusate sodium (SENOKOT S) 8 6-50 mg per tablet 1 tablet, 1 tablet, Oral, BID, Yessy Marrow, DO, 1 tablet at 05/31/21 1644    traMADol (ULTRAM) tablet 50 mg, 50 mg, Oral, Q6H PRN, Mannie Hernandez MD, 50 mg at 05/30/21 5215    warfarin (COUMADIN) tablet 2 5 mg, 2 5 mg, Oral, Daily (warfarin), Mannie Hernandez MD, 2 5 mg at 05/31/21 1644    Blood Culture:   No results found for: BLOODCX    Wound Culture:   No results found for: WOUNDCULT    Ins and Outs:  I/O last 24 hours: In: 880 [P O :880]  Out: 2550 [Urine:2550]          Physical:  Vitals:    06/01/21 0124   BP:    Pulse: 59   Resp:    Temp: (!) 97 4 °F (36 3 °C)   SpO2: 94%     Musculoskeletal: bilateral Lower Extremity  · Skin intact, mild/moderate effusion  · TTP medial and lateral knee joint lines  · SILT s/s/sp/dp/t  +fhl/ehl, +ankle dorsi/plantar flexion  2+ DP pulse    Assessment:    80 y  o female with bilateral knee OA  Recommend anti-inflammatories and PT      Plan:  · WBAT B/L LE  · Anti inflammatory medications  · PT/OT  · Pain control  · Dispo: Ortho signing off    Risa Lawrence MD

## 2021-06-01 NOTE — CONSULTS
PHYSICAL MEDICINE AND REHABILITATION CONSULT NOTE  Leonidas Rai 80 y o  female MRN: [id number]  [facility] Encounter: [id number]    Requested by (Physician/Service): Rosmery Russell DO  Reason for Consultation:  Assessment of rehabilitation needs    Assessment:  Rehabilitation Diagnosis:    Left thalamic CVA   Impaired mobility and self care    Recommendations:  Rehabilitation Plan:   Continue PT/OT while on acute care   Recommend return to sub-acute inpatient rehabilitation once medically stable   Covid-19 Testing: [facility] rehabilitation units require testing within 48 hours of potential admission for all general admissions  Please re-test if needed  *Re-testing is NOT required for patients recovering from COVID-19 infection if isolation has been discontinued per CDC criteria  Medical Co-morbidities Plan:  · Left thalamic CVA  · AVN of the right humeral head  · Afib on coumadin  · Cardiomyopathy  · Diabetes  · CAD  · Hypertension   · HLD  · GERD   · Left BBB  · DVT ppx: Coumadin     Thank you for this consultation  Do not hesitate to contact service with further questions  FERMIN Bailey  PM&R    History of Present Illness:  Leonidas Rai is a 80 y o  female with a PMH of CAD, diabetes, GERD, DVT PE, HLD, HTN, a fib on coumadin, cardiomyopathy, and bundle branch block who presented to the [facility] on 5/28/21 from [facility] with dysarthria, right sided weakness and numbness  She was not a tPA candidate due to therapeutic INR  She was recently in the hospital from 5/9-5/11 after falling and found to have AVN of her right humeral head as well as bilateral knee arthritis  At that time she was discharged to rehab at [facility]  CT showed decreased attenuation within the cerebral hemispheres suggestive of moderate chronic microangiopathic change    There was a new hypodensity within the anterior left thalamus suspicious for subacute ischemia  There was no mass effect or hemorrhagic transformation  MRI is pending  PM&R are consulted for rehabilitation recommendations  The patient was seen in her room  She states her shoulders have had limited ROM for a while  She denies any other complaints  Review of Systems: 10 point ROS negative except for what is noted in HPI    Function:  Prior level of function and living situation:  The patient lives in a ranch home with her spouse  She used a RW/SPC for ambulation  She has HHA 2 days a week to help with bathing  Current level of function:  Physical Therapy: Moderate assist x 1 for bed mobility, moderate assist x 2 for transfers   Occupational Therapy:  Minimal assist for eating, grooming, moderate assist for UB bathing/dressing, maximal assist for LB bathing/dressing and toileting  Speech Therapy:  Regular with thins       Physical Exam:  BP (!) 105/44   Pulse 59   Temp (!) 97 4 °F (36 3 °C)   Resp 17   Ht 4' 11" (1 499 m)   Wt 78 9 kg (174 lb)   SpO2 94%   BMI 35 14 kg/m²        Intake/Output Summary (Last 24 hours) at 6/1/2021 0937  Last data filed at 6/1/2021 0606  Gross per 24 hour   Intake 700 ml   Output 2200 ml   Net -1500 ml       Body mass index is 35 14 kg/m²  Physical Exam  HENT:      Head: Normocephalic  Pulmonary:      Effort: Pulmonary effort is normal    Musculoskeletal:      Comments: Unable to lift legs off of the bed, left sided SAB 1/5, right sided SAB 2/5 otherwise bilateral UE 4/5   Neurological:      Mental Status: She is alert and oriented to person, place, and time  Psychiatric:         Mood and Affect: Affect is flat            Social History:    Social History     Socioeconomic History    Marital status: /Civil Union     Spouse name: None    Number of children: None    Years of education: None    Highest education level: None   Occupational History    None   Social Needs    Financial resource strain: None   [employer] insecurity     Worry: None     Inability: None    Transportation needs     Medical: None     Non-medical: None   Tobacco Use    Smoking status: Never Smoker    Smokeless tobacco: Never Used   Substance and Sexual Activity    Alcohol use: Yes     Frequency: Monthly or less     Drinks per session: 1 or 2     Binge frequency: Never     Comment: occasional    Drug use: Yes     Types: Marijuana     Comment: MEDICAL MARIJUANA-HAS NOT USED FOR 3 WEEKS    Sexual activity: None   Lifestyle    Physical activity     Days per week: None     Minutes per session: None    Stress: None   Relationships    Social connections     Talks on phone: None     Gets together: None     Attends Mandaeism service: None     Active member of club or organization: None     Attends meetings of clubs or organizations: None     Relationship status: None    Intimate partner violence     Fear of current or ex partner: None     Emotionally abused: None     Physically abused: None     Forced sexual activity: None   Other Topics Concern    None   Social History Narrative    None        Family History:    Family History   Problem Relation Age of Onset    Parkinsonism Sister     Cancer Sister         unknown type    Heart attack Neg Hx     Stroke Neg Hx     Anuerysm Neg Hx     Clotting disorder Neg Hx     Arrhythmia Neg Hx     Heart failure Neg Hx     Coronary artery disease Neg Hx          Medications:     Current Facility-Administered Medications:     acetaminophen (TYLENOL) tablet 650 mg, 650 mg, Oral, Q6H PRN, Shy Burk PA-C, 650 mg at 05/31/21 2027    amiodarone tablet 100 mg, 100 mg, Oral, Daily, Maribel Bradford MD, 100 mg at 06/01/21 0831    aspirin (ECOTRIN LOW STRENGTH) EC tablet 81 mg, 81 mg, Oral, Daily, Maribel Bradford MD, 81 mg at 06/01/21 0832    cefTRIAXone (ROCEPHIN) 1,000 mg in dextrose 5 % 50 mL IVPB, 1,000 mg, Intravenous, Q24H, Shy Urbano PA-C, Last Rate: 100 mL/hr at 06/01/21 0606, 1,000 mg at 06/01/21 0606    Diclofenac Sodium (VOLTAREN) 1 % topical gel 2 g, 2 g, Topical, 4x Daily, Lonza DO Evelyn    furosemide (LASIX) tablet 20 mg, 20 mg, Oral, Daily, Barbara Aden MD, 20 mg at 05/31/21 0850    insulin lispro (HumaLOG) 100 units/mL subcutaneous injection 1-6 Units, 1-6 Units, Subcutaneous, TID AC **AND** Fingerstick Glucose (POCT), , , TID AC, Barbara Aden MD    isosorbide mononitrate (IMDUR) 24 hr tablet 30 mg, 30 mg, Oral, Daily, Barbara Aden MD, 30 mg at 05/31/21 0850    levothyroxine tablet 75 mcg, 75 mcg, Oral, Early Morning, Barbara Aden MD, 75 mcg at 06/01/21 0449    lisinopril (ZESTRIL) tablet 2 5 mg, 2 5 mg, Oral, Daily, Barbara Aden MD, 2 5 mg at 05/31/21 0850    metoprolol succinate (TOPROL-XL) 24 hr tablet 25 mg, 25 mg, Oral, After Juli Stern MD, 25 mg at 05/31/21 1644    polyethylene glycol (MIRALAX) packet 17 g, 17 g, Oral, Daily, aDve Sanchez DO, 17 g at 05/31/21 0849    potassium chloride (K-DUR,KLOR-CON) CR tablet 20 mEq, 20 mEq, Oral, Once, Leobardo Rivas DO    pravastatin (PRAVACHOL) tablet 40 mg, 40 mg, Oral, Daily With Juli Stern MD, 40 mg at 05/31/21 1644    senna-docusate sodium (SENOKOT S) 8 6-50 mg per tablet 1 tablet, 1 tablet, Oral, BID, Dave Sanchez DO, 1 tablet at 05/31/21 1644    traMADol (ULTRAM) tablet 50 mg, 50 mg, Oral, Q6H PRN, Barbara Aden MD, 50 mg at 05/30/21 2912    warfarin (COUMADIN) tablet 2 5 mg, 2 5 mg, Oral, Daily (warfarin), Barbara Aden MD, 2 5 mg at 05/31/21 1644    Past Medical History:     Past Medical History:   Diagnosis Date    Abnormal nuclear stress test     last assessed 04/03/2017    Anxiety     Arthritis     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    Bundle branch block, left     Cardiomyopathy ([facility] [address] )     Chest pain 3/9/2019    Chronic pain     chronic b/l shoulder pain    Chronic pain of right knee 4/2/2019    Coronary artery disease     Diabetes mellitus ([facility] [address] )  Gait disturbance 3/2/2018    GERD (gastroesophageal reflux disease)     H/O atrial flutter     History of DVT (deep vein thrombosis)     History of pulmonary embolism     Hyperlipidemia     Hypertension     Hypothyroid     Renal calculi     Shortness of breath         Past Surgical History:     Past Surgical History:   Procedure Laterality Date    ATRIAL ABLATION SURGERY  01/2015    CARDIAC PACEMAKER PLACEMENT      CARDIOVERSION      CHELA/DCCV 10/22/2014    CARPAL TUNNEL RELEASE Right     CATARACT EXTRACTION      CORONARY ANGIOPLASTY WITH STENT PLACEMENT      HYSTERECTOMY      JOINT REPLACEMENT Right     KY AMPUTATION TOE,MT-P JT Left 10/11/2017    Procedure: AMPUTATION SECOND TOE;  Surgeon: Juanita Elizondo DPM;  Location: [facility];  Service: Podiatry    TONSILLECTOMY      TOTAL HIP ARTHROPLASTY      Right         Allergies: Allergies   Allergen Reactions    Atorvastatin      Reaction unknown 10/23/2018-    Other Rash     Patient sensitive to adhesives from tape           LABORATORY RESULTS:      Lab Results   Component Value Date    HGB 11 2 (L) 06/01/2021    HGB 11 4 (L) 06/23/2015    HCT 34 3 (L) 06/01/2021    HCT 34 9 06/23/2015    WBC 6 81 06/01/2021    WBC 4 85 06/23/2015     Lab Results   Component Value Date    BUN 29 (H) 06/01/2021    BUN 16 06/23/2015     (L) 06/23/2015    K 3 8 06/01/2021    K 4 3 06/23/2015    CL 97 (L) 06/01/2021    CL 98 (L) 06/23/2015    GLUCOSE 90 06/23/2015    CREATININE 0 86 06/01/2021    CREATININE 0 68 06/23/2015     Lab Results   Component Value Date    PROTIME 27 0 (H) 06/01/2021    PROTIME 16 3 (H) 05/14/2015    INR 2 52 (H) 06/01/2021    INR 1 35 (H) 05/14/2015        DIAGNOSTIC STUDIES: Reviewed  Xr Knee 1 Or 2 Vw Left    Result Date: 6/1/2021  Impression: Stable tricompartmental osteoarthritis, particularly medially, with moderate effusion   Workstation performed: [id number]     Xr Knee 4+ Vw Right Injury    Result Date: 5/30/2021  Impression: No acute osseous abnormality  Moderate size knee joint effusion  Degenerative changes as described  Workstation performed: [id number]     Ct Stroke Alert Brain    Result Date: 5/28/2021  Impression: Decreased attenuation within the cerebral hemispheres suggestive of moderate chronic microangiopathic change  There is a new hypodensity within the anterior left thalamus suspicious for subacute ischemia  No mass effect or hemorrhagic transformation  Findings were directly discussed with Dr Law Cruz of neurology on 5/28/2021 11:50 AM  Workstation performed: [id number]     Ct Abdomen Pelvis With Contrast    Result Date: 5/28/2021  Impression: 1  Moderate rectal stool burden with some wall thickening, may represent early stercoral colitis 2  Sequela of chronic inflammatory sacroiliitis Workstation performed: [id number]     Cta Stroke Alert (head/neck)    Result Date: 5/28/2021  Impression: Severe stenosis of the left distal common carotid artery and proximal cervical internal carotid artery  The remainder of the cervical internal carotid artery is normal in caliber  Mild narrowing of both vertebral artery origins and moderate stenosis of the left subclavian origin  The remainder of the vertebrobasilar system is unremarkable  There is minor smooth narrowing of both P2 segments of the posterior cerebral arteries   Findings were directly discussed with Dr Law Cruz on 5/28/2021 11:56 AM  Workstation performed: [id number]

## 2021-06-02 ENCOUNTER — TELEPHONE (OUTPATIENT)
Dept: NEUROLOGY | Facility: CLINIC | Age: 86
End: 2021-06-02

## 2021-06-02 PROBLEM — I35.0 AORTIC STENOSIS, SEVERE: Status: ACTIVE | Noted: 2021-06-02

## 2021-06-02 LAB
ANION GAP SERPL CALCULATED.3IONS-SCNC: 3 MMOL/L (ref 4–13)
BUN SERPL-MCNC: 23 MG/DL (ref 5–25)
CALCIUM SERPL-MCNC: 9.7 MG/DL (ref 8.3–10.1)
CHLORIDE SERPL-SCNC: 96 MMOL/L (ref 100–108)
CO2 SERPL-SCNC: 30 MMOL/L (ref 21–32)
CREAT SERPL-MCNC: 0.62 MG/DL (ref 0.6–1.3)
ERYTHROCYTE [DISTWIDTH] IN BLOOD BY AUTOMATED COUNT: 14.4 % (ref 11.6–15.1)
GFR SERPL CREATININE-BSD FRML MDRD: 79 ML/MIN/1.73SQ M
GLUCOSE SERPL-MCNC: 109 MG/DL (ref 65–140)
GLUCOSE SERPL-MCNC: 122 MG/DL (ref 65–140)
GLUCOSE SERPL-MCNC: 129 MG/DL (ref 65–140)
GLUCOSE SERPL-MCNC: 132 MG/DL (ref 65–140)
GLUCOSE SERPL-MCNC: 96 MG/DL (ref 65–140)
HCT VFR BLD AUTO: 36.2 % (ref 34.8–46.1)
HGB BLD-MCNC: 11.6 G/DL (ref 11.5–15.4)
INR PPP: 2.12 (ref 0.84–1.19)
MCH RBC QN AUTO: 29.5 PG (ref 26.8–34.3)
MCHC RBC AUTO-ENTMCNC: 32 G/DL (ref 31.4–37.4)
MCV RBC AUTO: 92 FL (ref 82–98)
PLATELET # BLD AUTO: 236 THOUSANDS/UL (ref 149–390)
PMV BLD AUTO: 9.3 FL (ref 8.9–12.7)
POTASSIUM SERPL-SCNC: 4.2 MMOL/L (ref 3.5–5.3)
PROCALCITONIN SERPL-MCNC: <0.05 NG/ML
PROTHROMBIN TIME: 23.6 SECONDS (ref 11.6–14.5)
RBC # BLD AUTO: 3.93 MILLION/UL (ref 3.81–5.12)
SODIUM SERPL-SCNC: 129 MMOL/L (ref 136–145)
WBC # BLD AUTO: 6.46 THOUSAND/UL (ref 4.31–10.16)

## 2021-06-02 PROCEDURE — 80048 BASIC METABOLIC PNL TOTAL CA: CPT | Performed by: GENERAL PRACTICE

## 2021-06-02 PROCEDURE — 97530 THERAPEUTIC ACTIVITIES: CPT

## 2021-06-02 PROCEDURE — 84145 PROCALCITONIN (PCT): CPT | Performed by: GENERAL PRACTICE

## 2021-06-02 PROCEDURE — 97535 SELF CARE MNGMENT TRAINING: CPT

## 2021-06-02 PROCEDURE — 99233 SBSQ HOSP IP/OBS HIGH 50: CPT | Performed by: GENERAL PRACTICE

## 2021-06-02 PROCEDURE — 82948 REAGENT STRIP/BLOOD GLUCOSE: CPT

## 2021-06-02 PROCEDURE — 85027 COMPLETE CBC AUTOMATED: CPT | Performed by: GENERAL PRACTICE

## 2021-06-02 PROCEDURE — 97110 THERAPEUTIC EXERCISES: CPT

## 2021-06-02 PROCEDURE — 85610 PROTHROMBIN TIME: CPT | Performed by: GENERAL PRACTICE

## 2021-06-02 PROCEDURE — 93306 TTE W/DOPPLER COMPLETE: CPT | Performed by: INTERNAL MEDICINE

## 2021-06-02 RX ORDER — PRAVASTATIN SODIUM 80 MG/1
80 TABLET ORAL
Status: DISCONTINUED | OUTPATIENT
Start: 2021-06-02 | End: 2021-06-03 | Stop reason: HOSPADM

## 2021-06-02 RX ADMIN — FUROSEMIDE 20 MG: 20 TABLET ORAL at 09:03

## 2021-06-02 RX ADMIN — PRAVASTATIN SODIUM 80 MG: 80 TABLET ORAL at 16:33

## 2021-06-02 RX ADMIN — LEVOTHYROXINE SODIUM 75 MCG: 75 TABLET ORAL at 05:31

## 2021-06-02 RX ADMIN — DICLOFENAC SODIUM 2 G: 10 GEL TOPICAL at 21:04

## 2021-06-02 RX ADMIN — ISOSORBIDE MONONITRATE 30 MG: 30 TABLET, EXTENDED RELEASE ORAL at 09:02

## 2021-06-02 RX ADMIN — ASPIRIN 81 MG: 81 TABLET, COATED ORAL at 09:01

## 2021-06-02 RX ADMIN — WARFARIN SODIUM 2.5 MG: 2.5 TABLET ORAL at 16:33

## 2021-06-02 RX ADMIN — DICLOFENAC SODIUM 2 G: 10 GEL TOPICAL at 09:02

## 2021-06-02 RX ADMIN — DICLOFENAC SODIUM 2 G: 10 GEL TOPICAL at 16:34

## 2021-06-02 RX ADMIN — AMIODARONE HYDROCHLORIDE 100 MG: 200 TABLET ORAL at 09:01

## 2021-06-02 RX ADMIN — CEFTRIAXONE SODIUM 1000 MG: 10 INJECTION, POWDER, FOR SOLUTION INTRAVENOUS at 05:31

## 2021-06-02 RX ADMIN — DICLOFENAC SODIUM 2 G: 10 GEL TOPICAL at 11:32

## 2021-06-02 RX ADMIN — LISINOPRIL 2.5 MG: 2.5 TABLET ORAL at 09:03

## 2021-06-02 RX ADMIN — METOPROLOL SUCCINATE 25 MG: 25 TABLET, FILM COATED, EXTENDED RELEASE ORAL at 16:35

## 2021-06-02 NOTE — TELEPHONE ENCOUNTER
Reviewed patient chart, inpatient at [facility] at this time  Admitted under stroke pathway  Plan per inpatient therapy recommendations is for patient to be discharged back to [facility] when medically cleared (this was where the patient was prior to admission for rehab)  Called patient patient on the hospital room phone, but she states now is not a good time as the nurse is aiding in her care currently  I offered to call back later regarding stroke education and dischagre planning  She is agreeable to this       I will continue to follow up during hospitalization with inpatient neurology recommendations as well as after hospital discharge to assist

## 2021-06-02 NOTE — PROGRESS NOTES
[address] [facility]  Progress Note - Tyrell Bradley 7/24/1929, 80 y o  female MRN: [id number]  Unit/Bed#: [facility] 702-01 Encounter: [id number]  Primary Care Provider: Emerald Hickman MD   Date and time admitted to hospital: 5/28/2021 11:37 AM    * Stroke-like symptoms  Assessment & Plan  mild dysarthria  status post CTA head/neck: also revealing severe L distal common artery and ICA stenosis  CVA workup underway  Neurology on board  MRI showed recent ischemia left thalamus descending to the upper midbrain, without hemorrhage or mass effect  Echo shows severe AS  D/c tele as pt has known Aflutter on coumadin  Contd cva prophylaxis with coumadin/statin  LDL not at goal but reported allergy to lipitor  Increase Pravachol and add ASA to prevent future CVAs  PT/OT eval  PMR consult      Aortic stenosis, severe  Assessment & Plan  · OP f/u w/ cardio  · Lasix to keep euvolemic    Low grade fever  Assessment & Plan  · Noted 5/31  · ? UTI  · Rocephin started  · F/u Cx  · Could be related to CVA    Arthritis  Assessment & Plan  Knee XRs revealing moderate effusions  Ortho consult apprecaited  No clinical symptoms to suggest septic arthritis  Pt refused arthrocentesis  Voltaren gel to knees and shoulders    Colitis  Assessment & Plan  ?  S/p CT A/P revealing moderate stool burden with possibility of stercolar colitis  No clinical symptoms of colitis  cont bowel regimen    Chronic diastolic CHF (congestive heart failure) ([facility])  Assessment & Plan  Wt Readings from Last 3 Encounters:   05/28/21 78 9 kg (174 lb)   05/28/21 78 9 kg (174 lb)   05/11/21 83 4 kg (183 lb 13 8 oz)     Compensated  Cont lasix        Atrial flutter ([address] )  Assessment & Plan  continue amiodarone/BB  INR therapeutic    Hyponatremia  Assessment & Plan  · C/w Lasix  · Add 1 5L FR  · F/u U Na and U Osm and S Osm  · Will need to be cautious w/ IVF and salt tabs in setting of severe AS    Acquired hypothyroidism  Assessment & Plan  Elevated TSH however trended down from most recent one  Dose was just recently changed  Thus cont at present dose  Repeat TFT in 4-6 weeks as outpatient    Type 2 diabetes mellitus with stage 3a chronic kidney disease, without long-term current use of insulin [facility])  Assessment & Plan  Lab Results   Component Value Date    HGBA1C 5 8 (H) 2021       Recent Labs     21  1631 21  2203 21  0612 21  1035   POCGLU 167* 117 109 129         Blood Sugar Average: Last 72 hrs:  (P) 115 [id number]     -diet controlled  -sliding scale insulin/Accu-Cheks    Essential hypertension  Assessment & Plan  Stable  BP goal normotension per neuro    Coronary artery disease involving native coronary artery of native heart without angina pectoris  Assessment & Plan  Stable, CP free  Cont cardiac meds    VTE Pharmacologic Prophylaxis:   Pharmacologic: Warfarin (Coumadin)  Mechanical VTE Prophylaxis in Place: Yes    Patient Centered Rounds: I have performed bedside rounds with nursing staff today  Discussions with Specialists or Other Care Team Provider: cardio and neuro    Education and Discussions with Family / Patient: pt and Bhanu Humphrey    Time Spent for Care: 30 minutes  More than 50% of total time spent on counseling and coordination of care as described above  Current Length of Stay: 5 day(s)    Current Patient Status: Inpatient   Certification Statement: The patient will continue to require additional inpatient hospital stay due to hypoNa    Discharge Plan: tomorrow if Na stable    Code Status: Level 1 - Full Code      Subjective:   No acute complaints    Objective:     Vitals:   Temp (24hrs), Av 3 °F (36 8 °C), Min:98 3 °F (36 8 °C), Max:98 3 °F (36 8 °C)    Temp:  [98 3 °F (36 8 °C)] 98 3 °F (36 8 °C)  HR:  [67-70] 70  Resp:  [18] 18  BP: (145-160)/(62-68) 145/67  SpO2:  [94 %] 94 %  Body mass index is 35 14 kg/m²  Input and Output Summary (last 24 hours):        Intake/Output Summary (Last 24 hours) at 6/2/2021 1234  Last data filed at 6/1/2021 1630  Gross per 24 hour   Intake 320 ml   Output --   Net 320 ml       Physical Exam:     Physical Exam  HENT:      Head: Normocephalic and atraumatic  Nose: Nose normal       Mouth/Throat:      Mouth: Mucous membranes are moist    Eyes:      Extraocular Movements: Extraocular movements intact  Conjunctiva/sclera: Conjunctivae normal    Neck:      Musculoskeletal: Normal range of motion and neck supple  Cardiovascular:      Rate and Rhythm: Normal rate and regular rhythm  Pulmonary:      Effort: Pulmonary effort is normal       Breath sounds: Normal breath sounds  No wheezing or rales  Abdominal:      General: Bowel sounds are normal  There is no distension  Palpations: Abdomen is soft  Tenderness: There is no abdominal tenderness  Musculoskeletal: Normal range of motion  Right lower leg: No edema  Left lower leg: No edema  Skin:     General: Skin is warm and dry  Neurological:      Mental Status: She is alert and oriented to person, place, and time  Mental status is at baseline  Additional Data:     Labs:    Results from last 7 days   Lab Units 06/02/21  0505  05/31/21  2147   WBC Thousand/uL 6 46   < > 7 74   HEMOGLOBIN g/dL 11 6   < > 10 7*   HEMATOCRIT % 36 2   < > 32 7*   PLATELETS Thousands/uL 236   < > 220   NEUTROS PCT %  --   --  64   LYMPHS PCT %  --   --  21   MONOS PCT %  --   --  13*   EOS PCT %  --   --  1    < > = values in this interval not displayed  Results from last 7 days   Lab Units 06/02/21  0505   POTASSIUM mmol/L 4 2   CHLORIDE mmol/L 96*   CO2 mmol/L 30   BUN mg/dL 23   CREATININE mg/dL 0 62   CALCIUM mg/dL 9 7     Results from last 7 days   Lab Units 06/02/21  0505   INR  2 12*       * I Have Reviewed All Lab Data Listed Above  * Additional Pertinent Lab Tests Reviewed:  [facility]        Recent Cultures (last 7 days):     Results from last 7 days   Lab Units 05/31/21 2149 05/31/21 2148 05/31/21 2029   BLOOD CULTURE  No Growth at 24 hrs  No Growth at 24 hrs   --    URINE CULTURE   --   --  >100,000 cfu/ml Escherichia coli*  10,000-19,000 cfu/ml Proteus mirabilis*       Last 24 Hours Medication List:   Current Facility-Administered Medications   Medication Dose Route Frequency Provider Last Rate    acetaminophen  650 mg Oral Q6H PRN Yen Osullivan PA-C      amiodarone  100 mg Oral Daily Naila Guan MD      aspirin  81 mg Oral Daily Naila Guan MD      cefTRIAXone  1,000 mg Intravenous Q24H Yen Osullivan PA-C 1,000 mg (06/02/21 0531)    Diclofenac Sodium  2 g Topical 4x Daily Soni Stubbs DO      furosemide  20 mg Oral Daily Naila Guan MD      insulin lispro  1-6 Units Subcutaneous TID [facility] Naila Guan MD      isosorbide mononitrate  30 mg Oral Daily Naila Guan MD      levothyroxine  75 mcg Oral Early Morning Naila Guan MD      lisinopril  2 5 mg Oral Daily Naila Guan MD      metoprolol succinate  25 mg Oral After Santiago Gallagher MD      polyethylene glycol  17 g Oral Daily Collette Charles DO      pravastatin  80 mg Oral Daily With [facility], DO      senna-docusate sodium  1 tablet Oral BID Collette Charles DO      traMADol  50 mg Oral Q6H PRN Naila Guan MD      warfarin  2 5 mg Oral Daily (warfarin) Naila Guan MD          Today, Patient Was Seen By: Soni Stubbs DO    ** Please Note: Dictation voice to text software may have been used in the creation of this document   **

## 2021-06-02 NOTE — OCCUPATIONAL THERAPY NOTE
Occupational Therapy Treatment Note:       06/02/21 1600   OT Last Visit   OT Visit Date 06/02/21   Note Type   Note Type Treatment   Restrictions/Precautions   Weight Bearing Precautions Per Order No   Pain Assessment   Pain Score No Pain   ADL   Where Assessed Supine, bed   Grooming Assistance 5  Supervision/Setup   UB Dressing Assistance 4  Minimal Assistance   LB Dressing Assistance 2  Maximal Assistance   Bed Mobility   Rolling R 3  Moderate assistance   Additional items Assist x 1   Rolling L 3  Moderate assistance   Additional items Assist x 1   Additional Comments assist to reposition this date   Cognition   Overall Cognitive Status Impaired   Arousal/Participation Alert   Attention Attends with cues to redirect   Orientation Level Oriented X4   Memory Decreased recall of recent events   Following Commands Follows one step commands with increased time or repetition   Comments pleasnt   Activity Tolerance   Activity Tolerance Patient limited by fatigue   Medical Staff Made Aware NSG aware   Assessment   Assessment Pt was seen this date for OT tx session focusing on self care tasks, bed mobility,and overall activity tolreance  Pt requires assit for repositioning in bed and changing of clothes due to being soiled  Pt resting in supine at end of session all needs I nreahc  Would benefit form conitnued OT tx to improve overall funcitonal abilies      Plan   Treatment Interventions ADL retraining   Goal Expiration Date 06/12/21   OT Treatment Day 2   OT Frequency 3-5x/wk   Recommendation   OT Discharge Recommendation Post acute rehabilitation services     KARYN Mosley

## 2021-06-02 NOTE — PLAN OF CARE
Problem: Potential for Falls  Goal: Patient will remain free of falls  Description: INTERVENTIONS:  - Assess patient frequently for physical needs  -  Identify cognitive and physical deficits and behaviors that affect risk of falls    -  [place] fall precautions as indicated by assessment   - Educate patient/family on patient safety including physical limitations  - Instruct patient to call for assistance with activity based on assessment  - Modify environment to reduce risk of injury  - Consider OT/PT consult to assist with strengthening/mobility  Outcome: Progressing     Problem: Prexisting or High Potential for Compromised Skin Integrity  Goal: Skin integrity is maintained or improved  Description: INTERVENTIONS:  - Identify patients at risk for skin breakdown  - Assess and monitor skin integrity  - Assess and monitor nutrition and hydration status  - Monitor labs   - Assess for incontinence   - Turn and reposition patient  - Assist with mobility/ambulation  - Relieve pressure over bony prominences  - Avoid friction and shearing  - Provide appropriate hygiene as needed including keeping skin clean and dry  - Evaluate need for skin moisturizer/barrier cream  - Collaborate with interdisciplinary team   - Patient/family teaching  - Consider wound care consult   Outcome: Progressing     Problem: PAIN - ADULT  Goal: Verbalizes/displays adequate comfort level or baseline comfort level  Description: Interventions:  - Encourage patient to monitor pain and request assistance  - Assess pain using appropriate pain scale  - Administer analgesics based on type and severity of pain and evaluate response  - Implement non-pharmacological measures as appropriate and evaluate response  - Consider cultural and social influences on pain and pain management  - Notify physician/advanced practitioner if interventions unsuccessful or patient reports new pain  Outcome: Progressing     Problem: INFECTION - ADULT  Goal: Absence or prevention of progression during hospitalization  Description: INTERVENTIONS:  - Assess and monitor for signs and symptoms of infection  - Monitor lab/diagnostic results  - Monitor all insertion sites, i e  indwelling lines, tubes, and drains  - Monitor endotracheal if appropriate and nasal secretions for changes in amount and color  - [place] appropriate cooling/warming therapies per order  - Administer medications as ordered  - Instruct and encourage patient and family to use good hand hygiene technique  - Identify and instruct in appropriate isolation precautions for identified infection/condition  Outcome: Progressing  Goal: Absence of fever/infection during neutropenic period  Description: INTERVENTIONS:  - Monitor WBC    Outcome: Progressing     Problem: SAFETY ADULT  Goal: Patient will remain free of falls  Description: INTERVENTIONS:  - Assess patient frequently for physical needs  -  Identify cognitive and physical deficits and behaviors that affect risk of falls    -  [place] fall precautions as indicated by assessment   - Educate patient/family on patient safety including physical limitations  - Instruct patient to call for assistance with activity based on assessment  - Modify environment to reduce risk of injury  - Consider OT/PT consult to assist with strengthening/mobility  Outcome: Progressing  Goal: Maintain or return to baseline ADL function  Description: INTERVENTIONS:  -  Assess patient's ability to carry out ADLs; assess patient's baseline for ADL function and identify physical deficits which impact ability to perform ADLs (bathing, care of mouth/teeth, toileting, grooming, dressing, etc )  - Assess/evaluate cause of self-care deficits   - Assess range of motion  - Assess patient's mobility; develop plan if impaired  - Assess patient's need for assistive devices and provide as appropriate  - Encourage maximum independence but intervene and supervise when necessary  - Involve family in performance of ADLs  - Assess for home care needs following discharge   - Consider OT consult to assist with ADL evaluation and planning for discharge  - Provide patient education as appropriate  Outcome: Progressing  Goal: Maintain or return mobility status to optimal level  Description: INTERVENTIONS:  - Assess patient's baseline mobility status (ambulation, transfers, stairs, etc )    - Identify cognitive and physical deficits and behaviors that affect mobility  - Identify mobility aids required to assist with transfers and/or ambulation (gait belt, sit-to-stand, lift, walker, cane, etc )  - [place] fall precautions as indicated by assessment  - Record patient progress and toleration of activity level on Mobility SBAR; progress patient to next Phase/Stage  - Instruct patient to call for assistance with activity based on assessment  - Consider rehabilitation consult to assist with strengthening/weightbearing, etc   Outcome: Progressing     Problem: DISCHARGE PLANNING  Goal: Discharge to home or other facility with appropriate resources  Description: INTERVENTIONS:  - Identify barriers to discharge w/patient and caregiver  - Arrange for needed discharge resources and transportation as appropriate  - Identify discharge learning needs (meds, wound care, etc )  - Arrange for interpretive services to assist at discharge as needed  - Refer to Case Management Department for coordinating discharge planning if the patient needs post-hospital services based on physician/advanced practitioner order or complex needs related to functional status, cognitive ability, or social support system  Outcome: Progressing     Problem: Knowledge Deficit  Goal: Patient/family/caregiver demonstrates understanding of disease process, treatment plan, medications, and discharge instructions  Description: Complete learning assessment and assess knowledge base    Interventions:  - Provide teaching at level of understanding  - Provide teaching via preferred learning methods  Outcome: Progressing     Problem: Nutrition/Hydration-ADULT  Goal: Nutrient/Hydration intake appropriate for improving, restoring or maintaining nutritional needs  Description: Monitor and assess patient's nutrition/hydration status for malnutrition  Collaborate with interdisciplinary team and initiate plan and interventions as ordered  Monitor patient's weight and dietary intake as ordered or per policy  Utilize nutrition screening tool and intervene as necessary  Determine patient's food preferences and provide high-protein, high-caloric foods as appropriate       INTERVENTIONS:  - Monitor oral intake, urinary output, labs, and treatment plans  - Assess nutrition and hydration status and recommend course of action  - Evaluate amount of meals eaten  - Assist patient with eating if necessary   - Allow adequate time for meals  - Recommend/ encourage appropriate diets, oral nutritional supplements, and vitamin/mineral supplements  - Order, calculate, and assess calorie counts as needed  - Recommend, monitor, and adjust tube feedings and TPN/PPN based on assessed needs  - Assess need for intravenous fluids  - Provide specific nutrition/hydration education as appropriate  - Include patient/family/caregiver in decisions related to nutrition  Outcome: Progressing

## 2021-06-02 NOTE — PLAN OF CARE
Problem: OCCUPATIONAL THERAPY ADULT  Goal: Performs self-care activities at highest level of function for planned discharge setting  See evaluation for individualized goals  Description: Treatment Interventions: ADL retraining, Functional transfer training, UE strengthening/ROM, Visual perceptual retraining, Endurance training, Cognitive reorientation, Patient/family training, Equipment evaluation/education, Neuromuscular reeducation, Fine motor coordination activities, Compensatory technique education, Continued evaluation, Energy conservation, Activityengagement          See flowsheet documentation for full assessment, interventions and recommendations  Outcome: Progressing  Note: Limitation: Decreased ADL status, Decreased UE ROM, Decreased UE strength, Decreased Safe judgement during ADL, Decreased cognition, Decreased endurance, Decreased sensation, Decreased fine motor control, Decreased self-care trans, Decreased high-level ADLs, Mood limitation  Prognosis: Fair  Assessment: Pt was seen this date for OT tx session focusing on self care tasks, bed mobility,and overall activity tolreance  Pt requires assit for repositioning in bed and changing of clothes due to being soiled  Pt resting in supine at end of session all needs I nreahc  Would benefit form conitnued OT tx to improve overall funcitonal abilies        OT Discharge Recommendation: Post acute rehabilitation services  OT - OK to Discharge: Yes(to rehab when medically cleared)

## 2021-06-02 NOTE — ASSESSMENT & PLAN NOTE
· C/w Lasix  · Add 1 5L FR  · F/u U Na and U Osm and S Osm  · Will need to be cautious w/ IVF and salt tabs in setting of severe AS

## 2021-06-02 NOTE — ASSESSMENT & PLAN NOTE
mild dysarthria  status post CTA head/neck: also revealing severe L distal common artery and ICA stenosis  CVA workup underway  Neurology on board  MRI showed recent ischemia left thalamus descending to the upper midbrain, without hemorrhage or mass effect  Echo shows severe AS  D/c tele as pt has known Aflutter on coumadin  Contd cva prophylaxis with coumadin/statin   LDL not at goal but reported allergy to lipitor  Increase Pravachol and add ASA to prevent future CVAs  PT/OT eval  PMR consult

## 2021-06-02 NOTE — ASSESSMENT & PLAN NOTE
Lab Results   Component Value Date    HGBA1C 5 8 (H) 05/28/2021       Recent Labs     06/01/21  1631 06/01/21  2203 06/02/21  0612 06/02/21  1035   POCGLU 167* 117 109 129         Blood Sugar Average: Last 72 hrs:  (P) 115 [id number]     -diet controlled  -sliding scale insulin/Accu-Cheks

## 2021-06-02 NOTE — PLAN OF CARE
Problem: PHYSICAL THERAPY ADULT  Goal: Performs mobility at highest level of function for planned discharge setting  See evaluation for individualized goals  Description: Treatment/Interventions: Functional transfer training, LE strengthening/ROM, Therapeutic exercise, Endurance training, Patient/family training, Equipment eval/education, Bed mobility, Gait training, Spoke to nursing  Equipment Recommended: Wheelchair       See flowsheet documentation for full assessment, interventions and recommendations  Outcome: Progressing  Note: Prognosis: Fair  Problem List: Decreased strength, Decreased endurance, Impaired balance, Decreased mobility, Decreased cognition, Obesity, Pain  Assessment: Pt continues to present with decreased mobility, strength, balance, and endurance  However, pt is making steady progress towards mobility goals  Pt was able to transfer from supine to sitting EOB with decreased assistance this session  Pt still requires Ax2 for stand pivot transfers due to bilateral knee buckling  During transfer, pt required b/l knee blocking and VCs for hip and knee extension; unable to clear either foot to take a sidestep  Pt educated on seated therex program which she was able to perform with good technique  Pt would benefit from continued acute skilled PT to address above deficits  Continuing to recommend rehab upon d/c    Barriers to Discharge: Inaccessible home environment, Decreased caregiver support        PT Discharge Recommendation: Post acute rehabilitation services     PT - OK to Discharge: Yes(to rehab, when appropriate)    See flowsheet documentation for full assessment

## 2021-06-02 NOTE — PHYSICAL THERAPY NOTE
Physical Therapy Treatment Note    Patient's Name: Vinay Moyer  : 21 1205   PT Last Visit   PT Visit Date 21   Note Type   Note Type Treatment   Pain Assessment   Pain Assessment Tool Pain Assessment not indicated - pt denies pain   Restrictions/Precautions   Weight Bearing Precautions Per Order No   Other Precautions Chair Alarm;Multiple lines; Fall Risk;Cognitive   General   Chart Reviewed Yes   Family/Caregiver Present No   Cognition   Overall Cognitive Status Impaired   Arousal/Participation Alert; Cooperative   Attention Attends with cues to redirect   Orientation Level Oriented X4   Memory Decreased recall of precautions   Following Commands Follows one step commands with increased time or repetition   Comments Pt pleasant, cooperative, and motivated to work with therapy   Bed Mobility   Supine to Sit 4  Minimal assistance   Additional items Assist x 1;HOB elevated; Bedrails; Increased time required;Verbal cues;LE management   Additional Comments Pt ended session seated in recliner with chair alarm on and all needs in reach  Able to sit EOB with supervision assist     Transfers   Sit to Stand 3  Moderate assistance   Additional items Assist x 2; Increased time required;Verbal cues   Stand to Sit 3  Moderate assistance   Additional items Assist x 2; Increased time required;Verbal cues   Stand pivot 3  Moderate assistance   Additional items Assist x 2; Increased time required;Verbal cues   Additional Comments Transfers performed with HHA and b/l knee blocking  During stand pivot transfer, pt required 3 standing and pivoting (bumps) to get to drop arm recliner      Ambulation/Elevation   Gait pattern Not tested  (b/l knee buckling in standing and with SPT)   Balance   Static Sitting Fair -   Dynamic Sitting Fair -   Static Standing Poor -   Dynamic Standing Poor -   Endurance Deficit   Endurance Deficit Yes   Endurance Deficit Description weakness, fatigue, deconditioning Activity Tolerance   Activity Tolerance Patient limited by fatigue   Nurse Made Aware ok to see per RN   Exercises   Knee AROM Long Arc Quad Sitting;20 reps;AROM; Bilateral   Ankle Pumps Sitting;20 reps;AROM; Bilateral   Marching Sitting;20 reps;AROM; Bilateral   Assessment   Prognosis Fair   Problem List Decreased strength;Decreased endurance; Impaired balance;Decreased mobility; Decreased cognition;Obesity;Pain   Assessment Pt continues to present with decreased mobility, strength, balance, and endurance  However, pt is making steady progress towards mobility goals  Pt was able to transfer from supine to sitting EOB with decreased assistance this session  Pt still requires Ax2 for stand pivot transfers due to bilateral knee buckling  During transfer, pt required b/l knee blocking and VCs for hip and knee extension; unable to clear either foot to take a sidestep  Pt educated on seated therex program which she was able to perform with good technique  Pt would benefit from continued acute skilled PT to address above deficits  Continuing to recommend rehab upon d/c     Barriers to Discharge Inaccessible home environment;Decreased caregiver support   Goals   Patient Goals to get out of bed   STG Expiration Date 06/12/21   Plan   Treatment/Interventions Functional transfer training;LE strengthening/ROM; Elevations; Therapeutic exercise; Endurance training;Bed mobility;Gait training;Spoke to nursing;OT;Spoke to case management   Progress Progressing toward goals   PT Frequency Other (Comment)  (3-5x/wk)   Recommendation   PT Discharge Recommendation Post acute rehabilitation services   PT - OK to Discharge Yes  (to rehab, when appropriate)   Rafi 8 in Bed Without Bedrails 2   Lying on Back to Sitting on Edge of Flat Bed 2   Moving Bed to Chair 1   Standing Up From Chair 1   Walk in Room 1   Climb 3-5 Stairs 1   Basic Mobility Inpatient Raw Score 8       Steve Garcia, PT, DPT

## 2021-06-03 ENCOUNTER — TRANSITIONAL CARE MANAGEMENT (OUTPATIENT)
Dept: INTERNAL MEDICINE CLINIC | Facility: CLINIC | Age: 86
End: 2021-06-03

## 2021-06-03 VITALS
SYSTOLIC BLOOD PRESSURE: 130 MMHG | TEMPERATURE: 98.2 F | HEIGHT: 59 IN | HEART RATE: 75 BPM | OXYGEN SATURATION: 95 % | WEIGHT: 174 LBS | RESPIRATION RATE: 16 BRPM | BODY MASS INDEX: 35.08 KG/M2 | DIASTOLIC BLOOD PRESSURE: 61 MMHG

## 2021-06-03 LAB
ALBUMIN SERPL BCP-MCNC: 2.7 G/DL (ref 3.5–5)
ALP SERPL-CCNC: 72 U/L (ref 46–116)
ALT SERPL W P-5'-P-CCNC: 18 U/L (ref 12–78)
ANION GAP SERPL CALCULATED.3IONS-SCNC: 4 MMOL/L (ref 4–13)
AST SERPL W P-5'-P-CCNC: 13 U/L (ref 5–45)
BACTERIA UR CULT: ABNORMAL
BACTERIA UR CULT: ABNORMAL
BILIRUB SERPL-MCNC: 0.82 MG/DL (ref 0.2–1)
BUN SERPL-MCNC: 27 MG/DL (ref 5–25)
CALCIUM ALBUM COR SERPL-MCNC: 10.4 MG/DL (ref 8.3–10.1)
CALCIUM SERPL-MCNC: 9.4 MG/DL (ref 8.3–10.1)
CHLORIDE SERPL-SCNC: 97 MMOL/L (ref 100–108)
CO2 SERPL-SCNC: 32 MMOL/L (ref 21–32)
CREAT SERPL-MCNC: 0.65 MG/DL (ref 0.6–1.3)
EXT SARS-COV-2: NOT DETECTED
GFR SERPL CREATININE-BSD FRML MDRD: 78 ML/MIN/1.73SQ M
GLUCOSE SERPL-MCNC: 101 MG/DL (ref 65–140)
GLUCOSE SERPL-MCNC: 104 MG/DL (ref 65–140)
GLUCOSE SERPL-MCNC: 123 MG/DL (ref 65–140)
INR PPP: 1.98 (ref 0.84–1.19)
OSMOLALITY UR/SERPL-RTO: 285 MMOL/KG (ref 282–298)
OSMOLALITY UR: 351 MMOL/KG
POTASSIUM SERPL-SCNC: 4.3 MMOL/L (ref 3.5–5.3)
PROCALCITONIN SERPL-MCNC: <0.05 NG/ML
PROT SERPL-MCNC: 7.4 G/DL (ref 6.4–8.2)
PROTHROMBIN TIME: 22.4 SECONDS (ref 11.6–14.5)
SARS-COV-2 RNA RESP QL NAA+PROBE: NEGATIVE
SODIUM 24H UR-SCNC: 38 MOL/L
SODIUM SERPL-SCNC: 133 MMOL/L (ref 136–145)

## 2021-06-03 PROCEDURE — TCMXX

## 2021-06-03 PROCEDURE — 84300 ASSAY OF URINE SODIUM: CPT | Performed by: GENERAL PRACTICE

## 2021-06-03 PROCEDURE — 82948 REAGENT STRIP/BLOOD GLUCOSE: CPT

## 2021-06-03 PROCEDURE — 85610 PROTHROMBIN TIME: CPT | Performed by: GENERAL PRACTICE

## 2021-06-03 PROCEDURE — 97530 THERAPEUTIC ACTIVITIES: CPT

## 2021-06-03 PROCEDURE — 83930 ASSAY OF BLOOD OSMOLALITY: CPT | Performed by: GENERAL PRACTICE

## 2021-06-03 PROCEDURE — U0005 INFEC AGEN DETEC AMPLI PROBE: HCPCS | Performed by: GENERAL PRACTICE

## 2021-06-03 PROCEDURE — 97535 SELF CARE MNGMENT TRAINING: CPT

## 2021-06-03 PROCEDURE — 83935 ASSAY OF URINE OSMOLALITY: CPT | Performed by: GENERAL PRACTICE

## 2021-06-03 PROCEDURE — U0003 INFECTIOUS AGENT DETECTION BY NUCLEIC ACID (DNA OR RNA); SEVERE ACUTE RESPIRATORY SYNDROME CORONAVIRUS 2 (SARS-COV-2) (CORONAVIRUS DISEASE [COVID-19]), AMPLIFIED PROBE TECHNIQUE, MAKING USE OF HIGH THROUGHPUT TECHNOLOGIES AS DESCRIBED BY CMS-2020-01-R: HCPCS | Performed by: GENERAL PRACTICE

## 2021-06-03 PROCEDURE — 84145 PROCALCITONIN (PCT): CPT | Performed by: GENERAL PRACTICE

## 2021-06-03 PROCEDURE — 97112 NEUROMUSCULAR REEDUCATION: CPT

## 2021-06-03 PROCEDURE — 99239 HOSP IP/OBS DSCHRG MGMT >30: CPT | Performed by: GENERAL PRACTICE

## 2021-06-03 PROCEDURE — 80053 COMPREHEN METABOLIC PANEL: CPT | Performed by: GENERAL PRACTICE

## 2021-06-03 PROCEDURE — 97110 THERAPEUTIC EXERCISES: CPT

## 2021-06-03 RX ORDER — AMOXICILLIN 250 MG
1 CAPSULE ORAL 2 TIMES DAILY
Refills: 0
Start: 2021-06-03

## 2021-06-03 RX ORDER — PRAVASTATIN SODIUM 40 MG
80 TABLET ORAL DAILY
Refills: 0
Start: 2021-06-03 | End: 2022-02-22

## 2021-06-03 RX ORDER — POLYETHYLENE GLYCOL 3350 17 G/17G
17 POWDER, FOR SOLUTION ORAL DAILY
Refills: 0
Start: 2021-06-04

## 2021-06-03 RX ADMIN — ISOSORBIDE MONONITRATE 30 MG: 30 TABLET, EXTENDED RELEASE ORAL at 07:33

## 2021-06-03 RX ADMIN — LEVOTHYROXINE SODIUM 75 MCG: 75 TABLET ORAL at 05:35

## 2021-06-03 RX ADMIN — DICLOFENAC SODIUM 2 G: 10 GEL TOPICAL at 07:34

## 2021-06-03 RX ADMIN — FUROSEMIDE 20 MG: 20 TABLET ORAL at 07:34

## 2021-06-03 RX ADMIN — AMIODARONE HYDROCHLORIDE 100 MG: 200 TABLET ORAL at 07:33

## 2021-06-03 RX ADMIN — CEFTRIAXONE SODIUM 1000 MG: 10 INJECTION, POWDER, FOR SOLUTION INTRAVENOUS at 05:23

## 2021-06-03 RX ADMIN — LISINOPRIL 2.5 MG: 2.5 TABLET ORAL at 07:34

## 2021-06-03 RX ADMIN — ASPIRIN 81 MG: 81 TABLET, COATED ORAL at 07:33

## 2021-06-03 NOTE — PLAN OF CARE
Problem: OCCUPATIONAL THERAPY ADULT  Goal: Performs self-care activities at highest level of function for planned discharge setting  See evaluation for individualized goals  Description: Treatment Interventions: ADL retraining, Functional transfer training, UE strengthening/ROM, Visual perceptual retraining, Endurance training, Cognitive reorientation, Patient/family training, Equipment evaluation/education, Neuromuscular reeducation, Fine motor coordination activities, Compensatory technique education, Continued evaluation, Energy conservation, Activityengagement          See flowsheet documentation for full assessment, interventions and recommendations  Outcome: Progressing  Note: Limitation: Decreased ADL status, Decreased UE ROM, Decreased UE strength, Decreased Safe judgement during ADL, Decreased cognition, Decreased endurance, Decreased sensation, Decreased fine motor control, Decreased self-care trans, Decreased high-level ADLs, Mood limitation  Prognosis: Fair  Assessment: Pt participated in occupational therapy with focus on activity tolerance, bed mob, unsupported sitting balance and tolerance for pt engagement in functional self-care task/oral care, functional transfers/stand pivot to bedside commode  Pt cleared by RN/[place] for pt participation in therapy  Pt receivedHOB raised/supine pt sitting upright and agreeable to therapy following pt Identifiers confirmed  Pt pleasant cooperative and report her goal to get stronger  Pt reported need for toileting and bedside commode set up at bedside  Pt required assist x 2 for commode transfer 2* pt decreased overall strength, coordination and balance  Pt able to void and bm noted  Pt will require post acute rehab services to continue to address these above noted pt deficits which currently impair pt ADL and functional mob  Pt chair alarm active post session all needs within reach       OT Discharge Recommendation: Post acute rehabilitation services  OT - OK to Discharge: Yes(to rehab when medically cleared)

## 2021-06-03 NOTE — TRANSPORTATION MEDICAL NECESSITY
Section I - General Information    Name of Patient: Devyn Garay                 : 1929    Medicare #: [id number]  Transport Date: 21 (PCS is valid for round trips on this date and for all repetitive trips in the 60-day range as noted below )  Origin: [facility] [id number]                                                         Destination:  [facility]   Is the pt's stay covered under Medicare Part A (PPS/DRG)   []     Closest appropriate facility? If no, why is transport to more distant facility required? Yes  If hospice pt, is this transport related to pt's terminal illness? NA     Section II - Medical Necessity Questionnaire  Ambulance transportation is medically necessary only if other means of transport are contraindicated or would be potentially harmful to the patient  To meet this requirement, the patient must either be "bed confined" or suffer from a condition such that transport by means other than ambulance is contraindicated by the patient's condition  The following questions must be answered by the medical professional signing below for this form to be valid:    1)  Describe the MEDICAL CONDITION (physical and/or mental) of this patient AT [address] [place] [address] that requires the patient to be transported in an ambulance and why transport by other means is contraindicated by the patient's condition: bed confined; mod assist x2-3; unable to transfer; unable to safely tolerate seated position for transport; stroke-like symptoms; fall risk    2) Is the patient "bed confined" as defined below? Yes  To be "be confined" the patient must satisfy all three of the following conditions: (1) unable to get up from bed without Assistance; AND (2) unable to ambulate; AND (3) unable to sit in a chair or wheelchair  3) Can this patient safely be transported by car or wheelchair van (i e , seated during transport without a medical attendant or monitoring)? No    4) In addition to completing questions 1-3 above, please check any of the following conditions that apply*:   *Note: supporting documentation for any boxes checked must be maintained in the patient's medical records  If hosp-hosp transfer, describe services needed at 2nd facility not available at 1st facility? Medical attendant required   Unable to tolerate seated position for time needed to transport   Other(specify) fall risk    Section III - Signature of Physician or Healthcare Professional  I certify that the above information is true and correct based on my evaluation of this patient, and represent that the patient requires transport by ambulance and that other forms of transport are contraindicated  I understand that this information will be used by the Centers for Medicare and Medicaid Services (CMS) to support the determination of medical necessity for ambulance services, and I represent that I have personal knowledge of the patient's condition at time of transport  []  If this box is checked, I also certify that the patient is physically or mentally incapable of signing the ambulance service's claim and that the institution with which I am affiliated has furnished care, services, or assistance to the patient  My signature below is made on behalf of the patient pursuant to 42 CFR §424 36(b)(4)  In accordance with 42 CFR §424 37, the specific reason(s) that the patient is physically or mentally incapable of signing the claim form is as follows:     Signature of Physician* or Healthcare Professional______________________________________________  Signature Date 06/03/21 (For scheduled repetitive transports, this form is not valid for transports performed more than 60 days after this date)    Printed Name & Credentials of Physician or Healthcare Professional (MD, DO, RN, etc ) ADRIEL Nevarez    *Form must be signed by patient's attending physician for scheduled, repetitive transports   For non-repetitive, unscheduled ambulance transports, if unable to obtain the signature of the attending physician, any of the following may sign (choose appropriate option below)  [] Physician Assistant []  Clinical Nurse Specialist []  Registered Nurse  []  Nurse Practitioner  [x] Discharge Planner

## 2021-06-03 NOTE — DISCHARGE SUMMARY
[facility]  Discharge- Coit Steffany 7/24/1929, 80 y o  female MRN: [id number]  Unit/Bed#: [address] 702-01 Encounter: [id number]  Primary Care Provider: Iasías Celaya MD   Date and time admitted to hospital: 5/28/2021 11:37 AM    * Stroke-like symptoms  Assessment & Plan  mild dysarthria  status post CTA head/neck: also revealing severe L distal common artery and ICA stenosis  CVA workup underway  Neurology on board  MRI showed recent ischemia left thalamus descending to the upper midbrain, without hemorrhage or mass effect  Echo shows severe AS  Contd cva prophylaxis with coumadin/ASA/statin  LDL not at goal but reported allergy to lipitor  Increase Pravachol to prevent future CVAs  PT/OT eval  PMR consult      Aortic stenosis, severe  Assessment & Plan  · OP f/u w/ cardio  · Lasix to keep euvolemic    Low grade fever  Assessment & Plan  · Noted 5/31  · ? UTI  · Rocephin 3 day course completed  · Could be related to CVA  · Procals neg    Arthritis  Assessment & Plan  Knee XRs revealing moderate effusions  Ortho consult apprecaited  No clinical symptoms to suggest septic arthritis  Pt refused arthrocentesis  Voltaren gel to knees and shoulders    Colitis  Assessment & Plan  ?  S/p CT A/P revealing moderate stool burden with possibility of stercolar colitis  No clinical symptoms of colitis  cont bowel regimen    Chronic diastolic CHF (congestive heart failure) (HCC)  Assessment & Plan  Wt Readings from Last 3 Encounters:   05/28/21 78 9 kg (174 lb)   05/28/21 78 9 kg (174 lb)   05/11/21 83 4 kg (183 lb 13 8 oz)     Compensated  Cont lasix        Atrial flutter (HCC)  Assessment & Plan  continue amiodarone/BB  INR therapeutic    Hyponatremia  Assessment & Plan  · C/w Lasix  · Improved w/ 1 5L FR  · U Na 38 and U Osm 351 and S Osm WNL  · Will need to be cautious w/ IVF and salt tabs in setting of severe AS    Acquired hypothyroidism  Assessment & Plan  Elevated TSH however trended down from most recent one  Dose was just recently changed  Thus cont at present dose  Repeat TFT in 1-3 weeks as outpatient (4-6 weeks from dose change)    Type 2 diabetes mellitus with stage 3a chronic kidney disease, without long-term current use of insulin [facility])  Assessment & Plan  Lab Results   Component Value Date    HGBA1C 5 8 (H) 05/28/2021       Recent Labs     06/02/21  1035 06/02/21  1707 06/02/21  2125 06/03/21  0619   POCGLU 129 132 122 123         Blood Sugar Average: Last 72 hrs:  (P) 693 [id number]     -diet controlled    Essential hypertension  Assessment & Plan  Stable  BP goal normotension per neuro    Coronary artery disease involving native coronary artery of native heart without angina pectoris  Assessment & Plan  Stable, CP free  Cont cardiac meds      Discharging Physician / Practitioner: Florecita Barriga DO  PCP: Huey Tellez MD  Admission Date:   Admission Orders (From admission, onward)     Ordered        05/28/21 1329  Inpatient Admission  Once                   Discharge Date: 06/03/21    Disposition:      Short Term Rehab or SNF at [place] [id number] (see below)    For Discharges to [facility] SNF:   · [address] Physician    Reason for Admission: CVA    Discharge Diagnoses:     Please see assessment and plan section above for further details regarding discharge diagnoses  Resolved Problems  Date Reviewed: 6/3/2021    None          Consultations During Hospital Stay:  · Neuro  · Ortho  · PMR    Procedures Performed:   · none     Medication Adjustments and Discharge Medications:  · Summary of Medication Adjustments made as a result of this hospitalization: Pravachol increased  Stopped Tramadol  · Medication Dosing Tapers - Please refer to Discharge Medication List for details on any medication dosing tapers (if applicable to patient)    · Medications being temporarily held (include recommended restart time): n/a  · Discharge Medication List: See after visit summary for reconciled discharge medications  Wound Care Recommendations:  When applicable, please see wound care section of After Visit Summary  Diet Recommendations at Discharge:  Diet -        Diet Orders   (From admission, onward)             Start     Ordered    06/02/21 0920  Diet Kraig/CHO Controlled; Consistent Carbohydrate Diet Level 1 (4 carb servings/60 grams CHO/meal); Sodium 2 GM, Fluid Restriction 1500 ML  Diet effective now     Question Answer Comment   Diet Type Kraig/CHO Controlled    Kraig/CHO Controlled Consistent Carbohydrate Diet Level 1 (4 carb servings/60 grams CHO/meal)    Other Restriction(s): Sodium 2 GM    Other Restriction(s): Fluid Restriction 1500 ML    RD to adjust diet per protocol? Yes        06/02/21 0919                Instructions for any Catheters / Lines Present at Discharge (including removal date, if applicable): n/a    Significant Findings / Test Results:   · See above    Incidental Findings:   · none     Test Results Pending at Discharge (will require follow up):   · none     Outpatient Tests Requested:  · Check INR tomorrow  · Rehab will monitor CMP  · TFTs in 1-3 weeks  · Lipid panel in 6 weeks    Complications:  none    Hospital Course:     Lauri Jang is a 80 y o  female patient who originally presented to the hospital on 5/28/2021 due to concern for CVA  MRI confirmed CVA  Pravachol increased  Voltaren given for knee and shoulder pain  HypoNa noted for which pt put on 1 5L FR  C/o twitching in UEs which should improve with rehab  Condition at Discharge: stable     Discharge Day Visit / Exam:     Subjective:  C/o intermittent twitching in extremities    Vitals: Blood Pressure: 130/61 (06/03/21 0708)  Pulse: 75 (06/03/21 0708)  Temperature: 98 2 °F (36 8 °C) (06/03/21 0708)  Temp Source: Oral (06/02/21 2221)  Respirations: 16 (06/02/21 2221)  Height: 4' 11" (149 9 cm) (05/28/21 1522)  Weight - Scale: 78 9 kg (174 lb) (05/28/21 1522)  SpO2: 95 % (06/03/21 0708)  Exam:   Physical Exam  HENT:      Head: Normocephalic and atraumatic  Nose: Nose normal       Mouth/Throat:      Mouth: Mucous membranes are moist    Eyes:      Extraocular Movements: Extraocular movements intact  Conjunctiva/sclera: Conjunctivae normal    Neck:      Musculoskeletal: Normal range of motion and neck supple  Cardiovascular:      Rate and Rhythm: Normal rate and regular rhythm  Pulmonary:      Effort: Pulmonary effort is normal       Breath sounds: Normal breath sounds  Abdominal:      General: Bowel sounds are normal  There is no distension  Palpations: Abdomen is soft  Tenderness: There is no abdominal tenderness  Musculoskeletal: Normal range of motion  Right lower leg: No edema  Left lower leg: No edema  Skin:     General: Skin is warm and dry  Neurological:      Mental Status: She is alert and oriented to person, place, and time  Mental status is at baseline  Discussion with Family: yesterday    Goals of Care Discussions:  · Code Status at Discharge: Level 1 - Full Code  · Were there any Goals of Care Discussions during Hospitalization?: No  · Results of any General Goals of Care Discussions: n/a   · POLST Completed: No   · If POLST Completed, Summary of POLST Agreement Provided Here: n/a   · OK to Rehospitalize if Needed? Yes    Discharge instructions/Information to patient and family:   See after visit summary section titled Discharge Instructions for information provided to patient and family  Planned Readmission: no      Discharge Statement:  I spent 35 minutes discharging the patient  This time was spent on the day of discharge  I had direct contact with the patient on the day of discharge  Greater than 50% of the total time was spent examining patient, answering all patient questions, arranging and discussing plan of care with patient as well as directly providing post-discharge instructions  Additional time then spent on discharge activities      ** Please Note: This note has been constructed using a voice recognition system **

## 2021-06-03 NOTE — DISCHARGE INSTRUCTIONS
Please check INR tomorrow  If INR less than 2 would give increased dose of coumadin  Please monitor CMP  Please check TFTs in 1-3 weeks  Please check lipid panel in 6 weeks    Discharge Instructions - Iwona Lorenzo 80 y o  female MRN: [id number]  [facility]    Weight Bearing Status:                                           WBAT bilateral lower extremities    DVT prophylaxis  Per primary team    Pain:  Continue analgesics as directed    Dressing Instructions:   Please keep clean, dry and intact until follow up     Appt Instructions: If you do not have your appointment, please call the clinic at [phone] t  Otherwise followup as scheduled     Contact the office sooner if you experience any increased numbness/tingling in the extremities  Miscellaneous:          Stroke   WHAT YOU NEED TO KNOW:   A stroke happens when blood flow to part of the brain is interrupted  This can cause serious brain damage from a lack of oxygen  Your healthcare providers will help you create goals for your recovery  They will help you and your family or care providers make a plan for care at home to help you reach your goals  The plan will include lifestyle changes you can make to help you manage your health and prevent another stroke  Your discharge instructions will include information on services and equipment you or your family may need  DISCHARGE INSTRUCTIONS:   Call your local emergency number (911 in the [address] [facility]) for any of the following:   · You have any of the following signs of a stroke:      ? Numbness or drooping on one side of your face     ? Weakness in an arm or leg    ? Confusion or difficulty speaking    ? Dizziness, a severe headache, or vision loss       · You have a seizure  · You have chest pain or shortness of breath  Seek care immediately if:   · Your arm or leg feels warm, tender, and painful  It may look swollen and red      · You have loss of balance or coordination  · You have double vision or vision loss  · You have unusual or heavy bleeding  Call your doctor or neurologist if:   · Your blood sugar level or blood pressure is higher or lower than usual     · You have trouble swallowing  · You have trouble having a bowel movement or urinating  · You have questions or concerns about your condition or care  Medicines:  Medicines may be needed to treat high cholesterol, high blood pressure, or diabetes  You may also need any of the following, depending on the kind of stroke you had:  · Antiplatelets , such as aspirin, help prevent blood clots  Take your antiplatelet medicine exactly as directed  These medicines make it more likely for you to bleed or bruise  If you are told to take aspirin, do not take acetaminophen or ibuprofen instead  · Blood thinners  help prevent blood clots  Clots can cause strokes, heart attacks, and death  The following are general safety guidelines to follow while you are taking a blood thinner:    ? Watch for bleeding and bruising while you take blood thinners  Watch for bleeding from your gums or nose  Watch for blood in your urine and bowel movements  Use a soft washcloth on your skin, and a soft toothbrush to brush your teeth  This can keep your skin and gums from bleeding  If you shave, use an electric shaver  Do not play contact sports  ? Tell your dentist and other healthcare providers that you take a blood thinner  Wear a bracelet or necklace that says you take this medicine  ? Do not start or stop any other medicines unless your healthcare provider tells you to  Many medicines cannot be used with blood thinners  ? Take your blood thinner exactly as prescribed by your healthcare provider  Do not skip does or take less than prescribed  Tell your provider right away if you forget to take your blood thinner, or if you take too much  ? Warfarin  is a blood thinner that you may need to take   The following are things you should be aware of if you take warfarin:     § Foods and medicines can affect the amount of warfarin in your blood  Do not make major changes to your diet while you take warfarin  Warfarin works best when you eat about the same amount of vitamin K every day  Vitamin K is found in green leafy vegetables and certain other foods  Ask for more information about what to eat when you are taking warfarin  § You will need to see your healthcare provider for follow-up visits when you are on warfarin  You will need regular blood tests  These tests are used to decide how much medicine you need  · Take your medicine as directed  Contact your healthcare provider if you think your medicine is not helping or if you have side effects  Tell him or her if you are allergic to any medicine  Keep a list of the medicines, vitamins, and herbs you take  Include the amounts, and when and why you take them  Bring the list or the pill bottles to follow-up visits  Carry your medicine list with you in case of an emergency  Know the warning signs of a stroke: The words BE FAST can help you remember and recognize warning signs of a stroke:  · B = Balance:  Sudden loss of balance    · E = Eyes:  Loss of vision in one or both eyes    · F = Face:  Face droops on one side    · A = Arms:  Arm drops when both arms are raised    · S = Speech:  Speech is slurred or sounds different    · T = Time:  Time to get help immediately     Recovery testing: Your healthcare provider will test your recovery 90 days (3 months) after your stroke  This may be done over the phone or in person  Your provider will ask how well you can do the activities you did before the stroke  He or she will also ask how well you can do your daily activities without help  Your provider may make recommendations for you based on your test  For example, you may need someone to help you walk safely   You may also need help with daily activities, such as getting dressed  Based on your answers, your provider may do this test again over time  Manage the effects of a stroke:   · Go to stroke rehabilitation (rehab) if directed  Rehab is a program run by specialists who will help you recover abilities you may have lost  Specialists include physical, occupational, and speech therapists  Physical therapists help you gain strength or keep your balance  Occupational therapists teach you new ways to do daily activities  Your therapy may include movements for everyday activities  An example is being able to raise yourself from a chair  A speech therapist helps you improve your ability to talk and swallow  · Make your home safe  Remove anything you might trip over  Tape electrical cords down  Keep paths clear throughout your home  Make sure your home is well lit  Put nonslip materials on surfaces that might be slippery  An example is your bathtub or shower floor  A cane or walker may help you keep your balance as you walk  Prevent another stroke:   · Manage health conditions  A condition such as diabetes can increase your risk for a stroke  Control your blood sugar level if you have hyperglycemia or diabetes  Take your prescribed medicines and check your blood sugar level as directed  · Check your blood pressure as directed  High blood pressure can increase your risk for a stroke  Follow your healthcare provider's directions for controlling your blood pressure  · Do not use nicotine products or illegal drugs  Nicotine and other chemicals in cigarettes and cigars can cause blood vessel damage  Nicotine and illegal drugs both increase your risk for a stroke  Ask your healthcare provider for information if you currently smoke or use drugs and need help to quit  E- cigarettes or smokeless tobacco still contain nicotine  Talk to your healthcare provider before you use these products  · Talk to your healthcare provider about alcohol    Alcohol can raise your blood pressure  The recommended limit is 2 drinks in a day for men and 1 drink in a day for women  Do not binge drink or save a week's worth of alcohol to drink in 1 or 2 days  Limit weekly amounts as directed by your provider  · Eat a variety of healthy foods  Healthy foods include whole-grain breads, low-fat dairy products, beans, lean meats, and fish  Eat at least 5 servings of fruits and vegetables each day  Choose foods that are low in fat, cholesterol, salt, and sugar  Eat foods that are high in potassium, such as potatoes and bananas  A dietitian can help you create healthy meal plans  · Maintain a healthy weight  Ask your healthcare provider how much you should weigh  Ask him or her to help you create a weight loss plan if you are overweight  He or she can help you create small goals if you have a lot of weight to lose  · Exercise as directed  Exercise can lower your blood pressure, cholesterol, weight, and blood sugar levels  Healthcare providers will help you create exercise goals  They can also help you make a plan to reach your goals  For example, you can break exercise into 10 minute periods, 3 times in the day  Find an exercise that you enjoy  This will make it easier for you to reach your exercise goals  · Manage stress  Stress can raise your blood pressure  Find new ways to relax, such as deep breathing or listening to music  What you need to know about depression after a stroke:  Talk to your healthcare provider if you have depression that continues or is getting worse  Your provider may be able to help treat your depression  Your provider can also recommend support groups for you to join  A support group is a place to talk with others who have had a stroke  It may also help to talk to friends and family members about how you are feeling   Tell your family and friends to let your healthcare provider know if they see any signs of depression:  · Extreme sadness    · Avoiding social interaction with family or friends    · A lack of interest in things you once enjoyed    · Irritability    · Trouble sleeping    · Low energy levels    · A change in eating habits or sudden weight gain or loss    Follow up with your doctor or neurologist as directed: You may need to come in for regular tests of your brain function  Your provider may refer you to a specialist, or to other kinds of care  An example is palliative (comfort) care  Your provider can also give information about respite care to anyone who helps care for you  Respite care is a service to help caregivers take a break or get more rest  Write down your questions so you remember to ask them during your visits  For support and more information:   · National Stroke Association  [address] , [place] [address]  Phone: 7- 191 - [phone]  Web Address: [employer] On license of [facility]  [address] Information is for End User's use only and may not be sold, redistributed or otherwise used for commercial purposes  All illustrations and images included in CareNotes® are the copyrighted property of A D A M , Inc  or [facility] [place][note 2] Tuyet Martinez   The above information is an  only  It is not intended as medical advice for individual conditions or treatments  Talk to your doctor, nurse or pharmacist before following any medical regimen to see if it is safe and effective for you

## 2021-06-03 NOTE — COVID-19 HEALTH CARE FACILITY TRANSFER FORM
[facility] to [address] Transfer - COVID-19 Assessment             Name of Patient: Little Alvarado                : 1929          Transport Date: 21       Has the patient been laboratory tested for COVID-19? []  NO  If No,Test was not indicated per  CDC Testing Criteria   May Transfer Patient   [x] YES  If Tested Results below     COVID-19 References              SARS-COV-2   Date/Time Value Ref Range Status   2021 Not Detected Not Detected, Negative, Inconclusive Final   2021 Not Detected Not Detected, Negative, Inconclusive Final     SARS-CoV-2   Date/Time Value Ref Range Status   2021 09:48 AM Negative Negative Final     Comment:                  Question is to be completed for any patient who tests positive for COVID-19        1  [] Yes [May Transfer] [] No [May Not Transfer]          Question is to be completed for any patient who is tested for COVID-19            2    [x] Yes [May Not Transfer] [] No [May Transfer]          Signature of Physician or Health Care Professional: Som Bright DO 21          Form updated as of 3/24/2020

## 2021-06-03 NOTE — PHYSICAL THERAPY NOTE
Physical Therapy Progress Note     06/03/21 1155   PT Last Visit   PT Visit Date 06/03/21   Note Type   Note Type Treatment   Pain Assessment   Pain Assessment Tool Pain Assessment not indicated - pt denies pain   Pain Score No Pain   Restrictions/Precautions   Other Precautions Chair Alarm; Fall Risk   Subjective   Subjective The pt  states that she was incontinent in the chair, and that she needs to use the commode  Nursing requesting assistance to return to the patient to bed due to the elevated bed height with the mattress, and the patient's significant difficulty returning to bed yesterday  Bed Mobility   Sit to Supine 3  Moderate assistance   Additional items Assist x 1; Increased time required;Verbal cues;LE management   Transfers   Sit to Stand 4  Minimal assistance   Additional items Assist x 1; Increased time required;Verbal cues   Stand to Sit 4  Minimal assistance   Additional items Assist x 1; Increased time required;Verbal cues   Stand pivot 3  Moderate assistance   Additional items Assist x 1; Increased time required;Verbal cues   Ambulation/Elevation   Gait pattern Excessively slow; Step to;Short stride; Inconsistent sandra;Decreased foot clearance; Forward Flexion   Gait Assistance 3  Moderate assist   Additional items Assist x 1;Verbal cues; Tactile cues   Assistive Device Rolling walker   Distance 3 feet, 5 feet  Balance   Static Sitting Fair   Dynamic Sitting Fair -   Static Standing Poor +   Ambulatory Poor   Activity Tolerance   Activity Tolerance Patient tolerated treatment well   Nurse Made Aware MARTÍN SUAREZ, RN  Assessment   Prognosis Fair   Problem List Decreased strength;Decreased endurance; Impaired balance;Decreased mobility; Decreased cognition;Obesity;Pain   Assessment The pt  required less assistance for mobility during this session  She was able to stand for several minutes after being incontinent while being cleaned   She does require extended time for all mobility, but she has improving tolerance  She will benefit from continued therapy in order to maximize her functional mobility and independence  Barriers to Discharge Inaccessible home environment;Decreased caregiver support   Goals   Patient Goals To get her things together to go to rehab  STG Expiration Date 06/12/21   PT Treatment Day 2   Plan   Treatment/Interventions Functional transfer training;LE strengthening/ROM; Therapeutic exercise; Endurance training;Patient/family training;Bed mobility;Gait training   Progress Progressing toward goals   PT Frequency   (3-5x a week )   Recommendation   PT Discharge Recommendation Post acute rehabilitation services   Equipment Recommended [facility] Recommended Wheeled walker   AM-PAC Basic Mobility Inpatient   Turning in Bed Without Bedrails 3   Lying on Back to Sitting on Edge of Flat Bed 2   Moving Bed to Chair 2   Standing Up From Chair 3   Walk in Room 2   Climb 3-5 Stairs 1   Basic Mobility Inpatient Raw Score 13   Basic Mobility Standardized Score 33 99     An AM-PAC Basic Mobility standardized score less than 42 9 suggests the patient may benefit from discharge to post-acute rehab services      Chau Rios, PTA

## 2021-06-03 NOTE — ASSESSMENT & PLAN NOTE
Lab Results   Component Value Date    HGBA1C 5 8 (H) 05/28/2021       Recent Labs     06/02/21  1035 06/02/21  1707 06/02/21 2125 06/03/21  0619   POCGLU 129 132 122 123         Blood Sugar Average: Last 72 hrs:  (P) 964 [id number]     -diet controlled

## 2021-06-03 NOTE — ASSESSMENT & PLAN NOTE
Elevated TSH however trended down from most recent one  Dose was just recently changed   Thus cont at present dose  Repeat TFT in 1-3 weeks as outpatient (4-6 weeks from dose change)

## 2021-06-03 NOTE — PLAN OF CARE
Problem: PHYSICAL THERAPY ADULT  Goal: Performs mobility at highest level of function for planned discharge setting  See evaluation for individualized goals  Description: Treatment/Interventions: Functional transfer training, LE strengthening/ROM, Therapeutic exercise, Endurance training, Patient/family training, Equipment eval/education, Bed mobility, Gait training, Spoke to nursing  Equipment Recommended: Wheelchair       See flowsheet documentation for full assessment, interventions and recommendations  6/3/2021 1417 by Cookie Ford PTA  Outcome: Progressing  Note: Prognosis: Fair  Problem List: Decreased strength, Decreased endurance, Impaired balance, Decreased mobility, Decreased cognition, Obesity, Pain  Assessment: The pt  required less assistance for mobility during this session  She was able to stand for several minutes after being incontinent while being cleaned  She does require extended time for all mobility, but she has improving tolerance  She will benefit from continued therapy in order to maximize her functional mobility and independence  Barriers to Discharge: Inaccessible home environment, Decreased caregiver support        PT Discharge Recommendation: Post acute rehabilitation services     PT - OK to Discharge: Yes(to rehab, when appropriate)    See flowsheet documentation for full assessment  6/3/2021 1400 by Cookie Ford PTA  Outcome: Progressing  Note: Prognosis: Fair  Problem List: Decreased strength, Decreased endurance, Impaired balance, Decreased mobility, Decreased cognition, Obesity, Pain  Assessment: The pt  has improved strength, balance, and activity tolerance as compared to the prior session  She was able to maintain her balance at the edge of the bed both statically and dynamically  She was able to complete therapeutic exercise with instruction for technique and even was able to perform long-arc quads against light manual resistance for five repetitions   She demonstrated significant improvement in transfers and she was able to ambulate a short distance today  She will benefit from continued therapy in order to maximize her functional mobility and safety  Barriers to Discharge: Inaccessible home environment, Decreased caregiver support        PT Discharge Recommendation: Post acute rehabilitation services     PT - OK to Discharge: Yes(to rehab, when appropriate)    See flowsheet documentation for full assessment

## 2021-06-03 NOTE — PLAN OF CARE
Problem: PHYSICAL THERAPY ADULT  Goal: Performs mobility at highest level of function for planned discharge setting  See evaluation for individualized goals  Description: Treatment/Interventions: Functional transfer training, LE strengthening/ROM, Therapeutic exercise, Endurance training, Patient/family training, Equipment eval/education, Bed mobility, Gait training, Spoke to nursing  Equipment Recommended: Wheelchair       See flowsheet documentation for full assessment, interventions and recommendations  Outcome: Progressing  Note: Prognosis: Fair  Problem List: Decreased strength, Decreased endurance, Impaired balance, Decreased mobility, Decreased cognition, Obesity, Pain  Assessment: The pt  has improved strength, balance, and activity tolerance as compared to the prior session  She was able to maintain her balance at the edge of the bed both statically and dynamically  She was able to complete therapeutic exercise with instruction for technique and even was able to perform long-arc quads against light manual resistance for five repetitions  She demonstrated significant improvement in transfers and she was able to ambulate a short distance today  She will benefit from continued therapy in order to maximize her functional mobility and safety  Barriers to Discharge: Inaccessible home environment, Decreased caregiver support        PT Discharge Recommendation: Post acute rehabilitation services     PT - OK to Discharge: Yes(to rehab, when appropriate)    See flowsheet documentation for full assessment

## 2021-06-03 NOTE — CASE MANAGEMENT
CM was informed that pt is medically stable for dc today  CM arranged with [facility] BLS for a 12pm dc to [facility]  CM notified pt, pt's son in-law Geralynn Sandifer at [facility] and pt's bedside RN Dino Diaz of dc time  Facility transfer form and CMN completed  CMN signed and placed in medical regard bin

## 2021-06-03 NOTE — PHYSICAL THERAPY NOTE
Physical Therapy Progress Note     06/03/21 1000   PT Last Visit   PT Visit Date 06/03/21   Note Type   Note Type Treatment   Pain Assessment   Pain Assessment Tool Pain Assessment not indicated - pt denies pain   Pain Score No Pain   Restrictions/Precautions   Other Precautions Chair Alarm; Fall Risk  (Alarm active post session )   Subjective   Subjective The pt  states that she is feeling better, and that she would like to sit in the chair  She notes that she was incontinent and that she needs to use the bathroom  Bed Mobility   Sit to Supine 3  Moderate assistance   Additional items Assist x 1; Increased time required;Verbal cues;LE management   Transfers   Sit to Stand 4  Minimal assistance   Additional items Assist x 2; Increased time required;Verbal cues   Stand to Sit 4  Minimal assistance   Additional items Assist x 1   Stand pivot 3  Moderate assistance   Additional items Assist x 1; Increased time required;Verbal cues   Ambulation/Elevation   Gait pattern Excessively slow; Step to;Short stride; Inconsistent sandra;Decreased foot clearance; Forward Flexion   Gait Assistance 3  Moderate assist   Additional items Assist x 1;Verbal cues   Assistive Device Rolling walker   Distance 3 feet  Balance   Static Sitting Fair   Dynamic Sitting Fair -   Static Standing Poor +   Ambulatory Poor   Activity Tolerance   Activity Tolerance Patient tolerated treatment well   Nurse Made Aware Klarissa Hargrove RN  Exercises   TKR Sitting;Bilateral;AROM;AAROM;10 reps  (x2 sets )   Assessment   Prognosis Fair   Problem List Decreased strength;Decreased endurance; Impaired balance;Decreased mobility; Decreased cognition;Obesity;Pain   Assessment The pt  has improved strength, balance, and activity tolerance as compared to the prior session  She was able to maintain her balance at the edge of the bed both statically and dynamically   She was able to complete therapeutic exercise with instruction for technique and even was able to perform long-arc quads against light manual resistance for five repetitions  She demonstrated significant improvement in transfers and she was able to ambulate a short distance today  She will benefit from continued therapy in order to maximize her functional mobility and safety  Barriers to Discharge Inaccessible home environment;Decreased caregiver support   Goals   Patient Goals To sit out for a while  STG Expiration Date 06/12/21   PT Treatment Day 1   Plan   Treatment/Interventions Functional transfer training;LE strengthening/ROM; Therapeutic exercise; Endurance training;Patient/family training;Bed mobility;Gait training   Progress Progressing toward goals   PT Frequency   (3-5x a week )   Recommendation   PT Discharge Recommendation Post acute rehabilitation services   Equipment Recommended [facility] Recommended Wheeled walker   AM-PAC Basic Mobility Inpatient   Turning in Bed Without Bedrails 3   Lying on Back to Sitting on Edge of Flat Bed 2   Moving Bed to Chair 2   Standing Up From Chair 3   Walk in Room 2   Climb 3-5 Stairs 1   Basic Mobility Inpatient Raw Score 13   Basic Mobility Standardized Score 33 99     An AM-PAC Basic Mobility standardized score less than 42 9 suggests the patient may benefit from discharge to post-acute rehab services      Sergey Smith, PTA

## 2021-06-03 NOTE — PLAN OF CARE
Problem: Potential for Falls  Goal: Patient will remain free of falls  Description: INTERVENTIONS:  - Assess patient frequently for physical needs  -  Identify cognitive and physical deficits and behaviors that affect risk of falls    -  [place] fall precautions as indicated by assessment   - Educate patient/family on patient safety including physical limitations  - Instruct patient to call for assistance with activity based on assessment  - Modify environment to reduce risk of injury  - Consider OT/PT consult to assist with strengthening/mobility  Outcome: Progressing     Problem: Prexisting or High Potential for Compromised Skin Integrity  Goal: Skin integrity is maintained or improved  Description: INTERVENTIONS:  - Identify patients at risk for skin breakdown  - Assess and monitor skin integrity  - Assess and monitor nutrition and hydration status  - Monitor labs   - Assess for incontinence   - Turn and reposition patient  - Assist with mobility/ambulation  - Relieve pressure over bony prominences  - Avoid friction and shearing  - Provide appropriate hygiene as needed including keeping skin clean and dry  - Evaluate need for skin moisturizer/barrier cream  - Collaborate with interdisciplinary team   - Patient/family teaching  - Consider wound care consult   Outcome: Progressing     Problem: PAIN - ADULT  Goal: Verbalizes/displays adequate comfort level or baseline comfort level  Description: Interventions:  - Encourage patient to monitor pain and request assistance  - Assess pain using appropriate pain scale  - Administer analgesics based on type and severity of pain and evaluate response  - Implement non-pharmacological measures as appropriate and evaluate response  - Consider cultural and social influences on pain and pain management  - Notify physician/advanced practitioner if interventions unsuccessful or patient reports new pain  Outcome: Progressing     Problem: INFECTION - ADULT  Goal: Absence or prevention of progression during hospitalization  Description: INTERVENTIONS:  - Assess and monitor for signs and symptoms of infection  - Monitor lab/diagnostic results  - Monitor all insertion sites, i e  indwelling lines, tubes, and drains  - Monitor endotracheal if appropriate and nasal secretions for changes in amount and color  - [place] appropriate cooling/warming therapies per order  - Administer medications as ordered  - Instruct and encourage patient and family to use good hand hygiene technique  - Identify and instruct in appropriate isolation precautions for identified infection/condition  Outcome: Progressing  Goal: Absence of fever/infection during neutropenic period  Description: INTERVENTIONS:  - Monitor WBC    Outcome: Progressing     Problem: SAFETY ADULT  Goal: Patient will remain free of falls  Description: INTERVENTIONS:  - Assess patient frequently for physical needs  -  Identify cognitive and physical deficits and behaviors that affect risk of falls    -  [place] fall precautions as indicated by assessment   - Educate patient/family on patient safety including physical limitations  - Instruct patient to call for assistance with activity based on assessment  - Modify environment to reduce risk of injury  - Consider OT/PT consult to assist with strengthening/mobility  Outcome: Progressing  Goal: Maintain or return to baseline ADL function  Description: INTERVENTIONS:  -  Assess patient's ability to carry out ADLs; assess patient's baseline for ADL function and identify physical deficits which impact ability to perform ADLs (bathing, care of mouth/teeth, toileting, grooming, dressing, etc )  - Assess/evaluate cause of self-care deficits   - Assess range of motion  - Assess patient's mobility; develop plan if impaired  - Assess patient's need for assistive devices and provide as appropriate  - Encourage maximum independence but intervene and supervise when necessary  - Involve family in performance of ADLs  - Assess for home care needs following discharge   - Consider OT consult to assist with ADL evaluation and planning for discharge  - Provide patient education as appropriate  Outcome: Progressing  Goal: Maintain or return mobility status to optimal level  Description: INTERVENTIONS:  - Assess patient's baseline mobility status (ambulation, transfers, stairs, etc )    - Identify cognitive and physical deficits and behaviors that affect mobility  - Identify mobility aids required to assist with transfers and/or ambulation (gait belt, sit-to-stand, lift, walker, cane, etc )  - [place] fall precautions as indicated by assessment  - Record patient progress and toleration of activity level on Mobility SBAR; progress patient to next Phase/Stage  - Instruct patient to call for assistance with activity based on assessment  - Consider rehabilitation consult to assist with strengthening/weightbearing, etc   Outcome: Progressing     Problem: DISCHARGE PLANNING  Goal: Discharge to home or other facility with appropriate resources  Description: INTERVENTIONS:  - Identify barriers to discharge w/patient and caregiver  - Arrange for needed discharge resources and transportation as appropriate  - Identify discharge learning needs (meds, wound care, etc )  - Arrange for interpretive services to assist at discharge as needed  - Refer to Case Management Department for coordinating discharge planning if the patient needs post-hospital services based on physician/advanced practitioner order or complex needs related to functional status, cognitive ability, or social support system  Outcome: Progressing     Problem: Knowledge Deficit  Goal: Patient/family/caregiver demonstrates understanding of disease process, treatment plan, medications, and discharge instructions  Description: Complete learning assessment and assess knowledge base    Interventions:  - Provide teaching at level of understanding  - Provide teaching via preferred learning methods  Outcome: Progressing     Problem: Nutrition/Hydration-ADULT  Goal: Nutrient/Hydration intake appropriate for improving, restoring or maintaining nutritional needs  Description: Monitor and assess patient's nutrition/hydration status for malnutrition  Collaborate with interdisciplinary team and initiate plan and interventions as ordered  Monitor patient's weight and dietary intake as ordered or per policy  Utilize nutrition screening tool and intervene as necessary  Determine patient's food preferences and provide high-protein, high-caloric foods as appropriate       INTERVENTIONS:  - Monitor oral intake, urinary output, labs, and treatment plans  - Assess nutrition and hydration status and recommend course of action  - Evaluate amount of meals eaten  - Assist patient with eating if necessary   - Allow adequate time for meals  - Recommend/ encourage appropriate diets, oral nutritional supplements, and vitamin/mineral supplements  - Order, calculate, and assess calorie counts as needed  - Recommend, monitor, and adjust tube feedings and TPN/PPN based on assessed needs  - Assess need for intravenous fluids  - Provide specific nutrition/hydration education as appropriate  - Include patient/family/caregiver in decisions related to nutrition  Outcome: Progressing

## 2021-06-03 NOTE — OCCUPATIONAL THERAPY NOTE
Occupational Therapy Treatment Note     06/03/21 1015   OT Last Visit   OT Visit Date 06/03/21   Note Type   Note Type Treatment   Restrictions/Precautions   Weight Bearing Precautions Per Order No   Other Precautions Chair Alarm; Fall Risk   Lifestyle   Autonomy Pt reports recieving assistance w/ ADL/IADL tasks PTA  Pt reports home health assists w/ bathing 2x/wk  Pt does not drive and is retired  Reciprocal Relationships Pt resides w/ spouse who is 80 y o and not able to assist as needed  Pt reports home health aide assists 2x/wk w/ bathing  Service to Others Retired   Intrinsic Gratification Pt enjoys being home   Pain Assessment   Pain Assessment Tool 0-10   Pain Score No Pain   ADL   Where Assessed Edge of bed   Grooming Assistance 5  Supervision/Setup   Grooming Deficit Setup;Supervision/safety; Teeth care   LB Dressing Assistance 2  Maximal Assistance   LB Dressing Deficit Don/doff R sock; Don/doff L sock   Toileting Assistance  2  Maximal Assistance   Toileting Deficit Bedside commode;Setup;Supervison/safety   Bed Mobility   Supine to Sit 3  Moderate assistance   Additional items Assist x 1   Transfers   Sit to Stand 3  Moderate assistance   Additional items Assist x 2   Stand to Sit 3  Moderate assistance   Additional items Assist x 3   Stand pivot 3  Moderate assistance   Additional items Assist x 2   Toilet transfer 3  Moderate assistance   Additional items Assist x 2   Cognition   Overall Cognitive Status Impaired   Arousal/Participation Alert   Attention Attends with cues to redirect   Orientation Level Oriented X4   Memory Decreased recall of recent events   Following Commands Follows one step commands with increased time or repetition   Activity Tolerance   Activity Tolerance Patient limited by fatigue   Assessment   Assessment Pt participated in occupational therapy with focus on activity tolerance, bed mob, unsupported sitting balance and tolerance for pt engagement in functional self-care task/oral care, functional transfers/stand pivot to bedside commode  Pt cleared by RN/[place] for pt participation in therapy  Pt receivedHOB raised/supine pt sitting upright and agreeable to therapy following pt Identifiers confirmed  Pt pleasant cooperative and report her goal to get stronger  Pt reported need for toileting and bedside commode set up at bedside  Pt required assist x 2 for commode transfer 2* pt decreased overall strength, coordination and balance  Pt able to void and bm noted  Pt will require post acute rehab services to continue to address these above noted pt deficits which currently impair pt ADL and functional mob  Pt chair alarm active post session all needs within reach  Plan   Treatment Interventions ADL retraining;Functional transfer training;Patient/family training; Activityengagement   Goal Expiration Date 06/12/21   OT Treatment Day 3   OT Frequency 3-5x/wk   Recommendation   OT Discharge Recommendation Post acute rehabilitation services   AM-PAC Daily Activity Inpatient   Lower Body Dressing 2   Bathing 2   Toileting 2   Upper Body Dressing 2   Grooming 3   Eating 3   Daily Activity Raw Score 14   Daily Activity Standardized Score (Calc for Raw Score >=11) 33 39   AM-PAC Applied Cognition Inpatient   Following a Speech/Presentation 2   Understanding Ordinary Conversation 3   Taking Medications 2   Remembering Where Things Are Placed or Put Away 2   Remembering List of 4-5 Errands 1   Taking Care of Complicated Tasks 1   Applied Cognition Raw Score 11   Applied Cognition Standardized Score 27 03   [place] Index   Grooming Score 0   Dressing Score 5   Toilet Use Score 5   Transfers (Bed/Chair) Score 5   Mobility (Level Surface) Score 0       Tereza BOSS/AMANDA

## 2021-06-03 NOTE — ASSESSMENT & PLAN NOTE
mild dysarthria  status post CTA head/neck: also revealing severe L distal common artery and ICA stenosis  CVA workup underway  Neurology on board  MRI showed recent ischemia left thalamus descending to the upper midbrain, without hemorrhage or mass effect  Echo shows severe AS  Contd cva prophylaxis with coumadin/ASA/statin   LDL not at goal but reported allergy to lipitor  Increase Pravachol to prevent future CVAs  PT/OT eval  PMR consult

## 2021-06-03 NOTE — ASSESSMENT & PLAN NOTE
· C/w Lasix  · Improved w/ 1 5L FR  · U Na 38 and U Osm 351 and S Osm WNL  · Will need to be cautious w/ IVF and salt tabs in setting of severe AS

## 2021-06-04 ENCOUNTER — PATIENT OUTREACH (OUTPATIENT)
Dept: CASE MANAGEMENT | Facility: OTHER | Age: 86
End: 2021-06-04

## 2021-06-04 ENCOUNTER — TELEPHONE (OUTPATIENT)
Dept: NEUROLOGY | Facility: CLINIC | Age: 86
End: 2021-06-04

## 2021-06-04 NOTE — TELEPHONE ENCOUNTER
Tierra Craig from [facility] called to schedule patient for her hospital follow up  She is scheduled for 07/30 with Dr Tessa Gonsales in the [place] office  SLB/Stroke-like symptoms/Medicare    Roxanaart Kocher will need follow up in in 6 weeks with neurovascular attending  She will not require outpatient neurological testing

## 2021-06-06 LAB
BACTERIA BLD CULT: NORMAL
BACTERIA BLD CULT: NORMAL

## 2021-06-09 ENCOUNTER — PATIENT OUTREACH (OUTPATIENT)
Dept: CASE MANAGEMENT | Facility: OTHER | Age: 86
End: 2021-06-09

## 2021-06-10 ENCOUNTER — TELEPHONE (OUTPATIENT)
Dept: NEUROLOGY | Facility: CLINIC | Age: 86
End: 2021-06-10

## 2021-06-10 ENCOUNTER — PATIENT OUTREACH (OUTPATIENT)
Dept: CASE MANAGEMENT | Facility: OTHER | Age: 86
End: 2021-06-10

## 2021-06-10 NOTE — PROGRESS NOTES
This CM Assistant received an email from [employer] with an update on the patient  Per [employer] email on 6/10/21 at 1000 the patient is functioning at the current levels  Please see attached therapy evaluations  LCD 6/15, discharge plan back to [address] [place] [address])  CHF and DMII pathways  DMII no medication and check blood sugar one time per day  CHF - on Lasix, daily weight stable  CRNP in to see resident today, labs reviewed with no new orders  History of OA  Voltaren gel to shoulders and lidocaine patches to low back and bilateral knees  On Coumadin for afib  Stands with support 1 5 minutes  Ambulated 30 ft with min assist  Lower body care and toileting mod assist  This care manager assistant will continue to monitor via chart review throughout bundle episode

## 2021-06-10 NOTE — TELEPHONE ENCOUNTER
Post CVA Discharge Follow Up    Hospitalization: 5/28/2021 - 6/3/2021  The purpose of this phone call is to assess patient's general wellbeing or for any assistance needed with follow-up care  According to chart, patient discharged to [facility] at [phone]  Spoke with nurse Nancy Lara, they report the following:     Since discharge, patient has not experienced any new or worsening stroke-like symptoms  Some weakness of the right side has persisted  She is oriented with no speech abnormalities  Ambulation / ADLs:  Patient mobilizing via wheelchair outside of therapy  During therapy she is able to ambulate with a walker and assistance of 1 staff member  she continues to require assistance with ADLs and tranfers  Patient maintained on a regular consistency diet  She is incontinent at times  Medication Review:  I reviewed medications with them  Started Senna and Miralax  No reported missed doses, medication side effects, or signs of bleeding  Last reported /66    Appointments:  Scheduled stroke hospital follow up appointment is 7/30    There is no estimated discharge date at this time

## 2021-06-14 NOTE — PROGRESS NOTES
Cardiology  Heart Failure   Follow Up Office Visit Note    Tyrell Bradley   80 y o    female   MRN: [id number]  [address]  [address]  [address] [place] [address] Ester Zhou [address]9  [phone]    PCP: Emerald Hickman MD  Cardiologist : Dr Jennifer Medina          Summary of Recommendations  Low-sodium diet, Heart failure education as below  1 5 L fluid restriction in 24 hours  if she continues to gain weight would increase her furosemide to 40 mg daily  add Zetia 10 mg daily  Reassess lipids 6-8 weeks  F/U neurology 7/30  monitor thyroid- per PCP/medicine  Follow up will be scheduled with Dr Jennifer Medina 6 weeks  Her daughter prefers the [facility]      Impression/plan  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  Small vessel disease   --Also found to have severe stenosis left common carotid  On aspirin, statin  Follow-up neurology  I discussed the common carotid finding with the daughter  For now, given her decompensation from her stroke, will pursue medical management  She needs better lipid control  I added ezetimibe  Chronic diastolic heart failure  Low-salt diet, continue to monitor weights    Wt Readings from Last 3 Encounters:   06/16/21 80 7 kg (178 lb)   05/28/21 78 9 kg (174 lb)   05/28/21 78 9 kg (174 lb)     Wt Readings from Last 3 Encounters:   05/28/21 78 9 kg (174 lb)   05/28/21 78 9 kg (174 lb)   05/11/21 83 4 kg (183 lb 13 8 oz)     --Beta-blocker:   Metoprolol succinate 25 mg daily  --Diuretic:   Furosemide 20 mg daily ( decreased 8/2020 due to frequent urination)  --ACE/ARB/ARNI:   Lisinopril 2 5 mg daily  --2 g sodium diet, 1500 cc fluid restriction  Daily weights, call weight gain 2-3 lb in 1 day or 5 lb in 5 days  Aortic stenosis, severe  Medical management  CAD  Prior LAD stent 2014  On aspirin, statin, beta-blocker, nitrate  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 dual Ch PPM Interogation 3/15/2021: AP: 27 4%  : 99 9% (>40%~CHB)   No significant high rates  Paroxysmal atrial fibrillation rate controlled with metoprolol succinate  Antiarrhythmic therapy with amiodarone 200 mg daily, maintaining normal sinus rhythm  On anticoagulation with warfarin monitored by her PCP ( currently the facility)  6/14/21: INR 2 9  -LFTS normal  TSH  Improving, now 6, was 8 and prior to that, 14  History atrial flutter status post ablation  Hypertension, essential  /62  On lisinopril, metoprolol succinate, loop diuretic, isosorbide  Hyperlipidemia  On  Pravastatin 80 mg/d   5/29/21:   ·  intolerance to atorvastatin  -->Add Zetia 10 mg/d  Reassess lipids 6 weeks   PVD/carotid disease  · CT 5/28/21: Severe stenosis of the left distal common carotid artery and proximal cervical internal carotid artery  The remainder of the cervical internal carotid artery is normal in caliber  History hyponatremia  Last sodium 138  left bundle branch block   Ambulatory dysfunction  hypothyroidism medication just recently adjusted  Cardiac testing  · 3dTEE 3/10/20  EF 60%  No RWMA  moderate LVH  Mild LAE  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, with free respirophasic mobility between right and left atrial cavities  There was a medium-sized patent foramen ovale  Moderate aortic stenosis  Aortic root dilatation 37 mm   cardiac catheterization 3/2020  Left main: Normal   LAD: The vessel was medium sized  There was mild plaque  There were no significant lersions  Circumflex: The vessel was normal sized and dominant, giving rise to three OM branches, a posterolateral branch, and the PDA  No significant atherosclerotic disease was seen  RCA: The vessel was normal sized and non-dominant  There was a 60% ostial stenosis that normalized with administration of intra-coronary NTG  There were no significant lesions  CARDIAC STRUCTURES: There was moderate aortic stenosis  The mean gradient was 30 mmHg  Dx: symptomatic aortic stenosis  Non-obstructive atherosclerotic plaque   Normal LVEDP  Plan: continue TAVR evaluation  · TTE 6/1/21 EF 60%  Wall thickness mild-to-moderately increased, mild LVH  Grade 1 DD  Mild biatrial enlargement  Mild MR  Severe AS  Mean gradient 32 mm Hg, moderate TR                 HPI: Jhon Mcginnis is a 79 yo female with CAD, paroxysmal atrial fibrillation, atrial flutter status post ablation, heart block resulting in syncope, subsequent pacemaker, chronic left bundle branch block  She has ambulatory dysfunction, hypertension and hyperlipidemia  She follows with Dr Yaron Cerda  She had a prior LAD stent  She has been chronically anticoagulated with warfarin given cost prohibitive Eliquis, and has been maintaining sinus rhythm on low-dose amiodarone  She is intolerant to atorvastatin  She had been on  Pravastatin 80 mg daily  She last saw her cardiologist  August 2020  It was known her dyspnea workup prompted evaluation of aortic stenosis  CHELA suggested a valve area of 1 3 March 2020  TAVR was not worked up, partly due to [employer]  Also given the patient's declining functional capacity, this was deferred    5/9-5/11/21 fall  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 as well as 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  Discharged to [address] [place] [address]  5/28/21 with concern for stroke-like symptoms, from [facility]  Dysarthric  Her INR was therapeutic  CT showed moderate chronic microangiopathic change  CT and MRI showed changes consistent with left thalamus  ischemia:  Lacunar stroke,  on warfarin with a  therapeutic INR, as well as severe stenosis of left common carotid  She is not a candidate for tPA given a therapeutic INR and no IR target on CTA for intervention  Aspirin was added  Neurology recommended consideration of PCSK9 inhibitor  She was not followed by Cardiology  Notes indicate at discharge pravastatin was increased however she was discharged on 80 mg previously   She was placed on a 1 5 L fluid restriction given hyponatremia  Discharge sodium 133,  improved from a low of 129  [facility]  Discharge weight :  174 lb  Discharge creatinine: 0 65  Discharge diuretics: Lasix 20 mg daily    6/16/21  Hospital follow-up  She presents from [facility]  Her daughter is here with her today  she now has a lot of deficits from her stroke  She has some dysarthria  She has lower extremity weakness, which is worse than prior  She had significant issues with arthritis of her knees  She has difficulty sometimes feeding herself  She has some right-sided weakness more than the left  She is in a wheelchair   labs 6/7/21:  Creatinine 0 74 potassium 4 5 albumin 2 6   Na 138  LFTs normal  TSH 6 0  Wt today: mechanical lift at the facility  Wt Readings from Last 3 Encounters:   06/16/21 80 7 kg (178 lb)   05/28/21 78 9 kg (174 lb)   05/28/21 78 9 kg (174 lb)   We discussed the significant issues: Severe Aortic stenosis, (medical management) her stroke, severe distal left common carotid stenosis  Aggressive risk factor modification:  she is on aspirin, she is on warfarin, with therapeutic INR  Today, I added ezetimibe  She is intolerant to atorvastatin  Potentially, we could try rosuvastatin  She is on a beta-blocker, and ACE-inhibitor  I strongly encouraged adherence to low-salt diet  We talked extensively about this particularly since she is in the facility  This is not just avoiding the salt shaker  She is on low-dose furosemide 20 mg daily  She does have urinary incontinence  In the past she was on higher dose but decreased given urinary frequency  This needs to be monitored closely in conjunction with her weights  Mrs Ken Almazan go is alert and oriented x 3  She tells me she does not want any operations        I have spent  40 minutes with Patient and family today in which greater than 50% of this time was spent in counseling/coordination of care regarding Intructions for management, Patient and family education, Importance of tx compliance and Risk factor reductions  Assessment  Diagnoses and all orders for this visit:    Hospital discharge follow-up    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[facility])    Chronic diastolic heart failure (HCC)    Coronary artery disease involving native coronary artery of native heart without angina pectoris    LBBB (left bundle branch block)    Nonrheumatic aortic valve stenosis    Heart block AV second degree    Aortic stenosis, severe    Ambulatory dysfunction    Anticoagulated on Coumadin    History of atrial flutter    History of placement of stent in LAD coronary artery    Mixed hyperlipidemia    Pacemaker        Past Medical History:   Diagnosis Date    Abnormal nuclear stress test     last assessed 04/03/2017    Anxiety     Arthritis     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    Bundle branch block, left     Cardiomyopathy ([facility] [address] )     Chest pain 3/9/2019    Chronic pain     chronic b/l shoulder pain    Chronic pain of right knee 4/2/2019    Coronary artery disease     Diabetes mellitus ([facility] )     Gait disturbance 3/2/2018    GERD (gastroesophageal reflux disease)     H/O atrial flutter     History of DVT (deep vein thrombosis)     History of pulmonary embolism     Hyperlipidemia     Hypertension     Hypothyroid     Renal calculi     Shortness of breath        Review of Systems   Constitutional: Positive for malaise/fatigue  Negative for chills  Cardiovascular: Negative for chest pain, claudication, cyanosis, dyspnea on exertion, irregular heartbeat, leg swelling, near-syncope, orthopnea, palpitations, paroxysmal nocturnal dyspnea and syncope  Respiratory: Negative for cough and shortness of breath  Musculoskeletal: Positive for joint pain  Ambulatory dysfunction   Gastrointestinal: Negative for heartburn and nausea  Neurological: Negative for dizziness, focal weakness, headaches, light-headedness and weakness     All other systems reviewed and are negative  Allergies   Allergen Reactions    Atorvastatin      Reaction unknown 10/23/2018-    Other Rash     Patient sensitive to adhesives from tape           Current Outpatient Medications:     Accu-Chek FastClix Lancets MISC, Check once daily, Disp: 100 each, Rfl: 3    Accu-Chek Softclix Lancets lancets, Use daily Use as instructed, Disp: 100 each, Rfl: 1    acetaminophen (TYLENOL) 325 mg tablet, Take 975 mg by mouth 3 (three) times a day, Disp: , Rfl:     amiodarone 200 mg tablet, Take 0 5 tablets (100 mg total) by mouth daily, Disp: 45 tablet, Rfl: 3    Aspirin Low Dose 81 MG EC tablet, TAKE 1 TABLET BY MOUTH EVERY DAY, Disp: 90 tablet, Rfl: 0    Blood Glucose Monitoring Suppl (Accu-Chek Guide Me) w/Device KIT, Check once daily, Disp: 1 kit, Rfl: 0    Calcium Carbonate-Vitamin D (CALTRATE 600+D PO), Take 1 capsule by mouth 2 (two) times a day  , Disp: , Rfl:     Diclofenac Sodium (VOLTAREN) 1 %, Apply 2 g topically 4 (four) times a day Bilateral shoulders and knees, Disp: 150 g, Rfl: 0    furosemide (LASIX) 20 mg tablet, Take 1 tablet (20 mg total) by mouth daily, Disp: 90 tablet, Rfl: 2    glucose blood (Accu-Chek Guide) test strip, Check once daily, Disp: 100 each, Rfl: 3    isosorbide mononitrate (IMDUR) 30 mg 24 hr tablet, Take 1 tablet (30 mg total) by mouth daily, Disp: 90 tablet, Rfl: 3    Lancets (accu-chek soft touch) lancets, Use daily Use as instructed, Disp: 100 each, Rfl: 3    levothyroxine 75 mcg tablet, Take 1 tablet (75 mcg total) by mouth daily in the early morning, Disp:  , Rfl: 0    Lidocaine (Aspercreme Lidocaine) 4 % PTCH, Apply 3 patches topically daily B/l knees, low back, Disp: , Rfl:     lisinopril (ZESTRIL) 2 5 mg tablet, TAKE ONE TABLET BY MOUTH EVERY DAY, Disp: 90 tablet, Rfl: 1    metoprolol succinate (Toprol XL) 25 mg 24 hr tablet, Take 1 tablet (25 mg total) by mouth daily after dinner, Disp: 90 tablet, Rfl: 3    multivitamin-iron-minerals-folic acid (CENTRUM) chewable tablet, Chew 1 tablet daily  , Disp: , Rfl:     polyethylene glycol (MIRALAX) 17 g packet, Take 17 g by mouth daily, Disp:  , Rfl: 0    pravastatin (PRAVACHOL) 40 mg tablet, Take 2 tablets (80 mg total) by mouth daily, Disp: , Rfl: 0    senna-docusate sodium (SENOKOT S) 8 6-50 mg per tablet, Take 1 tablet by mouth 2 (two) times a day, Disp:  , Rfl: 0    warfarin (COUMADIN) 2 5 mg tablet, Take 1 tablet (2 5 mg total) by mouth daily, Disp: , Rfl: 0    Social History     Socioeconomic History    Marital status: /Civil Union     Spouse name: Not on file    Number of children: Not on file    Years of education: Not on file    Highest education level: Not on file   Occupational History    Not on file   Tobacco Use    Smoking status: Never Smoker    Smokeless tobacco: Never Used   Substance and Sexual Activity    Alcohol use: Yes     Comment: occasional    Drug use: Yes     Types: Marijuana     Comment: MEDICAL MARIJUANA-HAS NOT USED FOR 3 WEEKS    Sexual activity: Not on file   Other Topics Concern    Not on file   Social History Narrative    Not on file     Social Determinants of Health     Financial Resource Strain:     Difficulty of Paying Living Expenses:    Food Insecurity:     Worried About Running Out of Food in the Last Year:     Ran Out of Food in the Last Year:    Transportation Needs:     Lack of Transportation (Medical):      Lack of Transportation (Non-Medical):    Physical Activity:     Days of Exercise per Week:     Minutes of Exercise per Session:    Stress:     Feeling of Stress :    Social Connections:     Frequency of Communication with Friends and Family:     Frequency of Social Gatherings with Friends and Family:     Attends [employer] Services:     Active Member of Clubs or Organizations:     Attends Club or Organization Meetings:     Marital Status:    Intimate Partner Violence:     Fear of Current or Ex-Partner:     Emotionally Abused:     Physically Abused:     Sexually Abused:        Family History   Problem Relation Age of Onset   Uma Watts Parkinsonism Sister    Uma Watts Cancer Sister         unknown type    Heart attack Neg Hx     Stroke Neg Hx     Anuerysm Neg Hx     Clotting disorder Neg Hx     Arrhythmia Neg Hx     Heart failure Neg Hx     Coronary artery disease Neg Hx        Physical Exam  Vitals and nursing note reviewed  Constitutional:       General: She is not in acute distress  Appearance: She is not diaphoretic  HENT:      Head: Normocephalic and atraumatic  Eyes:      Conjunctiva/sclera: Conjunctivae normal    Cardiovascular:      Rate and Rhythm: Normal rate and regular rhythm  Pulses: Intact distal pulses  Heart sounds: Murmur heard  Harsh midsystolic murmur is present with a grade of 3/6 at the upper right sternal border radiating to the neck  Pulmonary:      Effort: Pulmonary effort is normal       Breath sounds: Normal breath sounds  Abdominal:      General: Bowel sounds are normal       Palpations: Abdomen is soft  Musculoskeletal:         General: Normal range of motion  Cervical back: Normal range of motion and neck supple  Skin:     General: Skin is warm and dry  Neurological:      Mental Status: She is alert and oriented to person, place, and time  Comments: Dysarthria         Vitals: not currently breastfeeding     Wt Readings from Last 3 Encounters:   05/28/21 78 9 kg (174 lb)   05/28/21 78 9 kg (174 lb)   05/11/21 83 4 kg (183 lb 13 8 oz)         Labs & Results:  Lab Results   Component Value Date    WBC 6 46 06/02/2021    HGB 11 6 06/02/2021    HCT 36 2 06/02/2021    MCV 92 06/02/2021     06/02/2021     BNP   Date Value Ref Range Status   05/15/2015 211 (H) 0 - 99 pg/mL Final     Comment:     The above 1 analytes were performed by [id number]     05/12/2015 186 (H) 0 - 99 pg/mL Final     Comment:     The above 1 analytes were performed by [id number]     10/21/2014 233 (H) 0 - 100 pg/mL Final     Comment:     The above 1 analytes [facility] [address]       No components found for: CHEM    Results for orders placed during the hospital encounter of 21    Echo complete with contrast if indicated    Narrative  [address]  [facility], [address] [facility]  [phone]    Transthoracic Echocardiogram  2D, M-mode, Doppler, and Color Doppler    Study date:  2021    Patient: Katrin Nj  MR number: [id number]  Account number: [de-identified]  : 1929  Age: 80 years  Gender: Female  Status: Inpatient  Location: Bedside  Height: 59 in  Weight: 173 6 lb  BP: 160/ 62 mmHg    Indications: CVA  Diagnoses: I63 9 - Cerebral infarction, unspecified    Sonographer:  Grady Sullivan RDCS  Interpreting Physician:  Nancyann Goodell, MD  Primary Physician:  Grady Sullivan MD  Referring Physician:  Sienna Gross DO  Group:  [facility]  Cardiology Fellow:  Hermilo Gross DO    SUMMARY    LEFT VENTRICLE:  Systolic function was normal by visual assessment  Ejection fraction was estimated to be 60 %  There were no regional wall motion abnormalities  Wall thickness was mildly to moderately increased  There was mild concentric hypertrophy  Doppler parameters were consistent with abnormal left ventricular relaxation (grade 1 diastolic dysfunction)  LEFT ATRIUM:  The atrium was mildly dilated  RIGHT ATRIUM:  The atrium was mildly dilated  MITRAL VALVE:  There was mild annular calcification  There was mild regurgitation  AORTIC VALVE:  The valve was trileaflet  Leaflets exhibited moderately to markedly increased thickness, moderate calcification, and markedly reduced cuspal separation  There was severe stenosis  There was trace regurgitation  Valve mean gradient was 32 mmHg    The aortic valve obstructive index (by VTI) was 0 22  Estimated aortic valve area (by VTI) was 0 76 cmï¾²  TRICUSPID VALVE:  There was moderate regurgitation  PULMONIC VALVE:  There was trace regurgitation  HISTORY: PRIOR HISTORY: DM2  CAD  Hypertension  LBBB  Atrial fibrillation  V-tach  Cardiomyopathy  Cardiac stents  Pacemaker  Hyperlipidemia  PROCEDURE: The procedure was performed at the bedside  This was a routine study  The transthoracic approach was used  The study included complete 2D imaging, M-mode, complete spectral Doppler, and color Doppler  The heart rate was 79 bpm,  at the start of the study  Echocardiographic views were limited due to poor acoustic window availability and decreased penetration  This was a technically difficult study  LEFT VENTRICLE: Size was normal  Systolic function was normal by visual assessment  Ejection fraction was estimated to be 60 %  There were no regional wall motion abnormalities  Wall thickness was mildly to moderately increased  There was  mild concentric hypertrophy  DOPPLER: Doppler parameters were consistent with abnormal left ventricular relaxation (grade 1 diastolic dysfunction)  RIGHT VENTRICLE: The size was normal  Systolic function was normal  A pacing wire was present in the ventricular cavity  LEFT ATRIUM: The atrium was mildly dilated  RIGHT ATRIUM: The atrium was mildly dilated  MITRAL VALVE: There was mild annular calcification  There was normal leaflet separation  DOPPLER: The transmitral velocity was within the normal range  There was no evidence for stenosis  There was mild regurgitation  AORTIC VALVE: The valve was trileaflet  Leaflets exhibited moderately to markedly increased thickness, moderate calcification, and markedly reduced cuspal separation  DOPPLER: There was severe stenosis  There was trace regurgitation  TRICUSPID VALVE: The valve structure was normal  There was normal leaflet separation   DOPPLER: The transtricuspid velocity was within the normal range  There was no evidence for stenosis  There was moderate regurgitation  PULMONIC VALVE: Leaflets exhibited normal thickness, no calcification, and normal cuspal separation  DOPPLER: The transpulmonic velocity was within the normal range  There was trace regurgitation  PERICARDIUM: There was no pericardial effusion  AORTA: The root exhibited normal size  SYSTEMIC VEINS: IVC: The inferior vena cava was normal in size and course  Respirophasic changes were normal     MEASUREMENT TABLES    2D MEASUREMENTS  LVOT   (Reference normals)  Diam   21 mm   (--)    DOPPLER MEASUREMENTS  LVOT   (Reference normals)  Peak lon   88 cm/s   (--)  Mean lon   60 cm/s   (--)  VTI   16 5 cm   (--)  Peak gradient   3 mmHg   (--)  Mean gradient   1 6 mmHg   (--)  Stroke vol   57 15 ml   (--)  Aortic valve   (Reference normals)  Peak lon   379 cm/s   (--)  Mean lon   266 cm/s   (--)  VTI   75 cm   (--)  Peak gradient   57 mmHg   (--)  Mean gradient   32 mmHg   (--)  Obstr index, VTI   0 22    (--)  Valve area, VTI   0 76 cmï¾²   (--)  Area index, VTI   0 44 cmï¾²/mï¾²   (--)  Obstr index, Vmax   0 23    (--)  Valve area, Vmax   0 8 cmï¾²   (--)  Area index, Vmax   0 46 cmï¾²/mï¾²   (--)  Obstr index, Vmean   0 23    (--)  Valve area, Vmean   0 8 cmï¾²   (--)  Area index, Vmean   0 46 cmï¾²/mï¾²   (--)    SYSTEM MEASUREMENT TABLES    2D  %FS: 32 25 %  Ao Diam: 3 59 cm  EDV(Teich): 82 16 ml  EF(Teich): 60 8 %  ESV(Teich): 32 21 ml  IVSd: 1 22 cm  LA Area: 22 4 cm2  LA Diam: 3 2 cm  LVEDV MOD A4C: 87 1 ml  LVEF MOD A4C: 55 18 %  LVESV MOD A4C: 39 04 ml  LVIDd: 4 28 cm  LVIDs: 2 9 cm  LVLd A4C: 7 01 cm  LVLs A4C: 6 13 cm  LVOT Diam: 2 13 cm  LVPWd: 1 22 cm  RA Area: 18 99 cm2  RVIDd: 3 97 cm  SV MOD A4C: 48 07 ml  SV(Teich): 49 96 ml    CW  AV Env  Ti: 279 73 ms  AV VTI: 70 78 cm  AV Vmax: 3 59 m/s  AV Vmean: 2 53 m/s  AV maxP 72 mmHg  AV meanP 85 mmHg  TR Vmax: 2 73 m/s  TR maxP 79 mmHg    MM  TAPSE: 2 08 cm    PW  JN (VTI): 0 83 cm2  JN Vmax: 0 87 cm2  AVAI (VTI): 0 cm2/m2  AVAI Vmax: 0 cm2/m2  E' Sept: 0 05 m/s  E/E' Sept: 19 74  LVOT Env  Ti: 281 64 ms  LVOT VTI: 16 38 cm  LVOT Vmax: 0 87 m/s  LVOT Vmean: 0 58 m/s  LVOT maxPG: 3 06 mmHg  LVOT meanP 55 mmHg  LVSI Dopp: 33 65 ml/m2  LVSV Dopp: 58 55 ml  MV A Steven: 0 94 m/s  MV Dec Ventura: 4 02 m/s2  MV DecT: 225 66 ms  MV E Steven: 0 91 m/s  MV E/A Ratio: 0 97  MV PHT: 65 44 ms  MVA By PHT: 3 36 cm2    [facility] Accredited Echocardiography [facility]    Prepared and electronically signed by    Arias Meléndez MD  Signed 2021 09:56:20    No results found for this or any previous visit  This note was completed in part utilizing [employer] fluency direct voice recognition software  Grammatical errors, random word insertion, spelling mistakes, and incomplete sentences may be an occasional consequence of the system secondary to software limitations, ambient noise and hardware issues  At the time of dictation, efforts were made to edit, clarify and /or correct errors  Please read the chart carefully and recognize, using context, where substitutions have occurred    If you have any questions or concerns about the context, text or information contained within the body of this dictation, please contact myself, the provider, for further clarification

## 2021-06-15 ENCOUNTER — TRANSITIONAL CARE MANAGEMENT (OUTPATIENT)
Dept: INTERNAL MEDICINE CLINIC | Facility: CLINIC | Age: 86
End: 2021-06-15

## 2021-06-16 ENCOUNTER — OFFICE VISIT (OUTPATIENT)
Dept: CARDIOLOGY CLINIC | Facility: CLINIC | Age: 86
End: 2021-06-16
Payer: MEDICARE

## 2021-06-16 VITALS
BODY MASS INDEX: 35.88 KG/M2 | DIASTOLIC BLOOD PRESSURE: 62 MMHG | SYSTOLIC BLOOD PRESSURE: 118 MMHG | WEIGHT: 178 LBS | OXYGEN SATURATION: 94 % | HEIGHT: 59 IN | HEART RATE: 71 BPM

## 2021-06-16 DIAGNOSIS — I35.0 NONRHEUMATIC AORTIC VALVE STENOSIS: ICD-10-CM

## 2021-06-16 DIAGNOSIS — I44.1 HEART BLOCK AV SECOND DEGREE: ICD-10-CM

## 2021-06-16 DIAGNOSIS — I44.7 LBBB (LEFT BUNDLE BRANCH BLOCK): ICD-10-CM

## 2021-06-16 DIAGNOSIS — Z95.5 HISTORY OF PLACEMENT OF STENT IN LAD CORONARY ARTERY: Chronic | ICD-10-CM

## 2021-06-16 DIAGNOSIS — I35.0 AORTIC STENOSIS, SEVERE: ICD-10-CM

## 2021-06-16 DIAGNOSIS — Z79.01 ANTICOAGULATED ON COUMADIN: ICD-10-CM

## 2021-06-16 DIAGNOSIS — Z86.79 HISTORY OF ATRIAL FLUTTER: Chronic | ICD-10-CM

## 2021-06-16 DIAGNOSIS — R26.2 AMBULATORY DYSFUNCTION: ICD-10-CM

## 2021-06-16 DIAGNOSIS — I25.10 CORONARY ARTERY DISEASE INVOLVING NATIVE CORONARY ARTERY OF NATIVE HEART WITHOUT ANGINA PECTORIS: Chronic | ICD-10-CM

## 2021-06-16 DIAGNOSIS — I50.32 CHRONIC DIASTOLIC HEART FAILURE (HCC): ICD-10-CM

## 2021-06-16 DIAGNOSIS — Z95.0 PACEMAKER: ICD-10-CM

## 2021-06-16 DIAGNOSIS — Z09 HOSPITAL DISCHARGE FOLLOW-UP: Primary | ICD-10-CM

## 2021-06-16 DIAGNOSIS — E78.2 MIXED HYPERLIPIDEMIA: Chronic | ICD-10-CM

## 2021-06-16 DIAGNOSIS — I63.9 THALAMIC STROKE (HCC): ICD-10-CM

## 2021-06-16 PROCEDURE — 99215 OFFICE O/P EST HI 40 MIN: CPT | Performed by: NURSE PRACTITIONER

## 2021-06-16 RX ORDER — EZETIMIBE 10 MG/1
10 TABLET ORAL DAILY
Qty: 30 TABLET | Refills: 3 | Status: SHIPPED | OUTPATIENT
Start: 2021-06-16 | End: 2021-07-02

## 2021-06-16 NOTE — LETTER
June 16, 2021     Amandeep Andrade, [address] [place] [address]    Patient: Leonidas Rai   YOB: 1929   Date of Visit: 6/16/2021       Dear Dr Maximo Hernández: Thank you for referring Candie Rios to me for evaluation  Below are my notes for this consultation  If you have questions, please do not hesitate to call me  I look forward to following your patient along with you  Sincerely,        FERMIN Jackson        CC: MD Clarence Gimenez, [place]  6/16/2021 11:32 AM  Sign when Signing Visit  Cardiology  Heart Failure   Follow Up Office Visit Note    Leonidas Rai   80 y o    female   MRN: [id number]  [address]  [address]  [place] [address] [facility] [address]  [phone]    PCP: Amandeep Andrade MD  Cardiologist : Dr Rehan Campbell          Summary of Recommendations  Low-sodium diet, Heart failure education as below  1 5 L fluid restriction in 24 hours  if she continues to gain weight would increase her furosemide to 40 mg daily  add Zetia 10 mg daily  Reassess lipids 6-8 weeks  F/U neurology 7/30  monitor thyroid- per PCP/medicine  Follow up will be scheduled with Dr Rehan Campbell 6 weeks  Her daughter prefers the [place] office      Impression/plan  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  Small vessel disease   --Also found to have severe stenosis left common carotid  On aspirin, statin  Follow-up neurology  I discussed the common carotid finding with the daughter  For now, given her decompensation from her stroke, will pursue medical management  She needs better lipid control  I added ezetimibe  Chronic diastolic heart failure    Low-salt diet, continue to monitor weights    Wt Readings from Last 3 Encounters:   06/16/21 80 7 kg (178 lb)   05/28/21 78 9 kg (174 lb)   05/28/21 78 9 kg (174 lb)     Wt Readings from Last 3 Encounters:   05/28/21 78 9 kg (174 lb)   05/28/21 78 9 kg (174 lb)   05/11/21 83 4 kg (183 lb 13 8 oz) --Beta-blocker:   Metoprolol succinate 25 mg daily  --Diuretic:   Furosemide 20 mg daily ( decreased 8/2020 due to frequent urination)  --ACE/ARB/ARNI:   Lisinopril 2 5 mg daily  --2 g sodium diet, 1500 cc fluid restriction  Daily weights, call weight gain 2-3 lb in 1 day or 5 lb in 5 days  Aortic stenosis, severe  Medical management  CAD  Prior LAD stent 2014  On aspirin, statin, beta-blocker, nitrate  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 dual Ch PPM Interogation 3/15/2021: AP: 27 4%  : 99 9% (>40%~CHB)  No significant high rates  Paroxysmal atrial fibrillation rate controlled with metoprolol succinate  Antiarrhythmic therapy with amiodarone 200 mg daily, maintaining normal sinus rhythm  On anticoagulation with warfarin monitored by her PCP ( currently the facility)  6/14/21: INR 2 9  -LFTS normal  TSH  Improving, now 6, was 8 and prior to that, 14  History atrial flutter status post ablation  Hypertension, essential  /62  On lisinopril, metoprolol succinate, loop diuretic, isosorbide  Hyperlipidemia  On  Pravastatin 80 mg/d   5/29/21:   ·  intolerance to atorvastatin  -->Add Zetia 10 mg/d  Reassess lipids 6 weeks   PVD/carotid disease  · CT 5/28/21: Severe stenosis of the left distal common carotid artery and proximal cervical internal carotid artery  The remainder of the cervical internal carotid artery is normal in caliber  History hyponatremia  Last sodium 138  left bundle branch block   Ambulatory dysfunction  hypothyroidism medication just recently adjusted  Cardiac testing  · 3dTEE 3/10/20  EF 60%  No RWMA  moderate LVH  Mild LAE  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, with free respirophasic mobility between right and left atrial cavities  There was a medium-sized patent foramen ovale  Moderate aortic stenosis  Aortic root dilatation 37 mm   cardiac catheterization 3/2020  Left main: Normal   LAD: The vessel was medium sized  There was mild plaque   There were no significant lersions  Circumflex: The vessel was normal sized and dominant, giving rise to three OM branches, a posterolateral branch, and the PDA  No significant atherosclerotic disease was seen  RCA: The vessel was normal sized and non-dominant  There was a 60% ostial stenosis that normalized with administration of intra-coronary NTG  There were no significant lesions  CARDIAC STRUCTURES: There was moderate aortic stenosis  The mean gradient was 30 mmHg  Dx: symptomatic aortic stenosis  Non-obstructive atherosclerotic plaque  Normal LVEDP  Plan: continue TAVR evaluation  · TTE 6/1/21 EF 60%  Wall thickness mild-to-moderately increased, mild LVH  Grade 1 DD  Mild biatrial enlargement  Mild MR  Severe AS  Mean gradient 32 mm Hg, moderate TR                 HPI: Shaheen Haynes is a 79 yo female with CAD, paroxysmal atrial fibrillation, atrial flutter status post ablation, heart block resulting in syncope, subsequent pacemaker, chronic left bundle branch block  She has ambulatory dysfunction, hypertension and hyperlipidemia  She follows with Dr Juli Johnston  She had a prior LAD stent  She has been chronically anticoagulated with warfarin given cost prohibitive Eliquis, and has been maintaining sinus rhythm on low-dose amiodarone  She is intolerant to atorvastatin  She had been on  Pravastatin 80 mg daily  She last saw her cardiologist  August 2020  It was known her dyspnea workup prompted evaluation of aortic stenosis  CHELA suggested a valve area of 1 3 March 2020  TAVR was not worked up, partly due to Arnaud  Also given the patient's declining functional capacity, this was deferred    5/9-5/11/21 fall  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 as well as 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  Discharged to [facility]  5/28/21 with concern for stroke-like symptoms, from [facility]  Dysarthric  Her INR was therapeutic   CT showed moderate chronic microangiopathic change  CT and MRI showed changes consistent with left thalamus  ischemia:  Lacunar stroke,  on warfarin with a  therapeutic INR, as well as severe stenosis of left common carotid  She is not a candidate for tPA given a therapeutic INR and no IR target on CTA for intervention  Aspirin was added  Neurology recommended consideration of PCSK9 inhibitor  She was not followed by Cardiology  Notes indicate at discharge pravastatin was increased however she was discharged on 80 mg previously  She was placed on a 1 5 L fluid restriction given hyponatremia  Discharge sodium 133,  improved from a low of 129  [facility]  Discharge weight :  174 lb  Discharge creatinine: 0 65  Discharge diuretics: Lasix 20 mg daily    6/16/21  Hospital follow-up  She [facility] from [facility]  Her daughter is here with her today  she now has a lot of deficits from her stroke  She has some dysarthria  She has lower extremity weakness, which is worse than prior  She had significant issues with arthritis of her knees  She has difficulty sometimes feeding herself  She has some right-sided weakness more than the left  She is in a wheelchair   labs 6/7/21:  Creatinine 0 74 potassium 4 5 albumin 2 6   Na 138  LFTs normal  TSH 6 0  Wt today: mechanical lift at the facility  Wt Readings from Last 3 Encounters:   06/16/21 80 7 kg (178 lb)   05/28/21 78 9 kg (174 lb)   05/28/21 78 9 kg (174 lb)   We discussed the significant issues: Severe Aortic stenosis, (medical management) her stroke, severe distal left common carotid stenosis  Aggressive risk factor modification:  she is on aspirin, she is on warfarin, with therapeutic INR  Today, I added ezetimibe  She is intolerant to atorvastatin  Potentially, we could try rosuvastatin  She is on a beta-blocker, and ACE-inhibitor  I strongly encouraged adherence to low-salt diet  We talked extensively about this particularly since she is in the facility  This is not just avoiding the salt shaker  She is on low-dose furosemide 20 mg daily  She does have urinary incontinence  In the past she was on higher dose but decreased given urinary frequency  This needs to be monitored closely in conjunction with her weights  Mrs Miko scott is alert and oriented x 3  She tells me she does not want any operations  I have spent  40 minutes with Patient and family today in which greater than 50% of this time was spent in counseling/coordination of care regarding Intructions for management, Patient and family education, Importance of tx compliance and Risk factor reductions       Assessment  Diagnoses and all orders for this visit:    Hospital discharge follow-up    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[facility])    Chronic diastolic heart failure (HCC)    Coronary artery disease involving native coronary artery of native heart without angina pectoris    LBBB (left bundle branch block)    Nonrheumatic aortic valve stenosis    Heart block AV second degree    Aortic stenosis, severe    Ambulatory dysfunction    Anticoagulated on Coumadin    History of atrial flutter    History of placement of stent in LAD coronary artery    Mixed hyperlipidemia    Pacemaker        Past Medical History:   Diagnosis Date    Abnormal nuclear stress test     last assessed 04/03/2017    Anxiety     Arthritis     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    Bundle branch block, left     Cardiomyopathy ([address] )     Chest pain 3/9/2019    Chronic pain     chronic b/l shoulder pain    Chronic pain of right knee 4/2/2019    Coronary artery disease     Diabetes mellitus ([address] )     Gait disturbance 3/2/2018    GERD (gastroesophageal reflux disease)     H/O atrial flutter     History of DVT (deep vein thrombosis)     History of pulmonary embolism     Hyperlipidemia     Hypertension     Hypothyroid     Renal calculi     Shortness of breath        Review of Systems   Constitutional: Positive for malaise/fatigue  Negative for chills  Cardiovascular: Negative for chest pain, claudication, cyanosis, dyspnea on exertion, irregular heartbeat, leg swelling, near-syncope, orthopnea, palpitations, paroxysmal nocturnal dyspnea and syncope  Respiratory: Negative for cough and shortness of breath  Musculoskeletal: Positive for joint pain  Ambulatory dysfunction   Gastrointestinal: Negative for heartburn and nausea  Neurological: Negative for dizziness, focal weakness, headaches, light-headedness and weakness  All other systems reviewed and are negative  Allergies   Allergen Reactions    Atorvastatin      Reaction unknown 10/23/2018-    Other Rash     Patient sensitive to adhesives from tape           Current Outpatient Medications:     Accu-Chek FastClix Lancets MISC, Check once daily, Disp: 100 each, Rfl: 3    Accu-Chek Softclix Lancets lancets, Use daily Use as instructed, Disp: 100 each, Rfl: 1    acetaminophen (TYLENOL) 325 mg tablet, Take 975 mg by mouth 3 (three) times a day, Disp: , Rfl:     amiodarone 200 mg tablet, Take 0 5 tablets (100 mg total) by mouth daily, Disp: 45 tablet, Rfl: 3    Aspirin Low Dose 81 MG EC tablet, TAKE 1 TABLET BY MOUTH EVERY DAY, Disp: 90 tablet, Rfl: 0    Blood Glucose Monitoring Suppl (Accu-Chek Guide Me) w/Device KIT, Check once daily, Disp: 1 kit, Rfl: 0    Calcium Carbonate-Vitamin D (CALTRATE 600+D PO), Take 1 capsule by mouth 2 (two) times a day  , Disp: , Rfl:     Diclofenac Sodium (VOLTAREN) 1 %, Apply 2 g topically 4 (four) times a day Bilateral shoulders and knees, Disp: 150 g, Rfl: 0    furosemide (LASIX) 20 mg tablet, Take 1 tablet (20 mg total) by mouth daily, Disp: 90 tablet, Rfl: 2    glucose blood (Accu-Chek Guide) test strip, Check once daily, Disp: 100 each, Rfl: 3    isosorbide mononitrate (IMDUR) 30 mg 24 hr tablet, Take 1 tablet (30 mg total) by mouth daily, Disp: 90 tablet, Rfl: 3    Lancets (accu-chek soft touch) lancets, Use daily Use as instructed, Disp: 100 each, Rfl: 3    levothyroxine 75 mcg tablet, Take 1 tablet (75 mcg total) by mouth daily in the early morning, Disp:  , Rfl: 0    Lidocaine (Aspercreme Lidocaine) 4 % PTCH, Apply 3 patches topically daily B/l knees, low back, Disp: , Rfl:     lisinopril (ZESTRIL) 2 5 mg tablet, TAKE ONE TABLET BY MOUTH EVERY DAY, Disp: 90 tablet, Rfl: 1    metoprolol succinate (Toprol XL) 25 mg 24 hr tablet, Take 1 tablet (25 mg total) by mouth daily after dinner, Disp: 90 tablet, Rfl: 3    multivitamin-iron-minerals-folic acid (CENTRUM) chewable tablet, Chew 1 tablet daily  , Disp: , Rfl:     polyethylene glycol (MIRALAX) 17 g packet, Take 17 g by mouth daily, Disp:  , Rfl: 0    pravastatin (PRAVACHOL) 40 mg tablet, Take 2 tablets (80 mg total) by mouth daily, Disp: , Rfl: 0    senna-docusate sodium (SENOKOT S) 8 6-50 mg per tablet, Take 1 tablet by mouth 2 (two) times a day, Disp:  , Rfl: 0    warfarin (COUMADIN) 2 5 mg tablet, Take 1 tablet (2 5 mg total) by mouth daily, Disp: , Rfl: 0    Social History     Socioeconomic History    Marital status: /Civil Union     Spouse name: Not on file    Number of children: Not on file    Years of education: Not on file    Highest education level: Not on file   Occupational History    Not on file   Tobacco Use    Smoking status: Never Smoker    Smokeless tobacco: Never Used   Substance and Sexual Activity    Alcohol use: Yes     Comment: occasional    Drug use: Yes     Types: Marijuana     Comment: MEDICAL MARIJUANA-HAS NOT USED FOR 3 WEEKS    Sexual activity: Not on file   Other Topics Concern    Not on file   Social History Narrative    Not on file     Social Determinants of Health     Financial Resource Strain:     Difficulty of Paying Living Expenses:    Food Insecurity:     Worried About Running Out of Food in the Last Year:     Harry of Food in the Last Year:    Transportation Needs:     Lack of Transportation (Medical):  Lack of Transportation (Non-Medical):    Physical Activity:     Days of Exercise per Week:     Minutes of Exercise per Session:    Stress:     Feeling of Stress :    Social Connections:     Frequency of Communication with Friends and Family:     Frequency of Social Gatherings with Friends and Family:     Attends [employer] Services:     Active Member of Clubs or Organizations:     Attends Club or Organization Meetings:     Marital Status:    Intimate Partner Violence:     Fear of Current or Ex-Partner:     Emotionally Abused:     Physically Abused:     Sexually Abused:        Family History   Problem Relation Age of Onset    Parkinsonism Sister     Cancer Sister         unknown type    Heart attack Neg Hx     Stroke Neg Hx     Anuerysm Neg Hx     Clotting disorder Neg Hx     Arrhythmia Neg Hx     Heart failure Neg Hx     Coronary artery disease Neg Hx        Physical Exam  Vitals and nursing note reviewed  Constitutional:       General: She is not in acute distress  Appearance: She is not diaphoretic  HENT:      Head: Normocephalic and atraumatic  Eyes:      Conjunctiva/sclera: Conjunctivae normal    Cardiovascular:      Rate and Rhythm: Normal rate and regular rhythm  Pulses: Intact distal pulses  Heart sounds: Murmur heard  Harsh midsystolic murmur is present with a grade of 3/6 at the upper right sternal border radiating to the neck  Pulmonary:      Effort: Pulmonary effort is normal       Breath sounds: Normal breath sounds  Abdominal:      General: Bowel sounds are normal       Palpations: Abdomen is soft  Musculoskeletal:         General: Normal range of motion  Cervical back: Normal range of motion and neck supple  Skin:     General: Skin is warm and dry  Neurological:      Mental Status: She is alert and oriented to person, place, and time  Comments: Dysarthria         Vitals: not currently breastfeeding     Wt Readings from Last 3 Encounters:   21 78 9 kg (174 lb)   21 78 9 kg (174 lb)   21 83 4 kg (183 lb 13 8 oz)         Labs & Results:  Lab Results   Component Value Date    WBC 6 46 2021    HGB 11 6 2021    HCT 36 2 2021    MCV 92 2021     2021     BNP   Date Value Ref Range Status   05/15/2015 211 (H) 0 - 99 pg/mL Final     Comment:     The above 1 analytes were performed by Javi Handley [id number]     2015 186 (H) 0 - 99 pg/mL Final     Comment:     The above 1 analytes were performed by Javi Handley [id number]     10/21/2014 233 (H) 0 - 100 pg/mL Final     Comment:     The above 1 analytes were performed by [employer] [facility]       No components found for: CHEM    Results for orders placed during the hospital encounter of 21    Echo complete with contrast if indicated    Narrative  [address]  [phone]    Transthoracic Echocardiogram  2D, M-mode, Doppler, and Color Doppler    Study date:  2021    Patient: Keke Cuadra  MR number: [id number]  Account number: [de-identified]  : 1929  Age: 80 years  Gender: Female  Status: Inpatient  Location: Bedside  Height: 59 in  Weight: 173 6 lb  BP: 160/ 62 mmHg    Indications: CVA  Diagnoses: I63 9 - Cerebral infarction, unspecified    Sonographer:  Zoë Vasquez RDCS  Interpreting Physician:  Anamika Rodriguez MD  Primary Physician:  Zoë Vasquez MD  Referring Physician:  Josefina Dunn DO  Group:  [facility]  Cardiology Fellow:  Sherlyn Cushing, DO    SUMMARY    LEFT VENTRICLE:  Systolic function was normal by visual assessment  Ejection fraction was estimated to be 60 %  There were no regional wall motion abnormalities  Wall thickness was mildly to moderately increased  There was mild concentric hypertrophy    Doppler parameters were consistent with abnormal left ventricular relaxation (grade 1 diastolic dysfunction)  LEFT ATRIUM:  The atrium was mildly dilated  RIGHT ATRIUM:  The atrium was mildly dilated  MITRAL VALVE:  There was mild annular calcification  There was mild regurgitation  AORTIC VALVE:  The valve was trileaflet  Leaflets exhibited moderately to markedly increased thickness, moderate calcification, and markedly reduced cuspal separation  There was severe stenosis  There was trace regurgitation  Valve mean gradient was 32 mmHg  The aortic valve obstructive index (by VTI) was 0 22  Estimated aortic valve area (by VTI) was 0 76 cmï¾²  TRICUSPID VALVE:  There was moderate regurgitation  PULMONIC VALVE:  There was trace regurgitation  HISTORY: PRIOR HISTORY: DM2  CAD  Hypertension  LBBB  Atrial fibrillation  V-tach  Cardiomyopathy  Cardiac stents  Pacemaker  Hyperlipidemia  PROCEDURE: The procedure was performed at the bedside  This was a routine study  The transthoracic approach was used  The study included complete 2D imaging, M-mode, complete spectral Doppler, and color Doppler  The heart rate was 79 bpm,  at the start of the study  Echocardiographic views were limited due to poor acoustic window availability and decreased penetration  This was a technically difficult study  LEFT VENTRICLE: Size was normal  Systolic function was normal by visual assessment  Ejection fraction was estimated to be 60 %  There were no regional wall motion abnormalities  Wall thickness was mildly to moderately increased  There was  mild concentric hypertrophy  DOPPLER: Doppler parameters were consistent with abnormal left ventricular relaxation (grade 1 diastolic dysfunction)  RIGHT VENTRICLE: The size was normal  Systolic function was normal  A pacing wire was present in the ventricular cavity  LEFT ATRIUM: The atrium was mildly dilated  RIGHT ATRIUM: The atrium was mildly dilated      MITRAL VALVE: There was mild annular calcification  There was normal leaflet separation  DOPPLER: The transmitral velocity was within the normal range  There was no evidence for stenosis  There was mild regurgitation  AORTIC VALVE: The valve was trileaflet  Leaflets exhibited moderately to markedly increased thickness, moderate calcification, and markedly reduced cuspal separation  DOPPLER: There was severe stenosis  There was trace regurgitation  TRICUSPID VALVE: The valve structure was normal  There was normal leaflet separation  DOPPLER: The transtricuspid velocity was within the normal range  There was no evidence for stenosis  There was moderate regurgitation  PULMONIC VALVE: Leaflets exhibited normal thickness, no calcification, and normal cuspal separation  DOPPLER: The transpulmonic velocity was within the normal range  There was trace regurgitation  PERICARDIUM: There was no pericardial effusion  AORTA: The root exhibited normal size  SYSTEMIC VEINS: IVC: The inferior vena cava was normal in size and course   Respirophasic changes were normal     MEASUREMENT TABLES    2D MEASUREMENTS  LVOT   (Reference normals)  Diam   21 mm   (--)    DOPPLER MEASUREMENTS  LVOT   (Reference normals)  Peak lon   88 cm/s   (--)  Mean lon   60 cm/s   (--)  VTI   16 5 cm   (--)  Peak gradient   3 mmHg   (--)  Mean gradient   1 6 mmHg   (--)  Stroke vol   57 15 ml   (--)  Aortic valve   (Reference normals)  Peak lon   379 cm/s   (--)  Mean lon   266 cm/s   (--)  VTI   75 cm   (--)  Peak gradient   57 mmHg   (--)  Mean gradient   32 mmHg   (--)  Obstr index, VTI   0 22    (--)  Valve area, VTI   0 76 cmï¾²   (--)  Area index, VTI   0 44 cmï¾²/mï¾²   (--)  Obstr index, Vmax   0 23    (--)  Valve area, Vmax   0 8 cmï¾²   (--)  Area index, Vmax   0 46 cmï¾²/mï¾²   (--)  Obstr index, Vmean   0 23    (--)  Valve area, Vmean   0 8 cmï¾²   (--)  Area index, Vmean   0 46 cmï¾²/mï¾²   (--)    SYSTEM MEASUREMENT TABLES    2D  %FS: 32 25 %  Ao Diam: 3 59 cm  EDV(Teich): 82 16 ml  EF(Teich): 60 8 %  ESV(Teich): 32 21 ml  IVSd: 1 22 cm  LA Area: 22 4 cm2  LA Diam: 3 2 cm  LVEDV MOD A4C: 87 1 ml  LVEF MOD A4C: 55 18 %  LVESV MOD A4C: 39 04 ml  LVIDd: 4 28 cm  LVIDs: 2 9 cm  LVLd A4C: 7 01 cm  LVLs A4C: 6 13 cm  LVOT Diam: 2 13 cm  LVPWd: 1 22 cm  RA Area: 18 99 cm2  RVIDd: 3 97 cm  SV MOD A4C: 48 07 ml  SV(Teich): 49 96 ml    CW  AV Env  Ti: 279 73 ms  AV VTI: 70 78 cm  AV Vmax: 3 59 m/s  AV Vmean: 2 53 m/s  AV maxP 72 mmHg  AV meanP 85 mmHg  TR Vmax: 2 73 m/s  TR maxP 79 mmHg    MM  TAPSE: 2 08 cm    PW  JN (VTI): 0 83 cm2  JN Vmax: 0 87 cm2  AVAI (VTI): 0 cm2/m2  AVAI Vmax: 0 cm2/m2  E' Sept: 0 05 m/s  E/E' Sept: 19 74  LVOT Env  Ti: 281 64 ms  LVOT VTI: 16 38 cm  LVOT Vmax: 0 87 m/s  LVOT Vmean: 0 58 m/s  LVOT maxPG: 3 06 mmHg  LVOT meanP 55 mmHg  LVSI Dopp: 33 65 ml/m2  LVSV Dopp: 58 55 ml  MV A Steven: 0 94 m/s  MV Dec Smyth: 4 02 m/s2  MV DecT: 225 66 ms  MV E Steven: 0 91 m/s  MV E/A Ratio: 0 97  MV PHT: 65 44 ms  MVA By PHT: 3 36 cm2    [employer] Accredited Echocardiography Laboratory    Prepared and electronically signed by    Naomie Valentin MD  Signed 2021 09:56:20    No results found for this or any previous visit  This note was completed in part utilizing [employer] direct voice recognition software  Grammatical errors, random word insertion, spelling mistakes, and incomplete sentences may be an occasional consequence of the system secondary to software limitations, ambient noise and hardware issues  At the time of dictation, efforts were made to edit, clarify and /or correct errors  Please read the chart carefully and recognize, using context, where substitutions have occurred    If you have any questions or concerns about the context, text or information contained within the body of this dictation, please contact myself, the provider, for further clarification

## 2021-06-17 ENCOUNTER — PATIENT OUTREACH (OUTPATIENT)
Dept: CASE MANAGEMENT | Facility: OTHER | Age: 86
End: 2021-06-17

## 2021-06-17 NOTE — PROGRESS NOTES
This CM Assistant received an email indicating the patient was discharged to [place] [id number]  I have removed myself off of the care team, added the CM to the care team who will follow the patient through the bundle episode, sent the care manager a inbasket notifying them of the bundle episode, updated the BPCI form, and updated the care coordination note

## 2021-06-18 ENCOUNTER — PATIENT OUTREACH (OUTPATIENT)
Dept: INTERNAL MEDICINE CLINIC | Facility: CLINIC | Age: 86
End: 2021-06-18

## 2021-06-18 NOTE — PROGRESS NOTES
Chart review- patient now at [facility] assisted living  PCP appointment canceled for today  Spoke [facility] christ patient in their pathways program or personal care   Will monitor chart during bundle episode

## 2021-06-24 ENCOUNTER — TELEPHONE (OUTPATIENT)
Dept: NEUROLOGY | Facility: CLINIC | Age: 86
End: 2021-06-24

## 2021-06-24 NOTE — TELEPHONE ENCOUNTER
21-30 Day Post CVA Discharge Follow Up    Hospitalization: 5/28/2021 - 6/3/2021   The purpose of this phone call is to assess patient's general wellbeing or for any assistance needed with follow-up care  Reviewed patient's chart, appears she was transitioned to [facility] facility at [phone], reached [address] she confirmed that the patient is indeed at that facility  She transferred the call to the healthcare center  There was no answer at the line she transferred me to, I left a message for a call back

## 2021-06-28 ENCOUNTER — PATIENT OUTREACH (OUTPATIENT)
Dept: INTERNAL MEDICINE CLINIC | Facility: CLINIC | Age: 86
End: 2021-06-28

## 2021-06-28 NOTE — TELEPHONE ENCOUNTER
Called facility, reached Lubna Chavez who transferred the call to nurse Astrid Rodriguez  They report the following: Since discharge, patient has not experienced any new or worsening stroke-like symptoms  she continues to have the following symptoms: slurred speech and generalized weakness  She is oriented  Ambulation / ADLs:  Patient mobilizing via wheelchair, she is able to self propel  she continues to require minimal assistance with ADLs and tranfers  Patient maintained on a regular consistency diet  She is incontinent at times  Medication Review:  She is not able to review medications with me at this time and instead will fax med list to [phone]  No reported missed doses, medication side effects, or signs of bleeding  Last reported /64  Last reported blood glucose 102    Appointments:  Scheduled stroke hospital follow up appointment is for 7/30

## 2021-06-28 NOTE — PROGRESS NOTES
Spoke with Vee Clifford patients daughter  Dalia Gibbons will be staying at [facility] permanently  They are looking into switching her over to their primary care  Vee Clifford will let the office know or myself with what they decide

## 2021-07-01 ENCOUNTER — TELEPHONE (OUTPATIENT)
Dept: CARDIOLOGY CLINIC | Facility: CLINIC | Age: 86
End: 2021-07-01

## 2021-07-01 NOTE — TELEPHONE ENCOUNTER
Patient Daughter called in asking for a change in medication Amiodarone   Dwayne Joshi  [facility] will not cut in half

## 2021-07-02 DIAGNOSIS — E78.2 MIXED HYPERLIPIDEMIA: Chronic | ICD-10-CM

## 2021-07-02 DIAGNOSIS — I47.2 VENTRICULAR TACHYCARDIA (HCC): ICD-10-CM

## 2021-07-02 RX ORDER — EZETIMIBE 10 MG/1
10 TABLET ORAL DAILY
Qty: 30 TABLET | Refills: 3 | Status: SHIPPED | OUTPATIENT
Start: 2021-07-02

## 2021-07-02 RX ORDER — AMIODARONE HYDROCHLORIDE 200 MG/1
100 TABLET ORAL DAILY
Qty: 45 TABLET | Refills: 3 | Status: SHIPPED | OUTPATIENT
Start: 2021-07-02

## 2021-07-07 NOTE — TELEPHONE ENCOUNTER
Then please send a letter on my behalf that the patient's 200 mg  on dose, to be cut in half with a half a tablet daily is the appropriate medical advice for this patient  There is no alternative  The nursing home must comply with the order

## 2021-07-09 ENCOUNTER — OFFICE VISIT (OUTPATIENT)
Dept: OBGYN CLINIC | Facility: CLINIC | Age: 86
End: 2021-07-09
Payer: MEDICARE

## 2021-07-09 VITALS — WEIGHT: 178 LBS | BODY MASS INDEX: 35.88 KG/M2 | HEIGHT: 59 IN

## 2021-07-09 DIAGNOSIS — M19.012 PRIMARY OSTEOARTHRITIS OF BOTH SHOULDERS: Primary | ICD-10-CM

## 2021-07-09 DIAGNOSIS — M19.011 PRIMARY OSTEOARTHRITIS OF BOTH SHOULDERS: Primary | ICD-10-CM

## 2021-07-09 DIAGNOSIS — M75.01 ADHESIVE CAPSULITIS OF RIGHT SHOULDER: ICD-10-CM

## 2021-07-09 PROCEDURE — 99214 OFFICE O/P EST MOD 30 MIN: CPT | Performed by: PHYSICAL MEDICINE & REHABILITATION

## 2021-07-09 NOTE — PROGRESS NOTES
1  Primary osteoarthritis of both shoulders     2  Adhesive capsulitis of right shoulder       No orders of the defined types were placed in this encounter  Impression: This is a pleasant patient who presents with chronic right shoulder pain likely secondary to severe osteoarthritis with ankylosing and rotator cuff tear  We had a lengthy discussion about treatment options including an injection into her glenohumeral joint but the patient refused  Noteworthy, the patient was not able to come in with family members today as this was a last minute appointment  I think she could benefit from a low does of an opiate  I did not have this conversation with her today  In the interim, she should continue with physical therapy and try to use that arm as much as tolerated  She can also use Voltaren gel for this  Continue with Tylenol for pain control  Can consider having her see pain management  Return to clinic if needed  Imaging Studies (I personally reviewed images in PACS and report):  Right shoulder x-rays most recent to this encounter reviewed  These images show severe osteoarthritis of the glenohumeral joint as evidence by ankylosis and osteophytosis throughout the shoulder joint  There is moderate osteoarthritis of the AC joint  No follow-ups on file  HPI:  Royal Boggs is a 80 y o  female  who presents for evaluation of   Chief Complaint   Patient presents with    Right Shoulder - Pain       Onset/Mechanism:  Chronic pain that worsened a couple of months ago  Location:  Throughout the shoulder  Radiation:  Denies  Provocative:  Moving the shoulder around  Severity:  It hurts  Associated Symptoms:  Trouble raising the arm above her head  Treatment so far:  No treatment  Patient is accompanied by nobody  Review of Systems   Constitutional: Positive for activity change  Negative for fever  HENT: Negative for sore throat  Eyes: Negative for visual disturbance  Respiratory: Negative for shortness of breath  Cardiovascular: Negative for chest pain  Gastrointestinal: Negative for abdominal pain  Endocrine: Negative for polydipsia  Genitourinary: Negative for difficulty urinating  Musculoskeletal: Positive for arthralgias  Skin: Negative for rash  Allergic/Immunologic: Negative for immunocompromised state  Neurological: Negative for numbness  Hematological: Does not bruise/bleed easily  Psychiatric/Behavioral: Negative for confusion         Following history reviewed and updated:  Past Medical History:   Diagnosis Date    Abnormal nuclear stress test     last assessed 04/03/2017    Anxiety     Arthritis     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    Bundle branch block, left     Cardiomyopathy ([address] )     Chest pain 3/9/2019    Chronic pain     chronic b/l shoulder pain    Chronic pain of right knee 4/2/2019    Coronary artery disease     Diabetes mellitus ([facility] [address]     Gait disturbance 3/2/2018    GERD (gastroesophageal reflux disease)     H/O atrial flutter     History of DVT (deep vein thrombosis)     History of pulmonary embolism     Hyperlipidemia     Hypertension     Hypothyroid     Renal calculi     Shortness of breath      Past Surgical History:   Procedure Laterality Date    ATRIAL ABLATION SURGERY  01/2015   [facility] CARDIAC PACEMAKER PLACEMENT  12/10/2018    [employer] YANNA XT DR MRI, model [id number]    CARDIOVERSION      CHELA/DCCV 10/22/2014    CARPAL TUNNEL RELEASE Right     CATARACT EXTRACTION      CORONARY ANGIOPLASTY WITH STENT PLACEMENT      HYSTERECTOMY      JOINT REPLACEMENT Right     [place] AMPUTATION TOE,MT-P JT Left 10/11/2017    Procedure: AMPUTATION SECOND TOE;  Surgeon: Saundra Toledo DPM;  Location: [facility];  Service: Podiatry    TONSILLECTOMY      TOTAL HIP ARTHROPLASTY      Right     Social History   Social History     Substance and Sexual Activity   Alcohol Use Yes    Comment: occasional Social History     Substance and Sexual Activity   Drug Use Not Currently    Types: Marijuana    Comment: MEDICAL MARIJUANA-HAS NOT USED FOR 3 WEEKS     Social History     Tobacco Use   Smoking Status Never Smoker   Smokeless Tobacco Never Used     Family History   Problem Relation Age of Onset   [facility] Parkinsonism Sister    [facility] Cancer Sister         unknown type    Heart attack Neg Hx     Stroke Neg Hx     Anuerysm Neg Hx     Clotting disorder Neg Hx     Arrhythmia Neg Hx     Heart failure Neg Hx     Coronary artery disease Neg Hx      Allergies   Allergen Reactions    Atorvastatin      Reaction unknown 10/23/2018-    Other Rash     Patient sensitive to adhesives from tape        Constitutional:  Ht 4' 11" (1 499 m)   Wt 80 7 kg (178 lb)   LMP  (LMP Unknown)   BMI 35 95 kg/m²    General: NAD  Eyes: Anicteric sclerae  Neck: Supple  Lungs: Unlabored breathing  Cardiovascular: No lower extremity edema  Skin: Intact without erythema  Neurologic: Sensation intact to light touch  Psychiatric: Mood and affect are appropriate  Right Shoulder Exam     Tenderness   The patient is experiencing tenderness in the acromion and biceps tendon      Range of Motion   Active abduction:  90 abnormal   Passive abduction: normal   Forward flexion:  100 abnormal   Internal rotation 0 degrees: abnormal     Tests   Apprehension: positive  Davison test: positive  Impingement: positive  Drop arm: positive  Sulcus: absent    Other   Erythema: absent  Scars: absent  Sensation: normal  Pulse: present             Procedures

## 2021-07-12 ENCOUNTER — TELEPHONE (OUTPATIENT)
Dept: OBGYN CLINIC | Facility: OTHER | Age: 86
End: 2021-07-12

## 2021-07-12 NOTE — TELEPHONE ENCOUNTER
Chiquita De Paz is calling to clarify how many times a day the Voltaren Gel needs to be applied  In Epic is says 4 times a day , but they state on the AVS it says 3 times a day      C/b # [id number] [phone] [phone]

## 2021-07-13 ENCOUNTER — TELEPHONE (OUTPATIENT)
Dept: OBGYN CLINIC | Facility: HOSPITAL | Age: 86
End: 2021-07-13

## 2021-07-13 DIAGNOSIS — M19.012 PRIMARY OSTEOARTHRITIS OF BOTH SHOULDERS: Primary | ICD-10-CM

## 2021-07-13 DIAGNOSIS — M19.011 PRIMARY OSTEOARTHRITIS OF BOTH SHOULDERS: Primary | ICD-10-CM

## 2021-07-13 DIAGNOSIS — M75.01 ADHESIVE CAPSULITIS OF RIGHT SHOULDER: ICD-10-CM

## 2021-07-13 NOTE — TELEPHONE ENCOUNTER
Spoke to JERALD and advised above  She is asking for a script for PT faxed to above fax number provided  Please write script and fax to number provided above   Thank you

## 2021-07-13 NOTE — TELEPHONE ENCOUNTER
Roberto Pierson from [facility], occupational therapist, is calling to find out patient's parameters for the Right upper extremity  Patient is WBAT  Please clarify her ROM and restrictions      Please fax to [phone]

## 2021-07-30 ENCOUNTER — OFFICE VISIT (OUTPATIENT)
Dept: NEUROLOGY | Facility: CLINIC | Age: 86
End: 2021-07-30
Payer: MEDICARE

## 2021-07-30 VITALS — SYSTOLIC BLOOD PRESSURE: 140 MMHG | DIASTOLIC BLOOD PRESSURE: 80 MMHG | HEART RATE: 74 BPM

## 2021-07-30 DIAGNOSIS — I63.9 THALAMIC STROKE (HCC): Primary | ICD-10-CM

## 2021-07-30 PROCEDURE — 99213 OFFICE O/P EST LOW 20 MIN: CPT | Performed by: PSYCHIATRY & NEUROLOGY

## 2021-07-30 NOTE — PROGRESS NOTES
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: The midbrain infarction is in the cerebral peduncle leading to the patient's right sided weakness  Patient is accompanied by her daughter  I reviewed all the medications  Of which there are multiple  She is currently on Coumadin, aspirin and cholesterol-lowering agent a secondary prevention  She does have chronic atrial fibrillation and severe aortic stenosis  She has improved somewhat since discharge although there is still moderate right-sided weakness and incoordination  She is receiving physical therapy  His at this time strong recommendation is for continued physical therapy and I explained the nature of stroke recovery and long-term nature of recovery process  26 minutes was spent with the patient and her daughter more than half of which was spent in care coordination, education and counseling  Subjective:    HPI      Recent hospitalization for a left thalamic and midbrain peduncle  ischemic infarct  Of overall still notes fairly significant weakness in the right arm and leg over for the patient has multiple medical problems with severe deconditioning  No pain is noted  Patient presents to the office today following recent hospitalization for stroke  Arturo Wiley is present for the visit today  Since discharge, she denies experiencing any new or worsening stroke-like symptoms  Patient presents via wheelchair  States her right and left side of her body "do not know what they are doing " She is staying at [facility] for rehab at this time  Medication Review:  I reviewed medications per [facility]  Patient reports having no difficulties obtaining medications  Reports she is taking all as prescribed with no missed doses, medication side effects, or signs of bleeding       Risk Factors / Education:  We reviewed stroke type, symptoms, personal risk factors and management, medications, and resources  Patient verbalizes understanding of teaching  Offered stroke education binder and my card to patient  I addressed all her questions  At the conclusion of the conversation, patient denies having any further questions or concerns  Objective:    Blood pressure 140/80, pulse 74, not currently breastfeeding  Physical Exam    Neurological Exam      Speech and language is intact  Right arm and leg are4/ 5 however there is significant incoordination with fine movement  ROS:    Review of Systems   Constitutional: Negative  Negative for appetite change and fever  HENT: Negative  Negative for hearing loss, tinnitus, trouble swallowing and voice change  Eyes: Negative  Negative for photophobia and pain  Respiratory: Negative  Negative for shortness of breath  Cardiovascular: Negative  Negative for palpitations  Gastrointestinal: Negative  Negative for nausea and vomiting  Endocrine: Negative  Negative for cold intolerance  Genitourinary: Negative  Negative for dysuria, frequency and urgency  Musculoskeletal: Negative  Negative for myalgias and neck pain  Skin: Negative  Negative for rash  Neurological: Positive for speech difficulty (slurred), weakness (right weakness) and numbness (left fingers at times )  Negative for dizziness, tremors, seizures, syncope, facial asymmetry, light-headedness and headaches  Hematological: Negative  Does not bruise/bleed easily  Psychiatric/Behavioral: Negative  Negative for confusion, hallucinations and sleep disturbance

## 2021-08-01 ENCOUNTER — HOSPITAL ENCOUNTER (INPATIENT)
Facility: HOSPITAL | Age: 86
LOS: 2 days | Discharge: HOME | DRG: 092 | End: 2021-08-03
Attending: EMERGENCY MEDICINE | Admitting: INTERNAL MEDICINE
Payer: MEDICARE

## 2021-08-01 ENCOUNTER — APPOINTMENT (EMERGENCY)
Dept: RADIOLOGY | Facility: HOSPITAL | Age: 86
DRG: 092 | End: 2021-08-01
Payer: MEDICARE

## 2021-08-01 DIAGNOSIS — R29.90 STROKE-LIKE SYMPTOMS: ICD-10-CM

## 2021-08-01 DIAGNOSIS — R42 LIGHTHEADEDNESS: Primary | ICD-10-CM

## 2021-08-01 DIAGNOSIS — I35.0 AORTIC STENOSIS, SEVERE: ICD-10-CM

## 2021-08-01 DIAGNOSIS — R25.1 TREMOR: ICD-10-CM

## 2021-08-01 DIAGNOSIS — I10 ESSENTIAL HYPERTENSION: ICD-10-CM

## 2021-08-01 LAB
ALBUMIN SERPL BCP-MCNC: 2.9 G/DL (ref 3.5–5)
ALP SERPL-CCNC: 83 U/L (ref 46–116)
ALT SERPL W P-5'-P-CCNC: 23 U/L (ref 12–78)
AMMONIA PLAS-SCNC: <10 UMOL/L (ref 11–35)
ANION GAP SERPL CALCULATED.3IONS-SCNC: 5 MMOL/L (ref 4–13)
APTT PPP: 33 SECONDS (ref 23–37)
AST SERPL W P-5'-P-CCNC: 18 U/L (ref 5–45)
BASOPHILS # BLD AUTO: 0.02 THOUSANDS/ΜL (ref 0–0.1)
BASOPHILS NFR BLD AUTO: 0 % (ref 0–1)
BILIRUB SERPL-MCNC: 0.24 MG/DL (ref 0.2–1)
BILIRUB UR QL STRIP: NEGATIVE
BUN SERPL-MCNC: 27 MG/DL (ref 5–25)
CALCIUM ALBUM COR SERPL-MCNC: 10.4 MG/DL (ref 8.3–10.1)
CALCIUM SERPL-MCNC: 9.5 MG/DL (ref 8.3–10.1)
CHLORIDE SERPL-SCNC: 100 MMOL/L (ref 100–108)
CLARITY UR: CLEAR
CO2 SERPL-SCNC: 31 MMOL/L (ref 21–32)
COLOR UR: YELLOW
CREAT SERPL-MCNC: 0.73 MG/DL (ref 0.6–1.3)
EOSINOPHIL # BLD AUTO: 0.21 THOUSAND/ΜL (ref 0–0.61)
EOSINOPHIL NFR BLD AUTO: 5 % (ref 0–6)
ERYTHROCYTE [DISTWIDTH] IN BLOOD BY AUTOMATED COUNT: 16.4 % (ref 11.6–15.1)
GFR SERPL CREATININE-BSD FRML MDRD: 72 ML/MIN/1.73SQ M
GLUCOSE SERPL-MCNC: 112 MG/DL (ref 65–140)
GLUCOSE UR STRIP-MCNC: NEGATIVE MG/DL
HCT VFR BLD AUTO: 32.7 % (ref 34.8–46.1)
HGB BLD-MCNC: 10.2 G/DL (ref 11.5–15.4)
HGB UR QL STRIP.AUTO: NEGATIVE
IMM GRANULOCYTES # BLD AUTO: 0.02 THOUSAND/UL (ref 0–0.2)
IMM GRANULOCYTES NFR BLD AUTO: 0 % (ref 0–2)
INR PPP: 1.97 (ref 0.84–1.19)
KETONES UR STRIP-MCNC: NEGATIVE MG/DL
LACTATE SERPL-SCNC: 1.5 MMOL/L (ref 0.5–2)
LEUKOCYTE ESTERASE UR QL STRIP: NEGATIVE
LYMPHOCYTES # BLD AUTO: 1.12 THOUSANDS/ΜL (ref 0.6–4.47)
LYMPHOCYTES NFR BLD AUTO: 24 % (ref 14–44)
MAGNESIUM SERPL-MCNC: 1.6 MG/DL (ref 1.6–2.6)
MCH RBC QN AUTO: 29.8 PG (ref 26.8–34.3)
MCHC RBC AUTO-ENTMCNC: 31.2 G/DL (ref 31.4–37.4)
MCV RBC AUTO: 96 FL (ref 82–98)
MONOCYTES # BLD AUTO: 0.6 THOUSAND/ΜL (ref 0.17–1.22)
MONOCYTES NFR BLD AUTO: 13 % (ref 4–12)
NEUTROPHILS # BLD AUTO: 2.66 THOUSANDS/ΜL (ref 1.85–7.62)
NEUTS SEG NFR BLD AUTO: 58 % (ref 43–75)
NITRITE UR QL STRIP: NEGATIVE
NRBC BLD AUTO-RTO: 0 /100 WBCS
PH UR STRIP.AUTO: 6.5 [PH]
PLATELET # BLD AUTO: 268 THOUSANDS/UL (ref 149–390)
PMV BLD AUTO: 9 FL (ref 8.9–12.7)
POTASSIUM SERPL-SCNC: 4.4 MMOL/L (ref 3.5–5.3)
PROT SERPL-MCNC: 7.3 G/DL (ref 6.4–8.2)
PROT UR STRIP-MCNC: NEGATIVE MG/DL
PROTHROMBIN TIME: 22.4 SECONDS (ref 11.6–14.5)
RBC # BLD AUTO: 3.42 MILLION/UL (ref 3.81–5.12)
SODIUM SERPL-SCNC: 136 MMOL/L (ref 136–145)
SP GR UR STRIP.AUTO: 1.02 (ref 1–1.03)
TROPONIN I SERPL-MCNC: <0.02 NG/ML
TSH SERPL DL<=0.05 MIU/L-ACNC: 3.45 UIU/ML (ref 0.36–3.74)
UROBILINOGEN UR QL STRIP.AUTO: 1 E.U./DL
WBC # BLD AUTO: 4.63 THOUSAND/UL (ref 4.31–10.16)

## 2021-08-01 PROCEDURE — 36415 COLL VENOUS BLD VENIPUNCTURE: CPT | Performed by: EMERGENCY MEDICINE

## 2021-08-01 PROCEDURE — 85730 THROMBOPLASTIN TIME PARTIAL: CPT | Performed by: EMERGENCY MEDICINE

## 2021-08-01 PROCEDURE — 80053 COMPREHEN METABOLIC PANEL: CPT | Performed by: EMERGENCY MEDICINE

## 2021-08-01 PROCEDURE — 81003 URINALYSIS AUTO W/O SCOPE: CPT | Performed by: EMERGENCY MEDICINE

## 2021-08-01 PROCEDURE — 93005 ELECTROCARDIOGRAM TRACING: CPT

## 2021-08-01 PROCEDURE — 71046 X-RAY EXAM CHEST 2 VIEWS: CPT

## 2021-08-01 PROCEDURE — 99285 EMERGENCY DEPT VISIT HI MDM: CPT

## 2021-08-01 PROCEDURE — 83735 ASSAY OF MAGNESIUM: CPT | Performed by: EMERGENCY MEDICINE

## 2021-08-01 PROCEDURE — NC001 PR NO CHARGE: Performed by: INTERNAL MEDICINE

## 2021-08-01 PROCEDURE — 70498 CT ANGIOGRAPHY NECK: CPT

## 2021-08-01 PROCEDURE — 70496 CT ANGIOGRAPHY HEAD: CPT

## 2021-08-01 PROCEDURE — 99285 EMERGENCY DEPT VISIT HI MDM: CPT | Performed by: EMERGENCY MEDICINE

## 2021-08-01 PROCEDURE — 82140 ASSAY OF AMMONIA: CPT | Performed by: EMERGENCY MEDICINE

## 2021-08-01 PROCEDURE — 84484 ASSAY OF TROPONIN QUANT: CPT | Performed by: EMERGENCY MEDICINE

## 2021-08-01 PROCEDURE — 85025 COMPLETE CBC W/AUTO DIFF WBC: CPT | Performed by: EMERGENCY MEDICINE

## 2021-08-01 PROCEDURE — 85610 PROTHROMBIN TIME: CPT | Performed by: EMERGENCY MEDICINE

## 2021-08-01 PROCEDURE — 84443 ASSAY THYROID STIM HORMONE: CPT | Performed by: EMERGENCY MEDICINE

## 2021-08-01 PROCEDURE — 1123F ACP DISCUSS/DSCN MKR DOCD: CPT | Performed by: PSYCHIATRY & NEUROLOGY

## 2021-08-01 PROCEDURE — 83605 ASSAY OF LACTIC ACID: CPT | Performed by: EMERGENCY MEDICINE

## 2021-08-01 RX ORDER — LEVOTHYROXINE SODIUM 0.07 MG/1
75 TABLET ORAL
Status: DISCONTINUED | OUTPATIENT
Start: 2021-08-02 | End: 2021-08-03 | Stop reason: HOSPADM

## 2021-08-01 RX ORDER — ACETAMINOPHEN 325 MG/1
975 TABLET ORAL 3 TIMES DAILY
Status: DISCONTINUED | OUTPATIENT
Start: 2021-08-01 | End: 2021-08-03 | Stop reason: HOSPADM

## 2021-08-01 RX ORDER — B-COMPLEX WITH VITAMIN C
1 TABLET ORAL 2 TIMES DAILY
Status: DISCONTINUED | OUTPATIENT
Start: 2021-08-01 | End: 2021-08-03 | Stop reason: HOSPADM

## 2021-08-01 RX ORDER — FUROSEMIDE 20 MG/1
20 TABLET ORAL DAILY
Status: DISCONTINUED | OUTPATIENT
Start: 2021-08-02 | End: 2021-08-03 | Stop reason: HOSPADM

## 2021-08-01 RX ORDER — AMIODARONE HYDROCHLORIDE 200 MG/1
100 TABLET ORAL DAILY
Status: DISCONTINUED | OUTPATIENT
Start: 2021-08-02 | End: 2021-08-03 | Stop reason: HOSPADM

## 2021-08-01 RX ORDER — ASPIRIN 81 MG/1
81 TABLET ORAL DAILY
Status: DISCONTINUED | OUTPATIENT
Start: 2021-08-02 | End: 2021-08-03 | Stop reason: HOSPADM

## 2021-08-01 RX ORDER — PRAVASTATIN SODIUM 80 MG/1
80 TABLET ORAL
Status: DISCONTINUED | OUTPATIENT
Start: 2021-08-02 | End: 2021-08-03 | Stop reason: HOSPADM

## 2021-08-01 RX ORDER — AMOXICILLIN 250 MG
1 CAPSULE ORAL 2 TIMES DAILY
Status: DISCONTINUED | OUTPATIENT
Start: 2021-08-01 | End: 2021-08-03 | Stop reason: HOSPADM

## 2021-08-01 RX ORDER — LISINOPRIL 2.5 MG/1
2.5 TABLET ORAL DAILY
Status: DISCONTINUED | OUTPATIENT
Start: 2021-08-02 | End: 2021-08-03 | Stop reason: HOSPADM

## 2021-08-01 RX ORDER — EZETIMIBE 10 MG/1
10 TABLET ORAL DAILY
Status: DISCONTINUED | OUTPATIENT
Start: 2021-08-02 | End: 2021-08-03 | Stop reason: HOSPADM

## 2021-08-01 RX ORDER — ISOSORBIDE MONONITRATE 30 MG/1
30 TABLET, EXTENDED RELEASE ORAL DAILY
Status: DISCONTINUED | OUTPATIENT
Start: 2021-08-02 | End: 2021-08-03 | Stop reason: HOSPADM

## 2021-08-01 RX ORDER — WARFARIN SODIUM 2.5 MG/1
2.5 TABLET ORAL
Status: DISCONTINUED | OUTPATIENT
Start: 2021-08-02 | End: 2021-08-03 | Stop reason: HOSPADM

## 2021-08-01 RX ORDER — METOPROLOL SUCCINATE 25 MG/1
25 TABLET, EXTENDED RELEASE ORAL
Status: DISCONTINUED | OUTPATIENT
Start: 2021-08-02 | End: 2021-08-03

## 2021-08-01 RX ORDER — POLYETHYLENE GLYCOL 3350 17 G/17G
17 POWDER, FOR SOLUTION ORAL DAILY
Status: DISCONTINUED | OUTPATIENT
Start: 2021-08-02 | End: 2021-08-03 | Stop reason: HOSPADM

## 2021-08-01 RX ADMIN — IOHEXOL 85 ML: 350 INJECTION, SOLUTION INTRAVENOUS at 18:32

## 2021-08-01 NOTE — ED PROVIDER NOTES
History  Chief Complaint   Patient presents with    Headache     Pt was sent from facility after having what looked like seizure like activity of her upper extremities, however, was awake and alert the whole time  Pt has slurred speech and slight weakness on the right side residual from a stroke in may  Pt c/o head "just not feeling right"  Ivone Montalvo is an 80y o  year old female with PMHx significant for many comorbidities, who presents to the ED today with lightheadedness and tremor  Symptoms started 2 hours ago when she woke up from a nap  States that she had lunch between 12 afternoon and afterwards took a nap  When she woke up she says that she was not feeling right    States that maybe she is feeling a little bit dizzy  Staff at nursing home thought that she was having a seizure however the patient was awake throughout this entire reported event and she had some tremors and all of her extremities  Patient denies any pain at this time  States that she has never felt quite like this before  She does have a 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 Taking?    Accu-Chek FastClix Lancets MISC  Outside Facility (Specify) No No   Sig: Check once daily   Accu-Chek Softclix Lancets lancets  Outside Facility (Specify) No No   Sig: Use daily Use as instructed   Aspirin Low Dose 81 MG EC tablet  Outside Facility (Specify) No No   Sig: TAKE 1 TABLET BY MOUTH EVERY DAY   Blood Glucose Monitoring Suppl (Accu-Chek Guide Me) w/Device KIT  Outside Facility (Specify) No No   Sig: Check once daily   Calcium Carbonate-Vitamin D (CALTRATE 600+D PO)  Outside Facility (Specify) Yes No   Sig: Take 1 capsule by mouth 2 (two) times a day     Carboxymethylcellulose Sodium (THERATEARS OP)  Outside Facility (Specify) Yes No   Sig: Apply to eye 3 (three) times a day I DROP INTO EACH EYE THREE TIMES DAILY   Diclofenac Sodium (VOLTAREN) 1 %  Outside Facility (Specify) No No   Sig: Apply 2 g topically 4 (four) times a day Bilateral shoulders and knees   Lancets (accu-chek soft touch) lancets  Outside Facility (Specify) No No   Sig: Use daily Use as instructed   Lidocaine (Aspercreme Lidocaine) 4 % PTCH  Outside Facility (Specify) Yes No   Sig: Apply 3 patches topically daily B/l knees, low back   acetaminophen (TYLENOL) 325 mg tablet  Outside Facility (Specify) Yes No   Sig: Take 975 mg by mouth 3 (three) times a day   amiodarone 200 mg tablet   No No   Sig: Take 0 5 tablets (100 mg total) by mouth daily   ezetimibe (ZETIA) 10 mg tablet   No No   Sig: Take 1 tablet (10 mg total) by mouth daily   furosemide (LASIX) 20 mg tablet  Outside Facility (Specify) No No   Sig: Take 1 tablet (20 mg total) by mouth daily   glucose blood (Accu-Chek Guide) test strip  Outside Facility (Specify) No No   Sig: Check once daily   isosorbide mononitrate (IMDUR) 30 mg 24 hr tablet  Outside Facility (Specify) No No   Sig: Take 1 tablet (30 mg total) by mouth daily   levothyroxine 75 mcg tablet  Outside Facility (Specify) No No   Sig: Take 1 tablet (75 mcg total) by mouth daily in the early morning   lisinopril (ZESTRIL) 2 5 mg tablet  Outside Facility (Specify) No No   Sig: TAKE ONE TABLET BY MOUTH EVERY DAY   metoprolol succinate (Toprol XL) 25 mg 24 hr tablet  Outside Facility (Specify) No No   Sig: Take 1 tablet (25 mg total) by mouth daily after dinner multivitamin-iron-minerals-folic acid (CENTRUM) chewable tablet  Outside Facility (Specify) Yes No   Sig: Chew 1 tablet daily     polyethylene glycol (MIRALAX) 17 g packet  Outside Facility (Specify) No No   Sig: Take 17 g by mouth daily   pravastatin (PRAVACHOL) 40 mg tablet  Outside Facility (Specify) No No   Sig: Take 2 tablets (80 mg total) by mouth daily   senna-docusate sodium (SENOKOT S) 8 6-50 mg per tablet  Outside Facility (Specify) No No   Sig: Take 1 tablet by mouth 2 (two) times a day   warfarin (COUMADIN) 2 5 mg tablet  Outside Facility (Specify) No No   Sig: Take 1 tablet (2 5 mg total) by mouth daily      Facility-Administered Medications: None       Past Medical History:   Diagnosis Date    Abnormal nuclear stress test     last assessed 04/03/2017    Anxiety     Arthritis     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 [address] )     Chest pain 3/9/2019    Chronic pain     chronic b/l shoulder pain    Chronic pain of right knee 4/2/2019    Coronary artery disease     Diabetes mellitus ([facility] [address] )     Gait disturbance 3/2/2018    GERD (gastroesophageal reflux disease)     H/O atrial flutter     History of DVT (deep vein thrombosis)     History of pulmonary embolism     Hyperlipidemia     Hypertension     Hypothyroid     Renal calculi     Shortness of breath        Past Surgical History:   Procedure Laterality Date    ATRIAL ABLATION SURGERY  01/2015   Sanjana Alonzo CARDIAC PACEMAKER PLACEMENT  12/10/2018    [employer]tronic YANNA XT  MRI, model [id number]    CARDIOVERSION      CHELA/DCCV 10/22/2014    CARPAL TUNNEL RELEASE Right     CATARACT EXTRACTION      CORONARY ANGIOPLASTY WITH STENT PLACEMENT      HYSTERECTOMY      JOINT REPLACEMENT Right     OK AMPUTATION TOE,MT-P JT Left 10/11/2017    Procedure: AMPUTATION SECOND TOE;  Surgeon: Filiberto Ludwig DPM;  Location: [facility];  Service: Podiatry    TONSILLECTOMY      TOTAL HIP ARTHROPLASTY Right       Family History   Problem Relation Age of Onset   [facility] Parkinsonism Sister    [facility] Cancer Sister         unknown type    Heart attack Neg Hx     Stroke Neg Hx     Anuerysm Neg Hx     Clotting disorder Neg Hx     Arrhythmia Neg Hx     Heart failure Neg Hx     Coronary artery disease Neg Hx      I have reviewed and agree with the history as documented  E-Cigarette/Vaping    E-Cigarette Use Never User      E-Cigarette/Vaping Substances    Nicotine No     THC No     CBD No     Flavoring No     Other No     Unknown No      Social History     Tobacco Use    Smoking status: Never Smoker    Smokeless tobacco: Never Used   Vaping Use    Vaping Use: Never used   Substance Use Topics    Alcohol use: Yes     Comment: occasional    Drug use: Not Currently     Types: Marijuana     Comment: MEDICAL MARIJUANA-HAS NOT USED FOR 3 WEEKS        Review of Systems   Reason unable to perform ROS: see above  Neurological: Positive for dizziness  Physical Exam  ED Triage Vitals [08/01/21 1706]   Temperature Pulse Respirations Blood Pressure SpO2   98 1 °F (36 7 °C) 83 16 154/68 97 %      Temp Source Heart Rate Source Patient Position - Orthostatic VS BP Location FiO2 (%)   Oral Monitor Lying Right arm --      Pain Score       --             Orthostatic Vital Signs  Vitals:    08/01/21 1715 08/01/21 1951 08/01/21 2030 08/01/21 2130   BP: 154/68 148/71 141/65 150/64   Pulse: 68 69 68 68   Patient Position - Orthostatic VS: Lying Lying Lying Lying       Physical Exam  Vitals and nursing note reviewed  Constitutional:       General: She is not in acute distress  Appearance: She is well-developed  She is not diaphoretic  HENT:      Head: Normocephalic and atraumatic  Eyes:      General: No scleral icterus  Conjunctiva/sclera: Conjunctivae normal    Neck:      Vascular: No JVD  Trachea: No tracheal deviation  Cardiovascular:      Rate and Rhythm: Normal rate and regular rhythm        Heart sounds: Murmur (AS) heard  Pulmonary:      Effort: Pulmonary effort is normal  No respiratory distress  Abdominal:      General: There is no distension  Tenderness: There is no abdominal tenderness  Musculoskeletal:         General: No deformity  Cervical back: Neck supple  Skin:     General: Skin is warm and dry  Neurological:      Mental Status: She is alert and oriented to person, place, and time  Comments: Sensation intact to light touch in all extremities  Strength 4/5 b/l LE, 5/5 b/l UE  PERRL  Cranial nerves 2-12 grossly intact  Unable to complete finger to nose testing b/l  Unable to complete heel to shin testing  Psychiatric:         Behavior: Behavior normal          ED Medications  Medications   iohexol (OMNIPAQUE) 350 MG/ML injection (SINGLE-DOSE) 85 mL (85 mL Intravenous Given 8/1/21 1832)       Diagnostic Studies  Results Reviewed     Procedure Component Value Units Date/Time    UA (URINE) with reflex to Scope [[id number]] Collected: 08/01/21 1743    Lab Status: Final result Specimen: Urine, Clean Catch Updated: 08/01/21 1829     Color, UA Yellow     Clarity, UA Clear     Specific Gravity, UA 1 016     pH, UA 6 5     Leukocytes, UA Negative     Nitrite, UA Negative     Protein, UA Negative mg/dl      Glucose, UA Negative mg/dl      Ketones, UA Negative mg/dl      Urobilinogen, UA 1 0 E U /dl      Bilirubin, UA Negative     Blood, UA Negative    TSH [[id number]]  (Normal) Collected: 08/01/21 1724    Lab Status: Final result Specimen: Blood from Arm, Right Updated: 08/01/21 1808     TSH 3RD GENERATON 3 450 uIU/mL     Narrative:      Patients undergoing fluorescein dye angiography may retain small amounts of fluorescein in the body for 48-72 hours post procedure  Samples containing fluorescein can produce falsely depressed TSH values  If the patient had this procedure,a specimen should be resubmitted post fluorescein clearance        Protime-INR [[id number]]  (Abnormal) Collected: 08/01/21 1724    Lab Status: Final result Specimen: Blood from Arm, Right Updated: 08/01/21 1806     Protime 22 4 seconds      INR 1 97    APTT [[id number]]  (Normal) Collected: 08/01/21 1724    Lab Status: Final result Specimen: Blood from Arm, Right Updated: 08/01/21 1806     PTT 33 seconds     Lactic acid, plasma [[id number]]  (Normal) Collected: 08/01/21 1724    Lab Status: Final result Specimen: Blood from Arm, Right Updated: 08/01/21 1802     LACTIC ACID 1 5 mmol/L     Narrative:      Result may be elevated if tourniquet was used during collection      Comprehensive metabolic panel [[id number]]  (Abnormal) Collected: 08/01/21 1724    Lab Status: Final result Specimen: Blood from Arm, Right Updated: 08/01/21 1801     Sodium 136 mmol/L      Potassium 4 4 mmol/L      Chloride 100 mmol/L      CO2 31 mmol/L      ANION GAP 5 mmol/L      BUN 27 mg/dL      Creatinine 0 73 mg/dL      Glucose 112 mg/dL      Calcium 9 5 mg/dL      Corrected Calcium 10 4 mg/dL      AST 18 U/L      ALT 23 U/L      Alkaline Phosphatase 83 U/L      Total Protein 7 3 g/dL      Albumin 2 9 g/dL      Total Bilirubin 0 24 mg/dL      eGFR 72 ml/min/1 73sq m     Narrative:      [place] guidelines for Chronic Kidney Disease (CKD):     Stage 1 with normal or high GFR (GFR > 90 mL/min/1 73 square meters)    Stage 2 Mild CKD (GFR = 60-89 mL/min/1 73 square meters)    Stage 3A Moderate CKD (GFR = 45-59 mL/min/1 73 square meters)    Stage 3B Moderate CKD (GFR = 30-44 mL/min/1 73 square meters)    Stage 4 Severe CKD (GFR = 15-29 mL/min/1 73 square meters)    Stage 5 End Stage CKD (GFR <15 mL/min/1 73 square meters)  Note: GFR calculation is accurate only with a steady state creatinine    Magnesium [[id number]]  (Normal) Collected: 08/01/21 1724    Lab Status: Final result Specimen: Blood from Arm, Right Updated: 08/01/21 1801     Magnesium 1 6 mg/dL     Troponin I [[id number]]  (Normal) Collected: 08/01/21 1724 Lab Status: Final result Specimen: Blood from Arm, Right Updated: 08/01/21 1801     Troponin I <0 02 ng/mL     Ammonia [[id number]]  (Abnormal) Collected: 08/01/21 1724    Lab Status: Final result Specimen: Blood from Arm, Right Updated: 08/01/21 1758     Ammonia <10 umol/L     CBC and differential [[id number]]  (Abnormal) Collected: 08/01/21 1724    Lab Status: Final result Specimen: Blood from Arm, Right Updated: 08/01/21 1740     WBC 4 63 Thousand/uL      RBC 3 42 Million/uL      Hemoglobin 10 2 g/dL      Hematocrit 32 7 %      MCV 96 fL      MCH 29 8 pg      MCHC 31 2 g/dL      RDW 16 4 %      MPV 9 0 fL      Platelets 246 Thousands/uL      nRBC 0 /100 WBCs      Neutrophils Relative 58 %      Immat GRANS % 0 %      Lymphocytes Relative 24 %      Monocytes Relative 13 %      Eosinophils Relative 5 %      Basophils Relative 0 %      Neutrophils Absolute 2 66 Thousands/µL      Immature Grans Absolute 0 02 Thousand/uL      Lymphocytes Absolute 1 12 Thousands/µL      Monocytes Absolute 0 60 Thousand/µL      Eosinophils Absolute 0 21 Thousand/µL      Basophils Absolute 0 02 Thousands/µL                  CTA head and neck with and without contrast   Final Result by Beverly Chong DO (08/01 1917)      CT brain: Old left thalamic and right caudate head lacunar infarcts  No mass, hemorrhage or definitive signs of acute territorial infarction  CT angiography: Severe stenosis of the distal left common carotid artery and proximal cervical internal carotid artery difficult to accurately measure due to the degree of calcification  This is likely greater than 80%  Stable narrowing of the subclavian artery origin on the left  Mild intracranial atherosclerotic disease involving the posterior cerebral arteries                    Workstation performed: [id number]         XR chest 2 views    (Results Pending)         Procedures  Procedures      ED Course  ED Course as of Aug 01 2155   Steph Ort Aug 01, 2021   [address] Procedure Note: EKG  Date/Time: 08/01/21 6:05 PM   Interpreted by: Shy Rebolledo DO  Indications / Diagnosis: dizzy  ECG reviewed by me, the ED Provider: yes   The EKG demonstrates:  Rhythm: a-paced, rate 70  BBB  Normal axis  ST Changes: No acute ST Changes, no STD/RADHA           1807 INR(!): 1 97             HEART Risk Score      Most Recent Value   Heart Score Risk Calculator   History  0 Filed at: 08/01/2021 1803   ECG  0 Filed at: 08/01/2021 1803   Age  2 Filed at: 08/01/2021 1803   Risk Factors  2 Filed at: 08/01/2021 1803   Troponin  0 Filed at: 08/01/2021 1803   HEART Score  4 Filed at: 08/01/2021 1803        Identification of Seniors at Risk      Most Recent Value   (ISAR) Identification of Seniors at Risk   Before the illness or injury that brought you to the Emergency, did you need someone to help you on a regular basis? 1 Filed at: 08/01/2021 1708   In the last 24 hours, have you needed more help than usual?  1 Filed at: 08/01/2021 1708   Have you been hospitalized for one or more nights during the past 6 months? 1 Filed at: 08/01/2021 1708   In general, do you see well?  0 Filed at: 08/01/2021 1708   In general, do you have serious problems with your memory? 0 Filed at: 08/01/2021 1708   Do you take more than three different medications every day? 1 Filed at: 08/01/2021 1708   ISAR Score  4 Filed at: 08/01/2021 1708                              MDM  Number of Diagnoses or Management Options  Diagnosis management comments: Patient presenting with lightheadedness, diffuse tremors, most consistent with a metabolic etiology  Neuro exam is nonfocal   Will do CT head neck, chest x-ray, EKG, labs, urinalysis, admit for further management and workup as indicated  Patient is hemodynamically stable at this time and airway is well protected         Amount and/or Complexity of Data Reviewed  Clinical lab tests: ordered and reviewed  Tests in the radiology section of CPT®: reviewed and ordered  Tests in the medicine section of CPT®: ordered and reviewed  Review and summarize past medical records: yes  Discuss the patient with other providers: yes    Risk of Complications, Morbidity, and/or Mortality  Presenting problems: moderate  Diagnostic procedures: low  Management options: moderate    Patient Progress  Patient progress: stable      Disposition  Final diagnoses:   Lightheadedness     Time reflects when diagnosis was documented in both MDM as applicable and the Disposition within this note     Time User Action Codes Description Comment    8/1/2021  8:16 PM Jenna Elizabeth Add [R42] Lightheadedness       ED Disposition     ED Disposition Condition Date/Time Comment    Admit Stable Sun Aug 1, 2021  8:16 PM Case was discussed with SOD and the patient's admission status was agreed to be Admission Status: inpatient status to the service of [facility]  Follow-up Information    None         Patient's Medications   Discharge Prescriptions    No medications on file     No discharge procedures on file  PDMP Review       Value Time User    PDMP Reviewed  Yes 2/16/2021  9:21 AM Lori Alonso DO           ED Provider  Attending physically available and evaluated Royal Boggs  SHIRA managed the patient along with the ED Attending      Electronically Signed by         Familia Gilliam DO  08/01/21 2243

## 2021-08-01 NOTE — ED ATTENDING ATTESTATION
8/1/2021  Pineda Paniagua DO, saw and evaluated the patient  I have discussed the patient with the resident/non-physician practitioner and agree with the resident's/non-physician practitioner's findings, Plan of Care, and MDM as documented in the resident's/non-physician practitioner's note, except where noted  All available labs and Radiology studies were reviewed  I was present for key portions of any procedure(s) performed by the resident/non-physician practitioner and I was immediately available to provide assistance  At this point I agree with the current assessment done in the Emergency Department  I have conducted an independent evaluation of this patient a history and physical is as follows:    80-year-old woman presents for evaluation of not feeling well  Started after she woke up from a nap about 2 hours prior to arrival   She is unable to elucidate further how she is feeling  Denies headache, chest pain, shortness of breath, abdominal pain, focal weakness, numbness, tingling  She does feel generally fatigued and weak overall  Symptoms are constant, mild to moderate severity  No alleviating or exacerbating factors  Generalized in nature  No other complaints at this time  Has history of prior CVA with right-sided deficit  Muscle strength essentially 5/5 bilateral upper extremities  Patient has decreased effort in the bilateral lower extremities but is able to lift off the bed bilaterally  Impression:  General malaise plan:  CT head, metabolic eval, UA, reassess              ED Course         Critical Care Time  Procedures

## 2021-08-02 ENCOUNTER — APPOINTMENT (INPATIENT)
Dept: NEUROLOGY | Facility: CLINIC | Age: 86
DRG: 092 | End: 2021-08-02
Payer: MEDICARE

## 2021-08-02 LAB
ALBUMIN SERPL BCP-MCNC: 2.7 G/DL (ref 3.5–5)
ALP SERPL-CCNC: 67 U/L (ref 46–116)
ALT SERPL W P-5'-P-CCNC: 15 U/L (ref 12–78)
ANION GAP SERPL CALCULATED.3IONS-SCNC: 4 MMOL/L (ref 4–13)
AST SERPL W P-5'-P-CCNC: 12 U/L (ref 5–45)
ATRIAL RATE: 500 BPM
BASOPHILS # BLD AUTO: 0.03 THOUSANDS/ΜL (ref 0–0.1)
BASOPHILS NFR BLD AUTO: 1 % (ref 0–1)
BILIRUB SERPL-MCNC: 0.35 MG/DL (ref 0.2–1)
BUN SERPL-MCNC: 22 MG/DL (ref 5–25)
CALCIUM ALBUM COR SERPL-MCNC: 9.7 MG/DL (ref 8.3–10.1)
CALCIUM SERPL-MCNC: 8.7 MG/DL (ref 8.3–10.1)
CHLORIDE SERPL-SCNC: 102 MMOL/L (ref 100–108)
CO2 SERPL-SCNC: 29 MMOL/L (ref 21–32)
CREAT SERPL-MCNC: 0.67 MG/DL (ref 0.6–1.3)
EOSINOPHIL # BLD AUTO: 0.29 THOUSAND/ΜL (ref 0–0.61)
EOSINOPHIL NFR BLD AUTO: 6 % (ref 0–6)
ERYTHROCYTE [DISTWIDTH] IN BLOOD BY AUTOMATED COUNT: 16.4 % (ref 11.6–15.1)
FOLATE SERPL-MCNC: >20 NG/ML (ref 3.1–17.5)
GFR SERPL CREATININE-BSD FRML MDRD: 77 ML/MIN/1.73SQ M
GLUCOSE SERPL-MCNC: 87 MG/DL (ref 65–140)
HCT VFR BLD AUTO: 30.2 % (ref 34.8–46.1)
HGB BLD-MCNC: 9.5 G/DL (ref 11.5–15.4)
IMM GRANULOCYTES # BLD AUTO: 0.02 THOUSAND/UL (ref 0–0.2)
IMM GRANULOCYTES NFR BLD AUTO: 0 % (ref 0–2)
LYMPHOCYTES # BLD AUTO: 1.18 THOUSANDS/ΜL (ref 0.6–4.47)
LYMPHOCYTES NFR BLD AUTO: 26 % (ref 14–44)
MCH RBC QN AUTO: 29.8 PG (ref 26.8–34.3)
MCHC RBC AUTO-ENTMCNC: 31.5 G/DL (ref 31.4–37.4)
MCV RBC AUTO: 95 FL (ref 82–98)
MONOCYTES # BLD AUTO: 0.53 THOUSAND/ΜL (ref 0.17–1.22)
MONOCYTES NFR BLD AUTO: 12 % (ref 4–12)
NEUTROPHILS # BLD AUTO: 2.54 THOUSANDS/ΜL (ref 1.85–7.62)
NEUTS SEG NFR BLD AUTO: 55 % (ref 43–75)
NRBC BLD AUTO-RTO: 0 /100 WBCS
PLATELET # BLD AUTO: 247 THOUSANDS/UL (ref 149–390)
PMV BLD AUTO: 9 FL (ref 8.9–12.7)
POTASSIUM SERPL-SCNC: 4 MMOL/L (ref 3.5–5.3)
PROT SERPL-MCNC: 6.8 G/DL (ref 6.4–8.2)
QRS AXIS: 22 DEGREES
QRSD INTERVAL: 130 MS
QT INTERVAL: 406 MS
QTC INTERVAL: 438 MS
RBC # BLD AUTO: 3.19 MILLION/UL (ref 3.81–5.12)
SODIUM SERPL-SCNC: 135 MMOL/L (ref 136–145)
T WAVE AXIS: 104 DEGREES
VENTRICULAR RATE: 70 BPM
WBC # BLD AUTO: 4.59 THOUSAND/UL (ref 4.31–10.16)

## 2021-08-02 PROCEDURE — 99222 1ST HOSP IP/OBS MODERATE 55: CPT | Performed by: PSYCHIATRY & NEUROLOGY

## 2021-08-02 PROCEDURE — 36415 COLL VENOUS BLD VENIPUNCTURE: CPT

## 2021-08-02 PROCEDURE — 82746 ASSAY OF FOLIC ACID SERUM: CPT | Performed by: STUDENT IN AN ORGANIZED HEALTH CARE EDUCATION/TRAINING PROGRAM

## 2021-08-02 PROCEDURE — 80053 COMPREHEN METABOLIC PANEL: CPT

## 2021-08-02 PROCEDURE — 85025 COMPLETE CBC W/AUTO DIFF WBC: CPT

## 2021-08-02 PROCEDURE — 93010 ELECTROCARDIOGRAM REPORT: CPT | Performed by: INTERNAL MEDICINE

## 2021-08-02 PROCEDURE — 95816 EEG AWAKE AND DROWSY: CPT

## 2021-08-02 PROCEDURE — 95816 EEG AWAKE AND DROWSY: CPT | Performed by: STUDENT IN AN ORGANIZED HEALTH CARE EDUCATION/TRAINING PROGRAM

## 2021-08-02 PROCEDURE — 99223 1ST HOSP IP/OBS HIGH 75: CPT | Performed by: INTERNAL MEDICINE

## 2021-08-02 PROCEDURE — 83918 ORGANIC ACIDS TOTAL QUANT: CPT | Performed by: STUDENT IN AN ORGANIZED HEALTH CARE EDUCATION/TRAINING PROGRAM

## 2021-08-02 PROCEDURE — NC001 PR NO CHARGE: Performed by: INTERNAL MEDICINE

## 2021-08-02 RX ORDER — WARFARIN SODIUM 2.5 MG/1
2.5 TABLET ORAL
Status: COMPLETED | OUTPATIENT
Start: 2021-08-02 | End: 2021-08-02

## 2021-08-02 RX ADMIN — ACETAMINOPHEN 975 MG: 325 TABLET, FILM COATED ORAL at 17:27

## 2021-08-02 RX ADMIN — METOPROLOL SUCCINATE 25 MG: 25 TABLET, FILM COATED, EXTENDED RELEASE ORAL at 17:27

## 2021-08-02 RX ADMIN — AMIODARONE HYDROCHLORIDE 100 MG: 200 TABLET ORAL at 11:04

## 2021-08-02 RX ADMIN — ACETAMINOPHEN 975 MG: 325 TABLET, FILM COATED ORAL at 21:00

## 2021-08-02 RX ADMIN — ACETAMINOPHEN 975 MG: 325 TABLET, FILM COATED ORAL at 11:02

## 2021-08-02 RX ADMIN — SENNOSIDES AND DOCUSATE SODIUM 1 TABLET: 8.6; 5 TABLET ORAL at 11:02

## 2021-08-02 RX ADMIN — Medication 1 TABLET: at 17:27

## 2021-08-02 RX ADMIN — WARFARIN SODIUM 2.5 MG: 2.5 TABLET ORAL at 12:18

## 2021-08-02 RX ADMIN — ACETAMINOPHEN 975 MG: 325 TABLET, FILM COATED ORAL at 03:52

## 2021-08-02 RX ADMIN — MULTIPLE VITAMINS W/ MINERALS TAB 1 TABLET: TAB ORAL at 11:05

## 2021-08-02 RX ADMIN — SENNOSIDES AND DOCUSATE SODIUM 1 TABLET: 8.6; 5 TABLET ORAL at 17:27

## 2021-08-02 RX ADMIN — ISOSORBIDE MONONITRATE 30 MG: 30 TABLET, EXTENDED RELEASE ORAL at 11:05

## 2021-08-02 RX ADMIN — FUROSEMIDE 20 MG: 20 TABLET ORAL at 11:04

## 2021-08-02 RX ADMIN — LISINOPRIL 2.5 MG: 2.5 TABLET ORAL at 11:04

## 2021-08-02 RX ADMIN — ASPIRIN 81 MG: 81 TABLET, COATED ORAL at 11:03

## 2021-08-02 RX ADMIN — PRAVASTATIN SODIUM 80 MG: 80 TABLET ORAL at 17:27

## 2021-08-02 RX ADMIN — Medication 1 TABLET: at 11:04

## 2021-08-02 RX ADMIN — LEVOTHYROXINE SODIUM 75 MCG: 75 TABLET ORAL at 11:03

## 2021-08-02 RX ADMIN — WARFARIN SODIUM 2.5 MG: 2.5 TABLET ORAL at 17:27

## 2021-08-02 NOTE — PLAN OF CARE
Problem: MOBILITY - ADULT  Goal: Maintain or return to baseline ADL function  Description: INTERVENTIONS:  -  Assess patient's ability to carry out ADLs; assess patient's baseline for ADL function and identify physical deficits which impact ability to perform ADLs (bathing, care of mouth/teeth, toileting, grooming, dressing, etc )  - Assess/evaluate cause of self-care deficits   - Assess range of motion  - Assess patient's mobility; develop plan if impaired  - Assess patient's need for assistive devices and provide as appropriate  - Encourage maximum independence but intervene and supervise when necessary  - Involve family in performance of ADLs  - Assess for home care needs following discharge   - Consider OT consult to assist with ADL evaluation and planning for discharge  - Provide patient education as appropriate  Outcome: Progressing  Goal: Maintains/Returns to pre admission functional level  Description: INTERVENTIONS:  - Perform BMAT or MOVE assessment daily    - Set and communicate daily mobility goal to care team and patient/family/caregiver  - Collaborate with rehabilitation services on mobility goals if consulted  - Perform Range of Motion 2 times a day  - Reposition patient every 2 hours    - Dangle patient 2 times a day  - Stand patient 2 times a day  - Ambulate patient 2 times a day  - Out of bed to chair 2 times a day   - Out of bed for meals 2 times a day  - Out of bed for toileting  - Record patient progress and toleration of activity level   Outcome: Progressing     Problem: Potential for Falls  Goal: Patient will remain free of falls  Description: INTERVENTIONS:  - Educate patient/family on patient safety including physical limitations  - Instruct patient to call for assistance with activity   - Consult OT/PT to assist with strengthening/mobility   - Keep Call bell within reach  - Keep bed low and locked with side rails adjusted as appropriate  - Keep care items and personal belongings within reach  - Initiate and maintain comfort rounds  - Make Fall Risk Sign visible to staff  - Offer Toileting every 2 Hours, in advance of need  - Initiate/Maintain bed alarm  - Apply yellow socks and bracelet for high fall risk patients  - Consider moving patient to room near nurses station  Outcome: Progressing     Problem: Prexisting or High Potential for Compromised Skin Integrity  Goal: Skin integrity is maintained or improved  Description: INTERVENTIONS:  - Identify patients at risk for skin breakdown  - Assess and monitor skin integrity  - Assess and monitor nutrition and hydration status  - Monitor labs   - Assess for incontinence   - Turn and reposition patient  - Assist with mobility/ambulation  - Relieve pressure over bony prominences  - Avoid friction and shearing  - Provide appropriate hygiene as needed including keeping skin clean and dry  - Evaluate need for skin moisturizer/barrier cream  - Collaborate with interdisciplinary team   - Patient/family teaching  - Consider wound care consult   Outcome: Progressing     Problem: PAIN - ADULT  Goal: Verbalizes/displays adequate comfort level or baseline comfort level  Description: Interventions:  - Encourage patient to monitor pain and request assistance  - Assess pain using appropriate pain scale  - Administer analgesics based on type and severity of pain and evaluate response  - Implement non-pharmacological measures as appropriate and evaluate response  - Consider cultural and social influences on pain and pain management  - Notify physician/advanced practitioner if interventions unsuccessful or patient reports new pain  Outcome: Progressing     Problem: INFECTION - ADULT  Goal: Absence or prevention of progression during hospitalization  Description: INTERVENTIONS:  - Assess and monitor for signs and symptoms of infection  - Monitor lab/diagnostic results  - Monitor all insertion sites, i e  indwelling lines, tubes, and drains  - Monitor endotracheal if appropriate and nasal secretions for changes in amount and color  - [facility] appropriate cooling/warming therapies per order  - Administer medications as ordered  - Instruct and encourage patient and family to use good hand hygiene technique  - Identify and instruct in appropriate isolation precautions for identified infection/condition  Outcome: Progressing  Goal: Absence of fever/infection during neutropenic period  Description: INTERVENTIONS:  - Monitor WBC    Outcome: Progressing     Problem: SAFETY ADULT  Goal: Maintain or return to baseline ADL function  Description: INTERVENTIONS:  -  Assess patient's ability to carry out ADLs; assess patient's baseline for ADL function and identify physical deficits which impact ability to perform ADLs (bathing, care of mouth/teeth, toileting, grooming, dressing, etc )  - Assess/evaluate cause of self-care deficits   - Assess range of motion  - Assess patient's mobility; develop plan if impaired  - Assess patient's need for assistive devices and provide as appropriate  - Encourage maximum independence but intervene and supervise when necessary  - Involve family in performance of ADLs  - Assess for home care needs following discharge   - Consider OT consult to assist with ADL evaluation and planning for discharge  - Provide patient education as appropriate  Outcome: Progressing  Goal: Maintains/Returns to pre admission functional level  Description: INTERVENTIONS:  - Perform BMAT or MOVE assessment daily    - Set and communicate daily mobility goal to care team and patient/family/caregiver  - Collaborate with rehabilitation services on mobility goals if consulted  - Perform Range of Motion 2 times a day  - Reposition patient every 2 hours    - Dangle patient 2 times a day  - Stand patient 2 times a day  - Ambulate patient 2 times a day  - Out of bed to chair 2 times a day   - Out of bed for meals 2 times a day  - Out of bed for toileting  - Record patient progress and toleration of activity level   Outcome: Progressing  Goal: Patient will remain free of falls  Description: INTERVENTIONS:  - Educate patient/family on patient safety including physical limitations  - Instruct patient to call for assistance with activity   - Consult OT/PT to assist with strengthening/mobility   - Keep Call bell within reach  - Keep bed low and locked with side rails adjusted as appropriate  - Keep care items and personal belongings within reach  - Initiate and maintain comfort rounds  - Make Fall Risk Sign visible to staff  - Offer Toileting every 2 Hours, in advance of need  - Initiate/Maintain bed alarm  - Apply yellow socks and bracelet for high fall risk patients  - Consider moving patient to room near nurses station  Outcome: Progressing     Problem: DISCHARGE PLANNING  Goal: Discharge to home or other facility with appropriate resources  Description: INTERVENTIONS:  - Identify barriers to discharge w/patient and caregiver  - Arrange for needed discharge resources and transportation as appropriate  - Identify discharge learning needs (meds, wound care, etc )  - Arrange for interpretive services to assist at discharge as needed  - Refer to Case Management Department for coordinating discharge planning if the patient needs post-hospital services based on physician/advanced practitioner order or complex needs related to functional status, cognitive ability, or social support system  Outcome: Progressing     Problem: Knowledge Deficit  Goal: Patient/family/caregiver demonstrates understanding of disease process, treatment plan, medications, and discharge instructions  Description: Complete learning assessment and assess knowledge base    Interventions:  - Provide teaching at level of understanding  - Provide teaching via preferred learning methods  Outcome: Progressing
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
## 2021-08-02 NOTE — ASSESSMENT & PLAN NOTE
Pt had a thalamic stroke in May 2021  Brain MRI at that time revealed ischemia left thalamus descending to the upper midbrain, without hemorrhage or mass effect      CT brain and CTA showed consistent findings on this admission

## 2021-08-02 NOTE — CONSULTS
NEUROLOGY RESIDENCY CONSULT NOTE     Name: Lashell Salamanca   Age & Sex: 80 y o  female   MRN: [id number]  Unit/Bed#: ED 27   Encounter: [id number]  Length of Stay: [place] Jyotisw[facility]     Ms Cammie Vargas is a delightful 80year old female with a PMHx of thalamic CVA (5/2021), HTN, HLD, DM type 2 and stage 3a CKD, A fib on Coumadin, severe aortic stenosis, hypothyroidism, tremor, diastolic HF with dual chamber pacemaker CAD s/p stent placement, presented to the ED yesterday with "lightheadedness and bilateral upper extremity tremors" per the ED note  Neurology was consulted for left arm weakness and b/l tremors  Currently, hemodynamically stable with some mild dysarthria and residual right-sided weakness that is her baseline since her recent stroke  CTA is unremarkable for any acute infarct; only showed old left thalamic and right caudate lacunar infarct with distal common carotid stenosis (>80%)  EKG, CBC, CMP, UA unremarkable, troponin negative, lactic acid 1 5  EEG pending  Essential tremor  - Tremor most prominent while performing simple tasks such as holding a cup and finger-to-nose testing    - Could switch Metoprolol to Propanolol to avoid polypharmacy and potentially treat the tremors  Left thalamic CVA (5/2021)  - Residual right sided weakness and mild dysarthria  B/l LE weakness  Brain MRI revealed ischemic left thalamus descending to the upper midbrain, without hemorrhage or mass effect     - On ASA 81 mg and Pravastatin 80 mg  - Continue Coumadin 2 5 mg for A  fib    SUBJECTIVE     Reason for Consult / Principal Problem: Tremors and LUE weakness  Hx and PE limited by: Severe arthritis and age    HPI: Lashell Salamanca is a wheel-chaired 80 y o  right handed female who presented yesterday from nursing home with a complaint of left arm tremor   Per patient, she woke up from a nap and noticed that her left arm felt "funny " She said she remembered having similar symptom in her right arm back in May before her stroke (5/2021)  She denies any LOC, amnesia, falls, tonic-clonic movements, or any pain at the time  She said she was a little unsteady on her feet but had no fever/chills, syncope or numbness/tingling  She does complain of some upper extremity pain but could be attributed to the BP cuff and IV lines  The slight slurred speech and lower extremity weakness are her baseline  Note: Patient was oriented but there were inconsistencies in her history reporting pertaining to the side of her symptoms and exact symptoms       Inpatient consult to Neurology  Consult performed by: Julieta Renee  Consult ordered by: Miguel Thomas DO  Reason for consult: Tremor and left-sided arm weakness        Historical Information   Past Medical History:   Diagnosis Date    Abnormal nuclear stress test     last assessed 04/03/2017    Anxiety     Arthritis     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    Bundle branch block, left     Cardiomyopathy ([facility] [address] [facility]     Chest pain 3/9/2019    Chronic pain     chronic b/l shoulder pain    Chronic pain of right knee 4/2/2019    Coronary artery disease     Diabetes mellitus ([facility] [address] )     Gait disturbance 3/2/2018    GERD (gastroesophageal reflux disease)     H/O atrial flutter     History of DVT (deep vein thrombosis)     History of pulmonary embolism     Hyperlipidemia     Hypertension     Hypothyroid     Renal calculi     Shortness of breath      Past Surgical History:   Procedure Laterality Date    ATRIAL ABLATION SURGERY  01/2015   [facility] CARDIAC PACEMAKER PLACEMENT  12/10/2018    Medtronic YANNA XT  MRI, model [id number]    CARDIOVERSION      CHELA/DCCV 10/22/2014    CARPAL TUNNEL RELEASE Right     CATARACT EXTRACTION      CORONARY ANGIOPLASTY WITH STENT PLACEMENT      HYSTERECTOMY      JOINT REPLACEMENT Right     MO AMPUTATION TOE,MT-P JT Left 10/11/2017    Procedure: AMPUTATION SECOND TOE;  Surgeon: Ilana Lama DPM;  Location: [facility];  Service: Podiatry    TONSILLECTOMY      TOTAL HIP ARTHROPLASTY      Right     Social History   Social History     Substance and Sexual Activity   Alcohol Use Yes    Comment: occasional     Social History     Substance and Sexual Activity   Drug Use Not Currently    Types: Marijuana    Comment: MEDICAL MARIJUANA-HAS NOT USED FOR 3 WEEKS     E-Cigarette/Vaping    E-Cigarette Use Never User      E-Cigarette/Vaping Substances    Nicotine No     THC No     CBD No     Flavoring No     Other No     Unknown No      Social History     Tobacco Use   Smoking Status Never Smoker   Smokeless Tobacco Never Used     Family History: non-contributory  Meds/Allergies   all current active meds have been reviewed  Allergies   Allergen Reactions    Atorvastatin      Reaction unknown 10/23/2018-    Other Rash     Patient sensitive to adhesives from tape       Review of previous medical records was completed    Review of Systems   HENT: Negative for trouble swallowing  Respiratory: Negative for cough and shortness of breath  Cardiovascular: Negative for chest pain  Gastrointestinal: Negative for abdominal pain  Musculoskeletal: Positive for arthralgias  Neurological: Positive for dizziness, tremors and speech difficulty  Psychiatric/Behavioral: Positive for sleep disturbance  OBJECTIVE     Patient ID: Aime Hartmann is a 80 y o  female  Vitals:   Vitals:    21 0720 21 0900 21 1000 21 1430   BP: 158/70 143/64 167/71 128/65   BP Location: Right arm Right arm Right arm Right arm   Pulse: 73 71 70 80   Resp: 16 16 16 16   Temp:       TempSrc:       SpO2: 94% 95% 95% 95%   Weight:          Body mass index is 35 31 kg/m²  No intake or output data in the 24 hours ending 21 1549    Temperature:   Temp (24hrs), Av 1 °F (36 7 °C), Min:98 °F (36 7 °C), Max:98 1 °F (36 7 °C)    Temperature: 98 °F (36 7 °C)    Invasive Devices:    Invasive Devices     Peripheral Intravenous Line Peripheral IV 08/01/21 Left Hand 1 day    Peripheral IV 08/01/21 Right Antecubital <1 day          Drain            External Urinary Catheter 64 days                Physical Exam  Constitutional:       General: She is not in acute distress  Appearance: She is obese  HENT:      Head: Normocephalic and atraumatic  Eyes:      Extraocular Movements: Extraocular movements intact  Pupils: Pupils are equal, round, and reactive to light  Skin:     General: Skin is warm  Neurological:      Mental Status: She is alert and oriented to person, place, and time  Deep Tendon Reflexes:      Reflex Scores:       Brachioradialis reflexes are 2+ on the right side and 2+ on the left side  Patellar reflexes are 2+ on the right side and 2+ on the left side  Psychiatric:         Mood and Affect: Mood normal          Speech: Speech is slurred  Behavior: Behavior normal         Neurologic Exam     Mental Status   Oriented to person, place, and time  Follows 2 step commands  Attention: normal  Concentration: normal    Speech: slurred   Level of consciousness: alert  Knowledge: good  Able to name object  Able to repeat  Normal comprehension  Speech is slightly slurred which is baseline since May 2021     Cranial Nerves   Cranial nerves II through XII intact  CN III, IV, VI   Pupils are equal, round, and reactive to light      Motor Exam   Overall muscle tone: normal    Strength   Right deltoid: 4/5  Left deltoid: 4/5  Right biceps: 5/5  Left biceps: 5/5  Right iliopsoas: 4/5  Left iliopsoas: 4/5  Right quadriceps: 4/5  Left quadriceps: 4/5  Right anterior tibial: 5/5  Left anterior tibial: 5/5    Sensory Exam   Light touch normal    Pinprick normal      Gait, Coordination, and Reflexes     Tremor   Intention tremor: present  Action tremor: left arm and right arm    Reflexes   Right brachioradialis: 2+  Left brachioradialis: 2+  Right patellar: 2+  Left patellar: 2+       LABORATORY DATA Labs: I have personally reviewed pertinent reports  Results from last 7 days   Lab Units 08/02/21  0544 08/01/21  1724   WBC Thousand/uL 4 59 4 63   HEMOGLOBIN g/dL 9 5* 10 2*   HEMATOCRIT % 30 2* 32 7*   PLATELETS Thousands/uL 247 268   NEUTROS PCT % 55 58   MONOS PCT % 12 13*      Results from last 7 days   Lab Units 08/02/21  0544 08/01/21  1724   POTASSIUM mmol/L 4 0 4 4   CHLORIDE mmol/L 102 100   CO2 mmol/L 29 31   BUN mg/dL 22 27*   CREATININE mg/dL 0 67 0 73   CALCIUM mg/dL 8 7 9 5   ALK PHOS U/L 67 83   ALT U/L 15 23   AST U/L 12 18     Results from last 7 days   Lab Units 08/01/21  1724   MAGNESIUM mg/dL 1 6          Results from last 7 days   Lab Units 08/01/21  1724   INR  1 97*   PTT seconds 33     Results from last 7 days   Lab Units 08/01/21  1724   LACTIC ACID mmol/L 1 5     Results from last 7 days   Lab Units 08/01/21  1724   TROPONIN I ng/mL <0 02       IMAGING & DIAGNOSTIC TESTING     Radiology Results: I have personally reviewed pertinent reports  CTA head and neck with and without contrast   Final Result by Vivian Zambrano DO (08/01 1917)      CT brain: Old left thalamic and right caudate head lacunar infarcts  No mass, hemorrhage or definitive signs of acute territorial infarction  CT angiography: Severe stenosis of the distal left common carotid artery and proximal cervical internal carotid artery difficult to accurately measure due to the degree of calcification  This is likely greater than 80%  Stable narrowing of the subclavian artery origin on the left  Mild intracranial atherosclerotic disease involving the posterior cerebral arteries                    Workstation performed: [id number]         XR chest 2 views   Final Result by Mandie Whitley MD (08/02 1023)      Enlargement of cardiac silhouette and mild pulmonary vascular congestion                  Workstation performed: [id number]             Other Diagnostic Testing: I have personally reviewed pertinent reports  ACTIVE MEDICATIONS     Current Facility-Administered Medications   Medication Dose Route Frequency    acetaminophen (TYLENOL) tablet 975 mg  975 mg Oral TID    amiodarone tablet 100 mg  100 mg Oral Daily    aspirin (ECOTRIN LOW STRENGTH) EC tablet 81 mg  81 mg Oral Daily    calcium carbonate-vitamin D (OSCAL-D) 500 mg-200 units per tablet 1 tablet  1 tablet Oral BID    ezetimibe (ZETIA) tablet 10 mg  10 mg Oral Daily    furosemide (LASIX) tablet 20 mg  20 mg Oral Daily    isosorbide mononitrate (IMDUR) 24 hr tablet 30 mg  30 mg Oral Daily    levothyroxine tablet 75 mcg  75 mcg Oral Early Morning    lisinopril (ZESTRIL) tablet 2 5 mg  2 5 mg Oral Daily    metoprolol succinate (TOPROL-XL) 24 hr tablet 25 mg  25 mg Oral After Dinner    multivitamin-minerals (CENTRUM) tablet 1 tablet  1 tablet Oral Daily    polyethylene glycol (MIRALAX) packet 17 g  17 g Oral Daily    pravastatin (PRAVACHOL) tablet 80 mg  80 mg Oral After Dinner    senna-docusate sodium (SENOKOT S) 8 6-50 mg per tablet 1 tablet  1 tablet Oral BID    warfarin (COUMADIN) tablet 2 5 mg  2 5 mg Oral Daily (warfarin)       Prior to Admission medications    Medication Sig Start Date End Date Taking?  Authorizing Provider   acetaminophen (TYLENOL) 325 mg tablet Take 975 mg by mouth 3 (three) times a day   Yes Historical Provider, MD   amiodarone 200 mg tablet Take 0 5 tablets (100 mg total) by mouth daily 7/2/21  Yes Mony Jimenez MD   Aspirin Low Dose 81 MG EC tablet TAKE 1 TABLET BY MOUTH EVERY DAY 3/9/21  Yes Fito Burger, DO   Calcium Carbonate-Vitamin D (CALTRATE 600+D PO) Take 1 capsule by mouth 2 (two) times a day     Yes Historical Provider, MD   Diclofenac Sodium (VOLTAREN) 1 % Apply 2 g topically 4 (four) times a day Bilateral shoulders and knees 6/3/21  Yes Hakeem Longo, DO   ezetimibe (ZETIA) 10 mg tablet Take 1 tablet (10 mg total) by mouth daily 7/2/21  Yes Mony Jimenez MD   furosemide (LASIX) 20 mg tablet Take 1 tablet (20 mg total) by mouth daily 3/5/21  Yes Ilana Da Silva MD   isosorbide mononitrate (IMDUR) 30 mg 24 hr tablet Take 1 tablet (30 mg total) by mouth daily 3/17/21  Yes Ilana Da Silva MD   levothyroxine 75 mcg tablet Take 1 tablet (75 mcg total) by mouth daily in the early morning 5/12/21  Yes Vineet Aaron PA-C   Lidocaine (Aspercreme Lidocaine) 4 % PTCH Apply 3 patches topically daily B/l knees, low back   Yes Historical Provider, MD   lisinopril (ZESTRIL) 2 5 mg tablet TAKE ONE TABLET BY MOUTH EVERY DAY 3/9/21  Yes Charissa Haile MD   metoprolol succinate (Toprol XL) 25 mg 24 hr tablet Take 1 tablet (25 mg total) by mouth daily after dinner 6/17/20  Yes Ilana Da Silva MD   multivitamin-iron-minerals-folic acid (CENTRUM) chewable tablet Chew 1 tablet daily     Yes Historical Provider, MD   polyethylene glycol (MIRALAX) 17 g packet Take 17 g by mouth daily 6/4/21  Yes Allen Moulton DO   pravastatin (PRAVACHOL) 40 mg tablet Take 2 tablets (80 mg total) by mouth daily 6/3/21  Yes Allen Moulton DO   senna-docusate sodium (SENOKOT S) 8 6-50 mg per tablet Take 1 tablet by mouth 2 (two) times a day 6/3/21  Yes Allen Moulton DO   warfarin (COUMADIN) 2 5 mg tablet Take 1 tablet (2 5 mg total) by mouth daily 5/11/21  Yes Vineet Aaron PA-C   Accu-Chek FastClix Lancets MISC Check once daily 3/1/21   Charissa Haile MD   Accu-Chek Softclix Lancets lancets Use daily Use as instructed 4/7/21   Charissa Haile MD   Blood Glucose Monitoring Suppl (Accu-Chek Guide Me) w/Device KIT Check once daily 3/1/21   Charissa Haile MD   Carboxymethylcellulose Sodium (THERATEARS OP) Apply to eye 3 (three) times a day I DROP INTO EACH EYE THREE TIMES DAILY    Historical Provider, MD   glucose blood (Accu-Chek Guide) test strip Check once daily 3/1/21   Charissa Haile MD   Lancets (accu-chek soft touch) lancets Use daily Use as instructed 2/26/21   Charissa Haile MD         CODE STATUS & ADVANCED DIRECTIVES     Code Status: Level 3 - DNAR and DNI    VTE Pharmacologic Prophylaxis: Warfarin (Coumadin)  VTE Mechanical Prophylaxis: sequential compression device    ==  [address] [facility] Subintern[facility]

## 2021-08-02 NOTE — ED NOTES
Requested 0900 meds again from the pharmacy  Pharmacy tech to manuelispense Judyann Shone, RN  08/02/21 [id number]

## 2021-08-02 NOTE — PROGRESS NOTES
INTERNAL MEDICINE RESIDENCY SENIOR ADMISSION NOTE     Name: Cruzito Horowitz   Age & Sex: 80 y o  female   MRN: [id number]  Unit/Bed#: ED 32   Encounter: [id number]  Primary Care Provider: Yordy Ahumada MD    Admit to team: SOD Team C     Patient seen and examined  Reviewed H&P per Dr Marquis King   Agree with the assessment and plan with any exception/addition as noted below:    Principal Problem:    Tremor  Active Problems:    Essential hypertension    History of placement of stent in LAD coronary artery    Hyperlipidemia    Acquired hypothyroidism    Pacemaker    Atrial flutter (HCC)    Chronic diastolic CHF (congestive heart failure) ([facility] [address] )    This is 80 year wheelchaired female with significant past medical history of CAD status post PCI,, hypertension, atrial fibrillation on warfarin, ventricular tachycardia, cardiomyopathy, dual-chamber pacemaker, hyperlipidemia, severe aortic stenosis who is presenting from nursing home with chief complaint of left arm tremor  Patient admits that she woke up from a nap this afternoon and notice that she had tremor in her left arm  She also admits that she had similar tremor in the right arm last month  Patient has a history of recent left thalamic stroke in May 2021  On presentation to ER, patient had stable vitals  Labs grossly unremarkable except corrected calcium 10 4,  Hemoglobin 10 2, INR 1 97, normal TSH  CTA head and neck showed old left thalamic and right caudate lacunar infarcts, no new infarcts, severe distal left common carotid artery stenosis and proximal cervical internal carotid artery difficult to measure due to calcification although likely greater than 80% stenosis  Patient will be admitted for this new onset left arm tremor  During my evaluation, patient is alert and ordered x3  She has tremor on the left arm on extension  Admits that her tremor was much worse in the afternoon    Bilateral upper extremity  strength is 4/5 which seems to be at baseline  Her tremor is likely an essential tremor although will start patient on EEG routine and awake  Consider beta-blockers for symptomatic relief of tremor after EEG is unremarkable  Diet ordered  Continue most of the home meds  Rest of the care as per H&P      Code Status: Level 3 - DNAR and DNI  Admission Status: INPATIENT   Disposition: Patient requires Med/Surg  Expected Length of Stay: >2 night

## 2021-08-02 NOTE — ASSESSMENT & PLAN NOTE
Patient is currently on pravastatin 80 mg daily and is [id number] mg daily at home    Continue pravastatin 80 mg and ezetimibe 10 mg daily

## 2021-08-02 NOTE — ED NOTES
Meds requested from pharmacy, no 0900 meds available to be given       Maday Pfeiffer RN  08/02/21 1649
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
## 2021-08-02 NOTE — ASSESSMENT & PLAN NOTE
Wt Readings from Last 3 Encounters:   08/01/21 79 3 kg (174 lb 13 2 oz)   07/09/21 80 7 kg (178 lb)   06/16/21 80 7 kg (178 lb)     Patient is on furosemide 20 mg daily and metoprolol 25 mg daily at home  Patient does not appear to be in a heart failure exacerbation at this time as per physical exam and labs    Continue furosemide 20 mg daily and change metoprolol 25 mg daily to propanolol 10 mg TID

## 2021-08-02 NOTE — ASSESSMENT & PLAN NOTE
Pacemaker functioning normally as of March 2021    MDT-DUAL CHAMBER PPM (DDDR MODE) - ACTIVE SYSTEM IS MRI CONDITIONAL   CARELINK TRANSMISSION: BATTERY VOLTAGE ADEQUATE (10 1 YRS)  AP: 27 4%  : 99 9% (>40%~CHB)  ALL AVAILABLE LEAD PARAMETERS WITHIN NORMAL LIMITS  NO SIGNIFICANT HIGH RATE EPISODES  PACEMAKER FUNCTIONING APPROPRIATELY

## 2021-08-02 NOTE — ASSESSMENT & PLAN NOTE
Patient is on levothyroxine 75 mg daily at home   TSH 3 45    Continue levothyroxine 75 mg daily  TSH WNL

## 2021-08-02 NOTE — ASSESSMENT & PLAN NOTE
Patient is on metoprolol 25 mg daily, warfarin 2 5 mg daily, amiodarone 100 mg daily  Normal sinus rhythm on exam  INR 1 97, PT 22 4, troponin negative    Continue home regimen as prescribed
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
## 2021-08-02 NOTE — ASSESSMENT & PLAN NOTE
Patient is on metoprolol 25 mg daily, isosorbide mononitrate 30 mg, and lisinopril 2 5 mg at home  BP is 150/64    Continue isosorbide mononitrate 30 mg, and lisinopril 2 5 mg daily  Discontinue metoprolol and start propanolol 10 mg TID    Monitor BP

## 2021-08-03 VITALS
RESPIRATION RATE: 16 BRPM | WEIGHT: 174.82 LBS | BODY MASS INDEX: 35.31 KG/M2 | TEMPERATURE: 97.5 F | DIASTOLIC BLOOD PRESSURE: 58 MMHG | HEART RATE: 77 BPM | OXYGEN SATURATION: 97 % | SYSTOLIC BLOOD PRESSURE: 127 MMHG

## 2021-08-03 LAB
ANION GAP SERPL CALCULATED.3IONS-SCNC: 6 MMOL/L (ref 4–13)
BUN SERPL-MCNC: 23 MG/DL (ref 5–25)
CALCIUM SERPL-MCNC: 9 MG/DL (ref 8.3–10.1)
CHLORIDE SERPL-SCNC: 100 MMOL/L (ref 100–108)
CO2 SERPL-SCNC: 29 MMOL/L (ref 21–32)
CREAT SERPL-MCNC: 0.73 MG/DL (ref 0.6–1.3)
GFR SERPL CREATININE-BSD FRML MDRD: 72 ML/MIN/1.73SQ M
GLUCOSE SERPL-MCNC: 86 MG/DL (ref 65–140)
POTASSIUM SERPL-SCNC: 3.9 MMOL/L (ref 3.5–5.3)
SODIUM SERPL-SCNC: 135 MMOL/L (ref 136–145)

## 2021-08-03 PROCEDURE — 99239 HOSP IP/OBS DSCHRG MGMT >30: CPT | Performed by: INTERNAL MEDICINE

## 2021-08-03 PROCEDURE — NC001 PR NO CHARGE: Performed by: PSYCHIATRY & NEUROLOGY

## 2021-08-03 PROCEDURE — 99231 SBSQ HOSP IP/OBS SF/LOW 25: CPT | Performed by: PSYCHIATRY & NEUROLOGY

## 2021-08-03 PROCEDURE — 80048 BASIC METABOLIC PNL TOTAL CA: CPT

## 2021-08-03 RX ORDER — PROPRANOLOL HYDROCHLORIDE 10 MG/1
10 TABLET ORAL 3 TIMES DAILY
Refills: 0
Start: 2021-08-03

## 2021-08-03 RX ORDER — PROPRANOLOL HYDROCHLORIDE 10 MG/1
10 TABLET ORAL 3 TIMES DAILY
Status: DISCONTINUED | OUTPATIENT
Start: 2021-08-03 | End: 2021-08-03 | Stop reason: HOSPADM

## 2021-08-03 RX ADMIN — SENNOSIDES AND DOCUSATE SODIUM 1 TABLET: 8.6; 5 TABLET ORAL at 17:21

## 2021-08-03 RX ADMIN — LISINOPRIL 2.5 MG: 2.5 TABLET ORAL at 08:27

## 2021-08-03 RX ADMIN — Medication 1 TABLET: at 08:27

## 2021-08-03 RX ADMIN — FUROSEMIDE 20 MG: 20 TABLET ORAL at 08:28

## 2021-08-03 RX ADMIN — ISOSORBIDE MONONITRATE 30 MG: 30 TABLET, EXTENDED RELEASE ORAL at 08:27

## 2021-08-03 RX ADMIN — AMIODARONE HYDROCHLORIDE 100 MG: 200 TABLET ORAL at 08:28

## 2021-08-03 RX ADMIN — EZETIMIBE 10 MG: 10 TABLET ORAL at 08:27

## 2021-08-03 RX ADMIN — WARFARIN SODIUM 2.5 MG: 2.5 TABLET ORAL at 17:21

## 2021-08-03 RX ADMIN — ASPIRIN 81 MG: 81 TABLET, COATED ORAL at 08:27

## 2021-08-03 RX ADMIN — Medication 1 TABLET: at 17:21

## 2021-08-03 RX ADMIN — POLYETHYLENE GLYCOL 3350 17 G: 17 POWDER, FOR SOLUTION ORAL at 08:27

## 2021-08-03 RX ADMIN — PRAVASTATIN SODIUM 80 MG: 80 TABLET ORAL at 17:21

## 2021-08-03 RX ADMIN — ACETAMINOPHEN 975 MG: 325 TABLET, FILM COATED ORAL at 08:27

## 2021-08-03 RX ADMIN — LEVOTHYROXINE SODIUM 75 MCG: 75 TABLET ORAL at 05:31

## 2021-08-03 RX ADMIN — PROPRANOLOL HYDROCHLORIDE 10 MG: 10 TABLET ORAL at 17:21

## 2021-08-03 RX ADMIN — MULTIPLE VITAMINS W/ MINERALS TAB 1 TABLET: TAB ORAL at 08:27

## 2021-08-03 RX ADMIN — ACETAMINOPHEN 975 MG: 325 TABLET, FILM COATED ORAL at 17:21

## 2021-08-03 RX ADMIN — SENNOSIDES AND DOCUSATE SODIUM 1 TABLET: 8.6; 5 TABLET ORAL at 08:53

## 2021-08-03 NOTE — CASE MANAGEMENT
CM confirmed with Dali Galo at [facility], yana with 6pm p/u
CM informed pt stable for DC back to [facility]  CM confirmed with pt, she states she always goes in BLS transport  CM placed referral to [facility] and left a message for report numbers  CM arranged BLS transport for 6pm via [address]  CM informed RN-Portia, Dr Jessica Bauer, and patient   Awaiting return call from [facility] Admissions
 Infant (Birth)

## 2021-08-03 NOTE — PROGRESS NOTES
NEUROLOGY RESIDENCY PROGRESS NOTE     Name: Cruzito Horowitz   Age & Sex: 80 y o  female   MRN: [id number]  Unit/Bed#: -01   Encounter: [id number]      Kwaku Matson is a 80year old female with a PMHx of thalamic CVA (05/2021), HTN, HLD, CAD s/p stent, A  Fib on Coumadin, diastolic heart failure with a pacemaker, and severe aortic stenosis  Presented to the ED on 08/1/21 with light-headedness which has resolved, and persistent left UE tremor/weakness which seems chronic  Neurologic exam and CT brain didn't show any new/acute infarction or bleeding  Still has bilateral essential tremors with right arm having more proximal arm involvement due to residual weakness from left thalamic stroke in May 2021  Routine EEG revealed no abnormalities  CMP was stable and within normal limits  TSH normal  Propanolol started  Essential tremors  Propanolol 10 mg TID was started today instead of metoprolol XL 25 mg  Follow up outpatient with Neurology to manage tremors and titrate the medication as needed  Can be cleared for discharge from Neuro's standpoint  SUBJECTIVE     Patient was seen and examined  No acute events overnight  Has been very cold in her feet but otherwise says her left arm feels better than when she came in to the ED  Denies any headache, lightheadedness, visual disturbances, speech changes nausea, vomiting, numbness/tingling or weakness in either of her upper extremities  She was given an extra dose of warfarin 2 5 mg last night given her subtherapeutic INR or 1 97  Tolerating regular diet  No other complaints  OBJECTIVE     Patient ID: Cruzito Horowitz is a 80 y o  female      Vitals:    08/02/21 1430 08/02/21 1620 08/02/21 2114 08/03/21 0743   BP: 128/65 137/63 144/64 140/72   BP Location: Right arm      Pulse: 80 77 73 68   Resp: 16      Temp:  97 6 °F (36 4 °C) 97 5 °F (36 4 °C) 97 5 °F (36 4 °C)   TempSrc:       SpO2: 95% 94% 94% 97%   Weight:          Temperature: Temp (24hrs), Av 5 °F (36 4 °C), Min:97 5 °F (36 4 °C), Max:97 6 °F (36 4 °C)    Temperature: 97 5 °F (36 4 °C)      Physical Exam  Constitutional:       General: She is not in acute distress  HENT:      Head: Normocephalic  Skin:     General: Skin is warm  Neurological:      Mental Status: She is alert and oriented to person, place, and time  Coordination: Finger-Nose-Finger Test normal       Deep Tendon Reflexes:      Reflex Scores:       Bicep reflexes are 2+ on the right side and 2+ on the left side  Brachioradialis reflexes are 2+ on the right side and 2+ on the left side  Patellar reflexes are 2+ on the right side and 2+ on the left side  Psychiatric:         Mood and Affect: Mood normal          Speech: Speech is slurred  Behavior: Behavior normal           Neurologic Exam     Mental Status   Oriented to person, place, and time  Follows 2 step commands  Attention: normal  Concentration: normal    Speech: slurred   Level of consciousness: alert    Speech is at baseline slightly slurred due to CVA in      Cranial Nerves   Cranial nerves II through XII intact  Motor Exam   Muscle bulk: normal  Overall muscle tone: normal    Strength   Right deltoid: 4/5  Left deltoid: 4/5  Right biceps: 5/5  Left biceps: 5/5  Right quadriceps: 4/5  Left quadriceps: 4/5  Right anterior tibial: 5/5  Left anterior tibial: 5/5  Right posterior tibial: 5/5  Left posterior tibial: 5/5  She has weak shoulders and legs at baseline (wheelchair-bound)     Sensory Exam   Light touch normal      Gait, Coordination, and Reflexes     Coordination   Finger to nose coordination: normal    Tremor   Intention tremor: present  Action tremor: right arm and left arm    Reflexes   Right brachioradialis: 2+  Left brachioradialis: 2+  Right biceps: 2+  Left biceps: 2+  Right patellar: 2+  Left patellar: 2+  Can perform finger to nose but with tremors        LABORATORY DATA     Labs:  I have personally reviewed pertinent reports  Results from last 7 days   Lab Units 08/02/21  0544 08/01/21  1724   WBC Thousand/uL 4 59 4 63   HEMOGLOBIN g/dL 9 5* 10 2*   HEMATOCRIT % 30 2* 32 7*   PLATELETS Thousands/uL 247 268   NEUTROS PCT % 55 58   MONOS PCT % 12 13*      Results from last 7 days   Lab Units 08/03/21  0530 08/02/21  0544 08/01/21  1724   SODIUM mmol/L 135* 135* 136   POTASSIUM mmol/L 3 9 4 0 4 4   CHLORIDE mmol/L 100 102 100   CO2 mmol/L 29 29 31   BUN mg/dL 23 22 27*   CREATININE mg/dL 0 73 0 67 0 73   CALCIUM mg/dL 9 0 8 7 9 5   ALK PHOS U/L  --  67 83   ALT U/L  --  15 23   AST U/L  --  12 18     Results from last 7 days   Lab Units 08/01/21  1724   MAGNESIUM mg/dL 1 6          Results from last 7 days   Lab Units 08/01/21  1724   INR  1 97*   PTT seconds 33     Results from last 7 days   Lab Units 08/01/21  1724   LACTIC ACID mmol/L 1 5     Results from last 7 days   Lab Units 08/01/21  1724   TROPONIN I ng/mL <0 02       IMAGING & DIAGNOSTIC TESTING     Radiology Results: I have personally reviewed pertinent reports  X-ray chest 08/01/21:  IMPRESSION: Enlargement of cardiac silhouette and mild pulmonary vascular congestion    CT stroke alert and CTA: 08/01/21  IMPRESSION:    CT brain: Old left thalamic and right caudate head lacunar infarcts  No mass, hemorrhage or definitive signs of acute territorial infarction      CT angiography: Severe stenosis of the distal left common carotid artery and proximal cervical internal carotid artery difficult to accurately measure due to the degree of calcification  This is likely greater than 80%  Stable narrowing of the subclavian artery origin on the left  Mild intracranial atherosclerotic disease involving the posterior cerebral arteries  Other Diagnostic Testing: I have personally reviewed pertinent reports  EEG routine and awake: 08/02/21    EEG impression:  This was a normal awake and drowsy routine EEG recording     Clinical Interpretation: This was a normal awake and drowsy routine EEG study  No electrographic seizures, interictal epileptiform discharges (seizure tendency) or focal slowing were seen       Jr Sanchez MD        ACTIVE MEDICATIONS     Current Facility-Administered Medications   Medication Dose Route Frequency    acetaminophen (TYLENOL) tablet 975 mg  975 mg Oral TID    amiodarone tablet 100 mg  100 mg Oral Daily    aspirin (ECOTRIN LOW STRENGTH) EC tablet 81 mg  81 mg Oral Daily    calcium carbonate-vitamin D (OSCAL-D) 500 mg-200 units per tablet 1 tablet  1 tablet Oral BID    ezetimibe (ZETIA) tablet 10 mg  10 mg Oral Daily    furosemide (LASIX) tablet 20 mg  20 mg Oral Daily    isosorbide mononitrate (IMDUR) 24 hr tablet 30 mg  30 mg Oral Daily    levothyroxine tablet 75 mcg  75 mcg Oral Early Morning    lisinopril (ZESTRIL) tablet 2 5 mg  2 5 mg Oral Daily    multivitamin-minerals (CENTRUM) tablet 1 tablet  1 tablet Oral Daily    polyethylene glycol (MIRALAX) packet 17 g  17 g Oral Daily    pravastatin (PRAVACHOL) tablet 80 mg  80 mg Oral After Dinner    propranolol (INDERAL) tablet 10 mg  10 mg Oral TID    senna-docusate sodium (SENOKOT S) 8 6-50 mg per tablet 1 tablet  1 tablet Oral BID    warfarin (COUMADIN) tablet 2 5 mg  2 5 mg Oral Daily (warfarin)       Prior to Admission medications    Medication Sig Start Date End Date Taking?  Authorizing Provider   acetaminophen (TYLENOL) 325 mg tablet Take 975 mg by mouth 3 (three) times a day   Yes Historical Provider, MD   amiodarone 200 mg tablet Take 0 5 tablets (100 mg total) by mouth daily 7/2/21  Yes Isac Alpers, MD   Aspirin Low Dose 81 MG EC tablet TAKE 1 TABLET BY MOUTH EVERY DAY 3/9/21  Yes Fito Lehman,    Calcium Carbonate-Vitamin D (CALTRATE 600+D PO) Take 1 capsule by mouth 2 (two) times a day     Yes Historical Provider, MD   Diclofenac Sodium (VOLTAREN) 1 % Apply 2 g topically 4 (four) times a day Bilateral shoulders and knees 6/3/21  Yes Vidal Gross DO   ezetimibe (ZETIA) 10 mg tablet Take 1 tablet (10 mg total) by mouth daily 7/2/21  Yes Yanelis Cifuentes MD   furosemide (LASIX) 20 mg tablet Take 1 tablet (20 mg total) by mouth daily 3/5/21  Yes Buck Ocampo MD   isosorbide mononitrate (IMDUR) 30 mg 24 hr tablet Take 1 tablet (30 mg total) by mouth daily 3/17/21  Yes Buck Ocampo MD   levothyroxine 75 mcg tablet Take 1 tablet (75 mcg total) by mouth daily in the early morning 5/12/21  Yes David Garcia PA-C   Lidocaine (Aspercreme Lidocaine) 4 % PTCH Apply 3 patches topically daily B/l knees, low back   Yes Historical Provider, MD   lisinopril (ZESTRIL) 2 5 mg tablet TAKE ONE TABLET BY MOUTH EVERY DAY 3/9/21  Yes Nick Vaughn MD   metoprolol succinate (Toprol XL) 25 mg 24 hr tablet Take 1 tablet (25 mg total) by mouth daily after dinner 6/17/20  Yes Buck Ocampo MD   multivitamin-iron-minerals-folic acid (CENTRUM) chewable tablet Chew 1 tablet daily     Yes Historical Provider, MD   polyethylene glycol (MIRALAX) 17 g packet Take 17 g by mouth daily 6/4/21  Yes Vidal Gross DO   pravastatin (PRAVACHOL) 40 mg tablet Take 2 tablets (80 mg total) by mouth daily 6/3/21  Yes Vidal Gross DO   senna-docusate sodium (SENOKOT S) 8 6-50 mg per tablet Take 1 tablet by mouth 2 (two) times a day 6/3/21  Yes Vidal Gross DO   warfarin (COUMADIN) 2 5 mg tablet Take 1 tablet (2 5 mg total) by mouth daily 5/11/21  Yes David Garcia PA-C   Accu-Chek FastClix Lancets MISC Check once daily 3/1/21   Nick Vaughn MD   Accu-Chek Softclix Lancets lancets Use daily Use as instructed 4/7/21   Nick Vaughn MD   Blood Glucose Monitoring Suppl (Accu-Chek Guide Me) w/Device KIT Check once daily 3/1/21   Nick Vaughn MD   Carboxymethylcellulose Sodium (THERATEARS OP) Apply to eye 3 (three) times a day I DROP INTO EACH EYE THREE TIMES DAILY    Historical Provider, MD   glucose blood (Accu-Chek Guide) test strip Check once daily 3/1/21   Nick Vaughn MD   Lancets (accu-chek soft touch) lancets Use daily Use as instructed 2/26/21   Nils Vasquez MD       VTE Pharmacologic Prophylaxis: Warfarin (Coumadin)  VTE Mechanical Prophylaxis: sequential compression device    ==  [address] [facility] Neurology Subinternship

## 2021-08-03 NOTE — TRANSPORTATION MEDICAL NECESSITY
Section I - General Information    Name of Patient: Vincenzo Tirado                 : 1929    Medicare #: [id number]  Transport Date: 21 (PCS is valid for round trips on this date and for all repetitive trips in the 60-day range as noted below )  Origin: [address] [place] [address] 7                                         Destination:  [facility]  Is the pt's stay covered under Medicare Part A (PPS/DRG)   []     Closest appropriate facility? If no, why is transport to more distant facility required? Yes  If hospice pt, is this transport related to pt's terminal illness? NA   Section II - Medical Necessity Questionnaire  Ambulance transportation is medically necessary only if other means of transport are contraindicated or would be potentially harmful to the patient  To meet this requirement, the patient must either be "bed confined" or suffer from a condition such that transport by means other than ambulance is contraindicated by the patient's condition  The following questions must be answered by the medical professional signing below for this form to be valid:    1)  Describe the MEDICAL CONDITION (physical and/or mental) of this patient AT [address] [place] [address] that requires the patient to be transported in an ambulance and why transport by other means is contraindicated by the patient's condition: bed confined; mod assist x2-3; unable to transfer; unable to safely tolerate seated position for transport;  fall risk    2) Is the patient "bed confined" as defined below? Yes  To be "be confined" the patient must satisfy all three of the following conditions: (1) unable to get up from bed without Assistance; AND (2) unable to ambulate; AND (3) unable to sit in a chair or wheelchair  3) Can this patient safely be transported by car or wheelchair van (i e , seated during transport without a medical attendant or monitoring)?    No    4) In addition to completing questions 1-3 above, please check any of the following conditions that apply*:   *Note: supporting documentation for any boxes checked must be maintained in the patient's medical records  If hosp-hosp transfer, describe services needed at 2nd facility not available at 1st facility? Unable to tolerate seated position for time needed to transport   Section III - Signature of Physician or Healthcare Professional  I certify that the above information is true and correct based on my evaluation of this patient, and represent that the patient requires transport by ambulance and that other forms of transport are contraindicated  I understand that this information will be used by the Centers for Medicare and Medicaid Services (CMS) to support the determination of medical necessity for ambulance services, and I represent that I have personal knowledge of the patient's condition at time of transport  []  If this box is checked, I also certify that the patient is physically or mentally incapable of signing the ambulance service's claim and that the institution with which I am affiliated has furnished care, services, or assistance to the patient  My signature below is made on behalf of the patient pursuant to 42 CFR §424 36(b)(4)  In accordance with 42 CFR §424 37, the specific reason(s) that the patient is physically or mentally incapable of signing the claim form is as follows: Barbara Izaguirre of Physician* or Healthcare Professional____________________________________  Signature Date 08/03/21 (For scheduled repetitive transports, this form is not valid for transports performed more than 60 days after this date)    Printed Name & Credentials of Physician or Healthcare Professional (MD, DO, RN, etc )___VIET Brenner_______  *Form must be signed by patient's attending physician for scheduled, repetitive transports   For non-repetitive, unscheduled ambulance transports, if unable to obtain the signature of the attending physician, any of the following may sign (choose appropriate option below)  [] Physician Assistant []  Clinical Nurse Specialist []  Registered Nurse  []  Nurse Practitioner  [x] Discharge Planner

## 2021-08-03 NOTE — PLAN OF CARE
Problem: MOBILITY - ADULT  Goal: Maintain or return to baseline ADL function  Description: INTERVENTIONS:  -  Assess patient's ability to carry out ADLs; assess patient's baseline for ADL function and identify physical deficits which impact ability to perform ADLs (bathing, care of mouth/teeth, toileting, grooming, dressing, etc )  - Assess/evaluate cause of self-care deficits   - Assess range of motion  - Assess patient's mobility; develop plan if impaired  - Assess patient's need for assistive devices and provide as appropriate  - Encourage maximum independence but intervene and supervise when necessary  - Involve family in performance of ADLs  - Assess for home care needs following discharge   - Consider OT consult to assist with ADL evaluation and planning for discharge  - Provide patient education as appropriate  Outcome: Progressing  Goal: Maintains/Returns to pre admission functional level  Description: INTERVENTIONS:  - Perform BMAT or MOVE assessment daily    - Set and communicate daily mobility goal to care team and patient/family/caregiver  - Collaborate with rehabilitation services on mobility goals if consulted  - Perform Range of Motion 3 times a day  - Reposition patient every 3 hours    - Dangle patient 3 times a day  - Stand patient 3 times a day  - Ambulate patient 3 times a day  - Out of bed to chair 3 times a day   - Out of bed for meals 3 times a day  - Out of bed for toileting  - Record patient progress and toleration of activity level   Outcome: Progressing     Problem: Potential for Falls  Goal: Patient will remain free of falls  Description: INTERVENTIONS:  - Educate patient/family on patient safety including physical limitations  - Instruct patient to call for assistance with activity   - Consult OT/PT to assist with strengthening/mobility   - Keep Call bell within reach  - Keep bed low and locked with side rails adjusted as appropriate  - Keep care items and personal belongings within reach  - Initiate and maintain comfort rounds  - Make Fall Risk Sign visible to staff  - Offer Toileting every 2 Hours, in advance of need  - Initiate/Maintain bed alarm  - Obtain necessary fall risk management equipment: alarms  - Apply yellow socks and bracelet for high fall risk patients  - Consider moving patient to room near nurses station  Outcome: Progressing     Problem: Prexisting or High Potential for Compromised Skin Integrity  Goal: Skin integrity is maintained or improved  Description: INTERVENTIONS:  - Identify patients at risk for skin breakdown  - Assess and monitor skin integrity  - Assess and monitor nutrition and hydration status  - Monitor labs   - Assess for incontinence   - Turn and reposition patient  - Assist with mobility/ambulation  - Relieve pressure over bony prominences  - Avoid friction and shearing  - Provide appropriate hygiene as needed including keeping skin clean and dry  - Evaluate need for skin moisturizer/barrier cream  - Collaborate with interdisciplinary team   - Patient/family teaching  - Consider wound care consult   Outcome: Progressing     Problem: PAIN - ADULT  Goal: Verbalizes/displays adequate comfort level or baseline comfort level  Description: Interventions:  - Encourage patient to monitor pain and request assistance  - Assess pain using appropriate pain scale  - Administer analgesics based on type and severity of pain and evaluate response  - Implement non-pharmacological measures as appropriate and evaluate response  - Consider cultural and social influences on pain and pain management  - Notify physician/advanced practitioner if interventions unsuccessful or patient reports new pain  Outcome: Progressing     Problem: INFECTION - ADULT  Goal: Absence or prevention of progression during hospitalization  Description: INTERVENTIONS:  - Assess and monitor for signs and symptoms of infection  - Monitor lab/diagnostic results  - Monitor all insertion sites, i e  indwelling lines, tubes, and drains  - Monitor endotracheal if appropriate and nasal secretions for changes in amount and color  - [place] appropriate cooling/warming therapies per order  - Administer medications as ordered  - Instruct and encourage patient and family to use good hand hygiene technique  - Identify and instruct in appropriate isolation precautions for identified infection/condition  Outcome: Progressing  Goal: Absence of fever/infection during neutropenic period  Description: INTERVENTIONS:  - Monitor WBC    Outcome: Progressing     Problem: SAFETY ADULT  Goal: Maintain or return to baseline ADL function  Description: INTERVENTIONS:  -  Assess patient's ability to carry out ADLs; assess patient's baseline for ADL function and identify physical deficits which impact ability to perform ADLs (bathing, care of mouth/teeth, toileting, grooming, dressing, etc )  - Assess/evaluate cause of self-care deficits   - Assess range of motion  - Assess patient's mobility; develop plan if impaired  - Assess patient's need for assistive devices and provide as appropriate  - Encourage maximum independence but intervene and supervise when necessary  - Involve family in performance of ADLs  - Assess for home care needs following discharge   - Consider OT consult to assist with ADL evaluation and planning for discharge  - Provide patient education as appropriate  Outcome: Progressing  Goal: Maintains/Returns to pre admission functional level  Description: INTERVENTIONS:  - Perform BMAT or MOVE assessment daily    - Set and communicate daily mobility goal to care team and patient/family/caregiver  - Collaborate with rehabilitation services on mobility goals if consulted  - Perform Range of Motion 3 times a day  - Reposition patient every 3 hours    - Dangle patient 3 times a day  - Stand patient 3 times a day  - Ambulate patient 3 times a day  - Out of bed to chair 3 times a day   - Out of bed for meals 3 times a day  - Out of bed for toileting  - Record patient progress and toleration of activity level   Outcome: Progressing  Goal: Patient will remain free of falls  Description: INTERVENTIONS:  - Educate patient/family on patient safety including physical limitations  - Instruct patient to call for assistance with activity   - Consult OT/PT to assist with strengthening/mobility   - Keep Call bell within reach  - Keep bed low and locked with side rails adjusted as appropriate  - Keep care items and personal belongings within reach  - Initiate and maintain comfort rounds  - Make Fall Risk Sign visible to staff  - Offer Toileting every 2 Hours, in advance of need  - Initiate/Maintain bed alarm  - Obtain necessary fall risk management equipment: alarms   - Apply yellow socks and bracelet for high fall risk patients  - Consider moving patient to room near nurses station  Outcome: Progressing     Problem: DISCHARGE PLANNING  Goal: Discharge to home or other facility with appropriate resources  Description: INTERVENTIONS:  - Identify barriers to discharge w/patient and caregiver  - Arrange for needed discharge resources and transportation as appropriate  - Identify discharge learning needs (meds, wound care, etc )  - Arrange for interpretive services to assist at discharge as needed  - Refer to Case Management Department for coordinating discharge planning if the patient needs post-hospital services based on physician/advanced practitioner order or complex needs related to functional status, cognitive ability, or social support system  Outcome: Progressing     Problem: Knowledge Deficit  Goal: Patient/family/caregiver demonstrates understanding of disease process, treatment plan, medications, and discharge instructions  Description: Complete learning assessment and assess knowledge base    Interventions:  - Provide teaching at level of understanding  - Provide teaching via preferred learning methods  Outcome: Progressing

## 2021-08-03 NOTE — DISCHARGE INSTRUCTIONS
Stop taking metoprolol and take propanolol 10 mg TID instead  Follow up with your neurologist outpatient
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
## 2021-08-04 LAB
METHYLMALONATE SERPL-SCNC: 210 NMOL/L (ref 0–378)
SL AMB DISCLAIMER: NORMAL

## 2021-08-06 ENCOUNTER — TELEPHONE (OUTPATIENT)
Dept: NEUROLOGY | Facility: CLINIC | Age: 86
End: 2021-08-06

## 2021-08-06 NOTE — TELEPHONE ENCOUNTER
Ester [address] at [phone] I asked the  for 5 2 nursing Crystal  I was transferred to [employer] messaging for [facility] and [facility] for someone to return my call to sched HFU appt for pt      SLB/Tremors and LUE Weakness/Medicare/AARP    NOTE FROM CHART:  Reason for Consult / Principal Problem: Tremors and LUE weakness  She should follow-up with the movement disorder specialist in 4-6 weeks, may see either physician or [address] [place] [address]  [phone] ask for 5 2 nursing-Crystal    [employer] message

## 2021-08-09 NOTE — TELEPHONE ENCOUNTER
[address] [facility] at [phone] I asked the  for 5 2 nursing Crystal  I was transferred to [employer] messaging for [facility] and [facility] for someone to return my call to sched HFU appt for pt      SLB/Tremors and LUE Weakness/Medicare/[employer]ARP

## 2021-08-10 NOTE — TELEPHONE ENCOUNTER
[address] [facility]  at [phone] I asked the  for 5 2 nursing Crystal  I was transferred to [employer] for [place] for someone to return my call to sched HFU appt for pt    Mailed 3rd attempt letter to pt's home      SLB/Tremors and LUE Weakness/Medicare/[employer]

## 2021-08-19 ENCOUNTER — TELEPHONE (OUTPATIENT)
Dept: NEUROLOGY | Facility: CLINIC | Age: 86
End: 2021-08-19

## 2021-08-19 NOTE — TELEPHONE ENCOUNTER
EMIR Zuniga from [facility] called  Pt resides at [facility]  Hever Zuniga is request a copy of last office visit to their facility at [phone]  Fax sent

## 2021-08-20 ENCOUNTER — TELEPHONE (OUTPATIENT)
Dept: OBGYN CLINIC | Facility: HOSPITAL | Age: 86
End: 2021-08-20

## 2021-08-20 NOTE — TELEPHONE ENCOUNTER
Patient sees Dr Diallo Saldana called from [facility] asking if the pt needs to have antibiotics prior to her dental appmt today     [phone]

## 2021-08-31 ENCOUNTER — PATIENT OUTREACH (OUTPATIENT)
Dept: INTERNAL MEDICINE CLINIC | Facility: CLINIC | Age: 86
End: 2021-08-31

## 2021-09-13 ENCOUNTER — REMOTE DEVICE CLINIC VISIT (OUTPATIENT)
Dept: CARDIOLOGY CLINIC | Facility: CLINIC | Age: 86
End: 2021-09-13
Payer: MEDICARE

## 2021-09-13 DIAGNOSIS — Z95.0 PRESENCE OF CARDIAC PACEMAKER: Primary | ICD-10-CM

## 2021-09-13 PROCEDURE — 93294 REM INTERROG EVL PM/LDLS PM: CPT | Performed by: INTERNAL MEDICINE

## 2021-09-13 PROCEDURE — 93296 REM INTERROG EVL PM/IDS: CPT | Performed by: INTERNAL MEDICINE

## 2021-09-13 NOTE — PROGRESS NOTES
Results for orders placed or performed in visit on 09/13/21   Cardiac EP device report    Narrative    MDT-DUAL CHAMBER PPM (DDDR MODE) - ACTIVE SYSTEM IS MRI CONDITIONAL  CARELINK TRANSMISSION: BATTERY VOLTAGE ADEQUATE (9 7 YRS)  AP: 33 9%  : 99 9% (>40%~MVP-OFF/CHB)  ALL AVAILABLE LEAD PARAMETERS WITHIN NORMAL LIMITS  NO SIGNIFICANT HIGH RATE EPISODES  PACEMAKER FUNCTIONING APPROPRIATELY    [address] [place] [address]

## 2021-09-15 ENCOUNTER — ANTICOAG VISIT (OUTPATIENT)
Dept: CARDIOLOGY CLINIC | Facility: CLINIC | Age: 86
End: 2021-09-15

## 2021-11-10 ENCOUNTER — HOSPITAL ENCOUNTER (EMERGENCY)
Facility: HOSPITAL | Age: 86
Discharge: HOME | End: 2021-11-11
Attending: EMERGENCY MEDICINE | Admitting: EMERGENCY MEDICINE
Payer: MEDICARE

## 2021-11-10 DIAGNOSIS — K08.409 S/P TOOTH EXTRACTION: ICD-10-CM

## 2021-11-10 DIAGNOSIS — K06.8 GUMS, BLEEDING: Primary | ICD-10-CM

## 2021-11-10 PROCEDURE — 99284 EMERGENCY DEPT VISIT MOD MDM: CPT

## 2021-11-11 VITALS
SYSTOLIC BLOOD PRESSURE: 111 MMHG | RESPIRATION RATE: 18 BRPM | OXYGEN SATURATION: 95 % | DIASTOLIC BLOOD PRESSURE: 50 MMHG | HEART RATE: 68 BPM | TEMPERATURE: 98.4 F

## 2021-11-11 LAB
ALBUMIN SERPL BCP-MCNC: 3.3 G/DL (ref 3.4–4.8)
ALP SERPL-CCNC: 50.5 U/L (ref 35–140)
ALT SERPL W P-5'-P-CCNC: 10 U/L (ref 5–54)
ANION GAP SERPL CALCULATED.3IONS-SCNC: 7 MMOL/L (ref 4–13)
APTT PPP: 40 SECONDS (ref 23–37)
AST SERPL W P-5'-P-CCNC: 13 U/L (ref 15–41)
BASOPHILS # BLD AUTO: 0.02 THOUSANDS/ΜL (ref 0–0.1)
BASOPHILS NFR BLD AUTO: 0 % (ref 0–1)
BILIRUB SERPL-MCNC: 0.35 MG/DL (ref 0.3–1.2)
BUN SERPL-MCNC: 40 MG/DL (ref 6–20)
CALCIUM ALBUM COR SERPL-MCNC: 9.8 MG/DL (ref 8.3–10.1)
CALCIUM SERPL-MCNC: 9.2 MG/DL (ref 8.4–10.2)
CHLORIDE SERPL-SCNC: 98 MMOL/L (ref 96–108)
CO2 SERPL-SCNC: 31 MMOL/L (ref 22–33)
CREAT SERPL-MCNC: 0.76 MG/DL (ref 0.4–1.1)
EOSINOPHIL # BLD AUTO: 0.22 THOUSAND/ΜL (ref 0–0.61)
EOSINOPHIL NFR BLD AUTO: 3 % (ref 0–6)
ERYTHROCYTE [DISTWIDTH] IN BLOOD BY AUTOMATED COUNT: 15.3 % (ref 11.6–15.1)
GFR SERPL CREATININE-BSD FRML MDRD: 68 ML/MIN/1.73SQ M
GLUCOSE SERPL-MCNC: 104 MG/DL (ref 65–140)
HCT VFR BLD AUTO: 28.4 % (ref 34.8–46.1)
HCT VFR BLD AUTO: 30 % (ref 34.8–46.1)
HGB BLD-MCNC: 8.8 G/DL (ref 11.5–15.4)
HGB BLD-MCNC: 9.2 G/DL (ref 11.5–15.4)
IMM GRANULOCYTES # BLD AUTO: 0.01 THOUSAND/UL (ref 0–0.2)
IMM GRANULOCYTES NFR BLD AUTO: 0 % (ref 0–2)
INR PPP: 3.27 (ref 0.84–1.19)
LYMPHOCYTES # BLD AUTO: 1.49 THOUSANDS/ΜL (ref 0.6–4.47)
LYMPHOCYTES NFR BLD AUTO: 23 % (ref 14–44)
MAGNESIUM SERPL-MCNC: 1.6 MG/DL (ref 1.6–2.6)
MCH RBC QN AUTO: 27.1 PG (ref 26.8–34.3)
MCHC RBC AUTO-ENTMCNC: 30.7 G/DL (ref 31.4–37.4)
MCV RBC AUTO: 89 FL (ref 82–98)
MONOCYTES # BLD AUTO: 0.81 THOUSAND/ΜL (ref 0.17–1.22)
MONOCYTES NFR BLD AUTO: 12 % (ref 4–12)
NEUTROPHILS # BLD AUTO: 4.07 THOUSANDS/ΜL (ref 1.85–7.62)
NEUTS SEG NFR BLD AUTO: 62 % (ref 43–75)
PLATELET # BLD AUTO: 204 THOUSANDS/UL (ref 149–390)
PMV BLD AUTO: 9.2 FL (ref 8.9–12.7)
POTASSIUM SERPL-SCNC: 3.9 MMOL/L (ref 3.5–5)
PROT SERPL-MCNC: 6.3 G/DL (ref 6.4–8.3)
PROTHROMBIN TIME: 32.3 SECONDS (ref 11.6–14.5)
RBC # BLD AUTO: 3.39 MILLION/UL (ref 3.81–5.12)
SODIUM SERPL-SCNC: 136 MMOL/L (ref 133–145)
WBC # BLD AUTO: 6.62 THOUSAND/UL (ref 4.31–10.16)

## 2021-11-11 PROCEDURE — 85014 HEMATOCRIT: CPT | Performed by: STUDENT IN AN ORGANIZED HEALTH CARE EDUCATION/TRAINING PROGRAM

## 2021-11-11 PROCEDURE — 85018 HEMOGLOBIN: CPT | Performed by: STUDENT IN AN ORGANIZED HEALTH CARE EDUCATION/TRAINING PROGRAM

## 2021-11-11 PROCEDURE — 85730 THROMBOPLASTIN TIME PARTIAL: CPT | Performed by: STUDENT IN AN ORGANIZED HEALTH CARE EDUCATION/TRAINING PROGRAM

## 2021-11-11 PROCEDURE — 83735 ASSAY OF MAGNESIUM: CPT | Performed by: STUDENT IN AN ORGANIZED HEALTH CARE EDUCATION/TRAINING PROGRAM

## 2021-11-11 PROCEDURE — 85610 PROTHROMBIN TIME: CPT | Performed by: STUDENT IN AN ORGANIZED HEALTH CARE EDUCATION/TRAINING PROGRAM

## 2021-11-11 PROCEDURE — 96374 THER/PROPH/DIAG INJ IV PUSH: CPT

## 2021-11-11 PROCEDURE — 80053 COMPREHEN METABOLIC PANEL: CPT | Performed by: STUDENT IN AN ORGANIZED HEALTH CARE EDUCATION/TRAINING PROGRAM

## 2021-11-11 PROCEDURE — 36415 COLL VENOUS BLD VENIPUNCTURE: CPT | Performed by: STUDENT IN AN ORGANIZED HEALTH CARE EDUCATION/TRAINING PROGRAM

## 2021-11-11 PROCEDURE — 99284 EMERGENCY DEPT VISIT MOD MDM: CPT | Performed by: STUDENT IN AN ORGANIZED HEALTH CARE EDUCATION/TRAINING PROGRAM

## 2021-11-11 PROCEDURE — 85025 COMPLETE CBC W/AUTO DIFF WBC: CPT | Performed by: STUDENT IN AN ORGANIZED HEALTH CARE EDUCATION/TRAINING PROGRAM

## 2021-11-11 RX ORDER — TRANEXAMIC ACID 100 MG/ML
500 INJECTION, SOLUTION INTRAVENOUS ONCE
Status: COMPLETED | OUTPATIENT
Start: 2021-11-11 | End: 2021-11-11

## 2021-11-11 RX ORDER — LIDOCAINE HYDROCHLORIDE AND EPINEPHRINE 10; 10 MG/ML; UG/ML
10 INJECTION, SOLUTION INFILTRATION; PERINEURAL ONCE
Status: COMPLETED | OUTPATIENT
Start: 2021-11-11 | End: 2021-11-11

## 2021-11-11 RX ORDER — HYDROCODONE BITARTRATE AND ACETAMINOPHEN 5; 325 MG/1; MG/1
1 TABLET ORAL ONCE
Status: COMPLETED | OUTPATIENT
Start: 2021-11-11 | End: 2021-11-11

## 2021-11-11 RX ADMIN — TRANEXAMIC ACID 500 MG: 1 INJECTION, SOLUTION INTRAVENOUS at 01:50

## 2021-11-11 RX ADMIN — THROMBIN, TOPICAL (BOVINE) 5000 UNITS: KIT at 05:28

## 2021-11-11 RX ADMIN — LIDOCAINE HYDROCHLORIDE,EPINEPHRINE BITARTRATE 10 ML: 10; .01 INJECTION, SOLUTION INFILTRATION; PERINEURAL at 08:11

## 2021-11-11 RX ADMIN — HYDROCODONE BITARTRATE AND ACETAMINOPHEN 1 TABLET: 5; 325 TABLET ORAL at 01:48

## 2021-11-11 RX ADMIN — Medication 2000 UNITS: at 08:01

## 2021-11-12 ENCOUNTER — TELEPHONE (OUTPATIENT)
Dept: HEMATOLOGY ONCOLOGY | Facility: CLINIC | Age: 86
End: 2021-11-12

## 2021-11-16 PROBLEM — C50.112 MALIGNANT NEOPLASM OF CENTRAL PORTION OF LEFT BREAST IN FEMALE, ESTROGEN RECEPTOR POSITIVE (HCC): Status: ACTIVE | Noted: 2021-11-16

## 2021-11-16 PROBLEM — Z17.0 MALIGNANT NEOPLASM OF CENTRAL PORTION OF LEFT BREAST IN FEMALE, ESTROGEN RECEPTOR POSITIVE (HCC): Status: ACTIVE | Noted: 2021-11-16

## 2021-11-17 ENCOUNTER — TELEPHONE (OUTPATIENT)
Dept: INFUSION CENTER | Facility: HOSPITAL | Age: 86
End: 2021-11-17

## 2021-11-18 ENCOUNTER — CONSULT (OUTPATIENT)
Dept: SURGICAL ONCOLOGY | Facility: CLINIC | Age: 86
End: 2021-11-18
Payer: MEDICARE

## 2021-11-18 ENCOUNTER — TELEPHONE (OUTPATIENT)
Dept: SURGICAL ONCOLOGY | Facility: CLINIC | Age: 86
End: 2021-11-18

## 2021-11-18 VITALS
SYSTOLIC BLOOD PRESSURE: 126 MMHG | HEART RATE: 82 BPM | OXYGEN SATURATION: 95 % | DIASTOLIC BLOOD PRESSURE: 82 MMHG | RESPIRATION RATE: 17 BRPM

## 2021-11-18 DIAGNOSIS — Z17.0 MALIGNANT NEOPLASM OF CENTRAL PORTION OF LEFT BREAST IN FEMALE, ESTROGEN RECEPTOR POSITIVE (HCC): Primary | ICD-10-CM

## 2021-11-18 DIAGNOSIS — C50.112 MALIGNANT NEOPLASM OF CENTRAL PORTION OF LEFT BREAST IN FEMALE, ESTROGEN RECEPTOR POSITIVE (HCC): Primary | ICD-10-CM

## 2021-11-18 PROCEDURE — 99204 OFFICE O/P NEW MOD 45 MIN: CPT | Performed by: SURGERY

## 2021-11-18 RX ORDER — APIXABAN 5 MG/1
TABLET, FILM COATED ORAL
COMMUNITY
Start: 2021-11-14 | End: 2021-12-21 | Stop reason: SDUPTHER

## 2021-11-18 RX ORDER — AMOXICILLIN 500 MG/1
CAPSULE ORAL
COMMUNITY
Start: 2021-09-15 | End: 2022-02-22

## 2021-11-18 RX ORDER — LOSARTAN POTASSIUM 25 MG/1
TABLET ORAL
COMMUNITY
Start: 2021-11-10 | End: 2022-07-29

## 2021-11-18 RX ORDER — LEVOTHYROXINE SODIUM 0.1 MG/1
TABLET ORAL
COMMUNITY
Start: 2021-09-13

## 2021-11-18 RX ORDER — CARBOXYMETHYLCELLULOSE SODIUM 2.5 MG/ML
SOLUTION/ DROPS OPHTHALMIC
COMMUNITY
Start: 2021-10-16

## 2021-11-18 RX ORDER — DIPHENHYDRAMINE HCL 25 MG
1 TABLET,DISINTEGRATING ORAL 2 TIMES DAILY
COMMUNITY
Start: 2021-09-07

## 2021-11-19 ENCOUNTER — TELEPHONE (OUTPATIENT)
Dept: HEMATOLOGY ONCOLOGY | Facility: CLINIC | Age: 86
End: 2021-11-19

## 2021-11-19 ENCOUNTER — PATIENT OUTREACH (OUTPATIENT)
Dept: CASE MANAGEMENT | Facility: HOSPITAL | Age: 86
End: 2021-11-19

## 2021-11-24 NOTE — ASSESSMENT & PLAN NOTE
Lab Results   Component Value Date    HGBA1C 5 7 09/07/2018       Recent Labs      10/29/18   1112  10/29/18   1613  10/29/18   2059  10/30/18   0742   POCGLU  117  92  118  97       Blood Sugar Average: Last 72 hrs:  (P) 324 [id number]     diet controlled DMII   accu checks acceptable Physical Therapy     Referred by: Eliseo Bennett MD; Medical Diagnosis (from order):    Diagnosis Information      Diagnosis    V43.65 (ICD-9-CM) - Z96.653 (ICD-10-CM) - Status post total bilateral knee replacement                Daily Treatment Note    Visit:  5     SUBJECTIVE                                                                                                               11/15/2021.  Patient presents to PT s/p B TKA performed on 11/1/2021.  At this time he reports discomfort at both knees.   He reports he had sporadic PT at home over the course of the last week.  Location of symptoms:  Anterior thigh, lateral ITB.  Aggravating factors:  Knee ROM, LAQ.  Relieving factors:  Movement of the body, icing of the knees.  Diagnostic imaging: plain films, CT scan.  Hx:  R knee ACL reconstruction 30 years ago, 2 meniscal surgeries L knee.  About 10 years ago he started to have more intensive symptoms at the knees and he started to seek medical attention.  SOC:  Works 3 separate jobs at this time.  He started to notice inability to walk on uneven surfaces.     11/17/2021.  Reports more standing resulted in an increase in pain.  However, he also reports that his activity level has increased.      11/20/2021.  Reports continued tightness at the knees but reports noticing improved ROM.     11/22/2021.  Reports compliance with HEP.  L LE area of slight redness has improved.      11/24/2021.  Reports on days after PT he feels as if he has 2 grapefruit sized knees.    Pain / Symptoms:  Pain/symptom is: constant  Pain rating (out of 10): Current: 4 ; Best: 3; Worst: 8    OBJECTIVE                                                                                                                        TREATMENT                                                                                                                initial evaluation completed    Therapeutic Exercise:  Examination results were reviewed with the  patient and the role of therapeutic exercise in resolving the function loss and pain was discussed.     PROM R and L knee in flexion and extension, focusing on flexion  Stationary cycling through available ROM (able to achieve a full revolution) x 20 min    Manual Therapy:  STM to the anterior knees, (muscle pump STM to the anterior distal thigh) and the posterior knees.    STM at the posterior lateral aspect of the L knee       ASSESSMENT                                                                                                             11/15/2021.   The patient presents to physical therapy with the following deficits:  1.  LEFS = 6/80.  2.  Loss of knee AROM bilateral.  3.  Increased swelling at both knees.  As a result of these deficits the patient's function level has also been impaired as manifest by their functional tool score (LEFS = 6/80).  The patient will benefit from skilled PT intervention that will focus on patient education, therapeutic exercise, and manual therapy techniques.  Failure to intervene could lead to permanent function loss and could lead to increased chronicity of this disorder.    PROBLEM LIST:  11/24  1.  LEFS = 6/80.    2.  Loss of knee AROM bilateral.    Knee:    - Flexion (150):        • Left: 97.  11/17 =  , 11/20= 105 (112 after), 11/22 = 98 (113), 11/24 = 110  (116)        • Right: 94  11/17 = 97, 11/20= 110 (117 after), 11/22 = 116 (123 after), 11/24 = 117 (122)    - Extension (0-10):        • Left: -9   11/17 =  -7, 11/20= -3, 11/22 = -5        • Right: -9   11/17 = -9, 11/20= -6. 11*/22 = -6    3.  Increased swelling at both knees.   Knee Circumference  Comments / Details: Mid patellar:  41 cm R, 42.3 cm L  11/17 =  R = 41.5 cm, L = 42.7 cm  11/24 =  R = 40.7 cm, L = 41.2 cm  Patient Education:   Results of above outlined education: Verbalizes understanding and Demonstrates understanding      PLAN                                                                                                                            Suggestions for next session as indicated: Progress per plan of care  Prone muscle pump to posterior knee  STM L lateral knee  Parallel bar ambulation      GOALS                                                                                                                           Long Term Goals: to be met by end of plan of care  1. Patient will bend/squat with reported manageable/tolerable pain for toileting.  2. Patient will ascend and descend 1 flight of steps with one rail assist step over step for going to bedroom.     3. Patient will ambulate 20 minutes no assistive device  4. Patient to achieve 125 degrees flexion AROM at both knees to assist with car transfers with little difficulty.   5. Lower Extremity Functional Scale: Patient will complete form to reflect an improved raw score to greater than or equal to 50/80 to indicate patient reported improvement in function/disability/impairment (minimal detectable change: 9 points).  6. Patient will be independent with progressed and modified home exercise program.      Therapy procedure time and total treatment time can be found documented on the Time Entry flowsheet

## 2021-12-06 ENCOUNTER — PATIENT OUTREACH (OUTPATIENT)
Dept: CASE MANAGEMENT | Facility: HOSPITAL | Age: 86
End: 2021-12-06

## 2021-12-07 ENCOUNTER — IN-CLINIC DEVICE VISIT (OUTPATIENT)
Dept: CARDIOLOGY CLINIC | Facility: CLINIC | Age: 86
End: 2021-12-07
Payer: MEDICARE

## 2021-12-07 DIAGNOSIS — Z95.0 PRESENCE OF PERMANENT CARDIAC PACEMAKER: Primary | ICD-10-CM

## 2021-12-07 PROCEDURE — 93280 PM DEVICE PROGR EVAL DUAL: CPT | Performed by: INTERNAL MEDICINE

## 2021-12-08 ENCOUNTER — PATIENT OUTREACH (OUTPATIENT)
Dept: CASE MANAGEMENT | Facility: HOSPITAL | Age: 86
End: 2021-12-08

## 2021-12-21 ENCOUNTER — OFFICE VISIT (OUTPATIENT)
Dept: CARDIOLOGY CLINIC | Facility: CLINIC | Age: 86
End: 2021-12-21
Payer: MEDICARE

## 2021-12-21 VITALS — OXYGEN SATURATION: 95 % | DIASTOLIC BLOOD PRESSURE: 60 MMHG | SYSTOLIC BLOOD PRESSURE: 120 MMHG | HEART RATE: 80 BPM

## 2021-12-21 DIAGNOSIS — Z95.0 PACEMAKER: ICD-10-CM

## 2021-12-21 DIAGNOSIS — I44.1 HEART BLOCK AV SECOND DEGREE: ICD-10-CM

## 2021-12-21 DIAGNOSIS — I44.7 LBBB (LEFT BUNDLE BRANCH BLOCK): ICD-10-CM

## 2021-12-21 DIAGNOSIS — N18.31 TYPE 2 DIABETES MELLITUS WITH STAGE 3A CHRONIC KIDNEY DISEASE, WITHOUT LONG-TERM CURRENT USE OF INSULIN (HCC): ICD-10-CM

## 2021-12-21 DIAGNOSIS — E11.22 TYPE 2 DIABETES MELLITUS WITH STAGE 3A CHRONIC KIDNEY DISEASE, WITHOUT LONG-TERM CURRENT USE OF INSULIN (HCC): ICD-10-CM

## 2021-12-21 DIAGNOSIS — I48.92 ATRIAL FLUTTER, UNSPECIFIED TYPE (HCC): ICD-10-CM

## 2021-12-21 DIAGNOSIS — I35.0 NONRHEUMATIC AORTIC VALVE STENOSIS: ICD-10-CM

## 2021-12-21 DIAGNOSIS — G89.29 CHRONIC PAIN OF RIGHT KNEE: ICD-10-CM

## 2021-12-21 DIAGNOSIS — E78.2 MIXED HYPERLIPIDEMIA: Chronic | ICD-10-CM

## 2021-12-21 DIAGNOSIS — M25.561 CHRONIC PAIN OF RIGHT KNEE: ICD-10-CM

## 2021-12-21 DIAGNOSIS — Z79.01 ANTICOAGULATED ON COUMADIN: ICD-10-CM

## 2021-12-21 DIAGNOSIS — Z95.5 HISTORY OF PLACEMENT OF STENT IN LAD CORONARY ARTERY: Chronic | ICD-10-CM

## 2021-12-21 DIAGNOSIS — I42.9 CARDIOMYOPATHY, UNSPECIFIED TYPE (HCC): ICD-10-CM

## 2021-12-21 DIAGNOSIS — I25.10 CORONARY ARTERY DISEASE INVOLVING NATIVE CORONARY ARTERY OF NATIVE HEART WITHOUT ANGINA PECTORIS: Chronic | ICD-10-CM

## 2021-12-21 DIAGNOSIS — I50.32 CHRONIC DIASTOLIC CHF (CONGESTIVE HEART FAILURE) (HCC): ICD-10-CM

## 2021-12-21 DIAGNOSIS — N18.30 HYPERTENSIVE HEART AND KIDNEY DISEASE WITH HF AND WITH CKD STAGE III (HCC): ICD-10-CM

## 2021-12-21 DIAGNOSIS — E66.01 SEVERE OBESITY (BMI 35.0-39.9) WITH COMORBIDITY (HCC): ICD-10-CM

## 2021-12-21 DIAGNOSIS — I35.0 AORTIC STENOSIS, SEVERE: ICD-10-CM

## 2021-12-21 DIAGNOSIS — I13.0 HYPERTENSIVE HEART AND KIDNEY DISEASE WITH HF AND WITH CKD STAGE III (HCC): ICD-10-CM

## 2021-12-21 DIAGNOSIS — I10 ESSENTIAL HYPERTENSION: Primary | ICD-10-CM

## 2021-12-21 DIAGNOSIS — I47.2 VENTRICULAR TACHYCARDIA (HCC): ICD-10-CM

## 2021-12-21 DIAGNOSIS — R26.2 AMBULATORY DYSFUNCTION: ICD-10-CM

## 2021-12-21 PROCEDURE — 93000 ELECTROCARDIOGRAM COMPLETE: CPT | Performed by: INTERNAL MEDICINE

## 2021-12-21 PROCEDURE — 99215 OFFICE O/P EST HI 40 MIN: CPT | Performed by: INTERNAL MEDICINE

## 2021-12-21 RX ORDER — PEDIATRIC MULTIVITAMIN NO.17
TABLET,CHEWABLE ORAL
COMMUNITY
Start: 2021-12-13

## 2021-12-21 RX ORDER — PRAVASTATIN SODIUM 80 MG/1
TABLET ORAL
COMMUNITY
Start: 2021-12-08

## 2021-12-21 RX ORDER — TRAMADOL HYDROCHLORIDE 50 MG/1
TABLET ORAL
COMMUNITY
Start: 2021-12-16

## 2021-12-21 RX ORDER — FERROUS SULFATE 325(65) MG
TABLET ORAL
COMMUNITY
Start: 2021-12-10

## 2021-12-21 RX ORDER — APIXABAN 5 MG/1
5 TABLET, FILM COATED ORAL 2 TIMES DAILY
Qty: 180 TABLET | Refills: 3 | Status: SHIPPED | OUTPATIENT
Start: 2021-12-21

## 2021-12-22 ENCOUNTER — CONSULT (OUTPATIENT)
Dept: HEMATOLOGY ONCOLOGY | Facility: CLINIC | Age: 86
End: 2021-12-22
Payer: MEDICARE

## 2021-12-22 VITALS
HEIGHT: 59 IN | TEMPERATURE: 96.8 F | BODY MASS INDEX: 36.89 KG/M2 | DIASTOLIC BLOOD PRESSURE: 64 MMHG | RESPIRATION RATE: 18 BRPM | SYSTOLIC BLOOD PRESSURE: 132 MMHG | HEART RATE: 62 BPM | WEIGHT: 183 LBS | OXYGEN SATURATION: 92 %

## 2021-12-22 DIAGNOSIS — C50.112 MALIGNANT NEOPLASM OF CENTRAL PORTION OF LEFT BREAST IN FEMALE, ESTROGEN RECEPTOR POSITIVE (HCC): Primary | ICD-10-CM

## 2021-12-22 DIAGNOSIS — Z17.0 MALIGNANT NEOPLASM OF CENTRAL PORTION OF LEFT BREAST IN FEMALE, ESTROGEN RECEPTOR POSITIVE (HCC): Primary | ICD-10-CM

## 2021-12-22 PROCEDURE — 99205 OFFICE O/P NEW HI 60 MIN: CPT | Performed by: INTERNAL MEDICINE

## 2021-12-23 ENCOUNTER — PATIENT OUTREACH (OUTPATIENT)
Dept: CASE MANAGEMENT | Facility: HOSPITAL | Age: 86
End: 2021-12-23

## 2021-12-23 ENCOUNTER — TELEPHONE (OUTPATIENT)
Dept: PALLIATIVE MEDICINE | Facility: CLINIC | Age: 86
End: 2021-12-23

## 2021-12-29 ENCOUNTER — PATIENT OUTREACH (OUTPATIENT)
Dept: CASE MANAGEMENT | Facility: HOSPITAL | Age: 86
End: 2021-12-29

## 2022-01-21 ENCOUNTER — OFFICE VISIT (OUTPATIENT)
Dept: UROLOGY | Facility: AMBULATORY SURGERY CENTER | Age: 87
End: 2022-01-21
Payer: MEDICARE

## 2022-01-21 VITALS
DIASTOLIC BLOOD PRESSURE: 68 MMHG | HEIGHT: 59 IN | HEART RATE: 73 BPM | SYSTOLIC BLOOD PRESSURE: 110 MMHG | BODY MASS INDEX: 36.96 KG/M2 | OXYGEN SATURATION: 93 %

## 2022-01-21 DIAGNOSIS — R35.0 URINARY FREQUENCY: Primary | ICD-10-CM

## 2022-01-21 DIAGNOSIS — N39.46 MIXED STRESS AND URGE URINARY INCONTINENCE: ICD-10-CM

## 2022-01-21 LAB
BACTERIA UR QL AUTO: ABNORMAL /HPF
BILIRUB UR QL STRIP: NEGATIVE
CLARITY UR: CLEAR
COLOR UR: YELLOW
GLUCOSE UR STRIP-MCNC: NEGATIVE MG/DL
HGB UR QL STRIP.AUTO: NEGATIVE
HYALINE CASTS #/AREA URNS LPF: ABNORMAL /LPF
KETONES UR STRIP-MCNC: NEGATIVE MG/DL
LEUKOCYTE ESTERASE UR QL STRIP: ABNORMAL
NITRITE UR QL STRIP: NEGATIVE
NON-SQ EPI CELLS URNS QL MICRO: ABNORMAL /HPF
PH UR STRIP.AUTO: 6 [PH]
PROT UR STRIP-MCNC: NEGATIVE MG/DL
RBC #/AREA URNS AUTO: ABNORMAL /HPF
SL AMB  POCT GLUCOSE, UA: ABNORMAL
SL AMB LEUKOCYTE ESTERASE,UA: POSITIVE
SL AMB POCT BILIRUBIN,UA: ABNORMAL
SL AMB POCT BLOOD,UA: + 1
SL AMB POCT CLARITY,UA: CLEAR
SL AMB POCT COLOR,UA: YELLOW
SL AMB POCT KETONES,UA: ABNORMAL
SL AMB POCT NITRITE,UA: ABNORMAL
SL AMB POCT PH,UA: 5
SL AMB POCT SPECIFIC GRAVITY,UA: 1.01
SL AMB POCT URINE PROTEIN: ABNORMAL
SL AMB POCT UROBILINOGEN: 0.2
SP GR UR STRIP.AUTO: 1.02 (ref 1–1.03)
UROBILINOGEN UR QL STRIP.AUTO: 0.2 E.U./DL
WBC #/AREA URNS AUTO: ABNORMAL /HPF

## 2022-01-21 PROCEDURE — 87086 URINE CULTURE/COLONY COUNT: CPT | Performed by: NURSE PRACTITIONER

## 2022-01-21 PROCEDURE — 81002 URINALYSIS NONAUTO W/O SCOPE: CPT | Performed by: NURSE PRACTITIONER

## 2022-01-21 PROCEDURE — 99203 OFFICE O/P NEW LOW 30 MIN: CPT | Performed by: NURSE PRACTITIONER

## 2022-01-21 PROCEDURE — 81001 URINALYSIS AUTO W/SCOPE: CPT | Performed by: NURSE PRACTITIONER

## 2022-01-21 RX ORDER — MIRABEGRON 50 MG/1
50 TABLET, FILM COATED, EXTENDED RELEASE ORAL DAILY
Qty: 30 TABLET | Refills: 11 | Status: SHIPPED | OUTPATIENT
Start: 2022-01-21

## 2022-01-21 NOTE — PROGRESS NOTES
01/21/22    Kae Jewell [facility]   7/24/1929   [id number]     Assessment  1 Urinary frequency   2 Mixed stress and urge incontinence     Discussion/Plan  1 Urinary frequency   2 Mixed stress and urge incontinence    Urine dip: positive leukocytes, positive blood, clear, yellow     UA and culture sent   Trial 50 mg Myrbetriq daily - reviewed side effects  GoodRx coupon provided    Renal US with PVR ordered   Brochures provided for PTNS, InterStim     Await urine studies  Trial Myrbetriq and obtain imaging  Consider advanced options  She wishes to follow up virtually to review results  Subjective  HPI   Paulina Case is a 80year old female who presents in consultation accompanied by her step-daughter for evaluation of urinary frequency and mixed urinary urge and stress incontinence  She is diabetic and maintained on daily Lasix  Her most recent A1c is 5 4%  She reports incontinence when going from a sitting to standing position as well as incontinence if she cannot make it to the bathroom in time  She is a resident at [facility]  She is in a wheelchair in the office today  She ambulates with a walker and has to wait for assistance to use the bathroom  Often times, she reports having an accident and being unable to control her bladder  She denies constipation, pain, dysuria, fever, chills, gross hematuria  She feels that she does not completely empty her bladder and feels the constant urge to urinate  She wears Depends briefs daily    PHM: DM2, HTN, CAD, aflutter, CHF, CKD3a, osteoarthritis, breast cancer, obesity    Review of Systems - History obtained from chart review and the patient  General ROS: negative  Psychological ROS: negative  Ophthalmic ROS: negative  Endocrine ROS: negative  Breast ROS: negative  Respiratory ROS: no cough, shortness of breath, or wheezing  Cardiovascular ROS: negative  Gastrointestinal ROS: negative  Genito-Urinary ROS: positive for - incontinence and urinary frequency/urgency  Musculoskeletal ROS: negative  Neurological ROS: negative  Dermatological ROS: negative       Objective  Physical Exam  Vitals and nursing note reviewed  Constitutional:       General: She is not in acute distress  Appearance: Normal appearance  She is obese  She is not ill-appearing, toxic-appearing or diaphoretic  HENT:      Head: Normocephalic and atraumatic  Right Ear: Decreased hearing noted  Left Ear: Decreased hearing noted  Pulmonary:      Effort: Pulmonary effort is normal  No respiratory distress  Abdominal:      Tenderness: There is no right CVA tenderness or left CVA tenderness  Musculoskeletal:         General: Normal range of motion  Cervical back: Normal range of motion  Skin:     General: Skin is warm and dry  Neurological:      General: No focal deficit present  Mental Status: She is alert and oriented to person, place, and time  Mental status is at baseline  Psychiatric:         Mood and Affect: Mood normal          Behavior: Behavior normal          Thought Content:  Thought content normal          Judgment: Judgment normal              Omer Ferraro

## 2022-01-22 LAB — BACTERIA UR CULT: NORMAL

## 2022-02-18 ENCOUNTER — TELEPHONE (OUTPATIENT)
Dept: UROLOGY | Facility: AMBULATORY SURGERY CENTER | Age: 87
End: 2022-02-18

## 2022-02-18 ENCOUNTER — TELEPHONE (OUTPATIENT)
Dept: CARDIAC SURGERY | Facility: CLINIC | Age: 87
End: 2022-02-18

## 2022-02-18 NOTE — TELEPHONE ENCOUNTER
Adilson Yadav from [facility] is calling to have [address] script faxed to [phone]  Fax sent   fyi

## 2022-02-22 ENCOUNTER — HOSPITAL ENCOUNTER (OUTPATIENT)
Facility: HOSPITAL | Age: 87
Setting detail: OBSERVATION
Discharge: HOME | End: 2022-02-23
Attending: EMERGENCY MEDICINE | Admitting: INTERNAL MEDICINE
Payer: MEDICARE

## 2022-02-22 ENCOUNTER — APPOINTMENT (EMERGENCY)
Dept: CT IMAGING | Facility: HOSPITAL | Age: 87
End: 2022-02-22
Payer: MEDICARE

## 2022-02-22 DIAGNOSIS — R25.2 JERKING MOVEMENTS OF EXTREMITIES: Primary | ICD-10-CM

## 2022-02-22 LAB
2HR DELTA HS TROPONIN: 1 NG/L
ALBUMIN SERPL BCP-MCNC: 3.1 G/DL (ref 3.5–5)
ALP SERPL-CCNC: 74 U/L (ref 46–116)
ALT SERPL W P-5'-P-CCNC: 17 U/L (ref 12–78)
ANION GAP SERPL CALCULATED.3IONS-SCNC: 5 MMOL/L (ref 4–13)
AST SERPL W P-5'-P-CCNC: 17 U/L (ref 5–45)
BASOPHILS # BLD AUTO: 0.03 THOUSANDS/ΜL (ref 0–0.1)
BASOPHILS NFR BLD AUTO: 1 % (ref 0–1)
BILIRUB SERPL-MCNC: 0.43 MG/DL (ref 0.2–1)
BUN SERPL-MCNC: 22 MG/DL (ref 5–25)
CALCIUM ALBUM COR SERPL-MCNC: 9.9 MG/DL (ref 8.3–10.1)
CALCIUM SERPL-MCNC: 9.2 MG/DL (ref 8.3–10.1)
CARDIAC TROPONIN I PNL SERPL HS: 4 NG/L
CARDIAC TROPONIN I PNL SERPL HS: 5 NG/L
CHLORIDE SERPL-SCNC: 97 MMOL/L (ref 100–108)
CO2 SERPL-SCNC: 32 MMOL/L (ref 21–32)
CREAT SERPL-MCNC: 0.7 MG/DL (ref 0.6–1.3)
EOSINOPHIL # BLD AUTO: 0.21 THOUSAND/ΜL (ref 0–0.61)
EOSINOPHIL NFR BLD AUTO: 5 % (ref 0–6)
ERYTHROCYTE [DISTWIDTH] IN BLOOD BY AUTOMATED COUNT: 15.5 % (ref 11.6–15.1)
GFR SERPL CREATININE-BSD FRML MDRD: 75 ML/MIN/1.73SQ M
GLUCOSE SERPL-MCNC: 92 MG/DL (ref 65–140)
GLUCOSE SERPL-MCNC: 97 MG/DL (ref 65–140)
HCT VFR BLD AUTO: 36.1 % (ref 34.8–46.1)
HGB BLD-MCNC: 11.6 G/DL (ref 11.5–15.4)
IMM GRANULOCYTES # BLD AUTO: 0.03 THOUSAND/UL (ref 0–0.2)
IMM GRANULOCYTES NFR BLD AUTO: 1 % (ref 0–2)
LYMPHOCYTES # BLD AUTO: 1.14 THOUSANDS/ΜL (ref 0.6–4.47)
LYMPHOCYTES NFR BLD AUTO: 26 % (ref 14–44)
MAGNESIUM SERPL-MCNC: 1.7 MG/DL (ref 1.6–2.6)
MCH RBC QN AUTO: 30.3 PG (ref 26.8–34.3)
MCHC RBC AUTO-ENTMCNC: 32.1 G/DL (ref 31.4–37.4)
MCV RBC AUTO: 94 FL (ref 82–98)
MONOCYTES # BLD AUTO: 0.62 THOUSAND/ΜL (ref 0.17–1.22)
MONOCYTES NFR BLD AUTO: 14 % (ref 4–12)
NEUTROPHILS # BLD AUTO: 2.33 THOUSANDS/ΜL (ref 1.85–7.62)
NEUTS SEG NFR BLD AUTO: 53 % (ref 43–75)
NRBC BLD AUTO-RTO: 0 /100 WBCS
PLATELET # BLD AUTO: 199 THOUSANDS/UL (ref 149–390)
PMV BLD AUTO: 9.6 FL (ref 8.9–12.7)
POTASSIUM SERPL-SCNC: 4 MMOL/L (ref 3.5–5.3)
PROLACTIN SERPL-MCNC: 12.4 NG/ML
PROT SERPL-MCNC: 7.3 G/DL (ref 6.4–8.2)
RBC # BLD AUTO: 3.83 MILLION/UL (ref 3.81–5.12)
SODIUM SERPL-SCNC: 134 MMOL/L (ref 136–145)
TSH SERPL DL<=0.05 MIU/L-ACNC: 2.32 UIU/ML (ref 0.36–3.74)
WBC # BLD AUTO: 4.36 THOUSAND/UL (ref 4.31–10.16)

## 2022-02-22 PROCEDURE — 99220 PR INITIAL OBSERVATION CARE/DAY 70 MINUTES: CPT | Performed by: INTERNAL MEDICINE

## 2022-02-22 PROCEDURE — 36415 COLL VENOUS BLD VENIPUNCTURE: CPT

## 2022-02-22 PROCEDURE — 99285 EMERGENCY DEPT VISIT HI MDM: CPT

## 2022-02-22 PROCEDURE — 70450 CT HEAD/BRAIN W/O DYE: CPT

## 2022-02-22 PROCEDURE — 99215 OFFICE O/P EST HI 40 MIN: CPT | Performed by: PSYCHIATRY & NEUROLOGY

## 2022-02-22 PROCEDURE — 84146 ASSAY OF PROLACTIN: CPT

## 2022-02-22 PROCEDURE — 84443 ASSAY THYROID STIM HORMONE: CPT

## 2022-02-22 PROCEDURE — G1004 CDSM NDSC: HCPCS

## 2022-02-22 PROCEDURE — 84484 ASSAY OF TROPONIN QUANT: CPT | Performed by: EMERGENCY MEDICINE

## 2022-02-22 PROCEDURE — 85025 COMPLETE CBC W/AUTO DIFF WBC: CPT | Performed by: EMERGENCY MEDICINE

## 2022-02-22 PROCEDURE — 99285 EMERGENCY DEPT VISIT HI MDM: CPT | Performed by: EMERGENCY MEDICINE

## 2022-02-22 PROCEDURE — 93005 ELECTROCARDIOGRAM TRACING: CPT

## 2022-02-22 PROCEDURE — 82948 REAGENT STRIP/BLOOD GLUCOSE: CPT

## 2022-02-22 PROCEDURE — 83735 ASSAY OF MAGNESIUM: CPT

## 2022-02-22 PROCEDURE — 80053 COMPREHEN METABOLIC PANEL: CPT | Performed by: EMERGENCY MEDICINE

## 2022-02-22 RX ORDER — LOSARTAN POTASSIUM 25 MG/1
12.5 TABLET ORAL DAILY
Status: DISCONTINUED | OUTPATIENT
Start: 2022-02-23 | End: 2022-02-23 | Stop reason: HOSPADM

## 2022-02-22 RX ORDER — LEVOTHYROXINE SODIUM 0.1 MG/1
100 TABLET ORAL
Status: DISCONTINUED | OUTPATIENT
Start: 2022-02-23 | End: 2022-02-23 | Stop reason: HOSPADM

## 2022-02-22 RX ORDER — OXYBUTYNIN CHLORIDE 5 MG/1
10 TABLET, EXTENDED RELEASE ORAL DAILY
Status: DISCONTINUED | OUTPATIENT
Start: 2022-02-23 | End: 2022-02-23 | Stop reason: HOSPADM

## 2022-02-22 RX ORDER — POLYETHYLENE GLYCOL 3350 17 G/17G
17 POWDER, FOR SOLUTION ORAL DAILY
Status: DISCONTINUED | OUTPATIENT
Start: 2022-02-23 | End: 2022-02-23 | Stop reason: HOSPADM

## 2022-02-22 RX ORDER — PROPRANOLOL HYDROCHLORIDE 20 MG/1
10 TABLET ORAL 3 TIMES DAILY
Status: DISCONTINUED | OUTPATIENT
Start: 2022-02-22 | End: 2022-02-23 | Stop reason: HOSPADM

## 2022-02-22 RX ORDER — PRAVASTATIN SODIUM 80 MG/1
80 TABLET ORAL
Status: DISCONTINUED | OUTPATIENT
Start: 2022-02-23 | End: 2022-02-23 | Stop reason: HOSPADM

## 2022-02-22 RX ORDER — ASPIRIN 81 MG/1
81 TABLET ORAL DAILY
Status: DISCONTINUED | OUTPATIENT
Start: 2022-02-23 | End: 2022-02-23 | Stop reason: HOSPADM

## 2022-02-22 RX ORDER — MINERAL OIL AND PETROLATUM 150; 830 MG/G; MG/G
OINTMENT OPHTHALMIC
Status: DISCONTINUED | OUTPATIENT
Start: 2022-02-22 | End: 2022-02-23 | Stop reason: HOSPADM

## 2022-02-22 RX ORDER — ASPIRIN 81 MG/1
81 TABLET, CHEWABLE ORAL ONCE
Status: COMPLETED | OUTPATIENT
Start: 2022-02-22 | End: 2022-02-22

## 2022-02-22 RX ORDER — ACETAMINOPHEN 325 MG/1
975 TABLET ORAL 3 TIMES DAILY
Status: DISCONTINUED | OUTPATIENT
Start: 2022-02-22 | End: 2022-02-23 | Stop reason: HOSPADM

## 2022-02-22 RX ORDER — FUROSEMIDE 40 MG/1
20 TABLET ORAL DAILY
Status: DISCONTINUED | OUTPATIENT
Start: 2022-02-23 | End: 2022-02-23 | Stop reason: HOSPADM

## 2022-02-22 RX ORDER — AMOXICILLIN 250 MG
1 CAPSULE ORAL 2 TIMES DAILY
Status: DISCONTINUED | OUTPATIENT
Start: 2022-02-22 | End: 2022-02-23 | Stop reason: HOSPADM

## 2022-02-22 RX ORDER — FERROUS SULFATE 325(65) MG
325 TABLET ORAL
Status: DISCONTINUED | OUTPATIENT
Start: 2022-02-23 | End: 2022-02-23 | Stop reason: HOSPADM

## 2022-02-22 RX ORDER — AMIODARONE HYDROCHLORIDE 200 MG/1
100 TABLET ORAL DAILY
Status: DISCONTINUED | OUTPATIENT
Start: 2022-02-23 | End: 2022-02-23 | Stop reason: HOSPADM

## 2022-02-22 RX ORDER — EZETIMIBE 10 MG/1
10 TABLET ORAL DAILY
Status: DISCONTINUED | OUTPATIENT
Start: 2022-02-23 | End: 2022-02-23 | Stop reason: HOSPADM

## 2022-02-22 RX ORDER — ISOSORBIDE MONONITRATE 30 MG/1
30 TABLET, EXTENDED RELEASE ORAL DAILY
Status: DISCONTINUED | OUTPATIENT
Start: 2022-02-23 | End: 2022-02-23 | Stop reason: HOSPADM

## 2022-02-22 RX ORDER — TRAMADOL HYDROCHLORIDE 50 MG/1
50 TABLET ORAL EVERY 8 HOURS PRN
Status: DISCONTINUED | OUTPATIENT
Start: 2022-02-22 | End: 2022-02-23 | Stop reason: HOSPADM

## 2022-02-22 RX ORDER — LIDOCAINE 50 MG/G
3 PATCH TOPICAL DAILY
Status: DISCONTINUED | OUTPATIENT
Start: 2022-02-23 | End: 2022-02-23 | Stop reason: HOSPADM

## 2022-02-22 RX ORDER — ACETAMINOPHEN 325 MG/1
650 TABLET ORAL ONCE
Status: COMPLETED | OUTPATIENT
Start: 2022-02-22 | End: 2022-02-22

## 2022-02-22 RX ADMIN — PROPRANOLOL HYDROCHLORIDE 10 MG: 20 TABLET ORAL at 21:18

## 2022-02-22 RX ADMIN — ASPIRIN 81 MG CHEWABLE TABLET 81 MG: 81 TABLET CHEWABLE at 19:09

## 2022-02-22 RX ADMIN — ACETAMINOPHEN 650 MG: 325 TABLET, FILM COATED ORAL at 19:09

## 2022-02-22 RX ADMIN — MINERAL OIL AND WHITE PETROLATUM: 150; 830 OINTMENT OPHTHALMIC at 22:43

## 2022-02-22 RX ADMIN — TRAMADOL HYDROCHLORIDE 50 MG: 50 TABLET ORAL at 21:23

## 2022-02-22 RX ADMIN — SENNOSIDES AND DOCUSATE SODIUM 1 TABLET: 50; 8.6 TABLET ORAL at 21:19

## 2022-02-22 RX ADMIN — APIXABAN 5 MG: 5 TABLET, FILM COATED ORAL at 19:11

## 2022-02-22 NOTE — ASSESSMENT & PLAN NOTE
Lab Results   Component Value Date    HGBA1C 5 4 01/13/2022     - Goal euglycemia    - Management as per medicine team

## 2022-02-22 NOTE — ASSESSMENT & PLAN NOTE
- Prior L thalamic stroke in May 2021    - Recommend continue on Eliquis 5 mg BID, ASA 81 mg QD, and Zetia 10 mg QD

## 2022-02-22 NOTE — ASSESSMENT & PLAN NOTE
Lab Results   Component Value Date    HGBA1C 5 4 01/13/2022       Recent Labs     02/22/22  0853   POCGLU 97       Blood Sugar Average: Last 72 hrs:  (P) 97     · Patient's past medical history significant for type 2 diabetes  · Continue home medications  · Patient currently without any long-term insulin use

## 2022-02-22 NOTE — ASSESSMENT & PLAN NOTE
80year old female with DM2, HTN, CAD, CKD3, HLD, LBBB, severe aortic stenosis, PAF maintained on Eliquis, s/p PPM, prior L thalamic stroke in May 2021, L carotid stenosis who presented to the hospital complaining of feeling "unwell " Patient unable to further assess history as patient is having a difficult time providing additional history regarding the symptoms she is feeling but notes she is having congestion, back pain, right sided abdominal pain  She does note that she has had tremors intermittently since her stroke in her B/L UE which are prominent with movement/action but today she had "full body" tremor which she had never had before  Suspect essential tremor which may be exacerbated in setting of feeling unwell but given unclear description of presenting symptoms, recommend admit to evaluate for stroke  Plan:   - Recommend admit on acute ischemic stroke pathway  - Check MRI brain without contrast, MRA head and neck  - Continue on Eliquis 5 mg BID, ASA 81 mg, Zetia 10 mg QPM    - Defer echocardiogram at this time  If MRI brain positive for stroke, will obtain TTE    - Monitor on telemetry  - Check fasting lipid panel, hemoglobin A1c    - Goal normothermia, euglycemia  - Allow for permissive hypertension with SBP goal <180    - PT/OT  - Stroke education    - Defer further work-up of nasal congestion, abdominal pain to primary team   - Monitor exam and notify with changes

## 2022-02-22 NOTE — ASSESSMENT & PLAN NOTE
Patient was initially placed on stroke pathway, however nothing Dr James Badillo examined the patient determined it would be discontinued      Plan  · Neurochecks q4

## 2022-02-22 NOTE — H&P
[address] [facility] 7/24/1929, 80 y o  female MRN: [id number]  Unit/Bed#: ED 12 Encounter: [id number]  Primary Care Provider: Olamide Martin MD   Date and time admitted to hospital: 2/22/2022  8:48 AM    * Tremor  Assessment & Plan  Patient was initially placed on stroke pathway, however nothing Dr Anastasia Severe examined the patient determined it would be discontinued  Plan  · Neurochecks q4    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[facility])  Assessment & Plan  · Patient's past medical history significant for chronic strokes  · Continue medications    Malignant neoplasm of central portion of left breast in female, estrogen receptor positive ([facility] )  Assessment & Plan  · Patient has a past medical history significant for breast cancer  · Continue outpatient follow-up    Atrial flutter ([facility] )  Assessment & Plan  · Patient has a past medical history significant for atrial flutter  · Continue home medications    Coronary artery disease involving native coronary artery of native heart without angina pectoris  Assessment & Plan  · Patient has a past medical history of CAD  · Continue home medications    Type 2 diabetes mellitus with stage 3a chronic kidney disease, without long-term current use of insulin ([facility] )  Assessment & Plan  Lab Results   Component Value Date    HGBA1C 5 4 01/13/2022       Recent Labs     02/22/22  0853   POCGLU 97       Blood Sugar Average: Last 72 hrs:  (P) 97     · Patient's past medical history significant for type 2 diabetes  · Continue home medications  · Patient currently without any long-term insulin use    Essential hypertension  Assessment & Plan  · Patient has past medical history significant for hypertension  · Continue home medications    VTE Pharmacologic Prophylaxis: VTE Score: 9 High Risk (Score >/= 5) - Pharmacological DVT Prophylaxis Ordered: apixaban (Eliquis)  Sequential Compression Devices Ordered    Code Status: Level 1 - Full Code   Discussion with family: Updated contact person () at bedside  Anticipated Length of Stay: Patient will be admitted on an observation basis with an anticipated length of stay of less than 2 midnights secondary to stroke r/o  Chief Complaint: tremors     History of Present Illness:  Anthony Guzmán is a 80 y o  female with a PMH of CAD (stent in LAD), HTN, HLD, Atrial flutter, DM2 w/ CKD3a, hypothyroidism, essential tremor, diastolic CHF, Hx of thalamic stroke, mod-severe aortic stenosis, hx of left breast cancer ER+, Hx of DVT/PE, GERD, anxiety, Hx of gait disturbances, ostearthritis, obesity who presents with tremors  Patient states this morning she got up around 4:00 a m  At use a bathroom, and had difficulty doing so  She contacted her nurse then she recent pain medication at the time  Patient states later in the day of it was noted that she had tremors and the nurse was concerned and thought that she may be having stroke  She was brought to the emergency department to be evaluated  During interview patient states she fell at her baseline and did not notice any changes in her speech  Patient endorses chronic pain in her knees and shoulders  Patient denies loss of consciousness or aura  Patient denies fevers, chills, nausea, vomiting, chest pain, shortness a breath, weakness, abdominal pain, diarrhea, constipation  Review of Systems:  Review of Systems   Constitutional: Negative for chills and fever  HENT: Negative for ear pain and sore throat  Eyes: Negative for pain and visual disturbance  Respiratory: Negative for cough and shortness of breath  Cardiovascular: Negative for chest pain and palpitations  Gastrointestinal: Negative for abdominal pain and vomiting  Genitourinary: Negative for dysuria and hematuria  Musculoskeletal: Negative for arthralgias and back pain  Skin: Negative for color change and rash  Neurological: Negative for seizures and syncope     All other systems reviewed and are negative  Past Medical and Surgical History:   Past Medical History:   Diagnosis Date    Abnormal nuclear stress test     last assessed 04/03/2017    Anxiety     Arthritis     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    Bundle branch block, left     Cardiomyopathy ([facility] )     Chest pain 3/9/2019    Chronic pain     chronic b/l shoulder pain    Chronic pain of right knee 4/2/2019    Coronary artery disease     Diabetes mellitus ([facility] )     Gait disturbance 3/2/2018    GERD (gastroesophageal reflux disease)     H/O atrial flutter     History of DVT (deep vein thrombosis)     History of pulmonary embolism     Hyperlipidemia     Hypertension     Hypothyroid     Renal calculi     Shortness of breath        Past Surgical History:   Procedure Laterality Date    ATRIAL ABLATION SURGERY  01/2015   Manuela Shageluk CARDIAC PACEMAKER PLACEMENT  12/10/2018    [employer] YANNA XT DR MRI, model [id number]    CARDIOVERSION      CHELA/DCCV 10/22/2014    CARPAL TUNNEL RELEASE Right     CATARACT EXTRACTION      CORONARY ANGIOPLASTY WITH STENT PLACEMENT      HYSTERECTOMY      JOINT REPLACEMENT Right     IL AMPUTATION TOE,MT-P JT Left 10/11/2017    Procedure: AMPUTATION SECOND TOE;  Surgeon: Pablo Ca DPM;  Location: [facility];  Service: Podiatry    TONSILLECTOMY      TOTAL HIP ARTHROPLASTY      Right       Meds/Allergies:  Prior to Admission medications    Medication Sig Start Date End Date Taking?  Authorizing Provider   Accu-Chek FastClix Lancets MISC Check once daily 3/1/21   Isaías Celaya MD   Accu-Chek Softclix Lancets lancets Use daily Use as instructed 4/7/21   Isaías Celaya MD   acetaminophen (TYLENOL) 325 mg tablet Take 975 mg by mouth 3 (three) times a day    Historical Provider, MD   amiodarone 200 mg tablet Take 0 5 tablets (100 mg total) by mouth daily 7/2/21   Yobani Fermin MD   amoxicillin (AMOXIL) 500 mg capsule  9/15/21   Historical Provider, MD   Aspirin Low Dose 81 MG EC tablet TAKE 1 TABLET BY MOUTH EVERY DAY 3/9/21   Patricia Johnson DO   Blood Glucose Monitoring Suppl (Accu-Chek Guide Me) w/Device KIT Check once daily 3/1/21   Zohreh Méndez MD   Calcium 600+D3 600-800 MG-UNIT TABS Take 1 tablet by mouth 2 (two) times a day 9/7/21   Historical Provider, MD   Diclofenac Sodium (VOLTAREN) 1 % Apply 2 g topically 4 (four) times a day Bilateral shoulders and knees 6/3/21   Karmen Ortega DO   Eliquis 5 MG Take 1 tablet (5 mg total) by mouth 2 (two) times a day 12/21/21   Kianna Carson MD   ezetimibe (ZETIA) 10 mg tablet Take 1 tablet (10 mg total) by mouth daily 7/2/21   David Linder MD   FeroSul 325 (65 Fe) MG tablet TAKE ONE TABLET BY MOUTH ONCE EVERY DAY ON AT NOON EMPTY stomach FOR ANEMIA 12/10/21   Historical Provider, MD   furosemide (LASIX) 20 mg tablet Take 1 tablet (20 mg total) by mouth daily 3/5/21   Kianna Carson MD   glucose blood (Accu-Chek Guide) test strip Check once daily 3/1/21   Zohreh Méndez MD   isosorbide mononitrate (IMDUR) 30 mg 24 hr tablet Take 1 tablet (30 mg total) by mouth daily 3/17/21   Kianna Carson MD   Lancets (accu-chek soft touch) lancets Use daily Use as instructed 2/26/21   Zohreh Méndez MD   levothyroxine 100 mcg tablet TAKE ONE TABLET BY MOUTH ONCE EVERY DAY 9/13/21   Historical Provider, MD   Lidocaine (Aspercreme Lidocaine) 4 % PTCH Apply 3 patches topically daily B/l knees, low back    Historical Provider, MD   losartan (COZAAR) 25 mg tablet TAKE 1/2 TABLET BY MOUTH ONCE EVERY DAY 11/10/21   Historical Provider, MD   Mirabegron ER (Myrbetriq) 50 MG TB24 Take 1 tablet (50 mg total) by mouth in the morning 1/21/22   FERMIN Mills   multivitamin-iron-minerals-folic acid (CENTRUM) chewable tablet Chew 1 tablet daily      Historical Provider, MD   Pediatric Multiple Vitamins (Multivitamin Childrens) CHEW chew ONE tablet and swallow orally daily 12/13/21   Historical Provider, MD   polyethylene glycol (MIRALAX) 17 g packet Take 17 g by mouth daily 6/4/21   Prashanth Hendrix DO   pravastatin (PRAVACHOL) 40 mg tablet Take 2 tablets (80 mg total) by mouth daily 6/3/21   Prashanth Hendrix DO   pravastatin (PRAVACHOL) 80 mg tablet TAKE ONE TABLET BY MOUTH ONCE EVERY DAY FOR CHOLESTEROL 12/8/21   Historical Provider, MD   propranolol (INDERAL) 10 mg tablet Take 1 tablet (10 mg total) by mouth 3 (three) times a day 8/3/21   Corazon Zimmerman MD   senna-docusate sodium (SENOKOT S) 8 6-50 mg per tablet Take 1 tablet by mouth 2 (two) times a day 6/3/21   Prashanth Hendrix DO   Theratears 0 25 % SOLN instill ONE drop into each eye three times a day 10/16/21   Historical Provider, MD   traMADol Mallory Lights) 50 mg tablet  12/16/21   Historical Provider, MD     I have reviewed home medications with patient personally  Allergies:    Allergies   Allergen Reactions    Atorvastatin      Reaction unknown 10/23/2018-    Other Rash     Patient sensitive to adhesives from tape       Social History:  Marital Status: /Civil Union   Occupation: Retired  Patient Pre-hospital Living Situation: [facility]: [facility]  Patient Pre-hospital Level of Mobility: unable to be assessed at time of evaluation  Patient Pre-hospital Diet Restrictions: Diabetic  Substance Use History:   Social History     Substance and Sexual Activity   Alcohol Use Yes    Comment: occasional     Social History     Tobacco Use   Smoking Status Never Smoker   Smokeless Tobacco Never Used     Social History     Substance and Sexual Activity   Drug Use Not Currently    Types: Marijuana    Comment: MEDICAL MARIJUANA-HAS NOT USED FOR 3 WEEKS       Family History:  Family History   Problem Relation Age of Onset    Parkinsonism Sister     Cancer Sister         unknown type       Physical Exam:     Vitals:   Blood Pressure: 140/61 (02/22/22 2015)  Pulse: 70 (02/22/22 2015)  Temperature: 98 2 °F (36 8 °C) (02/22/22 0851)  Temp Source: Oral (02/22/22 0851)  Respirations: 18 (02/22/22 2015)  SpO2: 92 % (02/22/22 2015)    Physical Exam  Vitals and nursing note reviewed  Constitutional:       General: She is not in acute distress  Appearance: She is well-developed  She is obese  HENT:      Head: Normocephalic and atraumatic  Eyes:      Conjunctiva/sclera: Conjunctivae normal    Cardiovascular:      Rate and Rhythm: Normal rate and regular rhythm  Heart sounds: Murmur heard  Pulmonary:      Effort: Pulmonary effort is normal  No respiratory distress  Breath sounds: Rales (mild bibasilar) present  Abdominal:      General: There is no distension  Palpations: Abdomen is soft  Tenderness: There is no abdominal tenderness  There is no guarding  Musculoskeletal:      Cervical back: Neck supple  Right lower leg: Edema (1+ pitting) present  Left lower leg: Edema (1+ pitting) present  Skin:     General: Skin is warm and dry  Neurological:      Mental Status: She is alert and oriented to person, place, and time  Additional Data:     Lab Results:  Results from last 7 days   Lab Units 02/22/22  0917   WBC Thousand/uL 4 36   HEMOGLOBIN g/dL 11 6   HEMATOCRIT % 36 1   PLATELETS Thousands/uL 199   NEUTROS PCT % 53   LYMPHS PCT % 26   MONOS PCT % 14*   EOS PCT % 5     Results from last 7 days   Lab Units 02/22/22  0917   SODIUM mmol/L 134*   POTASSIUM mmol/L 4 0   CHLORIDE mmol/L 97*   CO2 mmol/L 32   BUN mg/dL 22   CREATININE mg/dL 0 70   ANION GAP mmol/L 5   CALCIUM mg/dL 9 2   ALBUMIN g/dL 3 1*   TOTAL BILIRUBIN mg/dL 0 43   ALK PHOS U/L 74   ALT U/L 17   AST U/L 17   GLUCOSE RANDOM mg/dL 92         Results from last 7 days   Lab Units 02/22/22  0853   POC GLUCOSE mg/dl 97               Imaging: Reviewed radiology reports from this admission including: CT head  CT head without contrast   Final Result by Karolyn Bergman DO (02/22 1205)   No acute intracranial abnormality                    Workstation performed: [id number]             EKG and Other Studies Reviewed on Admission:   · EKG: NSR  HR 80     ** Please Note: This note has been constructed using a voice recognition system   **

## 2022-02-22 NOTE — CONSULTS
Consultation - Neurology   Ginger Delgado 80 y o  female MRN: [id number]  Unit/Bed#: ED 12 Encounter: [id number]      Assessment/Plan     Tremor  Assessment & Plan  80year old female with DM2, HTN, CAD, CKD3, HLD, LBBB, severe aortic stenosis, PAF maintained on Eliquis, s/p PPM, prior L thalamic stroke in May 2021, L carotid stenosis who presented to the hospital complaining of feeling "unwell " Patient unable to further assess history as patient is having a difficult time providing additional history regarding the symptoms she is feeling but notes she is having congestion, back pain, right sided abdominal pain  She does note that she has had tremors intermittently since her stroke in her B/L UE which are prominent with movement/action but today she had "full body" tremor which she had never had before  Suspect essential tremor which may be exacerbated in setting of feeling unwell but given unclear description of presenting symptoms, recommend admit to evaluate for stroke  Plan:   - Recommend admit on acute ischemic stroke pathway  - Check MRI brain without contrast, MRA head and neck  - Continue on Eliquis 5 mg BID, ASA 81 mg, Zetia 10 mg QPM    - Defer echocardiogram at this time  If MRI brain positive for stroke, will obtain TTE    - Monitor on telemetry  - Check fasting lipid panel, hemoglobin A1c    - Goal normothermia, euglycemia  - Allow for permissive hypertension with SBP goal <180    - PT/OT  - Stroke education    - Defer further work-up of nasal congestion, abdominal pain to primary team   - Monitor exam and notify with changes  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[facility])  Assessment & Plan  - Prior L thalamic stroke in May 2021    - Recommend continue on Eliquis 5 mg BID, ASA 81 mg QD, and Zetia 10 mg QD      Essential hypertension  Assessment & Plan  - Goal normotension    - Management as per medicine team     Type 2 diabetes mellitus with stage 3a chronic kidney disease, without long-term current use of insulin [facility])  Assessment & Plan    Lab Results   Component Value Date    HGBA1C 5 4 01/13/2022     - Goal euglycemia    - Management as per medicine team     Coronary artery disease involving native coronary artery of native heart without angina pectoris  Assessment & Plan  - Follows with cardiology as an outpatient  Atrial flutter ([employer] [facility] [address] )  Assessment & Plan  - s/p prior ablation   - Maintained on Eliquis 5 mg BID for anticoagulation  Recommendations for outpatient neurological follow up have yet to be determined  History of Present Illness     Reason for Consult / Principal Problem: Jerking movements of extremities  Hx and PE limited by: Poor historian and unable to reach facility for further information  HPI: Araceli Thacker is a 80 y o   female with DM2, HTN, CAD, CKD3, HLD, LBBB, severe aortic stenosis, PAF maintained on Eliquis, s/p PPM, prior L thalamic stroke in May 2021, L carotid stenosis who presents with complaints of feeling "unwell " To review, patient with prior L thalamic infarct in May 2021 felt to be small vessel etiology  She was on Coumadin at that time and ASA 81 mg QD was added for stroke prevention  Patient was also seen in August 2021 by inpatient neurology team for tremors B/L UE  Tremors were felt to be secondary to essential tremor at that time and was recommended to transition from Metoprolol to Propanolol and follow-up with movement disorder team as an outpatient  She underwent EEG at that time which was normal  Patient was diagnosed with 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 L breast in October 2021  She was seen by oncology teams and was not recommended to undergo additional therapy  Patient notes that she came to the hospital today because she woke up feeling "unwell " She states she felt nasal congestion as well as back pain with R sided abdominal pain   She notes that she came to the hospital because the nurse at her facility thought she might be having a stroke but she is not able to further elaborate on why she thought that  Attempted to contact facility to obtain additional history but unable to reach them  She notes that she recently had Covid-19 but does not think she was "that sick " She has a difficult time reliably indicating what her symptoms are but continues to say that she "just doesn't feel well " When asked about jerking movements, she notes that they are primarily in her B/L UE and worse when she is performing an action, particularly when she is eating  She notes nothing makes them better or worse  She states that today, she developed tremor of her "whole body" and states that this has never happened to her before  She note she is primarily wheelchair bound  Inpatient consult to Neurology  Consult performed by: Davida Phillip PA-C  Consult ordered by: Caitlin Cho PA-C          Review of Systems   Constitutional: Positive for fatigue  Negative for chills and fever  HENT: Positive for congestion  Negative for trouble swallowing  Eyes: Negative for visual disturbance  Respiratory: Negative for shortness of breath  Cardiovascular: Negative for chest pain  Gastrointestinal: Positive for abdominal pain  Negative for nausea and vomiting  Genitourinary: Negative for difficulty urinating  Musculoskeletal: Positive for back pain and gait problem  Negative for neck pain  Skin: Negative for rash  Neurological: Positive for tremors  Negative for dizziness, speech difficulty, weakness, light-headedness, numbness and headaches  Psychiatric/Behavioral: Negative for confusion         Historical Information   Past Medical History:   Diagnosis Date    Abnormal nuclear stress test     last assessed 04/03/2017    Anxiety     Arthritis     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    Bundle branch block, left     Cardiomyopathy (HCC)     Chest pain 3/9/2019    Chronic pain     chronic b/l shoulder pain    Chronic pain of right knee 4/2/2019    Coronary artery disease     Diabetes mellitus ([address] )     Gait disturbance 3/2/2018    GERD (gastroesophageal reflux disease)     H/O atrial flutter     History of DVT (deep vein thrombosis)     History of pulmonary embolism     Hyperlipidemia     Hypertension     Hypothyroid     Renal calculi     Shortness of breath      Past Surgical History:   Procedure Laterality Date    ATRIAL ABLATION SURGERY  01/2015   [facility] CARDIAC PACEMAKER PLACEMENT  12/10/2018    [employer] YANNA XT DR MRI, model [id number]    CARDIOVERSION      CHELA/DCCV 10/22/2014    CARPAL TUNNEL RELEASE Right     CATARACT EXTRACTION      CORONARY ANGIOPLASTY WITH STENT PLACEMENT      HYSTERECTOMY      JOINT REPLACEMENT Right     MA AMPUTATION TOE,MT-P JT Left 10/11/2017    Procedure: AMPUTATION SECOND TOE;  Surgeon: Rita Rogers DPM;  Location: [facility];  Service: Podiatry    TONSILLECTOMY      TOTAL HIP ARTHROPLASTY      Right     Social History   Social History     Substance and Sexual Activity   Alcohol Use Yes    Comment: occasional     Social History     Substance and Sexual Activity   Drug Use Not Currently    Types: Marijuana    Comment: MEDICAL MARIJUANA-HAS NOT USED FOR 3 WEEKS     E-Cigarette/Vaping    E-Cigarette Use Never User      E-Cigarette/Vaping Substances    Nicotine No     THC No     CBD No     Flavoring No     Other No     Unknown No      Social History     Tobacco Use   Smoking Status Never Smoker   Smokeless Tobacco Never Used     Family History:   Family History   Problem Relation Age of Onset    Parkinsonism Sister    [facility] Cancer Sister         unknown type       Review of previous medical records was completed  Meds/Allergies   Scheduled Meds:  Continuous Infusions:No current facility-administered medications for this encounter  PRN Meds:        Allergies   Allergen Reactions    Atorvastatin      Reaction unknown 10/23/2018-    Other Rash     Patient sensitive to adhesives from tape Objective   Vitals:Blood pressure 118/57, pulse 62, temperature 98 2 °F (36 8 °C), temperature source Oral, resp  rate 18, SpO2 95 %, not currently breastfeeding  ,There is no height or weight on file to calculate BMI  No intake or output data in the 24 hours ending 02/22/22 1532    Invasive Devices: Invasive Devices  Report    Peripheral Intravenous Line            Peripheral IV 02/22/22 Left Hand <1 day          Drain            External Urinary Catheter 203 days                Physical Exam  Constitutional:       General: She is not in acute distress  Appearance: Normal appearance  She is not ill-appearing, toxic-appearing or diaphoretic  HENT:      Head: Normocephalic and atraumatic  Mouth/Throat:      Mouth: Mucous membranes are moist       Pharynx: Oropharynx is clear  No oropharyngeal exudate or posterior oropharyngeal erythema  Eyes:      General: No scleral icterus  Right eye: No discharge  Left eye: No discharge  Extraocular Movements: Extraocular movements intact  Conjunctiva/sclera: Conjunctivae normal       Pupils: Pupils are equal, round, and reactive to light  Cardiovascular:      Rate and Rhythm: Normal rate and regular rhythm  Pulmonary:      Effort: No respiratory distress  Breath sounds: Normal breath sounds  No wheezing  Abdominal:      General: There is no distension  Palpations: Abdomen is soft  Tenderness: There is abdominal tenderness  Musculoskeletal:      Cervical back: Normal range of motion and neck supple  Right lower leg: No edema  Left lower leg: No edema  Skin:     General: Skin is warm and dry  Findings: No erythema or rash  Neurological:      Mental Status: She is alert and oriented to person, place, and time  Coordination: Finger-Nose-Finger Test normal       Deep Tendon Reflexes:      Reflex Scores:       Bicep reflexes are 2+ on the right side and 2+ on the left side  Brachioradialis reflexes are 2+ on the right side and 2+ on the left side  Patellar reflexes are 0 on the right side and 0 on the left side  Achilles reflexes are 0 on the right side and 0 on the left side  Psychiatric:         Mood and Affect: Mood normal          Behavior: Behavior normal        Neurologic Exam     Mental Status   Oriented to person, place, and time  Oriented to person  Oriented to place  Oriented to time  Attention: normal  Concentration: normal    Speech: (Mild dysarthria noted  )  Level of consciousness: alert  Able to name object  Able to repeat  Normal comprehension  Cranial Nerves     CN II   Right visual field deficit: none  Left visual field deficit: none     CN III, IV, VI   Pupils are equal, round, and reactive to light  Right pupil: Size: 4 mm  Shape: regular  Reactivity: brisk  Consensual response: intact  Left pupil: Size: 4 mm  Shape: regular  Reactivity: brisk  Consensual response: intact  Nystagmus: none   Diplopia: none  Ophthalmoparesis: none  Upgaze: normal  Downgaze: normal  Conjugate gaze: present    CN V   Right facial sensation deficit: none  Left facial sensation deficit: none    CN VII   Right facial weakness: none  Left facial weakness: none    CN VIII   Hearing: intact    CN IX, X   Palate: symmetric    CN XII   Tongue: not atrophic  Fasciculations: absent  Tongue deviation: none    Motor Exam   Muscle bulk: normal  Overall muscle tone: normal  Right arm tone: normal  Left arm tone: normal  Right leg tone: normal  Left leg tone: normalMotor strength RUE 4-/5 deltoid, 4/5 bicep/tricep,  LUE 4/5 deltoid, 4+/5 bicep/tricep, , B/L LE 3+/5 hip flexion, 4-/5 KF/KE, 4+/5 DF/PF  No bradykinesia noted with finger taps, hand         Sensory Exam   Right arm light touch: normal  Left arm light touch: normal  Right leg light touch: normal  Left leg light touch: normal  Right arm vibration: normal  Left arm vibration: normal  Right leg vibration: decreased from knee  Left leg vibration: decreased from knee  Sensation intact to temperature B/L UE and LE     Gait, Coordination, and Reflexes     Coordination   Finger to nose coordination: normal    Tremor   Resting tremor: absent  Intention tremor: present  Action tremor: left arm and right arm    Reflexes   Right brachioradialis: 2+  Left brachioradialis: 2+  Right biceps: 2+  Left biceps: 2+  Right patellar: 0  Left patellar: 0  Right achilles: 0  Left achilles: 0  Right plantar: normal  Left plantar: normalGait deferred for safety, patient notes she is primarily wheelchair bound         Lab Results:   Recent Results (from the past 24 hour(s))   Fingerstick Glucose (POCT)    Collection Time: 02/22/22  8:53 AM   Result Value Ref Range    POC Glucose 97 65 - 140 mg/dl   CBC and differential    Collection Time: 02/22/22  9:17 AM   Result Value Ref Range    WBC 4 36 4 31 - 10 16 Thousand/uL    RBC 3 83 3 81 - 5 12 Million/uL    Hemoglobin 11 6 11 5 - 15 4 g/dL    Hematocrit 36 1 34 8 - 46 1 %    MCV 94 82 - 98 fL    MCH 30 3 26 8 - 34 3 pg    MCHC 32 1 31 4 - 37 4 g/dL    RDW 15 5 (H) 11 6 - 15 1 %    MPV 9 6 8 9 - 12 7 fL    Platelets 054 069 - 123 Thousands/uL    nRBC 0 /100 WBCs    Neutrophils Relative 53 43 - 75 %    Immat GRANS % 1 0 - 2 %    Lymphocytes Relative 26 14 - 44 %    Monocytes Relative 14 (H) 4 - 12 %    Eosinophils Relative 5 0 - 6 %    Basophils Relative 1 0 - 1 %    Neutrophils Absolute 2 33 1 85 - 7 62 Thousands/µL    Immature Grans Absolute 0 03 0 00 - 0 20 Thousand/uL    Lymphocytes Absolute 1 14 0 60 - 4 47 Thousands/µL    Monocytes Absolute 0 62 0 17 - 1 22 Thousand/µL    Eosinophils Absolute 0 21 0 00 - 0 61 Thousand/µL    Basophils Absolute 0 03 0 00 - 0 10 Thousands/µL   Comprehensive metabolic panel    Collection Time: 02/22/22  9:17 AM   Result Value Ref Range    Sodium 134 (L) 136 - 145 mmol/L    Potassium 4 0 3 5 - 5 3 mmol/L    Chloride 97 (L) 100 - 108 mmol/L    CO2 32 21 - 32 mmol/L    ANION GAP 5 4 - 13 mmol/L    BUN 22 5 - 25 mg/dL    Creatinine 0 70 0 60 - 1 30 mg/dL    Glucose 92 65 - 140 mg/dL    Calcium 9 2 8 3 - 10 1 mg/dL    Corrected Calcium 9 9 8 3 - 10 1 mg/dL    AST 17 5 - 45 U/L    ALT 17 12 - 78 U/L    Alkaline Phosphatase 74 46 - 116 U/L    Total Protein 7 3 6 4 - 8 2 g/dL    Albumin 3 1 (L) 3 5 - 5 0 g/dL    Total Bilirubin 0 43 0 20 - 1 00 mg/dL    eGFR 75 ml/min/1 73sq m   HS Troponin 0hr (reflex protocol)    Collection Time: 02/22/22  9:17 AM   Result Value Ref Range    hs TnI 0hr 4 "Refer to ACS Flowchart"- see link ng/L   Magnesium    Collection Time: 02/22/22 11:27 AM   Result Value Ref Range    Magnesium 1 7 1 6 - 2 6 mg/dL   TSH    Collection Time: 02/22/22 11:27 AM   Result Value Ref Range    TSH 3RD GENERATON 2 318 0 358 - 3 740 uIU/mL   HS Troponin I 2hr    Collection Time: 02/22/22 11:49 AM   Result Value Ref Range    hs TnI 2hr 5 "Refer to ACS Flowchart"- see link ng/L    Delta 2hr hsTnI 1 <20 ng/L       Imaging Studies: I have personally reviewed pertinent reports  and I have personally reviewed pertinent films in PACS  CTH- No acute intracranial abnormality

## 2022-02-22 NOTE — ED PROVIDER NOTES
History  Chief Complaint   Patient presents with    Tremors     pt comes from [facility] with tremors that started this morning, pt also reports she woke up with a stuffy nose     45-year-old female with a past medical history of CAD, hypertension, hyperlipidemia, history of a flutter, type 2 diabetes mellitus, CKD stage 3, hypothyroidism, history of essential tremor, diastolic congestive heart failure, history of thalamic stroke, aortic stenosis, history of left breast cancer, history of DVT/PE, GERD, cardiomyopathy, anxiety, history of gait disturbance, osteoarthritis presents with complains of tremors/jerking movements  Patient notes that following her stroke last year she had jerking movements of her right hand that she said was witnessed by the doctor at [facility] where she resides  Patient states that she presents today because she had a feeling of being unwell and had whole body jerking  Reports that her jerking occur spontaneously, not necessarily at rest or when reaching out for objects  They are not continuous and occur intermittently  Admits to having a stuffy nose, being forgetful about the timeline of certain events, having a change in her temperature regulation between high ankle  Denies shaking at rest, chest pain, shortness of breath, fever chills, eating food today, loss of consciousness with episodes, or before episodes, nausea vomiting diarrhea, urinary symptoms, symptoms of bowel movement  Surgical history consists of a pacemaker, hysterectomy, coronary angioplasty with stent  Family history positive for sister with parkinsonism  Denies alcohol tobacco drug use  Patient was previously evaluated for tremors and jerking movements in August 2021 with an EEG that was normal           Prior to Admission Medications   Prescriptions Last Dose Informant Patient Reported? Taking?    Accu-Chek FastClix Lancets [facility]  Outside Facility (Specify) No No   Sig: Check once daily   Accu-Chek Softclix Lancets lancets  Outside Facility (Specify) No No   Sig: Use daily Use as instructed   Aspirin Low Dose 81 MG EC tablet 2/21/2022 at Unknown time Outside Facility (Specify) No Yes   Sig: TAKE 1 TABLET BY MOUTH EVERY DAY   Blood Glucose Monitoring Suppl (Accu-Chek Guide Me) w/Device KIT  Outside Facility (Specify) No No   Sig: Check once daily   Calcium 600+D3 600-800 MG-UNIT TABS  Outside Facility (Specify) Yes No   Sig: Take 1 tablet by mouth 2 (two) times a day   Diclofenac Sodium (VOLTAREN) 1 % 2/21/2022 at Unknown time Outside Facility (Specify) No Yes   Sig: Apply 2 g topically 4 (four) times a day Bilateral shoulders and knees   Eliquis 5 MG 2/21/2022 at Unknown time Outside Facility (Specify) No Yes   Sig: Take 1 tablet (5 mg total) by mouth 2 (two) times a day   FeroSul 325 (65 Fe) MG tablet 2/21/2022 at Unknown time Outside Facility (Specify) Yes Yes   Sig: TAKE ONE TABLET BY MOUTH ONCE EVERY DAY ON AT NOON EMPTY stomach FOR ANEMIA   Lancets (accu-chek soft touch) lancets  Outside Facility (Specify) No No   Sig: Use daily Use as instructed   Lidocaine (Aspercreme Lidocaine) 4 % PTCH 2/21/2022 at Unknown time Outside Facility (Specify) Yes Yes   Sig: Apply 3 patches topically daily B/l knees, low back   Mirabegron ER (Myrbetriq) 50 MG TB24 2/21/2022 at Unknown time  No Yes   Sig: Take 1 tablet (50 mg total) by mouth in the morning   Pediatric Multiple Vitamins (Multivitamin Childrens) [facility] (Specify) Yes No   Sig: chew ONE tablet and swallow orally daily   Theratears 0 25 % SOLN  Outside Facility (Specify) Yes No   Sig: instill ONE drop into each eye three times a day   acetaminophen (TYLENOL) 325 mg tablet 2/21/2022 at Unknown time Outside Facility (Specify) Yes Yes   Sig: Take 975 mg by mouth 3 (three) times a day   amiodarone 200 mg tablet 2/21/2022 at Unknown time Outside Facility (Specify) No Yes   Sig: Take 0 5 tablets (100 mg total) by mouth daily   amoxicillin (AMOXIL) 500 mg capsule Not Taking at Unknown time Outside Facility (Specify) Yes No   Patient not taking: Reported on 2/22/2022    ezetimibe (ZETIA) 10 mg tablet 2/21/2022 at Unknown time Outside Facility ([facility]) No Yes   Sig: Take 1 tablet (10 mg total) by mouth daily   furosemide (LASIX) 20 mg tablet 2/21/2022 at Unknown time Outside Facility (Specify) No Yes   Sig: Take 1 tablet (20 mg total) by mouth daily   glucose blood (Accu-Chek Guide) test strip  Outside Facility (Specify) No No   Sig: Check once daily   isosorbide mononitrate (IMDUR) 30 mg 24 hr tablet 2/21/2022 at Unknown time Outside Facility (Specify) No Yes   Sig: Take 1 tablet (30 mg total) by mouth daily   levothyroxine 100 mcg tablet 2/21/2022 at Unknown time Outside Facility (Specify) Yes Yes   Sig: TAKE ONE TABLET BY MOUTH ONCE EVERY DAY   losartan (COZAAR) 25 mg tablet 2/21/2022 at Unknown time Outside Facility (Specify) Yes Yes   Sig: TAKE 1/2 TABLET BY MOUTH ONCE EVERY DAY   multivitamin-iron-minerals-folic acid (CENTRUM) chewable tablet  Outside Facility (Specify) Yes No   Sig: Chew 1 tablet daily     polyethylene glycol (MIRALAX) 17 g packet 2/21/2022 at Unknown time Outside Facility (Specify) No Yes   Sig: Take 17 g by mouth daily   pravastatin (PRAVACHOL) 40 mg tablet Not Taking at Unknown time Outside Facility (Specify) No No   Sig: Take 2 tablets (80 mg total) by mouth daily   Patient not taking: Reported on 2/22/2022    pravastatin (PRAVACHOL) 80 mg tablet 2/21/2022 at Unknown time Outside Facility (Specify) Yes Yes   Sig: TAKE ONE TABLET BY MOUTH ONCE EVERY DAY FOR CHOLESTEROL   propranolol (INDERAL) 10 mg tablet 2/21/2022 at Unknown time Outside Facility (Specify) No Yes   Sig: Take 1 tablet (10 mg total) by mouth 3 (three) times a day   senna-docusate sodium (SENOKOT S) 8 6-50 mg per tablet  Outside Facility (Specify) No No   Sig: Take 1 tablet by mouth 2 (two) times a day   traMADol (ULTRAM) 50 mg tablet 2/21/2022 at Unknown time Outside Facility (Specify) Yes Yes      Facility-Administered Medications: None       Past Medical History:   Diagnosis Date    Abnormal nuclear stress test     last assessed 04/03/2017    Anxiety     Arthritis     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    Bundle branch block, left     Cardiomyopathy [facility]     Chest pain 3/9/2019    Chronic pain     chronic b/l shoulder pain    Chronic pain of right knee 4/2/2019    Coronary artery disease     Diabetes mellitus ([facility]     Gait disturbance 3/2/2018    GERD (gastroesophageal reflux disease)     H/O atrial flutter     History of DVT (deep vein thrombosis)     History of pulmonary embolism     Hyperlipidemia     Hypertension     Hypothyroid     Renal calculi     Shortness of breath        Past Surgical History:   Procedure Laterality Date    ATRIAL ABLATION SURGERY  01/2015   Lesvia Tovar CARDIAC PACEMAKER PLACEMENT  12/10/2018    [employer]onic YANNA XT DR MRI, model [id number]    CARDIOVERSION      CHELA/DCCV 10/22/2014    CARPAL TUNNEL RELEASE Right     CATARACT EXTRACTION      CORONARY ANGIOPLASTY WITH STENT PLACEMENT      HYSTERECTOMY      JOINT REPLACEMENT Right     MT AMPUTATION TOE,MT-P JT Left 10/11/2017    Procedure: AMPUTATION SECOND TOE;  Surgeon: Quang Soto DPM;  Location: [facility];  Service: Podiatry    TONSILLECTOMY      TOTAL HIP ARTHROPLASTY      Right       Family History   Problem Relation Age of Onset    Parkinsonism Sister     Cancer Sister         unknown type     I have reviewed and agree with the history as documented      E-Cigarette/Vaping    E-Cigarette Use Never User      E-Cigarette/Vaping Substances    Nicotine No     THC No     CBD No     Flavoring No     Other No     Unknown No      Social History     Tobacco Use    Smoking status: Never Smoker    Smokeless tobacco: Never Used   Vaping Use    Vaping Use: Never used   Substance Use Topics    Alcohol use: Yes     Comment: occasional    Drug use: Not Currently     Types: Marijuana     Comment: MEDICAL MARIJUANA-HAS NOT USED FOR 3 WEEKS       Review of Systems   Constitutional: Negative for chills and fever  HENT: Negative for ear pain and hearing loss  Reports stuffy nose   Eyes: Negative for pain and visual disturbance  Respiratory: Negative for cough, chest tightness and shortness of breath  Cardiovascular: Negative for chest pain and leg swelling  Gastrointestinal: Negative for abdominal pain, diarrhea, nausea and vomiting  Genitourinary: Negative for difficulty urinating, dysuria and flank pain  Musculoskeletal: Positive for gait problem (Uses wheelchair at baseline)  Negative for neck pain and neck stiffness  Skin: Negative for rash  Neurological: Positive for tremors  Negative for dizziness, syncope, weakness, numbness and headaches  Reports forgetfulness       Physical Exam  Physical Exam  Constitutional:       Appearance: Normal appearance  She is normal weight  HENT:      Head: Normocephalic and atraumatic  Right Ear: External ear normal       Left Ear: External ear normal       Nose: Nose normal       Mouth/Throat:      Mouth: Mucous membranes are moist       Pharynx: Oropharynx is clear  Eyes:      Extraocular Movements: Extraocular movements intact  Conjunctiva/sclera: Conjunctivae normal       Pupils: Pupils are equal, round, and reactive to light  Neck:      Vascular: No carotid bruit  Cardiovascular:      Rate and Rhythm: Normal rate and regular rhythm  Pulses: Normal pulses  Heart sounds: Normal heart sounds  Comments: Systolic ejection murmur  DP 2+ bilaterally  Pulmonary:      Effort: Pulmonary effort is normal       Breath sounds: Normal breath sounds  Chest:      Chest wall: No tenderness  Abdominal:      General: Abdomen is flat  Palpations: Abdomen is soft  Musculoskeletal:         General: Normal range of motion        Cervical back: Normal range of motion and neck supple  No tenderness  Comments: Full range of movement bilateral upper and lower extremities   Skin:     General: Skin is warm and dry  Capillary Refill: Capillary refill takes less than 2 seconds  Neurological:      General: No focal deficit present  Mental Status: She is alert and oriented to person, place, and time  Mental status is at baseline  Cranial Nerves: No cranial nerve deficit  Sensory: No sensory deficit (Bilateral upper lower extremities)  Motor: Weakness (4/5 strength bilateral upper and lower extremities) present  Gait: Abnormal gait: Unable to assess due to wheelchair use a baseline        Comments: Cerebellar:  Able to perform finger-to-nose, heel-to-shin  Throughout encounter patient had spontaneous individual jerking bilateral upper and lower extremities  No resting tremors noticed during encounter  No LOC, eye deviation         Vital Signs  ED Triage Vitals [02/22/22 0851]   Temperature Pulse Respirations Blood Pressure SpO2   98 2 °F (36 8 °C) 82 16 156/72 96 %      Temp Source Heart Rate Source Patient Position - Orthostatic VS BP Location FiO2 (%)   Oral Monitor Sitting Right arm --      Pain Score       --           Vitals:    02/22/22 0851 02/22/22 1145 02/22/22 1545   BP: 156/72 118/57 (!) 174/69   Pulse: 82 62 66   Patient Position - Orthostatic VS: Sitting Lying          Visual Acuity      ED Medications  Medications   aspirin chewable tablet 81 mg (has no administration in time range)   apixaban (ELIQUIS) tablet 5 mg (has no administration in time range)   acetaminophen (TYLENOL) tablet 650 mg (has no administration in time range)       Diagnostic Studies  Results Reviewed     Procedure Component Value Units Date/Time    Prolactin [[id number]]  (Normal) Collected: 02/22/22 1127    Lab Status: Final result Specimen: Blood Updated: 02/22/22 1757     Prolactin 12 4 ng/mL     Magnesium [[id number]]  (Normal) Collected: 02/22/22 1127    Lab Status: Final result Specimen: Blood Updated: 02/22/22 1508     Magnesium 1 7 mg/dL     TSH [[id number]]  (Normal) Collected: 02/22/22 1127    Lab Status: Final result Specimen: Blood Updated: 02/22/22 1508     TSH 3RD GENERATON 2 318 uIU/mL     Narrative:      Patients undergoing fluorescein dye angiography may retain small amounts of fluorescein in the body for 48-72 hours post procedure  Samples containing fluorescein can produce falsely depressed TSH values  If the patient had this procedure,a specimen should be resubmitted post fluorescein clearance        HS Troponin I 2hr [[id number]]  (Normal) Collected: 02/22/22 1149    Lab Status: Final result Specimen: Blood from Arm, Left Updated: 02/22/22 1234     hs TnI 2hr 5 ng/L      Delta 2hr hsTnI 1 ng/L     HS Troponin 0hr (reflex protocol) [[id number]]  (Normal) Collected: 02/22/22 0917    Lab Status: Final result Specimen: Blood from Hand, Left Updated: 02/22/22 1032     hs TnI 0hr 4 ng/L     Comprehensive metabolic panel [[id number]]  (Abnormal) Collected: 02/22/22 0917    Lab Status: Final result Specimen: Blood from Hand, Left Updated: 02/22/22 0959     Sodium 134 mmol/L      Potassium 4 0 mmol/L      Chloride 97 mmol/L      CO2 32 mmol/L      ANION GAP 5 mmol/L      BUN 22 mg/dL      Creatinine 0 70 mg/dL      Glucose 92 mg/dL      Calcium 9 2 mg/dL      Corrected Calcium 9 9 mg/dL      AST 17 U/L      ALT 17 U/L      Alkaline Phosphatase 74 U/L      Total Protein 7 3 g/dL      Albumin 3 1 g/dL      Total Bilirubin 0 43 mg/dL      eGFR 75 ml/min/1 73sq m     Narrative:      Maricarmen guidelines for Chronic Kidney Disease (CKD):     Stage 1 with normal or high GFR (GFR > 90 mL/min/1 73 square meters)    Stage 2 Mild CKD (GFR = 60-89 mL/min/1 73 square meters)    Stage 3A Moderate CKD (GFR = 45-59 mL/min/1 73 square meters)    Stage 3B Moderate CKD (GFR = 30-44 mL/min/1 73 square meters)    Stage 4 Severe CKD (GFR = 15-29 mL/min/1 73 square meters)    Stage 5 End Stage CKD (GFR <15 mL/min/1 73 square meters)  Note: GFR calculation is accurate only with a steady state creatinine    CBC and differential [[id number]]  (Abnormal) Collected: 02/22/22 0917    Lab Status: Final result Specimen: Blood from Hand, Left Updated: 02/22/22 0926     WBC 4 36 Thousand/uL      RBC 3 83 Million/uL      Hemoglobin 11 6 g/dL      Hematocrit 36 1 %      MCV 94 fL      MCH 30 3 pg      MCHC 32 1 g/dL      RDW 15 5 %      MPV 9 6 fL      Platelets 312 Thousands/uL      nRBC 0 /100 WBCs      Neutrophils Relative 53 %      Immat GRANS % 1 %      Lymphocytes Relative 26 %      Monocytes Relative 14 %      Eosinophils Relative 5 %      Basophils Relative 1 %      Neutrophils Absolute 2 33 Thousands/µL      Immature Grans Absolute 0 03 Thousand/uL      Lymphocytes Absolute 1 14 Thousands/µL      Monocytes Absolute 0 62 Thousand/µL      Eosinophils Absolute 0 21 Thousand/µL      Basophils Absolute 0 03 Thousands/µL     Fingerstick Glucose (POCT) [[id number]]  (Normal) Collected: 02/22/22 0853    Lab Status: Final result Updated: 02/22/22 0858     POC Glucose 97 mg/dl                  CT head without contrast   Final Result by Daily Montiel DO (02/22 1205)   No acute intracranial abnormality  Workstation performed: [id number]                    Procedures  ECG 12 Lead Documentation Only    Date/Time: 2/22/2022 1:16 PM  Performed by: Nora Vilchis PA-C  Authorized by: Nora Vilchis PA-C     Indications / Diagnosis:  Tremors, jerking, feeling unwell  ECG reviewed by me, the ED Provider: yes    Patient location:  ED  Previous ECG:     Previous ECG:  Compared to current    Similarity:  No change    Comparison to cardiac monitor: No    Interpretation:     Interpretation: normal    Rate:     ECG rate:  63    ECG rate assessment: normal    Rhythm:     Rhythm: sinus rhythm    Ectopy:     Ectopy: none    QRS:     QRS axis:  Normal    QRS intervals:   Wide (136)  Conduction:     Conduction: normal    ST segments:     ST segments:  Normal  T waves:     T waves: normal    Comments:      Sinus rhythm w/ V paced             ED Course  ED Course as of 02/22/22 1853   Tue Feb 22, 2022   1434 Called Lab for TSH, prolactin, Mg results - reached out to neurology   0 Conversation with neurology, pt admitted under SLIM as stroke pathway                                             MDM  Number of Diagnoses or Management Options  Jerking movements of extremities  Diagnosis management comments: 24-year-old female with extensive past medical history who presented with complaints of tremor/jerking movement  She states that she had RUE tremors following stroke, states that today she began having whole body jerking  Reports she also presented because she was not feeling right such as having stuffiness in her nose  She denies shaking at rest, chest pain, shortness of breath, fever chills, abdominal pain, loss of consciousness with episodes, aura before jerking, nausea vomiting diarrhea, urinary symptoms, issues with bowel movement  On exam there is a systolic ejection murmur, lungs are clear to auscultation bilaterally  Neurological exam reveals AAO x3, cranial nerves 2-12 intact, strength 4/5 bilateral upper and lower extremities, sensation intact to light touch, intact finger-to-nose and heel-to-shin, gait unable to be assessed as the patient ambulates via wheelchair  CT head revealed no acute abnormality  CBC CMP TSH magnesium troponin EKG unremarkable  Prolactin pending    Per neurology will continue patient's daily ASA 81mg and Eliquis 5mg  Admitted with SLIM as observation status, no stroke pathway         Amount and/or Complexity of Data Reviewed  Clinical lab tests: reviewed and ordered  Tests in the radiology section of CPT®: ordered and reviewed  Tests in the medicine section of CPT®: ordered and reviewed  Discuss the patient with other providers: yes        Disposition  Final diagnoses:   Jerking movements of extremities     Time reflects when diagnosis was documented in both MDM as applicable and the Disposition within this note     Time User Action Codes Description Comment    2/22/2022  3:32 PM Marcela Saundres [R25 2] Jerking movements of extremities       ED Disposition     ED Disposition Condition Date/Time Comment    Admit Stable Tue Feb 22, 2022  5:31 PM Case was discussed with Dr Vilma Marc and the patient's admission status was agreed to be Admission Status: observation status to the service of Dr Vilma Marc  Follow-up Information    None         Patient's Medications   Discharge Prescriptions    No medications on file       No discharge procedures on file      PDMP Review       Value Time User    PDMP Reviewed  Yes 2/16/2021  9:21 AM Jessica Lopez DO          ED Provider  Electronically Signed by           Caitlin Cho PA-C  02/22/22 Vipin Chaudhari PA-C  02/22/22 [id number]

## 2022-02-22 NOTE — ED NOTES
Vital signs stable  Pt c/o that she is uncomfortable and has been sitting in the same position for hours  Offer to reposition patient at this time  Pt refuses        Jyotsna Coronel RN  02/22/22 [id number]

## 2022-02-23 VITALS
TEMPERATURE: 98.1 F | OXYGEN SATURATION: 94 % | BODY MASS INDEX: 36.89 KG/M2 | WEIGHT: 182.98 LBS | HEART RATE: 64 BPM | SYSTOLIC BLOOD PRESSURE: 138 MMHG | HEIGHT: 59 IN | DIASTOLIC BLOOD PRESSURE: 72 MMHG | RESPIRATION RATE: 18 BRPM

## 2022-02-23 LAB
ANION GAP SERPL CALCULATED.3IONS-SCNC: 5 MMOL/L (ref 4–13)
BASOPHILS # BLD AUTO: 0.02 THOUSANDS/ΜL (ref 0–0.1)
BASOPHILS NFR BLD AUTO: 1 % (ref 0–1)
BUN SERPL-MCNC: 13 MG/DL (ref 5–25)
CALCIUM SERPL-MCNC: 8.7 MG/DL (ref 8.3–10.1)
CHLORIDE SERPL-SCNC: 98 MMOL/L (ref 100–108)
CO2 SERPL-SCNC: 30 MMOL/L (ref 21–32)
CREAT SERPL-MCNC: 0.75 MG/DL (ref 0.6–1.3)
EOSINOPHIL # BLD AUTO: 0.22 THOUSAND/ΜL (ref 0–0.61)
EOSINOPHIL NFR BLD AUTO: 5 % (ref 0–6)
ERYTHROCYTE [DISTWIDTH] IN BLOOD BY AUTOMATED COUNT: 15.5 % (ref 11.6–15.1)
FOLATE SERPL-MCNC: >20 NG/ML (ref 3.1–17.5)
GFR SERPL CREATININE-BSD FRML MDRD: 69 ML/MIN/1.73SQ M
GLUCOSE P FAST SERPL-MCNC: 99 MG/DL (ref 65–99)
GLUCOSE SERPL-MCNC: 99 MG/DL (ref 65–140)
HCT VFR BLD AUTO: 31.8 % (ref 34.8–46.1)
HGB BLD-MCNC: 10.6 G/DL (ref 11.5–15.4)
IMM GRANULOCYTES # BLD AUTO: 0.02 THOUSAND/UL (ref 0–0.2)
IMM GRANULOCYTES NFR BLD AUTO: 1 % (ref 0–2)
LYMPHOCYTES # BLD AUTO: 1.04 THOUSANDS/ΜL (ref 0.6–4.47)
LYMPHOCYTES NFR BLD AUTO: 25 % (ref 14–44)
MCH RBC QN AUTO: 30 PG (ref 26.8–34.3)
MCHC RBC AUTO-ENTMCNC: 33.3 G/DL (ref 31.4–37.4)
MCV RBC AUTO: 90 FL (ref 82–98)
MONOCYTES # BLD AUTO: 0.63 THOUSAND/ΜL (ref 0.17–1.22)
MONOCYTES NFR BLD AUTO: 15 % (ref 4–12)
NEUTROPHILS # BLD AUTO: 2.27 THOUSANDS/ΜL (ref 1.85–7.62)
NEUTS SEG NFR BLD AUTO: 53 % (ref 43–75)
NRBC BLD AUTO-RTO: 0 /100 WBCS
PLATELET # BLD AUTO: 173 THOUSANDS/UL (ref 149–390)
PMV BLD AUTO: 9.5 FL (ref 8.9–12.7)
POTASSIUM SERPL-SCNC: 3.8 MMOL/L (ref 3.5–5.3)
RBC # BLD AUTO: 3.53 MILLION/UL (ref 3.81–5.12)
SODIUM SERPL-SCNC: 133 MMOL/L (ref 136–145)
VIT B12 SERPL-MCNC: 597 PG/ML (ref 100–900)
WBC # BLD AUTO: 4.2 THOUSAND/UL (ref 4.31–10.16)

## 2022-02-23 PROCEDURE — 80048 BASIC METABOLIC PNL TOTAL CA: CPT

## 2022-02-23 PROCEDURE — 85025 COMPLETE CBC W/AUTO DIFF WBC: CPT

## 2022-02-23 PROCEDURE — 36415 COLL VENOUS BLD VENIPUNCTURE: CPT

## 2022-02-23 PROCEDURE — 99239 HOSP IP/OBS DSCHRG MGMT >30: CPT | Performed by: INTERNAL MEDICINE

## 2022-02-23 PROCEDURE — 83918 ORGANIC ACIDS TOTAL QUANT: CPT

## 2022-02-23 PROCEDURE — 82746 ASSAY OF FOLIC ACID SERUM: CPT

## 2022-02-23 PROCEDURE — 82607 VITAMIN B-12: CPT

## 2022-02-23 RX ADMIN — FERROUS SULFATE TAB 325 MG (65 MG ELEMENTAL FE) 325 MG: 325 (65 FE) TAB at 06:31

## 2022-02-23 RX ADMIN — ASPIRIN 81 MG: 81 TABLET, COATED ORAL at 09:19

## 2022-02-23 RX ADMIN — SENNOSIDES AND DOCUSATE SODIUM 1 TABLET: 50; 8.6 TABLET ORAL at 09:17

## 2022-02-23 RX ADMIN — ISOSORBIDE MONONITRATE 30 MG: 30 TABLET, EXTENDED RELEASE ORAL at 09:18

## 2022-02-23 RX ADMIN — EZETIMIBE 10 MG: 10 TABLET ORAL at 09:17

## 2022-02-23 RX ADMIN — LOSARTAN POTASSIUM 12.5 MG: 25 TABLET, FILM COATED ORAL at 09:18

## 2022-02-23 RX ADMIN — POLYETHYLENE GLYCOL 3350 17 G: 17 POWDER, FOR SOLUTION ORAL at 09:17

## 2022-02-23 RX ADMIN — PROPRANOLOL HYDROCHLORIDE 10 MG: 20 TABLET ORAL at 09:19

## 2022-02-23 RX ADMIN — TRAMADOL HYDROCHLORIDE 50 MG: 50 TABLET ORAL at 04:38

## 2022-02-23 RX ADMIN — FUROSEMIDE 20 MG: 40 TABLET ORAL at 09:19

## 2022-02-23 RX ADMIN — LIDOCAINE 5% 3 PATCH: 700 PATCH TOPICAL at 09:17

## 2022-02-23 RX ADMIN — AMIODARONE HYDROCHLORIDE 100 MG: 200 TABLET ORAL at 09:18

## 2022-02-23 RX ADMIN — OXYBUTYNIN CHLORIDE 10 MG: 5 TABLET, EXTENDED RELEASE ORAL at 09:18

## 2022-02-23 RX ADMIN — ACETAMINOPHEN 975 MG: 325 TABLET, FILM COATED ORAL at 09:16

## 2022-02-23 RX ADMIN — LEVOTHYROXINE SODIUM 100 MCG: 100 TABLET ORAL at 05:59

## 2022-02-23 RX ADMIN — APIXABAN 5 MG: 5 TABLET, FILM COATED ORAL at 09:17

## 2022-02-23 NOTE — CASE MANAGEMENT
Case Management Discharge Planning Note    Patient name Chelsea Yi  Location ED 12/ED 12 MRN [id number]  : 1929 Date 2022       Current Admission Date: 2022  Current Admission Diagnosis:Tremor   Patient Active Problem List    Diagnosis Date Noted    Malignant neoplasm of central portion of left breast in female, estrogen receptor positive ([facility] ) 2021    Lightheadedness     Aortic stenosis, severe 2021    Low grade fever 2021    Arthritis 2021    Stroke-like symptoms 2021    Colitis 2021    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[facility] ) 2021    Avascular necrosis ([facility] ) 2021    Knee pain, chronic 2021    Asymptomatic bacteriuria 2021    Acquired absence of other left toe(s) ([facility] ) 2021    Severe obesity (BMI 35 0-39  9) with comorbidity ([facility] ) 2021    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 ([facility] [address] [address] ) 2021    Mixed stress and urge urinary incontinence 2021    Chronic diastolic CHF (congestive heart failure) ([facility] [address] ) 2021    Generalized OA 2021    Anticoagulated on Coumadin 2021    Stage 3a chronic kidney disease ([facility] ) 2021    Primary osteoarthritis of both knees 10/15/2019    Effusion of left knee 10/15/2019    Ventricular tachycardia ([facility] ) 08/15/2019    Cardiomyopathy ([facility] ) 08/15/2019    Trochanteric bursitis of right hip 2019    Atrial flutter ([facility] ) 2019    Pacemaker 2019    Ambulatory dysfunction 2018    Hyponatremia 2018    Heart block AV second degree 2018    Cervical spine degeneration 2018    Primary osteoarthritis of both shoulders 2018    Tremor 2018    Right hip pain 2018    History of placement of stent in LAD coronary artery 2018    Hyperlipidemia 2018    History of atrial flutter 2018    LBBB (left bundle branch block) 2018    Coronary artery disease involving native coronary artery of native heart without angina pectoris 04/07/2017    Essential hypertension 04/07/2017    Chronic low back pain 08/16/2016    Acquired hypothyroidism 01/27/2016    Type 2 diabetes mellitus with stage 3a chronic kidney disease, without long-term current use of insulin ([facility] [address] ) 06/19/2015    Adhesive capsulitis 06/19/2015      LOS (days): 0  Geometric Mean LOS (GMLOS) (days):   Days to GMLOS:     OBJECTIVE:            Current admission status: Observation   Preferred Pharmacy:   [address], [address] [facility] [address] [address]  Phone: [phone] Fax: [phone]    Primary Care Provider: Coco Tarango MD    Primary Insurance: MEDICARE  [facility]    DISCHARGE DETAILS:    Other Referral/Resources/Interventions Provided:  Interventions: Transportation  Referral Comments: CM informed pt stable for DC to return to [address] [place] [address] Road  [id number] to inform them of pt return and left a message for the Personal Care floor as well as Misty Claude in Wellness, awaiting return call    CM placed referral for return in [facility]

## 2022-02-23 NOTE — DISCHARGE SUMMARY
[facility]  Discharge- Vinay Moyer 7/24/1929, 80 y o  female MRN: [id number]  Unit/Bed#: DONTA Encounter: [id number]  Primary Care Provider: Kamila Ochoa MD   Date and time admitted to hospital: 2/22/2022  8:48 AM    * Tremor  Assessment & Plan  On 2/22 AM, pt got up around 4:00 a m  to use the bathroom, and had difficulty doing so  Later in the day, she had tremors; home nurse was concerned and thought that she may be having stroke- brought to ED  Reports fall but denies LOC, aura  No speech changes noted afterwards  Patient endorses chronic pain in her knees and shoulders  Patient denies loss of consciousness or aura  Patient was initially placed on stroke pathway  Discontinued as per Neuro recs  Of note, 8/3/21 admission noted for a similar left arm tremor:   Patient woke up from a nap and noticed that she had tremor in her left arm   Had similar tremor in the right arm before as well  Determined to be most likely an essential tremor  Meds switched to propanolol to help treat the essential tremor  Workup:   CMP grossly WNL except mild hyponatremia (134), Mg 1 7 (borderline low)  TSH WNL  CBC no leukocytosis  Trops neg x2  Prolactin WNL (12)  EEG routine 8/1/21: No electrographic seizures, interictal epileptiform discharges (seizure tendency) or focal slowing noted  CT head 2/22/22 unremarkable    Plan:  · Neurochecks q4  · Follow up on labs: CBC, CMP, Vit B12, folate, MMA    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[facility] )  Assessment & Plan  · Patient has a history of recent left thalamic stroke in May 2021    · CTA head and neck showed old left thalamic and right caudate lacunar infarcts, no new infarcts, severe distal left common carotid artery stenosis and proximal cervical internal carotid artery difficult to measure due to calcification although likely greater than 80% stenosis    Plan:   · Continue ASA 81 mg, pravastatin 80 mg    Malignant neoplasm of central portion of left breast in female, estrogen receptor positive ([place] [address] )  Assessment & Plan  · Patient has a past medical history significant for breast cancer  · Continue outpatient follow-up with Dr Wen Carey (heme onc)    Atrial flutter ([place] [address] )  Assessment & Plan  · Patient has a past medical history significant for atrial flutter  · Patient is on propranolol 10 TID, Eliquis 5 mg BID, amiodarone 100 mg QD  · ECG 2/22: sinus rhythm w/ V paced, rate 63    Plan:   · Continue home medications    Type 2 diabetes mellitus with stage 3a chronic kidney disease, without long-term current use of insulin ([place] [address] )  Assessment & Plan  Lab Results   Component Value Date    HGBA1C 5 4 01/13/2022       Recent Labs     02/22/22  0853   POCGLU 97       Blood Sugar Average: Last 72 hrs:  (P) 97     · Patient's past medical history significant for type 2 diabetes  · Continue home medications  · Patient currently without any long-term insulin use    Essential hypertension  Assessment & Plan  · Patient has past medical history significant for hypertension  · Continue home medications    Coronary artery disease involving native coronary artery of native heart without angina pectoris  Assessment & Plan  · Patient has a past medical history of CAD  · Continue home medications    Medical Problems             Resolved Problems  Date Reviewed: 1/21/2022    None              Discharging Resident: Masoud Storm MD  Discharging Attending: No att  providers found  PCP: Marylu Modi MD  Admission Date:   Admission Orders (From admission, onward)     Ordered        02/22/22 1731  Place in Observation  Once                      Discharge Date: 02/23/22    Consultations During Hospital Stay:  · Neurology    Procedures Performed:   · None  Significant Findings / Test Results:   · Mild hyponatremia (133), without neurologic/mental status changes noted  · CTH wo contrast: No acute intracranial abnormality      Incidental Findings:   · Mild Hyponatremia (133)     Test Results Pending at Discharge (will require follow up): · None      Outpatient Tests Requested:  · F/u with PCP outpatient  Complications:  None  Reason for Admission: tremor    Hospital Course:   Yasmine Valentin is a 80 y o  female patient with DM2, HTN, CAD, CKD3, HLD, LBBB, severe aortic stenosis, PAF maintained on Eliquis, s/p PPM, prior L thalamic stroke in May 2021, L carotid stenosis who presented to the hospital complaining of feeling "unwell "  She noted that she has had tremors intermittently since her stroke in her B/L UE which are prominent with movement/action but on day of presentation, she had "full body" tremor which she had never had before  No acute metabolic derangements except mild hyponatremia noted  ECG showed NSR with rate of 63 with wide QR intervals (163)  Patient's CT Head without contrast was unremarkable  Neurology saw her and it was suspected to most likely be essential tremor which may be exacerbated in setting of feeling unwell but given unclear description of presenting symptoms  Patient was discharged on 2/23 am and asked to continue ambulatory meds and follow up with PCP  The patient, initially admitted to the hospital as inpatient, was discharged earlier than expected given the following: non focal neurologic exam, stable clinical status       Please see above list of diagnoses and related plan for additional information  Condition at Discharge: stable    Discharge Day Visit / Exam:   Subjective:    Ms Estre Jin is doing well this am  She tells me she would like to get out of the hospital   Denies new headaches, vision changes, nausea, speech difficulty, swallowing difficulty, bowel/bladder incontinence, numbness or tingling, palpitations, SOB, abdominal pain       Vitals: Blood Pressure: 138/72 (02/23/22 1146)  Pulse: 64 (02/23/22 1100)  Temperature: 98 1 °F (36 7 °C) (02/23/22 0319)  Temp Source: Oral (02/23/22 0319)  Respirations: 18 (02/23/22 0615)  Height: 4' 11" (149 9 cm) (02/23/22 0319)  Weight - Scale: 83 kg (182 lb 15 7 oz) (02/23/22 0319)  SpO2: 94 % (02/23/22 0615)    Physical Exam   Vitals reviewed  General Examination: Lying in bed, cooperative   HEENT: Normocephalic, Atraumatic  Extraocular movements intact, PERRLA  CVS: S1, S2 noted  Lungs: CTA b/l  Abdomen: Soft, normal bowel sounds  Non distended, non tender  Ext: No edema noted  Psych: Though Process - logical    Skin: No bleeding/bruising noted  Neuro: A, Ox3  No focal deficits appreciated  No tremors/jerking/fasiculations noted  4+/5 strength throughout in proximal, distal upper and lower extremities  Discussion with Family: will update family later today  [facility] Discharge instructions/Information to patient and family:   See after visit summary for information provided to patient and family  Provisions for Follow-Up Care:  See after visit summary for information related to follow-up care and any pertinent home health orders  Disposition:   [facility] Physician  Planned Readmission: none  Discharge Medications:  See after visit summary for reconciled discharge medications provided to patient and/or family        **Please Note: This note may have been constructed using a voice recognition system**

## 2022-02-23 NOTE — ASSESSMENT & PLAN NOTE
On 2/22 AM, pt got up around 4:00 a m  to use the bathroom, and had difficulty doing so  Later in the day, she had tremors; home nurse was concerned and thought that she may be having stroke- brought to ED  Reports fall but denies LOC, aura  No speech changes noted afterwards  Patient endorses chronic pain in her knees and shoulders  Patient denies loss of consciousness or aura  Patient was initially placed on stroke pathway  Discontinued as per Neuro recs  Of note, 8/3/21 admission noted for a similar left arm tremor:   Patient woke up from a nap and noticed that she had tremor in her left arm   Had similar tremor in the right arm before as well  Determined to be most likely an essential tremor  Meds switched to propanolol to help treat the essential tremor  Workup:   CMP grossly WNL except mild hyponatremia (134), Mg 1 7 (borderline low)  TSH WNL  CBC no leukocytosis  Trops neg x2  Prolactin WNL (12)  EEG routine 8/1/21: No electrographic seizures, interictal epileptiform discharges (seizure tendency) or focal slowing noted       CT head 2/22/22 unremarkable    Plan:  · Neurochecks q4  · Follow up on labs: CBC, CMP, Vit B12, folate, MMA

## 2022-02-23 NOTE — CASE MANAGEMENT
Case Management Discharge Planning Note    Patient name Sandi Zambrano  Location ED 12/ED 12 MRN [id number]  : 1929 Date 2022       Current Admission Date: 2022  Current Admission Diagnosis:Tremor   Patient Active Problem List    Diagnosis Date Noted    Malignant neoplasm of central portion of left breast in female, estrogen receptor positive ([facility] ) 2021    Lightheadedness     Aortic stenosis, severe 2021    Low grade fever 2021    Arthritis 2021    Stroke-like symptoms 2021    Colitis 2021    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[facility] ) 2021    Avascular necrosis ([facility] ) 2021    Knee pain, chronic 2021    Asymptomatic bacteriuria 2021    Ac[facility] absence of other left [facility] 2021    Severe obesity (BMI 35 0-39  9) with comorbidity ([facility] ) 2021    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 ([facility] ) 2021    Mixed stress and urge urinary incontinence 2021    Chronic diastolic CHF (congestive heart failure) ([facility] ) 2021    Generalized OA 2021    Anticoagulated on Coumadin 2021    Stage 3a chronic kidney disease ([facility] ) 2021    Primary osteoarthritis of both knees 10/15/2019    Effusion of left knee 10/15/2019    Ventricular tachycardia ([facility] ) 08/15/2019    Cardiomyopathy ([facility] ) 08/15/2019    Trochanteric bursitis of right hip 2019    Atrial flutter ([facility] ) 2019    Pacemaker 2019    Ambulatory dysfunction 2018    Hyponatremia 2018    Heart block AV second degree 2018    Cervical spine degeneration 2018    Primary osteoarthritis of both shoulders 2018    Tremor 2018    Right hip pain 2018    History of placement of stent in LAD coronary artery 2018    Hyperlipidemia 2018    History of atrial flutter 2018    LBBB (left bundle branch block) 2018    Coronary artery disease involving native coronary artery of native heart without angina pectoris 04/07/2017    Essential hypertension 04/07/2017    Chronic low back pain 08/16/2016    Acquired hypothyroidism 01/27/2016    Type 2 diabetes mellitus with stage 3a chronic kidney disease, without long-term current use of insulin ([facility] ) 06/19/2015    Adhesive capsulitis 06/19/2015      LOS (days): 0  Geometric Mean LOS (GMLOS) (days):   Days to GMLOS:     OBJECTIVE:            Current admission status: Observation   Preferred Pharmacy:   [address], [address] [facility] [address] [address]  Phone: [phone] Fax: [phone]    Primary Care Provider: Jarad Ga MD    Primary Insurance: MEDICARE  Secondary Insurance: AARP    DISCHARGE DETAILS:    Other Referral/Resources/Interventions Provided:  Referral Comments: CM received call from [facility] [place] [facility] personal care confirming pt is able to return ASAP  CM requested transport via [facility] one call, awaiting confirmation of time        Accepting Facility Name, [facility] : [facility]  Receiving Facility/Agency Phone Number: [phone] [id number]

## 2022-02-23 NOTE — ASSESSMENT & PLAN NOTE
· Patient has a past medical history significant for atrial flutter  · Patient is on propranolol 10 TID, Eliquis 5 mg BID, amiodarone 100 mg QD  · ECG 2/22: sinus rhythm w/ V paced, rate 63    Plan:   · Continue home medications

## 2022-02-23 NOTE — ASSESSMENT & PLAN NOTE
· Patient has a history of recent left thalamic stroke in May 2021    · CTA head and neck showed old left thalamic and right caudate lacunar infarcts, no new infarcts, severe distal left common carotid artery stenosis and proximal cervical internal carotid artery difficult to measure due to calcification although likely greater than 80% stenosis    Plan:   · Continue ASA 81 mg, pravastatin 80 mg

## 2022-02-23 NOTE — ED ATTENDING ATTESTATION
2/22/2022  IEddy MD, saw and evaluated the patient  I have discussed the patient with the resident/non-physician practitioner and agree with the resident's/non-physician practitioner's findings, Plan of Care, and MDM as documented in the resident's/non-physician practitioner's note, except where noted  All available labs and Radiology studies were reviewed  I was present for key portions of any procedure(s) performed by the resident/non-physician practitioner and I was immediately available to provide assistance  At this point I agree with the current assessment done in the Emergency Department  I have conducted an independent evaluation of this patient a history and physical is as follows:    Patient is a 19-year-old female presents to the emergency department from her nursing residence after experiencing jolting/jerking movements throughout her body today  She reports having tremor in the right upper arm since CVA in May of 2021  Today she has had greater than 4 episodes fleeting diffuse body jerking  She remains awake as these occur and notes that the last 1 was just a few minutes ago  She awoke today with a slightly funny feeling in her head she notes that this was not dizziness or pain but an odd sensation that she had not previously had  This has fully resolved  She did not appreciate any vision changes, diaphoresis fever, palpitations, chest discomfort or dyspnea with this  Reports having been eating and drinking normally she has noted some mild nasal congestion over the last couple of days  Past medical history includes hypertension, diabetes, atrial flutter, CAD, CKD, hypothyroidism CVA, essential tremor CHF, breast cancer, DVT/PE and ambulatory dysfunction  She uses a wheelchair at baseline  Initial episodes of abnormal movement occurred as she was seated in 1 today slightly over an hour after waking  On exam she appears in no acute distress    Mucous membranes are moist   Heart sounds regular  Lungs clear to auscultation bilaterally  Abdomen soft and nontender  All 4 extremities are nontender  Mild bilateral ankle edema  No facial asymmetry is appreciated with all movements  Sensation symmetric in all portions of the face  Upper extremity strength is symmetric 4/5 bilaterally  Strong bilateral radial pulses  Lower extremity strength 4/5 with all movements  Plus two PT pulses  Sensation in upper and lower extremities symmetric  Upon lifting arms and holding these out for several seconds fine tremor appreciated in each  She notes earlier episodes were different from this  Etiology new movements uncertain  Potentially this represents progression of essential tremor  Must consider possible focal seizures  With abnormal sensation in the head earlier obtaining CT head  Additionally checking cardiac studies and electrolytes  Will closely monitor    Would consider discussing with Neurology    ED Course         Critical Care Time  Procedures

## 2022-02-23 NOTE — ED NOTES
Beverage given to patient  Update provided to daughter Nikki Wagner with patient's consent        Rome Hutchison RN  02/22/22 8393

## 2022-02-23 NOTE — ASSESSMENT & PLAN NOTE
· Patient has a past medical history significant for breast cancer  · Continue outpatient follow-up with Dr Wenda Castleman (heme onc)

## 2022-02-24 LAB
ATRIAL RATE: 63 BPM
P AXIS: 55 DEGREES
PR INTERVAL: 188 MS
QRS AXIS: 21 DEGREES
QRSD INTERVAL: 136 MS
QT INTERVAL: 424 MS
QTC INTERVAL: 433 MS
T WAVE AXIS: 84 DEGREES
VENTRICULAR RATE: 63 BPM

## 2022-02-24 PROCEDURE — 93010 ELECTROCARDIOGRAM REPORT: CPT | Performed by: INTERNAL MEDICINE

## 2022-02-25 LAB — METHYLMALONATE SERPL-SCNC: 221 NMOL/L (ref 0–378)

## 2022-03-09 ENCOUNTER — REMOTE DEVICE CLINIC VISIT (OUTPATIENT)
Dept: CARDIOLOGY CLINIC | Facility: CLINIC | Age: 87
End: 2022-03-09
Payer: MEDICARE

## 2022-03-09 DIAGNOSIS — Z95.0 PRESENCE OF PERMANENT CARDIAC PACEMAKER: Primary | ICD-10-CM

## 2022-03-09 PROCEDURE — 93294 REM INTERROG EVL PM/LDLS PM: CPT | Performed by: INTERNAL MEDICINE

## 2022-03-09 PROCEDURE — 93296 REM INTERROG EVL PM/IDS: CPT | Performed by: INTERNAL MEDICINE

## 2022-03-09 NOTE — PROGRESS NOTES
Results for orders placed or performed in visit on 03/09/22   Cardiac EP device report    Narrative    MDT-DUAL CHAMBER PPM (DDDR MODE) - ACTIVE SYSTEM IS MRI CONDITIONAL  CARELINK TRANSMISSION: BATTERY VOLTAGE ADEQUATE  (9 3 YRS) AP 59%  99%  ALL AVAILABLE LEAD PARAMETERS WITHIN NORMAL LIMITS  NO SIGNIFICANT HIGH RATE EPISODES  NORMAL DEVICE FUNCTION  ---SANCHEZ

## 2022-03-09 NOTE — TELEPHONE ENCOUNTER
Los Mcdonnell from [facility] called to schedule appointment and needs u/s script refaxed to [phone]

## 2022-04-06 ENCOUNTER — TELEPHONE (OUTPATIENT)
Dept: HEMATOLOGY ONCOLOGY | Facility: CLINIC | Age: 87
End: 2022-04-06

## 2022-04-06 ENCOUNTER — OFFICE VISIT (OUTPATIENT)
Dept: SURGICAL ONCOLOGY | Facility: CLINIC | Age: 87
End: 2022-04-06
Payer: MEDICARE

## 2022-04-06 VITALS
HEIGHT: 59 IN | TEMPERATURE: 98 F | RESPIRATION RATE: 16 BRPM | SYSTOLIC BLOOD PRESSURE: 132 MMHG | OXYGEN SATURATION: 98 % | HEART RATE: 74 BPM | DIASTOLIC BLOOD PRESSURE: 80 MMHG | BODY MASS INDEX: 36.96 KG/M2

## 2022-04-06 DIAGNOSIS — C50.112 MALIGNANT NEOPLASM OF CENTRAL PORTION OF LEFT BREAST IN FEMALE, ESTROGEN RECEPTOR POSITIVE (HCC): Primary | ICD-10-CM

## 2022-04-06 DIAGNOSIS — Z17.0 MALIGNANT NEOPLASM OF CENTRAL PORTION OF LEFT BREAST IN FEMALE, ESTROGEN RECEPTOR POSITIVE (HCC): Primary | ICD-10-CM

## 2022-04-06 PROCEDURE — 99213 OFFICE O/P EST LOW 20 MIN: CPT | Performed by: SURGERY

## 2022-04-06 RX ORDER — AMOXICILLIN 500 MG/1
500 CAPSULE ORAL EVERY 8 HOURS SCHEDULED
COMMUNITY

## 2022-04-06 RX ORDER — ANORECTAL OINTMENT 15.7; .44; 24; 20.6 G/100G; G/100G; G/100G; G/100G
OINTMENT TOPICAL 2 TIMES DAILY
COMMUNITY

## 2022-04-06 RX ORDER — CLOTRIMAZOLE 1 %
CREAM (GRAM) TOPICAL 2 TIMES DAILY
COMMUNITY

## 2022-04-06 NOTE — TELEPHONE ENCOUNTER
Patient stated that I needed to make appt with the nursing home, spoke with Rafaela Nichols and states she will f/u with nursing home   to make appt

## 2022-04-06 NOTE — PROGRESS NOTES
Surgical Oncology Follow Up       [address] [facility],[address]  [facility]  [address] [facility] [address]  [facility] [address]    Job Cortez  7/24/1929  [id number]  [address] [facility],[address] [place] [address]  [facility] [place] [address] [address] [address]    Chief Complaint   Patient presents with    Follow-up          Assessment & Plan:   Patient presents for a follow-up visit  The patient has a grade 1  left breast cancer  In the meantime she has some dysphagia most likely related to a stroke, she is now on Eliquis  She has a relatively large hematoma in the right breast   Examination of both breast in the sitting position demonstrate no evidence of any worrisome skin findings were dominant masses or axillary adenopathy  Patient has refused surgery as well as anti hormonal therapy  I have recommended we follow her clinically, specifically I do not think annual mammography will provide her any significant benefit  We will see her back in 6 months  Cancer History:     Oncology History   Malignant neoplasm of central portion of left breast in female, estrogen receptor positive ([address] )   10/26/2021 Biopsy    Left breast ultrasound-guided biopsy  9 o'clock, 6 cm from nipple  Invasive ductal carcinoma  Grade 1    OK <1  HER2 0    Done at [address] of [facility]  Have no additional breast records  12/22/2021 - 12/22/2021 Hormone Therapy    Consult with Dr Alfredo Phelan - patient and daughter declined hormonal therapy           Interval History:   Patient has some dysphagia she states that her physicians told her she may have had small stroke  She is now on Eliquis  Review of Systems:   Review of Systems   Neurological: Positive for speech difficulty  All other systems reviewed and are negative        Past Medical History     Patient Active Problem List   Diagnosis    Coronary artery disease involving native coronary artery of native heart without angina pectoris    Essential hypertension    History of placement of stent in LAD coronary artery    Hyperlipidemia    History of atrial flutter    LBBB (left bundle branch block)    Right hip pain    Type 2 diabetes mellitus with stage 3a chronic kidney disease, without long-term current use of insulin ([facility])    Acquired hypothyroidism    Adhesive capsulitis    Chronic low back pain    Cervical spine degeneration    Primary osteoarthritis of both shoulders    Tremor    Heart block AV second degree    Hyponatremia    Ambulatory dysfunction    Pacemaker    Atrial flutter ([facility])    Trochanteric bursitis of right hip    Ventricular tachycardia (HCC)    Cardiomyopathy ([facility])    Primary osteoarthritis of both knees    Effusion of left knee    Chronic diastolic CHF (congestive heart failure) ([facility])    Generalized OA    Anticoagulated on Coumadin    Stage 3a chronic kidney disease ([facility])    Mixed stress and urge urinary incontinence    Acquired absence of other left toe(s) ([facility])    Severe obesity (BMI 35 0-39  9) with comorbidity ([address] )    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 ([facility])    Avascular necrosis ([facility])    Knee pain, chronic    Asymptomatic bacteriuria    Stroke-like symptoms    Colitis    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[facility])    Arthritis    Low grade fever    Aortic stenosis, severe    Lightheadedness    Malignant neoplasm of central portion of left breast in female, estrogen receptor positive ([address] )     Past Medical History:   Diagnosis Date    Abnormal nuclear stress test     last assessed 04/03/2017    Anxiety     Arthritis     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    Bundle branch block, left     Cardiomyopathy ([address] )     Chest pain 3/9/2019    Chronic pain     chronic b/l shoulder pain    Chronic pain of right knee 4/2/2019    Coronary artery disease     Diabetes mellitus ([address] )     Gait disturbance 3/2/2018    GERD (gastroesophageal reflux disease)     H/O atrial flutter     History of DVT (deep vein thrombosis)     History of pulmonary embolism     Hyperlipidemia     Hypertension     Hypothyroid     Renal calculi     Shortness of breath      Past Surgical History:   Procedure Laterality Date    ATRIAL ABLATION SURGERY  01/2015   [facility] CARDIAC PACEMAKER PLACEMENT  12/10/2018    Medtronic YANNA XT DR ESCOBEDO, model [id number]    CARDIOVERSION      CHELA/DCCV 10/22/2014    CARPAL TUNNEL RELEASE Right     CATARACT EXTRACTION      CORONARY ANGIOPLASTY WITH STENT PLACEMENT      HYSTERECTOMY      JOINT REPLACEMENT Right     WI AMPUTATION TOE,MT-P JT Left 10/11/2017    Procedure: AMPUTATION SECOND TOE;  Surgeon: Mercedes Bearden DPM;  Location: [facility];  Service: Podiatry    TONSILLECTOMY      TOTAL HIP ARTHROPLASTY      Right     Family History   Problem Relation Age of Onset    Parkinsonism Sister     Cancer Sister         unknown type     Social History     Socioeconomic History    Marital status: /Civil Union     Spouse name: Not on file    Number of children: Not on file    Years of education: Not on file    Highest education level: Not on file   Occupational History    Not on file   Tobacco Use    Smoking status: Never Smoker    Smokeless tobacco: Never Used   Vaping Use    Vaping Use: Never used   Substance and Sexual Activity    Alcohol use: Yes     Comment: occasional    Drug use: Not Currently     Types: Marijuana     Comment: MEDICAL MARIJUANA-HAS NOT USED FOR 3 WEEKS    Sexual activity: Not on file   Other Topics Concern    Not on file   Social History Narrative    Not on file     Social Determinants of Health     Financial Resource Strain: Not on file   Food Insecurity: No Food Insecurity    Worried About Running Out of Food in the Last Year: Never true    Harry of Food in the Last Year: Never true   Transportation Needs: No Transportation Needs    Lack of Transportation (Medical):  No  Lack of Transportation (Non-Medical):  No   Physical Activity: Not on file   Stress: Not on file   Social Connections: Not on file   Intimate Partner Violence: Not on file   Housing Stability: Not on file       Current Outpatient Medications:     Accu-Chek FastClix Lancets MISC, Check once daily, Disp: 100 each, Rfl: 3    Accu-Chek Softclix Lancets lancets, Use daily Use as instructed, Disp: 100 each, Rfl: 1    acetaminophen (TYLENOL) 325 mg tablet, Take 975 mg by mouth 3 (three) times a day, Disp: , Rfl:     amiodarone 200 mg tablet, Take 0 5 tablets (100 mg total) by mouth daily, Disp: 45 tablet, Rfl: 3    amoxicillin (AMOXIL) 500 mg capsule, Take 500 mg by mouth every 8 (eight) hours, Disp: , Rfl:     Aspirin Low Dose 81 MG EC tablet, TAKE 1 TABLET BY MOUTH EVERY DAY, Disp: 90 tablet, Rfl: 0    Blood Glucose Monitoring Suppl (Accu-Chek Guide Me) w/Device KIT, Check once daily, Disp: 1 kit, Rfl: 0    Calcium 600+D3 600-800 MG-UNIT TABS, Take 1 tablet by mouth 2 (two) times a day, Disp: , Rfl:     clotrimazole (LOTRIMIN) 1 % cream, Apply topically 2 (two) times a day, Disp: , Rfl:     Diclofenac Sodium (VOLTAREN) 1 %, Apply 2 g topically 4 (four) times a day Bilateral shoulders and knees, Disp: 150 g, Rfl: 0    Eliquis 5 MG, Take 1 tablet (5 mg total) by mouth 2 (two) times a day, Disp: 180 tablet, Rfl: 3    ezetimibe (ZETIA) 10 mg tablet, Take 1 tablet (10 mg total) by mouth daily, Disp: 30 tablet, Rfl: 3    FeroSul 325 (65 Fe) MG tablet, TAKE ONE TABLET BY MOUTH ONCE EVERY DAY ON AT NOON EMPTY stomach FOR ANEMIA, Disp: , Rfl:     furosemide (LASIX) 20 mg tablet, Take 1 tablet (20 mg total) by mouth daily, Disp: 90 tablet, Rfl: 2    glucose blood (Accu-Chek Guide) test strip, Check once daily, Disp: 100 each, Rfl: 3    isosorbide mononitrate (IMDUR) 30 mg 24 hr tablet, Take 1 tablet (30 mg total) by mouth daily, Disp: 90 tablet, Rfl: 3    Lancets (accu-chek soft touch) lancets, Use daily Use as instructed, Disp: 100 each, Rfl: 3    levothyroxine 100 mcg tablet, TAKE ONE TABLET BY MOUTH ONCE EVERY DAY, Disp: , Rfl:     Lidocaine (Aspercreme Lidocaine) 4 % PTCH, Apply 3 patches topically daily B/l knees, low back, Disp: , Rfl:     losartan (COZAAR) 25 mg tablet, TAKE 1/2 TABLET BY MOUTH ONCE EVERY DAY, Disp: , Rfl:     menthol-zinc oxide (Calmoseptine) 0 44-20 6 % OINT, Apply topically 2 (two) times a day, Disp: , Rfl:     Mirabegron ER (Myrbetriq) 50 MG TB24, Take 1 tablet (50 mg total) by mouth in the morning, Disp: 30 tablet, Rfl: 11    multivitamin-iron-minerals-folic acid (CENTRUM) chewable tablet, Chew 1 tablet daily  , Disp: , Rfl:     Pediatric Multiple Vitamins (Multivitamin Childrens) CHEW, chew ONE tablet and swallow orally daily, Disp: , Rfl:     polyethylene glycol (MIRALAX) 17 g packet, Take 17 g by mouth daily, Disp:  , Rfl: 0    pravastatin (PRAVACHOL) 80 mg tablet, TAKE ONE TABLET BY MOUTH ONCE EVERY DAY FOR CHOLESTEROL, Disp: , Rfl:     propranolol (INDERAL) 10 mg tablet, Take 1 tablet (10 mg total) by mouth 3 (three) times a day, Disp: , Rfl: 0    senna-docusate sodium (SENOKOT S) 8 6-50 mg per tablet, Take 1 tablet by mouth 2 (two) times a day, Disp:  , Rfl: 0    Theratears 0 25 % SOLN, instill ONE drop into each eye three times a day, Disp: , Rfl:     traMADol (ULTRAM) 50 mg tablet, , Disp: , Rfl:   Allergies   Allergen Reactions    Atorvastatin      Reaction unknown 10/23/2018-    Other Rash     Patient sensitive to adhesives from tape       Physical Exam:     Vitals:    04/06/22 1312   BP: 132/80   Pulse: 74   Resp: 16   Temp: 98 °F (36 7 °C)   SpO2: 98%     Physical Exam  Chest:      Comments: Examination the right breast demonstrates a large hematoma  The patient is unaware of any trauma  There are no dominant masses in the breast and no axillary adenopathy    The left breast was examined in the sitting position    There are no worrisome skin changes, tenderness, inverted nipple, nipple discharge, swelling, bleeding or evidence of a mass in any quadrant  Mark survey demonstrated no evidence of any clinically suspicious axillary, pectoral or paraclavicular lymph nodes  Results & Discussion:   I have recommended the patient not have mammograms since it would not alter her course  However if she did have a palpable abnormality we would consider possibly a adding therapy or surgery  Patient her daughter are in agreement with this  We will see her back in 6 months  Advance Care Planning/Advance Directives:  I discussed the disease status, treatment plans and follow-up with the patient

## 2022-04-07 ENCOUNTER — TELEPHONE (OUTPATIENT)
Dept: UROLOGY | Facility: AMBULATORY SURGERY CENTER | Age: 87
End: 2022-04-07

## 2022-04-07 ENCOUNTER — OFFICE VISIT (OUTPATIENT)
Dept: UROLOGY | Facility: AMBULATORY SURGERY CENTER | Age: 87
End: 2022-04-07
Payer: MEDICARE

## 2022-04-07 VITALS
OXYGEN SATURATION: 96 % | HEIGHT: 59 IN | DIASTOLIC BLOOD PRESSURE: 76 MMHG | RESPIRATION RATE: 16 BRPM | BODY MASS INDEX: 36.96 KG/M2 | HEART RATE: 67 BPM | SYSTOLIC BLOOD PRESSURE: 130 MMHG

## 2022-04-07 DIAGNOSIS — N39.46 MIXED STRESS AND URGE URINARY INCONTINENCE: Primary | ICD-10-CM

## 2022-04-07 PROCEDURE — 99213 OFFICE O/P EST LOW 20 MIN: CPT | Performed by: NURSE PRACTITIONER

## 2022-04-07 NOTE — TELEPHONE ENCOUNTER
----- Message from [address] [place] [address] sent at 4/7/2022  1:59 PM EDT -----  Patient wishes to schedule PTNS with nursing  Thank you!

## 2022-04-07 NOTE — PROGRESS NOTES
04/07/22    Niles Reynaga [facility]   7/24/1929   [id number]       Assessment  1 Urinary frequency   2 Mixed stress and urge incontinence      Discussion/Plan  1 Urinary frequency   2 Mixed stress and urge incontinence    Continue 50 mg Myrbetriq              Renal US with PVR unremarkable   PTNS and pelvic floor PT discussed as alternative treatment options       Patient wishes to schedule PTNS and continue medication  [facility] will need to facilitate transportation if family is unavailable  All questions answered at this time  Subjective  HPI   Justin Padgett is a 80year old female who presents in follow up for evaluation of urinary frequency and mixed urinary urge and stress incontinence  She is diabetic and maintained on daily Lasix  Her most recent A1c is 5 4%  She reports incontinence when going from a sitting to standing position as well as incontinence if she cannot make it to the bathroom in time  She is a resident at [facility]  She is in a wheelchair in the office today  She ambulates with a walker and has to wait for assistance to use the bathroom  Often times, she reports having an accident and being unable to control her bladder  She denies constipation, pain, dysuria, fever, chills, gross hematuria  She feels that she does not completely empty her bladder and feels the constant urge to urinate  She wears Depends briefs daily  She was started on Myrbetriq at previous office visit  She does not necessarily notice a difference in her bladder control  Her biggest complaint is bilateral knee and shoulder pain       Review of Systems - History obtained from chart review and the patient  General ROS: negative  Psychological ROS: negative  Endocrine ROS: negative  Respiratory ROS: no cough, shortness of breath, or wheezing  Cardiovascular ROS: no chest pain or dyspnea on exertion  Gastrointestinal ROS: no abdominal pain, change in bowel habits, or black or bloody stools  Genito-Urinary ROS: positive for - incontinence and urinary frequency/urgency  Musculoskeletal ROS: positive for - joint pain and joint stiffness  Neurological ROS: no TIA or stroke symptoms  Dermatological ROS: negative       Objective  Physical Exam  Vitals and nursing note reviewed  Constitutional:       General: She is not in acute distress  Appearance: Normal appearance  She is obese  She is not ill-appearing, toxic-appearing or diaphoretic  HENT:      Head: Normocephalic and atraumatic  Right Ear: Decreased hearing noted  Left Ear: Decreased hearing noted  Pulmonary:      Effort: Pulmonary effort is normal  No respiratory distress  Musculoskeletal:         General: Normal range of motion  Cervical back: Normal range of motion  Skin:     General: Skin is warm and dry  Neurological:      General: No focal deficit present  Mental Status: She is alert and oriented to person, place, and time  Mental status is at baseline  Psychiatric:         Attention and Perception: Attention normal          Mood and Affect: Mood normal  Affect is flat  Speech: Speech normal          Behavior: Behavior normal          Thought Content: Thought content normal          Judgment: Judgment normal            FERMIN Hilliard     I have spent 15 minutes with Patient  today in which greater than 50% of this time was spent in counseling/coordination of care regarding Risks and benefits of tx options and Intructions for management

## 2022-04-08 NOTE — TELEPHONE ENCOUNTER
Spoke to patient's sister, Gabe Garcia, and she advised patient is at [facility] and for staff to give them a call  Left message advising patient is to be scheduled for PTNS tx  Advised she will need to come into the office once a week for the same day and time for 12 weeks  Advised I am unsure if they are able to do this due to transportation  Advised to call the office back to schedule this or if alternatives are needed  Office number provided

## 2022-04-11 NOTE — TELEPHONE ENCOUNTER
Attempted to contact Tatiana in regards to scheduling PTNS  Phone just rang with no pick-up or voicemail to leave message  Will re-attempt at a later time

## 2022-04-12 NOTE — TELEPHONE ENCOUNTER
Spoke to STAFFDAVI from [facility]  Advised Monday's would not be able to schedule PTNS treatments due to upcoming holidays  12 appointments have been scheduled starting in May  STAFFVERENICEP states transportation should be no issue but will call if there is  Advised I will fax over bladder diary paperwork that will need to be filled out prior to appointment  She provided fax number  Advised to contact the office in the meantime with any concerns

## 2022-04-12 NOTE — TELEPHONE ENCOUNTER
Mitch Pelayo from [facility] called stating she was looking through the schedule and Mondays are good right now   Please call her back and see if this is ok  Please leave message with time and she can let know if ok to do so

## 2022-04-12 NOTE — TELEPHONE ENCOUNTER
Cong Scott from [facility] called stating she is to call to schedule appointments for patient  Call her back to arrange it

## 2022-04-12 NOTE — TELEPHONE ENCOUNTER
Left message to Pee Nielson from [facility] advising patient is to be scheduled for PTNS tx  Advised she will need to come into the office once a week for the same day and time for 12 weeks  Advised I am unsure if they are able to do this due to transportation  Advised to call the office back to schedule this or if alternatives are needed  Office number provided

## 2022-04-13 NOTE — TELEPHONE ENCOUNTER
Left message advising of fax # ([phone]) obtained yesterday is not allowing paperwork to be faxed over to [facility]  Office number provided for an alternate fax number

## 2022-05-11 NOTE — TELEPHONE ENCOUNTER
Spoke to Jameel Butt from [facility] to advise fax number is not allowing for faxes to go through  Jameel Butt provided an e-mail address for it to be sent: [email or web address]  Paperwork has been emailed

## 2022-05-17 ENCOUNTER — PROCEDURE VISIT (OUTPATIENT)
Dept: UROLOGY | Facility: AMBULATORY SURGERY CENTER | Age: 87
End: 2022-05-17
Payer: MEDICARE

## 2022-05-17 VITALS — HEART RATE: 60 BPM | SYSTOLIC BLOOD PRESSURE: 100 MMHG | DIASTOLIC BLOOD PRESSURE: 60 MMHG

## 2022-05-17 DIAGNOSIS — N39.46 MIXED STRESS AND URGE URINARY INCONTINENCE: ICD-10-CM

## 2022-05-17 PROCEDURE — 64566 NEUROELTRD STIM POST TIBIAL: CPT

## 2022-05-17 NOTE — PROGRESS NOTES
2022    Dewayne ESCOBAR [facility]  1929  [id number]    Diagnosis     Patient presents for PTNS  managed by our office    Plan  Patient will follow up in 1 week      Vitals:    22 1111   BP: 100/60   Pulse: 60         Procedure PTNS    History:  Date onset of symptoms: Patient has urgency and frequency, incontinence has about a year and wears a brief  Behavior modification: no  Biofeedback:no  E-Stim:no  Drugs:  Myrbetriq       Session 1 of     Ankle used: left  Treatment settin  Duration of treatment: 30 minutes  Lead lot #:[id number]  Expiration Date:24  Feeling/Response:Patient feels it up her leg    Patient Goals and Progress  Decreased Urgency Yes  Decreased Frequency Yes  Episodes of incontinence Yes  Sleep Through Night No  Related Health and Social Factors Yes immobile    Bladder Diary Summary:  Caffeine cups per day:0  Alcohol- number of drinks per day:0  Daytime voids:7  Nighttime voids:3  Urgency:cannhold sometimes  Incontinence- episodes per day:2    Treatment Plan  Increase Fluids Yes  Decrease Caffeine No doesn't drink much  Decrease FluidsNo  Urge reduction techniques No  Kegels No  Timed voiding No  No fluids before bed No          Stephanie Solorzano RN c/o left eye pain x 3 days, denies injury

## 2022-05-24 ENCOUNTER — TELEPHONE (OUTPATIENT)
Dept: UROLOGY | Facility: AMBULATORY SURGERY CENTER | Age: 87
End: 2022-05-24

## 2022-05-24 NOTE — TELEPHONE ENCOUNTER
Patient arrived for PTNS treatment  Patient has a pacemaker which is contraindicated  Talked with HARRIET Berkowitz and confirmed that remaining appointments are to be canceled  Patient aware

## 2022-06-08 ENCOUNTER — REMOTE DEVICE CLINIC VISIT (OUTPATIENT)
Dept: CARDIOLOGY CLINIC | Facility: CLINIC | Age: 87
End: 2022-06-08
Payer: MEDICARE

## 2022-06-08 DIAGNOSIS — Z95.0 PRESENCE OF PERMANENT CARDIAC PACEMAKER: Primary | ICD-10-CM

## 2022-06-08 PROCEDURE — 93296 REM INTERROG EVL PM/IDS: CPT | Performed by: INTERNAL MEDICINE

## 2022-06-08 PROCEDURE — 93294 REM INTERROG EVL PM/LDLS PM: CPT | Performed by: INTERNAL MEDICINE

## 2022-06-08 NOTE — PROGRESS NOTES
MDT-DUAL CHAMBER PPM (DDDR MODE) - ACTIVE SYSTEM IS MRI CONDITIONAL   CARELINK TRANSMISSION:  BATTERY VOLTAGE ADEQUATE (9 1 YR )   AP 56 8%  99 9% (>40%/AVB/HIS)   ALL LEAD PARAMETERS WITHIN NORMAL LIMITS   NO SIGNIFICANT HIGH RATE EPISODES   NORMAL DEVICE FUNCTION   RG

## 2022-07-15 ENCOUNTER — TELEPHONE (OUTPATIENT)
Dept: HEMATOLOGY ONCOLOGY | Facility: CLINIC | Age: 87
End: 2022-07-15

## 2022-07-15 NOTE — TELEPHONE ENCOUNTER
Left message for patient's daughter to call the office back to R/S FU appt with FERMIN Smith on 10/10/22

## 2022-07-27 ENCOUNTER — TELEPHONE (OUTPATIENT)
Dept: HEMATOLOGY ONCOLOGY | Facility: CLINIC | Age: 87
End: 2022-07-27

## 2022-07-27 NOTE — TELEPHONE ENCOUNTER
Appointment Cancellation Or Reschedule     Person calling In Physician Office    Provider [address] [place]   Office Visit Date and Time 10/10 11:30AM   Office Visit Location [facility]   Did patient want to reschedule their office appointment? If so, when was it scheduled to? Yes, 10/11 10:30AM   Is this patient calling to reschedule an infusion appointment? no   When is their next infusion appointment? no   Is this patient a Chemo patient? no   Reason for Cancellation or Reschedule Provider request     If the patient is a treatment patient, please route this to the office nurse  If the patient is not on treatment, please route to the office MA  If the patient is a surgical oncology patient, please route to surg/onc clinical pool

## 2022-07-29 ENCOUNTER — TELEPHONE (OUTPATIENT)
Dept: CARDIOLOGY CLINIC | Facility: CLINIC | Age: 87
End: 2022-07-29

## 2022-07-29 ENCOUNTER — OFFICE VISIT (OUTPATIENT)
Dept: CARDIOLOGY CLINIC | Facility: CLINIC | Age: 87
End: 2022-07-29
Payer: MEDICARE

## 2022-07-29 VITALS — DIASTOLIC BLOOD PRESSURE: 76 MMHG | HEART RATE: 69 BPM | SYSTOLIC BLOOD PRESSURE: 100 MMHG

## 2022-07-29 DIAGNOSIS — I10 ESSENTIAL HYPERTENSION: Chronic | ICD-10-CM

## 2022-07-29 DIAGNOSIS — I50.9 CONGESTIVE HEART FAILURE, UNSPECIFIED HF CHRONICITY, UNSPECIFIED HEART FAILURE TYPE (HCC): ICD-10-CM

## 2022-07-29 DIAGNOSIS — Z95.5 HISTORY OF PLACEMENT OF STENT IN LAD CORONARY ARTERY: Chronic | ICD-10-CM

## 2022-07-29 DIAGNOSIS — I48.92 ATRIAL FLUTTER, UNSPECIFIED TYPE (HCC): ICD-10-CM

## 2022-07-29 DIAGNOSIS — Z79.01 ANTICOAGULATED ON COUMADIN: ICD-10-CM

## 2022-07-29 DIAGNOSIS — I47.20 VENTRICULAR TACHYCARDIA: ICD-10-CM

## 2022-07-29 DIAGNOSIS — I50.32 CHRONIC DIASTOLIC CHF (CONGESTIVE HEART FAILURE) (HCC): ICD-10-CM

## 2022-07-29 DIAGNOSIS — E78.2 MIXED HYPERLIPIDEMIA: Chronic | ICD-10-CM

## 2022-07-29 DIAGNOSIS — I42.9 CARDIOMYOPATHY, UNSPECIFIED TYPE (HCC): ICD-10-CM

## 2022-07-29 DIAGNOSIS — N18.31 STAGE 3A CHRONIC KIDNEY DISEASE (HCC): ICD-10-CM

## 2022-07-29 DIAGNOSIS — I25.10 CORONARY ARTERY DISEASE INVOLVING NATIVE CORONARY ARTERY OF NATIVE HEART WITHOUT ANGINA PECTORIS: Primary | Chronic | ICD-10-CM

## 2022-07-29 DIAGNOSIS — Z95.0 PACEMAKER: ICD-10-CM

## 2022-07-29 DIAGNOSIS — I44.1 HEART BLOCK AV SECOND DEGREE: ICD-10-CM

## 2022-07-29 DIAGNOSIS — I44.7 LBBB (LEFT BUNDLE BRANCH BLOCK): ICD-10-CM

## 2022-07-29 DIAGNOSIS — I48.0 PAF (PAROXYSMAL ATRIAL FIBRILLATION) (HCC): ICD-10-CM

## 2022-07-29 DIAGNOSIS — I35.0 AORTIC STENOSIS, SEVERE: ICD-10-CM

## 2022-07-29 DIAGNOSIS — E66.01 SEVERE OBESITY (BMI 35.0-39.9) WITH COMORBIDITY (HCC): ICD-10-CM

## 2022-07-29 PROCEDURE — 99215 OFFICE O/P EST HI 40 MIN: CPT | Performed by: INTERNAL MEDICINE

## 2022-07-29 PROCEDURE — 93000 ELECTROCARDIOGRAM COMPLETE: CPT | Performed by: INTERNAL MEDICINE

## 2022-07-29 RX ORDER — FUROSEMIDE 40 MG/1
40 TABLET ORAL DAILY
Qty: 90 TABLET | Refills: 3 | Status: SHIPPED | OUTPATIENT
Start: 2022-07-29

## 2022-07-29 NOTE — TELEPHONE ENCOUNTER
Pt has a $4000 deductible- they will not price cut till this is pd off   I will suggest [address] with Sobia Green[place] she will not qualify for assistance [facility]

## 2022-07-29 NOTE — TELEPHONE ENCOUNTER
----- Message from Eli Caceres MD sent at 7/29/2022 10:44 AM EDT -----  Regarding: bLOOD THINNERS  Please let me know if Pradaxa 150mg BID or Xarelto 20mg daily is better cost than the $500 they are paying for Eliquis

## 2022-07-29 NOTE — PROGRESS NOTES
[facility] Cardiology  Follow up note  Janie Seip 80 y o  female MRN: [id number]        Problems    1  Coronary artery disease involving native coronary artery of native heart without angina pectoris     2  Mixed hyperlipidemia     3  LBBB (left bundle branch block)     4  Heart block AV second degree     5  Atrial flutter, unspecified type ([place] [id number] )  POCT ECG   6  Ventricular tachycardia (HCC)     7  Cardiomyopathy, unspecified type ([place] [address] )     8  Chronic diastolic CHF (congestive heart failure) ([place] [id number] )     9  Essential hypertension     10  History of placement of stent in LAD coronary artery     11  Pacemaker     12  Anticoagulated on Coumadin     13  Stage 3a chronic kidney disease ([place] [address] )     14  Severe obesity (BMI 35 0-39  9) with comorbidity ([place] [address] )     15  PAF (paroxysmal atrial fibrillation) ([place] [address] )     16  Aortic stenosis, severe     17  Congestive heart failure, unspecified HF chronicity, unspecified heart failure type (HCC)  furosemide (LASIX) 40 mg tablet       Impression:    Lashawn Sandoval returns for a six-month follow-up  Hypertension  Blood pressure is low    Hyperlipidemia  Continues on pravastatin    CAD  Remote LAD stent  She has baseline left bundle branch block  She does not have any current ischemic symptoms    History of atrial flutter  She is status post ablation without recurrence    PAF  She maintains sinus rhythm on Eliquis, amiodarone, propranolol, but acknowledges that the cost of Eliquis is extraordinary    Ventricular tachycardia  She does not have any recurrence on her device checks    2nd degree heart block  She is 99% Bi V paced after having Bi V pacer placed in 2018 for second-degree AV block  No arrhythmias on device checks    Aortic Stenosis  Moderate to severe by echo 6/21  With recently lowering blood pressure, I suspect this could have something to do with worsening aortic stenosis  She is not a candidate for intervention at 80years of age      Chronic diastolic CHF  She has mild edema, and mild pulmonary rales today  She is on Lasix 20 mg daily  She is compliant        Plan:    Lately blood pressures have been somewhat low  I am going to have her stop the losartan  It is possible this could represent progression of her aortic valve disease  She is still on propranolol, and isosorbide mononitrate which I will leave on for now  She has some minor crackles on exam, some minor leg edema, and I have recommended increasing the furosemide to 40 mg daily  Eliquis is quite costly, as much as 500 dollars for a 90 day supply, off check with our nurses regarding alternate options at lower cost   I do not think aggressive monitoring of amiodarone toxicity is necessary for a woman with severe non operative aortic stenosis, at 80years of age and explained this to her daughter        HPI:   Dulce Padgett is a 80y o  year old female with coronary artery disease, lad stent, PAF, history of atrial flutter status post ablation, second-degree heart block resulting in syncope with placement of a permanent pacemaker December 2018, ambulatory dysfunction, hypertension, hyperlipidemia presents for a follow-up visit  She has no complaints of angina regarding her CAD history  She continues to be in a wheelchair, living with her  at [employer]  She has no recurrence of AFib, device checks are unremarkable, she is 99% V paced  She has moderate to severe aortic stenosis, last echo 6/21, no need to repeat  She has low blood pressure, she is fatigued  She says she has some shortness of breath as well  She does not acknowledge edema but it is present on exam  Lipids are controlled on pravastatin      Review of Systems   Constitutional: Positive for fatigue  HENT: Negative  Eyes: Negative  Respiratory: Positive for shortness of breath  Cardiovascular: Positive for leg swelling  Negative for palpitations  Gastrointestinal: Negative  Endocrine: Negative  Genitourinary: Negative  Musculoskeletal: Negative  Skin: Negative  Neurological: Negative  Hematological: Negative  Psychiatric/Behavioral: Negative  Past Medical History:   Diagnosis Date    Abnormal nuclear stress test     last assessed 04/03/2017    Anxiety     Arthritis     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    Bundle branch block, left     Cardiomyopathy ([facility] [address] )     Chest pain 3/9/2019    Chronic pain     chronic b/l shoulder pain    Chronic pain of right knee 4/2/2019    Coronary artery disease     Diabetes mellitus ([facility] [address] )     Gait disturbance 3/2/2018    GERD (gastroesophageal reflux disease)     H/O atrial flutter     History of DVT (deep vein thrombosis)     History of pulmonary embolism     Hyperlipidemia     Hypertension     Hypothyroid     Renal calculi     Shortness of breath      Social History     Substance and Sexual Activity   Alcohol Use Yes    Comment: occasional     Social History     Substance and Sexual Activity   Drug Use Not Currently    Types: Marijuana    Comment: MEDICAL MARIJUANA-HAS NOT USED FOR 3 WEEKS     Social History     Tobacco Use   Smoking Status Never Smoker   Smokeless Tobacco Never Used       Allergies:   Allergies   Allergen Reactions    Atorvastatin      Reaction unknown 10/23/2018-    Other Rash     Patient sensitive to adhesives from tape       Medications:     Current Outpatient Medications:     acetaminophen (TYLENOL) 325 mg tablet, Take 975 mg by mouth 3 (three) times a day, Disp: , Rfl:     amiodarone 200 mg tablet, Take 0 5 tablets (100 mg total) by mouth daily, Disp: 45 tablet, Rfl: 3    amoxicillin (AMOXIL) 500 mg capsule, Take 500 mg by mouth every 8 (eight) hours, Disp: , Rfl:     Aspirin Low Dose 81 MG EC tablet, TAKE 1 TABLET BY MOUTH EVERY DAY, Disp: 90 tablet, Rfl: 0    Diclofenac Sodium (VOLTAREN) 1 %, Apply 2 g topically 4 (four) times a day Bilateral shoulders and knees, Disp: 150 g, Rfl: 0    Eliquis 5 MG, Take 1 tablet (5 mg total) by mouth 2 (two) times a day, Disp: 180 tablet, Rfl: 3    ezetimibe (ZETIA) 10 mg tablet, Take 1 tablet (10 mg total) by mouth daily, Disp: 30 tablet, Rfl: 3    FeroSul 325 (65 Fe) MG tablet, TAKE ONE TABLET BY MOUTH ONCE EVERY DAY ON AT NOON EMPTY stomach FOR ANEMIA, Disp: , Rfl:     furosemide (LASIX) 40 mg tablet, Take 1 tablet (40 mg total) by mouth daily, Disp: 90 tablet, Rfl: 3    isosorbide mononitrate (IMDUR) 30 mg 24 hr tablet, Take 1 tablet (30 mg total) by mouth daily, Disp: 90 tablet, Rfl: 3    levothyroxine 100 mcg tablet, TAKE ONE TABLET BY MOUTH ONCE EVERY DAY, Disp: , Rfl:     Lidocaine 4 % PTCH, Apply 3 patches topically daily B/l knees, low back, Disp: , Rfl:     menthol-zinc oxide (Calmoseptine) 0 44-20 6 % OINT, Apply topically 2 (two) times a day, Disp: , Rfl:     Mirabegron ER (Myrbetriq) 50 MG TB24, Take 1 tablet (50 mg total) by mouth in the morning, Disp: 30 tablet, Rfl: 11    multivitamin-iron-minerals-folic acid (CENTRUM) chewable tablet, Chew 1 tablet daily, Disp: , Rfl:     polyethylene glycol (MIRALAX) 17 g packet, Take 17 g by mouth daily, Disp:  , Rfl: 0    pravastatin (PRAVACHOL) 80 mg tablet, TAKE ONE TABLET BY MOUTH ONCE EVERY DAY FOR CHOLESTEROL, Disp: , Rfl:     Theratears 0 25 % SOLN, instill ONE drop into each eye three times a day, Disp: , Rfl:     traMADol (ULTRAM) 50 mg tablet, , Disp: , Rfl:     Accu-Chek FastClix Lancets MISC, Check once daily, Disp: 100 each, Rfl: 3    Accu-Chek Softclix Lancets lancets, Use daily Use as instructed, Disp: 100 each, Rfl: 1    Blood Glucose Monitoring Suppl (Accu-Chek Guide Me) w/Device KIT, Check once daily, Disp: 1 kit, Rfl: 0    Calcium 600+D3 600-800 MG-UNIT TABS, Take 1 tablet by mouth 2 (two) times a day (Patient not taking: Reported on 7/29/2022), Disp: , Rfl:     clotrimazole (LOTRIMIN) 1 % cream, Apply topically 2 (two) times a day (Patient not taking: No sig reported), Disp: , Rfl:    glucose blood (Accu-Chek Guide) test strip, Check once daily, Disp: 100 each, Rfl: 3    Ketoconazole-Hydrocortisone 2 & 1 % KIT, Apply topically (Patient not taking: No sig reported), Disp: , Rfl:     Lancets (accu-chek soft touch) lancets, Use daily Use as instructed, Disp: 100 each, Rfl: 3    Pediatric Multiple Vitamins (Multivitamin Childrens) CHEW, chew ONE tablet and swallow orally daily (Patient not taking: No sig reported), Disp: , Rfl:     propranolol (INDERAL) 10 mg tablet, Take 1 tablet (10 mg total) by mouth 3 (three) times a day (Patient not taking: Reported on 7/29/2022), Disp: , Rfl: 0    senna-docusate sodium (SENOKOT S) 8 6-50 mg per tablet, Take 1 tablet by mouth 2 (two) times a day (Patient not taking: Reported on 7/29/2022), Disp:  , Rfl: 0      Vitals:    07/29/22 1023   BP: 100/76   Pulse: 69     Weight (last 2 days)     Date/Time Weight    07/29/22 1023 --     Weight: wheelchair- daughter says 182lb at 07/29/22 1023        Physical Exam  Constitutional:       General: She is not in acute distress  Appearance: She is well-developed  She is not diaphoretic  HENT:      Head: Normocephalic and atraumatic  Eyes:      General: No scleral icterus  Conjunctiva/sclera: Conjunctivae normal       Pupils: Pupils are equal, round, and reactive to light  Neck:      Thyroid: No thyromegaly  Vascular: No JVD  Trachea: No tracheal deviation  Cardiovascular:      Rate and Rhythm: Normal rate and regular rhythm  Heart sounds: Murmur heard  No friction rub  No gallop  Pulmonary:      Effort: Pulmonary effort is normal  No respiratory distress  Breath sounds: Rales present  No wheezing  Abdominal:      General: Bowel sounds are normal  There is no distension  Palpations: Abdomen is soft  Tenderness: There is no abdominal tenderness  Musculoskeletal:         General: No tenderness or deformity  Normal range of motion        Cervical back: Normal range of motion and neck supple  Right lower leg: Edema present  Left lower leg: Edema present  Skin:     General: Skin is warm and dry  Findings: No erythema or rash  Neurological:      Mental Status: She is alert and oriented to person, place, and time  Cranial Nerves: No cranial nerve deficit     Psychiatric:         Judgment: Judgment normal            Laboratory Studies:  Lab Results   Component Value Date    HGBA1C 5 4 01/13/2022    HGBA1C 5 8 (H) 05/28/2021    HGBA1C 5 7 (H) 05/09/2021    HGBA1C 6 0 (H) 01/08/2021    HGBA1C 6 0 01/08/2021    HGBA1C 6 1 (H) 08/20/2020     (L) 06/23/2015     05/21/2015     (L) 05/16/2015    K 3 8 02/23/2022    K 4 0 02/22/2022    K 3 9 11/11/2021    K 4 3 06/23/2015    K 3 6 05/21/2015    K 4 7 05/16/2015    CL 98 (L) 02/23/2022    CL 97 (L) 02/22/2022    CL 98 11/11/2021    CL 98 (L) 06/23/2015    CL 93 (L) 05/21/2015    CL 98 (L) 05/16/2015    CO2 30 02/23/2022    CO2 32 02/22/2022    CO2 31 11/11/2021    CO2 28 06/23/2015    CO2 37 (H) 05/21/2015    CO2 28 05/16/2015    GLUCOSE 90 06/23/2015    GLUCOSE 146 (H) 05/21/2015    GLUCOSE 132 05/16/2015    CREATININE 0 75 02/23/2022    CREATININE 0 70 02/22/2022    CREATININE 0 76 11/11/2021    CREATININE 0 68 06/23/2015    CREATININE 0 91 05/21/2015    CREATININE 0 75 05/16/2015    BUN 13 02/23/2022    BUN 22 02/22/2022    BUN 40 (H) 11/11/2021    BUN 16 06/23/2015    BUN 20 05/21/2015    BUN 18 05/16/2015    MG 1 7 02/22/2022    MG 1 6 11/11/2021    MG 1 6 08/01/2021    MG 2 1 05/16/2015    MG 1 9 05/15/2015    MG 1 7 05/14/2015    PHOS 4 0 05/09/2021     Lab Results   Component Value Date    WBC 4 20 (L) 02/23/2022    WBC 4 85 06/23/2015    RBC 3 53 (L) 02/23/2022    RBC 4 00 06/23/2015    HGB 10 6 (L) 02/23/2022    HGB 11 4 (L) 06/23/2015    HCT 31 8 (L) 02/23/2022    HCT 34 9 06/23/2015    MCV 90 02/23/2022    MCV 87 06/23/2015    MCH 30 0 02/23/2022    MCH 28 5 06/23/2015    RDW 15 5 (H) 02/23/2022    RDW 17 0 (H) 06/23/2015     02/23/2022     06/23/2015     NT-proBNP: No results for input(s): NTBNP in the last 72 hours  Coags:      Lipid Profile:   Lab Results   Component Value Date    CHOL 210 11/24/2015     Lab Results   Component Value Date    HDL 53 05/29/2021     Lab Results   Component Value Date    LDLCALC 122 (H) 05/29/2021     Lab Results   Component Value Date    TRIG 144 05/29/2021       Cardiac testing:   EKG reviewed personally:   Results for orders placed or performed in visit on 07/29/22   POCT ECG    Impression    NSR, BIV paced         ECHO  2/20 - EF normal, mod to sev low gradient AS  3/20-CHELA-EF normal, moderate LVH, moderate aortic stenosis, valve area 1 3 cm squared  6/21-EF normal, LVH, moderate to severe aortic stenosis    Stress Myoview   3/19-EF normal, small amount of apical ischemia    Catheterization   2017-LAD stent placed  3/20-60% vasospastic RCA improved with nitro, no other significant stenosis    Device check   4/15/2019-normal device function, 6 seconds of AFib  1/29 - 99% v paced, no NSVT or Afib  7/22/2020-no AFib or nonsustained VT, normal pacing function  6/22-no AFib, no VT, normal Bi V pacing 99 9%    Gustavo Swan MD    Portions of the record may have been created with voice recognition software   Occasional wrong word or "sound a like" substitutions may have occurred due to the inherent limitations of voice recognition software   Read the chart carefully and recognize, using context, where substitutions have occurred

## 2022-09-06 ENCOUNTER — TELEPHONE (OUTPATIENT)
Dept: CARDIOLOGY CLINIC | Facility: CLINIC | Age: 87
End: 2022-09-06

## 2022-09-06 NOTE — TELEPHONE ENCOUNTER
Pt c/o urinary incontinence and frequency issues and asking if she may reduce the lasix from 40 to 20 mg? This was increased during last OV-7/29/22 due to minor LE edema, minor crackles in bases, and SOB  Daughter Jacqueline Likes saw her on Sunday and thinks LE edema is still minimal    She stated her mother has significantly reduced her liquid intake for fear of accidents and typically refuses social events to avoid a situation

## 2022-09-06 NOTE — TELEPHONE ENCOUNTER
She can do 20 mg a day, or she could do 40 mg every other day which would give her days free from a diuretic to have more social events than

## 2022-09-07 ENCOUNTER — REMOTE DEVICE CLINIC VISIT (OUTPATIENT)
Dept: CARDIOLOGY CLINIC | Facility: CLINIC | Age: 87
End: 2022-09-07
Payer: MEDICARE

## 2022-09-07 DIAGNOSIS — Z95.0 CARDIAC PACEMAKER IN SITU: Primary | ICD-10-CM

## 2022-09-07 PROCEDURE — 93294 REM INTERROG EVL PM/LDLS PM: CPT | Performed by: INTERNAL MEDICINE

## 2022-09-07 PROCEDURE — 93296 REM INTERROG EVL PM/IDS: CPT | Performed by: INTERNAL MEDICINE

## 2022-09-07 NOTE — PROGRESS NOTES
Results for orders placed or performed in visit on 09/07/22   Cardiac EP device report    Narrative    MDT-DUAL CHAMBER PPM (DDDR MODE) - ACTIVE SYSTEM IS MRI CONDITIONAL  CARELINK TRANSMISSION: BATTERY VOLTAGE ADEQUATE (8 7 YRS)  AP-61%, -100% (DEPENDENT/CHB)  ALL AVAILABLE LEAD PARAMETERS WITHIN NORMAL LIMITS  NO SIGNIFICANT HIGH RATE EPISODES  NORMAL DEVICE FUNCTION   GV

## 2022-10-11 ENCOUNTER — OFFICE VISIT (OUTPATIENT)
Dept: SURGICAL ONCOLOGY | Facility: CLINIC | Age: 87
End: 2022-10-11
Payer: MEDICARE

## 2022-10-11 VITALS
SYSTOLIC BLOOD PRESSURE: 128 MMHG | BODY MASS INDEX: 32.41 KG/M2 | OXYGEN SATURATION: 96 % | TEMPERATURE: 98.6 F | RESPIRATION RATE: 20 BRPM | DIASTOLIC BLOOD PRESSURE: 76 MMHG | HEART RATE: 88 BPM | HEIGHT: 63 IN

## 2022-10-11 DIAGNOSIS — C50.112 MALIGNANT NEOPLASM OF CENTRAL PORTION OF LEFT BREAST IN FEMALE, ESTROGEN RECEPTOR POSITIVE (HCC): Primary | ICD-10-CM

## 2022-10-11 DIAGNOSIS — Z17.0 MALIGNANT NEOPLASM OF CENTRAL PORTION OF LEFT BREAST IN FEMALE, ESTROGEN RECEPTOR POSITIVE (HCC): Primary | ICD-10-CM

## 2022-10-11 PROCEDURE — 99213 OFFICE O/P EST LOW 20 MIN: CPT

## 2022-10-11 RX ORDER — FAMOTIDINE 20 MG/1
20 TABLET, FILM COATED ORAL 2 TIMES DAILY
COMMUNITY

## 2022-10-11 NOTE — PROGRESS NOTES
Surgical Oncology Follow Up       [facility] [address]  [facility] [place]  [address] [address]    Benjiman Keep  7/24/1929  [id number]  [address] [facility]  [facility] [place]  [address] [address]    Chief Complaint   Patient presents with   • Follow-up       Assessment/Plan:  1  Malignant neoplasm of central portion of left breast in female, estrogen receptor positive ([facility] [address] )  - 6 month follow up       Discussion/Summary:  Patient is a 69-year-old female presenting today for six-month follow-up for left breast cancer diagnosed in October 2021  Pathology revealed a 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, HER2 negative  Due to patient's multiple comorbidities the patient's daughter and Dr Malu Fernandez discussed a nonsurgical approach  However when patient met with Medical Oncology she refused anti hormone therapy  At this point we have decided to forego with annual mammography and perform clinical breast exams every 6 months  There were no concerns on her clinical breast exam  I will see the patient back in 6 months or sooner should the need arise  She was instructed to call with any questions or concerns prior to this time  All questions were answered today  History of Present Illness:     Oncology History   Malignant neoplasm of central portion of left breast in female, estrogen receptor positive ([facility] [address] )   10/26/2021 Biopsy    Left breast ultrasound-guided biopsy  9 o'clock, 6 cm from nipple  Invasive ductal carcinoma  Grade 1    OH <1  HER2 0    Done at [address] [place]  Have no additional breast records       12/22/2021 - 12/22/2021 Hormone Therapy    Consult with Dr Moraima Coles - patient and daughter declined hormonal therapy          -Interval History: Patient is a 69-year-old female presenting today for six-month follow-up for left breast cancer diagnosed in October 2021   Patient is currently receiving clinical breast exams every six months for surveillance  She denies changes on her breast exam       Review of Systems:  Review of Systems   Constitutional: Positive for fatigue  Negative for activity change, appetite change and unexpected weight change  Respiratory: Negative for cough and shortness of breath  Cardiovascular: Negative for chest pain  Gastrointestinal: Negative for abdominal pain, diarrhea, nausea and vomiting  Endocrine: Negative for heat intolerance  Musculoskeletal: Positive for gait problem (wheel chair)  Negative for arthralgias, back pain and myalgias  Skin: Negative for rash  Neurological: Negative for weakness and headaches  Hematological: Negative for adenopathy  Patient Active Problem List   Diagnosis   • Coronary artery disease involving native coronary artery of native heart without angina pectoris   • Essential hypertension   • History of placement of stent in LAD coronary artery   • Hyperlipidemia   • History of atrial flutter   • LBBB (left bundle branch block)   • Right hip pain   • Type 2 diabetes mellitus with stage 3a chronic kidney disease, without long-term current use of insulin ([facility])   • Acquired hypothyroidism   • Adhesive capsulitis   • Chronic low back pain   • Cervical spine degeneration   • Primary osteoarthritis of both shoulders   • Tremor   • Heart block AV second degree   • Hyponatremia   • Ambulatory dysfunction   • Pacemaker   • Atrial flutter ([facility])   • Trochanteric bursitis of right hip   • Ventricular tachycardia   • Cardiomyopathy ([facility] [address] )   • Primary osteoarthritis of both knees   • Effusion of left knee   • Chronic diastolic CHF (congestive heart failure) ([facility])   • Generalized OA   • Anticoagulated on Coumadin   • Stage 3a chronic kidney disease ([facility])   • Mixed stress and urge urinary incontinence   • Acquired absence of other left toe(s) ([facility])   • Severe obesity (BMI 35 0-39  9) with comorbidity ([facility] [address] ) • 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)   • Avascular necrosis (HCC)   • Knee pain, chronic   • Asymptomatic bacteriuria   • Stroke-like symptoms   • Colitis   • 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[facility])   • Arthritis   • Low grade fever   • Aortic stenosis, severe   • Lightheadedness   • Malignant neoplasm of central portion of left breast in female, estrogen receptor positive ([facility] )   • PAF (paroxysmal atrial fibrillation) ([place] [address] )     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   • Bundle branch block, left    • Cardiomyopathy ([place] [address] )    • Chest pain 3/9/2019   • Chronic pain     chronic b/l shoulder pain   • Chronic pain of right knee 4/2/2019   • Coronary artery disease    • Diabetes mellitus ([place] [address] )    • Gait disturbance 3/2/2018   • GERD (gastroesophageal reflux disease)    • H/O atrial flutter    • History of DVT (deep vein thrombosis)    • History of pulmonary embolism    • Hyperlipidemia    • Hypertension    • Hypothyroid    • Renal calculi    • Shortness of breath      Past Surgical History:   Procedure Laterality Date   • ATRIAL ABLATION SURGERY  01/2015   • CARDIAC PACEMAKER PLACEMENT  12/10/2018    Medtronic YANNA XT DR MRI, model [id number]   • CARDIOVERSION      CHELA/DCCV 10/22/2014   • CARPAL TUNNEL RELEASE Right    • CATARACT EXTRACTION     • CORONARY ANGIOPLASTY WITH STENT PLACEMENT     • HYSTERECTOMY     • JOINT REPLACEMENT Right    • NC AMPUTATION TOE,MT-P JT Left 10/11/2017    Procedure: AMPUTATION SECOND TOE;  Surgeon: Heydi Ibarra DPM;  Location: [facility];  Service: Podiatry   • TONSILLECTOMY     • TOTAL HIP ARTHROPLASTY      Right     Family History   Problem Relation Age of Onset   • Parkinsonism Sister    • Cancer Sister         unknown type     Social History     Socioeconomic History   • Marital status: /Civil Union     Spouse name: Not on file   • Number of children: Not on file   • Years of education: Not on file   • Highest education level: Not on file   Occupational History   • Not on file   Tobacco Use   • Smoking status: Never Smoker   • Smokeless tobacco: Never Used   Vaping Use   • Vaping Use: Never used   Substance and Sexual Activity   • Alcohol use: Yes     Comment: occasional   • Drug use: Not Currently     Types: Marijuana     Comment: MEDICAL MARIJUANA-HAS NOT USED FOR 3 WEEKS   • Sexual activity: Not on file   Other Topics Concern   • Not on file   Social History Narrative   • Not on file     Social Determinants of Health     Financial Resource Strain: Not on file   Food Insecurity: Not on file   Transportation Needs: Not on file   Physical Activity: Not on file   Stress: Not on file   Social Connections: Not on file   Intimate Partner Violence: Not on file   Housing Stability: Not on file       Current Outpatient Medications:   •  Accu-Chek FastClix Lancets MISC, Check once daily, Disp: 100 each, Rfl: 3  •  Accu-Chek Softclix Lancets lancets, Use daily Use as instructed, Disp: 100 each, Rfl: 1  •  acetaminophen (TYLENOL) 325 mg tablet, Take 975 mg by mouth 3 (three) times a day, Disp: , Rfl:   •  amiodarone 200 mg tablet, Take 0 5 tablets (100 mg total) by mouth daily, Disp: 45 tablet, Rfl: 3  •  amoxicillin (AMOXIL) 500 mg capsule, Take 500 mg by mouth every 8 (eight) hours, Disp: , Rfl:   •  Aspirin Low Dose 81 MG EC tablet, TAKE 1 TABLET BY MOUTH EVERY DAY, Disp: 90 tablet, Rfl: 0  •  Blood Glucose Monitoring Suppl (Accu-Chek Guide Me) w/Device KIT, Check once daily, Disp: 1 kit, Rfl: 0  •  Diclofenac Sodium (VOLTAREN) 1 %, Apply 2 g topically 4 (four) times a day Bilateral shoulders and knees, Disp: 150 g, Rfl: 0  •  Eliquis 5 MG, Take 1 tablet (5 mg total) by mouth 2 (two) times a day, Disp: 180 tablet, Rfl: 3  •  ezetimibe (ZETIA) 10 mg tablet, Take 1 tablet (10 mg total) by mouth daily, Disp: 30 tablet, Rfl: 3  • famotidine (PEPCID) 20 mg tablet, Take 20 mg by mouth 2 (two) times a day, Disp: , Rfl:   •  FeroSul 325 (65 Fe) MG tablet, TAKE ONE TABLET BY MOUTH ONCE EVERY DAY ON AT NOON EMPTY stomach FOR ANEMIA, Disp: , Rfl:   •  furosemide (LASIX) 40 mg tablet, Take 1 tablet (40 mg total) by mouth daily (Patient taking differently: Take 40 mg by mouth daily Alternate 20 mg with 40 mg QOD), Disp: 90 tablet, Rfl: 3  •  glucose blood (Accu-Chek Guide) test strip, Check once daily, Disp: 100 each, Rfl: 3  •  isosorbide mononitrate (IMDUR) 30 mg 24 hr tablet, Take 1 tablet (30 mg total) by mouth daily, Disp: 90 tablet, Rfl: 3  •  Lancets (accu-chek soft touch) lancets, Use daily Use as instructed, Disp: 100 each, Rfl: 3  •  levothyroxine 100 mcg tablet, TAKE ONE TABLET BY MOUTH ONCE EVERY DAY, Disp: , Rfl:   •  Lidocaine 4 % PTCH, Apply 3 patches topically daily B/l knees, low back, Disp: , Rfl:   •  menthol-zinc oxide (Calmoseptine) 0 44-20 6 % OINT, Apply topically 2 (two) times a day, Disp: , Rfl:   •  Mirabegron ER (Myrbetriq) 50 MG TB24, Take 1 tablet (50 mg total) by mouth in the morning, Disp: 30 tablet, Rfl: 11  •  polyethylene glycol (MIRALAX) 17 g packet, Take 17 g by mouth daily, Disp:  , Rfl: 0  •  pravastatin (PRAVACHOL) 80 mg tablet, TAKE ONE TABLET BY MOUTH ONCE EVERY DAY FOR CHOLESTEROL, Disp: , Rfl:   •  propranolol (INDERAL) 10 mg tablet, Take 1 tablet (10 mg total) by mouth 3 (three) times a day, Disp: , Rfl: 0  •  Theratears 0 25 % SOLN, instill ONE drop into each eye three times a day, Disp: , Rfl:   •  traMADol (ULTRAM) 50 mg tablet, , Disp: , Rfl:   •  Calcium 600+D3 600-800 MG-UNIT TABS, Take 1 tablet by mouth 2 (two) times a day (Patient not taking: No sig reported), Disp: , Rfl:   •  clotrimazole (LOTRIMIN) 1 % cream, Apply topically 2 (two) times a day (Patient not taking: No sig reported), Disp: , Rfl:   •  Ketoconazole-Hydrocortisone 2 & 1 % KIT, Apply topically (Patient not taking: No sig reported), Disp: , Rfl:   •  multivitamin-iron-minerals-folic acid (CENTRUM) chewable tablet, Chew 1 tablet daily (Patient not taking: Reported on 10/11/2022), Disp: , Rfl:   •  Pediatric Multiple Vitamins (Multivitamin Childrens) CHEW, chew ONE tablet and swallow orally daily (Patient not taking: No sig reported), Disp: , Rfl:   •  senna-docusate sodium (SENOKOT S) 8 6-50 mg per tablet, Take 1 tablet by mouth 2 (two) times a day (Patient not taking: No sig reported), Disp:  , Rfl: 0  Allergies   Allergen Reactions   • Atorvastatin      Reaction unknown 10/23/2018-   • Other Rash     Patient sensitive to adhesives from tape     Vitals:    10/11/22 1024   BP: 128/76   Pulse: 88   Resp: 20   Temp: 98 6 °F (37 °C)   SpO2: 96%       Physical Exam  Constitutional:       General: She is not in acute distress  Appearance: Normal appearance  Cardiovascular:      Rate and Rhythm: Normal rate and regular rhythm  Pulses: Normal pulses  Heart sounds: Normal heart sounds  Pulmonary:      Effort: Pulmonary effort is normal       Breath sounds: Normal breath sounds  Chest:      Chest wall: No mass  Breasts:      Right: No swelling, bleeding, inverted nipple, mass, nipple discharge, skin change, tenderness, axillary adenopathy or supraclavicular adenopathy  Left: No swelling, bleeding, inverted nipple, mass, nipple discharge, skin change, tenderness, axillary adenopathy or supraclavicular adenopathy  Comments: No masses, nodularity, skin changes, nipple changes or discharge, or adenopathy appreciated on physical exam      Abdominal:      General: Abdomen is flat  Palpations: Abdomen is soft  Lymphadenopathy:      Upper Body:      Right upper body: No supraclavicular, axillary or pectoral adenopathy  Left upper body: No supraclavicular, axillary or pectoral adenopathy  Skin:     General: Skin is warm  Neurological:      General: No focal deficit present        Mental Status: She is alert and oriented to person, place, and time  Psychiatric:         Mood and Affect: Mood normal          Behavior: Behavior normal            Results:    Imaging  No results found  I reviewed the above imaging data  Advance Care Planning/Advance Directives:  Discussed disease status, cancer treatment plans and/or cancer treatment goals with the patient

## 2022-10-12 ENCOUNTER — TELEPHONE (OUTPATIENT)
Dept: HEMATOLOGY ONCOLOGY | Facility: CLINIC | Age: 87
End: 2022-10-12

## 2022-10-12 PROBLEM — R50.9 LOW GRADE FEVER: Status: RESOLVED | Noted: 2021-06-01 | Resolved: 2022-10-12

## 2022-10-12 NOTE — TELEPHONE ENCOUNTER
CALL TRANSFER   Reason for patient call? Nurse from a doctor's office calling about a personal matter regarding the patient    Patient's primary physician? Divya Lancaster RN call was transferred to and time it was transferred? KIRT Ibarra 10:24 10/12   Informed patient that the message will be forwarded to the team and someone will get back to them as soon as possible    Did you relay this information to the patient?  Yes, but hesitated to provide contact info

## 2022-10-20 NOTE — ASSESSMENT & PLAN NOTE
Lab Results   Component Value Date    HGBA1C 5 8 (H) 05/28/2021     - Goal euglycemia    - Management as per medicine team  English

## 2022-12-08 ENCOUNTER — IN-CLINIC DEVICE VISIT (OUTPATIENT)
Dept: CARDIOLOGY CLINIC | Facility: CLINIC | Age: 87
End: 2022-12-08

## 2022-12-08 DIAGNOSIS — Z95.0 CARDIAC PACEMAKER IN SITU: Primary | ICD-10-CM

## 2022-12-08 NOTE — PROGRESS NOTES
Results for orders placed or performed in visit on 12/08/22   Cardiac EP device report    Narrative    MDT-DUAL CHAMBER PPM (DDDR MODE) - ACTIVE SYSTEM IS MRI CONDITIONAL  DEVICE INTERROGATED IN THE [facility] OFFICE: BATTERY VOLTAGE ADEQUATE-8 5 YRS  AP 60%  100%  ALL AVAILABLE LEAD PARAMETERS WITHIN NORMAL LIMITS  NO SIGNIFICANT HIGH RATE EPISODES  NO PROGRAMMING CHANGES MADE TO DEVICE PARAMETERS  NORMAL DEVICE FUNCTION   NC

## 2022-12-19 DIAGNOSIS — I48.92 ATRIAL FLUTTER, UNSPECIFIED TYPE (HCC): ICD-10-CM

## 2022-12-19 RX ORDER — APIXABAN 5 MG/1
TABLET, FILM COATED ORAL
Qty: 180 TABLET | Refills: 3 | Status: SHIPPED | OUTPATIENT
Start: 2022-12-19

## 2023-01-25 NOTE — ASSESSMENT & PLAN NOTE
Take the following medications the morning of surgery: DILTIAZEM, METOPROLOL, OMEPRAZOLE      If you are on prescription narcotic pain medication to control your pain you may also take that medication the morning of surgery.    General Instructions:  Do not eat solid food after midnight the night before surgery.  You may drink clear liquids day of surgery but must stop at least one hour before your hospital arrival time. CUTOFF TIME IS 5:00 AM.  It is beneficial for you to have a clear drink that contains carbohydrates the day of surgery.  We suggest a 12 to 20 ounce bottle of Gatorade or Powerade for non-diabetic patients or a 12 to 20 ounce bottle of G2 or Powerade Zero for diabetic patients. (Pediatric patients, are not advised to drink a 12 to 20 ounce carbohydrate drink)    Clear liquids are liquids you can see through.  Nothing red in color.     Plain water                               Sports drinks  Sodas                                   Gelatin (Jell-O)  Fruit juices without pulp such as white grape juice and apple juice  Popsicles that contain no fruit or yogurt  Tea or coffee (no cream or milk added)  Gatorade / Powerade  G2 / Powerade Zero    Infants may have breast milk up to four hours before surgery.  Infants drinking formula may drink formula up to six hours before surgery.   Patients who avoid smoking, chewing tobacco and alcohol for 4 weeks prior to surgery have a reduced risk of post-operative complications.  Quit smoking as many days before surgery as you can.  Do not smoke, use chewing tobacco or drink alcohol the day of surgery.   If applicable bring your C-PAP/ BI-PAP machine.  Bring any papers given to you in the doctor’s office.  Wear clean comfortable clothes.  Do not wear contact lenses, false eyelashes or make-up.  Bring a case for your glasses.   Bring crutches or walker if applicable.  Remove all piercings.  Leave jewelry and any other valuables at home.  Hair extensions with metal  With bilateral knee and low back pain, bilateral shoulder pain  Associated ambulatory dysfunction  Continue scheduled acetaminophen 975mg TID  Continue topical aspercreme 4% lidocaine patches to b/l knees and low back  Continue volatren gel to bilateral shoulders  Continue PT through home health at discharge clips must be removed prior to surgery.  The Pre-Admission Testing nurse will instruct you to bring medications if unable to obtain an accurate list in Pre-Admission Testing.        If you were given a blood bank ID arm band remember to bring it with you the day of surgery.    Preventing a Surgical Site Infection:  For 2 to 3 days before surgery, avoid shaving with a razor because the razor can irritate skin and make it easier to develop an infection.    Any areas of open skin can increase the risk of a post-operative wound infection by allowing bacteria to enter and travel throughout the body.  Notify your surgeon if you have any skin wounds / rashes even if it is not near the expected surgical site.  The area will need assessed to determine if surgery should be delayed until it is healed.  The night prior to surgery shower using a fresh bar of anti-bacterial soap (such as Dial) and clean washcloth.  Sleep in a clean bed with clean clothing.  Do not allow pets to sleep with you.  Shower on the morning of surgery using a fresh bar of anti-bacterial soap (such as Dial) and clean washcloth.  Dry with a clean towel and dress in clean clothing.    CHLORHEXIDINE CLOTH INSTRUCTIONS  The morning of surgery follow these instructions using the Chlorhexidine cloths you've been given.  These steps reduce bacteria on the body.  Do not use the cloths near your eyes, ears mouth, genitalia or on open wounds.  Throw the cloths away after use but do not try to flush them down a toilet.      Open and remove one cloth at a time from the package.    Leave the cloth unfolded and begin the bathing.  Massage the skin with the cloths using gentle pressure to remove bacteria.  Do not scrub harshly.   Follow the steps below with one 2% CHG cloth per area (6 total cloths).  One cloth for neck, shoulders and chest.  One cloth for both arms, hands, fingers and underarms (do underarms last).  One cloth for the abdomen followed by groin.  One  cloth for right leg and foot including between the toes.  One cloth for left leg and foot including between the toes.  The last cloth is to be used for the back of the neck, back and buttocks.    Allow the CHG to air dry 3 minutes on the skin which will give it time to work and decrease the chance of irritation.  The skin may feel sticky until it is dry.  Do not rinse with water or any other liquid or you will lose the beneficial effects of the CHG.  If mild skin irritation occurs, do rinse the skin to remove the CHG.  Report this to the nurse at time of admission.  Do not apply lotions, creams, ointments, deodorants or perfumes after using the clothes. Dress in clean clothes before coming to the hospital.     Ask your surgeon if you will be receiving antibiotics prior to surgery.    Make sure you, your family, and all healthcare providers clean their hands with soap and water or an alcohol based hand  before caring for you or your wound.    Day of surgery:  Your arrival time is approximately two hours before your scheduled surgery time.  Upon arrival, a Pre-op nurse and Anesthesiologist will review your health history, obtain vital signs, and answer questions you may have.  The only belongings needed at this time will be a list of your home medications and if applicable your C-PAP/BI-PAP machine.  A Pre-op nurse will start an IV and you may receive medication in preparation for surgery, including something to help you relax.     Please be aware that surgery does come with discomfort.  We want to make every effort to control your discomfort so please discuss any uncontrolled symptoms with your nurse.   Your doctor will most likely have prescribed pain medications.      If you are going home after surgery you will receive individualized written care instructions before being discharged.  A responsible adult must drive you to and from the hospital on the day of your surgery and stay with you for 24 hours.   Discharge prescriptions can be filled by the hospital pharmacy during regular pharmacy hours.  If you are having surgery late in the day/evening your prescription may be e-prescribed to your pharmacy.  Please verify your pharmacy hours or chose a 24 hour pharmacy to avoid not having access to your prescription because your pharmacy has closed for the day.    If you are staying overnight following surgery, you will be transported to your hospital room following the recovery period.  [facility] has all private rooms.    If you have any questions please call Pre-Admission Testing at [phone].  Deductibles and co-payments are collected on the day of service. Please be prepared to pay the required co-pay, deductible or deposit on the day of service as defined by your plan.    Call your surgeon immediately if you experience any of the following symptoms:  Sore Throat  Shortness of Breath or difficulty breathing  Cough  Chills  Body soreness or muscle pain  Headache  Fever  New loss of taste or smell  Do not arrive for your surgery ill.  Your procedure will need to be rescheduled to another time.  You will need to call your physician before the day of surgery to avoid any unnecessary exposure to hospital staff as well as other patients.

## 2023-03-15 ENCOUNTER — REMOTE DEVICE CLINIC VISIT (OUTPATIENT)
Dept: CARDIOLOGY CLINIC | Facility: CLINIC | Age: 88
End: 2023-03-15

## 2023-03-15 DIAGNOSIS — Z95.0 PRESENCE OF PERMANENT CARDIAC PACEMAKER: Primary | ICD-10-CM

## 2023-03-15 NOTE — PROGRESS NOTES
Results for orders placed or performed in visit on 03/15/23   Cardiac EP device report    Narrative    MDT-DUAL CHAMBER PPM (DDDR MODE) - ACTIVE SYSTEM IS MRI CONDITIONAL  CARELINK TRANSMISSION: BATTERY VOLTAGE ADEQUATE  (8 2 YRS) AP 62%  99%  ALL AVAILABLE LEAD PARAMETERS WITHIN NORMAL LIMITS  NO SIGNIFICANT HIGH RATE EPISODES  NORMAL DEVICE FUNCTION  ----SANCHEZ

## 2023-04-06 NOTE — ASSESSMENT & PLAN NOTE
· BP controlled    · Continue Lisinopril, Toprol-XL Finasteride Pregnancy And Lactation Text: This medication is absolutely contraindicated during pregnancy. It is unknown if it is excreted in breast milk.

## 2023-05-19 ENCOUNTER — TELEPHONE (OUTPATIENT)
Dept: CARDIOLOGY CLINIC | Facility: CLINIC | Age: 88
End: 2023-05-19

## 2023-05-19 NOTE — TELEPHONE ENCOUNTER
Daughter called to ask if pt's Lasix dosage may be decreased due to pt's frequency & incontinence  (Pt was upset due to urine incontinence x 3 today)     Lasix 20 mg tablet every other day alternating with 40 mg dose  Please advise

## 2023-06-14 ENCOUNTER — REMOTE DEVICE CLINIC VISIT (OUTPATIENT)
Dept: CARDIOLOGY CLINIC | Facility: CLINIC | Age: 88
End: 2023-06-14
Payer: MEDICARE

## 2023-06-14 DIAGNOSIS — Z95.0 CARDIAC PACEMAKER IN SITU: Primary | ICD-10-CM

## 2023-06-14 PROCEDURE — 93294 REM INTERROG EVL PM/LDLS PM: CPT | Performed by: INTERNAL MEDICINE

## 2023-06-14 PROCEDURE — 93296 REM INTERROG EVL PM/IDS: CPT | Performed by: INTERNAL MEDICINE

## 2023-06-14 NOTE — PROGRESS NOTES
Results for orders placed or performed in visit on 06/14/23   Cardiac EP device report    Narrative    MDT-DUAL CHAMBER PPM (DDDR MODE) - ACTIVE SYSTEM IS MRI CONDITIONAL  CARELINK TRANSMISSION: BATTERY VOLTAGE ADEQUATE (8 YRS)  AP-33%, >99% (DEPENDENT/CHB)  ALL AVAILABLE LEAD PARAMETERS WITHIN NORMAL LIMITS  NO SIGNIFICANT HIGH RATE EPISODES  NORMAL DEVICE FUNCTION   GV

## 2023-06-25 ENCOUNTER — PREPPED CHART (OUTPATIENT)
Dept: URBAN - METROPOLITAN AREA CLINIC 6 | Facility: CLINIC | Age: 88
End: 2023-06-25

## 2023-07-12 ENCOUNTER — COMPLETE EYE EXAM (OUTPATIENT)
Dept: URBAN - METROPOLITAN AREA CLINIC 6 | Facility: CLINIC | Age: 88
End: 2023-07-12

## 2023-07-12 DIAGNOSIS — H35.3122: ICD-10-CM

## 2023-07-12 DIAGNOSIS — H43.813: ICD-10-CM

## 2023-07-12 DIAGNOSIS — H35.3111: ICD-10-CM

## 2023-07-12 DIAGNOSIS — Z96.1: ICD-10-CM

## 2023-07-12 DIAGNOSIS — H04.123: ICD-10-CM

## 2023-07-12 DIAGNOSIS — E11.9: ICD-10-CM

## 2023-07-12 PROCEDURE — 92014 COMPRE OPH EXAM EST PT 1/>: CPT

## 2023-07-12 ASSESSMENT — VISUAL ACUITY
OD_CC: 20/60+2
OS_CC: 20/80+1

## 2023-07-12 ASSESSMENT — TONOMETRY
OS_IOP_MMHG: 16
OD_IOP_MMHG: 19

## 2023-07-19 DIAGNOSIS — I50.9 CONGESTIVE HEART FAILURE, UNSPECIFIED HF CHRONICITY, UNSPECIFIED HEART FAILURE TYPE (HCC): ICD-10-CM

## 2023-07-19 RX ORDER — FUROSEMIDE 40 MG/1
40 TABLET ORAL DAILY
Qty: 90 TABLET | Refills: 3 | Status: SHIPPED | OUTPATIENT
Start: 2023-07-19

## 2023-07-19 RX ORDER — FUROSEMIDE 40 MG/1
TABLET ORAL
Qty: 90 TABLET | Refills: 0 | OUTPATIENT
Start: 2023-07-19

## 2023-07-19 NOTE — TELEPHONE ENCOUNTER
Requested medication(s) are due for refill today: Yes  Patient has already received a courtesy refill: No  Other reason request has been forwarded to provider: JEVON walls

## 2023-09-13 ENCOUNTER — REMOTE DEVICE CLINIC VISIT (OUTPATIENT)
Dept: CARDIOLOGY CLINIC | Facility: CLINIC | Age: 88
End: 2023-09-13
Payer: MEDICARE

## 2023-09-13 DIAGNOSIS — Z95.0 CARDIAC PACEMAKER IN SITU: Primary | ICD-10-CM

## 2023-09-13 PROCEDURE — 93296 REM INTERROG EVL PM/IDS: CPT | Performed by: INTERNAL MEDICINE

## 2023-09-13 PROCEDURE — 93294 REM INTERROG EVL PM/LDLS PM: CPT | Performed by: INTERNAL MEDICINE

## 2023-09-13 NOTE — PROGRESS NOTES
Results for orders placed or performed in visit on 09/13/23   Cardiac EP device report    Narrative    MDT-DUAL CHAMBER PPM (DDDR MODE) - ACTIVE SYSTEM IS MRI CONDITIONAL  CARELINK TRANSMISSION: BATTERY STATUS "7 YRS." AP 20%  100%. ALL AVAILABLE LEAD PARAMETERS WITHIN NORMAL LIMITS. NO SIGNIFICANT HIGH RATE EPISODES. NORMAL DEVICE FUNCTION.  NC

## 2023-09-27 ENCOUNTER — HOSPITAL ENCOUNTER (EMERGENCY)
Facility: HOSPITAL | Age: 88
Discharge: HOME | End: 2023-09-27
Attending: EMERGENCY MEDICINE
Payer: MEDICARE

## 2023-09-27 ENCOUNTER — APPOINTMENT (EMERGENCY)
Dept: RADIOLOGY | Facility: HOSPITAL | Age: 88
End: 2023-09-27
Payer: MEDICARE

## 2023-09-27 ENCOUNTER — APPOINTMENT (EMERGENCY)
Dept: CT IMAGING | Facility: HOSPITAL | Age: 88
End: 2023-09-27
Payer: MEDICARE

## 2023-09-27 VITALS
TEMPERATURE: 98.6 F | DIASTOLIC BLOOD PRESSURE: 61 MMHG | OXYGEN SATURATION: 95 % | BODY MASS INDEX: 31.87 KG/M2 | RESPIRATION RATE: 18 BRPM | HEART RATE: 68 BPM | WEIGHT: 179.9 LBS | SYSTOLIC BLOOD PRESSURE: 125 MMHG

## 2023-09-27 DIAGNOSIS — M54.50 CHRONIC LOWER BACK PAIN: ICD-10-CM

## 2023-09-27 DIAGNOSIS — M25.512 LEFT SHOULDER PAIN: Primary | ICD-10-CM

## 2023-09-27 DIAGNOSIS — I35.0 AORTIC STENOSIS: ICD-10-CM

## 2023-09-27 DIAGNOSIS — G89.29 CHRONIC LOWER BACK PAIN: ICD-10-CM

## 2023-09-27 LAB
2HR DELTA HS TROPONIN: 0 NG/L
ALBUMIN SERPL BCP-MCNC: 3.6 G/DL (ref 3.5–5)
ALP SERPL-CCNC: 59 U/L (ref 34–104)
ALT SERPL W P-5'-P-CCNC: 13 U/L (ref 7–52)
ANION GAP SERPL CALCULATED.3IONS-SCNC: 6 MMOL/L
AST SERPL W P-5'-P-CCNC: 19 U/L (ref 13–39)
ATRIAL RATE: 70 BPM
BASOPHILS # BLD AUTO: 0.02 THOUSANDS/ÂΜL (ref 0–0.1)
BASOPHILS NFR BLD AUTO: 0 % (ref 0–1)
BILIRUB SERPL-MCNC: 0.57 MG/DL (ref 0.2–1)
BUN SERPL-MCNC: 18 MG/DL (ref 5–25)
CALCIUM SERPL-MCNC: 9 MG/DL (ref 8.4–10.2)
CARDIAC TROPONIN I PNL SERPL HS: 8 NG/L
CARDIAC TROPONIN I PNL SERPL HS: 8 NG/L
CHLORIDE SERPL-SCNC: 95 MMOL/L (ref 96–108)
CO2 SERPL-SCNC: 34 MMOL/L (ref 21–32)
CREAT SERPL-MCNC: 0.84 MG/DL (ref 0.6–1.3)
EOSINOPHIL # BLD AUTO: 0.2 THOUSAND/ÂΜL (ref 0–0.61)
EOSINOPHIL NFR BLD AUTO: 5 % (ref 0–6)
ERYTHROCYTE [DISTWIDTH] IN BLOOD BY AUTOMATED COUNT: 12.6 % (ref 11.6–15.1)
GFR SERPL CREATININE-BSD FRML MDRD: 59 ML/MIN/1.73SQ M
GLUCOSE SERPL-MCNC: 99 MG/DL (ref 65–140)
HCT VFR BLD AUTO: 38.4 % (ref 34.8–46.1)
HGB BLD-MCNC: 12.6 G/DL (ref 11.5–15.4)
IMM GRANULOCYTES # BLD AUTO: 0.01 THOUSAND/UL (ref 0–0.2)
IMM GRANULOCYTES NFR BLD AUTO: 0 % (ref 0–2)
LYMPHOCYTES # BLD AUTO: 0.95 THOUSANDS/ÂΜL (ref 0.6–4.47)
LYMPHOCYTES NFR BLD AUTO: 21 % (ref 14–44)
MCH RBC QN AUTO: 32.4 PG (ref 26.8–34.3)
MCHC RBC AUTO-ENTMCNC: 32.8 G/DL (ref 31.4–37.4)
MCV RBC AUTO: 99 FL (ref 82–98)
MONOCYTES # BLD AUTO: 0.53 THOUSAND/ÂΜL (ref 0.17–1.22)
MONOCYTES NFR BLD AUTO: 12 % (ref 4–12)
NEUTROPHILS # BLD AUTO: 2.76 THOUSANDS/ÂΜL (ref 1.85–7.62)
NEUTS SEG NFR BLD AUTO: 62 % (ref 43–75)
NRBC BLD AUTO-RTO: 0 /100 WBCS
PLATELET # BLD AUTO: 176 THOUSANDS/UL (ref 149–390)
PMV BLD AUTO: 9.2 FL (ref 8.9–12.7)
POTASSIUM SERPL-SCNC: 3.9 MMOL/L (ref 3.5–5.3)
PROT SERPL-MCNC: 7.1 G/DL (ref 6.4–8.4)
QRS AXIS: 22 DEGREES
QRSD INTERVAL: 138 MS
QT INTERVAL: 456 MS
QTC INTERVAL: 495 MS
RBC # BLD AUTO: 3.89 MILLION/UL (ref 3.81–5.12)
SODIUM SERPL-SCNC: 135 MMOL/L (ref 135–147)
T WAVE AXIS: 265 DEGREES
VENTRICULAR RATE: 71 BPM
WBC # BLD AUTO: 4.47 THOUSAND/UL (ref 4.31–10.16)

## 2023-09-27 PROCEDURE — 80053 COMPREHEN METABOLIC PANEL: CPT

## 2023-09-27 PROCEDURE — 99285 EMERGENCY DEPT VISIT HI MDM: CPT

## 2023-09-27 PROCEDURE — 71045 X-RAY EXAM CHEST 1 VIEW: CPT

## 2023-09-27 PROCEDURE — 71275 CT ANGIOGRAPHY CHEST: CPT

## 2023-09-27 PROCEDURE — 93005 ELECTROCARDIOGRAM TRACING: CPT

## 2023-09-27 PROCEDURE — G1004 CDSM NDSC: HCPCS

## 2023-09-27 PROCEDURE — 36415 COLL VENOUS BLD VENIPUNCTURE: CPT

## 2023-09-27 PROCEDURE — 85025 COMPLETE CBC W/AUTO DIFF WBC: CPT

## 2023-09-27 PROCEDURE — 73030 X-RAY EXAM OF SHOULDER: CPT

## 2023-09-27 PROCEDURE — 74174 CTA ABD&PLVS W/CONTRAST: CPT

## 2023-09-27 PROCEDURE — 93010 ELECTROCARDIOGRAM REPORT: CPT | Performed by: INTERNAL MEDICINE

## 2023-09-27 PROCEDURE — 84484 ASSAY OF TROPONIN QUANT: CPT

## 2023-09-27 RX ORDER — ACETAMINOPHEN 325 MG/1
650 TABLET ORAL ONCE
Status: COMPLETED | OUTPATIENT
Start: 2023-09-27 | End: 2023-09-27

## 2023-09-27 RX ADMIN — ACETAMINOPHEN 650 MG: 325 TABLET, FILM COATED ORAL at 10:55

## 2023-09-27 RX ADMIN — IOHEXOL 100 ML: 350 INJECTION, SOLUTION INTRAVENOUS at 09:38

## 2023-09-27 NOTE — ED NOTES
[facility]. Awaiting transportation time.       Vanesa Cui RN  09/27/23 [id number]

## 2023-09-27 NOTE — ED PROVIDER NOTES
History  Chief Complaint   Patient presents with   • Arm Pain     Left arm pain started this morning, radiates to neck and jaw. Pt also complaining of lower back pain. Denies any falls or injuries. Denies chest pain and nausea. States "occasional shortness of breath". 80year old female presenting today with concerns of left arm pain rating into the left neck onset this morning. Patient states that she woke up with this. Denies any falls. Has had a history of left shoulder pain in the past, currently has a lidocaine patch that she got on there this morning. Took pain medication was given aspirin by EMS. States that she does not have any nausea, chest pain, occasionally does have shortness of breath. History of severe aortic stenosis, CAD, left bundle branch block, hypertension, diabetes mellitus, hypothyroidism. Patient states that she did not take any of her medications this morning. Denies any vomiting, diarrhea, constipation, nausea, headaches, fever, chills. Prior to Admission Medications   Prescriptions Last Dose Informant Patient Reported? Taking?    Accu-Chek FastClix Lancets MISC  Outside Facility (Specify) No No   Sig: Check once daily   Accu-Chek Softclix Lancets lancets  Outside Facility (Specify) No No   Sig: Use daily Use as instructed   Aspirin Low Dose 81 MG EC tablet  Outside Facility (Specify) No No   Sig: TAKE 1 TABLET BY MOUTH EVERY DAY   Patient not taking: Reported on 3/30/2023   Aspirin Low Dose 81 MG chewable tablet  Outside Facility (Specify) Yes No   Sig: TAKE ONE TABLET BY MOUTH ONCE EVERY DAY   Blood Glucose Monitoring Suppl (Accu-Chek Guide Me) w/Device KIT  Outside Facility (Specify) No No   Sig: Check once daily   Patient not taking: Reported on 3/30/2023   Calcium 600+D3 600-800 MG-UNIT TABS  Outside Facility (Specify) Yes No   Sig: Take 1 tablet by mouth 2 (two) times a day   Patient not taking: Reported on 4/21/2023   Diclofenac Sodium (VOLTAREN) 1 %  Outside Facility (Specify) No No   Sig: Apply 2 g topically 4 (four) times a day Bilateral shoulders and knees   Eliquis 5 MG  Outside Facility (Specify) No No   Sig: TAKE ONE TABLET BY MOUTH TWICE DAILY   FeroSul 325 (65 Fe) MG tablet  Outside Facility (Specify) Yes No   Sig: TAKE ONE TABLET BY MOUTH ONCE EVERY DAY ON AT NOON EMPTY stomach FOR ANEMIA   Ketoconazole-Hydrocortisone 2 & 1 % KIT  Outside Facility (Specify) Yes No   Sig: Apply topically   Lancets (accu-chek soft touch) lancets  Outside Facility (Specify) No No   Sig: Use daily Use as instructed   Patient not taking: Reported on 3/30/2023   Lidocaine 4 % PTCH  Outside Facility (Specify) Yes No   Sig: Apply 3 patches topically daily B/l knees, low back   Lubricant Eye Drops 0.5 % SOLN  Outside Facility (Specify) Yes No   Sig: INSTILL ONE DROP IN EACH EYE THREE TIMES DAILY FOR DRY EYE (replaces Thera)   Patient not taking: Reported on 4/21/2023   Mirabegron ER (Myrbetriq) 50 MG TB24  Outside Facility (Specify) No No   Sig: Take 1 tablet (50 mg total) by mouth in the morning   Pediatric Multiple Vitamins (Multivitamin Childrens) [address] (Specify) Yes No   Sig: chew ONE tablet and swallow orally daily   Patient not taking: Reported on 4/21/2023   Theratears 0.25 % SOLN  Outside Facility (Specify) Yes No   Sig: instill ONE drop into each eye three times a day   acetaminophen (TYLENOL) 325 mg tablet  Outside Facility (Specify) Yes No   Sig: Take 975 mg by mouth 3 (three) times a day   amiodarone 200 mg tablet  Outside Facility (Specify) No No   Sig: Take 0.5 tablets (100 mg total) by mouth daily   amoxicillin (AMOXIL) 500 mg capsule  Outside Facility (Specify) Yes No   Sig: Take 2,000 mg by mouth 1 HOUR PRIOR TO DENTAL PROCEDURE   clotrimazole (LOTRIMIN) 1 % cream  Outside Facility (Specify) Yes No   Sig: Apply topically 2 (two) times a day   Patient not taking: Reported on 4/21/2023   ezetimibe (ZETIA) 10 mg tablet  Outside Facility (Specify) No No Sig: Take 1 tablet (10 mg total) by mouth daily   famotidine (PEPCID) 20 mg tablet  Outside Facility (Specify) Yes No   Sig: Take 20 mg by mouth 2 (two) times a day   furosemide (LASIX) 40 mg tablet   No No   Sig: Take 1 tablet (40 mg total) by mouth daily Or as directed.    glucose blood (Accu-Chek Guide) test strip  Outside Facility (Specify) No No   Sig: Check once daily   ipratropium (ATROVENT) 0.06 % nasal spray   No No   Si sprays into each nostril 3 (three) times a day   isosorbide mononitrate (IMDUR) 30 mg 24 hr tablet  Outside Facility (Specify) No No   Sig: Take 1 tablet (30 mg total) by mouth daily   levothyroxine 100 mcg tablet  Outside Facility (Specify) Yes No   Sig: TAKE ONE TABLET BY MOUTH ONCE EVERY DAY   Patient not taking: Reported on 3/30/2023   levothyroxine 112 mcg tablet  Outside Facility (Specify) Yes No   Sig: Take 112 mcg by mouth every morning Take on an empty stomach   menthol-zinc oxide (Calmoseptine) 0.44-20.6 % OINT  Outside Facility (Specify) Yes No   Sig: Apply topically 2 (two) times a day   multivitamin-iron-minerals-folic acid (CENTRUM) chewable tablet  Outside Facility (Specify) Yes No   Sig: Chew 1 tablet daily   Patient not taking: Reported on 2023   polyethylene glycol (MIRALAX) 17 g packet  Outside Facility (Specify) No No   Sig: Take 17 g by mouth daily   Patient taking differently: Take 17 g by mouth if needed   pravastatin (PRAVACHOL) 80 mg tablet  Outside Facility (Specify) Yes No   Sig: TAKE ONE TABLET BY MOUTH ONCE EVERY DAY FOR CHOLESTEROL   senna-docusate sodium (SENOKOT S) 8.6-50 mg per tablet  Outside Facility (Specify) No No   Sig: Take 1 tablet by mouth 2 (two) times a day   Patient not taking: Reported on 2023   traMADol (ULTRAM) 50 mg tablet  Outside Facility (Specify) Yes No   Sig: Take 50 mg by mouth every 8 (eight) hours as needed      Facility-Administered Medications: None        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   • Bundle branch block, left    • Cardiomyopathy ([address])    • Chest pain 3/9/2019   • Chronic pain     chronic b/l shoulder pain   • Chronic pain of right knee 4/2/2019   • Coronary artery disease    • Diabetes mellitus ([address])    • Gait disturbance 3/2/2018   • GERD (gastroesophageal reflux disease)    • H/O atrial flutter    • History of DVT (deep vein thrombosis)    • History of pulmonary embolism    • Hyperlipidemia    • Hypertension    • Hypothyroid    • Renal calculi    • Shortness of breath        Past Surgical History:   Procedure Laterality Date   • ATRIAL ABLATION SURGERY  01/2015   • CARDIAC PACEMAKER PLACEMENT  12/10/2018    Medtronic YANNA XT DR MRI, model [id number]   • CARDIOVERSION      CHELA/DCCV 10/22/2014   • CARPAL TUNNEL RELEASE Right    • CATARACT EXTRACTION     • CORONARY ANGIOPLASTY WITH STENT PLACEMENT     • HYSTERECTOMY     • JOINT REPLACEMENT Right    • MT AMPUTATION TOE METATARSOPHALANGEAL JOINT Left 10/11/2017    Procedure: AMPUTATION SECOND TOE;  Surgeon: Job Hernandez DPM;  Location: [facility];  Service: Podiatry   • TONSILLECTOMY     • TOTAL HIP ARTHROPLASTY      Right       Family History   Problem Relation Age of Onset   • Parkinsonism Sister    • Cancer Sister         unknown type     I have reviewed and agree with the history as documented.     E-Cigarette/Vaping   • E-Cigarette Use Never User      E-Cigarette/Vaping Substances   • Nicotine No    • THC No    • CBD No    • Flavoring No    • Other No    • Unknown No      Social History     Tobacco Use   • Smoking status: Never   • Smokeless tobacco: Never   Vaping Use   • Vaping Use: Never used   Substance Use Topics   • Alcohol use: Yes     Comment: occasional   • Drug use: Not Currently     Types: Marijuana     Comment: MEDICAL MARIJUANA-HAS NOT USED FOR 3 WEEKS       Review of Systems   Constitutional: Negative for chills and fever. HENT: Negative for ear pain and sore throat. Neck pain, left   Eyes: Negative for pain and visual disturbance. Respiratory: Positive for shortness of breath (Occassional). Negative for apnea, cough, choking, chest tightness, wheezing and stridor. Cardiovascular: Negative for chest pain, palpitations and leg swelling. Gastrointestinal: Negative for abdominal pain, constipation, diarrhea, nausea and vomiting. Genitourinary: Negative for dysuria and hematuria. Musculoskeletal: Positive for arthralgias (left shoulder) and back pain. Negative for gait problem and joint swelling. Skin: Negative for color change and rash. Neurological: Negative for dizziness, seizures, syncope, weakness, light-headedness and headaches. All other systems reviewed and are negative. Physical Exam  Physical Exam  Vitals and nursing note reviewed. Constitutional:       General: She is not in acute distress. Appearance: She is well-developed. She is obese. She is ill-appearing. HENT:      Head: Normocephalic and atraumatic. Eyes:      Conjunctiva/sclera: Conjunctivae normal.   Cardiovascular:      Rate and Rhythm: Normal rate and regular rhythm. Pulses: Normal pulses. Heart sounds: Murmur (systolic, radiating into bilateral carotids) heard. No friction rub. No gallop. Pulmonary:      Effort: Pulmonary effort is normal. No respiratory distress. Breath sounds: Normal breath sounds. No stridor. No wheezing, rhonchi or rales. Chest:      Chest wall: No tenderness. Abdominal:      General: There is distension (due to obesity). Palpations: Abdomen is soft. Tenderness: There is abdominal tenderness (diffuse). There is no right CVA tenderness or left CVA tenderness. Musculoskeletal:         General: No swelling, tenderness or signs of injury. Cervical back: Neck supple. Right lower leg: No edema. Left lower leg: No edema. Skin:     General: Skin is warm and dry. Capillary Refill: Capillary refill takes less than 2 seconds. Coloration: Skin is pale. Skin is not jaundiced. Findings: No bruising, erythema, lesion or rash. Neurological:      Mental Status: She is alert.    Psychiatric:         Mood and Affect: Mood normal.         Vital Signs  ED Triage Vitals   Temperature Pulse Respirations Blood Pressure SpO2   09/27/23 0853 09/27/23 0832 09/27/23 0832 09/27/23 0832 09/27/23 0832   98.6 °F (37 °C) 79 18 (!) 175/84 93 %      Temp Source Heart Rate Source Patient Position - Orthostatic VS BP Location FiO2 (%)   09/27/23 0853 -- -- 09/27/23 0832 --   Oral   Right arm       Pain Score       09/27/23 0832       8           Vitals:    09/27/23 0832 09/27/23 0900 09/27/23 1030 09/27/23 1115   BP: (!) 175/84 160/72 138/62    Pulse: 79 69 71 71         Visual Acuity      ED Medications  Medications   iohexol (OMNIPAQUE) 350 MG/ML injection (SINGLE-DOSE) 100 mL (100 mL Intravenous Given 9/27/23 0938)   acetaminophen (TYLENOL) tablet 650 mg (650 mg Oral Given 9/27/23 1055)       Diagnostic Studies  Results Reviewed     Procedure Component Value Units Date/Time    HS Troponin I 2hr [[id number]]  (Normal) Collected: 09/27/23 1056    Lab Status: Final result Specimen: Blood from Arm, Left Updated: 09/27/23 1136     hs TnI 2hr 8 ng/L      Delta 2hr hsTnI 0 ng/L     HS Troponin I 4hr [[id number]]     Lab Status: No result Specimen: Blood     HS Troponin 0hr (reflex protocol) [[id number]]  (Normal) Collected: 09/27/23 0846    Lab Status: Final result Specimen: Blood from Arm, Right Updated: 09/27/23 0924     hs TnI 0hr 8 ng/L     Comprehensive metabolic panel [[id number]]  (Abnormal) Collected: 09/27/23 0846    Lab Status: Final result Specimen: Blood from Arm, Right Updated: 09/27/23 0916     Sodium 135 mmol/L      Potassium 3.9 mmol/L      Chloride 95 mmol/L      CO2 34 mmol/L      ANION GAP 6 mmol/L      BUN 18 mg/dL      Creatinine 0.84 mg/dL      Glucose 99 mg/dL      Calcium 9.0 mg/dL      AST 19 U/L      ALT 13 U/L      Alkaline Phosphatase 59 U/L      Total Protein 7.1 g/dL      Albumin 3.6 g/dL      Total Bilirubin 0.57 mg/dL      eGFR 59 ml/min/1.73sq m     Narrative:      [facility] guidelines for Chronic Kidney Disease (CKD):   •  Stage 1 with normal or high GFR (GFR > 90 mL/min/1.73 square meters)  •  Stage 2 Mild CKD (GFR = 60-89 mL/min/1.73 square meters)  •  Stage 3A Moderate CKD (GFR = 45-59 mL/min/1.73 square meters)  •  Stage 3B Moderate CKD (GFR = 30-44 mL/min/1.73 square meters)  •  Stage 4 Severe CKD (GFR = 15-29 mL/min/1.73 square meters)  •  Stage 5 End Stage CKD (GFR <15 mL/min/1.73 square meters)  Note: GFR calculation is accurate only with a steady state creatinine    CBC and differential [[id number]]  (Abnormal) Collected: 09/27/23 0846    Lab Status: Final result Specimen: Blood from Arm, Right Updated: 09/27/23 0856     WBC 4.47 Thousand/uL      RBC 3.89 Million/uL      Hemoglobin 12.6 g/dL      Hematocrit 38.4 %      MCV 99 fL      MCH 32.4 pg      MCHC 32.8 g/dL      RDW 12.6 %      MPV 9.2 fL      Platelets 122 Thousands/uL      nRBC 0 /100 WBCs      Neutrophils Relative 62 %      Immat GRANS % 0 %      Lymphocytes Relative 21 %      Monocytes Relative 12 %      Eosinophils Relative 5 %      Basophils Relative 0 %      Neutrophils Absolute 2.76 Thousands/µL      Immature Grans Absolute 0.01 Thousand/uL      Lymphocytes Absolute 0.95 Thousands/µL      Monocytes Absolute 0.53 Thousand/µL      Eosinophils Absolute 0.20 Thousand/µL      Basophils Absolute 0.02 Thousands/µL                  CTA dissection protocol chest/abdomen/pelvis   Final Result by Ravi White MD (09/27 1031)      No aortic dissection. No acute findings in the chest, abdomen or pelvis. Chronic and nonemergent findings above.                   Workstation performed: [id number]         XR chest 1 view portable   Final Result by Destiny Ybarra MD (09/27 6561)      No acute cardiopulmonary disease. Workstation performed: [id number]         XR shoulder 2+ views LEFT   Final Result by Destiny Ybarra MD (09/27 0911)      No acute fracture or dislocation of the left shoulder. Advanced osteoarthritis of the glenohumeral joint. Workstation performed: [id number]                    Procedures  ECG 12 Lead Documentation Only    Date/Time: 9/27/2023 9:02 AM    Performed by: Cornell Ordoñez PA-C  Authorized by: Cornell Ordoñez PA-C    Indications / Diagnosis:  Neck, shoulder pain. SOB  ECG reviewed by me, the ED Provider: yes    Patient location:  ED  Previous ECG:     Previous ECG:  Compared to current    Similarity:  No change    Comparison to cardiac monitor: No    Interpretation:     Interpretation: abnormal    Quality:     Tracing quality:  Limited by artifact  Rate:     ECG rate:  71    ECG rate assessment: normal    Rhythm:     Rhythm: paced    Pacing:     Capture:  Complete    Type of pacing:  Ventricular and atrial  Ectopy:     Ectopy: none    QRS:     QRS axis:  Indeterminate  Conduction:     Conduction: abnormal      Abnormal conduction: complete LBBB    ST segments:     ST segments:  Normal  T waves:     T waves: normal               ED Course  ED Course as of 09/27/23 1223   Wed Sep 27, 2023   0900 WBC: 4.47   1008 Anion Gap: 6   1008 Sodium: 135   1008 Potassium: 3.9   1008 Creatinine: 0.84   1008 eGFR: 59   1008 hs TnI 0hr: 8   1037 hs TnI 0hr: 8  Will trend. HEART Risk Score    Flowsheet Row Most Recent Value   Heart Score Risk Calculator    History 1 Filed at: 09/27/2023 1039   ECG 1 Filed at: 09/27/2023 1039   Age 2 Filed at: 09/27/2023 1039   Risk Factors 2 Filed at: 09/27/2023 1039   Troponin 0 Filed at: 09/27/2023 1039   HEART Score 6 Filed at: 09/27/2023 1039                        SBIRT 22yo+    Flowsheet Row Most Recent Value   Initial Alcohol Screen: US AUDIT-C     1.  How often do you have a drink containing alcohol? 0 Filed at: 09/27/2023 0851   2. How many drinks containing alcohol do you have on a typical day you are drinking? 0 Filed at: 09/27/2023 0851   3b. FEMALE Any Age, or MALE 65+: How often do you have 4 or more drinks on one occassion? 0 Filed at: 09/27/2023 0851   Audit-C Score 0 Filed at: 09/27/2023 3191   MARCO: How many times in the past year have you. .. Used an illegal drug or used a prescription medication for non-medical reasons? Never Filed at: 09/27/2023 6639                    Medical Decision Making  80year old female presenting today with concerns of left shoulder, left neck pain, abdominal tenderness. Lower back pain, similar to previous. Not moving. Severe aortic stenosis. CAD. ACS rule out. Concern of dissection in this patient given aortic stenosis, left neck and shoulder pain, back and abdominal pain. CTA dissection study of chest abdomen pelvis. Patient denying anything for pain at this time. CTA dissection study was negative for any aortic dissection. No acute intraabdominal process found. Lab work-up reassuring. EKG negative for ischemia. Please see above documentation. Patient heart score 6, was discussed with SLIM cardiac observation, however, patient recently had an echo and trop has been stable at 8. We will send referral to cardiology office will patient be seen earlier than set appointment time in December. Patient agreeable to plan, will have patient discharged with close return precautions discussed. Patient at time of discharge well-appearing in no acute distress, questions answered. Patient's vitals, lab/imaging results, diagnosis, and treatment plan were discussed with the patient. All new/changed medications were discussed with patient, specifically, route of administration, how often and when to take, and where they can be picked up.  Strict return precautions as well as close follow up with PCP was discussed with the patient and the patient was agreeable to my recommendations. Patient verbally acknowledged understanding of the above communications. All labs reviewed and utilized in the medical decision making process (if labs were ordered). Portions of the record may have been created with voice recognition software.  Occasional wrong word or "sound a like" substitutions may have occurred due to the inherent limitations of voice recognition software.  Read the chart carefully and recognize, using context, where substitutions have occurred. Amount and/or Complexity of Data Reviewed  Labs: ordered. Decision-making details documented in ED Course. Radiology: ordered. Risk  OTC drugs. Prescription drug management. Disposition  Final diagnoses:   Left shoulder pain   Aortic stenosis   Chronic lower back pain     Time reflects when diagnosis was documented in both MDM as applicable and the Disposition within this note     Time User Action Codes Description Comment    9/27/2023 11:52 AM Meredeth Estrin Add [M25.512] Left shoulder pain     9/27/2023 11:52 AM Meredeth Estrin Add [I35.0] Aortic stenosis     9/27/2023 11:52 AM Meredeth Estrin Add [M54.50,  G89.29] Chronic lower back pain       ED Disposition     ED Disposition   Discharge    Condition   Stable    Date/Time   Wed Sep 27, 2023 11:52 AM    Comment   Sarah Milton [facility] discharge to home/self care.                Follow-up Information     Follow up With Specialties Details Why Contact Info Additional Information    [address] Emergency Department Emergency Medicine Go to  If symptoms worsen [address] Emergency Department, [address] [place], [place], [address]    Aimee Noonan MD Internal Medicine Schedule an appointment as soon as possible for a visit  As needed [address] [facility]  [address] [facility] Schedule an appointment as soon as possible for a visit   [address] [place] [address] Drive [id number] [address] Cardiology [address] [place], [address] [place] [address], [facility]          Patient's Medications   Discharge Prescriptions    No medications on file           PDMP Review       Value Time User    PDMP Reviewed  Yes 2/16/2021  9:21 AM Otilia Juarez DO          ED Provider  Electronically Signed by           Marla Avila PA-C  09/27/23 4664

## 2023-10-05 ENCOUNTER — HOSPITAL ENCOUNTER (EMERGENCY)
Facility: HOSPITAL | Age: 88
Discharge: HOME | End: 2023-10-05
Attending: EMERGENCY MEDICINE
Payer: MEDICARE

## 2023-10-05 ENCOUNTER — APPOINTMENT (EMERGENCY)
Dept: RADIOLOGY | Facility: HOSPITAL | Age: 88
End: 2023-10-05
Payer: MEDICARE

## 2023-10-05 ENCOUNTER — APPOINTMENT (EMERGENCY)
Dept: CT IMAGING | Facility: HOSPITAL | Age: 88
End: 2023-10-05
Payer: MEDICARE

## 2023-10-05 VITALS
RESPIRATION RATE: 16 BRPM | HEART RATE: 74 BPM | TEMPERATURE: 98.1 F | OXYGEN SATURATION: 94 % | BODY MASS INDEX: 34.3 KG/M2 | HEIGHT: 63 IN | SYSTOLIC BLOOD PRESSURE: 137 MMHG | WEIGHT: 193.56 LBS | DIASTOLIC BLOOD PRESSURE: 63 MMHG

## 2023-10-05 DIAGNOSIS — M47.812 CERVICAL SPINE DEGENERATION: ICD-10-CM

## 2023-10-05 DIAGNOSIS — M25.412 SHOULDER EFFUSION, LEFT: ICD-10-CM

## 2023-10-05 DIAGNOSIS — M19.019 SHOULDER ARTHRITIS: ICD-10-CM

## 2023-10-05 DIAGNOSIS — G89.29 CHRONIC NECK PAIN: Primary | ICD-10-CM

## 2023-10-05 DIAGNOSIS — M54.2 CHRONIC NECK PAIN: Primary | ICD-10-CM

## 2023-10-05 LAB
2HR DELTA HS TROPONIN: -2 NG/L
ALBUMIN SERPL BCP-MCNC: 3.8 G/DL (ref 3.5–5)
ALP SERPL-CCNC: 68 U/L (ref 34–104)
ALT SERPL W P-5'-P-CCNC: 10 U/L (ref 7–52)
ANION GAP SERPL CALCULATED.3IONS-SCNC: 8 MMOL/L
AST SERPL W P-5'-P-CCNC: 18 U/L (ref 13–39)
ATRIAL RATE: 76 BPM
BASOPHILS # BLD AUTO: 0.02 THOUSANDS/ÂΜL (ref 0–0.1)
BASOPHILS NFR BLD AUTO: 0 % (ref 0–1)
BILIRUB SERPL-MCNC: 0.42 MG/DL (ref 0.2–1)
BUN SERPL-MCNC: 22 MG/DL (ref 5–25)
CALCIUM SERPL-MCNC: 9 MG/DL (ref 8.4–10.2)
CARDIAC TROPONIN I PNL SERPL HS: 7 NG/L
CARDIAC TROPONIN I PNL SERPL HS: 9 NG/L
CHLORIDE SERPL-SCNC: 97 MMOL/L (ref 96–108)
CK SERPL-CCNC: 35 U/L (ref 26–192)
CO2 SERPL-SCNC: 33 MMOL/L (ref 21–32)
CREAT SERPL-MCNC: 0.81 MG/DL (ref 0.6–1.3)
EOSINOPHIL # BLD AUTO: 0.24 THOUSAND/ÂΜL (ref 0–0.61)
EOSINOPHIL NFR BLD AUTO: 4 % (ref 0–6)
ERYTHROCYTE [DISTWIDTH] IN BLOOD BY AUTOMATED COUNT: 12.8 % (ref 11.6–15.1)
GFR SERPL CREATININE-BSD FRML MDRD: 62 ML/MIN/1.73SQ M
GLUCOSE SERPL-MCNC: 109 MG/DL (ref 65–140)
HCT VFR BLD AUTO: 37.1 % (ref 34.8–46.1)
HGB BLD-MCNC: 12.1 G/DL (ref 11.5–15.4)
IMM GRANULOCYTES # BLD AUTO: 0.02 THOUSAND/UL (ref 0–0.2)
IMM GRANULOCYTES NFR BLD AUTO: 0 % (ref 0–2)
LYMPHOCYTES # BLD AUTO: 1.12 THOUSANDS/ÂΜL (ref 0.6–4.47)
LYMPHOCYTES NFR BLD AUTO: 20 % (ref 14–44)
MCH RBC QN AUTO: 32.5 PG (ref 26.8–34.3)
MCHC RBC AUTO-ENTMCNC: 32.6 G/DL (ref 31.4–37.4)
MCV RBC AUTO: 100 FL (ref 82–98)
MONOCYTES # BLD AUTO: 0.7 THOUSAND/ÂΜL (ref 0.17–1.22)
MONOCYTES NFR BLD AUTO: 12 % (ref 4–12)
NEUTROPHILS # BLD AUTO: 3.64 THOUSANDS/ÂΜL (ref 1.85–7.62)
NEUTS SEG NFR BLD AUTO: 64 % (ref 43–75)
NRBC BLD AUTO-RTO: 0 /100 WBCS
P AXIS: 18 DEGREES
PLATELET # BLD AUTO: 198 THOUSANDS/UL (ref 149–390)
PMV BLD AUTO: 9.4 FL (ref 8.9–12.7)
POTASSIUM SERPL-SCNC: 4 MMOL/L (ref 3.5–5.3)
PR INTERVAL: 174 MS
PROT SERPL-MCNC: 7.2 G/DL (ref 6.4–8.4)
QRS AXIS: -11 DEGREES
QRSD INTERVAL: 146 MS
QT INTERVAL: 432 MS
QTC INTERVAL: 486 MS
RBC # BLD AUTO: 3.72 MILLION/UL (ref 3.81–5.12)
SODIUM SERPL-SCNC: 138 MMOL/L (ref 135–147)
T WAVE AXIS: 142 DEGREES
VENTRICULAR RATE: 76 BPM
WBC # BLD AUTO: 5.74 THOUSAND/UL (ref 4.31–10.16)

## 2023-10-05 PROCEDURE — 93005 ELECTROCARDIOGRAM TRACING: CPT

## 2023-10-05 PROCEDURE — 71045 X-RAY EXAM CHEST 1 VIEW: CPT

## 2023-10-05 PROCEDURE — 82550 ASSAY OF CK (CPK): CPT

## 2023-10-05 PROCEDURE — 80053 COMPREHEN METABOLIC PANEL: CPT

## 2023-10-05 PROCEDURE — 36415 COLL VENOUS BLD VENIPUNCTURE: CPT

## 2023-10-05 PROCEDURE — G1004 CDSM NDSC: HCPCS

## 2023-10-05 PROCEDURE — 73200 CT UPPER EXTREMITY W/O DYE: CPT

## 2023-10-05 PROCEDURE — 93010 ELECTROCARDIOGRAM REPORT: CPT | Performed by: INTERNAL MEDICINE

## 2023-10-05 PROCEDURE — 84484 ASSAY OF TROPONIN QUANT: CPT

## 2023-10-05 PROCEDURE — 96374 THER/PROPH/DIAG INJ IV PUSH: CPT

## 2023-10-05 PROCEDURE — 72125 CT NECK SPINE W/O DYE: CPT

## 2023-10-05 PROCEDURE — 99285 EMERGENCY DEPT VISIT HI MDM: CPT

## 2023-10-05 PROCEDURE — 85025 COMPLETE CBC W/AUTO DIFF WBC: CPT

## 2023-10-05 PROCEDURE — 99285 EMERGENCY DEPT VISIT HI MDM: CPT | Performed by: EMERGENCY MEDICINE

## 2023-10-05 RX ORDER — LOPERAMIDE HYDROCHLORIDE 2 MG/1
2 CAPSULE ORAL 4 TIMES DAILY PRN
COMMUNITY

## 2023-10-05 RX ORDER — OXYCODONE HYDROCHLORIDE 5 MG/1
5 TABLET ORAL EVERY 6 HOURS PRN
Qty: 28 TABLET | Refills: 0 | Status: SHIPPED | OUTPATIENT
Start: 2023-10-05 | End: 2023-10-12

## 2023-10-05 RX ORDER — POLYETHYLENE GLYCOL 3350 17 G/17G
POWDER ORAL
COMMUNITY
Start: 2023-07-11

## 2023-10-05 RX ORDER — OXYCODONE HYDROCHLORIDE 5 MG/1
5 TABLET ORAL ONCE
Status: COMPLETED | OUTPATIENT
Start: 2023-10-05 | End: 2023-10-05

## 2023-10-05 RX ORDER — ZINC OXIDE
1 POWDER (GRAM) MISCELLANEOUS AS NEEDED
COMMUNITY

## 2023-10-05 RX ORDER — MULTIVIT-MIN/FA/LYCOPEN/LUTEIN .4-300-25
TABLET ORAL
COMMUNITY
Start: 2023-08-31

## 2023-10-05 RX ORDER — HYDROMORPHONE HCL IN WATER/PF 6 MG/30 ML
0.2 PATIENT CONTROLLED ANALGESIA SYRINGE INTRAVENOUS ONCE
Status: COMPLETED | OUTPATIENT
Start: 2023-10-05 | End: 2023-10-05

## 2023-10-05 RX ORDER — LANOLIN ALCOHOL/MO/W.PET/CERES
3 CREAM (GRAM) TOPICAL
COMMUNITY

## 2023-10-05 RX ORDER — FUROSEMIDE 20 MG/1
TABLET ORAL
COMMUNITY
Start: 2023-07-25

## 2023-10-05 RX ORDER — LIDOCAINE 50 MG/G
1 PATCH TOPICAL ONCE
Status: DISCONTINUED | OUTPATIENT
Start: 2023-10-05 | End: 2023-10-05 | Stop reason: HOSPADM

## 2023-10-05 RX ADMIN — HYDROMORPHONE HYDROCHLORIDE 0.2 MG: 0.2 INJECTION, SOLUTION INTRAMUSCULAR; INTRAVENOUS; SUBCUTANEOUS at 02:52

## 2023-10-05 RX ADMIN — LIDOCAINE 1 PATCH: 700 PATCH TOPICAL at 07:14

## 2023-10-05 RX ADMIN — OXYCODONE HYDROCHLORIDE 5 MG: 5 TABLET ORAL at 07:14

## 2023-10-05 NOTE — ED PROVIDER NOTES
History  Chief Complaint   Patient presents with   • Neck Pain     Pt reports neck pain and L sided shoulder pain that travels to her hand. Pt has pacemaker. EMS reports changes in ECG between first and second one ran. EMS also reports a period where pt appeared more dusky and sweaty. Ms. Freddy Padilla is a 81yo F with a past medical history of anxiety, arthritis, CAD, diabetes, atrial flutter, DVT, hypertension, and hypothyroidism who presents to the ED for left arm and neck pain. Pt is a poor historian. Patient reports that at some point earlier today she began having neck pain that extended down into her left arm down to the fingertips. Patient describes the pain as a dull aching sensation. Reports that the pain is ongoing at this time. Patient reports that she is able to fully move her left arm, but the pain is increased with movement. Patient is unaware of any alleviating factors. Patient denies headache at this time. Patient is unsure of her cardiac history reports that she knows she has a pacemaker but is unsure why. Per her chart, patient is on Eliquis. Prior to Admission Medications   Prescriptions Last Dose Informant Patient Reported? Taking?    Accu-Chek FastClix Lancets MISC  Outside Facility (Specify) No No   Sig: Check once daily   Accu-Chek Softclix Lancets lancets  Outside Facility (Specify) No No   Sig: Use daily Use as instructed   Aspirin Low Dose 81 MG chewable tablet  Outside Facility (Specify) Yes Yes   Sig: TAKE ONE TABLET BY MOUTH ONCE EVERY DAY   Diclofenac Sodium (VOLTAREN) 1 %  Outside Facility (Specify) No Yes   Sig: Apply 2 g topically 4 (four) times a day Bilateral shoulders and knees   Eliquis 5 MG  Outside Facility (Specify) No Yes   Sig: TAKE ONE TABLET BY MOUTH TWICE DAILY   Lidocaine 4 % PTCH  Outside Facility (Specify) Yes Yes   Sig: Apply 3 patches topically daily B/l knees, low back   Mirabegron ER (Myrbetriq) 50 MG TB24  Outside Facility (Specify) No Yes   Sig: Take 1 tablet (50 mg total) by mouth in the morning   Multiple Vitamins-Minerals (CEROVITE SENIOR PO)   Yes Yes   Sig: Take 1 tablet by mouth in the morning   Multiple Vitamins-Minerals (CertaVite Senior/Antioxidant) TABS   Yes No   Sig: TAKE ONE TABLET BY MOUTH ONCE EVERY DAY   Theratears 0.25 % SOLN  Outside Facility (Specify) Yes Yes   Sig: instill ONE drop into each eye three times a day   Zinc Oxide POWD   Yes Yes   Sig: Use 1 Application as needed (with soilage)   acetaminophen (TYLENOL) 325 mg tablet  Outside Facility (Specify) Yes Yes   Sig: Take 975 mg by mouth 3 (three) times a day   amiodarone 200 mg tablet  Outside Facility (Specify) No Yes   Sig: Take 0.5 tablets (100 mg total) by mouth daily   amoxicillin (AMOXIL) 500 mg capsule  Outside Facility (Specify) Yes Yes   Sig: Take 2,000 mg by mouth 1 HOUR PRIOR TO DENTAL PROCEDURE   ezetimibe (ZETIA) 10 mg tablet  Outside Facility (Specify) No Yes   Sig: Take 1 tablet (10 mg total) by mouth daily   famotidine (PEPCID) 20 mg tablet  Outside Facility (Specify) Yes Yes   Sig: Take 20 mg by mouth 2 (two) times a day   furosemide (LASIX) 20 mg tablet   Yes No   Sig: TAKE ONE TABLET BY MOUTH EVERY OTHER DAY FOR chf alternate with 40mg dose   furosemide (LASIX) 40 mg tablet   No Yes   Sig: Take 1 tablet (40 mg total) by mouth daily Or as directed.    Patient taking differently: Take 20 mg by mouth daily Alternate 20mg with 40mg every other day   glucose blood (Accu-Chek Guide) test strip  Outside Facility (Specify) No No   Sig: Check once daily   ipratropium (ATROVENT) 0.06 % nasal spray   No Yes   Si sprays into each nostril 3 (three) times a day   isosorbide mononitrate (IMDUR) 30 mg 24 hr tablet  Outside Facility (Specify) No Yes   Sig: Take 1 tablet (30 mg total) by mouth daily   levothyroxine 112 mcg tablet  Outside Facility (Specify) Yes Yes   Sig: Take 112 mcg by mouth every morning Take on an empty stomach   loperamide (IMODIUM) 2 mg capsule   Yes Yes Sig: Take 2 mg by mouth 4 (four) times a day as needed for diarrhea   melatonin 3 mg   Yes Yes   Sig: Take 3 mg by mouth daily at bedtime   menthol-zinc oxide (Calmoseptine) 0.44-20.6 % OINT  Outside Facility (Specify) Yes Yes   Sig: Apply topically 2 (two) times a day   polyethylene glycol (GLYCOLAX) 17 GM/SCOOP   Yes No   Sig: MIX 17 GRAMS (1 CAPFUL) OF POWDER IN 8 OUNCES OF LIQUID AND DRINK ONCE DAILY FOR CONSTIPATON AS NEEDED   polyethylene glycol (MIRALAX) 17 g packet  Outside Facility (Specify) No Yes   Sig: Take 17 g by mouth daily   Patient taking differently: Take 17 g by mouth if needed   pravastatin (PRAVACHOL) 80 mg tablet  Outside Facility (Specify) Yes Yes   Sig: TAKE ONE TABLET BY MOUTH ONCE EVERY DAY FOR CHOLESTEROL   traMADol (ULTRAM) 50 mg tablet  Outside Facility (Specify) Yes Yes   Sig: Take 50 mg by mouth every 8 (eight) hours as needed      Facility-Administered Medications: None       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   • Bundle branch block, left    • Cardiomyopathy ([address])    • Chest pain 3/9/2019   • Chronic pain     chronic b/l shoulder pain   • Chronic pain of right knee 4/2/2019   • Coronary artery disease    • Diabetes mellitus ([address])    • Gait disturbance 3/2/2018   • GERD (gastroesophageal reflux disease)    • H/O atrial flutter    • History of DVT (deep vein thrombosis)    • History of pulmonary embolism    • Hyperlipidemia    • Hypertension    • Hypothyroid    • Renal calculi    • Shortness of breath        Past Surgical History:   Procedure Laterality Date   • ATRIAL ABLATION SURGERY  01/2015   • CARDIAC PACEMAKER PLACEMENT  12/10/2018    Medtronic YANNA XT  MRI, model [id number]   • CARDIOVERSION      CHELA/DCCV 10/22/2014   • CARPAL TUNNEL RELEASE Right    • CATARACT EXTRACTION     • CORONARY ANGIOPLASTY WITH STENT PLACEMENT     • HYSTERECTOMY     • JOINT REPLACEMENT Right    • PA AMPUTATION TOE METATARSOPHALANGEAL JOINT Left 10/11/2017    Procedure: AMPUTATION SECOND TOE;  Surgeon: Brandi Mckinney DPM;  Location: [facility];  Service: Podiatry   • TONSILLECTOMY     • TOTAL HIP ARTHROPLASTY      Right       Family History   Problem Relation Age of Onset   • Parkinsonism Sister    • Cancer Sister         unknown type     I have reviewed and agree with the history as documented. E-Cigarette/Vaping   • E-Cigarette Use Never User      E-Cigarette/Vaping Substances   • Nicotine No    • THC No    • CBD No    • Flavoring No    • Other No    • Unknown No      Social History     Tobacco Use   • Smoking status: Never   • Smokeless tobacco: Never   Vaping Use   • Vaping Use: Never used   Substance Use Topics   • Alcohol use: Yes     Comment: occasional   • Drug use: Not Currently     Types: Marijuana     Comment: MEDICAL MARIJUANA-HAS NOT USED FOR 3 WEEKS        Review of Systems   Constitutional: Negative for activity change, chills and fever. Eyes: Positive for visual disturbance. Negative for pain. Patient has macular degeneration, but no acute vision changes   Respiratory: Negative for chest tightness and shortness of breath. Cardiovascular: Negative for chest pain. Gastrointestinal: Negative for abdominal pain, constipation, diarrhea, nausea and vomiting. Genitourinary: Negative for dysuria and hematuria. Skin: Negative for rash and wound. Neurological: Negative for dizziness, syncope, light-headedness and headaches. Psychiatric/Behavioral: Negative.         Physical Exam  ED Triage Vitals   Temperature Pulse Respirations Blood Pressure SpO2   10/05/23 0228 10/05/23 0228 10/05/23 0228 10/05/23 0228 10/05/23 0228   98.1 °F (36.7 °C) 75 18 153/67 93 %      Temp Source Heart Rate Source Patient Position - Orthostatic VS BP Location FiO2 (%)   10/05/23 0228 10/05/23 0228 -- 10/05/23 0228 --   Oral Monitor  Right arm Pain Score       10/05/23 0252       7             Orthostatic Vital Signs  Vitals:    10/05/23 0228 10/05/23 0345 10/05/23 0615   BP: 153/67     Pulse: 75 70 72       Physical Exam  Vitals and nursing note reviewed. Constitutional:       Appearance: Normal appearance. HENT:      Head: Normocephalic and atraumatic. Right Ear: External ear normal.      Left Ear: External ear normal.      Nose: Nose normal.      Mouth/Throat:      Mouth: Mucous membranes are moist.      Pharynx: Oropharynx is clear. Eyes:      Extraocular Movements: Extraocular movements intact. Conjunctiva/sclera: Conjunctivae normal.   Cardiovascular:      Rate and Rhythm: Normal rate. Heart sounds: Murmur heard. Pulmonary:      Effort: Pulmonary effort is normal.      Breath sounds: Normal breath sounds. Abdominal:      General: Abdomen is flat. There is no distension. Palpations: Abdomen is soft. Tenderness: There is no abdominal tenderness. Musculoskeletal:         General: Normal range of motion. Cervical back: Normal range of motion and neck supple. Skin:     General: Skin is warm and dry. Neurological:      General: No focal deficit present. Mental Status: She is alert and oriented to person, place, and time.    Psychiatric:         Mood and Affect: Mood normal.         Behavior: Behavior normal.         ED Medications  Medications   oxyCODONE (ROXICODONE) IR tablet 5 mg (has no administration in time range)   HYDROmorphone HCl (DILAUDID) injection 0.2 mg (0.2 mg Intravenous Given 10/5/23 0252)       Diagnostic Studies  Results Reviewed     Procedure Component Value Units Date/Time    HS Troponin I 2hr [[id number]]  (Normal) Collected: 10/05/23 0504    Lab Status: Final result Specimen: Blood from Arm, Left Updated: 10/05/23 0553     hs TnI 2hr 7 ng/L      Delta 2hr hsTnI -2 ng/L     HS Troponin 0hr (reflex protocol) [[id number]]  (Normal) Collected: 10/05/23 0250    Lab Status: Final result Specimen: Blood from Arm, Left Updated: 10/05/23 0326     hs TnI 0hr 9 ng/L     Comprehensive metabolic panel [[id number]]  (Abnormal) Collected: 10/05/23 0250    Lab Status: Final result Specimen: Blood from Arm, Left Updated: 10/05/23 0320     Sodium 138 mmol/L      Potassium 4.0 mmol/L      Chloride 97 mmol/L      CO2 33 mmol/L      ANION GAP 8 mmol/L      BUN 22 mg/dL      Creatinine 0.81 mg/dL      Glucose 109 mg/dL      Calcium 9.0 mg/dL      AST 18 U/L      ALT 10 U/L      Alkaline Phosphatase 68 U/L      Total Protein 7.2 g/dL      Albumin 3.8 g/dL      Total Bilirubin 0.42 mg/dL      eGFR 62 ml/min/1.73sq m     Narrative:      National Kidney Disease Foundation guidelines for Chronic Kidney Disease (CKD):   •  Stage 1 with normal or high GFR (GFR > 90 mL/min/1.73 square meters)  •  Stage 2 Mild CKD (GFR = 60-89 mL/min/1.73 square meters)  •  Stage 3A Moderate CKD (GFR = 45-59 mL/min/1.73 square meters)  •  Stage 3B Moderate CKD (GFR = 30-44 mL/min/1.73 square meters)  •  Stage 4 Severe CKD (GFR = 15-29 mL/min/1.73 square meters)  •  Stage 5 End Stage CKD (GFR <15 mL/min/1.73 square meters)  Note: GFR calculation is accurate only with a steady state creatinine    CK [[id number]]  (Normal) Collected: 10/05/23 0250    Lab Status: Final result Specimen: Blood from Arm, Left Updated: 10/05/23 0320     Total CK 35 U/L     CBC and differential [[id number]]  (Abnormal) Collected: 10/05/23 0250    Lab Status: Final result Specimen: Blood from Arm, Left Updated: 10/05/23 0307     WBC 5.74 Thousand/uL      RBC 3.72 Million/uL      Hemoglobin 12.1 g/dL      Hematocrit 37.1 %       fL      MCH 32.5 pg      MCHC 32.6 g/dL      RDW 12.8 %      MPV 9.4 fL      Platelets 032 Thousands/uL      nRBC 0 /100 WBCs      Neutrophils Relative 64 %      Immat GRANS % 0 %      Lymphocytes Relative 20 %      Monocytes Relative 12 %      Eosinophils Relative 4 %      Basophils Relative 0 %      Neutrophils Absolute 3.64 Thousands/µL      Immature Grans Absolute 0.02 Thousand/uL      Lymphocytes Absolute 1.12 Thousands/µL      Monocytes Absolute 0.70 Thousand/µL      Eosinophils Absolute 0.24 Thousand/µL      Basophils Absolute 0.02 Thousands/µL                  XR chest 1 view portable   ED Interpretation by Marlen Fernández MD (10/05 0340)   No acute cardiac, pulmonary, or osseous processes noted. Compared to last x-ray      CT cervical spine without contrast   Final Result by Orlando Witt DO (10/05 2838)      No cervical spine fracture or traumatic malalignment. Multilevel degenerative changes of the cervical spine. Workstation performed: [id number]         CT upper extremity wo contrast left   Final Result by Orlando Witt DO (10/05 1072)      Severe glenohumeral arthrosis with large joint effusion and remodeling of the glenoid as well as the humeral head. Findings may be degenerative in nature with possibly some component of neuroarthropathy. Workstation performed: [id number]               Procedures  ECG 12 Lead Documentation Only    Date/Time: 10/5/2023 3:02 AM    Performed by: Marlen Fernández MD  Authorized by: Marlen Fernández MD    Indications / Diagnosis:  L arm pain  ECG reviewed by me, the ED Provider: yes    Patient location:  ED  Previous ECG:     Previous ECG:  Compared to current    Similarity:  Changes noted  Interpretation:     Interpretation: non-specific    Rate:     ECG rate:  76  Rhythm:     Rhythm: paced    Pacing:     Capture:  Complete  Ectopy:     Ectopy: none            ED Course                             SBIRT 20yo+    Flowsheet Row Most Recent Value   Initial Alcohol Screen: US AUDIT-C     1. How often do you have a drink containing alcohol? 0 Filed at: 10/05/2023 0252   2. How many drinks containing alcohol do you have on a typical day you are drinking? 0 Filed at: 10/05/2023 0252   3a. Male UNDER 65: How often do you have five or more drinks on one occasion?  0 Filed at: 10/05/2023 0252   3b. FEMALE Any Age, or MALE 65+: How often do you have 4 or more drinks on one occassion? 0 Filed at: 10/05/2023 0252   Audit-C Score 0 Filed at: 10/05/2023 8630   MARCO: How many times in the past year have you. .. Used an illegal drug or used a prescription medication for non-medical reasons? Never Filed at: 10/05/2023 9864                Medical Decision Making  Ms. Nahun Jones is a 81yo F with a past medical history of anxiety, arthritis, CAD, diabetes, atrial flutter, DVT, hypertension, and hypothyroidism who presents to the ED for left arm and neck pain. Based on history and physical DDx includes but is not limited to: ACS, MI, PE, PTX, pneumonia, dissection, pleurisy, pericarditis, myocarditis, rhabdomyolysis, GI etiology. Neck CT: "No cervical spine fracture or traumatic malalignment. Multilevel degenerative changes of the cervical spine."  Shoulder CT: "Severe glenohumeral arthrosis with large joint effusion and remodeling of the glenoid as well as the humeral head. Findings may be degenerative in nature with possibly some component of neuroarthropathy."    Put in referral for spine and pain clinic for her neck, and ortho for her shoulder. Pt given oxycodone to bridge to appointments. Contacted patient's daughter to give her an update on the request of the patient. Results shared with patient. Return precautions given and patient expresses understanding and is comfortable with discharge. Amount and/or Complexity of Data Reviewed  Labs: ordered. Radiology: ordered and independent interpretation performed. Risk  Prescription drug management.             Disposition  Final diagnoses:   Chronic neck pain   Cervical spine degeneration   Shoulder arthritis   Shoulder effusion, left     Time reflects when diagnosis was documented in both MDM as applicable and the Disposition within this note     Time User Action Codes Description Comment    10/5/2023  4:13 AM Kody Hurd Arleen Best Add [M54.2,  G89.29] Chronic neck pain     10/5/2023  4:14 AM Madelin Chandler Add [B50.630] Cervical spine degeneration     10/5/2023  4:15 AM Madelin Chandler Add [M19.019] Shoulder arthritis     10/5/2023  4:17 AM Madelin Chandler Add [M25.412] Shoulder effusion, left       ED Disposition     ED Disposition   Discharge    Condition   Stable    Date/Time   Thu Oct 5, 2023  6:11 AM    Comment   [facility] discharge to home/self care. Follow-up Information    None         Patient's Medications   Discharge Prescriptions    OXYCODONE (ROXICODONE) 5 IMMEDIATE RELEASE TABLET    Take 1 tablet (5 mg total) by mouth every 6 (six) hours as needed for moderate pain (do not take with tramadol) for up to 7 days Max Daily Amount: 20 mg       Start Date: 10/5/2023 End Date: 10/12/2023       Order Dose: 5 mg       Quantity: 28 tablet    Refills: 0         PDMP Review       Value Time User    PDMP Reviewed  Yes 10/5/2023  2:24 AM Soy Salinas MD           ED Provider  Attending physically available and evaluated Juanita Lopez. I managed the patient along with the ED Attending.     Electronically Signed by         Madelin Chandler MD  10/05/23 9470

## 2023-10-05 NOTE — ED ATTENDING ATTESTATION
10/5/2023  I, Anaya Bauman MD, saw and evaluated the patient. I have discussed the patient with the resident/non-physician practitioner and agree with the resident's/non-physician practitioner's findings, Plan of Care, and MDM as documented in the resident's/non-physician practitioner's note, except where noted. All available labs and Radiology studies were reviewed. I was present for key portions of any procedure(s) performed by the resident/non-physician practitioner and I was immediately available to provide assistance. At this point I agree with the current assessment done in the Emergency Department. I have conducted an independent evaluation of this patient a history and physical is as follows: Patient is a 80year old female with neck pain tonight while in bed with radiation down left arm to hand. No trauma. No fever. No N/V. No chest pain or sob. No headache. Has a pacemaker. Was last seen in this ED on 9/27/23 for left shoulder pain. Had negative dissection study and left shoulder x-ray which showed severe arthritis disease. [facility] website checked on this patient and last Rx filled was on 9/30/23 for tramadol for 10 day supply. NCAT. PERRL. Moist mucous membranes. Paravertebral cervical tenderness. Lungs clear. Heart regular with murmur. Abdomen soft and nontender. Good bowel sounds. No edema. No rash noted. (+) left shoulder tenderness with limited ROM due to pain. Neuro intact. No focal deficits. DDx including but not limited to: strain, sprain, arthritis, spinal stenosis, torticollis, herniated disc, radiculopathy; doubt epidural abscess or fracture or meningitis or carotid dissection. DDx including but not limited to: tendinitis, bursitis, arthritis, rotator cuff injury, fracture, dislocation, contusion, strain, sprain, brachial plexus impingement, radiculopathy; doubt septic arthritis or cardiac etiology or DVT or arterial occlusion.  Will check labs and x-ray and EKG and CTs and give dilaudid IV for pain.      ED Course         Critical Care Time  Procedures

## 2023-10-10 ENCOUNTER — OFFICE VISIT (OUTPATIENT)
Dept: SURGICAL ONCOLOGY | Facility: CLINIC | Age: 88
End: 2023-10-10
Payer: MEDICARE

## 2023-10-10 VITALS
OXYGEN SATURATION: 93 % | SYSTOLIC BLOOD PRESSURE: 142 MMHG | TEMPERATURE: 97.3 F | BODY MASS INDEX: 34.29 KG/M2 | HEIGHT: 63 IN | HEART RATE: 80 BPM | RESPIRATION RATE: 15 BRPM | DIASTOLIC BLOOD PRESSURE: 82 MMHG

## 2023-10-10 DIAGNOSIS — Z17.0 MALIGNANT NEOPLASM OF CENTRAL PORTION OF LEFT BREAST IN FEMALE, ESTROGEN RECEPTOR POSITIVE: Primary | ICD-10-CM

## 2023-10-10 DIAGNOSIS — C50.112 MALIGNANT NEOPLASM OF CENTRAL PORTION OF LEFT BREAST IN FEMALE, ESTROGEN RECEPTOR POSITIVE: Primary | ICD-10-CM

## 2023-10-10 PROCEDURE — 99213 OFFICE O/P EST LOW 20 MIN: CPT

## 2023-10-10 NOTE — PROGRESS NOTES
Surgical Oncology Follow Up       [address] [facility] SURGICAL ONCOLOGY [facility] [address]    Courtney Cap  7/24/1929  [id number]  [address] [facility] SURGICAL ONCOLOGY [facility] [address]    Chief Complaint   Patient presents with   • Follow-up       Assessment/Plan:  1. Malignant neoplasm of central portion of left breast in female, estrogen receptor positive   - PRN       Discussion/Summary: Patient is a 59-year-old female presenting today for six-month follow-up for left breast cancer diagnosed in October 2021. Pathology revealed invasive ductal carcinoma ER/AK positive, HER2 negative. Due to patient's multiple comorbidities the patient's daughter and Dr. Conor Davis discussed a nonsurgical approach. Patient met with Medical Oncology she refused anti hormone therapy. There were no concerns on her cbe. It was discussed with patient and her daughter and we have decided to forego clinical exams. They were instructed to call in the future with questions or concerns. All questions were answered today. History of Present Illness:     Oncology History   Malignant neoplasm of central portion of left breast in female, estrogen receptor positive    10/26/2021 Biopsy    Left breast ultrasound-guided biopsy  9 o'clock, 6 cm from nipple  Invasive ductal carcinoma  Grade 1    AK <1  HER2 0    Done at [address] [facility]. Have no additional breast records. 12/22/2021 - 12/22/2021 Hormone Therapy    Consult with Dr. Veronica Covert - patient and daughter declined hormonal therapy          -Interval History: Patient is a 59-year-old female presenting today for six-month follow-up for left breast cancer diagnosed in October 2021. She has been on obs. Patient denies changes on her breast exam. She has shoulder pain.  She denies persistent headaches, bone pain, back pain, shortness of breath, or abdominal pain. Review of Systems:  Review of Systems   Constitutional: Negative for activity change, appetite change, fatigue and unexpected weight change. Respiratory: Negative for cough and shortness of breath. Cardiovascular: Negative for chest pain. Gastrointestinal: Negative for abdominal pain, diarrhea, nausea and vomiting. Endocrine: Negative for heat intolerance. Musculoskeletal: Positive for arthralgias and gait problem (wheel chair). Negative for back pain and myalgias. Skin: Negative for rash. Neurological: Negative for weakness and headaches. Hematological: Negative for adenopathy. Patient Active Problem List   Diagnosis   • Coronary artery disease involving native coronary artery of native heart without angina pectoris   • Essential hypertension   • History of placement of stent in LAD coronary artery   • Hyperlipidemia   • History of atrial flutter   • LBBB (left bundle branch block)   • Right hip pain   • Type 2 diabetes mellitus with stage 3a chronic kidney disease, without long-term current use of insulin ([facility])   • Acquired hypothyroidism   • Adhesive capsulitis   • Chronic low back pain   • Cervical spine degeneration   • Primary osteoarthritis of both shoulders   • Tremor   • Heart block AV second degree   • Hyponatremia   • Ambulatory dysfunction   • Pacemaker   • Atrial flutter ([facility])   • Trochanteric bursitis of right hip   • Ventricular tachycardia ([facility])   • Cardiomyopathy ([address])   • Primary osteoarthritis of both knees   • Effusion of left knee   • Chronic diastolic CHF (congestive heart failure) ([facility])   • Generalized OA   • Anticoagulated on Coumadin   • Stage 3a chronic kidney disease ([facility])   • Mixed stress and urge urinary incontinence   • Acquired absence of other left toe(s) ([facility])   • Severe obesity (BMI 35.0-39. 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[facility])   • Arthritis   • Aortic stenosis, severe   • Lightheadedness   • Malignant neoplasm of central portion of left breast in female, estrogen receptor positive    • PAF (paroxysmal atrial fibrillation) ([address])     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   • Bundle branch block, left    • Cardiomyopathy ([address])    • Chest pain 3/9/2019   • Chronic pain     chronic b/l shoulder pain   • Chronic pain of right knee 4/2/2019   • Coronary artery disease    • Diabetes mellitus ([address])    • Gait disturbance 3/2/2018   • GERD (gastroesophageal reflux disease)    • H/O atrial flutter    • History of DVT (deep vein thrombosis)    • History of pulmonary embolism    • Hyperlipidemia    • Hypertension    • Hypothyroid    • Renal calculi    • Shortness of breath      Past Surgical History:   Procedure Laterality Date   • ATRIAL ABLATION SURGERY  01/2015   • CARDIAC PACEMAKER PLACEMENT  12/10/2018    Medtronic YANNA XT DR MRI, model [id number]   • CARDIOVERSION      CHELA/DCCV 10/22/2014   • CARPAL TUNNEL RELEASE Right    • CATARACT EXTRACTION     • CORONARY ANGIOPLASTY WITH STENT PLACEMENT     • HYSTERECTOMY     • JOINT REPLACEMENT Right    • OR AMPUTATION TOE METATARSOPHALANGEAL JOINT Left 10/11/2017    Procedure: AMPUTATION SECOND TOE;  Surgeon: Leida Hopper DPM;  Location: [facility];  Service: Podiatry   • TONSILLECTOMY     • TOTAL HIP ARTHROPLASTY      Right     Family History   Problem Relation Age of Onset   • Parkinsonism Sister    • Cancer Sister         unknown type     Social History     Socioeconomic History   • Marital status: /Civil Union     Spouse name: Not on file   • Number of children: Not on file   • Years of education: Not on file   • Highest education level: Not on file Occupational History   • Not on file   Tobacco Use   • Smoking status: Never   • Smokeless tobacco: Never   Vaping Use   • Vaping Use: Never used   Substance and Sexual Activity   • Alcohol use: Yes     Comment: occasional   • Drug use: Not Currently     Types: Marijuana     Comment: MEDICAL MARIJUANA-HAS NOT USED FOR 3 WEEKS   • Sexual activity: Not on file   Other Topics Concern   • Not on file   Social History Narrative   • Not on file     Social Determinants of Health     Financial Resource Strain: Not on file   Food Insecurity: No Food Insecurity (6/28/2021)    Hunger Vital Sign    • Worried About Running Out of Food in the Last Year: Never true    • Ran Out of Food in the Last Year: Never true   Transportation Needs: No Transportation Needs (6/28/2021)    PRAPARE - Transportation    • Lack of Transportation (Medical): No    • Lack of Transportation (Non-Medical):  No   Physical Activity: Not on file   Stress: Not on file   Social Connections: Not on file   Intimate Partner Violence: Not on file   Housing Stability: Not on file       Current Outpatient Medications:   •  amoxicillin (AMOXIL) 500 mg capsule, Take 2,000 mg by mouth 1 HOUR PRIOR TO DENTAL PROCEDURE, Disp: , Rfl:   •  Aspirin Low Dose 81 MG chewable tablet, TAKE ONE TABLET BY MOUTH ONCE EVERY DAY, Disp: , Rfl:   •  Diclofenac Sodium (VOLTAREN) 1 %, Apply 2 g topically 4 (four) times a day Bilateral shoulders and knees, Disp: 150 g, Rfl: 0  •  Eliquis 5 MG, TAKE ONE TABLET BY MOUTH TWICE DAILY, Disp: 180 tablet, Rfl: 3  •  ezetimibe (ZETIA) 10 mg tablet, Take 1 tablet (10 mg total) by mouth daily, Disp: 30 tablet, Rfl: 3  •  Lidocaine 4 % PTCH, Apply 3 patches topically daily B/l knees, low back, Disp: , Rfl:   •  melatonin 3 mg, Take 3 mg by mouth daily at bedtime, Disp: , Rfl:   •  menthol-zinc oxide (Calmoseptine) 0.44-20.6 % OINT, Apply topically 2 (two) times a day, Disp: , Rfl:   •  Multiple Vitamins-Minerals (CEROVITE SENIOR PO), Take 1 tablet by mouth in the morning, Disp: , Rfl:   •  Theratears 0.25 % SOLN, instill ONE drop into each eye three times a day, Disp: , Rfl:   •  traMADol (ULTRAM) 50 mg tablet, Take 50 mg by mouth every 8 (eight) hours as needed, Disp: , Rfl:   •  Zinc Oxide POWD, Use 1 Application as needed (with soilage), Disp: , Rfl:   •  Accu-Chek FastClix Lancets MISC, Check once daily, Disp: 100 each, Rfl: 3  •  Accu-Chek Softclix Lancets lancets, Use daily Use as instructed, Disp: 100 each, Rfl: 1  •  acetaminophen (TYLENOL) 325 mg tablet, Take 975 mg by mouth 3 (three) times a day, Disp: , Rfl:   •  amiodarone 200 mg tablet, Take 0.5 tablets (100 mg total) by mouth daily, Disp: 45 tablet, Rfl: 3  •  famotidine (PEPCID) 20 mg tablet, Take 20 mg by mouth 2 (two) times a day, Disp: , Rfl:   •  furosemide (LASIX) 20 mg tablet, TAKE ONE TABLET BY MOUTH EVERY OTHER DAY FOR chf alternate with 40mg dose, Disp: , Rfl:   •  furosemide (LASIX) 40 mg tablet, Take 1 tablet (40 mg total) by mouth daily Or as directed.  (Patient taking differently: Take 20 mg by mouth daily Alternate 20mg with 40mg every other day), Disp: 90 tablet, Rfl: 3  •  glucose blood (Accu-Chek Guide) test strip, Check once daily, Disp: 100 each, Rfl: 3  •  ipratropium (ATROVENT) 0.06 % nasal spray, 2 sprays into each nostril 3 (three) times a day, Disp: 15 mL, Rfl: 6  •  isosorbide mononitrate (IMDUR) 30 mg 24 hr tablet, Take 1 tablet (30 mg total) by mouth daily, Disp: 90 tablet, Rfl: 3  •  levothyroxine 112 mcg tablet, Take 112 mcg by mouth every morning Take on an empty stomach, Disp: , Rfl:   •  loperamide (IMODIUM) 2 mg capsule, Take 2 mg by mouth 4 (four) times a day as needed for diarrhea, Disp: , Rfl:   •  Mirabegron ER (Myrbetriq) 50 MG TB24, Take 1 tablet (50 mg total) by mouth in the morning, Disp: 30 tablet, Rfl: 11  •  Multiple Vitamins-Minerals (CertaVite Senior/Antioxidant) TABS, TAKE ONE TABLET BY MOUTH ONCE EVERY DAY, Disp: , Rfl:   •  oxyCODONE (ROXICODONE) 5 immediate release tablet, Take 1 tablet (5 mg total) by mouth every 6 (six) hours as needed for moderate pain (do not take with tramadol) for up to 7 days Max Daily Amount: 20 mg, Disp: 28 tablet, Rfl: 0  •  polyethylene glycol (GLYCOLAX) 17 GM/SCOOP, MIX 17 GRAMS (1 CAPFUL) OF POWDER IN 8 OUNCES OF LIQUID AND DRINK ONCE DAILY FOR CONSTIPATON AS NEEDED, Disp: , Rfl:   •  polyethylene glycol (MIRALAX) 17 g packet, Take 17 g by mouth daily (Patient taking differently: Take 17 g by mouth if needed), Disp:  , Rfl: 0  •  pravastatin (PRAVACHOL) 80 mg tablet, TAKE ONE TABLET BY MOUTH ONCE EVERY DAY FOR CHOLESTEROL, Disp: , Rfl:   Allergies   Allergen Reactions   • Atorvastatin      Reaction unknown 10/23/2018-   • Other Rash     Patient sensitive to adhesives from tape     Vitals:    10/10/23 0944   BP: 142/82   Pulse: 80   Resp: 15   Temp: (!) 97.3 °F (36.3 °C)   SpO2: 93%       Physical Exam  Constitutional:       General: She is not in acute distress. Appearance: Normal appearance. Cardiovascular:      Rate and Rhythm: Normal rate and regular rhythm. Pulses: Normal pulses. Heart sounds: Normal heart sounds. Pulmonary:      Effort: Pulmonary effort is normal.      Breath sounds: Normal breath sounds. Chest:      Chest wall: No mass. Breasts:     Right: No swelling, bleeding, inverted nipple, mass, nipple discharge, skin change or tenderness. Left: No swelling, bleeding, inverted nipple, mass, nipple discharge, skin change or tenderness. Abdominal:      General: Abdomen is flat. Palpations: Abdomen is soft. Lymphadenopathy:      Upper Body:      Right upper body: No supraclavicular, axillary or pectoral adenopathy. Left upper body: No supraclavicular, axillary or pectoral adenopathy. Skin:     General: Skin is warm. Neurological:      General: No focal deficit present. Mental Status: She is alert and oriented to person, place, and time.    Psychiatric: Mood and Affect: Mood normal.         Behavior: Behavior normal.           Results:    Imaging  XR chest 1 view portable    Result Date: 10/5/2023  Narrative: CHEST INDICATION:   neck pain and shoulder pain. COMPARISON: CXR and chest CT 9/27/2023. EXAM PERFORMED/VIEWS:  XR CHEST PORTABLE. FINDINGS: Mild cardiomegaly with left subclavian pacemaker leads in right atrium and right ventricle. Lungs clear. No effusion or pneumothorax. Upper abdomen normal. Moderate bilateral glenohumeral degenerative disease. Impression: No acute cardiopulmonary disease. Workstation performed: [id number]     CT upper extremity wo contrast left    Result Date: 10/5/2023  Narrative: CT left shoulder without IV contrast INDICATION: Pain. COMPARISON: Multiple priors most recently CT 9/27/2023 TECHNIQUE: CT examination of the above was performed. This examination, like all CT scans performed in the [facility], was performed utilizing techniques to minimize radiation dose exposure, including the use of iterative reconstruction and automated exposure control software. Multiplanar 2D reformatted images were created from the source data. Rad dose  313 mGy-cm FINDINGS: OSSEOUS STRUCTURES: Severe glenohumeral arthrosis with large joint effusion and remodeling of the glenoid as well as the humeral head. Mild acromioclavicular osteoarthritis. VISUALIZED MUSCULATURE: Mild to moderate muscle atrophy. Tendons are poorly evaluated. SOFT TISSUES:  Unremarkable. OTHER PERTINENT FINDINGS: Left chest cardiac resynchronization device. Impression: Severe glenohumeral arthrosis with large joint effusion and remodeling of the glenoid as well as the humeral head. Findings may be degenerative in nature with possibly some component of neuroarthropathy. Workstation performed: [id number]     CT cervical spine without contrast    Result Date: 10/5/2023  Narrative: CT CERVICAL SPINE - WITHOUT CONTRAST INDICATION:   neck pain.  COMPARISON: None. TECHNIQUE:  CT examination of the cervical spine was performed without intravenous contrast.  Contiguous axial images were obtained. Multiplanar 2D reformatted images were created from the source data. Radiation dose length product (DLP) for this visit:  455 mGy-cm . This examination, like all CT scans performed in the [facility], was performed utilizing techniques to minimize radiation dose exposure, including the use of iterative reconstruction and automated exposure control. IMAGE QUALITY:  Diagnostic. FINDINGS: ALIGNMENT:  There is straightening of normal cervical lordosis. No traumatic subluxation or acute compression deformity. VERTEBRAE:  No fracture. DEGENERATIVE CHANGES:  Moderate multilevel cervical degenerative changes are noted. No critical central canal stenosis. PREVERTEBRAL AND PARASPINAL SOFT TISSUES: Unremarkable THORACIC INLET:  Normal. Bilateral mastoid effusions. Impression: No cervical spine fracture or traumatic malalignment. Multilevel degenerative changes of the cervical spine. Workstation performed: [id number]     CTA dissection protocol chest/abdomen/pelvis    Result Date: 9/27/2023  Narrative: CTA - CHEST, ABDOMEN AND PELVIS - WITHOUT AND WITH IV CONTRAST INDICATION:   Back pain, lower, abdominal pain, history of severe aortic stenosis, left arm and neck pain. Dena Sor History of hysterectomy. COMPARISON: 5/28/2021 abdominopelvic CT. 3/3/2005 chest CT. TECHNIQUE: CT examination of the chest, abdomen and pelvis was performed both prior to and after the administration of intravenous contrast.   The noncontrast portion of this examination was performed utilizing low radiation dose technique. Thin section angiographic arterial phase post contrast technique was used in order to evaluate for aortic dissection. 3D reformatted images and volume rendering were performed on an independent workstation. Multiplanar 2D reformatted images were created from the source data. Radiation dose length product (DLP) for this visit:  1003 mGy-cm . This examination, like all CT scans performed in the [facility], was performed utilizing techniques to minimize radiation dose exposure, including the use of iterative reconstruction and automated exposure control. IV Contrast:  100 mL of iohexol (OMNIPAQUE) Enteric Contrast:  Enteric contrast was not administered. FINDINGS: AORTA: Moderate to marked atherosclerosis. Normal aortic caliber and enhancement. No intramural hematoma. Chest: Subclavian, visualized carotid and vertebral arteries are patent. Abdomen: Moderate to marked celiac and mild superior mesenteric artery atherosclerotic origin narrowing. Otherwise vessels are patent. Inferior mesenteric artery is patent. Patent celiac branches. Patent single bilateral renal arteries. Bilateral common, external, and internal iliac and bilateral visualized femoral arteries are patent. Short segment proximal right internal iliac moderate stenosis and ectasia. CHEST LUNGS: Linear scar and dependent atelectasis. Patent central airways. PLEURA: Within normal limits. HEART/PULMONARY ARTERIAL TREE: Chronic cardiomegaly. Right atrial and ventricular pacemaker leads. Aortic and mitral annular and coronary artery calcification. No PE. MEDIASTINUM AND NIKKO: Within normal limits. CHEST WALL AND LOWER NECK: Within normal limits. ABDOMEN LIVER/BILIARY TREE: Within normal limits. GALLBLADDER: Gallstones. No secondary signs of cholecystitis. SPLEEN: Within normal limits. PANCREAS: Chronic calcifications in the region of the pancreatic head, may be vascular. No acute pathology. ADRENAL GLANDS: Within normal limits. KIDNEYS/URETERS: Within normal limits. STOMACH AND BOWEL: Diverticulosis. Normal caliber and wall thickness. APPENDIX: Normal appearance. ABDOMINOPELVIC CAVITY: No abnormal air, fluid or enlarged lymph nodes. PELVIS REPRODUCTIVE ORGANS: Hysterectomy. No adnexal masses.  URINARY BLADDER: Within normal limits. ABDOMINAL WALL/INGUINAL REGIONS: Chronic, small fat-containing umbilical hernia. New, mild soft tissue thickening or debris. No secondary signs of inflammation/infection. OSSEOUS STRUCTURES: Degenerative changes. Degenerative grade 1 anterolisthesis at L5-S1. Right hip arthroplasty. Scoliosis. Bilateral erosive shoulder arthropathy with large joint effusions. Impression: No aortic dissection. No acute findings in the chest, abdomen or pelvis. Chronic and nonemergent findings above. Workstation performed: [id number]     XR shoulder 2+ views LEFT    Result Date: 9/27/2023  Narrative: LEFT SHOULDER INDICATION:   Shoulder pain. COMPARISON:  6/26/15. VIEWS:  XR SHOULDER 2+ VW LEFT FINDINGS: There is no acute fracture or dislocation of the left shoulder. Irregularity of the humeral head as well as periarticular calcification, likely due to advanced osteoarthritis. There is remodeling of the glenoid secondary to advanced osteoarthritis of the  glenohumeral joint. Impression: No acute fracture or dislocation of the left shoulder. Advanced osteoarthritis of the glenohumeral joint. Workstation performed: [id number]     XR chest 1 view portable    Result Date: 9/27/2023  Narrative: CHEST INDICATION:   Back pain, jaw pain. Cardiopulmonary process? . COMPARISON: Chest radiograph dated 8/1/2021. EXAM PERFORMED/VIEWS:  XR CHEST PORTABLE FINDINGS: Stably positioned left subclavian approach cardiac pacing device. Cardiomediastinal silhouette is stably enlarged. No focal airspace consolidation. No pneumothorax or pleural effusion. Degenerative changes of both shoulders. Impression: No acute cardiopulmonary disease. Workstation performed: [id number]     Cardiac EP device report    Result Date: 9/13/2023  Narrative: MDT-DUAL CHAMBER PPM (DDDR MODE) - ACTIVE SYSTEM IS MRI CONDITIONAL CARELINK TRANSMISSION: BATTERY STATUS "7 YRS." AP 20%  100%. ALL AVAILABLE LEAD PARAMETERS WITHIN NORMAL LIMITS.  NO SIGNIFICANT HIGH RATE EPISODES. NORMAL DEVICE FUNCTION. NC       I reviewed the above imaging data. Advance Care Planning/Advance Directives:  Discussed disease status, cancer treatment plans and/or cancer treatment goals with the patient.

## 2023-10-15 ENCOUNTER — APPOINTMENT (EMERGENCY)
Dept: RADIOLOGY | Facility: HOSPITAL | Age: 88
End: 2023-10-15
Payer: MEDICARE

## 2023-10-15 ENCOUNTER — HOSPITAL ENCOUNTER (EMERGENCY)
Facility: HOSPITAL | Age: 88
Discharge: HOME | End: 2023-10-15
Attending: EMERGENCY MEDICINE
Payer: MEDICARE

## 2023-10-15 VITALS
RESPIRATION RATE: 17 BRPM | TEMPERATURE: 97.5 F | SYSTOLIC BLOOD PRESSURE: 175 MMHG | OXYGEN SATURATION: 98 % | HEART RATE: 74 BPM | DIASTOLIC BLOOD PRESSURE: 67 MMHG

## 2023-10-15 DIAGNOSIS — M19.90 OSTEOARTHRITIS: ICD-10-CM

## 2023-10-15 DIAGNOSIS — M25.512 LEFT SHOULDER PAIN: Primary | ICD-10-CM

## 2023-10-15 LAB
2HR DELTA HS TROPONIN: -1 NG/L
ALBUMIN SERPL BCP-MCNC: 3.6 G/DL (ref 3.5–5)
ALP SERPL-CCNC: 74 U/L (ref 34–104)
ALT SERPL W P-5'-P-CCNC: 18 U/L (ref 7–52)
ANION GAP SERPL CALCULATED.3IONS-SCNC: 10 MMOL/L
AST SERPL W P-5'-P-CCNC: 26 U/L (ref 13–39)
BASOPHILS # BLD AUTO: 0.03 THOUSANDS/ÂΜL (ref 0–0.1)
BASOPHILS NFR BLD AUTO: 1 % (ref 0–1)
BILIRUB SERPL-MCNC: 0.49 MG/DL (ref 0.2–1)
BUN SERPL-MCNC: 26 MG/DL (ref 5–25)
CALCIUM SERPL-MCNC: 9.1 MG/DL (ref 8.4–10.2)
CARDIAC TROPONIN I PNL SERPL HS: 6 NG/L
CARDIAC TROPONIN I PNL SERPL HS: 7 NG/L
CHLORIDE SERPL-SCNC: 93 MMOL/L (ref 96–108)
CO2 SERPL-SCNC: 34 MMOL/L (ref 21–32)
CREAT SERPL-MCNC: 0.83 MG/DL (ref 0.6–1.3)
EOSINOPHIL # BLD AUTO: 0.23 THOUSAND/ÂΜL (ref 0–0.61)
EOSINOPHIL NFR BLD AUTO: 4 % (ref 0–6)
ERYTHROCYTE [DISTWIDTH] IN BLOOD BY AUTOMATED COUNT: 12.8 % (ref 11.6–15.1)
GFR SERPL CREATININE-BSD FRML MDRD: 60 ML/MIN/1.73SQ M
GLUCOSE SERPL-MCNC: 114 MG/DL (ref 65–140)
HCT VFR BLD AUTO: 38.2 % (ref 34.8–46.1)
HGB BLD-MCNC: 12.8 G/DL (ref 11.5–15.4)
IMM GRANULOCYTES # BLD AUTO: 0.03 THOUSAND/UL (ref 0–0.2)
IMM GRANULOCYTES NFR BLD AUTO: 1 % (ref 0–2)
LYMPHOCYTES # BLD AUTO: 1.6 THOUSANDS/ÂΜL (ref 0.6–4.47)
LYMPHOCYTES NFR BLD AUTO: 28 % (ref 14–44)
MCH RBC QN AUTO: 32.7 PG (ref 26.8–34.3)
MCHC RBC AUTO-ENTMCNC: 33.5 G/DL (ref 31.4–37.4)
MCV RBC AUTO: 98 FL (ref 82–98)
MONOCYTES # BLD AUTO: 0.74 THOUSAND/ÂΜL (ref 0.17–1.22)
MONOCYTES NFR BLD AUTO: 13 % (ref 4–12)
NEUTROPHILS # BLD AUTO: 3.13 THOUSANDS/ÂΜL (ref 1.85–7.62)
NEUTS SEG NFR BLD AUTO: 53 % (ref 43–75)
NRBC BLD AUTO-RTO: 0 /100 WBCS
PLATELET # BLD AUTO: 209 THOUSANDS/UL (ref 149–390)
PMV BLD AUTO: 8.9 FL (ref 8.9–12.7)
POTASSIUM SERPL-SCNC: 3.8 MMOL/L (ref 3.5–5.3)
PROT SERPL-MCNC: 7.2 G/DL (ref 6.4–8.4)
RBC # BLD AUTO: 3.91 MILLION/UL (ref 3.81–5.12)
SODIUM SERPL-SCNC: 137 MMOL/L (ref 135–147)
WBC # BLD AUTO: 5.76 THOUSAND/UL (ref 4.31–10.16)

## 2023-10-15 PROCEDURE — 80053 COMPREHEN METABOLIC PANEL: CPT

## 2023-10-15 PROCEDURE — 36415 COLL VENOUS BLD VENIPUNCTURE: CPT

## 2023-10-15 PROCEDURE — G1004 CDSM NDSC: HCPCS

## 2023-10-15 PROCEDURE — 85025 COMPLETE CBC W/AUTO DIFF WBC: CPT

## 2023-10-15 PROCEDURE — 73030 X-RAY EXAM OF SHOULDER: CPT

## 2023-10-15 PROCEDURE — 93005 ELECTROCARDIOGRAM TRACING: CPT

## 2023-10-15 PROCEDURE — 84484 ASSAY OF TROPONIN QUANT: CPT

## 2023-10-15 PROCEDURE — 74177 CT ABD & PELVIS W/CONTRAST: CPT

## 2023-10-15 RX ORDER — ACETAMINOPHEN 325 MG/1
650 TABLET ORAL ONCE
Status: COMPLETED | OUTPATIENT
Start: 2023-10-15 | End: 2023-10-15

## 2023-10-15 RX ADMIN — ACETAMINOPHEN 650 MG: 325 TABLET, FILM COATED ORAL at 05:29

## 2023-10-15 RX ADMIN — MORPHINE SULFATE 2 MG: 2 INJECTION, SOLUTION INTRAMUSCULAR; INTRAVENOUS at 05:29

## 2023-10-15 RX ADMIN — IOHEXOL 100 ML: 350 INJECTION, SOLUTION INTRAVENOUS at 03:45

## 2023-10-15 NOTE — DISCHARGE INSTRUCTIONS
You were seen in the ED for chronic left shoulder pain. Return to the ED for any worsening symptoms or new symptoms. Follow up with your primary care doctor as soon as possible.

## 2023-10-15 NOTE — ED ATTENDING ATTESTATION
Darlene Parmar MD, saw and evaluated the patient. I have discussed the patient with the resident and agree with the resident's findings, Plan of Care, and MDM as documented in the resident's note, except where noted. All available labs and Radiology studies were reviewed. I was present for key portions of any procedure(s) performed by the resident and I was immediately available to provide assistance. At this point I agree with the current assessment done in the Emergency Department. I have conducted an independent evaluation of this patient a history and physical is as follows:    81 yo female with a complicated past medical history including DM, HTN, hyperlipidemia, CAD, anxiety, prior DVT, GERD, and several chronic pain syndromes brought to the ED by EMS for evaluation of left shoulder pain x 1 day. The patient says she woke up this morning with pain in the left shoulder that radiates down the arm into the hand. She cannot recall any recent falls or injury to the arm. The pain occasionally radiates into the left upper back as well. No chest pain, shortness of breath, diaphoresis, nausea, or vomiting. No numbness or weakness in the extremity. No swelling, discoloration, or deformity. No other specific complaints. *Of note, the patient has been seen at the [facility] twice recently for similar symptoms. She has undergone both a dissection study and shoulder CT for these complaints. The patient says today's pain is "the same" as that she experienced during those ED visits.     ROS: per resident physician note    Gen: NAD, AA&Ox3  HEENT: PERRL, EOMI  Neck: supple  CV: RRR  Lungs: CTA B/L  Abdomen: soft, (+) generalized tenderness  Left Shoulder: (+) anterior joint tenderness, (+) mild swelling, decreased ROM secondary to pain, no erythema or warmth  Neuro: 5/5 strength all extremities, sensation grossly intact  Skin: no rash    ED Course  The patient is comfortable appearing with stable vital signs despite her complaints. Shoulder pain does not seem to be an acute issue --> she has already undergone 2 extensive workups ar [facility] recently. Abdomen is diffusely tender however, which does seem acute. Unclear etiology of presentation. ACS vs chronic pain vs osteoarthritis vs an intraabdominal process? Will check EKG, shoulder x-ray, CT A/P, basic labs, and troponin. APAP and morphine administered. Will continue to monitor in the ED. Disposition per workup and reassessment.     Critical Care Time  Procedures

## 2023-10-15 NOTE — ED PROVIDER NOTES
History  Chief Complaint   Patient presents with    Shoulder Pain     States L shoulder pain. Denies chest pain. Pt given one nitro and 324 of aspirin en route. 17-year-old female patient with history of CAD, pacemaker, pulmonary embolism, HLD, HTN presenting with left shoulder pain onset today. Patient states that she woke up with left shoulder pain radiating to her left elbow and her left hand. Patient states that she woke up with this pain. Denies any injuries. Patient states that the pain also radiates to her back. Denies fever, chest pain, shortness of breath, nausea, vomiting, lightheadedness, syncope. Per chart review, patient was seen in [facility] for similar symptoms. Patient had a negative CTA dissection study at that time. Patient also had negative troponins. Patient also had a CT of her left upper extremity that showed effusion of her left shoulder. Prior to Admission Medications   Prescriptions Last Dose Informant Patient Reported? Taking?    Accu-Chek FastClix Lancets MISC  Outside Facility (Specify) No No   Sig: Check once daily   Accu-Chek Softclix Lancets lancets  Outside Facility (Specify) No No   Sig: Use daily Use as instructed   Aspirin Low Dose 81 MG chewable tablet  Outside Facility (Specify) Yes No   Sig: TAKE ONE TABLET BY MOUTH ONCE EVERY DAY   Diclofenac Sodium (VOLTAREN) 1 %  Outside Facility (Specify) No No   Sig: Apply 2 g topically 4 (four) times a day Bilateral shoulders and knees   Eliquis 5 MG  Outside Facility (Specify) No No   Sig: TAKE ONE TABLET BY MOUTH TWICE DAILY   Lidocaine 4 % PTCH  Outside Facility (Specify) Yes No   Sig: Apply 3 patches topically daily B/l knees, low back   Mirabegron ER (Myrbetriq) 50 MG TB24  Outside Facility (Specify) No No   Sig: Take 1 tablet (50 mg total) by mouth in the morning   Multiple Vitamins-Minerals (CEROVITE SENIOR PO)   Yes No   Sig: Take 1 tablet by mouth in the morning   Multiple Vitamins-Minerals (CertaVite Senior/Antioxidant) TABS   Yes No   Sig: TAKE ONE TABLET BY MOUTH ONCE EVERY DAY   Theratears 0.25 % SOLN  Outside Facility (Specify) Yes No   Sig: instill ONE drop into each eye three times a day   Zinc Oxide POWD   Yes No   Sig: Use 1 Application as needed (with soilage)   acetaminophen (TYLENOL) 325 mg tablet  Outside Facility (Specify) Yes No   Sig: Take 975 mg by mouth 3 (three) times a day   amiodarone 200 mg tablet  Outside Facility (Specify) No No   Sig: Take 0.5 tablets (100 mg total) by mouth daily   amoxicillin (AMOXIL) 500 mg capsule  Outside Facility (Specify) Yes No   Sig: Take 2,000 mg by mouth 1 HOUR PRIOR TO DENTAL PROCEDURE   ezetimibe (ZETIA) 10 mg tablet  Outside Facility (Specify) No No   Sig: Take 1 tablet (10 mg total) by mouth daily   famotidine (PEPCID) 20 mg tablet  Outside Facility (Specify) Yes No   Sig: Take 20 mg by mouth 2 (two) times a day   furosemide (LASIX) 20 mg tablet   Yes No   Sig: TAKE ONE TABLET BY MOUTH EVERY OTHER DAY FOR chf alternate with 40mg dose   furosemide (LASIX) 40 mg tablet   No No   Sig: Take 1 tablet (40 mg total) by mouth daily Or as directed.    Patient taking differently: Take 20 mg by mouth daily Alternate 20mg with 40mg every other day   glucose blood (Accu-Chek Guide) test strip  Outside Facility (Specify) No No   Sig: Check once daily   ipratropium (ATROVENT) 0.06 % nasal spray   No No   Si sprays into each nostril 3 (three) times a day   isosorbide mononitrate (IMDUR) 30 mg 24 hr tablet  Outside Facility (Specify) No No   Sig: Take 1 tablet (30 mg total) by mouth daily   levothyroxine 112 mcg tablet  Outside Facility (Specify) Yes No   Sig: Take 112 mcg by mouth every morning Take on an empty stomach   loperamide (IMODIUM) 2 mg capsule   Yes No   Sig: Take 2 mg by mouth 4 (four) times a day as needed for diarrhea   melatonin 3 mg   Yes No   Sig: Take 3 mg by mouth daily at bedtime   menthol-zinc oxide (Calmoseptine) 0.44-20.6 % [address] [place] [address] (Specify) Yes No   Sig: Apply topically 2 (two) times a day   polyethylene glycol (GLYCOLAX) 17 GM/SCOOP   Yes No   Sig: MIX 17 GRAMS (1 CAPFUL) OF POWDER IN 8 OUNCES OF LIQUID AND DRINK ONCE DAILY FOR CONSTIPATON AS NEEDED   polyethylene glycol (MIRALAX) 17 g packet  Outside Facility (Specify) No No   Sig: Take 17 g by mouth daily   Patient taking differently: Take 17 g by mouth if needed   pravastatin (PRAVACHOL) 80 mg tablet  Outside Facility (Specify) Yes No   Sig: TAKE ONE TABLET BY MOUTH ONCE EVERY DAY FOR CHOLESTEROL   traMADol (ULTRAM) 50 mg tablet  Outside Facility (Specify) Yes No   Sig: Take 50 mg by mouth every 8 (eight) hours as needed      Facility-Administered Medications: None       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    Bundle branch block, left     Cardiomyopathy (HCC)     Chest pain 3/9/2019    Chronic pain     chronic b/l shoulder pain    Chronic pain of right knee 4/2/2019    Coronary artery disease     Diabetes mellitus (HCC)     Gait disturbance 3/2/2018    GERD (gastroesophageal reflux disease)     H/O atrial flutter     History of DVT (deep vein thrombosis)     History of pulmonary embolism     Hyperlipidemia     Hypertension     Hypothyroid     Renal calculi     Shortness of breath        Past Surgical History:   Procedure Laterality Date    ATRIAL ABLATION SURGERY  01/2015    CARDIAC PACEMAKER PLACEMENT  12/10/2018    Medtronic YANNA XT DR ESCOBEDO, model [id number]    CARDIOVERSION      CHELA/DCCV 10/22/2014    CARPAL TUNNEL RELEASE Right     CATARACT EXTRACTION      CORONARY ANGIOPLASTY WITH STENT PLACEMENT      HYSTERECTOMY      JOINT REPLACEMENT Right     OR AMPUTATION TOE METATARSOPHALANGEAL JOINT Left 10/11/2017    Procedure: AMPUTATION SECOND TOE;  Surgeon: Anthony Lyon DPM;  Location: [facility];  Service: Podiatry    TONSILLECTOMY      TOTAL HIP ARTHROPLASTY      Right       Family History   Problem Relation Age of Onset    Parkinsonism Sister     Cancer Sister         unknown type     I have reviewed and agree with the history as documented. E-Cigarette/Vaping    E-Cigarette Use Never User      E-Cigarette/Vaping Substances    Nicotine No     THC No     CBD No     Flavoring No     Other No     Unknown No      Social History     Tobacco Use    Smoking status: Never    Smokeless tobacco: Never   Vaping Use    Vaping Use: Never used   Substance Use Topics    Alcohol use: Yes     Comment: occasional    Drug use: Not Currently     Types: Marijuana     Comment: MEDICAL MARIJUANA-HAS NOT USED FOR 3 WEEKS        Review of Systems   Musculoskeletal:         Left shoulder pain   All other systems reviewed and are negative. Physical Exam  ED Triage Vitals   Temperature Pulse Respirations Blood Pressure SpO2   10/15/23 0218 10/15/23 0218 10/15/23 0218 10/15/23 0218 10/15/23 0218   97.5 °F (36.4 °C) 81 22 (!) 179/92 96 %      Temp Source Heart Rate Source Patient Position - Orthostatic VS BP Location FiO2 (%)   10/15/23 0218 10/15/23 0218 10/15/23 0218 10/15/23 0218 --   Oral Monitor Lying Left arm       Pain Score       10/15/23 0217       10 - Worst Possible Pain             Orthostatic Vital Signs  Vitals:    10/15/23 0430 10/15/23 0500 10/15/23 0530 10/15/23 0630   BP: 163/70 162/67 (!) 175/67    Pulse: 80 76 76 74   Patient Position - Orthostatic VS:   Lying        Physical Exam  Vitals reviewed. Constitutional:       Appearance: Normal appearance. HENT:      Head: Normocephalic and atraumatic. Nose: Nose normal.      Mouth/Throat:      Mouth: Mucous membranes are moist.      Pharynx: Oropharynx is clear. Eyes:      Extraocular Movements: Extraocular movements intact. Conjunctiva/sclera: Conjunctivae normal.   Cardiovascular:      Rate and Rhythm: Normal rate and regular rhythm. Pulses: Normal pulses.       Heart sounds: Normal heart sounds. Comments: Pulses intact  Pulmonary:      Effort: Pulmonary effort is normal.      Breath sounds: Normal breath sounds. Abdominal:      General: Bowel sounds are normal.      Palpations: Abdomen is soft. Tenderness: There is abdominal tenderness (Generalized abdominal tenderness to palpation. ). Musculoskeletal:         General: Swelling (Mild swelling to the left shoulder) and tenderness (Left shoulder tenderness anteriorly with tenderness to left elbow.) present. Cervical back: Normal range of motion. Comments: Limited range of motion of left shoulder secondary to pain   Skin:     General: Skin is warm and dry. Neurological:      General: No focal deficit present. Mental Status: She is alert and oriented to person, place, and time. Mental status is at baseline.          ED Medications  Medications   iohexol (OMNIPAQUE) 350 MG/ML injection (SINGLE-DOSE) 100 mL (100 mL Intravenous Given 10/15/23 0345)   acetaminophen (TYLENOL) tablet 650 mg (650 mg Oral Given 10/15/23 0529)   morphine injection 2 mg (2 mg Intravenous Given 10/15/23 0529)       Diagnostic Studies  Results Reviewed       Procedure Component Value Units Date/Time    HS Troponin I 2hr [[id number]]  (Normal) Collected: 10/15/23 0426    Lab Status: Final result Specimen: Blood from Arm, Right Updated: 10/15/23 0511     hs TnI 2hr 6 ng/L      Delta 2hr hsTnI -1 ng/L     Comprehensive metabolic panel [[id number]]  (Abnormal) Collected: 10/15/23 0229    Lab Status: Final result Specimen: Blood from Arm, Right Updated: 10/15/23 0316     Sodium 137 mmol/L      Potassium 3.8 mmol/L      Chloride 93 mmol/L      CO2 34 mmol/L      ANION GAP 10 mmol/L      BUN 26 mg/dL      Creatinine 0.83 mg/dL      Glucose 114 mg/dL      Calcium 9.1 mg/dL      AST 26 U/L      ALT 18 U/L      Alkaline Phosphatase 74 U/L      Total Protein 7.2 g/dL      Albumin 3.6 g/dL      Total Bilirubin 0.49 mg/dL      eGFR 60 ml/min/1.73sq m Narrative:      National Kidney Disease Foundation guidelines for Chronic Kidney Disease (CKD):     Stage 1 with normal or high GFR (GFR > 90 mL/min/1.73 square meters)    Stage 2 Mild CKD (GFR = 60-89 mL/min/1.73 square meters)    Stage 3A Moderate CKD (GFR = 45-59 mL/min/1.73 square meters)    Stage 3B Moderate CKD (GFR = 30-44 mL/min/1.73 square meters)    Stage 4 Severe CKD (GFR = 15-29 mL/min/1.73 square meters)    Stage 5 End Stage CKD (GFR <15 mL/min/1.73 square meters)  Note: GFR calculation is accurate only with a steady state creatinine    HS Troponin 0hr (reflex protocol) [[id number]]  (Normal) Collected: 10/15/23 0229    Lab Status: Final result Specimen: Blood from Arm, Right Updated: 10/15/23 0305     hs TnI 0hr 7 ng/L     CBC and differential [[id number]]  (Abnormal) Collected: 10/15/23 0229    Lab Status: Final result Specimen: Blood from Arm, Right Updated: 10/15/23 0238     WBC 5.76 Thousand/uL      RBC 3.91 Million/uL      Hemoglobin 12.8 g/dL      Hematocrit 38.2 %      MCV 98 fL      MCH 32.7 pg      MCHC 33.5 g/dL      RDW 12.8 %      MPV 8.9 fL      Platelets 558 Thousands/uL      nRBC 0 /100 WBCs      Neutrophils Relative 53 %      Immat GRANS % 1 %      Lymphocytes Relative 28 %      Monocytes Relative 13 %      Eosinophils Relative 4 %      Basophils Relative 1 %      Neutrophils Absolute 3.13 Thousands/µL      Immature Grans Absolute 0.03 Thousand/uL      Lymphocytes Absolute 1.60 Thousands/µL      Monocytes Absolute 0.74 Thousand/µL      Eosinophils Absolute 0.23 Thousand/µL      Basophils Absolute 0.03 Thousands/µL                    CT abdomen pelvis with contrast   Final Result by Gayatri Campuzano MD (10/15 8755)      No acute intra-abdominal/pelvic abnormalities with multiple ancillary findings detailed above.          Workstation performed: [id number]         XR shoulder 2+ views LEFT   ED Interpretation by Les Marcial MD (10/15 7622)   Osteoarthritis, no changes from previous xray Procedures  Procedures      ED Course  ED Course as of 10/15/23 2128   [place] Oct 15, 2023   8453 Potassium: 3.8             HEART Risk Score      Flowsheet Row Most Recent Value   Heart Score Risk Calculator    History 0 Filed at: 10/15/2023 2128   ECG 0 Filed at: 10/15/2023 2128   Age 2 Filed at: 10/15/2023 2128   Risk Factors 2 Filed at: 10/15/2023 2128   Troponin 0 Filed at: 10/15/2023 2128   HEART Score 4 Filed at: 10/15/2023 2128                        SBIRT 22yo+      Flowsheet Row Most Recent Value   Initial Alcohol Screen: US AUDIT-C     1. How often do you have a drink containing alcohol? 0 Filed at: 10/15/2023 0258   2. How many drinks containing alcohol do you have on a typical day you are drinking? 0 Filed at: 10/15/2023 0258   3b. FEMALE Any Age, or MALE 65+: How often do you have 4 or more drinks on one occassion? 0 Filed at: 10/15/2023 0258   Audit-C Score 0 Filed at: 10/15/2023 3168   MARCO: How many times in the past year have you. .. Used an illegal drug or used a prescription medication for non-medical reasons? Never Filed at: 10/15/2023 0258                  Medical Decision Making  81 y/o female patient presents to ED c/o left shoulder pain. Given nitro and aspirin in route. Patient is a poor historian. Patient states pain to moving her left shoulder. DDx includes ACS, chronic shoulder pain, new injuries or trauma. 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 With effusion. On a previous CT scan. Labs within normal limits, troponin negative. Exam shows tenderness to left shoulder and pain with range of motion and limited range of motion secondary to pain. Patient also mild tenderness to abdomen. CT abdomen pelvis shows no acute findings. This is likely her chronic left shoulder pain. Stable for discharge with follow-up with PCP. Return precautions given.     Amount and/or Complexity of Data Reviewed  Labs: ordered. Decision-making details documented in ED Course. Radiology: ordered and independent interpretation performed. ECG/medicine tests: ordered. Details: EKG shows paced rhythm. Discussion of management or test interpretation with external provider(s): Follow-up with PCP. Risk  OTC drugs. Prescription drug management. Disposition  Final diagnoses:   Left shoulder pain   Osteoarthritis     Time reflects when diagnosis was documented in both MDM as applicable and the Disposition within this note       Time User Action Codes Description Comment    10/15/2023  5:00 AM Kareem FENGU [facility] Add [M25.512] Left shoulder pain     10/15/2023  5:00 AM Kareem JORDAN [facility] Add [J91.21] Osteoarthritis           ED Disposition       ED Disposition   Discharge    Condition   Stable    Date/Time   Sun Oct 15, 2023 4215    [address] [facility] discharge to home/self care. Follow-up Information       Follow up With Specialties Details Why Terryann Kawasaki, MD Internal Medicine Schedule an appointment as soon as possible for a visit   [address]  [phone]              Discharge Medication List as of 10/15/2023  5:15 AM        CONTINUE these medications which have NOT CHANGED    Details   ! ! Accu-Chek FastClix Lancets MISC Check once daily, Normal      !!  Accu-Chek Softclix Lancets lancets Use daily Use as instructed, Starting Wed 4/7/2021, Normal      acetaminophen (TYLENOL) 325 mg tablet Take 975 mg by mouth 3 (three) times a day, Historical Med      amiodarone 200 mg tablet Take 0.5 tablets (100 mg total) by mouth daily, Starting Fri 7/2/2021, Normal      amoxicillin (AMOXIL) 500 mg capsule Take 2,000 mg by mouth 1 HOUR PRIOR TO DENTAL PROCEDURE, Historical Med      Aspirin Low Dose 81 MG chewable tablet TAKE ONE TABLET BY MOUTH ONCE EVERY DAY, Historical Med      Diclofenac Sodium (VOLTAREN) 1 % Apply 2 g topically 4 (four) times a day Bilateral shoulders and knees, Starting Thu 6/3/2021, No Print      Eliquis 5 MG TAKE ONE TABLET BY MOUTH TWICE DAILY, Normal      ezetimibe (ZETIA) 10 mg tablet Take 1 tablet (10 mg total) by mouth daily, Starting Fri 7/2/2021, Normal      famotidine (PEPCID) 20 mg tablet Take 20 mg by mouth 2 (two) times a day, Historical Med      !! furosemide (LASIX) 20 mg tablet TAKE ONE TABLET BY MOUTH EVERY OTHER DAY FOR chf alternate with 40mg dose, Historical Med      !! furosemide (LASIX) 40 mg tablet Take 1 tablet (40 mg total) by mouth daily Or as directed., Starting Wed 7/19/2023, Normal      glucose blood (Accu-Chek Guide) test strip Check once daily, Normal      ipratropium (ATROVENT) 0.06 % nasal spray 2 sprays into each nostril 3 (three) times a day, Starting Wed 7/5/2023, Normal      isosorbide mononitrate (IMDUR) 30 mg 24 hr tablet Take 1 tablet (30 mg total) by mouth daily, Starting Wed 3/17/2021, Normal      levothyroxine 112 mcg tablet Take 112 mcg by mouth every morning Take on an empty stomach, Starting Tue 3/28/2023, Historical Med      Lidocaine 4 % PTCH Apply 3 patches topically daily B/l knees, low back, Historical Med      loperamide (IMODIUM) 2 mg capsule Take 2 mg by mouth 4 (four) times a day as needed for diarrhea, Historical Med      melatonin 3 mg Take 3 mg by mouth daily at bedtime, Historical Med      menthol-zinc oxide (Calmoseptine) 0.44-20.6 % OINT Apply topically 2 (two) times a day, Historical Med      Mirabegron ER (Myrbetriq) 50 MG TB24 Take 1 tablet (50 mg total) by mouth in the morning, Starting Fri 1/21/2022, Normal      !!  Multiple Vitamins-Minerals (CEROVITE SENIOR PO) Take 1 tablet by mouth in the morning, Historical Med      !! Multiple Vitamins-Minerals (CertaVite Senior/Antioxidant) TABS TAKE ONE TABLET BY MOUTH ONCE EVERY DAY, Historical Med      polyethylene glycol (GLYCOLAX) 17 GM/SCOOP MIX 17 GRAMS (1 CAPFUL) OF POWDER IN 8 OUNCES OF LIQUID AND DRINK ONCE DAILY FOR CONSTIPATON AS NEEDED, Historical Med      polyethylene glycol (MIRALAX) 17 g packet Take 17 g by mouth daily, Starting Fri 6/4/2021, No Print      pravastatin (PRAVACHOL) 80 mg tablet TAKE ONE TABLET BY MOUTH ONCE EVERY DAY FOR CHOLESTEROL, Historical Med      Theratears 0.25 % SOLN instill ONE drop into each eye three times a day, Historical Med      traMADol (ULTRAM) 50 mg tablet Take 50 mg by mouth every 8 (eight) hours as needed, Starting u 12/16/2021, Historical Med      Zinc Oxide POWD Use 1 Application as needed (with soilage), Historical Med       !! - Potential duplicate medications found. Please discuss with provider. No discharge procedures on file. PDMP Review         Value Time User    PDMP Reviewed  Yes 10/5/2023  2:24 AM Lashae Braun MD             ED Provider  Attending physically available and evaluated Thalia Ventura. I managed the patient along with the ED Attending.     Electronically Signed by           Aleatha Sandifer, MD  10/15/23 2128       Aleatha Sandifer, MD  10/15/23 2129

## 2023-10-16 LAB
ATRIAL RATE: 79 BPM
P AXIS: 64 DEGREES
PR INTERVAL: 202 MS
QRS AXIS: 17 DEGREES
QRSD INTERVAL: 140 MS
QT INTERVAL: 410 MS
QTC INTERVAL: 470 MS
T WAVE AXIS: 135 DEGREES
VENTRICULAR RATE: 79 BPM

## 2023-10-16 PROCEDURE — 93010 ELECTROCARDIOGRAM REPORT: CPT | Performed by: INTERNAL MEDICINE

## 2023-12-08 ENCOUNTER — ESTABLISHED COMPREHENSIVE EXAM (OUTPATIENT)
Dept: URBAN - METROPOLITAN AREA CLINIC 6 | Facility: CLINIC | Age: 88
End: 2023-12-08

## 2023-12-08 DIAGNOSIS — H35.3132: ICD-10-CM

## 2023-12-08 DIAGNOSIS — H43.813: ICD-10-CM

## 2023-12-08 DIAGNOSIS — E11.9: ICD-10-CM

## 2023-12-08 PROCEDURE — 92250 FUNDUS PHOTOGRAPHY W/I&R: CPT

## 2023-12-08 PROCEDURE — 92014 COMPRE OPH EXAM EST PT 1/>: CPT

## 2023-12-08 PROCEDURE — 92202 OPSCPY EXTND ON/MAC DRAW: CPT

## 2023-12-08 ASSESSMENT — TONOMETRY
OS_IOP_MMHG: 8
OD_IOP_MMHG: 8

## 2023-12-08 ASSESSMENT — VISUAL ACUITY
OD_CC: 20/70+2
OD_PH: 20/60-2
OU_CC: J10
OS_PH: 20/40-
OS_CC: 20/80+2

## 2023-12-13 NOTE — PROGRESS NOTES
Cardiology  Follow Up Office Visit Note    Melida Posadas   80 y.o.   female   MRN: [id number]  [place]  [address]  [place] [address]  [phone]    PCP: Claire Lozada MD  Cardiologist : Dr Gallito Aguilera            Summary of Recommendations  Continue the current plan  Follow up will be scheduled with Dr Gallito Aguilera in 6 months, BMP prior to        Impression/plan  Chronic diastolic heart failure. Low-salt diet, continue to monitor weights  Wt Readings from Last 3 Encounters:   12/14/23 80.3 kg (177 lb)   10/05/23 87.8 kg (193 lb 9 oz)   09/27/23 81.6 kg (179 lb 14.3 oz)     --Beta-blocker:   OFF given low BP  --Diuretic:   Furosemide 20 mg daily alt with 40 mg QOD. Continue  --ACE/ARB/ARNI:   OFF given low BP  --2 g sodium diet, 1500 cc fluid restriction. Daily weights. Can take Lasix 40 mg/d PRN for wt gain 3 lb/ 24 hrs  Aortic stenosis, severe. Medical management. Does not want surgery; No further echoes  Ventricular tachycardia  No recurrence on Amio.   CAD. Prior LAD stent 2014. On aspirin, statin, beta-blocker, nitrate  No sx  Hx advanced HB/Syncope/S/P MDT PPM  12/18  Interrogation 9/13/23. AP 20%  100%. No sign. high rates  Paroxysmal atrial fibrillation   rate controlled with metoprolol succinate. Heart rate 76   Antiarrhythmic therapy with amiodarone 200 mg daily, maintaining normal sinus rhythm. TSH 10/24/2023 6.1, free T4 1.25. LFTs normal.  On anticoagulation with Eliquis 5 mg BID ( normal renal fxn; BMI 34)  History atrial flutter status post ablation  Hypertension, essential. /78. On loop diuretic, isosorbide. OFF BB/ ARB given low BP  Hyperlipidemia. On  Pravastatin 80 mg/d, zetia 10 mg/d   intolerance to atorvastatin  10/24/23 LDL 62, at goal  PVD/carotid disease  CT 5/28/21: Severe stenosis of the left distal common carotid artery and proximal cervical internal carotid artery.    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 EF 60%. No RWMA. moderate LVH. Mild LAE. 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 There was a medium-sized patent foramen ovale. Moderate aortic stenosis. Aortic root dilatation 37 mm  cardiac catheterization 3/2020  Left main: Normal.  LAD: The vessel was medium sized. There was mild plaque. There were no significant lersions. Circumflex: The vessel was normal sized and dominant, giving rise to three OM branches, a posterolateral branch, and the PDA. No significant atherosclerotic disease was seen. RCA: The vessel was normal sized and non-dominant. There was a 60% ostial stenosis that normalized with administration of intra-coronary NTG. There were no significant lesions. CARDIAC STRUCTURES: There was moderate aortic stenosis. The mean gradient was 30 mmHg. Dx: symptomatic aortic stenosis. Non-obstructive atherosclerotic plaque. Normal LVEDP. Plan: continue TAVR evaluation. TTE 6/1/21 EF 60%. Wall thickness mild-to-moderately increased, mild LVH. Grade 1 DD. Mild biatrial enlargement. Mild MR. Severe AS. Mean gradient 32 mm Hg, moderate TR                   HPI: Alvena Olszewski is a 79 yo female with CAD, paroxysmal atrial fibrillation, atrial flutter status post ablation, heart block resulting in syncope, subsequent pacemaker, chronic left bundle branch block. She has ambulatory dysfunction, hypertension and hyperlipidemia. She follows with Dr. Long Mendoza. She had a prior LAD stent. She has been chronically anticoagulated with warfarin given cost prohibitive Eliquis, and has been maintaining sinus rhythm on low-dose amiodarone. She is intolerant to atorvastatin. She had been on  Pravastatin 80 mg daily.  She last saw her cardiologist  August 2020. It was known her dyspnea workup prompted evaluation of aortic stenosis. CHELA suggested a valve area of 1.3 March 2020. TAVR was not worked up, partly due to [employer]  Also given the patient's declining functional capacity, this was deferred    5/9-5/11/21 fall. 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 Discharged to [address] [place] [address]  5/28/21 with concern for stroke-like symptoms, from [facility]. Dysarthric. Her INR was therapeutic. CT showed moderate chronic microangiopathic change. CT and MRI showed changes consistent with left thalamus  ischemia:. Lacunar stroke,  on warfarin with a  therapeutic INR, as well as severe stenosis of left common carotid. She is not a candidate for tPA given a therapeutic INR and no IR target on CTA for intervention. Aspirin was added. Neurology recommended consideration of PCSK9 inhibitor. She was not followed by Cardiology  Notes indicate at discharge pravastatin was increased however she was discharged on 80 mg previously. She was placed on a 1.5 L fluid restriction given hyponatremia. Discharge sodium 133,  improved from a low of 129  [facility]  Discharge weight :  174 lb  Discharge creatinine: 0.65  Discharge diuretics: Lasix 20 mg daily    6/16/21  Hospital follow-up. She presents from [facility]. Her daughter is here with her today  she now has a lot of deficits from her stroke. She has some dysarthria. She has lower extremity weakness, which is worse than prior. She had significant issues with arthritis of her knees. She has difficulty sometimes feeding herself. She has some right-sided weakness more than the left. She is in a wheelchair   labs 6/7/21:  Creatinine 0.74 potassium 4.5 albumin 2.6 . Na 138.  LFTs normal  TSH 6.0  Wt today: mechanical lift at the facility  We discussed the significant issues: Severe Aortic stenosis, (medical management) her stroke, severe distal left common carotid stenosis. Aggressive risk factor modification:  she is on aspirin, she is on warfarin, with therapeutic INR. Today, I added ezetimibe. She is intolerant to atorvastatin. Potentially, we could try rosuvastatin. She is on a beta-blocker, and ACE-inhibitor. I strongly encouraged adherence to low-salt diet. We talked extensively about this particularly since she is in the facility. This is not just avoiding the salt shaker. She is on low-dose furosemide 20 mg daily. She does have urinary incontinence. In the past she was on higher dose but decreased given urinary frequency. This needs to be monitored closely in conjunction with her weights  Mrs. Catalino Magana is alert and oriented x 3. She tells me she does not want any operations. Interval history  ER visits for neck and shoulder pain    12/21; 7/2022; 4/21/23 OV Dr Jagdeep Andrews  Per his note:   She is quite weak  She presents in a wheelchair  She is unable to stand easily or do much independently  There is been no evidence of A-fib, her device checks are normal  She has progressive moderate to severe aortic stenosis but does not admit to any rest symptoms. She has not had syncope  Her blood pressures are running low  Lipids are controlled  She continues on amiodarone without any significant arrhythmias by device checks  Her CHF symptoms are stable  She has been on propranolol, not prescribed by me, unclear what the diagnosis is, but considering the low blood pressure we discussed discontinuation    Blood pressure continues to run low, we stopped losartan previously  Stop propranolol, no arrhythmias on device checks, and low BP today  She has slowly progressive mod to severe AS.   6 month f/u      12/14/23  PMH: coronary artery disease, lad stent, PAF, history of atrial flutter status post ablation, second-degree heart block resulting in syncope with placement of a permanent pacemaker December 2018, ambulatory dysfunction, hypertension, hyperlipidemia    She is here for her 6-month follow-up. Her daughter her met her here. She resides at [facility]. She is most troubled by intermittent left shoulder pain. Pain medication does help. Otherwise, she is status quo. Her daughter wonders whether Xarelto would be cheaper than Eliquis. She is going to do some research and determine if this is more cost favorable. She may then be in touch with us to ask us to change. She had her pacemaker checked today. The site is satisfactory. /78  Heart rate 76 and regular by exam  Weight 177 pound. On exam, she has a persistent significant murmur of aortic stenosis and 1+ bilateral lower extremity edema  Will continue the current plan  Follow-up in 6 months, nonfasting BMP prior to      I have spent  25 minutes with Patient and family today in which greater than 50% of this time was spent in counseling/coordination of care regarding Intructions for management, Patient and family education, Importance of tx compliance and Risk factor reductions. Assessment  Diagnoses and all orders for this visit:    Chronic diastolic CHF (congestive heart failure) ([facility])  -     Basic metabolic panel; Future  -     CBC and Platelet; Future    Left shoulder pain  -     Ambulatory Referral to Cardiology    Aortic stenosis  -     Ambulatory Referral to Cardiology  -     Basic metabolic panel; Future  -     CBC and Platelet;  Future    Aortic stenosis, severe    Essential hypertension    Pacemaker    Mixed hyperlipidemia    History of placement of stent in LAD coronary artery    Anticoagulated on Coumadin    Stage 3a chronic kidney disease ([facility])    LBBB (left bundle branch block)    PAF (paroxysmal atrial fibrillation) ([facility])    Ventricular tachycardia ([facility])    Coronary artery disease involving native coronary artery of native heart without angina pectoris    Congestive heart failure, unspecified HF chronicity, unspecified heart failure type ([address])  -     furosemide (LASIX) 40 mg tablet; Take 0.5 tablets (20 mg total) by mouth daily Alternate 20mg with 40mg every other day    Type 2 diabetes mellitus with stage 3a chronic kidney disease, without long-term current use of insulin ([facility])    Severe obesity (BMI 35.0-39. 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 Negative for chills. Cardiovascular:  Negative for chest pain, claudication, cyanosis, dyspnea on exertion, irregular heartbeat, leg swelling, near-syncope, orthopnea, palpitations, paroxysmal nocturnal dyspnea and syncope. Respiratory:  Negative for cough and shortness of breath. Musculoskeletal:  Positive for joint pain. Ambulatory dysfunction   Gastrointestinal:  Negative for heartburn and nausea. Neurological:  Negative for dizziness, focal weakness, headaches, light-headedness and weakness. All other systems reviewed and are negative. Allergies   Allergen Reactions    Atorvastatin      Reaction unknown 10/23/2018-    Other Rash     Patient sensitive to adhesives from tape     .     Current Outpatient Medications:     Accu-Chek FastClix Lancets MISC, Check once daily, Disp: 100 each, Rfl: 3    Accu-Chek Softclix Lancets lancets, Use daily Use as instructed, Disp: 100 each, Rfl: 1    acetaminophen (TYLENOL) 325 mg tablet, Take 975 mg by mouth 3 (three) times a day, Disp: , Rfl:     amiodarone 200 mg tablet, Take 0.5 tablets (100 mg total) by mouth daily, Disp: 45 tablet, Rfl: 3    amoxicillin (AMOXIL) 500 mg capsule, Take 2,000 mg by mouth 1 HOUR PRIOR TO DENTAL PROCEDURE, Disp: , Rfl:     Aspirin Low Dose 81 MG chewable tablet, TAKE ONE TABLET BY MOUTH ONCE EVERY DAY, Disp: , Rfl:     Diclofenac Sodium (VOLTAREN) 1 %, Apply 2 g topically 4 (four) times a day Bilateral shoulders and knees, Disp: 150 g, Rfl: 0    Eliquis 5 MG, TAKE ONE TABLET BY MOUTH TWICE DAILY, Disp: 180 tablet, Rfl: 3    ezetimibe (ZETIA) 10 mg tablet, Take 1 tablet (10 mg total) by mouth daily, Disp: 30 tablet, Rfl: 3    famotidine (PEPCID) 20 mg tablet, Take 20 mg by mouth 2 (two) times a day, Disp: , Rfl:     furosemide (LASIX) 40 mg tablet, Take 0.5 tablets (20 mg total) by mouth daily Alternate 20mg with 40mg every other day, Disp: , Rfl:     glucose blood (Accu-Chek Guide) test strip, Check once daily, Disp: 100 each, Rfl: 3    ipratropium (ATROVENT) 0.06 % nasal spray, 2 sprays into each nostril 3 (three) times a day, Disp: 15 mL, Rfl: 6    isosorbide mononitrate (IMDUR) 30 mg 24 hr tablet, Take 1 tablet (30 mg total) by mouth daily, Disp: 90 tablet, Rfl: 3    levothyroxine 112 mcg tablet, Take 112 mcg by mouth every morning Take on an empty stomach, Disp: , Rfl:     Lidocaine 4 % PTCH, Apply 3 patches topically daily B/l knees, low back, Disp: , Rfl:     loperamide (IMODIUM) 2 mg capsule, Take 2 mg by mouth 4 (four) times a day as needed for diarrhea, Disp: , Rfl:     melatonin 3 mg, Take 3 mg by mouth daily at bedtime, Disp: , Rfl:     menthol-zinc oxide (Calmoseptine) 0.44-20.6 % OINT, Apply topically 2 (two) times a day, Disp: , Rfl:     Mirabegron ER (Myrbetriq) 50 MG TB24, Take 1 tablet (50 mg total) by mouth in the morning, Disp: 30 tablet, Rfl: 11    Multiple Vitamins-Minerals (CEROVITE SENIOR PO), Take 1 tablet by mouth in the morning, Disp: , Rfl:     Multiple Vitamins-Minerals (CertaVite Senior/Antioxidant) TABS, TAKE ONE TABLET BY MOUTH ONCE EVERY DAY, Disp: , Rfl:     polyethylene glycol (MIRALAX) 17 g packet, Take 17 g by mouth daily (Patient taking differently: Take 17 g by mouth if needed), Disp:  , Rfl: 0    pravastatin (PRAVACHOL) 80 mg tablet, TAKE ONE TABLET BY MOUTH ONCE EVERY DAY FOR CHOLESTEROL, Disp: , Rfl:     Theratears 0.25 % SOLN, instill ONE drop into each eye three times a day, Disp: , Rfl:     traMADol (ULTRAM) 50 mg tablet, Take 50 mg by mouth every 8 (eight) hours as needed, Disp: , Rfl:     polyethylene glycol (GLYCOLAX) 17 GM/SCOOP, MIX 17 GRAMS (1 CAPFUL) OF POWDER IN 8 OUNCES OF LIQUID AND DRINK ONCE DAILY FOR CONSTIPATON AS NEEDED (Patient not taking: Reported on 12/14/2023), Disp: , Rfl:     Zinc Oxide POWD, Use 1 Application as needed (with soilage) (Patient not taking: Reported on 12/14/2023), Disp: , Rfl:     Social History     Socioeconomic History    Marital status: /Civil Union     Spouse name: Not on file    Number of children: Not on file    Years of education: Not on file    Highest education level: Not on file   Occupational History    Not on file   Tobacco Use    Smoking status: Never    Smokeless tobacco: Never   Vaping Use    Vaping status: Never Used   Substance and Sexual Activity    Alcohol use: Yes     Comment: occasional    Drug use: Not Currently     Types: Marijuana     Comment: MEDICAL MARIJUANA-HAS NOT USED FOR 3 WEEKS    Sexual activity: Not on file   Other Topics Concern    Not on file   Social History Narrative    Not on file     Social Determinants of Health     Financial Resource Strain: Not on file   Food Insecurity: No Food Insecurity (6/28/2021)    Hunger Vital Sign     Worried About Running Out of Food in the Last Year: Never true     Ran Out of Food in the Last Year: Never true   Transportation Needs: No Transportation Needs (6/28/2021)    PRAPARE - Transportation     Lack of Transportation (Medical): No     Lack of Transportation (Non-Medical): No   Physical Activity: Not on file   Stress: Not on file   Social Connections: Not on file   Intimate Partner Violence: Not on file   Housing Stability: Not on file       Family History   Problem Relation Age of Onset    Parkinsonism Sister     Cancer Sister         unknown type       Physical Exam  Vitals and nursing note reviewed. Constitutional:       General: She is not in acute distress. Appearance: She is not diaphoretic. HENT:      Head: Normocephalic and atraumatic. Eyes:      Conjunctiva/sclera: Conjunctivae normal.   Cardiovascular:      Rate and Rhythm: Normal rate and regular rhythm. Pulses: Intact distal pulses. Heart sounds: Murmur heard. Harsh midsystolic murmur is present with a grade of 3/6 at the upper right sternal border radiating to the neck. Pulmonary:      Effort: Pulmonary effort is normal.      Breath sounds: Normal breath sounds. Abdominal:      General: Bowel sounds are normal.      Palpations: Abdomen is soft. Musculoskeletal:         General: Normal range of motion. Cervical back: Normal range of motion and neck supple. Right lower leg: Edema present. Left lower leg: Edema present. Skin:     General: Skin is warm and dry. Neurological:      Mental Status: She is alert and oriented to person, place, and time. Comments: Dysarthria         Vitals: Blood pressure 146/78, pulse 76, height 5' 3" (1.6 m), weight 80.3 kg (177 lb), SpO2 96%, not currently breastfeeding.    Wt Readings from Last 3 Encounters:   12/14/23 80.3 kg (177 lb)   10/05/23 87.8 kg (193 lb 9 oz)   09/27/23 81.6 kg (179 lb 14.3 oz)         Labs & Results:  Lab Results   Component Value Date    WBC 5.76 10/15/2023    HGB 12.8 10/15/2023    HCT 38.2 10/15/2023    MCV 98 10/15/2023     10/15/2023     BNP   Date Value Ref Range Status 05/15/2015 211 (H) 0 - 99 pg/mL Final     Comment:     The above 1 analytes were performed by [facility] [id number]     2015 186 (H) 0 - 99 pg/mL Final     Comment:     The above 1 analytes were performed by [facility] [id number]     10/21/2014 233 (H) 0 - 100 pg/mL Final     Comment:     The above 1 analytes were performed by [address] [place] [address]       No components found for: "CHEM"    Results for orders placed during the hospital encounter of 21    Echo complete with contrast if indicated    Narrative  [address] [facility] [address]  [address] [place] [address],  [facility]  [phone]    Transthoracic Echocardiogram  2D, M-mode, Doppler, and Color Doppler    Study date:  2021    Patient: Rosamaria Mann  MR number: [id number]  Account number: [de-identified]  : [id number]  Age: 80 years  Gender: Female  Status: Inpatient  Location: Bedside  Height: 59 in  Weight: 173.6 lb  BP: 160/ 62 mmHg    Indications: CVA. Diagnoses: I63.9 - Cerebral infarction, unspecified    Sonographer:  Jamie Cifuentes RDCS  Interpreting Physician:  Manjeet Astudillo MD  Primary Physician:  Jillian Salinas MD  Referring Physician:  Donato Da Silva DO  Group:  [facility]  Cardiology Fellow:  Kimberli Kaur DO    SUMMARY    LEFT VENTRICLE:  Systolic function was normal by visual assessment. Ejection fraction was estimated to be 60 %. There were no regional wall motion abnormalities. Wall thickness was mildly to moderately increased. There was mild concentric hypertrophy. Doppler parameters were consistent with abnormal left ventricular relaxation (grade 1 diastolic dysfunction). LEFT ATRIUM:  The atrium was mildly dilated. RIGHT ATRIUM:  The atrium was mildly dilated. MITRAL VALVE:  There was mild annular calcification. There was mild regurgitation. AORTIC VALVE:  The valve was trileaflet. Leaflets exhibited moderately to markedly increased thickness, moderate calcification, and markedly reduced cuspal separation. There was severe stenosis. There was trace regurgitation. Valve mean gradient was 32 mmHg. The aortic valve obstructive index (by VTI) was 0.22. Estimated aortic valve area (by VTI) was 0.76 cmï¾². TRICUSPID VALVE:  There was moderate regurgitation. PULMONIC VALVE:  There was trace regurgitation. HISTORY: PRIOR HISTORY: DM2. CAD. Hypertension. LBBB. Atrial fibrillation. V-tach. Cardiomyopathy. Cardiac stents. Pacemaker. Hyperlipidemia. PROCEDURE: The procedure was performed at the bedside. This was a routine study. The transthoracic approach was used. The study included complete 2D imaging, M-mode, complete spectral Doppler, and color Doppler. The heart rate was 79 bpm,  at the start of the study. Echocardiographic views were limited due to poor acoustic window availability and decreased penetration. This was a technically difficult study. LEFT VENTRICLE: Size was normal. Systolic function was normal by visual assessment. Ejection fraction was estimated to be 60 %. There were no regional wall motion abnormalities. Wall thickness was mildly to moderately increased. There was  mild concentric hypertrophy. DOPPLER: Doppler parameters were consistent with abnormal left ventricular relaxation (grade 1 diastolic dysfunction). RIGHT VENTRICLE: The size was normal. Systolic function was normal. A pacing wire was present in the ventricular cavity. LEFT ATRIUM: The atrium was mildly dilated. RIGHT ATRIUM: The atrium was mildly dilated. MITRAL VALVE: There was mild annular calcification. There was normal leaflet separation. DOPPLER: The transmitral velocity was within the normal range. There was no evidence for stenosis. There was mild regurgitation. AORTIC VALVE: The valve was trileaflet.  Leaflets exhibited moderately to markedly increased thickness, moderate calcification, and markedly reduced cuspal separation. DOPPLER: There was severe stenosis. There was trace regurgitation. TRICUSPID VALVE: The valve structure was normal. There was normal leaflet separation. DOPPLER: The transtricuspid velocity was within the normal range. There was no evidence for stenosis. There was moderate regurgitation. PULMONIC VALVE: Leaflets exhibited normal thickness, no calcification, and normal cuspal separation. DOPPLER: The transpulmonic velocity was within the normal range. There was trace regurgitation. PERICARDIUM: There was no pericardial effusion. AORTA: The root exhibited normal size. SYSTEMIC VEINS: IVC: The inferior vena cava was normal in size and course.  Respirophasic changes were normal.    MEASUREMENT TABLES    2D MEASUREMENTS  LVOT   (Reference normals)  Diam   21 mm   (--)    DOPPLER MEASUREMENTS  LVOT   (Reference normals)  Peak lon   88 cm/s   (--)  Mean lon   60 cm/s   (--)  VTI   16.5 cm   (--)  Peak gradient   3 mmHg   (--)  Mean gradient   1.6 mmHg   (--)  Stroke vol   57.15 ml   (--)  Aortic valve   (Reference normals)  Peak lon   379 cm/s   (--)  Mean lon   266 cm/s   (--)  VTI   75 cm   (--)  Peak gradient   57 mmHg   (--)  Mean gradient   32 mmHg   (--)  Obstr index, VTI   0.22    (--)  Valve area, VTI   0.76 cmï¾²   (--)  Area index, VTI   0.44 cmï¾²/mï¾²   (--)  Obstr index, Vmax   0.23    (--)  Valve area, Vmax   0.8 cmï¾²   (--)  Area index, Vmax   0.46 cmï¾²/mï¾²   (--)  Obstr index, Vmean   0.23    (--)  Valve area, Vmean   0.8 cmï¾²   (--)  Area index, Vmean   0.46 cmï¾²/mï¾²   (--)    SYSTEM MEASUREMENT TABLES    2D  %FS: 32.25 %  Ao Diam: 3.59 cm  EDV(Teich): 82.16 ml  EF(Teich): 60.8 %  ESV(Teich): 32.21 ml  IVSd: 1.22 cm  LA Area: 22.4 cm2  LA Diam: 3.2 cm  LVEDV MOD A4C: 87.1 ml  LVEF MOD A4C: 55.18 %  LVESV MOD A4C: 39.04 ml  LVIDd: 4.28 cm  LVIDs: 2.9 cm  LVLd A4C: 7.01 cm  LVLs A4C: 6.13 cm  LVOT Diam: 2.13 cm  LVPWd: 1.22 cm  RA Area: 18.99 cm2  RVIDd: 3.97 cm  SV MOD A4C: 48.07 ml  SV(Teich): 49.96 ml    CW  AV Env. Ti: 279.73 ms  AV VTI: 70.78 cm  AV Vmax: 3.59 m/s  AV Vmean: 2.53 m/s  AV maxP.72 mmHg  AV meanP.85 mmHg  TR Vmax: 2.73 m/s  TR maxP.79 mmHg    MM  TAPSE: 2.08 cm    PW  JN (VTI): 0.83 cm2  JN Vmax: 0.87 cm2  AVAI (VTI): 0 cm2/m2  AVAI Vmax: 0 cm2/m2  E' Sept: 0.05 m/s  E/E' Sept: 19.74  LVOT Env. Ti: 281.64 ms  LVOT VTI: 16.38 cm  LVOT Vmax: 0.87 m/s  LVOT Vmean: 0.58 m/s  LVOT maxPG: 3.06 mmHg  LVOT meanP.55 mmHg  LVSI Dopp: 33.65 ml/m2  LVSV Dopp: 58.55 ml  MV A Steven: 0.94 m/s  MV Dec [place]: 4.02 m/s2  MV DecT: 225.66 ms  MV E Steven: 0.91 m/s  MV E/A Ratio: 0.97  MV PHT: 65.44 ms  MVA By PHT: 3.36 cm2    [employer] [facility]    Prepared and electronically signed by    August Boyce MD  Signed 2021 09:56:20    No results found for this or any previous visit. This note was completed in part utilizing milabent direct voice recognition software. Grammatical errors, random word insertion, spelling mistakes, and incomplete sentences may be an occasional consequence of the system secondary to software limitations, ambient noise and hardware issues. At the time of dictation, efforts were made to edit, clarify and /or correct errors. Please read the chart carefully and recognize, using context, where substitutions have occurred.   If you have any questions or concerns about the context, text or information contained within the body of this dictation, please contact myself, the provider, for further clarification

## 2023-12-14 ENCOUNTER — OFFICE VISIT (OUTPATIENT)
Dept: CARDIOLOGY CLINIC | Facility: CLINIC | Age: 88
End: 2023-12-14
Payer: MEDICARE

## 2023-12-14 ENCOUNTER — IN-CLINIC DEVICE VISIT (OUTPATIENT)
Dept: CARDIOLOGY CLINIC | Facility: CLINIC | Age: 88
End: 2023-12-14
Payer: MEDICARE

## 2023-12-14 VITALS
HEIGHT: 63 IN | HEART RATE: 76 BPM | OXYGEN SATURATION: 96 % | BODY MASS INDEX: 31.36 KG/M2 | DIASTOLIC BLOOD PRESSURE: 78 MMHG | SYSTOLIC BLOOD PRESSURE: 146 MMHG | WEIGHT: 177 LBS

## 2023-12-14 DIAGNOSIS — I25.10 CORONARY ARTERY DISEASE INVOLVING NATIVE CORONARY ARTERY OF NATIVE HEART WITHOUT ANGINA PECTORIS: Chronic | ICD-10-CM

## 2023-12-14 DIAGNOSIS — I50.32 CHRONIC DIASTOLIC CHF (CONGESTIVE HEART FAILURE) (HCC): Primary | ICD-10-CM

## 2023-12-14 DIAGNOSIS — Z95.0 PRESENCE OF PERMANENT CARDIAC PACEMAKER: Primary | ICD-10-CM

## 2023-12-14 DIAGNOSIS — I35.0 AORTIC STENOSIS: ICD-10-CM

## 2023-12-14 DIAGNOSIS — I44.7 LBBB (LEFT BUNDLE BRANCH BLOCK): ICD-10-CM

## 2023-12-14 DIAGNOSIS — N18.31 TYPE 2 DIABETES MELLITUS WITH STAGE 3A CHRONIC KIDNEY DISEASE, WITHOUT LONG-TERM CURRENT USE OF INSULIN (HCC): ICD-10-CM

## 2023-12-14 DIAGNOSIS — I35.0 AORTIC STENOSIS, SEVERE: ICD-10-CM

## 2023-12-14 DIAGNOSIS — E66.01 SEVERE OBESITY (BMI 35.0-39.9) WITH COMORBIDITY (HCC): ICD-10-CM

## 2023-12-14 DIAGNOSIS — E78.2 MIXED HYPERLIPIDEMIA: Chronic | ICD-10-CM

## 2023-12-14 DIAGNOSIS — N18.31 STAGE 3A CHRONIC KIDNEY DISEASE (HCC): ICD-10-CM

## 2023-12-14 DIAGNOSIS — I47.20 VENTRICULAR TACHYCARDIA (HCC): ICD-10-CM

## 2023-12-14 DIAGNOSIS — I48.0 PAF (PAROXYSMAL ATRIAL FIBRILLATION) (HCC): ICD-10-CM

## 2023-12-14 DIAGNOSIS — I10 ESSENTIAL HYPERTENSION: Chronic | ICD-10-CM

## 2023-12-14 DIAGNOSIS — Z79.01 ANTICOAGULATED ON COUMADIN: ICD-10-CM

## 2023-12-14 DIAGNOSIS — M25.512 LEFT SHOULDER PAIN: ICD-10-CM

## 2023-12-14 DIAGNOSIS — Z95.5 HISTORY OF PLACEMENT OF STENT IN LAD CORONARY ARTERY: Chronic | ICD-10-CM

## 2023-12-14 DIAGNOSIS — E11.22 TYPE 2 DIABETES MELLITUS WITH STAGE 3A CHRONIC KIDNEY DISEASE, WITHOUT LONG-TERM CURRENT USE OF INSULIN (HCC): ICD-10-CM

## 2023-12-14 DIAGNOSIS — I50.9 CONGESTIVE HEART FAILURE, UNSPECIFIED HF CHRONICITY, UNSPECIFIED HEART FAILURE TYPE (HCC): ICD-10-CM

## 2023-12-14 DIAGNOSIS — Z95.0 PACEMAKER: ICD-10-CM

## 2023-12-14 PROCEDURE — 99214 OFFICE O/P EST MOD 30 MIN: CPT | Performed by: NURSE PRACTITIONER

## 2023-12-14 PROCEDURE — 93280 PM DEVICE PROGR EVAL DUAL: CPT | Performed by: STUDENT IN AN ORGANIZED HEALTH CARE EDUCATION/TRAINING PROGRAM

## 2023-12-14 RX ORDER — FUROSEMIDE 40 MG/1
20 TABLET ORAL DAILY
Start: 2023-12-14

## 2023-12-14 NOTE — PROGRESS NOTES
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 (>40% AVB/DEPENDENT; DDDR 60). ALL LEAD PARAMETERS WITHIN NORMAL LIMITS. NO SIGNIFICANT HIGH RATE EPISODES. NO PROGRAMMING CHANGES MADE TO DEVICE PARAMETERS. PT SEEN TODAY IN OFFICE BY FERMIN Gutierrez. NORMAL DEVICE FUNCTION.  PAS/ES

## 2023-12-14 NOTE — PATIENT INSTRUCTIONS
DASH Eating Plan   WHAT YOU NEED TO KNOW:   The DASH (Dietary Approaches to Stop Hypertension) Eating Plan is designed to help prevent or lower high blood pressure. It can also help to lower LDL (bad) cholesterol and decrease your risk for heart disease. The plan is low in sodium, sugar, unhealthy fats, and total fat. It is high in potassium, calcium, magnesium, and fiber. These nutrients are added when you eat more fruits, vegetables, and whole grains. With the DASH eating plan, you need to eat a certain number of servings from each food group. This will help you get enough of certain nutrients and limit others. The amount of servings you should eat depends on how many calories you need. Your dietitian can help you create meal plans with the right number of servings for each food group. DISCHARGE INSTRUCTIONS:   What you need to know about sodium:  Your dietitian will tell you how much sodium is safe for you to have each day. People with high blood pressure should have no more than 1,500 to 2,300 mg of sodium in a day. A teaspoon (tsp) of salt has 2,300 mg of sodium. This may seem like a difficult goal, but small changes to the foods you eat can make a big difference. Your healthcare provider or dietitian can help you create a meal plan that follows your sodium limit. Read food labels. Food labels can help you choose foods that are low in sodium. The amount of sodium is listed in milligrams (mg). The % Daily Value (DV) column tells you how much of your daily needs are met by 1 serving of the food for each nutrient listed. Choose foods that have less than 5% of the DV of sodium. These foods are considered low in sodium. Foods that have 20% or more of the DV of sodium are considered high in sodium. Avoid foods that have more than 300 mg of sodium in each serving. Choose foods that say low-sodium, reduced-sodium, or no salt added on the food label. Limit added salt.   Do not salt food at the table if you add salt when you cook. Use herbs and spices, such as onions, garlic, and salt-free seasonings to add flavor. Try lemon or lime juice or vinegar to add a tart flavor. Use hot peppers or a small amount of hot pepper sauce to add a spicy flavor. Limit foods high in added salt, such as the following:    Seasonings made with salt, such as garlic salt, celery salt, onion salt, seasoned salt, meat tenderizers, and monosodium glutamate (MSG)    Miso soup and canned or dried soup mixes    Regular soy sauce, barbecue sauce, teriyaki sauce, steak sauce, Worcestershire sauce, and most flavored vinegars    Snack foods, such as salted chips, popcorn, pretzels, pork rinds, salted crackers, and salted nuts    Frozen foods, such as dinners, entrees, vegetables with sauces, and breaded meats    Ask about salt substitutes. Ask your healthcare provider if you may use salt substitutes. Some salt substitutes have ingredients that can be harmful if you have certain health conditions. Choose foods carefully at restaurants. Meals from restaurants, especially fast food restaurants, are often high in sodium. Some restaurants have nutrition information that tells you the amount of sodium in their foods. Ask to have your food prepared with less, or no salt. What you need to know about fats:  Healthy fats include unsaturated fats and omega-3 fatty acids. Unhealthy fats include saturated fats and trans fats.   Include healthy fats, such as the following:      Cooking oils, such as soybean, canola, olive, or sunflower    Fatty fish, such as salmon, tuna, mackerel, or sardines    Flaxseed oil or ground flaxseed    ½ cup of cooked beans, such as black beans, kidney beans, or clemons beans    1½ ounces of low-sodium nuts, such as almonds or walnuts    Low-sugar, low-sodium peanut butter    Seeds such as jojo seeds or sunflower seeds       Limit or do not have unhealthy fats, such as the following:      Foods that contain fat from animals, such as fatty meats, whole milk, butter, and cream    Shortening, stick margarine, palm oil, and coconut oil    Full-fat or creamy salad dressing    Creamy soup    Crackers, chips, and baked goods made with margarine or shortening    Foods that are fried in unhealthy fats    Gravy and sauces, such as Alex or cheese sauces    What you need to know about carbohydrates (carbs): All carbs break down into sugar. Complex carbs contain more fiber than simple carbs. This means complex carbs go into the bloodstream more slowly and cause less of a blood sugar spike. Try to include more complex carbs and fewer simple carbs. Include complex carbs, such as the followin slice of whole-grain bread    1 ounce of dry cereal that does not contain added sugar    ½ cup of cooked oatmeal    2 ounces of cooked whole-grain pasta    ½ cup of cooked brown rice    Limit or do not have simple carbs, such as the following:      [employer], such as doughnuts, pastries, and cookies    Mixes for cornbread and biscuits    White rice and pasta mixes, such as boxed macaroni and cheese    Instant and cold cereals that contain sugar    Jelly, jam, and ice cream that contain sugar    Condiments such as ketchup    Drinks high in sugar, such as soft drinks, lemonade, and fruit juice    What you need to know about vegetables and fruits:  Vegetables and fruits can be fresh, frozen, or canned. If possible, try to choose low-sodium canned options.   Include a variety of vegetables and fruits, such as the followin medium apple, pear, or peach (about ½ cup chopped)    ½ small banana    ½ cup berries, such as blueberries, strawberries, or blackberries    1 cup of raw leafy greens, such as lettuce, spinach, kale, or keyona greens    ½ cup of frozen or canned (no added salt) vegetables, such as green beans    ½ cup of fresh, frozen, or canned fruit (canned in light syrup or fruit juice)    ½ cup of vegetable or fruit juice    Limit or do not have vegetables and fruits made in the following ways:      Frozen fruit such as cherries that have added sugar    Fruit in cream or butter sauce    Canned vegetables that are high in sodium    Sauerkraut, pickled vegetables, and other foods prepared in brine    Fried vegetables or vegetables in butter or high-fat sauces    What you need to know about protein foods: Include lean or low-fat protein foods, such as the following:      Poultry (chicken, turkey) with no skin    Fish (especially fatty fish, such as salmon, fresh tuna, or mackerel)    Lean beef and pork (loin, round, extra lean hamburger)    Egg whites and egg substitutes    1 cup of nonfat (skim) or 1% milk    1½ ounces of fat-free or low-fat cheese    6 ounces of nonfat or low-fat yogurt    Limit or do not have high-fat protein foods, such as the following:      Smoked or cured meat, such as corned beef, salomon, ham, hot dogs, and sausage    Canned beans and canned meats or spreads, such as potted meats, sardines, anchovies, and imitation seafood    Deli or lunch meats, such as bologna, ham, turkey, and roast beef    High-fat meat (T-bone steak, regular hamburger, and ribs)    Whole eggs and egg yolks    Whole milk, 2% milk, and cream    Regular cheese and processed cheese    Other guidelines to follow:   Maintain a healthy weight. Your risk for heart disease is higher if you have extra weight. Ask your healthcare provider what a healthy weight is for you. Your provider may suggest that you lose weight. You can lose weight by eating fewer calories and foods that have added sugars and fat. The DASH meal plan can help you do this. Decrease calories by eating smaller portions at each meal and fewer snacks. Ask your provider for more information about how to lose weight. Exercise regularly. Regular exercise can help you reach or maintain a healthy weight.  Regular exercise can also help decrease your blood pressure and improve your cholesterol levels. Get 30 minutes or more of moderate exercise each day of the week. To lose weight, get at least 60 minutes of exercise. Talk to your healthcare provider about the best exercise program for you. Limit alcohol. Women should limit alcohol to 1 drink a day. Men should limit alcohol to 2 drinks a day. A drink of alcohol is 12 ounces of beer, 5 ounces of wine, or 1½ ounces of liquor. For more information:   [employer] [facility] and [address]  [id number]  [place] [address]  Phone: [phone]  Web Address: [facility]no    © Copyright [employer] 2023 Information is for End User's use only and may not be sold, redistributed or otherwise used for commercial purposes. The above information is an  only. It is not intended as medical advice for individual conditions or treatments. Talk to your doctor, nurse or pharmacist before following any medical regimen to see if it is safe and effective for you.

## 2023-12-14 NOTE — LETTER
December 14, 2023     Aimee Noonan, 49 [facility]  [address] [facility] [address]    Patient: Maria Alejandra Yost   YOB: 1929   Date of Visit: 12/14/2023       Dear Dr. Nelda Vazquez: Thank you for referring Raymond Osorio to me for evaluation. Below are my notes for this consultation. If you have questions, please do not hesitate to call me. I look forward to following your patient along with you. Sincerely,        Sela Boeck, CRNP        CC: Claire Holes, MD Sela Boeck, [address] [facility]  12/14/2023 11:41 AM  Sign when Signing Visit  Cardiology  Follow Up Office Visit Note    Maria Alejandra Yost   80 y.o.   female   MRN: [id number]  [address]  [phone]    PCP: Aimee Noonan MD  Cardiologist : Dr Amanda Mari            Summary of Recommendations  Continue the current plan  Follow up will be scheduled with Dr Amanda Mari in 6 months, BMP prior to        Impression/plan  Chronic diastolic heart failure. Low-salt diet, continue to monitor weights  Wt Readings from Last 3 Encounters:   12/14/23 80.3 kg (177 lb)   10/05/23 87.8 kg (193 lb 9 oz)   09/27/23 81.6 kg (179 lb 14.3 oz)     --Beta-blocker:   OFF given low BP  --Diuretic:   Furosemide 20 mg daily alt with 40 mg QOD. Continue  --ACE/ARB/ARNI:   OFF given low BP  --2 g sodium diet, 1500 cc fluid restriction. Daily weights. Can take Lasix 40 mg/d PRN for wt gain 3 lb/ 24 hrs  Aortic stenosis, severe. Medical management. Does not want surgery; No further echoes  Ventricular tachycardia  No recurrence on Amio.   CAD. Prior LAD stent 2014. On aspirin, statin, beta-blocker, nitrate  No sx  Hx advanced HB/Syncope/S/P MDT PPM  12/18  Interrogation 9/13/23. AP 20%  100%. No sign. high rates  Paroxysmal atrial fibrillation   rate controlled with metoprolol succinate.    Heart rate 76   Antiarrhythmic therapy with amiodarone 200 mg daily, maintaining normal sinus rhythm. TSH 10/24/2023 6.1, free T4 1.25. LFTs normal.  On anticoagulation with Eliquis 5 mg BID ( normal renal fxn; BMI 34)  History atrial flutter status post ablation  Hypertension, essential. /78. On loop diuretic, isosorbide. OFF BB/ ARB given low BP  Hyperlipidemia. On  Pravastatin 80 mg/d, zetia 10 mg/d   intolerance to atorvastatin  10/24/23 LDL 62, at goal  PVD/carotid disease  CT 5/28/21: Severe stenosis of the left distal common carotid artery and proximal cervical internal carotid artery. 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 EF 60%. No RWMA. moderate LVH. Mild LAE. 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 There was a medium-sized patent foramen ovale. Moderate aortic stenosis. Aortic root dilatation 37 mm  cardiac catheterization 3/2020  Left main: Normal.  LAD: The vessel was medium sized. There was mild plaque. There were no significant lersions. Circumflex: The vessel was normal sized and dominant, giving rise to three OM branches, a posterolateral branch, and the PDA. No significant atherosclerotic disease was seen. RCA: The vessel was normal sized and non-dominant. There was a 60% ostial stenosis that normalized with administration of intra-coronary NTG. There were no significant lesions. CARDIAC STRUCTURES: There was moderate aortic stenosis. The mean gradient was 30 mmHg. Dx: symptomatic aortic stenosis. Non-obstructive atherosclerotic plaque. Normal LVEDP. Plan: continue TAVR evaluation. TTE 6/1/21 EF 60%. Wall thickness mild-to-moderately increased, mild LVH. Grade 1 DD. Mild biatrial enlargement. Mild MR. Severe AS.  Mean gradient 32 mm Hg, moderate TR                   HPI: Alvena Olszewski is a 81 yo female with CAD, paroxysmal atrial fibrillation, atrial flutter status post ablation, heart block resulting in syncope, subsequent pacemaker, chronic left bundle branch block. She has ambulatory dysfunction, hypertension and hyperlipidemia. She follows with Dr. Ben Arce. She had a prior LAD stent. She has been chronically anticoagulated with warfarin given cost prohibitive Eliquis, and has been maintaining sinus rhythm on low-dose amiodarone. She is intolerant to atorvastatin. She had been on  Pravastatin 80 mg daily. She last saw her cardiologist  August 2020. It was known her dyspnea workup prompted evaluation of aortic stenosis. CHELA suggested a valve area of 1.3 March 2020. TAVR was not worked up, partly due to [employer]  Also given the patient's declining functional capacity, this was deferred    5/9-5/11/21 fall. 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 Discharged to [address] [place] [address]  5/28/21 with concern for stroke-like symptoms, from [facility]. Dysarthric. Her INR was therapeutic. CT showed moderate chronic microangiopathic change. CT and MRI showed changes consistent with left thalamus  ischemia:. Lacunar stroke,  on warfarin with a  therapeutic INR, as well as severe stenosis of left common carotid. She is not a candidate for tPA given a therapeutic INR and no IR target on CTA for intervention. Aspirin was added. Neurology recommended consideration of PCSK9 inhibitor. She was not followed by Cardiology  Notes indicate at discharge pravastatin was increased however she was discharged on 80 mg previously. She was placed on a 1.5 L fluid restriction given hyponatremia. Discharge sodium 133,  improved from a low of 129  [facility]  Discharge weight :  174 lb  Discharge creatinine: 0.65  Discharge diuretics: Lasix 20 mg daily    6/16/21  Hospital follow-up. She presents from [facility].   Her daughter is here with her today  she now has a lot of deficits from her stroke. She has some dysarthria. She has lower extremity weakness, which is worse than prior. She had significant issues with arthritis of her knees. She has difficulty sometimes feeding herself. She has some right-sided weakness more than the left. She is in a wheelchair   labs 6/7/21:  Creatinine 0.74 potassium 4.5 albumin 2.6 . Na 138. LFTs normal  TSH 6.0  Wt today: mechanical lift at the facility  We discussed the significant issues: Severe Aortic stenosis, (medical management) her stroke, severe distal left common carotid stenosis. Aggressive risk factor modification:  she is on aspirin, she is on warfarin, with therapeutic INR. Today, I added ezetimibe. She is intolerant to atorvastatin. Potentially, we could try rosuvastatin. She is on a beta-blocker, and ACE-inhibitor. I strongly encouraged adherence to low-salt diet. We talked extensively about this particularly since she is in the facility. This is not just avoiding the salt shaker. She is on low-dose furosemide 20 mg daily. She does have urinary incontinence. In the past she was on higher dose but decreased given urinary frequency. This needs to be monitored closely in conjunction with her weights  Mrs. Freddy Padilla is alert and oriented x 3. She tells me she does not want any operations. Interval history  ER visits for neck and shoulder pain    12/21; 7/2022; 4/21/23 OV Dr Sola Villaseñor  Per his note:   She is quite weak  She presents in a wheelchair  She is unable to stand easily or do much independently  There is been no evidence of A-fib, her device checks are normal  She has progressive moderate to severe aortic stenosis but does not admit to any rest symptoms.   She has not had syncope  Her blood pressures are running low  Lipids are controlled  She continues on amiodarone without any significant arrhythmias by device checks  Her CHF symptoms are stable  She has been on propranolol, not prescribed by me, unclear what the diagnosis is, but considering the low blood pressure we discussed discontinuation    Blood pressure continues to run low, we stopped losartan previously  Stop propranolol, no arrhythmias on device checks, and low BP today  She has slowly progressive mod to severe AS. 6 month f/u      12/14/23  PMH: coronary artery disease, lad stent, PAF, history of atrial flutter status post ablation, second-degree heart block resulting in syncope with placement of a permanent pacemaker December 2018, ambulatory dysfunction, hypertension, hyperlipidemia    She is here for her 6-month follow-up. Her daughter her met her here. She resides at [facility]. She is most troubled by intermittent left shoulder pain. Pain medication does help. Otherwise, she is status quo. Her daughter wonders whether Xarelto would be cheaper than Eliquis. She is going to do some research and determine if this is more cost favorable. She may then be in touch with us to ask us to change. She had her pacemaker checked today. The site is satisfactory. /78  Heart rate 76 and regular by exam  Weight 177 pound. On exam, she has a persistent significant murmur of aortic stenosis and 1+ bilateral lower extremity edema  Will continue the current plan  Follow-up in 6 months, nonfasting BMP prior to      I have spent  25 minutes with Patient and family today in which greater than 50% of this time was spent in counseling/coordination of care regarding Intructions for management, Patient and family education, Importance of tx compliance and Risk factor reductions. Assessment  Diagnoses and all orders for this visit:    Chronic diastolic CHF (congestive heart failure) (HCC)  -     Basic metabolic panel; Future  -     CBC and Platelet;  Future    Left shoulder pain  -     Ambulatory Referral to Cardiology    Aortic stenosis  -     Ambulatory Referral to Cardiology  -     Basic metabolic panel; Future  -     CBC and Platelet; Future    Aortic stenosis, severe    Essential hypertension    Pacemaker    Mixed hyperlipidemia    History of placement of stent in LAD coronary artery    Anticoagulated on Coumadin    Stage 3a chronic kidney disease ([facility])    LBBB (left bundle branch block)    PAF (paroxysmal atrial fibrillation) ([facility])    Ventricular tachycardia ([facility])    Coronary artery disease involving native coronary artery of native heart without angina pectoris    Congestive heart failure, unspecified HF chronicity, unspecified heart failure type ([facility])  -     furosemide (LASIX) 40 mg tablet; Take 0.5 tablets (20 mg total) by mouth daily Alternate 20mg with 40mg every other day    Type 2 diabetes mellitus with stage 3a chronic kidney disease, without long-term current use of insulin ([facility])    Severe obesity (BMI 35.0-39. 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   • Bundle branch block, left    • Cardiomyopathy [facility])    • Chest pain 3/9/2019   • Chronic pain     chronic b/l shoulder pain   • Chronic pain of right knee 4/2/2019   • Coronary artery disease    • Diabetes mellitus ([address])    • Gait disturbance 3/2/2018   • GERD (gastroesophageal reflux disease)    • H/O atrial flutter    • History of DVT (deep vein thrombosis)    • History of pulmonary embolism    • Hyperlipidemia    • Hypertension    • Hypothyroid    • Renal calculi    • Shortness of breath        Review of Systems   Constitutional: Positive for malaise/fatigue. Negative for chills. Cardiovascular:  Negative for chest pain, claudication, cyanosis, dyspnea on exertion, irregular heartbeat, leg swelling, near-syncope, orthopnea, palpitations, paroxysmal nocturnal dyspnea and syncope. Respiratory:  Negative for cough and shortness of breath.     Musculoskeletal:  Positive for joint pain. Ambulatory dysfunction   Gastrointestinal:  Negative for heartburn and nausea. Neurological:  Negative for dizziness, focal weakness, headaches, light-headedness and weakness. All other systems reviewed and are negative. Allergies   Allergen Reactions   • Atorvastatin      Reaction unknown 10/23/2018-   • Other Rash     Patient sensitive to adhesives from tape     .     Current Outpatient Medications:   •  Accu-Chek FastClix Lancets MISC, Check once daily, Disp: 100 each, Rfl: 3  •  Accu-Chek Softclix Lancets lancets, Use daily Use as instructed, Disp: 100 each, Rfl: 1  •  acetaminophen (TYLENOL) 325 mg tablet, Take 975 mg by mouth 3 (three) times a day, Disp: , Rfl:   •  amiodarone 200 mg tablet, Take 0.5 tablets (100 mg total) by mouth daily, Disp: 45 tablet, Rfl: 3  •  amoxicillin (AMOXIL) 500 mg capsule, Take 2,000 mg by mouth 1 HOUR PRIOR TO DENTAL PROCEDURE, Disp: , Rfl:   •  Aspirin Low Dose 81 MG chewable tablet, TAKE ONE TABLET BY MOUTH ONCE EVERY DAY, Disp: , Rfl:   •  Diclofenac Sodium (VOLTAREN) 1 %, Apply 2 g topically 4 (four) times a day Bilateral shoulders and knees, Disp: 150 g, Rfl: 0  •  Eliquis 5 MG, TAKE ONE TABLET BY MOUTH TWICE DAILY, Disp: 180 tablet, Rfl: 3  •  ezetimibe (ZETIA) 10 mg tablet, Take 1 tablet (10 mg total) by mouth daily, Disp: 30 tablet, Rfl: 3  •  famotidine (PEPCID) 20 mg tablet, Take 20 mg by mouth 2 (two) times a day, Disp: , Rfl:   •  furosemide (LASIX) 40 mg tablet, Take 0.5 tablets (20 mg total) by mouth daily Alternate 20mg with 40mg every other day, Disp: , Rfl:   •  glucose blood (Accu-Chek Guide) test strip, Check once daily, Disp: 100 each, Rfl: 3  •  ipratropium (ATROVENT) 0.06 % nasal spray, 2 sprays into each nostril 3 (three) times a day, Disp: 15 mL, Rfl: 6  •  isosorbide mononitrate (IMDUR) 30 mg 24 hr tablet, Take 1 tablet (30 mg total) by mouth daily, Disp: 90 tablet, Rfl: 3  •  levothyroxine 112 mcg tablet, Take 112 mcg by mouth every morning Take on an empty stomach, Disp: , Rfl:   •  Lidocaine 4 % PTCH, Apply 3 patches topically daily B/l knees, low back, Disp: , Rfl:   •  loperamide (IMODIUM) 2 mg capsule, Take 2 mg by mouth 4 (four) times a day as needed for diarrhea, Disp: , Rfl:   •  melatonin 3 mg, Take 3 mg by mouth daily at bedtime, Disp: , Rfl:   •  menthol-zinc oxide (Calmoseptine) 0.44-20.6 % OINT, Apply topically 2 (two) times a day, Disp: , Rfl:   •  Mirabegron ER (Myrbetriq) 50 MG TB24, Take 1 tablet (50 mg total) by mouth in the morning, Disp: 30 tablet, Rfl: 11  •  Multiple Vitamins-Minerals (CEROVITE SENIOR PO), Take 1 tablet by mouth in the morning, Disp: , Rfl:   •  Multiple Vitamins-Minerals (CertaVite Senior/Antioxidant) TABS, TAKE ONE TABLET BY MOUTH ONCE EVERY DAY, Disp: , Rfl:   •  polyethylene glycol (MIRALAX) 17 g packet, Take 17 g by mouth daily (Patient taking differently: Take 17 g by mouth if needed), Disp:  , Rfl: 0  •  pravastatin (PRAVACHOL) 80 mg tablet, TAKE ONE TABLET BY MOUTH ONCE EVERY DAY FOR CHOLESTEROL, Disp: , Rfl:   •  Theratears 0.25 % SOLN, instill ONE drop into each eye three times a day, Disp: , Rfl:   •  traMADol (ULTRAM) 50 mg tablet, Take 50 mg by mouth every 8 (eight) hours as needed, Disp: , Rfl:   •  polyethylene glycol (GLYCOLAX) 17 GM/SCOOP, MIX 17 GRAMS (1 CAPFUL) OF POWDER IN 8 OUNCES OF LIQUID AND DRINK ONCE DAILY FOR CONSTIPATON AS NEEDED (Patient not taking: Reported on 12/14/2023), Disp: , Rfl:   •  Zinc Oxide POWD, Use 1 Application as needed (with soilage) (Patient not taking: Reported on 12/14/2023), Disp: , Rfl:     Social History     Socioeconomic History   • Marital status: /Civil Union     Spouse name: Not on file   • Number of children: Not on file   • Years of education: Not on file   • Highest education level: Not on file   Occupational History   • Not on file   Tobacco Use   • Smoking status: Never   • Smokeless tobacco: Never   Vaping Use   • Vaping status: Never Used   Substance and Sexual Activity   • Alcohol use: Yes     Comment: occasional   • Drug use: Not Currently     Types: Marijuana     Comment: MEDICAL MARIJUANA-HAS NOT USED FOR 3 WEEKS   • Sexual activity: Not on file   Other Topics Concern   • Not on file   Social History Narrative   • Not on file     Social Determinants of Health     Financial Resource Strain: Not on file   Food Insecurity: No Food Insecurity (6/28/2021)    Hunger Vital Sign    • Worried About Running Out of Food in the Last Year: Never true    • Ran Out of Food in the Last Year: Never true   Transportation Needs: No Transportation Needs (6/28/2021)    PRAPARE - Transportation    • Lack of Transportation (Medical): No    • Lack of Transportation (Non-Medical): No   Physical Activity: Not on file   Stress: Not on file   Social Connections: Not on file   Intimate Partner Violence: Not on file   Housing Stability: Not on file       Family History   Problem Relation Age of Onset   • Parkinsonism Sister    • Cancer Sister         unknown type       Physical Exam  Vitals and nursing note reviewed. Constitutional:       General: She is not in acute distress. Appearance: She is not diaphoretic. HENT:      Head: Normocephalic and atraumatic. Eyes:      Conjunctiva/sclera: Conjunctivae normal.   Cardiovascular:      Rate and Rhythm: Normal rate and regular rhythm. Pulses: Intact distal pulses. Heart sounds: Murmur heard. Harsh midsystolic murmur is present with a grade of 3/6 at the upper right sternal border radiating to the neck. Pulmonary:      Effort: Pulmonary effort is normal.      Breath sounds: Normal breath sounds. Abdominal:      General: Bowel sounds are normal.      Palpations: Abdomen is soft. Musculoskeletal:         General: Normal range of motion. Cervical back: Normal range of motion and neck supple. Right lower leg: Edema present. Left lower leg: Edema present.    Skin:     General: Skin is warm and dry. Neurological:      Mental Status: She is alert and oriented to person, place, and time. Comments: Dysarthria         Vitals: Blood pressure 146/78, pulse 76, height 5' 3" (1.6 m), weight 80.3 kg (177 lb), SpO2 96%, not currently breastfeeding. Wt Readings from Last 3 Encounters:   23 80.3 kg (177 lb)   10/05/23 87.8 kg (193 lb 9 oz)   23 81.6 kg (179 lb 14.3 oz)         Labs & Results:  Lab Results   Component Value Date    WBC 5.76 10/15/2023    HGB 12.8 10/15/2023    HCT 38.2 10/15/2023    MCV 98 10/15/2023     10/15/2023     BNP   Date Value Ref Range Status   05/15/2015 211 (H) 0 - 99 pg/mL Final     Comment:     The above 1 analytes [place] [facility]     2015 186 (H) 0 - 99 pg/mL Final     Comment:     The above 1 analytes were [place] [facility] [id number]     10/21/2014 233 (H) 0 - 100 pg/mL Final     Comment:     The above 1 analytes were performed by [address] [place] [address]       No components found for: "CHEM"    Results for orders placed during the hospital encounter of 21    Echo complete with contrast if indicated    Narrative  [id number] [facility] [address]  [address] [facility], [address] [place] [address]  [phone]    Transthoracic Echocardiogram  2D, M-mode, Doppler, and Color Doppler    Study date:  2021    Patient: Diane Ugalde  MR number: [id number]  Account number: [de-identified]  : 1929  Age: 80 years  Gender: Female  Status: Inpatient  Location: Bedside  Height: 59 in  Weight: 173.6 lb  BP: 160/ 62 mmHg    Indications: CVA.     Diagnoses: I63.9 - Cerebral infarction, unspecified    Sonographer:  Loraine Gonzáles RDCS  Interpreting Physician:  Donnamaria Schaumann, MD  Primary Physician:  Danish Morris MD  Referring Physician:  Kimmie Pope DO  Group:  [facility]  Cardiology Fellow:  Kristi Primrose, DO    SUMMARY    LEFT VENTRICLE:  Systolic function was normal by visual assessment. Ejection fraction was estimated to be 60 %. There were no regional wall motion abnormalities. Wall thickness was mildly to moderately increased. There was mild concentric hypertrophy. Doppler parameters were consistent with abnormal left ventricular relaxation (grade 1 diastolic dysfunction). LEFT ATRIUM:  The atrium was mildly dilated. RIGHT ATRIUM:  The atrium was mildly dilated. MITRAL VALVE:  There was mild annular calcification. There was mild regurgitation. AORTIC VALVE:  The valve was trileaflet. Leaflets exhibited moderately to markedly increased thickness, moderate calcification, and markedly reduced cuspal separation. There was severe stenosis. There was trace regurgitation. Valve mean gradient was 32 mmHg. The aortic valve obstructive index (by VTI) was 0.22. Estimated aortic valve area (by VTI) was 0.76 cmï¾². TRICUSPID VALVE:  There was moderate regurgitation. PULMONIC VALVE:  There was trace regurgitation. HISTORY: PRIOR HISTORY: DM2. CAD. Hypertension. LBBB. Atrial fibrillation. V-tach. Cardiomyopathy. Cardiac stents. Pacemaker. Hyperlipidemia. PROCEDURE: The procedure was performed at the bedside. This was a routine study. The transthoracic approach was used. The study included complete 2D imaging, M-mode, complete spectral Doppler, and color Doppler. The heart rate was 79 bpm,  at the start of the study. Echocardiographic views were limited due to poor acoustic window availability and decreased penetration. This was a technically difficult study. LEFT VENTRICLE: Size was normal. Systolic function was normal by visual assessment. Ejection fraction was estimated to be 60 %. There were no regional wall motion abnormalities. Wall thickness was mildly to moderately increased. There was  mild concentric hypertrophy.  DOPPLER: Doppler parameters were consistent with abnormal left ventricular relaxation (grade 1 diastolic dysfunction). RIGHT VENTRICLE: The size was normal. Systolic function was normal. A pacing wire was present in the ventricular cavity. LEFT ATRIUM: The atrium was mildly dilated. RIGHT ATRIUM: The atrium was mildly dilated. MITRAL VALVE: There was mild annular calcification. There was normal leaflet separation. DOPPLER: The transmitral velocity was within the normal range. There was no evidence for stenosis. There was mild regurgitation. AORTIC VALVE: The valve was trileaflet. Leaflets exhibited moderately to markedly increased thickness, moderate calcification, and markedly reduced cuspal separation. DOPPLER: There was severe stenosis. There was trace regurgitation. TRICUSPID VALVE: The valve structure was normal. There was normal leaflet separation. DOPPLER: The transtricuspid velocity was within the normal range. There was no evidence for stenosis. There was moderate regurgitation. PULMONIC VALVE: Leaflets exhibited normal thickness, no calcification, and normal cuspal separation. DOPPLER: The transpulmonic velocity was within the normal range. There was trace regurgitation. PERICARDIUM: There was no pericardial effusion. AORTA: The root exhibited normal size. SYSTEMIC VEINS: IVC: The inferior vena cava was normal in size and course.  Respirophasic changes were normal.    MEASUREMENT TABLES    2D MEASUREMENTS  LVOT   (Reference normals)  Diam   21 mm   (--)    DOPPLER MEASUREMENTS  LVOT   (Reference normals)  Peak lon   88 cm/s   (--)  Mean lon   60 cm/s   (--)  VTI   16.5 cm   (--)  Peak gradient   3 mmHg   (--)  Mean gradient   1.6 mmHg   (--)  Stroke vol   57.15 ml   (--)  Aortic valve   (Reference normals)  Peak lon   379 cm/s   (--)  Mean lon   266 cm/s   (--)  VTI   75 cm   (--)  Peak gradient   57 mmHg   (--)  Mean gradient   32 mmHg   (--)  Obstr index, VTI   0.22    (--)  Valve area, VTI   0.76 cmï¾²   (--)  Area index, VTI   0.44 cmï¾²/mï¾²   (--)  Obstr index, Vmax   0.23    (--)  Valve area, Vmax   0.8 cmï¾²   (--)  Area index, Vmax   0.46 cmï¾²/mï¾²   (--)  Obstr index, Vmean   0.23    (--)  Valve area, Vmean   0.8 cmï¾²   (--)  Area index, Vmean   0.46 cmï¾²/mï¾²   (--)    SYSTEM MEASUREMENT TABLES    2D  %FS: 32.25 %  Ao Diam: 3.59 cm  EDV(Teich): 82.16 ml  EF(Teich): 60.8 %  ESV(Teich): 32.21 ml  IVSd: 1.22 cm  LA Area: 22.4 cm2  LA Diam: 3.2 cm  LVEDV MOD A4C: 87.1 ml  LVEF MOD A4C: 55.18 %  LVESV MOD A4C: 39.04 ml  LVIDd: 4.28 cm  LVIDs: 2.9 cm  LVLd A4C: 7.01 cm  LVLs A4C: 6.13 cm  LVOT Diam: 2.13 cm  LVPWd: 1.22 cm  RA Area: 18.99 cm2  RVIDd: 3.97 cm  SV MOD A4C: 48.07 ml  SV(Teich): 49.96 ml    CW  AV Env. Ti: 279.73 ms  AV VTI: 70.78 cm  AV Vmax: 3.59 m/s  AV Vmean: 2.53 m/s  AV maxP.72 mmHg  AV meanP.85 mmHg  TR Vmax: 2.73 m/s  TR maxP.79 mmHg    MM  TAPSE: 2.08 cm    PW  JN (VTI): 0.83 cm2  JN Vmax: 0.87 cm2  AVAI (VTI): 0 cm2/m2  AVAI Vmax: 0 cm2/m2  E' Sept: 0.05 m/s  E/E' Sept: 19.74  LVOT Env. Ti: 281.64 ms  LVOT VTI: 16.38 cm  LVOT Vmax: 0.87 m/s  LVOT Vmean: 0.58 m/s  LVOT maxPG: 3.06 mmHg  LVOT meanP.55 mmHg  LVSI Dopp: 33.65 ml/m2  LVSV Dopp: 58.55 ml  MV A Steven: 0.94 m/s  MV Dec Rankin: 4.02 m/s2  MV DecT: 225.66 ms  MV E Steven: 0.91 m/s  MV E/A Ratio: 0.97  MV PHT: 65.44 ms  MVA By PHT: 3.36 cm2    [facility] Accredited [facility]    Prepared and electronically signed by    Sravan Powell MD  Signed 2021 09:56:20    No results found for this or any previous visit. This note was completed in part utilizing [employer] direct voice recognition software. Grammatical errors, random word insertion, spelling mistakes, and incomplete sentences may be an occasional consequence of the system secondary to software limitations, ambient noise and hardware issues. At the time of dictation, efforts were made to edit, clarify and /or correct errors.   Please read the chart carefully and recognize, using context, where substitutions have occurred.   If you have any questions or concerns about the context, text or information contained within the body of this dictation, please contact myself, the provider, for further clarification

## 2023-12-29 ENCOUNTER — HOSPITAL ENCOUNTER (EMERGENCY)
Facility: HOSPITAL | Age: 88
Discharge: HOME | End: 2023-12-29
Attending: STUDENT IN AN ORGANIZED HEALTH CARE EDUCATION/TRAINING PROGRAM
Payer: MEDICARE

## 2023-12-29 ENCOUNTER — APPOINTMENT (EMERGENCY)
Dept: CT IMAGING | Facility: HOSPITAL | Age: 88
End: 2023-12-29
Payer: MEDICARE

## 2023-12-29 ENCOUNTER — APPOINTMENT (EMERGENCY)
Dept: RADIOLOGY | Facility: HOSPITAL | Age: 88
End: 2023-12-29
Payer: MEDICARE

## 2023-12-29 VITALS
HEART RATE: 75 BPM | TEMPERATURE: 98 F | SYSTOLIC BLOOD PRESSURE: 122 MMHG | DIASTOLIC BLOOD PRESSURE: 59 MMHG | OXYGEN SATURATION: 90 %

## 2023-12-29 DIAGNOSIS — M19.019: Primary | ICD-10-CM

## 2023-12-29 DIAGNOSIS — D64.9 ANEMIA: ICD-10-CM

## 2023-12-29 LAB
APTT PPP: 36 SECONDS (ref 23–37)
BASOPHILS # BLD AUTO: 0.04 THOUSANDS/ÂΜL (ref 0–0.1)
BASOPHILS NFR BLD AUTO: 1 % (ref 0–1)
EOSINOPHIL # BLD AUTO: 0.08 THOUSAND/ÂΜL (ref 0–0.61)
EOSINOPHIL NFR BLD AUTO: 1 % (ref 0–6)
ERYTHROCYTE [DISTWIDTH] IN BLOOD BY AUTOMATED COUNT: 14.2 % (ref 11.6–15.1)
HCT VFR BLD AUTO: 33.6 % (ref 34.8–46.1)
HGB BLD-MCNC: 10.3 G/DL (ref 11.5–15.4)
IMM GRANULOCYTES # BLD AUTO: 0.03 THOUSAND/UL (ref 0–0.2)
IMM GRANULOCYTES NFR BLD AUTO: 1 % (ref 0–2)
INR PPP: 1.39 (ref 0.84–1.19)
LYMPHOCYTES # BLD AUTO: 1.06 THOUSANDS/ÂΜL (ref 0.6–4.47)
LYMPHOCYTES NFR BLD AUTO: 19 % (ref 14–44)
MCH RBC QN AUTO: 30.2 PG (ref 26.8–34.3)
MCHC RBC AUTO-ENTMCNC: 30.7 G/DL (ref 31.4–37.4)
MCV RBC AUTO: 99 FL (ref 82–98)
MONOCYTES # BLD AUTO: 0.71 THOUSAND/ÂΜL (ref 0.17–1.22)
MONOCYTES NFR BLD AUTO: 13 % (ref 4–12)
NEUTROPHILS # BLD AUTO: 3.69 THOUSANDS/ÂΜL (ref 1.85–7.62)
NEUTS SEG NFR BLD AUTO: 65 % (ref 43–75)
NRBC BLD AUTO-RTO: 0 /100 WBCS
PLATELET # BLD AUTO: 245 THOUSANDS/UL (ref 149–390)
PMV BLD AUTO: 9 FL (ref 8.9–12.7)
PROTHROMBIN TIME: 17.8 SECONDS (ref 11.6–14.5)
RBC # BLD AUTO: 3.41 MILLION/UL (ref 3.81–5.12)
WBC # BLD AUTO: 5.61 THOUSAND/UL (ref 4.31–10.16)

## 2023-12-29 PROCEDURE — 36415 COLL VENOUS BLD VENIPUNCTURE: CPT

## 2023-12-29 PROCEDURE — 71250 CT THORAX DX C-: CPT

## 2023-12-29 PROCEDURE — 99284 EMERGENCY DEPT VISIT MOD MDM: CPT

## 2023-12-29 PROCEDURE — G1004 CDSM NDSC: HCPCS

## 2023-12-29 PROCEDURE — 85730 THROMBOPLASTIN TIME PARTIAL: CPT

## 2023-12-29 PROCEDURE — 73030 X-RAY EXAM OF SHOULDER: CPT

## 2023-12-29 PROCEDURE — 85025 COMPLETE CBC W/AUTO DIFF WBC: CPT

## 2023-12-29 PROCEDURE — 85610 PROTHROMBIN TIME: CPT

## 2023-12-29 NOTE — ED PROCEDURE NOTE
Procedure  POC Cardiac US    Date/Time: 12/29/2023 3:08 PM    Performed by: Laurie Macedo MD  Authorized by: Laurie Macedo MD    Patient location:  ED  Procedure details:     Exam Type:  Educational    Indications comment:  Shoulder bruise/low O2 sat    Assessment / Evaluation for: cardiac function and pericardial effusion      Image quality: limited diagnostic      Image availability:  Images available in PACS  Patient Details:     Cardiac Rhythm:  Regular  Cardiac findings:     Echo technique: limited 2D      Views obtained: parasternal long axis, parasternal short axis, subcostal and apical      Pericardial effusion: absent    POC Lung US    Date/Time: 12/29/2023 3:09 PM    Performed by: Laurie Macedo MD  Authorized by: Laurie Macedo MD    Patient location:  ED  Procedure details:     Exam Type:  Educational    Indications: hypoxia      Assessment / Evaluation for:  Pneumothorax and pleural effusion    Structures Visualized: pleural line and rib      Exam Type: initial exam      Image quality: non-diagnostic      Image availability:  Images available in PACS  Left Hemithorax Findings:     Left pleura visualized:  Visualized    Left Hemithorax Findings: normal    Right Lung Findings:     Right pleural visualized:  Visualized    Right hemithorax findings: normal                     Laurie Macedo MD  12/29/23 1509

## 2023-12-29 NOTE — DISCHARGE INSTRUCTIONS
Please taken tylenol for pain. Please follow up with your family doctor for further evaluation of this mild anemia.

## 2024-01-04 NOTE — ED PROVIDER NOTES
History  Chief Complaint   Patient presents with    Evaluation of Abnormal Diagnostic Test     Staff at NH reports low spo2 ( 90%) pt denies any complaint. Nh also concerned about bruise on left shoulder because pt has cancer hx on the left lung .      89 year old female presenting today with concerns of large bruise the posterior left shoulder. Unknown trauma. Patient does not remember falling, and facility did not see her on the ground or with a fall. Patient states she she normally has pain in bilateral shoulders and does not have any increased ROM loss. Facility sent her due to concerns with hypoxia. States she has a lung cancer lesion in this same area and is concerned the bruise may be from this lesion enlarging. Patient denies any other symptoms.        Prior to Admission Medications   Prescriptions Last Dose Informant Patient Reported? Taking?   Accu-Chek FastClix Lancets MISC  Outside Facility (Specify) No No   Sig: Check once daily   Accu-Chek Softclix Lancets lancets  Outside Facility (Specify) No No   Sig: Use daily Use as instructed   Aspirin Low Dose 81 MG chewable tablet  Outside Facility (Specify) Yes No   Sig: TAKE ONE TABLET BY MOUTH ONCE EVERY DAY   Diclofenac Sodium (VOLTAREN) 1 %  Outside Facility (Specify) No No   Sig: Apply 2 g topically 4 (four) times a day Bilateral shoulders and knees   Eliquis 5 MG  Outside Facility (Specify) No No   Sig: TAKE ONE TABLET BY MOUTH TWICE DAILY   Lidocaine 4 % PTCH  Outside Facility (Specify) Yes No   Sig: Apply 3 patches topically daily B/l knees, low back   Mirabegron ER (Myrbetriq) 50 MG TB24  Outside Facility (Specify) No No   Sig: Take 1 tablet (50 mg total) by mouth in the morning   Multiple Vitamins-Minerals (CEROVITE SENIOR PO)  Outside Facility (Specify) Yes No   Sig: Take 1 tablet by mouth in the morning   Multiple Vitamins-Minerals (CertaVite Senior/Antioxidant) TABS  Outside Facility (Specify) Yes No   Sig: TAKE ONE TABLET BY MOUTH ONCE EVERY DAY    Theratears 0.25 % SOLN  Outside Facility (Specify) Yes No   Sig: instill ONE drop into each eye three times a day   Zinc Oxide POWD  Outside Facility (Specify) Yes No   Sig: Use 1 Application as needed (with soilage)   Patient not taking: Reported on 2023   acetaminophen (TYLENOL) 325 mg tablet  Outside Facility (Specify) Yes No   Sig: Take 975 mg by mouth 3 (three) times a day   amiodarone 200 mg tablet  Outside Facility (Specify) No No   Sig: Take 0.5 tablets (100 mg total) by mouth daily   amoxicillin (AMOXIL) 500 mg capsule  Outside Facility (Specify) Yes No   Sig: Take 2,000 mg by mouth 1 HOUR PRIOR TO DENTAL PROCEDURE   ezetimibe (ZETIA) 10 mg tablet  Outside Facility (Specify) No No   Sig: Take 1 tablet (10 mg total) by mouth daily   famotidine (PEPCID) 20 mg tablet  Outside Facility (Specify) Yes No   Sig: Take 20 mg by mouth 2 (two) times a day   furosemide (LASIX) 40 mg tablet   No No   Sig: Take 0.5 tablets (20 mg total) by mouth daily Alternate 20mg with 40mg every other day   glucose blood (Accu-Chek Guide) test strip  Outside Facility (Specify) No No   Sig: Check once daily   ipratropium (ATROVENT) 0.06 % nasal spray  Outside Facility (Specify) No No   Si sprays into each nostril 3 (three) times a day   isosorbide mononitrate (IMDUR) 30 mg 24 hr tablet  Outside Facility (Specify) No No   Sig: Take 1 tablet (30 mg total) by mouth daily   levothyroxine 112 mcg tablet  Outside Facility (Specify) Yes No   Sig: Take 112 mcg by mouth every morning Take on an empty stomach   loperamide (IMODIUM) 2 mg capsule  Outside Facility (Specify) Yes No   Sig: Take 2 mg by mouth 4 (four) times a day as needed for diarrhea   melatonin 3 mg  Outside Facility (Specify) Yes No   Sig: Take 3 mg by mouth daily at bedtime   menthol-zinc oxide (Calmoseptine) 0.44-20.6 % OINT  Outside Facility (Specify) Yes No   Sig: Apply topically 2 (two) times a day   polyethylene glycol (GLYCOLAX) 17 GM/SCOOP  Outside Facility  (Specify) Yes No   Sig: MIX 17 GRAMS (1 CAPFUL) OF POWDER IN 8 OUNCES OF LIQUID AND DRINK ONCE DAILY FOR CONSTIPATON AS NEEDED   Patient not taking: Reported on 12/14/2023   polyethylene glycol (MIRALAX) 17 g packet  Outside Facility (Specify) No No   Sig: Take 17 g by mouth daily   Patient taking differently: Take 17 g by mouth if needed   pravastatin (PRAVACHOL) 80 mg tablet  Outside Facility (Specify) Yes No   Sig: TAKE ONE TABLET BY MOUTH ONCE EVERY DAY FOR CHOLESTEROL   traMADol (ULTRAM) 50 mg tablet  Outside Facility (Specify) Yes No   Sig: Take 50 mg by mouth every 8 (eight) hours as needed      Facility-Administered Medications: None       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    Bundle branch block, left     Cardiomyopathy (HCC)     Chest pain 3/9/2019    Chronic pain     chronic b/l shoulder pain    Chronic pain of right knee 4/2/2019    Coronary artery disease     Diabetes mellitus (HCC)     Gait disturbance 3/2/2018    GERD (gastroesophageal reflux disease)     H/O atrial flutter     History of DVT (deep vein thrombosis)     History of pulmonary embolism     Hyperlipidemia     Hypertension     Hypothyroid     Renal calculi     Shortness of breath        Past Surgical History:   Procedure Laterality Date    ATRIAL ABLATION SURGERY  01/2015    CARDIAC PACEMAKER PLACEMENT  12/10/2018    Medtronic YANNA XT DR ESCOBEDO, model [id number]    CARDIOVERSION      CHELA/DCCV 10/22/2014    CARPAL TUNNEL RELEASE Right     CATARACT EXTRACTION      CORONARY ANGIOPLASTY WITH STENT PLACEMENT      HYSTERECTOMY      JOINT REPLACEMENT Right     PA AMPUTATION TOE METATARSOPHALANGEAL JOINT Left 10/11/2017    Procedure: AMPUTATION SECOND TOE;  Surgeon: Ashly Craft DPM;  Location: [facility];  Service: Podiatry    TONSILLECTOMY      TOTAL HIP ARTHROPLASTY      Right       Family History    Problem Relation Age of Onset    Parkinsonism Sister     Cancer Sister         unknown type     I have reviewed and agree with the history as documented.    E-Cigarette/Vaping    E-Cigarette Use Never User      E-Cigarette/Vaping Substances    Nicotine No     THC No     CBD No     Flavoring No     Other No     Unknown No      Social History     Tobacco Use    Smoking status: Never    Smokeless tobacco: Never   Vaping Use    Vaping status: Never Used   Substance Use Topics    Alcohol use: Yes     Comment: occasional    Drug use: Not Currently     Types: Marijuana     Comment: MEDICAL MARIJUANA-HAS NOT USED FOR 3 WEEKS       Review of Systems   Constitutional:  Negative for chills and fever.   HENT:  Negative for ear pain and sore throat.    Eyes:  Negative for pain and visual disturbance.   Respiratory:  Negative for cough and shortness of breath.    Cardiovascular:  Negative for chest pain and palpitations.   Gastrointestinal:  Negative for abdominal pain and vomiting.   Genitourinary:  Negative for dysuria and hematuria.   Musculoskeletal:  Negative for arthralgias and back pain.   Skin:  Positive for color change. Negative for pallor, rash and wound.   Neurological:  Negative for seizures and syncope.   All other systems reviewed and are negative.      Physical Exam  Physical Exam  Vitals and nursing note reviewed.   Constitutional:       General: She is not in acute distress.     Appearance: She is well-developed. She is not ill-appearing.   HENT:      Head: Normocephalic and atraumatic.   Eyes:      Conjunctiva/sclera: Conjunctivae normal.   Cardiovascular:      Rate and Rhythm: Normal rate and regular rhythm.      Heart sounds: No murmur heard.  Pulmonary:      Effort: Pulmonary effort is normal. No respiratory distress.      Breath sounds: Normal breath sounds.   Abdominal:      Palpations: Abdomen is soft.      Tenderness: There is no abdominal tenderness.   Musculoskeletal:         General: No swelling.       Cervical back: Neck supple.   Skin:     General: Skin is warm and dry.      Capillary Refill: Capillary refill takes less than 2 seconds.      Coloration: Skin is not jaundiced or pale.      Findings: Bruising (large bruise to posterior left shoulder in multiple stages of healing) present. No erythema, lesion or rash.   Neurological:      Mental Status: She is alert.   Psychiatric:         Mood and Affect: Mood normal.         Vital Signs  ED Triage Vitals [12/29/23 1337]   Temperature Pulse Resp Blood Pressure SpO2   98 °F (36.7 °C) 75 -- 122/59 94 %      Temp Source Heart Rate Source Patient Position - Orthostatic VS BP Location FiO2 (%)   Oral -- Sitting Right arm --      Pain Score       --           Vitals:    12/29/23 1337   BP: 122/59   Pulse: 75   Patient Position - Orthostatic VS: Sitting         Visual Acuity      ED Medications  Medications - No data to display    Diagnostic Studies  Results Reviewed       Procedure Component Value Units Date/Time    APTT [[id number]]  (Normal) Collected: 12/29/23 1402    Lab Status: Final result Specimen: Blood from Arm, Right Updated: 12/29/23 1443     PTT 36 seconds     Protime-INR [[id number]]  (Abnormal) Collected: 12/29/23 1402    Lab Status: Final result Specimen: Blood from Arm, Right Updated: 12/29/23 1443     Protime 17.8 seconds      INR 1.39    CBC and differential [[id number]]  (Abnormal) Collected: 12/29/23 1402    Lab Status: Final result Specimen: Blood from Arm, Right Updated: 12/29/23 1418     WBC 5.61 Thousand/uL      RBC 3.41 Million/uL      Hemoglobin 10.3 g/dL      Hematocrit 33.6 %      MCV 99 fL      MCH 30.2 pg      MCHC 30.7 g/dL      RDW 14.2 %      MPV 9.0 fL      Platelets 245 Thousands/uL      nRBC 0 /100 WBCs      Neutrophils Relative 65 %      Immat GRANS % 1 %      Lymphocytes Relative 19 %      Monocytes Relative 13 %      Eosinophils Relative 1 %      Basophils Relative 1 %      Neutrophils Absolute 3.69 Thousands/µL      Immature Grans  Absolute 0.03 Thousand/uL      Lymphocytes Absolute 1.06 Thousands/µL      Monocytes Absolute 0.71 Thousand/µL      Eosinophils Absolute 0.08 Thousand/µL      Basophils Absolute 0.04 Thousands/µL                    CT chest without contrast   Final Result by Aliza Magdaleno MD (12/29 1527)      No acute pulmonary disease.      Pulmonary artery enlargement which can be seen with pulmonary hypertension.      Severe bilateral glenohumeral degenerative disease with erosion of the glenoids and large bilateral joint effusions.         Workstation performed: [id number]         XR shoulder 2+ views LEFT   Final Result by Partha Renae MD (12/30 0630)      No acute osseous abnormality. Severe arthritic change at the left glenohumeral joint.      Workstation performed: [id number]                    Procedures  Procedures         ED Course  ED Course as of 01/04/24 1520   Fri Dec 29, 2023   1420 Hemoglobin(!): 10.3  2.5 drop since 2 months ago   1445 PTT: 36   1445 POCT INR(!): 1.39  Lower than previous. Patient not on warfarin. She is on eliquis and compliant.                                             Medical Decision Making  89 year old female presenting today with concerns of large bruise to posterior left shoulder, atraumatic.  CT scan to rule out fracture or deep abscess / other pathology.  Coags.  Lab workup reassuring.  CT showing severe degenerative glenohumeral joint with effusion and erosions. Doubt infectious, and bruise seems to be healing. Patient states she feels well enough to go home  ------------------------------------------------------------  Strict return precautions discussed. Patient at time of discharge well-appearing in no acute distress, all questions answered. Patient agreeable to plan.  Patient's vitals, lab/imaging results, diagnosis, and treatment plan were discussed with the patient. All new/changed medications were discussed with patient, specifically, route of administration, how  "often and when to take, and where they can be picked up. Strict return precautions as well as close follow up with PCP was discussed with the patient and the patient was agreeable to my recommendations.  Patient verbally acknowledged understanding of the above communications. All labs reviewed and utilized in the medical decision making process (if labs were ordered). Portions of the record may have been created with voice recognition software.  Occasional wrong word or \"sound a like\" substitutions may have occurred due to the inherent limitations of voice recognition software.  Read the chart carefully and recognize, using context, where substitutions have occurred.      Amount and/or Complexity of Data Reviewed  Labs: ordered. Decision-making details documented in ED Course.  Radiology: ordered.             Disposition  Final diagnoses:   Arthritis of shoulder region, degenerative - Bilateral glenohumeral joints   Anemia     Time reflects when diagnosis was documented in both MDM as applicable and the Disposition within this note       Time User Action Codes Description Comment    12/29/2023  3:30 PM Arun Landa Add [M19.019] Arthritis of shoulder region, degenerative     12/29/2023  3:30 PM Arun Landa Modify [M19.019] Arthritis of shoulder region, degenerative Bilateral glenohumeral joints    12/29/2023  3:30 PM Arun Landa Add [D64.9] Anemia           ED Disposition       ED Disposition   Discharge    Condition   Stable    Date/Time   Fri Dec 29, 2023  3:30 PM    Comment   Brenda Lopez discharge to home/self care.                   Follow-up Information       Follow up With Specialties Details Why Contact Info Additional Information    [facility] Emergency Department Emergency Medicine Go to  If symptoms worsen [address] [facility] [address]  [phone] [facility] Emergency Department, [address], [place], " [address]    Meliza Rodríguez MD Internal Medicine Schedule an appointment as soon as possible for a visit  As needed [address] [address] [phone]               Discharge Medication List as of 12/29/2023  5:28 PM        CONTINUE these medications which have NOT CHANGED    Details   !! Accu-Chek FastClix Lancets MISC Check once daily, Normal      !! Accu-Chek Softclix Lancets lancets Use daily Use as instructed, Starting Wed 4/7/2021, Normal      acetaminophen (TYLENOL) 325 mg tablet Take 975 mg by mouth 3 (three) times a day, Historical Med      amiodarone 200 mg tablet Take 0.5 tablets (100 mg total) by mouth daily, Starting Fri 7/2/2021, Normal      amoxicillin (AMOXIL) 500 mg capsule Take 2,000 mg by mouth 1 HOUR PRIOR TO DENTAL PROCEDURE, Historical Med      Aspirin Low Dose 81 MG chewable tablet TAKE ONE TABLET BY MOUTH ONCE EVERY DAY, Historical Med      Diclofenac Sodium (VOLTAREN) 1 % Apply 2 g topically 4 (four) times a day Bilateral shoulders and knees, Starting Thu 6/3/2021, No Print      Eliquis 5 MG TAKE ONE TABLET BY MOUTH TWICE DAILY, Normal      ezetimibe (ZETIA) 10 mg tablet Take 1 tablet (10 mg total) by mouth daily, Starting Fri 7/2/2021, Normal      famotidine (PEPCID) 20 mg tablet Take 20 mg by mouth 2 (two) times a day, Historical Med      furosemide (LASIX) 40 mg tablet Take 0.5 tablets (20 mg total) by mouth daily Alternate 20mg with 40mg every other day, Starting Thu 12/14/2023, No Print      glucose blood (Accu-Chek Guide) test strip Check once daily, Normal      ipratropium (ATROVENT) 0.06 % nasal spray 2 sprays into each nostril 3 (three) times a day, Starting Wed 7/5/2023, Normal      isosorbide mononitrate (IMDUR) 30 mg 24 hr tablet Take 1 tablet (30 mg total) by mouth daily, Starting Wed 3/17/2021, Normal      levothyroxine 112 mcg tablet Take 112 mcg by mouth every morning Take on an empty stomach, Starting Tue 3/28/2023, Historical Med      Lidocaine 4 % PTCH  Apply 3 patches topically daily B/l knees, low back, Historical Med      loperamide (IMODIUM) 2 mg capsule Take 2 mg by mouth 4 (four) times a day as needed for diarrhea, Historical Med      melatonin 3 mg Take 3 mg by mouth daily at bedtime, Historical Med      menthol-zinc oxide (Calmoseptine) 0.44-20.6 % OINT Apply topically 2 (two) times a day, Historical Med      Mirabegron ER (Myrbetriq) 50 MG TB24 Take 1 tablet (50 mg total) by mouth in the morning, Starting Fri 1/21/2022, Normal      !! Multiple Vitamins-Minerals (CEROVITE SENIOR PO) Take 1 tablet by mouth in the morning, Historical Med      !! Multiple Vitamins-Minerals (CertaVite Senior/Antioxidant) TABS TAKE ONE TABLET BY MOUTH ONCE EVERY DAY, Historical Med      polyethylene glycol (GLYCOLAX) 17 GM/SCOOP MIX 17 GRAMS (1 CAPFUL) OF POWDER IN 8 OUNCES OF LIQUID AND DRINK ONCE DAILY FOR CONSTIPATON AS NEEDED, Historical Med      polyethylene glycol (MIRALAX) 17 g packet Take 17 g by mouth daily, Starting Fri 6/4/2021, No Print      pravastatin (PRAVACHOL) 80 mg tablet TAKE ONE TABLET BY MOUTH ONCE EVERY DAY FOR CHOLESTEROL, Historical Med      Theratears 0.25 % SOLN instill ONE drop into each eye three times a day, Historical Med      traMADol (ULTRAM) 50 mg tablet Take 50 mg by mouth every 8 (eight) hours as needed, Starting u 12/16/2021, Historical Med      Zinc Oxide POWD Use 1 Application as needed (with soilage), Historical Med       !! - Potential duplicate medications found. Please discuss with provider.          No discharge procedures on file.    PDMP Review         Value Time User    PDMP Reviewed  Yes 10/5/2023  2:24 AM Yaakov Gates MD            ED Provider  Electronically Signed by             Arun Landa PA-C  01/04/24 [id number]

## 2024-01-18 NOTE — PROGRESS NOTES
Assessment  1. Localized osteoarthritis of left shoulder    2. Myofascial pain syndrome    3. Cervical radiculopathy        Plan  The patient symptoms, history/physical are consistent with pain that is multifactorial in origin.  She has definite left shoulder osteoarthritis which is the main source of her symptoms but she also has a cervical radiculopathy from underlying cervical stenosis which is contributing to symptoms and finally muscle spasms which is causing pain for her as well.  At this time, I discussed treatment I will be multimodal approach for    Advised her that I would like to schedule her for a left shoulder intra-articular steroid injection in 2 weeks which she would be amenable to doing.  This will be scheduled through her [facility].  For now, I will start her on tizanidine 2 mg at bedtime to help with spasms.  She was apprised the most common side effects including sleepiness and dizziness.    My impressions and treatment recommendations were discussed in detail with the patient who verbalized understanding and had no further questions.  Discharge instructions were provided. I personally saw and examined the patient and I agree with the above discussed plan of care.    Orders Placed This Encounter   Procedures   • [place] spine and pain procedure     Standing Status:   Future     Standing Expiration Date:   1/22/2028     Order Specific Question:   Reason for Exam:     Answer:   Left shoulder intra-articular steroid injection     Order Specific Question:   Anticoagulant hold needed?     Answer:   No     New Medications Ordered This Visit   Medications   • tiZANidine (ZANAFLEX) 2 mg tablet     Sig: Take 1 tablet (2 mg total) by mouth daily at bedtime     Dispense:  30 tablet     Refill:  2       History of Present Illness    Brenda Lopez is a 89 y.o. female who presents for consultation in regards to left-sided neck and shoulder and upper arm pain.  The patient was seen here  several years ago for cervical stenosis at which time she underwent a cervical epidural steroid injection.  She is accompanied by her daughter.  Symptoms are moderate to severe rated 8/10 on a numeric rating scale and located on the left shoulder and upper arm as well as left neck aggravated with turning her head and using her left arm for any movement.  Pain can be shooting, numb, sharp.  She feels weakness in the left arm.  She is currently on tramadol 50 mg up to 3 times a day which is mildly helping.    I have personally reviewed and/or updated the patient's past medical history, past surgical history, family history, social history, current medications, allergies, and vital signs today.     Review of Systems   Constitutional:  Negative for fever and unexpected weight change.   HENT:  Negative for trouble swallowing.    Eyes:  Negative for visual disturbance.   Respiratory:  Negative for shortness of breath and wheezing.    Cardiovascular:  Positive for leg swelling. Negative for chest pain and palpitations.   Gastrointestinal:  Negative for constipation, diarrhea, nausea and vomiting.   Endocrine: Negative for cold intolerance, heat intolerance and polydipsia.   Genitourinary:  Negative for difficulty urinating and frequency.   Musculoskeletal:  Positive for joint swelling. Negative for arthralgias, gait problem and myalgias.   Skin:  Negative for rash.   Neurological:  Negative for dizziness, seizures, syncope, weakness and headaches.   Hematological:  Does not bruise/bleed easily.   Psychiatric/Behavioral:  Negative for dysphoric mood.    All other systems reviewed and are negative.      Patient Active Problem List   Diagnosis   • Coronary artery disease involving native coronary artery of native heart without angina pectoris   • Essential hypertension   • History of placement of stent in LAD coronary artery   • Hyperlipidemia   • History of atrial flutter   • LBBB (left bundle branch block)   • Right hip  pain   • Type 2 diabetes mellitus with stage 3a chronic kidney disease, without long-term current use of insulin ([facility])   • Acquired hypothyroidism   • Adhesive capsulitis   • Chronic low back pain   • Cervical spine degeneration   • Primary osteoarthritis of both shoulders   • Tremor   • Heart block AV second degree   • Hyponatremia   • Ambulatory dysfunction   • Pacemaker   • Atrial flutter ([facility])   • Trochanteric bursitis of right hip   • Ventricular tachycardia ([facility])   • Cardiomyopathy ([facility])   • Primary osteoarthritis of both knees   • Effusion of left knee   • Chronic diastolic CHF (congestive heart failure) ([facility])   • Generalized OA   • Anticoagulated on Coumadin   • Stage 3a chronic kidney disease ([facility])   • Mixed stress and urge urinary incontinence   • Acquired absence of other left toe(s) ([facility])   • Severe obesity (BMI 35.0-39.9) with comorbidity ([facility])   • 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 ([facility])   • Avascular necrosis ([facility])   • Knee pain, chronic   • Asymptomatic bacteriuria   • Stroke-like symptoms   • Colitis   • 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[facility])   • Arthritis   • Aortic stenosis, severe   • Lightheadedness   • Malignant neoplasm of central portion of left breast in female, estrogen receptor positive    • PAF (paroxysmal atrial fibrillation) ([facility])       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   • Bundle branch block, left    • Cardiomyopathy ([facility])    • Chest pain 3/9/2019   • Chronic pain     chronic b/l shoulder pain   • Chronic pain of right knee 4/2/2019   • Coronary artery disease    • Diabetes mellitus ([facility])    • Gait disturbance 3/2/2018   • GERD (gastroesophageal reflux disease)    • H/O atrial flutter    • History of DVT (deep vein thrombosis)    • History of pulmonary embolism    • Hyperlipidemia    • Hypertension    • Hypothyroid    •  Renal calculi    • Shortness of breath        Past Surgical History:   Procedure Laterality Date   • ATRIAL ABLATION SURGERY  01/2015   • CARDIAC PACEMAKER PLACEMENT  12/10/2018    [employer] YANNA XT DR ESCOBEDO, model [id number]   • CARDIOVERSION      CHELA/DCCV 10/22/2014   • CARPAL TUNNEL RELEASE Right    • CATARACT EXTRACTION     • CORONARY ANGIOPLASTY WITH STENT PLACEMENT     • HYSTERECTOMY     • JOINT REPLACEMENT Right    • MS AMPUTATION TOE METATARSOPHALANGEAL JOINT Left 10/11/2017    Procedure: AMPUTATION SECOND TOE;  Surgeon: Ashly Craft DPM;  Location: [facility];  Service: Podiatry   • TONSILLECTOMY     • TOTAL HIP ARTHROPLASTY      Right       Family History   Problem Relation Age of Onset   • Parkinsonism Sister    • Cancer Sister         unknown type       Social History     Occupational History   • Not on file   Tobacco Use   • Smoking status: Never   • Smokeless tobacco: Never   Vaping Use   • Vaping status: Never Used   Substance and Sexual Activity   • Alcohol use: Yes     Comment: occasional   • Drug use: Not Currently     Types: Marijuana     Comment: MEDICAL MARIJUANA-HAS NOT USED FOR 3 WEEKS   • Sexual activity: Not on file       Current Outpatient Medications on File Prior to Visit   Medication Sig   • Accu-Chek FastClix Lancets MISC Check once daily   • Accu-Chek Softclix Lancets lancets Use daily Use as instructed   • acetaminophen (TYLENOL) 325 mg tablet Take 975 mg by mouth 3 (three) times a day   • amiodarone 200 mg tablet Take 0.5 tablets (100 mg total) by mouth daily   • amoxicillin (AMOXIL) 500 mg capsule Take 2,000 mg by mouth 1 HOUR PRIOR TO DENTAL PROCEDURE   • Aspirin Low Dose 81 MG chewable tablet TAKE ONE TABLET BY MOUTH ONCE EVERY DAY   • Diclofenac Sodium (VOLTAREN) 1 % Apply 2 g topically 4 (four) times a day Bilateral shoulders and knees   • Eliquis 5 MG TAKE ONE TABLET BY MOUTH TWICE DAILY   • ezetimibe (ZETIA) 10 mg tablet Take 1 tablet (10 mg total) by mouth daily   •  famotidine (PEPCID) 20 mg tablet Take 20 mg by mouth 2 (two) times a day   • furosemide (LASIX) 40 mg tablet Take 0.5 tablets (20 mg total) by mouth daily Alternate 20mg with 40mg every other day   • glucose blood (Accu-Chek Guide) test strip Check once daily   • ipratropium (ATROVENT) 0.06 % nasal spray 2 sprays into each nostril 3 (three) times a day   • isosorbide mononitrate (IMDUR) 30 mg 24 hr tablet Take 1 tablet (30 mg total) by mouth daily   • levothyroxine 112 mcg tablet Take 112 mcg by mouth every morning Take on an empty stomach   • Lidocaine 4 % PTCH Apply 3 patches topically daily B/l knees, low back   • loperamide (IMODIUM) 2 mg capsule Take 2 mg by mouth 4 (four) times a day as needed for diarrhea   • melatonin 3 mg Take 3 mg by mouth daily at bedtime   • menthol-zinc oxide (Calmoseptine) 0.44-20.6 % OINT Apply topically 2 (two) times a day   • Mirabegron ER (Myrbetriq) 50 MG TB24 Take 1 tablet (50 mg total) by mouth in the morning   • Multiple Vitamins-Minerals (CEROVITE SENIOR PO) Take 1 tablet by mouth in the morning   • Multiple Vitamins-Minerals (CertaVite Senior/Antioxidant) TABS TAKE ONE TABLET BY MOUTH ONCE EVERY DAY   • polyethylene glycol (GLYCOLAX) 17 GM/SCOOP MIX 17 GRAMS (1 CAPFUL) OF POWDER IN 8 OUNCES OF LIQUID AND DRINK ONCE DAILY FOR CONSTIPATON AS NEEDED (Patient not taking: Reported on 12/14/2023)   • polyethylene glycol (MIRALAX) 17 g packet Take 17 g by mouth daily (Patient taking differently: Take 17 g by mouth if needed)   • pravastatin (PRAVACHOL) 80 mg tablet TAKE ONE TABLET BY MOUTH ONCE EVERY DAY FOR CHOLESTEROL   • Theratears 0.25 % SOLN instill ONE drop into each eye three times a day   • traMADol (ULTRAM) 50 mg tablet Take 50 mg by mouth every 8 (eight) hours as needed   • Zinc Oxide POWD Use 1 Application as needed (with soilage) (Patient not taking: Reported on 12/14/2023)     No current facility-administered medications on file prior to visit.       Allergies    Allergen Reactions   • Atorvastatin      Reaction unknown 10/23/2018-   • Other Rash     Patient sensitive to adhesives from tape       Physical Exam    /63   Pulse 79   Wt 80.3 kg (177 lb)   LMP  (LMP Unknown)   BMI 31.35 kg/m²     Constitutional: normal, well developed, well nourished, alert, in no distress and non-toxic and no overt pain behavior.  Eyes: anicteric  HEENT: grossly intact  Neck: supple, symmetric, trachea midline and no masses   Pulmonary:even and unlabored  Cardiovascular:No edema or pitting edema present  Skin:Normal without rashes or lesions and well hydrated  Psychiatric:Mood and affect appropriate  Neurologic:Cranial Nerves II-XII grossly intact  Musculoskeletal:in wheelchair    Examination of the left shoulder reveals severe pain with flexion greater than 30 degrees, abduction greater than 30 degrees.  Examination cervical spine reveals significant muscle spasms in the left trapezius and splenius capitis muscles.  Motor strength the left upper extremity is 4/5 in the biceps and triceps left 5/5 right upper extremity    Imaging    CT CERVICAL SPINE - WITHOUT CONTRAST (10/5/2023)     INDICATION:   neck pain.     COMPARISON:  None.     TECHNIQUE:  CT examination of the cervical spine was performed without intravenous contrast.  Contiguous axial images were obtained. Multiplanar 2D reformatted images were created from the source data.     Radiation dose length product (DLP) for this visit:  455 mGy-cm .  This examination, like all CT scans performed in the [facility], was performed utilizing techniques to minimize radiation dose exposure, including the use of iterative   reconstruction and automated exposure control.     IMAGE QUALITY:  Diagnostic.     FINDINGS:     ALIGNMENT:  There is straightening of normal cervical lordosis.  No traumatic subluxation or acute compression deformity.     VERTEBRAE:  No fracture.     DEGENERATIVE CHANGES:  Moderate multilevel  cervical degenerative changes are noted. No critical central canal stenosis.     PREVERTEBRAL AND PARASPINAL SOFT TISSUES: Unremarkable     THORACIC INLET:  Normal.     Bilateral mastoid effusions.     IMPRESSION:     No cervical spine fracture or traumatic malalignment.     Multilevel degenerative changes of the cervical spine.       CT left shoulder without IV contrast (10/29076)     INDICATION: Pain.     COMPARISON: Multiple priors most recently CT 9/27/2023     TECHNIQUE: CT examination of the above was performed. This examination, like all CT scans performed [facility], was performed utilizing techniques to minimize radiation dose exposure, including the use of iterative reconstruction   and automated exposure control software.  Multiplanar 2D reformatted images were created from the source data.     Rad dose  313 mGy-cm     FINDINGS:     OSSEOUS STRUCTURES: Severe glenohumeral arthrosis with large joint effusion and remodeling of the glenoid as well as the humeral head. Mild acromioclavicular osteoarthritis.     VISUALIZED MUSCULATURE: Mild to moderate muscle atrophy. Tendons are poorly evaluated.     SOFT TISSUES:  Unremarkable.     OTHER PERTINENT FINDINGS: Left chest cardiac resynchronization device.        IMPRESSION:     Severe glenohumeral arthrosis with large joint effusion and remodeling of the glenoid as well as the humeral head. Findings may be degenerative in nature with possibly some component of neuroarthropathy.       LEFT SHOULDER (12/29/2023)     INDICATION:   Large bruise. Deep hematoma?.     COMPARISON: 10/15/2023     VIEWS:  XR SHOULDER 2+ VW LEFT  Images: 3     FINDINGS:     There is no acute fracture or dislocation.     Severe arthritic change at the glenohumeral joint appears stable compared to the prior study. Mild arthritic change at the AC joint.  No lytic or blastic osseous lesion.     Soft tissues are unremarkable. Left-sided permanent pacemaker.      IMPRESSION:     No acute osseous abnormality. Severe arthritic change at the left glenohumeral joint.

## 2024-01-22 ENCOUNTER — TELEPHONE (OUTPATIENT)
Age: 89
End: 2024-01-22

## 2024-01-22 ENCOUNTER — CONSULT (OUTPATIENT)
Dept: PAIN MEDICINE | Facility: CLINIC | Age: 89
End: 2024-01-22
Payer: MEDICARE

## 2024-01-22 VITALS
BODY MASS INDEX: 31.35 KG/M2 | DIASTOLIC BLOOD PRESSURE: 63 MMHG | SYSTOLIC BLOOD PRESSURE: 111 MMHG | WEIGHT: 177 LBS | HEART RATE: 79 BPM

## 2024-01-22 DIAGNOSIS — M19.012 LOCALIZED OSTEOARTHRITIS OF LEFT SHOULDER: Primary | ICD-10-CM

## 2024-01-22 DIAGNOSIS — M79.18 MYOFASCIAL PAIN SYNDROME: ICD-10-CM

## 2024-01-22 DIAGNOSIS — M54.12 CERVICAL RADICULOPATHY: ICD-10-CM

## 2024-01-22 PROCEDURE — 99204 OFFICE O/P NEW MOD 45 MIN: CPT | Performed by: ANESTHESIOLOGY

## 2024-01-22 RX ORDER — TIZANIDINE 2 MG/1
2 TABLET ORAL
Qty: 30 TABLET | Refills: 2 | Status: SHIPPED | OUTPATIENT
Start: 2024-01-22

## 2024-01-22 NOTE — TELEPHONE ENCOUNTER
Spoke with Elaine at [facility], she will have person call back that schedules the appts.

## 2024-01-22 NOTE — TELEPHONE ENCOUNTER
RN s/w nurse Elaine at [facility] She wanted to confirmed the order for the tizanidine and ask about the injection. Told her Tizanidine 2 mg daily at bedtime was ordered.  Told her our  will be calling [facility] to schedule the left shoulder injection.   Elaine appreciated the c/b.     [facility] ph # [phone] pls call them them to schedule the shoulder inj.

## 2024-01-22 NOTE — TELEPHONE ENCOUNTER
Caller: Elaine nurse from [facility]    Doctor: Gladis    Reason for call: Elaine has some questions in regards to the pt's office notes and injection.  If someone can please give her a call    Call back#: [phone]

## 2024-01-25 NOTE — TELEPHONE ENCOUNTER
Procedure scheduled with Diann at [facility].  Instructions given to Diann since patient does not have MyChart.

## 2024-01-25 NOTE — TELEPHONE ENCOUNTER
Caller: Brenda Aceves     Doctor: Gladis    Reason for call: Yaa calling to schedule procedure please advise     Call back#: [phone]

## 2024-02-16 ENCOUNTER — HOSPITAL ENCOUNTER (OUTPATIENT)
Dept: RADIOLOGY | Facility: CLINIC | Age: 89
End: 2024-02-16
Payer: MEDICARE

## 2024-02-16 VITALS
RESPIRATION RATE: 18 BRPM | TEMPERATURE: 98.5 F | DIASTOLIC BLOOD PRESSURE: 74 MMHG | SYSTOLIC BLOOD PRESSURE: 124 MMHG | OXYGEN SATURATION: 92 % | HEART RATE: 85 BPM

## 2024-02-16 DIAGNOSIS — M19.012 LOCALIZED OSTEOARTHRITIS OF LEFT SHOULDER: ICD-10-CM

## 2024-02-16 PROCEDURE — 77002 NEEDLE LOCALIZATION BY XRAY: CPT | Performed by: ANESTHESIOLOGY

## 2024-02-16 PROCEDURE — 20610 DRAIN/INJ JOINT/BURSA W/O US: CPT | Performed by: ANESTHESIOLOGY

## 2024-02-16 RX ORDER — 0.9 % SODIUM CHLORIDE 0.9 %
2 VIAL (ML) INJECTION ONCE
Status: COMPLETED | OUTPATIENT
Start: 2024-02-16 | End: 2024-02-16

## 2024-02-16 RX ORDER — ROPIVACAINE HYDROCHLORIDE 2 MG/ML
3 INJECTION, SOLUTION EPIDURAL; INFILTRATION; PERINEURAL ONCE
Status: COMPLETED | OUTPATIENT
Start: 2024-02-16 | End: 2024-02-16

## 2024-02-16 RX ORDER — METHYLPREDNISOLONE ACETATE 80 MG/ML
80 INJECTION, SUSPENSION INTRA-ARTICULAR; INTRALESIONAL; INTRAMUSCULAR; PARENTERAL; SOFT TISSUE ONCE
Status: COMPLETED | OUTPATIENT
Start: 2024-02-16 | End: 2024-02-16

## 2024-02-16 RX ADMIN — ROPIVACAINE HYDROCHLORIDE 3 ML: 2 INJECTION, SOLUTION EPIDURAL; INFILTRATION; PERINEURAL at 13:56

## 2024-02-16 RX ADMIN — IOHEXOL 1 ML: 300 INJECTION, SOLUTION INTRAVENOUS at 13:55

## 2024-02-16 RX ADMIN — Medication 2 ML: at 13:54

## 2024-02-16 RX ADMIN — METHYLPREDNISOLONE ACETATE 80 MG: 80 INJECTION, SUSPENSION INTRA-ARTICULAR; INTRALESIONAL; INTRAMUSCULAR; PARENTERAL; SOFT TISSUE at 13:56

## 2024-02-16 RX ADMIN — SODIUM CHLORIDE 2 ML: 9 INJECTION INTRAMUSCULAR; INTRAVENOUS; SUBCUTANEOUS at 13:54

## 2024-02-16 NOTE — H&P
History of Present Illness: The patient is a 89 y.o. female who presents with complaints of left shoulder pain is here today for left shoulder intra-articular steroid injection    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    Bundle branch block, left     Cardiomyopathy ([facility])     Chest pain 3/9/2019    Chronic pain     chronic b/l shoulder pain    Chronic pain of right knee 4/2/2019    Coronary artery disease     Diabetes mellitus ([facility]CC)     Gait disturbance 3/2/2018    GERD (gastroesophageal reflux disease)     H/O atrial flutter     History of DVT (deep vein thrombosis)     History of pulmonary embolism     Hyperlipidemia     Hypertension     Hypothyroid     Renal calculi     Shortness of breath        Past Surgical History:   Procedure Laterality Date    ATRIAL ABLATION SURGERY  01/2015    CARDIAC PACEMAKER PLACEMENT  12/10/2018    [place] YANNA XT  MRI, model W1DR01    CARDIOVERSION      CHELA/DCCV 10/22/2014    CARPAL TUNNEL RELEASE Right     CATARACT EXTRACTION      CORONARY ANGIOPLASTY WITH STENT PLACEMENT      HYSTERECTOMY      JOINT REPLACEMENT Right     GA AMPUTATION TOE METATARSOPHALANGEAL JOINT Left 10/11/2017    Procedure: AMPUTATION SECOND TOE;  Surgeon: Ashly Craft DPM;  [facility];  Service: Podiatry    TONSILLECTOMY      TOTAL HIP ARTHROPLASTY      Right         Current Outpatient Medications:     Accu-Chek FastClix Lancets MISC, Check once daily, Disp: 100 each, Rfl: 3    Accu-Chek Softclix Lancets lancets, Use daily Use as instructed, Disp: 100 each, Rfl: 1    acetaminophen (TYLENOL) 325 mg tablet, Take 975 mg by mouth 3 (three) times a day, Disp: , Rfl:     amiodarone 200 mg tablet, Take 0.5 tablets (100 mg total) by mouth daily, Disp: 45 tablet, Rfl: 3    amoxicillin (AMOXIL) 500 mg capsule, Take 2,000 mg by mouth 1 HOUR PRIOR TO DENTAL  PROCEDURE, Disp: , Rfl:     Aspirin Low Dose 81 MG chewable tablet, TAKE ONE TABLET BY MOUTH ONCE EVERY DAY, Disp: , Rfl:     Diclofenac Sodium (VOLTAREN) 1 %, Apply 2 g topically 4 (four) times a day Bilateral shoulders and knees, Disp: 150 g, Rfl: 0    Eliquis 5 MG, TAKE ONE TABLET BY MOUTH TWICE DAILY, Disp: 180 tablet, Rfl: 3    ezetimibe (ZETIA) 10 mg tablet, Take 1 tablet (10 mg total) by mouth daily, Disp: 30 tablet, Rfl: 3    famotidine (PEPCID) 20 mg tablet, Take 20 mg by mouth 2 (two) times a day, Disp: , Rfl:     furosemide (LASIX) 40 mg tablet, Take 0.5 tablets (20 mg total) by mouth daily Alternate 20mg with 40mg every other day, Disp: , Rfl:     glucose blood (Accu-Chek Guide) test strip, Check once daily, Disp: 100 each, Rfl: 3    ipratropium (ATROVENT) 0.06 % nasal spray, 2 sprays into each nostril 3 (three) times a day, Disp: 15 mL, Rfl: 6    isosorbide mononitrate (IMDUR) 30 mg 24 hr tablet, Take 1 tablet (30 mg total) by mouth daily, Disp: 90 tablet, Rfl: 3    levothyroxine 112 mcg tablet, Take 112 mcg by mouth every morning Take on an empty stomach, Disp: , Rfl:     Lidocaine 4 % PTCH, Apply 3 patches topically daily B/l knees, low back, Disp: , Rfl:     loperamide (IMODIUM) 2 mg capsule, Take 2 mg by mouth 4 (four) times a day as needed for diarrhea, Disp: , Rfl:     melatonin 3 mg, Take 3 mg by mouth daily at bedtime, Disp: , Rfl:     menthol-zinc oxide (Calmoseptine) 0.44-20.6 % OINT, Apply topically 2 (two) times a day, Disp: , Rfl:     Mirabegron ER (Myrbetriq) 50 MG TB24, Take 1 tablet (50 mg total) by mouth in the morning, Disp: 30 tablet, Rfl: 11    Multiple Vitamins-Minerals (CEROVITE SENIOR PO), Take 1 tablet by mouth in the morning, Disp: , Rfl:     Multiple Vitamins-Minerals (CertaVite Senior/Antioxidant) TABS, TAKE ONE TABLET BY MOUTH ONCE EVERY DAY, Disp: , Rfl:     polyethylene glycol (GLYCOLAX) 17 GM/SCOOP, MIX 17 GRAMS (1 CAPFUL) OF POWDER IN 8 OUNCES OF LIQUID AND DRINK ONCE  DAILY FOR CONSTIPATON AS NEEDED (Patient not taking: Reported on 12/14/2023), Disp: , Rfl:     polyethylene glycol (MIRALAX) 17 g packet, Take 17 g by mouth daily (Patient taking differently: Take 17 g by mouth if needed), Disp:  , Rfl: 0    pravastatin (PRAVACHOL) 80 mg tablet, TAKE ONE TABLET BY MOUTH ONCE EVERY DAY FOR CHOLESTEROL, Disp: , Rfl:     Theratears 0.25 % SOLN, instill ONE drop into each eye three times a day, Disp: , Rfl:     tiZANidine (ZANAFLEX) 2 mg tablet, Take 1 tablet (2 mg total) by mouth daily at bedtime, Disp: 30 tablet, Rfl: 2    traMADol (ULTRAM) 50 mg tablet, Take 50 mg by mouth every 8 (eight) hours as needed, Disp: , Rfl:     Zinc Oxide POWD, Use 1 Application as needed (with soilage) (Patient not taking: Reported on 12/14/2023), Disp: , Rfl:     Current Facility-Administered Medications:     iohexol (OMNIPAQUE) 300 mg/mL injection 1 mL, 1 mL, Intra-articular, Once, Melvin Griffith MD    lidocaine (PF) (XYLOCAINE-MPF) 2 % injection 2 mL, 2 mL, Infiltration, Once, Melvin Griffith MD    methylPREDNISolone acetate (DEPO-MEDROL) injection 80 mg, 80 mg, Intra-articular, Once, Melvin Griffith MD    ropivacaine (NAROPIN) injection 3 mL, 3 mL, Intra-articular, Once, Melvin Griffith MD    sodium chloride (PF) 0.9 % injection 2 mL, 2 mL, Infiltration, Once, Melvin Griffith MD    Allergies   Allergen Reactions    Atorvastatin      Reaction unknown 10/23/2018-    Other Rash     Patient sensitive to adhesives from tape       Physical Exam:   Vitals:    02/16/24 1334   BP: 98/62   Pulse: 98   Resp: 18   Temp: 98.5 °F (36.9 °C)   SpO2: 92%     General: Awake, Alert, Oriented x 3, Mood and affect appropriate  Respiratory: Respirations even and unlabored  Cardiovascular: Peripheral pulses intact; no edema  Musculoskeletal Exam: Left shoulder pain    ASA Score: 3    Patient/Chart Verification  Patient ID Verified: Verbal  ID Band Applied: No  Consents Confirmed: Procedural, To be obtained in the  Pre-Procedure area  H&P( within 30 days) Verified: To be obtained in the Pre-Procedure area  Allergies Reviewed: Yes  Anticoag/NSAID held?: No  Currently on antibiotics?: No    Assessment:   1. Localized osteoarthritis of left shoulder        Plan: Left shoulder intra-articular steroid injection

## 2024-02-16 NOTE — DISCHARGE INSTR - LAB
Do not apply heat to any area that is numb. If you have discomfort or soreness at the injection site, you may apply ice today, 20 minutes on and 20 minutes off. Tomorrow you may use ice or warm, moist heat. Do not apply ice or heat directly to the skin.  If you experience severe shortness of breath, go to the Emergency Room.  You may have numbness for several hours from the local anesthetic. Please use caution and common sense, especially with weight-bearing activities.  You may have an increase or change in the discomfort for 36-48 hours after your treatment. Apply ice and continue with any pain medicine you have been prescribed.  Do not do anything strenuous today. You may shower, but no tub baths or hot tubs today. You may resume your normal activities tomorrow, but do not “overdo it”. Resume normal activities slowly when you are feeling better.  If you experience redness, drainage or swelling at the injection site, or if you develop a fever above 100 degrees, please call [facility] at [phone] or go to the Emergency Room.  Continue to take all routine medicines prescribed by your primary care physician unless otherwise instructed by our staff. Most blood thinners should be started again according to your regularly scheduled dosing. If you have any questions, please give our office a call.    As no general anesthesia was used in today's procedure, you should not experience any side effects related to anesthesia.       If you have a problem specifically related to your procedure, please call our office at [phone].  Problems not related to your procedure should be directed to your primary care physician.

## 2024-02-23 ENCOUNTER — TELEPHONE (OUTPATIENT)
Dept: RADIOLOGY | Facility: MEDICAL CENTER | Age: 89
End: 2024-02-23

## 2024-02-23 NOTE — TELEPHONE ENCOUNTER
Patient 's  daughter Reports     there is improvement  but could not give a number %     improvement post injection    Pain Level   varies but not much  /10

## 2024-02-26 ENCOUNTER — OFFICE VISIT (OUTPATIENT)
Dept: UROLOGY | Facility: AMBULATORY SURGERY CENTER | Age: 89
End: 2024-02-26
Payer: MEDICARE

## 2024-02-26 VITALS
HEART RATE: 79 BPM | BODY MASS INDEX: 31.35 KG/M2 | WEIGHT: 177 LBS | RESPIRATION RATE: 17 BRPM | SYSTOLIC BLOOD PRESSURE: 120 MMHG | DIASTOLIC BLOOD PRESSURE: 72 MMHG | OXYGEN SATURATION: 96 %

## 2024-02-26 DIAGNOSIS — N39.46 MIXED STRESS AND URGE URINARY INCONTINENCE: Primary | ICD-10-CM

## 2024-02-26 LAB
BACTERIA UR QL AUTO: ABNORMAL /HPF
BILIRUB UR QL STRIP: NEGATIVE
CLARITY UR: CLEAR
COLOR UR: ABNORMAL
GLUCOSE UR STRIP-MCNC: NEGATIVE MG/DL
HGB UR QL STRIP.AUTO: NEGATIVE
HYALINE CASTS #/AREA URNS LPF: ABNORMAL /LPF
KETONES UR STRIP-MCNC: NEGATIVE MG/DL
LEUKOCYTE ESTERASE UR QL STRIP: ABNORMAL
MUCOUS THREADS UR QL AUTO: ABNORMAL
NITRITE UR QL STRIP: NEGATIVE
NON-SQ EPI CELLS URNS QL MICRO: ABNORMAL /HPF
PH UR STRIP.AUTO: 6 [PH]
POST-VOID RESIDUAL VOLUME, ML POC: 13 ML
PROT UR STRIP-MCNC: NEGATIVE MG/DL
RBC #/AREA URNS AUTO: ABNORMAL /HPF
SP GR UR STRIP.AUTO: 1.01 (ref 1–1.03)
UROBILINOGEN UR STRIP-ACNC: <2 MG/DL
WBC #/AREA URNS AUTO: ABNORMAL /HPF

## 2024-02-26 PROCEDURE — 87186 SC STD MICRODIL/AGAR DIL: CPT

## 2024-02-26 PROCEDURE — 99213 OFFICE O/P EST LOW 20 MIN: CPT

## 2024-02-26 PROCEDURE — 87077 CULTURE AEROBIC IDENTIFY: CPT

## 2024-02-26 PROCEDURE — 81001 URINALYSIS AUTO W/SCOPE: CPT

## 2024-02-26 PROCEDURE — 88112 CYTOPATH CELL ENHANCE TECH: CPT | Performed by: PATHOLOGY

## 2024-02-26 PROCEDURE — 87086 URINE CULTURE/COLONY COUNT: CPT

## 2024-02-26 PROCEDURE — 51798 US URINE CAPACITY MEASURE: CPT

## 2024-02-26 RX ORDER — FUROSEMIDE 20 MG/1
TABLET ORAL
COMMUNITY
Start: 2024-02-10

## 2024-02-26 NOTE — PROGRESS NOTES
Office Visit- Urology  Brenda Lopez 7/24/1929 MRN: [id number]      Assessment/Discussion/Plan    89 y.o. female managed by     Mixed urinary incontinence  -Was previously utilizing Myrbetriq 50 mg  -Patient was previously recommended PTNS but because of pacemaker status she is in eligible for this therapy  -Discussed bladder irritants  -Discussed possible exacerbating factor of loop diuretic use  -Patient will like to trial alternative medication.  Will try it Gemtesa 75 mg daily.  Discontinue use of Myrbetriq.  Discussed administration and side effects  - follow-up in 6 to 8 weeks    2.  Possible gross hematuria  -Patient notes that 1 time she had possible gross hematuria although she does not routinely check her urine.  She has a sacral/buttock wound that sometimes bleeds and she sees this on her depends.   -CT of the abdomen pelvis  with contrast in October 2023 with no suspicious renal mass or hydronephrosis  -Patient/family defers consideration of cystoscopy at this point in time.  Reviewed possible etiologies of gross hematuria including bladder carcinoma  -Will obtain a urine cytology  -Continue to monitor          Chief Complaint:   Brenda is a 89 y.o. female presenting to the office for a follow up visit regarding mixed urinary incontinence        Subjective    Patient is a 89-year-old female who presents for follow-up in regards to mixed urinary incontinence.  She was last in the office in April 2022.  She has a component of urgency incontinence, functional incontinence, stress urinary incontinence.  She is a resident at [facility] and is in a wheelchair.  She is accompanied in the office today with her daughter.  She she has been on Myrbetriq 50 mg for some time.  She is still symptomatic.  She was previously recommended to have PTNS but due to having a pacemaker this is contraindicated.  She denies any dysuria.  When questioned about episodes of gross hematuria she states that there has been  may have been 1 episode of gross hematuria.  She has a sacral/buttock wound that occasionally bleeds if she notices blood within her depend.  She may have noticed blood within the toilet bowl at this point in time but she is unaware if this was from wound or within the urine.  She had a CT of the abdomen pelvis with contrast on 10/15/2023 with no suspicious renal mass.      ROS:   Review of Systems   Constitutional: Negative.  Negative for chills, fatigue and fever.   HENT: Negative.     Respiratory:  Negative for shortness of breath.    Cardiovascular:  Negative for chest pain.   Gastrointestinal: Negative.  Negative for abdominal pain.   Endocrine: Negative.    Musculoskeletal: Negative.    Skin: Negative.    Neurological: Negative.  Negative for dizziness and light-headedness.   Hematological: Negative.    Psychiatric/Behavioral: Negative.           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    Bundle branch block, left     Cardiomyopathy (HCC)     Chest pain 3/9/2019    Chronic pain     chronic b/l shoulder pain    Chronic pain of right knee 4/2/2019    Coronary artery disease     Diabetes mellitus (HCC)     Gait disturbance 3/2/2018    GERD (gastroesophageal reflux disease)     H/O atrial flutter     History of DVT (deep vein thrombosis)     History of pulmonary embolism     Hyperlipidemia     Hypertension     Hypothyroid     Renal calculi     Shortness of breath        Past Surgical History  Past Surgical History:   Procedure Laterality Date    ATRIAL ABLATION SURGERY  01/2015    CARDIAC PACEMAKER PLACEMENT  12/10/2018    Medtronic YANNA XT DR ESCOBEDO, model [id number]    CARDIOVERSION      CHELA/DCCV 10/22/2014    CARPAL TUNNEL RELEASE Right     CATARACT EXTRACTION      CORONARY ANGIOPLASTY WITH STENT PLACEMENT      HYSTERECTOMY      JOINT REPLACEMENT Right      MA AMPUTATION TOE METATARSOPHALANGEAL JOINT Left 10/11/2017    Procedure: AMPUTATION SECOND TOE;  Surgeon: Ashly Craft DPM;  Location: [facility];  Service: Podiatry    TONSILLECTOMY      TOTAL HIP ARTHROPLASTY      Right       Past Family History  Family History   Problem Relation Age of Onset    Parkinsonism Sister     Cancer Sister         unknown type       Past Social history  Social History     Socioeconomic History    Marital status: /Civil Union     Spouse name: Not on file    Number of children: Not on file    Years of education: Not on file    Highest education level: Not on file   Occupational History    Not on file   Tobacco Use    Smoking status: Never    Smokeless tobacco: Never   Vaping Use    Vaping status: Never Used   Substance and Sexual Activity    Alcohol use: Yes     Comment: occasional    Drug use: Not Currently     Types: Marijuana     Comment: MEDICAL MARIJUANA-HAS NOT USED FOR 3 WEEKS    Sexual activity: Not Currently   Other Topics Concern    Not on file   Social History Narrative    Not on file     Social Determinants of Health     Financial Resource Strain: Not on file   Food Insecurity: No Food Insecurity (6/28/2021)    Hunger Vital Sign     Worried About Running Out of Food in the Last Year: Never true     Ran Out of Food in the Last Year: Never true   Transportation Needs: No Transportation Needs (6/28/2021)    PRAPARE - Transportation     Lack of Transportation (Medical): No     Lack of Transportation (Non-Medical): No   Physical Activity: Not on file   Stress: Not on file   Social Connections: Not on file   Intimate Partner Violence: Not on file   Housing Stability: Not on file       Current Medications  Current Outpatient Medications   Medication Sig Dispense Refill    Accu-Chek FastClix Lancets MISC Check once daily 100 each 3    Accu-Chek Softclix Lancets lancets Use daily Use as instructed 100 each 1    acetaminophen (TYLENOL) 325 mg tablet Take 975 mg by mouth 3  (three) times a day      amiodarone 200 mg tablet Take 0.5 tablets (100 mg total) by mouth daily 45 tablet 3    amoxicillin (AMOXIL) 500 mg capsule Take 2,000 mg by mouth 1 HOUR PRIOR TO DENTAL PROCEDURE      Aspirin Low Dose 81 MG chewable tablet TAKE ONE TABLET BY MOUTH ONCE EVERY DAY      Diclofenac Sodium (VOLTAREN) 1 % Apply 2 g topically 4 (four) times a day Bilateral shoulders and knees 150 g 0    Eliquis 5 MG TAKE ONE TABLET BY MOUTH TWICE DAILY 180 tablet 3    ezetimibe (ZETIA) 10 mg tablet Take 1 tablet (10 mg total) by mouth daily 30 tablet 3    famotidine (PEPCID) 20 mg tablet Take 20 mg by mouth 2 (two) times a day      furosemide (LASIX) 20 mg tablet TAKE ONE TABLET BY MOUTH EVERY OTHER DAY ALTERNATING WITH 40MG EVERY OTHER DAY FOR HYPERTENSION      furosemide (LASIX) 40 mg tablet Take 0.5 tablets (20 mg total) by mouth daily Alternate 20mg with 40mg every other day      glucose blood (Accu-Chek Guide) test strip Check once daily 100 each 3    ipratropium (ATROVENT) 0.06 % nasal spray 2 sprays into each nostril 3 (three) times a day 15 mL 6    isosorbide mononitrate (IMDUR) 30 mg 24 hr tablet Take 1 tablet (30 mg total) by mouth daily 90 tablet 3    levothyroxine 112 mcg tablet Take 112 mcg by mouth every morning Take on an empty stomach      Lidocaine 4 % PTCH Apply 3 patches topically daily B/l knees, low back      loperamide (IMODIUM) 2 mg capsule Take 2 mg by mouth 4 (four) times a day as needed for diarrhea      melatonin 3 mg Take 3 mg by mouth daily at bedtime      menthol-zinc oxide (Calmoseptine) 0.44-20.6 % OINT Apply topically 2 (two) times a day      Mirabegron ER (Myrbetriq) 50 MG TB24 Take 1 tablet (50 mg total) by mouth in the morning 30 tablet 11    Multiple Vitamins-Minerals (CEROVITE SENIOR PO) Take 1 tablet by mouth in the morning      Multiple Vitamins-Minerals (CertaVite Senior/Antioxidant) TABS TAKE ONE TABLET BY MOUTH ONCE EVERY DAY      polyethylene glycol (MIRALAX) 17 g packet  Take 17 g by mouth daily (Patient taking differently: Take 17 g by mouth if needed)  0    pravastatin (PRAVACHOL) 80 mg tablet TAKE ONE TABLET BY MOUTH ONCE EVERY DAY FOR CHOLESTEROL      Theratears 0.25 % SOLN instill ONE drop into each eye three times a day      tiZANidine (ZANAFLEX) 2 mg tablet Take 1 tablet (2 mg total) by mouth daily at bedtime 30 tablet 2    traMADol (ULTRAM) 50 mg tablet Take 50 mg by mouth every 8 (eight) hours as needed      polyethylene glycol (GLYCOLAX) 17 GM/SCOOP MIX 17 GRAMS (1 CAPFUL) OF POWDER IN 8 OUNCES OF LIQUID AND DRINK ONCE DAILY FOR CONSTIPATON AS NEEDED (Patient not taking: Reported on 12/14/2023)      Zinc Oxide POWD Use 1 Application as needed (with soilage) (Patient not taking: Reported on 12/14/2023)       No current facility-administered medications for this visit.       Allergies  Allergies   Allergen Reactions    Atorvastatin      Reaction unknown 10/23/2018-    Other Rash     Patient sensitive to adhesives from tape       OBJECTIVE    Vitals   Vitals:    02/26/24 1252   BP: 120/72   BP Location: Left arm   Patient Position: Sitting   Cuff Size: Adult   Pulse: 79   Resp: 17   SpO2: 96%   Weight: 80.3 kg (177 lb)       PVR:    Physical Exam  Constitutional:       General: She is not in acute distress.     Appearance: Normal appearance. She is normal weight. She is not ill-appearing or toxic-appearing.   HENT:      Head: Normocephalic and atraumatic.   Eyes:      Conjunctiva/sclera: Conjunctivae normal.   Cardiovascular:      Rate and Rhythm: Normal rate.   Pulmonary:      Effort: Pulmonary effort is normal. No respiratory distress.   Skin:     General: Skin is warm and dry.      Comments: Bruising noted on right arm-patient on anticoagulation   Neurological:      General: No focal deficit present.      Mental Status: She is alert and oriented to person, place, and time.      Cranial Nerves: No cranial nerve deficit.   Psychiatric:         Mood and Affect: Mood normal.          Behavior: Behavior normal.         Thought Content: Thought content normal.       Labs:     Lab Results   Component Value Date    CREATININE 0.80 10/24/2023      Lab Results   Component Value Date    HGBA1C 5.7 (H) 10/24/2023     Lab Results   Component Value Date    GLUCOSE 90 06/23/2015    CALCIUM 8.9 10/24/2023     (L) 06/23/2015    K 4.1 10/24/2023    CO2 35 (H) 10/24/2023     10/24/2023    BUN 18 10/24/2023    CREATININE 0.80 10/24/2023       I have personally reviewed all pertinent lab results and reviewed with patient    Imaging   Narrative & Impression   CT ABDOMEN AND PELVIS WITH IV CONTRAST     INDICATION:   Epigastric pain  abdominal tenderness on exam in epigastric.     COMPARISON: 9/27/2023.     TECHNIQUE:  CT examination of the abdomen and pelvis was performed. Multiplanar 2D reformatted images were created from the source data.     This examination, like all CT scans performed in the [facility], was performed utilizing techniques to minimize radiation dose exposure, including the use of iterative reconstruction and automated exposure control. Radiation dose length   product (DLP) for this visit:  878 mGy-cm     IV Contrast:  100 mL of iohexol (OMNIPAQUE)  Enteric Contrast:  Enteric contrast was not administered.     FINDINGS:     ABDOMEN     LOWER CHEST: Bibasilar atelectasis. No pleural or pericardial effusions. Cardiomegaly again noted. Otherwise no clinically significant abnormality identified in the visualized lower chest.     LIVER/BILIARY TREE:  Unremarkable.     GALLBLADDER:  There are gallstone(s) within the gallbladder, without pericholecystic inflammatory changes.     SPLEEN:  Unremarkable.     PANCREAS:  Unremarkable.     ADRENAL GLANDS:  Unremarkable.     KIDNEYS/URETERS: Too small to characterize hypodensity in the upper pole of the right kidney. Nonobstructing 1 mm calcification in the lower pole of the right kidney. Mild symmetrical renal atrophy. No  hydronephrosis, suspicious renal mass, or   perinephric fluid collections bilaterally..     STOMACH AND BOWEL: Stomach is mildly distended with air and fluid. No gross intraluminal mass or irregular wall thickening. The small and large bowel loops are normal in course and caliber without obstruction or inflammation. Terminal ileum and appendix   are grossly unremarkable. Descending and sigmoid colon diverticulosis without evidence for acute diverticulitis..     APPENDIX:  No findings to suggest appendicitis.     ABDOMINOPELVIC CAVITY:  No ascites.  No pneumoperitoneum.  No lymphadenopathy.     VESSELS:  Atherosclerotic changes are present.  No evidence of aneurysm.     PELVIS     REPRODUCTIVE ORGANS:  Surgical changes of prior hysterectomy.     URINARY BLADDER: Mildly distended and grossly unremarkable.     ABDOMINAL WALL/INGUINAL REGIONS: Small fluid and fat-containing the umbilical hernia.     OSSEOUS STRUCTURES:  No acute fracture or destructive osseous lesion. Extensive multilevel degenerative changes of the thoracolumbar spine and left hip joint. Total right hip prosthesis in normal anatomical alignment. Prominent sclerosis surrounding the   bilateral sacroiliac joints.     IMPRESSION:     No acute intra-abdominal/pelvic abnormalities with multiple ancillary findings detailed above.        Workstation performed: [id number]       Luis Sneed PA-C  Date: 2/26/2024 Time: 1:06 PM  [facility]    This note was written using fluency dictation software. Please excuse any resulting minor grammatical errors.

## 2024-02-28 ENCOUNTER — TELEPHONE (OUTPATIENT)
Age: 89
End: 2024-02-28

## 2024-02-28 DIAGNOSIS — N30.00 ACUTE CYSTITIS WITHOUT HEMATURIA: Primary | ICD-10-CM

## 2024-02-28 LAB — BACTERIA UR CULT: ABNORMAL

## 2024-02-28 PROCEDURE — 88112 CYTOPATH CELL ENHANCE TECH: CPT | Performed by: PATHOLOGY

## 2024-02-28 RX ORDER — CEPHALEXIN 500 MG/1
500 CAPSULE ORAL EVERY 12 HOURS SCHEDULED
Qty: 14 CAPSULE | Refills: 0 | Status: SHIPPED | OUTPATIENT
Start: 2024-02-28 | End: 2024-03-06

## 2024-02-28 NOTE — TELEPHONE ENCOUNTER
Diann called to schedule patient's 6-8 week f/u with Luis but he doesn't have anything until August. Is it okay for patient to be seen in August? Ii can call the facility back and schedule the appointment, I just need a yes or no. I don't want to make that call on my own.    CB: [phone] ext [phone]

## 2024-02-29 ENCOUNTER — TELEPHONE (OUTPATIENT)
Dept: UROLOGY | Facility: CLINIC | Age: 89
End: 2024-02-29

## 2024-02-29 NOTE — TELEPHONE ENCOUNTER
----- Message from Luis Sneed PA-C sent at 2/28/2024  3:21 PM EST -----  Please call patient to inform her that urine cytology returned negative with no concern for cancer cells but urine culture is positive and with her symptoms I do think it is worthwhile to consider treatment.  I will send over a 7-day course of Keflex.    Attempted to reach pt via phone number on file, no answer or VM available. Will try again tomorrow.

## 2024-03-19 ENCOUNTER — REMOTE DEVICE CLINIC VISIT (OUTPATIENT)
Dept: CARDIOLOGY CLINIC | Facility: CLINIC | Age: 89
End: 2024-03-19
Payer: MEDICARE

## 2024-03-19 DIAGNOSIS — Z95.0 CARDIAC PACEMAKER IN SITU: Primary | ICD-10-CM

## 2024-03-19 PROCEDURE — 93294 REM INTERROG EVL PM/LDLS PM: CPT | Performed by: STUDENT IN AN ORGANIZED HEALTH CARE EDUCATION/TRAINING PROGRAM

## 2024-03-19 PROCEDURE — 93296 REM INTERROG EVL PM/IDS: CPT | Performed by: STUDENT IN AN ORGANIZED HEALTH CARE EDUCATION/TRAINING PROGRAM

## 2024-03-19 NOTE — PROGRESS NOTES
"Results for orders placed or performed in visit on 03/19/24   Cardiac EP device report    Narrative    MDT-DUAL CHAMBER PPM (DDDR MODE) - ACTIVE SYSTEM IS MRI CONDITIONAL  CARELINK TRANSMISSION: BATTERY STATUS \"7 YRS.\" AP 15%  0%. ALL AVAILABLE LEAD PARAMETERS WITHIN NORMAL LIMITS. NO SIGNIFICANT HIGH RATE EPISODES. NORMAL DEVICE FUNCTION. NC         "

## 2024-03-25 ENCOUNTER — TELEPHONE (OUTPATIENT)
Age: 89
End: 2024-03-25

## 2024-03-25 DIAGNOSIS — N39.46 MIXED STRESS AND URGE URINARY INCONTINENCE: Primary | ICD-10-CM

## 2024-03-25 NOTE — TELEPHONE ENCOUNTER
Patient is calling to request a prescription refill    Last seen by: Luis 2/26/24     Medication: Mirabegron ER (Myrbetriq) 50 MG TB24    [facility]     [facility] [address]  Phone: [phone]  Fax: [phone]  CHLOE #: --     Pt can be reached at: [phone]    Patient was on Gemtesa and does not feel this is working. She would like to go back on Myrbetriq and will need a refill.

## 2024-03-25 NOTE — TELEPHONE ENCOUNTER
Called and spoke with patient's daughter Pallavi. Informed on AP's note. Confirmed follow up appt.

## 2024-03-25 NOTE — TELEPHONE ENCOUNTER
Please let patient know that I changed her prescription to the Myrbetriq 50 mg and discontinue to the Gemtesa.  She should keep her follow-up appointment in September to reevaluate her symptoms.

## 2024-04-10 ENCOUNTER — TELEPHONE (OUTPATIENT)
Age: 89
End: 2024-04-10

## 2024-04-10 DIAGNOSIS — T14.8XXA BRUISING: Primary | ICD-10-CM

## 2024-04-10 NOTE — TELEPHONE ENCOUNTER
Patient's daughter was contacted and made aware of above. She was concerned over the dosing of her mother's Lasix as at last office visit she was taking 40 mg tablets, 40 mg alternating every other day with 20 mg accomplished using 0.5 tablet. She know has a script for 20 mg tablets as well but reports taking the same dosing without having to cut the tablets. She wished to know who had prescribed this formulation and was informed the authorizing provider was Olinda Osuna per patient's chart.

## 2024-04-10 NOTE — TELEPHONE ENCOUNTER
Patient's daughter calls in regarding patient's bruising that she had on her shoulder.  Patient had this when she saw you last 12/2023.     Patient's bruising has now extended to arms and neck.  Patient is in [facility] [place] and the doctor there has examined her and wanted to have patient do a CT but patient refused.  Daughter denies patient experiencing any falls recently, but does report staff told her she sits down in wheelchair hard and flails arms backwards but reports no injuries.    Daughter is asking if this brusing could be from medication, asked if it's from Eliquis or Lasix.  Daughter is asking if Eliquis could be switched to Xarelto if so.    Made her aware patient is overdue for visit with Dr. Vaz and is due for follow up with you in June as well.  She will have [facility] call as they need to arrange transportation for patient.    Please advise.

## 2024-05-08 ENCOUNTER — TELEPHONE (OUTPATIENT)
Age: 89
End: 2024-05-08

## 2024-05-08 NOTE — TELEPHONE ENCOUNTER
Pt's daughter Pallavi called and informed they requested a medication change on 3/25 and wanted to check on the status of that as [facility] informed her the pt was not currently taking any medication. Informed Pallavi Myrbetriq 50 mg was sent to [facility] pharmacy on 3/25/24, she understood and stated she was going to check with the pharmacy.

## 2024-06-18 ENCOUNTER — REMOTE DEVICE CLINIC VISIT (OUTPATIENT)
Dept: CARDIOLOGY CLINIC | Facility: CLINIC | Age: 89
End: 2024-06-18
Payer: MEDICARE

## 2024-06-18 DIAGNOSIS — Z95.0 CARDIAC PACEMAKER IN SITU: Primary | ICD-10-CM

## 2024-06-18 PROCEDURE — 93294 REM INTERROG EVL PM/LDLS PM: CPT | Performed by: STUDENT IN AN ORGANIZED HEALTH CARE EDUCATION/TRAINING PROGRAM

## 2024-06-18 PROCEDURE — 93296 REM INTERROG EVL PM/IDS: CPT | Performed by: STUDENT IN AN ORGANIZED HEALTH CARE EDUCATION/TRAINING PROGRAM

## 2024-06-18 NOTE — PROGRESS NOTES
Results for orders placed or performed in visit on 06/18/24   Cardiac EP device report    Narrative    MDT-DUAL CHAMBER PPM (DDDR MODE) - ACTIVE SYSTEM IS MRI CONDITIONAL  CARELINK TRANSMISSION: BATTERY VOLTAGE ADEQUATE (6.8 YRS). AP-16%, >99% (DEPENDENT/CHB). ALL AVAILABLE LEAD PARAMETERS WITHIN NORMAL LIMITS. NO SIGNIFICANT HIGH RATE EPISODES. NORMAL DEVICE FUNCTION. GV

## 2024-06-26 ENCOUNTER — OFFICE VISIT (OUTPATIENT)
Dept: CARDIOLOGY CLINIC | Facility: CLINIC | Age: 89
End: 2024-06-26
Payer: MEDICARE

## 2024-06-26 ENCOUNTER — TELEPHONE (OUTPATIENT)
Age: 89
End: 2024-06-26

## 2024-06-26 ENCOUNTER — TELEPHONE (OUTPATIENT)
Dept: CARDIOLOGY CLINIC | Facility: CLINIC | Age: 89
End: 2024-06-26

## 2024-06-26 VITALS
DIASTOLIC BLOOD PRESSURE: 52 MMHG | SYSTOLIC BLOOD PRESSURE: 110 MMHG | WEIGHT: 173 LBS | OXYGEN SATURATION: 92 % | BODY MASS INDEX: 30.65 KG/M2 | HEART RATE: 71 BPM

## 2024-06-26 DIAGNOSIS — I35.0 AORTIC STENOSIS, SEVERE: ICD-10-CM

## 2024-06-26 DIAGNOSIS — Z95.0 PACEMAKER: ICD-10-CM

## 2024-06-26 DIAGNOSIS — E66.01 SEVERE OBESITY (BMI 35.0-39.9) WITH COMORBIDITY (HCC): ICD-10-CM

## 2024-06-26 DIAGNOSIS — I50.32 CHRONIC DIASTOLIC CHF (CONGESTIVE HEART FAILURE) (HCC): ICD-10-CM

## 2024-06-26 DIAGNOSIS — I44.7 LBBB (LEFT BUNDLE BRANCH BLOCK): ICD-10-CM

## 2024-06-26 DIAGNOSIS — N18.31 TYPE 2 DIABETES MELLITUS WITH STAGE 3A CHRONIC KIDNEY DISEASE, WITHOUT LONG-TERM CURRENT USE OF INSULIN (HCC): ICD-10-CM

## 2024-06-26 DIAGNOSIS — Z95.5 HISTORY OF PLACEMENT OF STENT IN LAD CORONARY ARTERY: Chronic | ICD-10-CM

## 2024-06-26 DIAGNOSIS — I44.1 HEART BLOCK AV SECOND DEGREE: ICD-10-CM

## 2024-06-26 DIAGNOSIS — I48.92 ATRIAL FLUTTER, UNSPECIFIED TYPE (HCC): ICD-10-CM

## 2024-06-26 DIAGNOSIS — N18.31 STAGE 3A CHRONIC KIDNEY DISEASE (HCC): ICD-10-CM

## 2024-06-26 DIAGNOSIS — I48.0 PAF (PAROXYSMAL ATRIAL FIBRILLATION) (HCC): ICD-10-CM

## 2024-06-26 DIAGNOSIS — I47.20 VENTRICULAR TACHYCARDIA (HCC): ICD-10-CM

## 2024-06-26 DIAGNOSIS — E11.22 TYPE 2 DIABETES MELLITUS WITH STAGE 3A CHRONIC KIDNEY DISEASE, WITHOUT LONG-TERM CURRENT USE OF INSULIN (HCC): ICD-10-CM

## 2024-06-26 DIAGNOSIS — I10 ESSENTIAL HYPERTENSION: Chronic | ICD-10-CM

## 2024-06-26 DIAGNOSIS — I25.10 CORONARY ARTERY DISEASE INVOLVING NATIVE CORONARY ARTERY OF NATIVE HEART WITHOUT ANGINA PECTORIS: Primary | Chronic | ICD-10-CM

## 2024-06-26 DIAGNOSIS — E78.2 MIXED HYPERLIPIDEMIA: Chronic | ICD-10-CM

## 2024-06-26 PROBLEM — Z86.79 HISTORY OF ATRIAL FLUTTER: Chronic | Status: RESOLVED | Noted: 2018-01-26 | Resolved: 2024-06-26

## 2024-06-26 PROBLEM — Z79.01 ANTICOAGULATED ON COUMADIN: Status: RESOLVED | Noted: 2021-02-02 | Resolved: 2024-06-26

## 2024-06-26 PROCEDURE — 99214 OFFICE O/P EST MOD 30 MIN: CPT | Performed by: INTERNAL MEDICINE

## 2024-06-26 PROCEDURE — 93000 ELECTROCARDIOGRAM COMPLETE: CPT | Performed by: INTERNAL MEDICINE

## 2024-06-26 RX ORDER — ASPIRIN 81 MG
TABLET,CHEWABLE ORAL
Start: 2024-06-26

## 2024-06-26 RX ORDER — APIXABAN 5 MG/1
TABLET, FILM COATED ORAL
Start: 2024-06-26

## 2024-06-26 RX ORDER — OXYCODONE HYDROCHLORIDE AND ACETAMINOPHEN 5; 325 MG/1; MG/1
TABLET ORAL
COMMUNITY
Start: 2024-05-17

## 2024-06-26 NOTE — PROGRESS NOTES
[facility] Cardiology  Follow up note  Brenda Lopez 89 y.o. female MRN: [id number]      Impression:    Hypertension  Blood pressures are stable, no lightheadedness or syncope    Hyperlipidemia  Well-controlled on pravastatin    CAD  Remote LAD stent  She does not offer any ischemic symptoms, she continues on aspirin    History of atrial flutter  With a history of ablation    PAF  Remains in normal sinus rhythm, no evidence of any recurrence  She is on Eliquis  She is on amiodarone  She does have minor bibasilar crackles, no symptoms to suggest CHF, she is almost 89 years of age.  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  Device checks have been normal    Aortic Stenosis  Moderate to severe by echo 6/21  Slight progression by auscultation, but no significant symptoms  She is not a candidate for TAVR at her age with her level of debility    Chronic diastolic CHF  Controlled, without any symptoms, albeit minor bibasilar crackles, on furosemide 40 mg alternating with 20 mg every other day        Plan:    Will call [facility] for any records with recent labs, x-ray  Oxygen today 92%, she has no symptoms, she does have minor bibasilar crackles, could be a slight element of volume overload, but considering she is asymptomatic and already has polyuria on her current furosemide dose, will not make any changes today  However asked her to call me if she has any symptoms of shortness of breath that developed  Hold aspirin for 4 days  Hold Eliquis x 4 doses  Hopefully this should help resolve her bleeding gums after her tooth extraction    HPI:   Brenda Lopez is a 89 y.o. year old female with coronary artery disease, lad stent, PAF, history of atrial flutter status post ablation, second-degree heart block resulting in syncope with placement of a permanent pacemaker December 2018,  ambulatory dysfunction, hypertension, hyperlipidemia presents for a follow-up visit.    She shared with me today that she was quite the traveler during her youth, her  [place] however did not enjoy traveling as much.  Her blood pressure is stable  She had a tooth pulled last week, she has had persistent bleeding  Pacemaker checks have been unremarkable  She has not had any significant A-fib recurrence  She continues on aspirin and Eliquis.  She denies edema, orthopnea  She has severe aortic stenosis but denies chest pain/lightheadedness/syncope.      Review of Systems   Constitutional:  Negative for appetite change, diaphoresis, fatigue and fever.   Respiratory:  Negative for chest tightness, shortness of breath and wheezing.    Cardiovascular:  Negative for chest pain, palpitations and leg swelling.   Gastrointestinal:  Negative for abdominal pain and blood in stool.   Musculoskeletal:  Negative for arthralgias and joint swelling.   Skin:  Negative for rash.   Neurological:  Positive for weakness. Negative for dizziness, syncope and light-headedness.         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   • Bundle branch block, left    • Cardiomyopathy (HCC)    • Chest pain 3/9/2019   • Chronic pain     chronic b/l shoulder pain   • Chronic pain of right knee 4/2/2019   • Coronary artery disease    • Diabetes mellitus (HCC)    • Gait disturbance 3/2/2018   • GERD (gastroesophageal reflux disease)    • H/O atrial flutter    • History of DVT (deep vein thrombosis)    • History of pulmonary embolism    • Hyperlipidemia    • Hypertension    • Hypothyroid    • Renal calculi    • Shortness of breath      Social History     Substance and Sexual Activity   Alcohol Use Yes    Comment: occasional     Social History     Substance and Sexual Activity   Drug Use Not Currently   •  Types: Marijuana    Comment: MEDICAL MARIJUANA-HAS NOT USED FOR 3 WEEKS     Social History     Tobacco Use   Smoking Status Never   Smokeless Tobacco Never       Allergies:  Allergies   Allergen Reactions   • Atorvastatin      Reaction unknown 10/23/2018-   • Other Rash     Patient sensitive to adhesives from tape       Medications:     Current Outpatient Medications:   •  Accu-Chek FastClix Lancets MISC, Check once daily, Disp: 100 each, Rfl: 3  •  Accu-Chek Softclix Lancets lancets, Use daily Use as instructed, Disp: 100 each, Rfl: 1  •  acetaminophen (TYLENOL) 325 mg tablet, Take 975 mg by mouth 3 (three) times a day, Disp: , Rfl:   •  amiodarone 200 mg tablet, Take 0.5 tablets (100 mg total) by mouth daily, Disp: 45 tablet, Rfl: 3  •  amoxicillin (AMOXIL) 500 mg capsule, Take 2,000 mg by mouth 1 HOUR PRIOR TO DENTAL PROCEDURE, Disp: , Rfl:   •  Aspirin Low Dose 81 MG chewable tablet, Hold aspirin for 4 days until 6/30 and can resume 7/1, Disp: , Rfl:   •  Diclofenac Sodium (VOLTAREN) 1 %, Apply 2 g topically 4 (four) times a day Bilateral shoulders and knees, Disp: 150 g, Rfl: 0  •  Eliquis 5 MG, Hold 4 doses starting today and resume Friday 6/28 pm dose, Disp: , Rfl:   •  ezetimibe (ZETIA) 10 mg tablet, Take 1 tablet (10 mg total) by mouth daily, Disp: 30 tablet, Rfl: 3  •  famotidine (PEPCID) 20 mg tablet, Take 20 mg by mouth 2 (two) times a day, Disp: , Rfl:   •  furosemide (LASIX) 20 mg tablet, TAKE ONE TABLET BY MOUTH EVERY OTHER DAY ALTERNATING WITH 40MG EVERY OTHER DAY FOR HYPERTENSION, Disp: , Rfl:   •  furosemide (LASIX) 40 mg tablet, Take 0.5 tablets (20 mg total) by mouth daily Alternate 20mg with 40mg every other day, Disp: , Rfl:   •  glucose blood (Accu-Chek Guide) test strip, Check once daily, Disp: 100 each, Rfl: 3  •  ipratropium (ATROVENT) 0.06 % nasal spray, 2 sprays into each nostril 3 (three) times a day, Disp: 15 mL, Rfl: 6  •  isosorbide mononitrate (IMDUR) 30 mg 24 hr tablet, Take 1  tablet (30 mg total) by mouth daily, Disp: 90 tablet, Rfl: 3  •  levothyroxine 112 mcg tablet, Take 112 mcg by mouth every morning Take on an empty stomach, Disp: , Rfl:   •  Lidocaine 4 % PTCH, Apply 3 patches topically daily B/l knees, low back, Disp: , Rfl:   •  loperamide (IMODIUM) 2 mg capsule, Take 2 mg by mouth 4 (four) times a day as needed for diarrhea, Disp: , Rfl:   •  melatonin 3 mg, Take 3 mg by mouth daily at bedtime, Disp: , Rfl:   •  menthol-zinc oxide (Calmoseptine) 0.44-20.6 % OINT, Apply topically 2 (two) times a day, Disp: , Rfl:   •  Mirabegron ER 25 MG TB24, Take 50 mg by mouth in the morning, Disp: 90 tablet, Rfl: 3  •  Multiple Vitamins-Minerals (CEROVITE SENIOR PO), Take 1 tablet by mouth in the morning, Disp: , Rfl:   •  Multiple Vitamins-Minerals (CertaVite Senior/Antioxidant) TABS, TAKE ONE TABLET BY MOUTH ONCE EVERY DAY, Disp: , Rfl:   •  oxyCODONE-acetaminophen (PERCOCET) 5-325 mg per tablet, TAKE ONE TABLET BY MOUTH EVERY TWELVE HOURS AS NEEDED FOR SEVERE pain, Disp: , Rfl:   •  polyethylene glycol (MIRALAX) 17 g packet, Take 17 g by mouth daily (Patient taking differently: Take 17 g by mouth if needed), Disp:  , Rfl: 0  •  pravastatin (PRAVACHOL) 80 mg tablet, TAKE ONE TABLET BY MOUTH ONCE EVERY DAY FOR CHOLESTEROL, Disp: , Rfl:   •  Theratears 0.25 % SOLN, instill ONE drop into each eye three times a day, Disp: , Rfl:   •  tiZANidine (ZANAFLEX) 2 mg tablet, Take 1 tablet (2 mg total) by mouth daily at bedtime, Disp: 30 tablet, Rfl: 2  •  traMADol (ULTRAM) 50 mg tablet, Take 50 mg by mouth every 8 (eight) hours as needed, Disp: , Rfl:   •  polyethylene glycol (GLYCOLAX) 17 GM/SCOOP, MIX 17 GRAMS (1 CAPFUL) OF POWDER IN 8 OUNCES OF LIQUID AND DRINK ONCE DAILY FOR CONSTIPATON AS NEEDED (Patient not taking: Reported on 12/14/2023), Disp: , Rfl:   •  Zinc Oxide POWD, Use 1 Application as needed (with soilage) (Patient not taking: Reported on 12/14/2023), Disp: , Rfl:       Vitals:     06/26/24 1044   BP: 110/52   Pulse: 71   SpO2: 92%     Weight (last 2 days)     Date/Time Weight    06/26/24 1044 78.5 (173)     Weight: verbal at 06/26/24 1044        Physical Exam  Constitutional:       General: She is not in acute distress.     Appearance: Normal appearance. She is not diaphoretic.   HENT:      Head: Normocephalic and atraumatic.   Eyes:      General: No scleral icterus.     Conjunctiva/sclera: Conjunctivae normal.   Neck:      Vascular: No JVD.   Cardiovascular:      Rate and Rhythm: Normal rate and regular rhythm.      Heart sounds: Normal heart sounds. No murmur heard.  Pulmonary:      Effort: Pulmonary effort is normal. No respiratory distress.      Breath sounds: Normal breath sounds. No wheezing, rhonchi or rales.   Musculoskeletal:         General: No tenderness.      Right lower leg: Normal. No edema.      Left lower leg: Normal. No edema.   Skin:     General: Skin is warm and dry.   Neurological:      Mental Status: She is alert. Mental status is at baseline.           Laboratory Studies:  Lab Results   Component Value Date    HGBA1C 5.7 (H) 10/24/2023    HGBA1C 5.8 (H) 03/28/2023    HGBA1C 6.0 (H) 08/06/2022     (L) 06/23/2015     05/21/2015     (L) 05/16/2015    K 4.1 10/24/2023    K 3.8 10/15/2023    K 4.0 10/05/2023    K 3.9 09/27/2023    K 4.2 06/16/2023    K 4.1 03/28/2023     10/24/2023    CL 93 (L) 10/15/2023    CL 97 10/05/2023    CL 95 (L) 09/27/2023    CL 96 (L) 06/16/2023    CL 95 (L) 03/28/2023    CO2 35 (H) 10/24/2023    CO2 34 (H) 10/15/2023    CO2 33 (H) 10/05/2023    CO2 34 (H) 09/27/2023    CO2 31 06/16/2023    CO2 30 03/28/2023    GLUCOSE 90 06/23/2015    GLUCOSE 146 (H) 05/21/2015    GLUCOSE 132 05/16/2015    CREATININE 0.80 10/24/2023    CREATININE 0.83 10/15/2023    CREATININE 0.81 10/05/2023    CREATININE 0.84 09/27/2023    CREATININE 0.83 06/16/2023    CREATININE 0.88 03/28/2023    BUN 18 10/24/2023    BUN 26 (H) 10/15/2023    BUN 22  "10/05/2023    BUN 18 09/27/2023    BUN 19 06/16/2023    BUN 24 03/28/2023    MG 1.7 02/22/2022    MG 1.6 11/11/2021    MG 1.6 08/01/2021    MG 2.1 05/16/2015    MG 1.9 05/15/2015    MG 1.7 05/14/2015    PHOS 4.0 10/24/2023    PHOS 4.0 05/09/2021     Lab Results   Component Value Date    WBC 5.61 12/29/2023    WBC 4.85 06/23/2015    RBC 3.41 (L) 12/29/2023    RBC 4.00 06/23/2015    HGB 10.3 (L) 12/29/2023    HGB 11.4 (L) 06/23/2015    HCT 33.6 (L) 12/29/2023    HCT 34.9 06/23/2015    MCV 99 (H) 12/29/2023    MCV 87 06/23/2015    MCH 30.2 12/29/2023    MCH 28.5 06/23/2015    RDW 14.2 12/29/2023    RDW 17.0 (H) 06/23/2015     12/29/2023     06/23/2015     NT-proBNP: No results for input(s): \"NTBNP\" in the last 72 hours.   Coags:      Lipid Profile:   Lab Results   Component Value Date    CHOL 210 11/24/2015     Lab Results   Component Value Date    HDL 53 05/29/2021     Lab Results   Component Value Date    LDLCALC 122 (H) 05/29/2021     Lab Results   Component Value Date    TRIG 144 05/29/2021       Cardiac testing:   EKG reviewed personally:   Results for orders placed or performed in visit on 06/26/24   POCT ECG    Impression    Sinus rhythm, occasional paced beats, left bundle branch block         ECHO  2/20 - EF normal, mod to sev low gradient AS  3/20-CHELA-EF normal, moderate LVH, moderate aortic stenosis, valve area 1.3 cm squared  6/21-EF normal, LVH, moderate to severe aortic stenosis    Stress Myoview   3/19-EF normal, small amount of apical ischemia    Catheterization   2017-LAD stent placed  3/20-60% vasospastic RCA improved with nitro, no other significant stenosis    Device check   4/15/2019-normal device function, 6 seconds of AFib  1/29 - 99% v paced, no NSVT or Afib  7/22/2020-no AFib or nonsustained VT, normal pacing function  6/22-no AFib, no VT, normal Bi V pacing 99.9%    Myke Vaz MD    Portions of the record may have been created with voice recognition software.  Occasional wrong " "word or \"sound a like\" substitutions may have occurred due to the inherent limitations of voice recognition software.  Read the chart carefully and recognize, using context, where substitutions have occurred.  "

## 2024-06-26 NOTE — TELEPHONE ENCOUNTER
Called Doris back to clarify medication holds and the dates to start again. Doris understood.     4 Doses of Eliquis - next does 6/28 PM  4 doses Aspirin - next dose 7/1

## 2024-06-26 NOTE — TELEPHONE ENCOUNTER
Spoke to assistant at  to fax over recent bloodwork and xrays. Also left  for Jose RN to call office back for medication changes

## 2024-06-26 NOTE — TELEPHONE ENCOUNTER
Received call from EMIR George from [facility], stating she is returning a call from [place]. I do not see any encounters documented in pt's chart regarding this. RN stated she did receive a copy of the AVS and saw the note regarding holding her eliquis. She also stated she will fax over the documents requested on the VM.     Advised will send a message to the team if anything else is needed. RN stated if someone needs to call her back, she can be reached at [phone], [phone]: [phone].

## 2024-06-26 NOTE — PATIENT INSTRUCTIONS
Patient is to hold Eliquis for 4 doses starting this afternoon, and can resume her p.m. dose on 6/28  Patient is to hold aspirin for the next 4 days, to resume on the morning of 7/1.  This is all in the context of recent tooth extraction with persistent bleeding.

## 2024-08-05 ENCOUNTER — TELEPHONE (OUTPATIENT)
Dept: CARDIOLOGY CLINIC | Facility: CLINIC | Age: 89
End: 2024-08-05

## 2024-08-05 NOTE — TELEPHONE ENCOUNTER
Spoke to Pallavi regarding verbal order of medication change per Dr. Li - The lasix dose documented is 20 mg and 40 mg alternating. She can try just taking 20 mg daily and follow her weights.    Asked for written order to fax to nursing [place]

## 2024-08-05 NOTE — TELEPHONE ENCOUNTER
Caller: Pallavi    Doctor: Татьяна    Reason for call: Pallavi calling in regards to patient. States patient is having urinary incontinence during the day that she believes is due to her water pill. States that this is holding her back from doing daily activities. Denies having problems at night. Pallavi would like a call back to discuss if changes can be made. States she has doctor appts until 3 pm today and would like a call back after 3 pm if possible.     Call back#: [phone]

## 2024-08-05 NOTE — TELEPHONE ENCOUNTER
Spoke to patients daughter Pallavi and relayed Dr. Li message. Pt was tested at her nursing home for a UTI and it came back clean. She said they had this problem in the past and Dr. Vaz lowered the water pill. Patients daughter said at night water pill almost seems to wear off from the time of the last dose. Pts daughter Pallavi thinks it's the water pill dosage causing the problem.

## 2024-08-14 NOTE — UTILIZATION REVIEW
Initial Clinical Review    Admission: Date/Time/Statement: 12/9/18 AT 2053 URGENT INPATIENT ADMISSION FROM [facility] ED TO [address] [place] [address] ICU 2ND SYNCOPE WITH 2ND DEGREE AV BLOCK + LBBB S/P TRANSVENOUS PACEMAKER -  FOR ELECTROPHYSIOLOGY EVALUATION FOR PPM       Orders Placed This Encounter   Procedures    Inpatient Admission     Standing Status:   Standing     Number of Occurrences:   1     Order Specific Question:   Admitting Physician     Answer:   Robina Downing [[id number]]     Order Specific Question:   Level of Care     Answer:   Critical Care [15]     Order Specific Question:   Estimated length of stay     Answer:   More than 2 Midnights     Order Specific Question:   Certification     Answer:   I certify that inpatient services are medically necessary for this patient for a duration of greater than two midnights  See H&P and MD Progress Notes for additional information about the patient's course of treatment  Date/Time/Mode of Arrival: 12/9/18 AT 2053 URGENT INPATIENT ADMISSION FROM [facility] ED TO [address] [place] [address] ICU 2ND SYNCOPE WITH 2ND DEGREE AV BLOCK + LBBB S/P TRANSVENOUS PACEMAKER -  FOR ELECTROPHYSIOLOGY EVALUATION FOR PPM         Reason for Admission / Principal Problem: Syncope with AV Block     HPI: Danisha Patel is a 80 y o  female who presents to the [facility] Emergency Department with a sudden onset of syncope  Patient reports waking up and feeling fine this morning  She remembers walking back to her living room from her kitchen and the next thing she remembers is being woken up by her   He had found her on the floor  It is unclear how long she was unconscious  She had no loss of bowel or bladder  After this episode she woke up without confusion  In the emergency department, she was found to have second-degree type 2 heart block and left bundle branch block and was subsequently transferred to [facility] for electrophysiology evaluation   Given her slow HR, cardiology recommended TVP placement and the patient presents to the ICU post procedurally       History obtained from chart review and the patient        ROS:   14 point ROS is negative and/or as stated in the HPI  Physical Exam:  Cardiac: Slowed rate and regular rhythm, no murmur, no rub    Pulmonary: Clear to auscultation bilaterally, no secretions  Vital Signs:   Vitals   Temperature Pulse Respirations Blood Pressure SpO2   12/09/18 1937 12/09/18 1937 12/09/18 1937 12/09/18 1937 12/09/18 1937   98 6 °F (37 °C) 65 18 132/68 92 %      Temp Source Heart Rate Source Patient Position - Orthostatic VS BP Location FiO2 (%)   12/09/18 1937 12/10/18 0700 12/10/18 0700 12/09/18 1937 --   Oral Monitor Lying Right arm       Pain Score       12/09/18 1937       No Pain        Wt Readings from Last 1 Encounters:   12/10/18 78 4 kg (172 lb 13 5 oz)      12/09 0701  12/10 0700 12/10 0701  12/10 1028  Most Recent    Temperature (°F) 97 398 9    97 3 (36 3)    Pulse 5278 7678  76    Respirations 1830 2529  25    Blood Pressure 93/40176/86 127/51150/59  127/51    SpO2 (%) 9298 9496  95        LABS/Diagnostic Test Results:   Results from last 7 days  Lab Units 12/09/18  1155   WBC Thousand/uL 7 48   HEMOGLOBIN g/dL 12 0   HEMATOCRIT % 35 9   PLATELETS Thousands/uL 214   NEUTROS PCT % 78*   MONOS PCT % 8      Results from last 7 days  Lab Units 12/09/18  1155   POTASSIUM mmol/L 4 3   CHLORIDE mmol/L 102   CO2 mmol/L 31   BUN mg/dL 25   CREATININE mg/dL 0 78   CALCIUM mg/dL 9 4   ALK PHOS U/L 76   ALT U/L 24   AST U/L 17       Results from last 7 days  Lab Units 12/09/18  1155   TROPONIN I ng/mL <0 02          Past Medical/Surgical History:    Active Ambulatory Problems     Diagnosis Date Noted    CAD (coronary artery disease) 04/07/2017    HTN (hypertension) 04/07/2017    History of placement of stent in LAD coronary artery 01/26/2018    Hyperlipidemia 01/26/2018    History of atrial flutter 01/26/2018    Edema 01/26/2018  LBBB (left bundle branch block) 01/26/2018    Right hip pain 03/02/2018    Diabetes mellitus ([facility] ) 06/19/2015    Hypothyroidism 01/27/2016    Adhesive capsulitis 06/19/2015    Chronic low back pain 08/16/2016    Gait disturbance 03/02/2018    Cervical spine degeneration 09/28/2018    Primary osteoarthritis of both shoulders 09/28/2018    Tremors of nervous system 09/28/2018     Resolved Ambulatory Problems     Diagnosis Date Noted    Generalized abdominal pain 03/02/2018    Urinary retention 03/02/2018    Near syncope 10/28/2018     Past Medical History:   Diagnosis Date    Abnormal nuclear stress test     Anxiety     Arthritis     Bundle branch block, left     Cardiomyopathy [facility] )     Chronic pain     Coronary artery disease     [facility]     GERD (gastroesophageal reflux disease)     H/O atrial flutter     History of DVT (deep vein thrombosis)     History of pulmonary embolism     Hyperlipidemia     Hypertension     Hypothyroid     Renal calculi     Shortness of breath        Admitting Diagnosis: Heart block AV second degree [I44 1]    Age/Sex: 80 y o  female      Assessment/Plan:   Impression:  Principal Problem:    Syncope  Active Problems:    Heart block AV second degree    LBBB (left bundle branch block)    History of placement of stent in LAD coronary artery    History of atrial flutter    Diabetes mellitus (HCC)    Hypothyroidism    HTN (hypertension)    Hyperlipidemia      Plan:   Neuro:   · Pain controlled with: tylenol PRN  · Regulate sleep/wake cycle  · Delirium precautions  ? CAM-ICU daily  · Trend neuro exam  CV:   · Cardiac infusions: None  ? MAP goal > 65  · Rhythm: V-Paced  ?  Follow rhythm on telemetry  · Hold BB and ACEi  · EP consult in AM  · VVI 50 RFV temp pacer wire  Lung:   · SpO2 goal >92%  · Pulmonary toileting with IS  GI:   · Stress ulcer prophylaxis: No prophylaxis needed  · Bowel regimen: PRN  · Zofran PRN for nausea  FEN:   · Fluid/Diuretic plan: NS @ 50/ hour  · Nutrition/diet plan: NPO at midnight  · Replete electrolytes with goals: K >4 0, Mag >2 0, and Phos >3 0  :   · Indwelling Segura: no  · Trend UOP and BUN/creat  · Strict I and O  ID:   · Trend temps and WBC count  · Maintain normothermia  Heme:   · Trend hgb and plts  ? Transfuse as needed for goal hgb >7 0  Endo:   · Glycemic control plan: SQ insulin coverage  MSK/Skin:   · Frequent turning and pressure off-loading  · Local wound care as needed     VTE Pharmacologic Prophylaxis: Heparin SQ  VTE Mechanical Prophylaxis: sequential compression device     Invasive lines and devices: Invasive Devices            Peripheral Intravenous Line                     Peripheral IV 12/09/18 Left Hand less than 1 day                   Line                     Venous Sheath 6 Fr  Right Femoral less than 1 day                 Disposition: ICU admission  Given critical illness, patient length of stay will require greater than two midnights        Admission Orders:  12/9/18 AT 2053  ADMIT INPATIENT TO ICU  CARDIO PULM MONOTORING  VS PER ICU Q1HR+  NEURO + NEUROVASCULAR CHECKS Q4HRS       TEMP PACEMAKER CARE -  MA + HR AS ORDERED    Turn q2hrs  SCD    NPO  (Diet Kraig/CHO Controlled; Consistent Carbohydrate Diet Level 2 (5 carb servings/75 grams CHO/meal)    Continuous IV Infusions:   sodium chloride 50 mL/hr Last Rate: 50 mL/hr (12/09/18 2113)       Scheduled Meds:   Current Facility-Administered Medications:  acetaminophen 650 mg Oral Q6H PRN Wallace Joseph PA-C    aspirin 81 mg Oral Daily Gloria Romero PA-C    calcium carbonate 1 tablet Oral Daily With Breakfast Gloria Romero PA-C    heparin (porcine) 5,000 Units Subcutaneous [facility] Gloria Romero PA-C    hydrALAZINE 5 mg Intravenous Q6H PRN Neris Menard MD    insulin lispro 1-5 Units Subcutaneous TID AC Gloria Romero PA-C    insulin lispro 1-5 Units Subcutaneous HS Gloria Romero PA-C    levothyroxine 25 mcg Oral Early Morning Wallace Joseph PA-C magnesium sulfate 2 g Intravenous Once FERMIN Santo Last Rate: 2 g (12/10/18 0843)   multivitamin-minerals 1 tablet Oral Daily Gloria Romero PA-C    ondansetron 4 mg Intravenous Q8H PRN Carlo SONNY Cates    pravastatin 40 mg Oral Daily Gloria Romero PA-C    sodium chloride 50 mL/hr Intravenous Continuous Carlo SONNY Cates Last Rate: 50 mL/hr (12/09/18 2113)       PRN Meds:     Acetaminophen 650 mg q6hrs prn given x 1    IV hydrALAZINE 5 mg q6hrs prn SBP > 160 given x 2    ondansetron    CONSULT ELECTROPHYSIOLOGY    *FOR ELECTROPHYSIOLOGY STUDY 12/10/18 no body aches/no chest pain/no chills

## 2024-09-17 ENCOUNTER — REMOTE DEVICE CLINIC VISIT (OUTPATIENT)
Dept: CARDIOLOGY CLINIC | Facility: CLINIC | Age: 89
End: 2024-09-17
Payer: MEDICARE

## 2024-09-17 DIAGNOSIS — Z95.0 CARDIAC PACEMAKER IN SITU: Primary | ICD-10-CM

## 2024-09-17 PROCEDURE — 93294 REM INTERROG EVL PM/LDLS PM: CPT | Performed by: STUDENT IN AN ORGANIZED HEALTH CARE EDUCATION/TRAINING PROGRAM

## 2024-09-17 PROCEDURE — 93296 REM INTERROG EVL PM/IDS: CPT | Performed by: STUDENT IN AN ORGANIZED HEALTH CARE EDUCATION/TRAINING PROGRAM

## 2024-09-17 NOTE — PROGRESS NOTES
"Results for orders placed or performed in visit on 09/17/24   Cardiac EP device report    Narrative    MDT-DUAL CHAMBER PPM (DDDR MODE) - ACTIVE SYSTEM IS MRI CONDITIONAL  CARELINK TRANSMISSION: BATTERY STATUS \"6 YRS.\" AP 18%  100%. ALL AVAILABLE LEAD PARAMETERS WITHIN NORMAL LIMITS. 1 NSVT NOTED; APPROX 8 BEATS@ 174 BPM. PT ON AMIO. EF 60% (ECHO 2021). NORMAL DEVICE FUNCTION. NC         "

## 2024-10-21 ENCOUNTER — TELEPHONE (OUTPATIENT)
Dept: PAIN MEDICINE | Facility: CLINIC | Age: 89
End: 2024-10-21

## 2024-10-21 NOTE — TELEPHONE ENCOUNTER
Please schedule patient for repeat left shoulder intra-articular steroid injection.  Referral request received

## 2024-10-23 NOTE — TELEPHONE ENCOUNTER
Called POA, scheduled injection same day as follow up with CRNP on 11/22 due to transportation issues.  Called [facility], advised nursing of instructions and appt.

## 2024-11-01 NOTE — TELEPHONE ENCOUNTER
Caller: titi Gutierrez     Doctor: Gladis     Reason for call: Pallavi stated she had a missed call, I confirmed pt appts on 11/22/2024. Per  pt is all good to go on 11/22/2024.     Call back#: [phone]

## 2024-11-22 ENCOUNTER — OFFICE VISIT (OUTPATIENT)
Dept: PAIN MEDICINE | Facility: CLINIC | Age: 89
End: 2024-11-22
Payer: MEDICARE

## 2024-11-22 ENCOUNTER — HOSPITAL ENCOUNTER (OUTPATIENT)
Dept: RADIOLOGY | Facility: CLINIC | Age: 89
End: 2024-11-22
Payer: MEDICARE

## 2024-11-22 VITALS
OXYGEN SATURATION: 90 % | DIASTOLIC BLOOD PRESSURE: 69 MMHG | SYSTOLIC BLOOD PRESSURE: 143 MMHG | HEART RATE: 75 BPM | TEMPERATURE: 98.4 F | RESPIRATION RATE: 20 BRPM

## 2024-11-22 VITALS
RESPIRATION RATE: 16 BRPM | DIASTOLIC BLOOD PRESSURE: 60 MMHG | BODY MASS INDEX: 30.65 KG/M2 | HEIGHT: 63 IN | SYSTOLIC BLOOD PRESSURE: 115 MMHG | HEART RATE: 75 BPM

## 2024-11-22 DIAGNOSIS — M25.511 CHRONIC PAIN OF BOTH SHOULDERS: Primary | ICD-10-CM

## 2024-11-22 DIAGNOSIS — M79.18 MYOFASCIAL PAIN SYNDROME: ICD-10-CM

## 2024-11-22 DIAGNOSIS — G89.29 CHRONIC LEFT SHOULDER PAIN: ICD-10-CM

## 2024-11-22 DIAGNOSIS — G89.4 CHRONIC PAIN SYNDROME: ICD-10-CM

## 2024-11-22 DIAGNOSIS — G89.29 CHRONIC PAIN OF BOTH SHOULDERS: Primary | ICD-10-CM

## 2024-11-22 DIAGNOSIS — M25.512 CHRONIC LEFT SHOULDER PAIN: ICD-10-CM

## 2024-11-22 DIAGNOSIS — M54.12 CERVICAL RADICULOPATHY: ICD-10-CM

## 2024-11-22 DIAGNOSIS — M25.512 CHRONIC PAIN OF BOTH SHOULDERS: Primary | ICD-10-CM

## 2024-11-22 PROCEDURE — 99214 OFFICE O/P EST MOD 30 MIN: CPT

## 2024-11-22 PROCEDURE — 20610 DRAIN/INJ JOINT/BURSA W/O US: CPT | Performed by: ANESTHESIOLOGY

## 2024-11-22 PROCEDURE — 77002 NEEDLE LOCALIZATION BY XRAY: CPT | Performed by: ANESTHESIOLOGY

## 2024-11-22 PROCEDURE — 77002 NEEDLE LOCALIZATION BY XRAY: CPT

## 2024-11-22 RX ORDER — ROPIVACAINE HYDROCHLORIDE 2 MG/ML
3 INJECTION, SOLUTION EPIDURAL; INFILTRATION; PERINEURAL ONCE
Status: COMPLETED | OUTPATIENT
Start: 2024-11-22 | End: 2024-11-22

## 2024-11-22 RX ORDER — METHYLPREDNISOLONE ACETATE 80 MG/ML
80 INJECTION, SUSPENSION INTRA-ARTICULAR; INTRALESIONAL; INTRAMUSCULAR; PARENTERAL; SOFT TISSUE ONCE
Status: COMPLETED | OUTPATIENT
Start: 2024-11-22 | End: 2024-11-22

## 2024-11-22 RX ORDER — 0.9 % SODIUM CHLORIDE 0.9 %
2 VIAL (ML) INJECTION ONCE
Status: COMPLETED | OUTPATIENT
Start: 2024-11-22 | End: 2024-11-22

## 2024-11-22 RX ADMIN — IOHEXOL 1 ML: 300 INJECTION, SOLUTION INTRAVENOUS at 13:52

## 2024-11-22 RX ADMIN — METHYLPREDNISOLONE ACETATE 80 MG: 80 INJECTION, SUSPENSION INTRA-ARTICULAR; INTRALESIONAL; INTRAMUSCULAR; SOFT TISSUE at 13:52

## 2024-11-22 RX ADMIN — LIDOCAINE HYDROCHLORIDE 2 ML: 20 INJECTION, SOLUTION EPIDURAL; INFILTRATION; INTRACAUDAL at 13:51

## 2024-11-22 RX ADMIN — SODIUM CHLORIDE 2 ML: 9 INJECTION INTRAMUSCULAR; INTRAVENOUS; SUBCUTANEOUS at 13:51

## 2024-11-22 RX ADMIN — ROPIVACAINE HYDROCHLORIDE 3 ML: 2 INJECTION, SOLUTION EPIDURAL; INFILTRATION at 13:52

## 2024-11-22 NOTE — H&P
History of Present Illness: The patient is a 89 y.o. female who presents with complaints of left shoulder pain is here today for left shoulder injection    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    Bundle branch block, left     Cardiomyopathy (HCC)     Chest pain 3/9/2019    Chronic pain     chronic b/l shoulder pain    Chronic pain of right knee 4/2/2019    Coronary artery disease     Diabetes mellitus (HCC)     Gait disturbance 3/2/2018    GERD (gastroesophageal reflux disease)     H/O atrial flutter     History of DVT (deep vein thrombosis)     History of pulmonary embolism     Hyperlipidemia     Hypertension     Hypothyroid     Renal calculi     Shortness of breath        Past Surgical History:   Procedure Laterality Date    ATRIAL ABLATION SURGERY  01/2015    CARDIAC PACEMAKER PLACEMENT  12/10/2018    Medtronic YANNA XT DR MRI, model [id number]    CARDIOVERSION      CHELA/DCCV 10/22/2014    CARPAL TUNNEL RELEASE Right     CATARACT EXTRACTION      CORONARY ANGIOPLASTY WITH STENT PLACEMENT      HYSTERECTOMY      JOINT REPLACEMENT Right     GA AMPUTATION TOE METATARSOPHALANGEAL JOINT Left 10/11/2017    Procedure: AMPUTATION SECOND TOE;  Surgeon: Ashly Craft DPM;  Location: [facility];  Service: Podiatry    TONSILLECTOMY      TOTAL HIP ARTHROPLASTY      Right         Current Outpatient Medications:     Accu-Chek FastClix Lancets MISC, Check once daily, Disp: 100 each, Rfl: 3    Accu-Chek Softclix Lancets lancets, Use daily Use as instructed, Disp: 100 each, Rfl: 1    acetaminophen (TYLENOL) 325 mg tablet, Take 975 mg by mouth 3 (three) times a day, Disp: , Rfl:     amiodarone 200 mg tablet, Take 0.5 tablets (100 mg total) by mouth daily, Disp: 45 tablet, Rfl: 3    amoxicillin (AMOXIL) 500 mg capsule, Take 2,000 mg by mouth 1 HOUR PRIOR TO DENTAL PROCEDURE, Disp: , Rfl:      Aspirin Low Dose 81 MG chewable tablet, Hold aspirin for 4 days until 6/30 and can resume 7/1, Disp: , Rfl:     Diclofenac Sodium (VOLTAREN) 1 %, Apply 2 g topically 4 (four) times a day Bilateral shoulders and knees, Disp: 150 g, Rfl: 0    Eliquis 5 MG, Hold 4 doses starting today and resume Friday 6/28 pm dose, Disp: , Rfl:     ezetimibe (ZETIA) 10 mg tablet, Take 1 tablet (10 mg total) by mouth daily, Disp: 30 tablet, Rfl: 3    famotidine (PEPCID) 20 mg tablet, Take 20 mg by mouth 2 (two) times a day, Disp: , Rfl:     furosemide (LASIX) 20 mg tablet, TAKE ONE TABLET BY MOUTH EVERY OTHER DAY ALTERNATING WITH 40MG EVERY OTHER DAY FOR HYPERTENSION, Disp: , Rfl:     furosemide (LASIX) 40 mg tablet, Take 0.5 tablets (20 mg total) by mouth daily Alternate 20mg with 40mg every other day, Disp: , Rfl:     glucose blood (Accu-Chek Guide) test strip, Check once daily, Disp: 100 each, Rfl: 3    ipratropium (ATROVENT) 0.06 % nasal spray, 2 sprays into each nostril 3 (three) times a day, Disp: 15 mL, Rfl: 6    isosorbide mononitrate (IMDUR) 30 mg 24 hr tablet, Take 1 tablet (30 mg total) by mouth daily, Disp: 90 tablet, Rfl: 3    levothyroxine 112 mcg tablet, Take 112 mcg by mouth every morning Take on an empty stomach, Disp: , Rfl:     Lidocaine 4 % PTCH, Apply 3 patches topically daily B/l knees, low back, Disp: , Rfl:     loperamide (IMODIUM) 2 mg capsule, Take 2 mg by mouth 4 (four) times a day as needed for diarrhea, Disp: , Rfl:     melatonin 3 mg, Take 3 mg by mouth daily at bedtime, Disp: , Rfl:     menthol-zinc oxide (Calmoseptine) 0.44-20.6 % OINT, Apply topically 2 (two) times a day, Disp: , Rfl:     Mirabegron ER 25 MG TB24, Take 50 mg by mouth in the morning, Disp: 90 tablet, Rfl: 3    Multiple Vitamins-Minerals (CEROVITE SENIOR PO), Take 1 tablet by mouth in the morning, Disp: , Rfl:     Multiple Vitamins-Minerals (CertaVite Senior/Antioxidant) TABS, TAKE ONE TABLET BY MOUTH ONCE EVERY DAY, Disp: , Rfl:      oxyCODONE-acetaminophen (PERCOCET) 5-325 mg per tablet, TAKE ONE TABLET BY MOUTH EVERY TWELVE HOURS AS NEEDED FOR SEVERE pain, Disp: , Rfl:     polyethylene glycol (GLYCOLAX) 17 GM/SCOOP, MIX 17 GRAMS (1 CAPFUL) OF POWDER IN 8 OUNCES OF LIQUID AND DRINK ONCE DAILY FOR CONSTIPATON AS NEEDED (Patient not taking: Reported on 12/14/2023), Disp: , Rfl:     polyethylene glycol (MIRALAX) 17 g packet, Take 17 g by mouth daily (Patient taking differently: Take 17 g by mouth if needed), Disp:  , Rfl: 0    pravastatin (PRAVACHOL) 80 mg tablet, TAKE ONE TABLET BY MOUTH ONCE EVERY DAY FOR CHOLESTEROL, Disp: , Rfl:     Theratears 0.25 % SOLN, instill ONE drop into each eye three times a day, Disp: , Rfl:     tiZANidine (ZANAFLEX) 2 mg tablet, Take 1 tablet (2 mg total) by mouth daily at bedtime, Disp: 30 tablet, Rfl: 2    traMADol (ULTRAM) 50 mg tablet, Take 50 mg by mouth every 8 (eight) hours as needed, Disp: , Rfl:     Zinc Oxide POWD, Use 1 Application as needed (with soilage) (Patient not taking: Reported on 12/14/2023), Disp: , Rfl:     Current Facility-Administered Medications:     iohexol (OMNIPAQUE) 300 mg/mL injection 1 mL, 1 mL, Intra-articular, Once, Melvin Griffith MD    lidocaine (PF) (XYLOCAINE-MPF) 2 % injection 2 mL, 2 mL, Infiltration, Once, Melvin Griffith MD    methylPREDNISolone acetate (DEPO-MEDROL) injection 80 mg, 80 mg, Intra-articular, Once, Melvin Griffith MD    ropivacaine (NAROPIN) injection 3 mL, 3 mL, Intra-articular, Once, Melvin Griffith MD    sodium chloride (PF) 0.9 % injection 2 mL, 2 mL, Infiltration, Once, Melvin Griffith MD    Allergies   Allergen Reactions    Atorvastatin      Reaction unknown 10/23/2018-    Other Rash     Patient sensitive to adhesives from tape       Physical Exam:   Vitals:    11/22/24 1333   BP: 102/58   Pulse: 68   Resp: 20   Temp: 98.4 °F (36.9 °C)   SpO2: 91%     General: Awake, Alert, Oriented x 3, Mood and affect appropriate  Respiratory: Respirations even and  unlabored  Cardiovascular: Peripheral pulses intact; no edema  Musculoskeletal Exam: Left shoulder pain    ASA Score: 3    Patient/Chart Verification  Patient ID Verified: Verbal  ID Band Applied: No  Consents Confirmed: Procedural, To be obtained in the Pre-Procedure area  Interval H&P(within 24 hr) Complete (required for Outpatients and Surgery Admit only): To be obtained in the Procedural area  Allergies Reviewed: Yes  Anticoag/NSAID held?: NA  Currently on antibiotics?: No    Assessment:   1. Chronic left shoulder pain        Plan: left shoulder intra-articular steroid injection

## 2024-11-22 NOTE — DISCHARGE INSTR - LAB

## 2024-11-22 NOTE — PROGRESS NOTES
Assessment:  1. Chronic pain of both shoulders    2. Chronic pain syndrome    3. Cervical radiculopathy    4. Myofascial pain syndrome        Plan:  The patient is a 89-year-old female with a history of chronic pain secondary to neck pain, cervical radiculopathy, bilateral shoulder pain and myofascial pain who presents to the office with ongoing bilateral shoulder pain, left worse than right and neck pain.    I do suspect that the majority of the patient's pain is coming from the advanced arthritis in both shoulders.  I did instruct patient to proceed with left shoulder injection which is scheduled following this office visit.  Patient also has arthritis in her right shoulder and can consider a right intra-articular shoulder injection.  Patient would like to proceed with left and will call our office if she would like to proceed with right shoulder.    My impressions and treatment recommendations were discussed in detail with the patient who verbalized understanding and had no further questions.  Discharge instructions were provided. I personally saw and examined the patient and I agree with the above discussed plan of care.    No orders of the defined types were placed in this encounter.    No orders of the defined types were placed in this encounter.      History of Present Illness:  Brenda Lopez is a 89 y.o. female with a history of chronic pain secondary to neck pain, cervical radiculopathy, bilateral shoulder pain and myofascial pain.  She was last seen on 2/16/2024 where she underwent a left shoulder intra-articular injection which did provide her greater than 50% relief of her left shoulder pain.  She presents to the office with ongoing bilateral shoulder pain, left worse than right and neck pain.  She is currently scheduled for a left intra-articular shoulder injection following this office visit.    She states her pain is the same since the last office visit and constant.  She is currently rating her  pain an 8/10 on a numeric scale.    Current pain medications include Tylenol as needed, tizanidine 2 mg and tramadol 50 mg as prescribed by her assisted living facility.  She states this medication regimen is helpful.    I have personally reviewed and/or updated the patient's past medical history, past surgical history, family history, social history, current medications, allergies, and vital signs today.     Review of Systems   Respiratory:  Negative for shortness of breath.    Cardiovascular:  Negative for chest pain.   Gastrointestinal:  Negative for constipation, diarrhea, nausea and vomiting.   Musculoskeletal:  Positive for back pain, gait problem, joint swelling, neck pain and neck stiffness. Negative for arthralgias and myalgias.        Left arm and shoulder pain  Bilateral knee pain   Skin:  Negative for rash.   Neurological:  Negative for dizziness, seizures and weakness.   All other systems reviewed and are negative.      Patient Active Problem List   Diagnosis    Coronary artery disease involving native coronary artery of native heart without angina pectoris    Essential hypertension    History of placement of stent in LAD coronary artery    Hyperlipidemia    LBBB (left bundle branch block)    Right hip pain    Type 2 diabetes mellitus with stage 3a chronic kidney disease, without long-term current use of insulin ([facility])    Acquired hypothyroidism    Adhesive capsulitis    Chronic low back pain    Cervical spine degeneration    Primary osteoarthritis of both shoulders    Tremor    Heart block AV second degree    Hyponatremia    Ambulatory dysfunction    Pacemaker    Atrial flutter ([facility])    Trochanteric bursitis of right hip    Ventricular tachycardia ([facility])    Cardiomyopathy ([facility])    Primary osteoarthritis of both knees    Effusion of left knee    Chronic diastolic CHF (congestive heart failure) ([facility])    Generalized OA    Stage 3a chronic kidney disease ([facility])    Mixed stress and urge urinary incontinence     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)    Severe obesity (BMI 35.0-39.9) with comorbidity (HCC)    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)    Avascular necrosis (HCC)    Knee pain, chronic    Asymptomatic bacteriuria    Stroke-like symptoms    Colitis    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HCC)    Arthritis    Aortic stenosis, severe    Lightheadedness    Malignant neoplasm of central portion of left breast in female, estrogen receptor positive (HCC)    PAF (paroxysmal atrial fibrillation) (HCC)    Localized osteoarthritis of left shoulder       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    Bundle branch block, left     Cardiomyopathy (HCC)     Chest pain 3/9/2019    Chronic pain     chronic b/l shoulder pain    Chronic pain of right knee 4/2/2019    Coronary artery disease     Diabetes mellitus (HCC)     Gait disturbance 3/2/2018    GERD (gastroesophageal reflux disease)     H/O atrial flutter     History of DVT (deep vein thrombosis)     History of pulmonary embolism     Hyperlipidemia     Hypertension     Hypothyroid     Renal calculi     Shortness of breath        Past Surgical History:   Procedure Laterality Date    ATRIAL ABLATION SURGERY  01/2015    CARDIAC PACEMAKER PLACEMENT  12/10/2018    Medtronic YANNA XT DR MRI, model [id number]    CARDIOVERSION      CHELA/DCCV 10/22/2014    CARPAL TUNNEL RELEASE Right     CATARACT EXTRACTION      CORONARY ANGIOPLASTY WITH STENT PLACEMENT      HYSTERECTOMY      JOINT REPLACEMENT Right     AR AMPUTATION TOE METATARSOPHALANGEAL JOINT Left 10/11/2017    Procedure: AMPUTATION SECOND TOE;  Surgeon: Ashly Craft DPM;  Location: [facility];  Service: Podiatry    TONSILLECTOMY      TOTAL HIP ARTHROPLASTY      Right       Family History   Problem Relation Age of Onset    Parkinsonism Sister     Cancer Sister          unknown type       Social History     Occupational History    Not on file   Tobacco Use    Smoking status: Never    Smokeless tobacco: Never   Vaping Use    Vaping status: Never Used   Substance and Sexual Activity    Alcohol use: Yes     Comment: occasional    Drug use: Not Currently     Types: Marijuana     Comment: MEDICAL MARIJUANA-HAS NOT USED FOR 3 WEEKS    Sexual activity: Not Currently       Current Outpatient Medications on File Prior to Visit   Medication Sig    Accu-Chek FastClix Lancets MISC Check once daily    Accu-Chek Softclix Lancets lancets Use daily Use as instructed    acetaminophen (TYLENOL) 325 mg tablet Take 975 mg by mouth 3 (three) times a day    amiodarone 200 mg tablet Take 0.5 tablets (100 mg total) by mouth daily    amoxicillin (AMOXIL) 500 mg capsule Take 2,000 mg by mouth 1 HOUR PRIOR TO DENTAL PROCEDURE    Aspirin Low Dose 81 MG chewable tablet Hold aspirin for 4 days until 6/30 and can resume 7/1    Diclofenac Sodium (VOLTAREN) 1 % Apply 2 g topically 4 (four) times a day Bilateral shoulders and knees    Eliquis 5 MG Hold 4 doses starting today and resume Friday 6/28 pm dose    ezetimibe (ZETIA) 10 mg tablet Take 1 tablet (10 mg total) by mouth daily    famotidine (PEPCID) 20 mg tablet Take 20 mg by mouth 2 (two) times a day    furosemide (LASIX) 20 mg tablet TAKE ONE TABLET BY MOUTH EVERY OTHER DAY ALTERNATING WITH 40MG EVERY OTHER DAY FOR HYPERTENSION    furosemide (LASIX) 40 mg tablet Take 0.5 tablets (20 mg total) by mouth daily Alternate 20mg with 40mg every other day    glucose blood (Accu-Chek Guide) test strip Check once daily    ipratropium (ATROVENT) 0.06 % nasal spray 2 sprays into each nostril 3 (three) times a day    isosorbide mononitrate (IMDUR) 30 mg 24 hr tablet Take 1 tablet (30 mg total) by mouth daily    levothyroxine 112 mcg tablet Take 112 mcg by mouth every morning Take on an empty stomach    Lidocaine 4 % PTCH Apply 3 patches topically daily B/l knees, low back  "   loperamide (IMODIUM) 2 mg capsule Take 2 mg by mouth 4 (four) times a day as needed for diarrhea    melatonin 3 mg Take 3 mg by mouth daily at bedtime    menthol-zinc oxide (Calmoseptine) 0.44-20.6 % OINT Apply topically 2 (two) times a day    Mirabegron ER 25 MG TB24 Take 50 mg by mouth in the morning    Multiple Vitamins-Minerals (CEROVITE SENIOR PO) Take 1 tablet by mouth in the morning    Multiple Vitamins-Minerals (CertaVite Senior/Antioxidant) TABS TAKE ONE TABLET BY MOUTH ONCE EVERY DAY    oxyCODONE-acetaminophen (PERCOCET) 5-325 mg per tablet TAKE ONE TABLET BY MOUTH EVERY TWELVE HOURS AS NEEDED FOR SEVERE pain    polyethylene glycol (MIRALAX) 17 g packet Take 17 g by mouth daily (Patient taking differently: Take 17 g by mouth if needed)    pravastatin (PRAVACHOL) 80 mg tablet TAKE ONE TABLET BY MOUTH ONCE EVERY DAY FOR CHOLESTEROL    Theratears 0.25 % SOLN instill ONE drop into each eye three times a day    tiZANidine (ZANAFLEX) 2 mg tablet Take 1 tablet (2 mg total) by mouth daily at bedtime    traMADol (ULTRAM) 50 mg tablet Take 50 mg by mouth every 8 (eight) hours as needed    polyethylene glycol (GLYCOLAX) 17 GM/SCOOP MIX 17 GRAMS (1 CAPFUL) OF POWDER IN 8 OUNCES OF LIQUID AND DRINK ONCE DAILY FOR CONSTIPATON AS NEEDED (Patient not taking: Reported on 12/14/2023)    Zinc Oxide POWD Use 1 Application as needed (with soilage) (Patient not taking: Reported on 12/14/2023)     No current facility-administered medications on file prior to visit.       Allergies   Allergen Reactions    Atorvastatin      Reaction unknown 10/23/2018-    Other Rash     Patient sensitive to adhesives from tape       Physical Exam:    /60   Pulse 75   Resp 16   Ht 5' 3\" (1.6 m)   LMP  (LMP Unknown)   BMI 30.65 kg/m²     Constitutional:normal, well developed, well nourished, alert, in no distress and non-toxic and no overt pain behavior.  Eyes:anicteric  HEENT:grossly intact  Neck:supple, symmetric, trachea midline and " no masses   Pulmonary:even and unlabored  Cardiovascular:No edema or pitting edema present  Skin:Normal without rashes or lesions and well hydrated  Psychiatric:Mood and affect appropriate  Neurologic:Cranial Nerves II-XII grossly intact  Musculoskeletal:in wheelchair    Cervical Spine Exam    Appearance:  Normal lordosis  Palpation/Tenderness:  left cervical paraspinal tenderness  right cervical paraspinal tenderness  Sensory:  no sensory deficits noted  Range of Motion:  Flexion:  No limitation  with pain  Extension:  Minimally limited  with pain  Rotation - Left:  Minimally limited  with pain  Rotation - Right:  Minimally limited  with pain  Motor Strength:  Left Arm Flexion  5/5  Left Arm Extension  5/5  Right Arm Flexion  5/5  Right Arm Extension  5/5  Left    5/5  Right   5/5    Imaging

## 2024-12-06 ENCOUNTER — TELEPHONE (OUTPATIENT)
Dept: RADIOLOGY | Facility: MEDICAL CENTER | Age: 89
End: 2024-12-06

## 2024-12-06 NOTE — TELEPHONE ENCOUNTER
Patient's daughter Reports      no improvement . The patient  is in a wheel chair  and she does get much movement.

## 2024-12-19 ENCOUNTER — IN-CLINIC DEVICE VISIT (OUTPATIENT)
Dept: CARDIOLOGY CLINIC | Facility: CLINIC | Age: 89
End: 2024-12-19
Payer: MEDICARE

## 2024-12-19 ENCOUNTER — RESULTS FOLLOW-UP (OUTPATIENT)
Dept: NON INVASIVE DIAGNOSTICS | Facility: HOSPITAL | Age: 89
End: 2024-12-19

## 2024-12-19 DIAGNOSIS — Z95.0 PRESENCE OF CARDIAC PACEMAKER: Primary | ICD-10-CM

## 2024-12-19 PROCEDURE — 93280 PM DEVICE PROGR EVAL DUAL: CPT | Performed by: STUDENT IN AN ORGANIZED HEALTH CARE EDUCATION/TRAINING PROGRAM

## 2024-12-19 NOTE — PROGRESS NOTES
MDT DC PPM (DDDR MODE) - ACTIVE SYSTEM IS MRI CONDITIONAL   DEVICE INTERROGATED IN THE [facility] OFFICE: BATTERY VOLTAGE ADEQUATE (6.1 YRS). AP 17.2%.  99.9%. (>40%/DEPENDENT/AVB/DDDR 60 PPM). ALL AVAILABLE LEAD PARAMETERS WITHIN NORMAL LIMITS. NO SIGNIFICANT HIGH RATE EPISODES. NO PROGRAMMING CHANGES MADE TO DEVICE PARAMETERS. NORMAL DEVICE FUNCTION. [facility]/ES

## 2025-02-14 ENCOUNTER — OFFICE VISIT (OUTPATIENT)
Dept: CARDIOLOGY CLINIC | Facility: CLINIC | Age: OVER 89
End: 2025-02-14
Payer: MEDICARE

## 2025-02-14 VITALS
HEART RATE: 69 BPM | BODY MASS INDEX: 31 KG/M2 | WEIGHT: 175 LBS | SYSTOLIC BLOOD PRESSURE: 118 MMHG | OXYGEN SATURATION: 87 % | DIASTOLIC BLOOD PRESSURE: 60 MMHG

## 2025-02-14 DIAGNOSIS — E66.01 SEVERE OBESITY (BMI 35.0-39.9) WITH COMORBIDITY (HCC): ICD-10-CM

## 2025-02-14 DIAGNOSIS — I48.0 PAF (PAROXYSMAL ATRIAL FIBRILLATION) (HCC): ICD-10-CM

## 2025-02-14 DIAGNOSIS — I47.20 VENTRICULAR TACHYCARDIA (HCC): ICD-10-CM

## 2025-02-14 DIAGNOSIS — I35.0 AORTIC STENOSIS, SEVERE: ICD-10-CM

## 2025-02-14 DIAGNOSIS — I50.32 CHRONIC DIASTOLIC CHF (CONGESTIVE HEART FAILURE) (HCC): ICD-10-CM

## 2025-02-14 DIAGNOSIS — E78.2 MIXED HYPERLIPIDEMIA: Chronic | ICD-10-CM

## 2025-02-14 DIAGNOSIS — E11.22 TYPE 2 DIABETES MELLITUS WITH STAGE 3A CHRONIC KIDNEY DISEASE, WITHOUT LONG-TERM CURRENT USE OF INSULIN (HCC): ICD-10-CM

## 2025-02-14 DIAGNOSIS — I42.9 CARDIOMYOPATHY, UNSPECIFIED TYPE (HCC): ICD-10-CM

## 2025-02-14 DIAGNOSIS — N18.31 TYPE 2 DIABETES MELLITUS WITH STAGE 3A CHRONIC KIDNEY DISEASE, WITHOUT LONG-TERM CURRENT USE OF INSULIN (HCC): ICD-10-CM

## 2025-02-14 DIAGNOSIS — I25.10 CORONARY ARTERY DISEASE INVOLVING NATIVE CORONARY ARTERY OF NATIVE HEART WITHOUT ANGINA PECTORIS: Primary | Chronic | ICD-10-CM

## 2025-02-14 DIAGNOSIS — N18.31 STAGE 3A CHRONIC KIDNEY DISEASE (HCC): ICD-10-CM

## 2025-02-14 DIAGNOSIS — I48.92 ATRIAL FLUTTER, UNSPECIFIED TYPE (HCC): ICD-10-CM

## 2025-02-14 DIAGNOSIS — I44.7 LBBB (LEFT BUNDLE BRANCH BLOCK): ICD-10-CM

## 2025-02-14 DIAGNOSIS — I50.9 CONGESTIVE HEART FAILURE, UNSPECIFIED HF CHRONICITY, UNSPECIFIED HEART FAILURE TYPE (HCC): ICD-10-CM

## 2025-02-14 PROCEDURE — 99214 OFFICE O/P EST MOD 30 MIN: CPT | Performed by: INTERNAL MEDICINE

## 2025-02-14 PROCEDURE — G2211 COMPLEX E/M VISIT ADD ON: HCPCS | Performed by: INTERNAL MEDICINE

## 2025-02-14 PROCEDURE — 93000 ELECTROCARDIOGRAM COMPLETE: CPT | Performed by: INTERNAL MEDICINE

## 2025-02-14 RX ORDER — FUROSEMIDE 40 MG/1
TABLET ORAL
Qty: 98 TABLET | Refills: 3 | Status: SHIPPED | OUTPATIENT
Start: 2025-02-14

## 2025-02-19 ENCOUNTER — TELEPHONE (OUTPATIENT)
Age: OVER 89
End: 2025-02-19

## 2025-02-19 NOTE — TELEPHONE ENCOUNTER
Patient's daughter Pallavi calling to see if Dr. Vaz consulted Palliative care for patient. She was contacted by [facility] where her mother resides, that Palliative care would be visiting patient.   Advised Pallavi of Dr. Vaz's office note from 2/14/25, but that no orders were placed for hospice or palliative care as of now. Pallavi verbalized understanding.

## 2025-02-24 ENCOUNTER — RESULTS FOLLOW-UP (OUTPATIENT)
Dept: CARDIOLOGY CLINIC | Facility: CLINIC | Age: OVER 89
End: 2025-02-24

## 2025-02-24 NOTE — TELEPHONE ENCOUNTER
I called [facility] and spoke with Christie. They will do a repeat BMP on Thursday 2/27. We also reviewed medications confirming the patient is not on potassium or aldactone.

## 2025-02-24 NOTE — TELEPHONE ENCOUNTER
----- Message from FERMIN Wells sent at 2/24/2025 12:37 PM EST -----  Regarding: K 5.7  Pt's most recent blood work shows K 5.7.  Creatinine normal .  Can you please call the facility and repeat BMP and make sure any medications that cause hyperkalemia are held until after repeat blood work.  TY  ----- Message -----  From: Geni Ackerman MA  Sent: 2/24/2025   7:30 AM EST  To: FERMIN Padron      ----- Message -----  From: Interface, Transcription Incoming  Sent: 2/21/2025   5:53 PM EST  To: Cardiology [facility]

## 2025-02-25 NOTE — PROGRESS NOTES
Advanced Heart Failure / Pulmonary Hypertension Outpatient Visit    Brenda Lopez 89 y.o. 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[facility]) 05/28/2021    Avascular necrosis ([facility]) 05/09/2021    Knee pain, chronic 05/09/2021    Asymptomatic bacteriuria 05/09/2021    Acquired absence of other left toe(s) ([facility]) 04/27/2021    Severe obesity (BMI 35.0-39.9) with comorbidity ([facility]) 04/27/2021    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 ([facility]) 04/27/2021    Mixed stress and urge urinary incontinence 02/05/2021    Chronic diastolic CHF (congestive heart failure) ([facility]) 02/02/2021    Generalized OA 02/02/2021    Stage 3a chronic kidney disease ([facility]) 02/02/2021    Primary osteoarthritis of both knees 10/15/2019    Effusion of left knee 10/15/2019    Ventricular tachycardia ([facility]) 08/15/2019    Cardiomyopathy ([facility]) 08/15/2019    Trochanteric bursitis of right hip 04/02/2019    Atrial flutter ([facility]) 03/04/2019    Pacemaker 01/11/2019    Ambulatory dysfunction 12/12/2018    Hyponatremia 12/11/2018    Heart block AV second degree 12/09/2018    Cervical spine degeneration 09/28/2018    Primary osteoarthritis of both shoulders 09/28/2018    Tremor 09/28/2018    Right hip pain 03/02/2018    History of placement of stent in LAD coronary artery 01/26/2018    Hyperlipidemia 01/26/2018    LBBB (left bundle branch block) 01/26/2018    Coronary artery disease involving native coronary artery of native heart without angina  pectoris 04/07/2017    Essential hypertension 04/07/2017    Chronic low back pain 08/16/2016    Acquired hypothyroidism 01/27/2016    Type 2 diabetes mellitus with stage 3a chronic kidney disease, without long-term current use of insulin ([facility]) 06/19/2015    Adhesive capsulitis 06/19/2015       Today's Plan:  Volume up on exam.  +JVD, +2 LE edema.  +Rales. Pulse ox 87-90% on RA--will increase Lasix to 40 BID and see patient in office in 2-3 weeks  CXR ordered. Pulse ox lower than baseline 87-90%. Dtr report recent cough.  No fevers.  Recent quarantine at facility  Repeat blood work prior to next appt.   Per DTR, will meet Palliative team in March  RTO with HF AP in 2-3 weeks to check volume status, need to increase diuretic.      Plan:  Acute on chronic diastolic CHF, LVEF 60%  Per Dr. Vaz 2/14: Edema, JVD, rales, mild hypoxemia, do not think hospitalization is necessary at this time--lasix increased for 4 days    Weight last OV: 175 lbs, Today 175 lbs (171 lbs at facility)    BMP 2/26/25--in media section on EPIC: , K 5.1, creat stable 0.79    Volume status: Volume up on exam.  + 2 LE edema.  +Rales.  +JVD. Pulse ox 87-90% in the office.         Hypertension  Controlled  Imdur 30 mg QD     Hyperlipidemia  Controlled on pravastatin in the past but no recent assessment     CAD  Remote LAD stent  Continues on aspirin, no ischemic symptoms     History of atrial flutter  With a history of ablation, no recurrence     PAF  In normal sinus rhythm, continues on amiodarone, 100 mg daily  Continues on Eliquis     Ventricular tachycardia  No recurrence since starting amiodarone     BIV pacer  Placed for advanced 2nd degree AVB.  Device checks remain unremarkable     Aortic Stenosis  Severe  Not a TAVR candidate at her advanced age  With current acute on chronic CHF, need to consider the possibility of hospice in the near future, but still having a reasonable quality of life and has not had repeated hospitalizations,  but should her respiratory status or quality of life or shortness of breath worsen in the near future, low threshold to transition her to hospice        HPI:   2/14/25: Dr. Vaz: Brenda Lopez is a 89 y.o. year old female with coronary artery disease, lad stent, PAF, history of atrial flutter status post ablation, second-degree heart block resulting in syncope with placement of a permanent pacemaker December 2018, ambulatory dysfunction, hypertension, hyperlipidemia presents for a follow-up visit. She feels well and has no complaints but she was hypoxemic, although weights have not changed, and no recent hospitalizations, currently taking furosemide 40 mg alternating with 20 mg every other day.  Blood pressure is satisfactory, renal function last summer was normal, continues on Eliquis and amiodarone, with no arrhythmia recurrence by symptoms either VT or PAF.    2/25/25: 2 week f/u after diuretics increased.  LE edema increased from last week.  States she voided more and had less swelling with escalated diuretic dose 2 weeks ago. Weight at facility this morning 171 lbs.--weighed weekly.  Labs reviewed with patient/dtr. Denies SOB, CP, dizziness, palpitations.  Dtr concerned about her cough, raspy voice after recent facility quarantine d/t virus.  Drinks less than 2 liters/day- not thirsty.  Does not walk well-- mostly confined to a w/c.  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   HENT: Negative.     Eyes: Negative.    Respiratory:  Positive for cough.    Cardiovascular:  Positive for leg swelling. Negative for chest pain and palpitations.   Gastrointestinal: Negative.    Endocrine: Negative.    Genitourinary: Negative.    Musculoskeletal: Negative.    Skin: Negative.    Allergic/Immunologic: Negative.    Neurological: Negative.    Hematological: Negative.    Psychiatric/Behavioral: Negative.         Allergies   Allergen Reactions    Atorvastatin      Reaction unknown 10/23/2018-    Other Rash     Patient sensitive to adhesives from tape         Current Outpatient Medications:     Accu-Chek FastClix Lancets MISC, Check once daily, Disp: 100 each, Rfl: 3    Accu-Chek Softclix Lancets lancets, Use daily Use as instructed, Disp: 100 each, Rfl: 1    acetaminophen (TYLENOL) 325 mg tablet, Take 975 mg by mouth 3 (three) times a day, Disp: , Rfl:     amiodarone 200 mg tablet, Take 0.5 tablets (100 mg total) by mouth daily, Disp: 45 tablet, Rfl: 3    Aspirin Low Dose 81 MG chewable tablet, Hold aspirin for 4 days until 6/30 and can resume 7/1, Disp: , Rfl:     Diclofenac Sodium (VOLTAREN) 1 %, Apply 2 g topically 4 (four) times a day Bilateral shoulders and knees, Disp: 150 g, Rfl: 0    Eliquis 5 MG, Hold 4 doses starting today and resume Friday 6/28 pm dose, Disp: , Rfl:     ezetimibe (ZETIA) 10 mg tablet, Take 1 tablet (10 mg total) by mouth daily, Disp: 30 tablet, Rfl: 3    famotidine (PEPCID) 20 mg tablet, Take 20 mg by mouth 2 (two) times a day, Disp: , Rfl:     furosemide (LASIX) 40 mg tablet, Take 1 tablet (40 mg total) by mouth 2 (two) times a day Take 1 tab twice daily (early AM, mid PM) for 4 days then cut down to 1 tab daily., Disp: 98 tablet, Rfl: 3    glucose blood (Accu-Chek  Guide) test strip, Check once daily, Disp: 100 each, Rfl: 3    ipratropium (ATROVENT) 0.06 % nasal spray, 2 sprays into each nostril 3 (three) times a day, Disp: 15 mL, Rfl: 6    isosorbide mononitrate (IMDUR) 30 mg 24 hr tablet, Take 1 tablet (30 mg total) by mouth daily, Disp: 90 tablet, Rfl: 3    levothyroxine 112 mcg tablet, Take 112 mcg by mouth every morning Take on an empty stomach, Disp: , Rfl:     Lidocaine 4 % PTCH, Apply 3 patches topically daily B/l knees, low back, Disp: , Rfl:     oxyCODONE-acetaminophen (PERCOCET) 5-325 mg per tablet, TAKE ONE TABLET BY MOUTH EVERY TWELVE HOURS AS NEEDED FOR SEVERE pain, Disp: , Rfl:     polyethylene glycol (GLYCOLAX) 17 GM/SCOOP, , Disp: , Rfl:     polyethylene glycol (MIRALAX) 17 g packet, Take 17 g by mouth daily, Disp:  , Rfl: 0    pravastatin (PRAVACHOL) 80 mg tablet, TAKE ONE TABLET BY MOUTH ONCE EVERY DAY FOR CHOLESTEROL, Disp: , Rfl:     tiZANidine (ZANAFLEX) 2 mg tablet, Take 1 tablet (2 mg total) by mouth daily at bedtime, Disp: 30 tablet, Rfl: 2    traMADol (ULTRAM) 50 mg tablet, Take 50 mg by mouth every 8 (eight) hours as needed, Disp: , Rfl:     amoxicillin (AMOXIL) 500 mg capsule, Take 2,000 mg by mouth 1 HOUR PRIOR TO DENTAL PROCEDURE (Patient not taking: Reported on 2/28/2025), Disp: , Rfl:     loperamide (IMODIUM) 2 mg capsule, Take 2 mg by mouth 4 (four) times a day as needed for diarrhea (Patient not taking: Reported on 2/28/2025), Disp: , Rfl:     melatonin 3 mg, Take 3 mg by mouth daily at bedtime (Patient not taking: Reported on 2/28/2025), Disp: , Rfl:     menthol-zinc oxide (Calmoseptine) 0.44-20.6 % OINT, Apply topically 2 (two) times a day (Patient not taking: Reported on 2/28/2025), Disp: , Rfl:     Mirabegron ER 25 MG TB24, Take 50 mg by mouth in the morning (Patient not taking: Reported on 2/28/2025), Disp: 90 tablet, Rfl: 3    Multiple Vitamins-Minerals (CEROVITE SENIOR PO), Take 1 tablet by mouth in the morning (Patient not taking:  "Reported on 2/28/2025), Disp: , Rfl:     Multiple Vitamins-Minerals (CertaVite Senior/Antioxidant) TABS, TAKE ONE TABLET BY MOUTH ONCE EVERY DAY (Patient not taking: Reported on 2/14/2025), Disp: , Rfl:     Theratears 0.25 % SOLN, instill ONE drop into each eye three times a day (Patient not taking: Reported on 2/28/2025), Disp: , Rfl:     Zinc Oxide POWD, Use 1 Application as needed (with soilage) (Patient not taking: Reported on 12/14/2023), Disp: , Rfl:     Social History     Socioeconomic History    Marital status: /Civil Union     Spouse name: Not on file    Number of children: Not on file    Years of education: Not on file    Highest education level: Not on file   Occupational History    Not on file   Tobacco Use    Smoking status: Never    Smokeless tobacco: Never   Vaping Use    Vaping status: Never Used   Substance and Sexual Activity    Alcohol use: Yes     Comment: occasional    Drug use: Not Currently     Types: Marijuana     Comment: MEDICAL MARIJUANA-HAS NOT USED FOR 3 WEEKS    Sexual activity: Not Currently   Other Topics Concern    Not on file   Social History Narrative    Not on file     Social Drivers of Health     Financial Resource Strain: Not on file   Food Insecurity: No Food Insecurity (6/28/2021)    Hunger Vital Sign     Worried About Running Out of Food in the Last Year: Never true     Ran Out of Food in the Last Year: Never true   Transportation Needs: No Transportation Needs (6/28/2021)    PRAPARE - Transportation     Lack of Transportation (Medical): No     Lack of Transportation (Non-Medical): No   Physical Activity: Not on file   Stress: Not on file   Social Connections: Not on file   Intimate Partner Violence: Not on file   Housing Stability: Not on file     Family History   Problem Relation Age of Onset    Parkinsonism Sister     Cancer Sister         unknown type       Vitals:   Blood pressure 100/70, pulse 70, height 5' 3\" (1.6 m), weight 79.4 kg (175 lb), SpO2 (!) 88%, not " "currently breastfeeding.    Wt Readings from Last 10 Encounters:   25 79.4 kg (175 lb)   25 79.4 kg (175 lb)   24 78.5 kg (173 lb)   24 80.3 kg (177 lb)   24 80.3 kg (177 lb)   23 80.3 kg (177 lb)   10/05/23 87.8 kg (193 lb 9 oz)   23 81.6 kg (179 lb 14.3 oz)   23 82.6 kg (182 lb)   22 82.6 kg (182 lb)     Vitals:    25 1025   BP: 100/70   BP Location: Right arm   Patient Position: Sitting   Cuff Size: Standard   Pulse: 70   SpO2: (!) 88%   Weight: 79.4 kg (175 lb)   Height: 5' 3\" (1.6 m)       Physical Exam  Constitutional:       Appearance: Normal appearance.   Neck:      Vascular: JVD present.   Cardiovascular:      Rate and Rhythm: Normal rate and regular rhythm.      Heart sounds: Murmur heard.   Pulmonary:      Effort: No respiratory distress.      Breath sounds: Examination of the right-upper field reveals rales. Examination of the left-upper field reveals rales. Examination of the right-lower field reveals rales. Examination of the left-lower field reveals rales. Rales present.      Comments: Pulse ox 87-90% on RA  Abdominal:      General: Abdomen is flat.      Palpations: Abdomen is soft.   Musculoskeletal:      Right lower le+ Edema present.      Left lower le+ Edema present.   Skin:     General: Skin is warm and dry.   Neurological:      Mental Status: She is alert and oriented to person, place, and time. Mental status is at baseline.   Psychiatric:         Behavior: Behavior is cooperative.         Cognition and Memory: Cognition normal.       Labs & Results:  Lab Results   Component Value Date    WBC 5.61 2023    HGB 10.3 (L) 2023    HCT 33.6 (L) 2023    MCV 99 (H) 2023     2023     Lab Results   Component Value Date    SODIUM 140 10/24/2023    K 4.1 10/24/2023     10/24/2023    CO2 35 (H) 10/24/2023    BUN 18 10/24/2023    CREATININE 0.80 10/24/2023    GLUC 86 10/24/2023    CALCIUM 8.9 " 10/24/2023     Lab Results   Component Value Date    INR 1.39 (H) 12/29/2023    INR 1.8 11/12/2021    INR 3.27 (H) 11/11/2021    PROTIME 17.8 (H) 12/29/2023    PROTIME 32.3 (H) 11/11/2021    PROTIME 22.4 (H) 08/01/2021     Lab Results   Component Value Date     (H) 05/15/2015        FERMIN Padron

## 2025-02-28 ENCOUNTER — OFFICE VISIT (OUTPATIENT)
Dept: CARDIOLOGY CLINIC | Facility: CLINIC | Age: OVER 89
End: 2025-02-28
Payer: MEDICARE

## 2025-02-28 VITALS
SYSTOLIC BLOOD PRESSURE: 100 MMHG | OXYGEN SATURATION: 88 % | DIASTOLIC BLOOD PRESSURE: 70 MMHG | HEART RATE: 70 BPM | HEIGHT: 63 IN | WEIGHT: 175 LBS | BODY MASS INDEX: 31.01 KG/M2

## 2025-02-28 DIAGNOSIS — I50.32 CHRONIC DIASTOLIC CHF (CONGESTIVE HEART FAILURE) (HCC): Primary | ICD-10-CM

## 2025-02-28 DIAGNOSIS — I50.9 CONGESTIVE HEART FAILURE, UNSPECIFIED HF CHRONICITY, UNSPECIFIED HEART FAILURE TYPE (HCC): ICD-10-CM

## 2025-02-28 DIAGNOSIS — I48.0 PAF (PAROXYSMAL ATRIAL FIBRILLATION) (HCC): ICD-10-CM

## 2025-02-28 PROCEDURE — 99214 OFFICE O/P EST MOD 30 MIN: CPT

## 2025-02-28 RX ORDER — FUROSEMIDE 40 MG/1
40 TABLET ORAL 2 TIMES DAILY
Qty: 98 TABLET | Refills: 3 | Status: SHIPPED | OUTPATIENT
Start: 2025-02-28

## 2025-02-28 NOTE — PATIENT INSTRUCTIONS
Daily weights--report weight gain 3 lbs in 24 hrs, 5 lbs in 1 week    Low sodium diet    Increase Furosemide 40 mg to twice per day    Repeat blood work in 1 weeks--BMP, BNP    CXR today if possible

## 2025-03-06 ENCOUNTER — TELEPHONE (OUTPATIENT)
Age: OVER 89
End: 2025-03-06

## 2025-03-06 NOTE — TELEPHONE ENCOUNTER
Spoke with Diann Cartagena at [facility].  She will fax the CXR to the office.

## 2025-03-06 NOTE — TELEPHONE ENCOUNTER
Received a call from patient's daughter Pallavi.  Pallavi was with patient at  with [facility] on 2/28.    Pallavi called about the chest x ray ordered by [facility].  Pallavi said [facility] was [facility] that same day. and she was told by Hector David that the result was faxed to [facility]issa / Cardiology office.      Pallavi asked if the result was received and if not, she asked if someone would contact Hector David.

## 2025-03-08 NOTE — ED NOTES
Patient placed on 2L NC     Edil Wilson  12/29/23 [id number]     Stroke Education provided to spouse/SO and the following topics were discussed    1. Patients personal risk factors for stroke are diabetes mellitus and hypertension    2. Warning signs of Stroke:        * Sudden numbness or weakness of the face, arm or leg, especially on one side of          The body            * Sudden confusion, trouble speaking or understanding        * Sudden trouble seeing in one or both eyes        * Sudden trouble walking, dizziness, loss of balance or coordination        * Sudden severe headache with no known cause      3. Importance of activation Emergency Medical Services ( 9-1-1 ) immediately if experience any warning signs of stroke.    4. Be sure and schedule a follow-up appointment with your primary care doctor or any specialists as instructed.     5. You must take medicine every day to treat your risk factors for stroke.  Be sure to take your medicines exactly as your doctor tells you: no more, no less.  Know what your medicines are for , what they do.  Anti-thrombotics /anticoagulants can help prevent strokes.  You are taking the following medicine(s)  Asprin     6.  Smoking and second-hand smoke greatly increase your risk of stroke, cardiovascular disease and death. Smoking history cigarettes, several per day or ended year 2017    7. Information provided was [facility] Stroke Education Binder, Stroke Handouts, or Verbal Education    8. Documentation of teaching completed in Patient Education Activity and on Care Plan with teaching response noted?  yes

## 2025-03-12 PROBLEM — I35.0 AORTIC STENOSIS, SEVERE: Chronic | Status: ACTIVE | Noted: 2021-06-02

## 2025-03-12 PROBLEM — N18.31 STAGE 3A CHRONIC KIDNEY DISEASE (HCC): Chronic | Status: ACTIVE | Noted: 2021-02-02

## 2025-03-12 PROBLEM — N18.30 HYPERTENSIVE HEART AND KIDNEY DISEASE WITH HF AND WITH CKD STAGE III (HCC): Status: RESOLVED | Noted: 2021-04-27 | Resolved: 2025-03-12

## 2025-03-12 PROBLEM — I13.0 HYPERTENSIVE HEART AND KIDNEY DISEASE WITH HF AND WITH CKD STAGE III (HCC): Status: RESOLVED | Noted: 2021-04-27 | Resolved: 2025-03-12

## 2025-03-12 PROBLEM — I44.1 HEART BLOCK AV SECOND DEGREE: Chronic | Status: ACTIVE | Noted: 2018-12-09

## 2025-03-12 PROBLEM — I42.9 CARDIOMYOPATHY (HCC): Status: RESOLVED | Noted: 2019-08-15 | Resolved: 2025-03-12

## 2025-03-12 PROBLEM — I48.91: Chronic | Status: ACTIVE | Noted: 2019-03-04

## 2025-03-12 PROBLEM — I48.92: Chronic | Status: ACTIVE | Noted: 2019-03-04

## 2025-03-12 PROBLEM — R82.71 ASYMPTOMATIC BACTERIURIA: Status: RESOLVED | Noted: 2021-05-09 | Resolved: 2025-03-12

## 2025-03-12 PROBLEM — I50.32 CHRONIC HEART FAILURE WITH PRESERVED EJECTION FRACTION (HFPEF, >= 50%) (HCC): Chronic | Status: ACTIVE | Noted: 2021-02-02

## 2025-03-12 PROBLEM — K52.9 COLITIS: Status: RESOLVED | Noted: 2021-05-28 | Resolved: 2025-03-12

## 2025-03-12 NOTE — PROGRESS NOTES
General Cardiology Outpatient Visit    Brenda Lopez 89 y.o. 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[facility]) 05/28/2021    Avascular necrosis ([facility]) 05/09/2021    Knee pain, chronic 05/09/2021    Acquired absence of other left toe(s) ([facility]) 04/27/2021    Severe obesity (BMI 35.0-39.9) with comorbidity ([facility]) 04/27/2021    Mixed stress and urge urinary incontinence 02/05/2021    Chronic heart failure with preserved ejection fraction (HFpEF, >= 50%) ([facility]) 02/02/2021    Generalized OA 02/02/2021    Stage 3a chronic kidney disease ([facility]) 02/02/2021    Primary osteoarthritis of both knees 10/15/2019    Effusion of left knee 10/15/2019    Ventricular tachycardia ([facility]) 08/15/2019    Trochanteric bursitis of right hip 04/02/2019    Atrial fibrillation/flutter ([facility]) 03/04/2019    Pacemaker 01/11/2019    Ambulatory dysfunction 12/12/2018    Hyponatremia 12/11/2018    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 12/09/2018    Cervical spine degeneration 09/28/2018    Primary osteoarthritis of both shoulders 09/28/2018    Tremor 09/28/2018    Right hip pain 03/02/2018    History of placement of stent in LAD coronary artery 01/26/2018    Hyperlipidemia 01/26/2018    LBBB (left bundle branch block) 01/26/2018    Coronary artery disease involving native coronary artery of native heart without angina pectoris 04/07/2017    Essential hypertension 04/07/2017    Chronic low back pain 08/16/2016    Acquired hypothyroidism 01/27/2016    Type 2 diabetes mellitus with stage 3a chronic kidney disease, without long-term current use of insulin  "(HCC) 06/19/2015    Adhesive capsulitis 06/19/2015       Today's Plan:  Down ~4 lbs since last visit (not weighed in office today).   Now back on Lasix 40 mg daily - will continue this dose.   Recommendation sent to facility to increase frequency of weight checks to 2-3 times weekly, if not daily.     Plan:  Chronic HFpEF; LVEF 60%   Weight of 175 lbs on 02/28. Today, weighs ? lbs.    Most recent BMP from 03/06/2025: sodium 142; potassium 4.7; BUN 31; creatinine 0.91; eGFR 58.    Guideline-Directed Medical Therapy:  --Aldosterone Antagonist: No.   --SGLT2 Inhibitor: No.     Volume Management:  --Diuretic: Lasix 40 mg daily.    Atrial fibrillation / flutter, paroxysmal   [id number] = 9 (age, sex, HF, HTN, DVT/PE, CAD, DM).   Anticoagulation on Eliquis.   Rate control: none.   Rhythm control: amiodarone 100 mg daily.    Coronary artery disease: s/p remote LAD stenting. Continues on ASA, Imdur, Zetia, and statin.  Aortic stenosis, severe: not a candidate for TAVR.  Hypertension  Hyperlipidemia  History of ventricular tachycardia  History of high degree AV block: s/p BiV PPM.  Chronic kidney disease, stage III  Diabetes mellitus, type II  Hypothyroidism  History of PE/DVT  Chronic pain    HPI:   Brenda Lopez is a 89-year-old woman with a PMH as above who presents to the office for follow-up. Follows with Dr. Vaz.     02/28/2025 with TA: \"2 week f/u after diuretics increased.  LE edema increased from last week.  States she voided more and had less swelling with escalated diuretic dose 2 weeks ago. Weight at facility this morning 171 lbs.--weighed weekly.  Labs reviewed with patient/dtr. Denies SOB, CP, dizziness, palpitations. Dtr concerned about her cough, raspy voice after recent facility quarantine d/t virus.  Drinks less than 2 liters/day- not thirsty. Does not walk well-- mostly confined to a w/c. Meeting with palliative care in March.\" Lasix increased to 40 mg BID.     03/14/2025: Patient presents with " her daughter for follow-up. Feeling well today with no current cardiac complaints. Reviewed weight log provided by facility; being weighed weekly.  Weight on 03/11 of 171 lbs. Now back on Lasix 40 mg daily.  Planning to enroll in VNA program through [facility]; patient will be visited at home every 2 weeks by staff for assessment.    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    Asymptomatic bacteriuria 05/09/2021    Bundle branch block, left     Cardiomyopathy (HCC)     Chest pain 03/09/2019    Chronic pain     chronic b/l shoulder pain    Chronic pain of right knee 04/02/2019    Colitis 05/28/2021    Coronary artery disease     Diabetes mellitus (HCC)     Gait disturbance 03/02/2018    GERD (gastroesophageal reflux disease)     H/O atrial flutter     History of DVT (deep vein thrombosis)     History of pulmonary embolism     Hyperlipidemia     Hypertension     Hypothyroid     Renal calculi        Review of Systems   Constitutional:  Negative for activity change, fatigue and unexpected weight change.   Respiratory:  Negative for cough, chest tightness and shortness of breath.    Cardiovascular:  Negative for chest pain, palpitations and leg swelling.   Gastrointestinal:  Negative for abdominal distention and abdominal pain.   Genitourinary:  Negative for decreased urine volume.   Neurological:  Negative for dizziness, syncope, weakness and light-headedness.   Psychiatric/Behavioral:  Negative for confusion and sleep disturbance. The patient is not nervous/anxious.        Allergies   Allergen Reactions    Atorvastatin      Reaction unknown 10/23/2018-    Other Rash     Patient sensitive to adhesives from tape         Current Outpatient Medications:     Accu-Chek FastClix Lancets MISC, Check once daily, Disp: 100 each, Rfl: 3    Accu-Chek Softclix Lancets lancets, Use daily  Use as instructed, Disp: 100 each, Rfl: 1    acetaminophen (TYLENOL) 325 mg tablet, Take 975 mg by mouth 3 (three) times a day, Disp: , Rfl:     amiodarone 200 mg tablet, Take 0.5 tablets (100 mg total) by mouth daily, Disp: 45 tablet, Rfl: 3    Aspirin Low Dose 81 MG chewable tablet, Hold aspirin for 4 days until 6/30 and can resume 7/1, Disp: , Rfl:     dextromethorphan-guaiFENesin (ROBITUSSIN DM)  mg/5 mL oral syrup, Take 5 mL by mouth every 12 (twelve) hours, Disp: , Rfl:     Diclofenac Sodium (VOLTAREN) 1 %, Apply 2 g topically 4 (four) times a day Bilateral shoulders and knees, Disp: 150 g, Rfl: 0    Eliquis 5 MG, Hold 4 doses starting today and resume Friday 6/28 pm dose, Disp: , Rfl:     ezetimibe (ZETIA) 10 mg tablet, Take 1 tablet (10 mg total) by mouth daily, Disp: 30 tablet, Rfl: 3    famotidine (PEPCID) 20 mg tablet, Take 20 mg by mouth 2 (two) times a day, Disp: , Rfl:     furosemide (LASIX) 40 mg tablet, Take 1 tablet (40 mg total) by mouth daily, Disp: , Rfl:     glucose blood (Accu-Chek Guide) test strip, Check once daily, Disp: 100 each, Rfl: 3    ipratropium (ATROVENT) 0.06 % nasal spray, 2 sprays into each nostril 3 (three) times a day, Disp: 15 mL, Rfl: 6    isosorbide mononitrate (IMDUR) 30 mg 24 hr tablet, Take 1 tablet (30 mg total) by mouth daily, Disp: 90 tablet, Rfl: 3    levothyroxine 112 mcg tablet, Take 125 mcg by mouth every morning Take on an empty stomach, Disp: , Rfl:     Lidocaine 4 % PTCH, Apply 3 patches topically daily B/l knees, low back, Disp: , Rfl:     Mirabegron ER (Myrbetriq) 25 MG TB24, Take by mouth, Disp: , Rfl:     Multiple Vitamins-Minerals (CEROVITE SENIOR PO), Take 1 tablet by mouth in the morning, Disp: , Rfl:     oxyCODONE-acetaminophen (PERCOCET) 5-325 mg per tablet, TAKE ONE TABLET BY MOUTH EVERY TWELVE HOURS AS NEEDED FOR SEVERE pain, Disp: , Rfl:     polyethylene glycol (MIRALAX) 17 g packet, Take 17 g by mouth daily, Disp:  , Rfl: 0    pravastatin  (PRAVACHOL) 80 mg tablet, TAKE ONE TABLET BY MOUTH ONCE EVERY DAY FOR CHOLESTEROL, Disp: , Rfl:     tiZANidine (ZANAFLEX) 2 mg tablet, Take 1 tablet (2 mg total) by mouth daily at bedtime, Disp: 30 tablet, Rfl: 2    traMADol (ULTRAM) 50 mg tablet, Take 50 mg by mouth every 8 (eight) hours as needed, Disp: , Rfl:     amoxicillin (AMOXIL) 500 mg capsule, Take 2,000 mg by mouth 1 HOUR PRIOR TO DENTAL PROCEDURE (Patient not taking: Reported on 2/28/2025), Disp: , Rfl:     loperamide (IMODIUM) 2 mg capsule, Take 2 mg by mouth 4 (four) times a day as needed for diarrhea (Patient not taking: Reported on 2/28/2025), Disp: , Rfl:     melatonin 3 mg, Take 3 mg by mouth daily at bedtime (Patient not taking: Reported on 2/28/2025), Disp: , Rfl:     menthol-zinc oxide (Calmoseptine) 0.44-20.6 % OINT, Apply topically 2 (two) times a day (Patient not taking: Reported on 2/28/2025), Disp: , Rfl:     Mirabegron ER 25 MG TB24, Take 50 mg by mouth in the morning (Patient not taking: Reported on 2/28/2025), Disp: 90 tablet, Rfl: 3    Multiple Vitamins-Minerals (CertaVite Senior/Antioxidant) TABS, TAKE ONE TABLET BY MOUTH ONCE EVERY DAY (Patient not taking: Reported on 3/14/2025), Disp: , Rfl:     polyethylene glycol (GLYCOLAX) 17 GM/SCOOP, , Disp: , Rfl:     Theratears 0.25 % SOLN, instill ONE drop into each eye three times a day (Patient not taking: Reported on 2/28/2025), Disp: , Rfl:     Zinc Oxide POWD, Use 1 Application as needed (with soilage) (Patient not taking: Reported on 12/14/2023), Disp: , Rfl:     Social History     Socioeconomic History    Marital status: /Civil Union     Spouse name: Not on file    Number of children: Not on file    Years of education: Not on file    Highest education level: Not on file   Occupational History    Not on file   Tobacco Use    Smoking status: Never    Smokeless tobacco: Never   Vaping Use    Vaping status: Never Used   Substance and Sexual Activity    Alcohol use: Yes     Comment:  occasional    Drug use: Not Currently     Types: Marijuana     Comment: MEDICAL MARIJUANA-HAS NOT USED FOR 3 WEEKS    Sexual activity: Not Currently   Other Topics Concern    Not on file   Social History Narrative    Not on file     Social Drivers of Health     Financial Resource Strain: Not on file   Food Insecurity: No Food Insecurity (6/28/2021)    Hunger Vital Sign     Worried About Running Out of Food in the Last Year: Never true     Ran Out of Food in the Last Year: Never true   Transportation Needs: No Transportation Needs (6/28/2021)    PRAPARE - Transportation     Lack of Transportation (Medical): No     Lack of Transportation (Non-Medical): No   Physical Activity: Not on file   Stress: Not on file   Social Connections: Not on file   Intimate Partner Violence: Not on file   Housing Stability: Not on file     Family History   Problem Relation Age of Onset    Parkinsonism Sister     Cancer Sister         unknown type       Vitals:   Blood pressure 108/58, pulse 77, SpO2 94%, not currently breastfeeding.    Wt Readings from Last 10 Encounters:   02/28/25 79.4 kg (175 lb)   02/14/25 79.4 kg (175 lb)   06/26/24 78.5 kg (173 lb)   02/26/24 80.3 kg (177 lb)   01/22/24 80.3 kg (177 lb)   12/14/23 80.3 kg (177 lb)   10/05/23 87.8 kg (193 lb 9 oz)   09/27/23 81.6 kg (179 lb 14.3 oz)   03/30/23 82.6 kg (182 lb)   11/04/22 82.6 kg (182 lb)     Vitals:    03/14/25 1126   BP: 108/58   BP Location: Right arm   Patient Position: Sitting   Cuff Size: Standard   Pulse: 77   SpO2: 94%       Physical Exam  Vitals reviewed.   Constitutional:       General: She is awake. She is not in acute distress.     Appearance: Normal appearance. She is well-developed. She is not toxic-appearing or diaphoretic.      Comments: Seated in wheelchair   HENT:      Head: Normocephalic.      Nose: Nose normal.   Neck:      Vascular: No JVD.   Cardiovascular:      Rate and Rhythm: Normal rate and regular rhythm.      Heart sounds: Murmur heard.       Systolic murmur is present.   Pulmonary:      Effort: Pulmonary effort is normal. No tachypnea, bradypnea or respiratory distress.      Breath sounds: Normal air entry. No decreased air movement. No decreased breath sounds.   Abdominal:      General: There is no distension.   Musculoskeletal:      Cervical back: Neck supple.      Right lower leg: No edema.      Left lower leg: No edema.   Skin:     General: Skin is warm and dry.      Coloration: Skin is pale. Skin is not jaundiced.   Neurological:      Mental Status: She is alert and oriented to person, place, and time.   Psychiatric:         Attention and Perception: Attention normal.         Mood and Affect: Mood and affect normal.         Speech: Speech normal.         Behavior: Behavior normal. Behavior is cooperative.         Thought Content: Thought content normal.       Labs & Results:  Lab Results   Component Value Date    WBC 5.61 12/29/2023    HGB 10.3 (L) 12/29/2023    HCT 33.6 (L) 12/29/2023    MCV 99 (H) 12/29/2023     12/29/2023     Lab Results   Component Value Date    SODIUM 140 10/24/2023    K 4.1 10/24/2023     10/24/2023    CO2 35 (H) 10/24/2023    BUN 18 10/24/2023    CREATININE 0.80 10/24/2023    GLUC 86 10/24/2023    CALCIUM 8.9 10/24/2023     Lab Results   Component Value Date    INR 1.39 (H) 12/29/2023    INR 1.8 11/12/2021    INR 3.27 (H) 11/11/2021    PROTIME 17.8 (H) 12/29/2023    PROTIME 32.3 (H) 11/11/2021    PROTIME 22.4 (H) 08/01/2021     Lab Results   Component Value Date     (H) 05/15/2015      Lu Anderson PA-C

## 2025-03-14 ENCOUNTER — OFFICE VISIT (OUTPATIENT)
Dept: CARDIOLOGY CLINIC | Facility: CLINIC | Age: OVER 89
End: 2025-03-14
Payer: MEDICARE

## 2025-03-14 VITALS — SYSTOLIC BLOOD PRESSURE: 108 MMHG | DIASTOLIC BLOOD PRESSURE: 58 MMHG | HEART RATE: 77 BPM | OXYGEN SATURATION: 94 %

## 2025-03-14 DIAGNOSIS — I48.92 ATRIAL FIBRILLATION/FLUTTER (HCC): Chronic | ICD-10-CM

## 2025-03-14 DIAGNOSIS — I50.32 CHRONIC HEART FAILURE WITH PRESERVED EJECTION FRACTION (HFPEF, >= 50%) (HCC): Primary | Chronic | ICD-10-CM

## 2025-03-14 DIAGNOSIS — I50.9 CONGESTIVE HEART FAILURE, UNSPECIFIED HF CHRONICITY, UNSPECIFIED HEART FAILURE TYPE (HCC): ICD-10-CM

## 2025-03-14 DIAGNOSIS — I48.91 ATRIAL FIBRILLATION/FLUTTER (HCC): Chronic | ICD-10-CM

## 2025-03-14 PROCEDURE — 99214 OFFICE O/P EST MOD 30 MIN: CPT | Performed by: PHYSICIAN ASSISTANT

## 2025-03-14 RX ORDER — GUAIFENESIN AND DEXTROMETHORPHAN HYDROBROMIDE 10; 100 MG/5ML; MG/5ML
5 SYRUP ORAL EVERY 12 HOURS
COMMUNITY

## 2025-03-14 RX ORDER — MIRABEGRON 25 MG/1
TABLET, FILM COATED, EXTENDED RELEASE ORAL
COMMUNITY

## 2025-03-14 RX ORDER — FUROSEMIDE 40 MG/1
40 TABLET ORAL DAILY
Status: SHIPPED
Start: 2025-03-14

## 2025-03-14 NOTE — PATIENT INSTRUCTIONS
Continue current medications, including Lasix 40 mg daily.   Recommend more frequent weight checks.     Please weigh yourself every day (after emptying your bladder) and keep a detailed log of weights.   Contact the Heart Failure program at [phone] if you gain 3+ lbs overnight or 5+ lbs in 5-7 days.  Limit daily sodium/salt intake to 2000 mg daily to prevent fluid retention.  Avoid canned foods, fast food/Chinese food, and processed meats (hot dogs, lunch meat, and sausage etc.). Caution with condiments.  Limit fluid intake to 2000 mL or 2 liters (about 60-65 ounces) daily.  Avoid electrolyte replacement drinks (such as Gatorade, Pedialyte, Propel, Liquid IV, etc.).  Bring complete list of medications and log of daily weights to your follow-up appointment.

## 2025-03-19 ENCOUNTER — TELEPHONE (OUTPATIENT)
Age: OVER 89
End: 2025-03-19

## 2025-03-19 NOTE — TELEPHONE ENCOUNTER
Received a call from Christie at [facility]  has recent bw .     Will fax over the results. Advised to make it att to CHF nurses. [place] office.   Scanned results are in the system also from 3/6/2025    Please look out for BW.

## 2025-03-24 ENCOUNTER — REMOTE DEVICE CLINIC VISIT (OUTPATIENT)
Dept: CARDIOLOGY CLINIC | Facility: CLINIC | Age: OVER 89
End: 2025-03-24
Payer: MEDICARE

## 2025-03-24 DIAGNOSIS — I48.0 PAF (PAROXYSMAL ATRIAL FIBRILLATION) (HCC): Primary | ICD-10-CM

## 2025-03-24 DIAGNOSIS — Z95.0 PRESENCE OF CARDIAC PACEMAKER: ICD-10-CM

## 2025-03-24 DIAGNOSIS — I47.20 VENTRICULAR TACHYCARDIA (HCC): ICD-10-CM

## 2025-03-24 PROCEDURE — 93296 REM INTERROG EVL PM/IDS: CPT | Performed by: INTERNAL MEDICINE

## 2025-03-24 PROCEDURE — 93294 REM INTERROG EVL PM/LDLS PM: CPT | Performed by: INTERNAL MEDICINE

## 2025-03-24 NOTE — PROGRESS NOTES
Results for orders placed or performed in visit on 03/24/25   Cardiac EP device report    Narrative    MDT-DUAL CHAMBER PPM (DDDR MODE) - ACTIVE SYSTEM IS MRI CONDITIONAL  CARELINK TRANSMISSION:  BATTERY VOLTAGE ADEQUATE (5.7 YRS).  AP 28.9%   99.9% (>40% AVB/DDDR 60 BPM). ALL AVAILABLE LEAD PARAMETERS WITHIN NORMAL LIMITS. TIME IN AT/AF <0.1%.  NO SIGNIFICANT HIGH RATE EPISODES.  NORMAL DEVICE FUNCTION.  PAS

## 2025-03-25 ENCOUNTER — RESULTS FOLLOW-UP (OUTPATIENT)
Dept: CARDIOLOGY CLINIC | Facility: CLINIC | Age: OVER 89
End: 2025-03-25

## 2025-04-16 ENCOUNTER — TELEPHONE (OUTPATIENT)
Age: OVER 89
End: 2025-04-16

## 2025-04-16 NOTE — TELEPHONE ENCOUNTER
Spoke to Pallavi the daughter  and is very concerned about having this dental procedure. Brenda needs to have 11 teeth extracted. [facility] advised that it would be done under local and would have her hold Eliquis 48 hrs prior. She will have multiple visits, only one or two done at a time.   Concerns for the daughter is can she handle it from a cardiology standpoint?  She currently residents at [facility] and is on the Palliative floor, not much nursing involved. Worried about the bleeding that may occur.   Per Daughter Dr Mosqueda from [facility] was going to reach out to Dr Vaz and speak about the case to see if this is a concern.   No date has been set up as of yet. Also still waiting on xrays.    Pallavi Ko (Daughter)  [phone] (Home Phone     Please review

## 2025-04-16 NOTE — TELEPHONE ENCOUNTER
Pallavi daughter called again waiting to hear if her mother could have dental procedure. Please call her at [phone].

## 2025-04-16 NOTE — TELEPHONE ENCOUNTER
Caller: Pallavi Ko    Provider: Alissa Grigsby    Call back #: [phone]    Reason for call: Patient's daughter, Pallavi, called with a request to speak to Alissa Grigsby about concerns for future tooth extractions for patient. S is requesting to pull 10-11 teeth on patient. Pallavi is concerned with her mother being on Eliquis and the bleeding not being able to stop. Pallavi is concerned about this and would like to speak to Alissa in regards to her tooth extractions. Please call Pallavi back at her number [phone]. If she is not available, leave a voicemail at her number. Thank you!

## 2025-04-17 NOTE — TELEPHONE ENCOUNTER
She can probably handle light sedation and local anesthesia but it can’t be done in the office. I already spoke with her surgeon about this. Big picture. What is the urgency or need for extraction. If she is at risk of serious infection then it may be needed.

## 2025-04-18 NOTE — TELEPHONE ENCOUNTER
"Spoke to patients daughter Pallavi.We discussed Dr. Vaz message below. Daughter is concerned about mother having surgery. Patient complains mother is not able to eat hard foods but has seen her eat hard salami and hard candy the other day. Patients daughter is worried that the surgery will bring more problems then make things better.     Daughter also said surgery was mothers idea, not surgeons. Last dental procedure pt bleed for a whole week. Also worried surgeon is \"surgery hungry\"    Advised to maybe get second opinion or speak to surgeon with concerns. Patient will call back with any questions   "

## 2025-04-18 NOTE — TELEPHONE ENCOUNTER
Caller: Pallavi Ko,daughter    Doctor: Dr. Vaz    Reason for call: Pallavi is returning a call from [facility].  Please call her.    Thank you    Call back#: [phone]

## 2025-04-22 NOTE — TELEPHONE ENCOUNTER
Patients daughter called and requested to speak to Karon again. Patients dental office called to schedule extractions and told her that she is able to have local anesthesia in office. Patients daughter wants to clarify that per Dr. Vaz message she is not able to have this done in the office.

## 2025-04-24 NOTE — TELEPHONE ENCOUNTER
"I cannot decide if teeth \"should be pulled\" that is between her and or surgeon.  She cannot be sedated in the office.  She can have local anesthesia and office pull if that is an offered plan.  Daughter and patient have to decide if they want it.  "

## 2025-06-23 ENCOUNTER — REMOTE DEVICE CLINIC VISIT (OUTPATIENT)
Dept: CARDIOLOGY CLINIC | Facility: CLINIC | Age: OVER 89
End: 2025-06-23
Payer: MEDICARE

## 2025-06-23 ENCOUNTER — RESULTS FOLLOW-UP (OUTPATIENT)
Dept: CARDIOLOGY CLINIC | Facility: CLINIC | Age: OVER 89
End: 2025-06-23

## 2025-06-23 DIAGNOSIS — Z95.0 PRESENCE OF CARDIAC PACEMAKER: Primary | ICD-10-CM

## 2025-06-23 PROCEDURE — 93296 REM INTERROG EVL PM/IDS: CPT | Performed by: STUDENT IN AN ORGANIZED HEALTH CARE EDUCATION/TRAINING PROGRAM

## 2025-06-23 PROCEDURE — 93294 REM INTERROG EVL PM/LDLS PM: CPT | Performed by: STUDENT IN AN ORGANIZED HEALTH CARE EDUCATION/TRAINING PROGRAM

## 2025-06-23 NOTE — PROGRESS NOTES
Results for orders placed or performed in visit on 06/23/25   Cardiac EP device report    Narrative    MDT-DUAL CHAMBER PPM (DDDR MODE) - ACTIVE SYSTEM IS MRI CONDITIONAL  CARELINK TRANSMISSION: BATTERY VOLTAGE ADEQUATE (5.3 YR) AP 32.6%.  99.9%. (>40%/AVB/DDDR 60 PPM, -180 ms). ALL LEAD PARAMETERS WITHIN NORMAL LIMITS. NO SIGNIFICANT HIGH RATE EPISODES. 0.6 PVC SINGLES/H. NORMAL DEVICE FUNCTION. [facility]

## (undated) DEVICE — TUBING SUCTION 5MM X 12 FT

## (undated) DEVICE — SUT VICRYL 4-0 SH 27 IN J415H

## (undated) DEVICE — STOCKINETTE REGULAR

## (undated) DEVICE — KERLIX BANDAGE ROLL: Brand: KERLIX

## (undated) DEVICE — GAUZE SPONGES,16 PLY: Brand: CURITY

## (undated) DEVICE — CURITY NON-ADHERENT STRIPS: Brand: CURITY

## (undated) DEVICE — SCD SEQUENTIAL COMPRESSION COMFORT SLEEVE MEDIUM KNEE LENGTH: Brand: KENDALL SCD

## (undated) DEVICE — SYRINGE 10ML LL

## (undated) DEVICE — GLOVE SRG BIOGEL 7

## (undated) DEVICE — 2000CC GUARDIAN II: Brand: GUARDIAN

## (undated) DEVICE — UNIVERSAL  MINOR EXTREMITY PK: Brand: CARDINAL HEALTH

## (undated) DEVICE — 10FR FRAZIER SUCTION HANDLE: Brand: CARDINAL HEALTH

## (undated) DEVICE — NEEDLE 18 G X 1 1/2

## (undated) DEVICE — SUT VICRYL 3-0 SH 27 IN J416H

## (undated) DEVICE — SUT PROLENE 4-0 PS-2 18 IN 8682H

## (undated) DEVICE — NEEDLE 25G X 1 1/2

## (undated) DEVICE — CUFF TOURNIQUET 18 X 4 IN QUICK CONNECT DISP 1 BLADDER

## (undated) DEVICE — ACE WRAP 4 IN UNSTERILE

## (undated) DEVICE — SYRINGE 3ML LL

## (undated) DEVICE — REM POLYHESIVE ADULT PATIENT RETURN ELECTRODE: Brand: VALLEYLAB

## (undated) DEVICE — INTENDED FOR TISSUE SEPARATION, AND OTHER PROCEDURES THAT REQUIRE A SHARP SURGICAL BLADE TO PUNCTURE OR CUT.: Brand: BARD-PARKER ® CARBON RIB-BACK BLADES

## (undated) DEVICE — CHLORAPREP HI-LITE 26ML ORANGE

## (undated) DEVICE — PAD CAST 4 IN COTTON NON STERILE